# Patient Record
Sex: FEMALE | Race: WHITE | Employment: OTHER | URBAN - NONMETROPOLITAN AREA
[De-identification: names, ages, dates, MRNs, and addresses within clinical notes are randomized per-mention and may not be internally consistent; named-entity substitution may affect disease eponyms.]

---

## 2017-01-20 ENCOUNTER — TELEPHONE (OUTPATIENT)
Dept: GASTROENTEROLOGY | Age: 57
End: 2017-01-20

## 2017-11-01 ENCOUNTER — HOSPITAL ENCOUNTER (INPATIENT)
Facility: HOSPITAL | Age: 57
LOS: 6 days | Discharge: HOME | End: 2017-11-07
Attending: INTERNAL MEDICINE | Admitting: FAMILY MEDICINE

## 2017-11-01 ENCOUNTER — APPOINTMENT (OUTPATIENT)
Dept: GENERAL RADIOLOGY | Facility: HOSPITAL | Age: 57
End: 2017-11-01

## 2017-11-01 ENCOUNTER — APPOINTMENT (OUTPATIENT)
Dept: CT IMAGING | Facility: HOSPITAL | Age: 57
End: 2017-11-01

## 2017-11-01 DIAGNOSIS — B19.20 HEPATITIS C VIRUS INFECTION WITHOUT HEPATIC COMA, UNSPECIFIED CHRONICITY: ICD-10-CM

## 2017-11-01 DIAGNOSIS — Z74.09 IMPAIRED FUNCTIONAL MOBILITY, BALANCE, GAIT, AND ENDURANCE: ICD-10-CM

## 2017-11-01 DIAGNOSIS — I21.4 NSTEMI (NON-ST ELEVATED MYOCARDIAL INFARCTION) (HCC): Primary | ICD-10-CM

## 2017-11-01 DIAGNOSIS — R74.8 ELEVATED LIVER ENZYMES: ICD-10-CM

## 2017-11-01 LAB
ALBUMIN SERPL-MCNC: 3.3 G/DL (ref 3.5–5)
ALBUMIN/GLOB SERPL: 1.1 G/DL (ref 1.1–2.5)
ALP SERPL-CCNC: 98 U/L (ref 24–120)
ALT SERPL W P-5'-P-CCNC: 1236 U/L (ref 0–54)
AMMONIA BLD-SCNC: 21 UMOL/L (ref 9–33)
ANION GAP SERPL CALCULATED.3IONS-SCNC: 6 MMOL/L (ref 4–13)
APTT PPP: 33 SECONDS (ref 24.1–34.8)
ARTERIAL PATENCY WRIST A: ABNORMAL
AST SERPL-CCNC: 3036 U/L (ref 7–45)
ATMOSPHERIC PRESS: 748 MMHG
BASE EXCESS BLDA CALC-SCNC: 9.6 MMOL/L (ref 0–2)
BASOPHILS # BLD AUTO: 0.05 10*3/MM3 (ref 0–0.2)
BASOPHILS NFR BLD AUTO: 0.4 % (ref 0–2)
BDY SITE: ABNORMAL
BILIRUB SERPL-MCNC: 0.5 MG/DL (ref 0.1–1)
BODY TEMPERATURE: 37 C
BUN BLD-MCNC: 26 MG/DL (ref 5–21)
BUN/CREAT SERPL: 18.4 (ref 7–25)
CALCIUM SPEC-SCNC: 8.6 MG/DL (ref 8.4–10.4)
CHLORIDE SERPL-SCNC: 93 MMOL/L (ref 98–110)
CK MB SERPL-CCNC: 6.32 NG/ML (ref 0–5)
CK MB SERPL-RTO: 3.8 % (ref 0–5.7)
CK SERPL-CCNC: 166 U/L (ref 0–203)
CO2 SERPL-SCNC: 38 MMOL/L (ref 24–31)
COHGB MFR BLD: 3.3 % (ref 0–5)
CREAT BLD-MCNC: 1.41 MG/DL (ref 0.5–1.4)
DEPRECATED RDW RBC AUTO: 51.4 FL (ref 40–54)
EOSINOPHIL # BLD AUTO: 0.07 10*3/MM3 (ref 0–0.7)
EOSINOPHIL NFR BLD AUTO: 0.5 % (ref 0–4)
ERYTHROCYTE [DISTWIDTH] IN BLOOD BY AUTOMATED COUNT: 13.9 % (ref 12–15)
ETHANOL UR QL: <0.01 %
GAS FLOW AIRWAY: 2 LPM
GFR SERPL CREATININE-BSD FRML MDRD: 39 ML/MIN/1.73
GLOBULIN UR ELPH-MCNC: 3 GM/DL
GLUCOSE BLD-MCNC: 88 MG/DL (ref 70–100)
HAV IGM SERPL QL IA: NEGATIVE
HBV CORE IGM SERPL QL IA: NEGATIVE
HBV SURFACE AG SERPL QL IA: NEGATIVE
HCO3 BLDA-SCNC: 37.5 MMOL/L (ref 20–26)
HCT VFR BLD AUTO: 39.4 % (ref 37–47)
HCT VFR BLD CALC: 35.9 % (ref 38–51)
HCV AB SER DONR QL: REACTIVE
HCV S/C RATIO: 35.5 (ref 0–0.99)
HGB BLD-MCNC: 11.6 G/DL (ref 12–16)
HGB BLDA-MCNC: 11.7 G/DL (ref 13.5–17.5)
IMM GRANULOCYTES # BLD: 0.05 10*3/MM3 (ref 0–0.03)
IMM GRANULOCYTES NFR BLD: 0.4 % (ref 0–5)
INR PPP: 1.38 (ref 0.91–1.09)
LYMPHOCYTES # BLD AUTO: 2.11 10*3/MM3 (ref 0.72–4.86)
LYMPHOCYTES NFR BLD AUTO: 14.9 % (ref 15–45)
Lab: ABNORMAL
Lab: ABNORMAL
MAGNESIUM SERPL-MCNC: 1.9 MG/DL (ref 1.4–2.2)
MCH RBC QN AUTO: 29.9 PG (ref 28–32)
MCHC RBC AUTO-ENTMCNC: 29.4 G/DL (ref 33–36)
MCV RBC AUTO: 101.5 FL (ref 82–98)
METHGB BLD QL: 0.7 % (ref 0–3)
MODALITY: ABNORMAL
MONOCYTES # BLD AUTO: 1.66 10*3/MM3 (ref 0.19–1.3)
MONOCYTES NFR BLD AUTO: 11.7 % (ref 4–12)
MYOGLOBIN SERPL-MCNC: 165.2 NG/ML (ref 0–110)
NEUTROPHILS # BLD AUTO: 10.26 10*3/MM3 (ref 1.87–8.4)
NEUTROPHILS NFR BLD AUTO: 72.1 % (ref 39–78)
NOTIFIED BY: ABNORMAL
NOTIFIED WHO: ABNORMAL
NRBC BLD MANUAL-RTO: 0 /100 WBC (ref 0–0)
NT-PROBNP SERPL-MCNC: ABNORMAL PG/ML (ref 0–900)
OXYHGB MFR BLDV: 88.3 % (ref 94–99)
PCO2 BLDA: 68.2 MM HG (ref 35–45)
PH BLDA: 7.35 PH UNITS (ref 7.35–7.45)
PLATELET # BLD AUTO: 292 10*3/MM3 (ref 130–400)
PMV BLD AUTO: 10.7 FL (ref 6–12)
PO2 BLDA: 68.6 MM HG (ref 83–108)
POTASSIUM BLD-SCNC: 4.5 MMOL/L (ref 3.5–5.3)
POTASSIUM BLDA-SCNC: 4.3 MMOL/L (ref 3.5–5.2)
PROT SERPL-MCNC: 6.3 G/DL (ref 6.3–8.7)
PROTHROMBIN TIME: 17.4 SECONDS (ref 11.9–14.6)
RBC # BLD AUTO: 3.88 10*6/MM3 (ref 4.2–5.4)
SAO2 % BLDCOA: 91.9 % (ref 94–99)
SODIUM BLD-SCNC: 137 MMOL/L (ref 135–145)
SODIUM BLDA-SCNC: 137 MMOL/L (ref 136–145)
TROPONIN I SERPL-MCNC: 0.09 NG/ML (ref 0–0.03)
TROPONIN I SERPL-MCNC: 0.1 NG/ML (ref 0–0.03)
TROPONIN I SERPL-MCNC: 0.1 NG/ML (ref 0–0.03)
VENTILATOR MODE: ABNORMAL
WBC NRBC COR # BLD: 14.2 10*3/MM3 (ref 4.8–10.8)

## 2017-11-01 PROCEDURE — 36600 WITHDRAWAL OF ARTERIAL BLOOD: CPT

## 2017-11-01 PROCEDURE — 85060 BLOOD SMEAR INTERPRETATION: CPT | Performed by: INTERNAL MEDICINE

## 2017-11-01 PROCEDURE — 80074 ACUTE HEPATITIS PANEL: CPT | Performed by: PHYSICIAN ASSISTANT

## 2017-11-01 PROCEDURE — 93010 ELECTROCARDIOGRAM REPORT: CPT | Performed by: INTERNAL MEDICINE

## 2017-11-01 PROCEDURE — 25010000002 FUROSEMIDE PER 20 MG: Performed by: INTERNAL MEDICINE

## 2017-11-01 PROCEDURE — 83880 ASSAY OF NATRIURETIC PEPTIDE: CPT | Performed by: PHYSICIAN ASSISTANT

## 2017-11-01 PROCEDURE — 82805 BLOOD GASES W/O2 SATURATION: CPT

## 2017-11-01 PROCEDURE — 85730 THROMBOPLASTIN TIME PARTIAL: CPT | Performed by: PHYSICIAN ASSISTANT

## 2017-11-01 PROCEDURE — 25010000002 METHYLPREDNISOLONE PER 125 MG: Performed by: PHYSICIAN ASSISTANT

## 2017-11-01 PROCEDURE — 80307 DRUG TEST PRSMV CHEM ANLYZR: CPT | Performed by: INTERNAL MEDICINE

## 2017-11-01 PROCEDURE — 82550 ASSAY OF CK (CPK): CPT | Performed by: PHYSICIAN ASSISTANT

## 2017-11-01 PROCEDURE — 82375 ASSAY CARBOXYHB QUANT: CPT

## 2017-11-01 PROCEDURE — 94760 N-INVAS EAR/PLS OXIMETRY 1: CPT

## 2017-11-01 PROCEDURE — 84484 ASSAY OF TROPONIN QUANT: CPT | Performed by: INTERNAL MEDICINE

## 2017-11-01 PROCEDURE — 84484 ASSAY OF TROPONIN QUANT: CPT | Performed by: PHYSICIAN ASSISTANT

## 2017-11-01 PROCEDURE — 70450 CT HEAD/BRAIN W/O DYE: CPT

## 2017-11-01 PROCEDURE — 93005 ELECTROCARDIOGRAM TRACING: CPT

## 2017-11-01 PROCEDURE — 83735 ASSAY OF MAGNESIUM: CPT | Performed by: PHYSICIAN ASSISTANT

## 2017-11-01 PROCEDURE — 99284 EMERGENCY DEPT VISIT MOD MDM: CPT

## 2017-11-01 PROCEDURE — 94799 UNLISTED PULMONARY SVC/PX: CPT

## 2017-11-01 PROCEDURE — 85610 PROTHROMBIN TIME: CPT | Performed by: PHYSICIAN ASSISTANT

## 2017-11-01 PROCEDURE — 83874 ASSAY OF MYOGLOBIN: CPT | Performed by: PHYSICIAN ASSISTANT

## 2017-11-01 PROCEDURE — 82140 ASSAY OF AMMONIA: CPT | Performed by: PHYSICIAN ASSISTANT

## 2017-11-01 PROCEDURE — 94640 AIRWAY INHALATION TREATMENT: CPT

## 2017-11-01 PROCEDURE — 85025 COMPLETE CBC W/AUTO DIFF WBC: CPT | Performed by: PHYSICIAN ASSISTANT

## 2017-11-01 PROCEDURE — 82553 CREATINE MB FRACTION: CPT | Performed by: PHYSICIAN ASSISTANT

## 2017-11-01 PROCEDURE — 80053 COMPREHEN METABOLIC PANEL: CPT | Performed by: PHYSICIAN ASSISTANT

## 2017-11-01 PROCEDURE — 71010 HC CHEST PA OR AP: CPT

## 2017-11-01 PROCEDURE — 93005 ELECTROCARDIOGRAM TRACING: CPT | Performed by: PHYSICIAN ASSISTANT

## 2017-11-01 PROCEDURE — 83050 HGB METHEMOGLOBIN QUAN: CPT

## 2017-11-01 RX ORDER — FAMOTIDINE 20 MG/1
40 TABLET, FILM COATED ORAL DAILY
Status: DISCONTINUED | OUTPATIENT
Start: 2017-11-02 | End: 2017-11-07 | Stop reason: HOSPADM

## 2017-11-01 RX ORDER — CYCLOBENZAPRINE HCL 10 MG
10 TABLET ORAL 2 TIMES DAILY PRN
Status: ON HOLD | COMMUNITY
End: 2018-11-26

## 2017-11-01 RX ORDER — NICOTINE 21 MG/24HR
1 PATCH, TRANSDERMAL 24 HOURS TRANSDERMAL EVERY 24 HOURS
Status: DISCONTINUED | OUTPATIENT
Start: 2017-11-01 | End: 2017-11-07 | Stop reason: HOSPADM

## 2017-11-01 RX ORDER — RAMIPRIL 2.5 MG/1
2.5 CAPSULE ORAL DAILY
Status: DISCONTINUED | OUTPATIENT
Start: 2017-11-02 | End: 2017-11-04

## 2017-11-01 RX ORDER — SODIUM CHLORIDE 0.9 % (FLUSH) 0.9 %
1-10 SYRINGE (ML) INJECTION AS NEEDED
Status: DISCONTINUED | OUTPATIENT
Start: 2017-11-01 | End: 2017-11-07 | Stop reason: HOSPADM

## 2017-11-01 RX ORDER — ASPIRIN 325 MG
325 TABLET, DELAYED RELEASE (ENTERIC COATED) ORAL DAILY
Status: DISCONTINUED | OUTPATIENT
Start: 2017-11-02 | End: 2017-11-07 | Stop reason: HOSPADM

## 2017-11-01 RX ORDER — ASPIRIN 81 MG/1
324 TABLET, CHEWABLE ORAL ONCE
Status: COMPLETED | OUTPATIENT
Start: 2017-11-01 | End: 2017-11-01

## 2017-11-01 RX ORDER — IPRATROPIUM BROMIDE AND ALBUTEROL SULFATE 2.5; .5 MG/3ML; MG/3ML
3 SOLUTION RESPIRATORY (INHALATION) ONCE
Status: COMPLETED | OUTPATIENT
Start: 2017-11-01 | End: 2017-11-01

## 2017-11-01 RX ORDER — PANTOPRAZOLE SODIUM 40 MG/1
40 TABLET, DELAYED RELEASE ORAL
Status: DISCONTINUED | OUTPATIENT
Start: 2017-11-02 | End: 2017-11-07 | Stop reason: HOSPADM

## 2017-11-01 RX ORDER — POTASSIUM CHLORIDE 750 MG/1
20 CAPSULE, EXTENDED RELEASE ORAL DAILY
Status: DISCONTINUED | OUTPATIENT
Start: 2017-11-02 | End: 2017-11-07 | Stop reason: HOSPADM

## 2017-11-01 RX ORDER — OXYCODONE HYDROCHLORIDE 5 MG/1
5 TABLET ORAL EVERY 8 HOURS PRN
Status: DISCONTINUED | OUTPATIENT
Start: 2017-11-01 | End: 2017-11-07 | Stop reason: HOSPADM

## 2017-11-01 RX ORDER — IPRATROPIUM BROMIDE AND ALBUTEROL SULFATE 2.5; .5 MG/3ML; MG/3ML
3 SOLUTION RESPIRATORY (INHALATION)
Status: DISCONTINUED | OUTPATIENT
Start: 2017-11-01 | End: 2017-11-02

## 2017-11-01 RX ORDER — PANTOPRAZOLE SODIUM 40 MG/1
40 TABLET, DELAYED RELEASE ORAL DAILY
COMMUNITY

## 2017-11-01 RX ORDER — ONDANSETRON 2 MG/ML
4 INJECTION INTRAMUSCULAR; INTRAVENOUS EVERY 6 HOURS PRN
Status: DISCONTINUED | OUTPATIENT
Start: 2017-11-01 | End: 2017-11-07 | Stop reason: HOSPADM

## 2017-11-01 RX ORDER — VENLAFAXINE 75 MG/1
75 TABLET ORAL DAILY
Status: ON HOLD | COMMUNITY
End: 2018-09-29

## 2017-11-01 RX ORDER — BISACODYL 5 MG/1
5 TABLET, DELAYED RELEASE ORAL DAILY PRN
Status: DISCONTINUED | OUTPATIENT
Start: 2017-11-01 | End: 2017-11-07 | Stop reason: HOSPADM

## 2017-11-01 RX ORDER — VENLAFAXINE 37.5 MG/1
75 TABLET ORAL EVERY 12 HOURS SCHEDULED
Status: DISCONTINUED | OUTPATIENT
Start: 2017-11-01 | End: 2017-11-07 | Stop reason: HOSPADM

## 2017-11-01 RX ORDER — HYDROCODONE BITARTRATE AND ACETAMINOPHEN 7.5; 325 MG/1; MG/1
1 TABLET ORAL EVERY 6 HOURS PRN
COMMUNITY
End: 2018-09-28

## 2017-11-01 RX ORDER — GABAPENTIN 600 MG/1
600 TABLET ORAL 4 TIMES DAILY
COMMUNITY
End: 2018-09-28

## 2017-11-01 RX ORDER — FUROSEMIDE 10 MG/ML
40 INJECTION INTRAMUSCULAR; INTRAVENOUS 2 TIMES DAILY
Status: DISCONTINUED | OUTPATIENT
Start: 2017-11-01 | End: 2017-11-02

## 2017-11-01 RX ORDER — SPIRONOLACTONE 25 MG/1
25 TABLET ORAL DAILY
Status: ON HOLD | COMMUNITY
End: 2018-09-29

## 2017-11-01 RX ORDER — METHYLPREDNISOLONE SODIUM SUCCINATE 125 MG/2ML
125 INJECTION, POWDER, LYOPHILIZED, FOR SOLUTION INTRAMUSCULAR; INTRAVENOUS ONCE
Status: COMPLETED | OUTPATIENT
Start: 2017-11-01 | End: 2017-11-01

## 2017-11-01 RX ORDER — BUDESONIDE AND FORMOTEROL FUMARATE DIHYDRATE 160; 4.5 UG/1; UG/1
2 AEROSOL RESPIRATORY (INHALATION)
Status: DISCONTINUED | OUTPATIENT
Start: 2017-11-01 | End: 2017-11-07 | Stop reason: HOSPADM

## 2017-11-01 RX ORDER — SPIRONOLACTONE 25 MG/1
25 TABLET ORAL DAILY
Status: DISCONTINUED | OUTPATIENT
Start: 2017-11-02 | End: 2017-11-07 | Stop reason: HOSPADM

## 2017-11-01 RX ORDER — BUDESONIDE AND FORMOTEROL FUMARATE DIHYDRATE 160; 4.5 UG/1; UG/1
2 AEROSOL RESPIRATORY (INHALATION)
COMMUNITY

## 2017-11-01 RX ADMIN — IPRATROPIUM BROMIDE AND ALBUTEROL SULFATE 3 ML: 2.5; .5 SOLUTION RESPIRATORY (INHALATION) at 17:20

## 2017-11-01 RX ADMIN — METHYLPREDNISOLONE SODIUM SUCCINATE 125 MG: 125 INJECTION, POWDER, FOR SOLUTION INTRAMUSCULAR; INTRAVENOUS at 17:30

## 2017-11-01 RX ADMIN — FUROSEMIDE 40 MG: 10 INJECTION, SOLUTION INTRAMUSCULAR; INTRAVENOUS at 22:44

## 2017-11-01 RX ADMIN — METOPROLOL TARTRATE 12.5 MG: 25 TABLET, FILM COATED ORAL at 22:45

## 2017-11-01 RX ADMIN — OXYCODONE HYDROCHLORIDE 5 MG: 5 TABLET ORAL at 22:48

## 2017-11-01 RX ADMIN — IPRATROPIUM BROMIDE AND ALBUTEROL SULFATE 3 ML: 2.5; .5 SOLUTION RESPIRATORY (INHALATION) at 22:29

## 2017-11-01 RX ADMIN — ASPIRIN 81 MG 324 MG: 81 TABLET ORAL at 17:18

## 2017-11-01 RX ADMIN — VENLAFAXINE HYDROCHLORIDE 75 MG: 37.5 TABLET ORAL at 22:48

## 2017-11-01 RX ADMIN — BUDESONIDE AND FORMOTEROL FUMARATE DIHYDRATE 2 PUFF: 160; 4.5 AEROSOL RESPIRATORY (INHALATION) at 22:29

## 2017-11-01 RX ADMIN — NICOTINE 1 PATCH: 21 PATCH, EXTENDED RELEASE TRANSDERMAL at 22:47

## 2017-11-01 NOTE — ED PROVIDER NOTES
Subjective   History of Present Illness  57-year-old female presents chief complaint of chest pain.  Patient notes she has had chest pain for the past 3 days and also reports she has had chest pain on and off for the past month.  She reports her chest pain feels like a weight on her chest and is located in the center in the lower part.  Chest pink and be reproduced with palpation is not worse with exertion.  The patient was not she has been more short of breath recently but denies cough.  When she does cough however which is rare, it is productive of green phlegm.  Patient cannot tolerate exertion.  She notes she believes her ankles are swelling.  She is supposed to wear oxygen at night however she has been feeling so short of breath she has been wearing oxygen all day for the past 3-4 days.  Patient was noted to have an oxygen saturation in the 50s upon arrival because she was not wearing oxygen.  She was connected to 2 L on a nasal cannula is now satting 94%.  The significant other is in the room at this patient also notes the patient has had a difficulty with word finding and difficulty with walking.  They are concerned that she may have had a stroke.  Patient can move all extremities.  Patient has significant weakness of her lower extremities.  Review of Systems   All other systems reviewed and are negative.      Past Medical History:   Diagnosis Date   • CHF (congestive heart failure)    • COPD (chronic obstructive pulmonary disease)    • Pneumonia        Allergies   Allergen Reactions   • Codeine Nausea And Vomiting       Past Surgical History:   Procedure Laterality Date   • CARPAL TUNNEL RELEASE     • HYSTERECTOMY      complete       Family History   Problem Relation Age of Onset   • Cancer Mother    • Heart disease Father    • Cancer Sister    • Heart disease Brother        Social History     Social History   • Marital status:      Spouse name: N/A   • Number of children: N/A   • Years of education:  N/A     Social History Main Topics   • Smoking status: Current Every Day Smoker     Packs/day: 0.50     Types: Cigarettes   • Smokeless tobacco: Never Used   • Alcohol use No   • Drug use: No   • Sexual activity: Defer     Other Topics Concern   • None     Social History Narrative   • None           Objective   Physical Exam   Constitutional: She is oriented to person, place, and time. She appears well-developed.   HENT:   Head: Normocephalic.   Eyes: Pupils are equal, round, and reactive to light.   Neck: Normal range of motion.   Cardiovascular: Normal rate and regular rhythm.    Pulmonary/Chest: Effort normal. She has wheezes.   Abdominal: Soft.   hepatomegaly   Musculoskeletal:   Extremity weakness, bilateral legs   Neurological: She is alert and oriented to person, place, and time.   Skin: Skin is warm.   Psychiatric: She has a normal mood and affect. Her behavior is normal.   Nursing note and vitals reviewed.      Procedures         ED Course  ED Course                  MDM  Number of Diagnoses or Management Options  Diagnosis management comments: Will admit to hospitalist, Dr. Zepeda.  Patient has an elevated troponin, elevated liver enzymes.  Patient has a history of Hepatitis C.         Amount and/or Complexity of Data Reviewed  Decide to obtain previous medical records or to obtain history from someone other than the patient: yes        Final diagnoses:   NSTEMI (non-ST elevated myocardial infarction)   Elevated liver enzymes   Hepatitis C virus infection without hepatic coma, unspecified chronicity            Saurav Hernandez PA-C  11/01/17 5839

## 2017-11-02 ENCOUNTER — APPOINTMENT (OUTPATIENT)
Dept: CARDIOLOGY | Facility: HOSPITAL | Age: 57
End: 2017-11-02
Attending: INTERNAL MEDICINE

## 2017-11-02 ENCOUNTER — APPOINTMENT (OUTPATIENT)
Dept: CT IMAGING | Facility: HOSPITAL | Age: 57
End: 2017-11-02

## 2017-11-02 ENCOUNTER — APPOINTMENT (OUTPATIENT)
Dept: ULTRASOUND IMAGING | Facility: HOSPITAL | Age: 57
End: 2017-11-02

## 2017-11-02 ENCOUNTER — APPOINTMENT (OUTPATIENT)
Dept: MRI IMAGING | Facility: HOSPITAL | Age: 57
End: 2017-11-02

## 2017-11-02 LAB
ALBUMIN SERPL-MCNC: 4 G/DL (ref 3.5–5)
ALBUMIN/GLOB SERPL: 1.3 G/DL (ref 1.1–2.5)
ALP SERPL-CCNC: 110 U/L (ref 24–120)
ALT SERPL W P-5'-P-CCNC: 1454 U/L (ref 0–54)
AMPHET+METHAMPHET UR QL: NEGATIVE
AMYLASE SERPL-CCNC: 50 U/L (ref 30–110)
ANION GAP SERPL CALCULATED.3IONS-SCNC: 14 MMOL/L (ref 4–13)
ARTERIAL PATENCY WRIST A: POSITIVE
ARTICHOKE IGE QN: 114 MG/DL (ref 0–99)
AST SERPL-CCNC: 2453 U/L (ref 7–45)
ATMOSPHERIC PRESS: 748 MMHG
BARBITURATES UR QL SCN: NEGATIVE
BASE EXCESS BLDA CALC-SCNC: 17.3 MMOL/L (ref 0–2)
BDY SITE: ABNORMAL
BENZODIAZ UR QL SCN: POSITIVE
BH CV ECHO MEAS - AO MAX PG (FULL): 3.6 MMHG
BH CV ECHO MEAS - AO MAX PG: 9.5 MMHG
BH CV ECHO MEAS - AO MEAN PG (FULL): 0.33 MMHG
BH CV ECHO MEAS - AO MEAN PG: 3.3 MMHG
BH CV ECHO MEAS - AO ROOT AREA (BSA CORRECTED): 1.7
BH CV ECHO MEAS - AO ROOT AREA: 4.9 CM^2
BH CV ECHO MEAS - AO ROOT DIAM: 2.5 CM
BH CV ECHO MEAS - AO V2 MAX: 154 CM/SEC
BH CV ECHO MEAS - AO V2 MEAN: 82.3 CM/SEC
BH CV ECHO MEAS - AO V2 VTI: 24.9 CM
BH CV ECHO MEAS - AVA(I,A): 2.7 CM^2
BH CV ECHO MEAS - AVA(I,D): 2.7 CM^2
BH CV ECHO MEAS - AVA(V,A): 2.4 CM^2
BH CV ECHO MEAS - AVA(V,D): 2.4 CM^2
BH CV ECHO MEAS - BSA(HAYCOCK): 1.5 M^2
BH CV ECHO MEAS - BSA: 1.5 M^2
BH CV ECHO MEAS - BZI_BMI: 21.7 KILOGRAMS/M^2
BH CV ECHO MEAS - BZI_METRIC_HEIGHT: 152.4 CM
BH CV ECHO MEAS - BZI_METRIC_WEIGHT: 50.3 KG
BH CV ECHO MEAS - CONTRAST EF (2CH): 54 ML/M^2
BH CV ECHO MEAS - CONTRAST EF 4CH: 56.4 ML/M^2
BH CV ECHO MEAS - EDV(CUBED): 56.6 ML
BH CV ECHO MEAS - EDV(MOD-SP2): 35 ML
BH CV ECHO MEAS - EDV(MOD-SP4): 47 ML
BH CV ECHO MEAS - EDV(TEICH): 63.5 ML
BH CV ECHO MEAS - EF(CUBED): 74.3 %
BH CV ECHO MEAS - EF(MOD-SP2): 54 %
BH CV ECHO MEAS - EF(MOD-SP4): 56.4 %
BH CV ECHO MEAS - EF(TEICH): 66.9 %
BH CV ECHO MEAS - ESV(CUBED): 14.5 ML
BH CV ECHO MEAS - ESV(MOD-SP2): 16.1 ML
BH CV ECHO MEAS - ESV(MOD-SP4): 20.5 ML
BH CV ECHO MEAS - ESV(TEICH): 21 ML
BH CV ECHO MEAS - FS: 36.5 %
BH CV ECHO MEAS - IVS/LVPW: 0.8
BH CV ECHO MEAS - IVSD: 0.57 CM
BH CV ECHO MEAS - LA DIMENSION: 2.9 CM
BH CV ECHO MEAS - LA/AO: 1.2
BH CV ECHO MEAS - LAT PEAK E' VEL: 10.3 CM/SEC
BH CV ECHO MEAS - LV DIASTOLIC VOL/BSA (35-75): 32.3 ML/M^2
BH CV ECHO MEAS - LV MASS(C)D: 65.4 GRAMS
BH CV ECHO MEAS - LV MASS(C)DI: 45 GRAMS/M^2
BH CV ECHO MEAS - LV MAX PG: 5.9 MMHG
BH CV ECHO MEAS - LV MEAN PG: 3 MMHG
BH CV ECHO MEAS - LV SYSTOLIC VOL/BSA (12-30): 14.1 ML/M^2
BH CV ECHO MEAS - LV V1 MAX: 121.7 CM/SEC
BH CV ECHO MEAS - LV V1 MEAN: 74.8 CM/SEC
BH CV ECHO MEAS - LV V1 VTI: 22.5 CM
BH CV ECHO MEAS - LVIDD: 3.8 CM
BH CV ECHO MEAS - LVIDS: 2.4 CM
BH CV ECHO MEAS - LVLD AP2: 5.7 CM
BH CV ECHO MEAS - LVLD AP4: 6.2 CM
BH CV ECHO MEAS - LVLS AP2: 5.4 CM
BH CV ECHO MEAS - LVLS AP4: 5.1 CM
BH CV ECHO MEAS - LVOT AREA (M): 3.1 CM^2
BH CV ECHO MEAS - LVOT AREA: 3 CM^2
BH CV ECHO MEAS - LVOT DIAM: 2 CM
BH CV ECHO MEAS - LVPWD: 0.71 CM
BH CV ECHO MEAS - MED PEAK E' VEL: 10.5 CM/SEC
BH CV ECHO MEAS - MV A MAX VEL: 102 CM/SEC
BH CV ECHO MEAS - MV DEC TIME: 0.25 SEC
BH CV ECHO MEAS - MV E MAX VEL: 73.3 CM/SEC
BH CV ECHO MEAS - MV E/A: 0.72
BH CV ECHO MEAS - RAP SYSTOLE: 5 MMHG
BH CV ECHO MEAS - RVSP: 54 MMHG
BH CV ECHO MEAS - SI(AO): 84.2 ML/M^2
BH CV ECHO MEAS - SI(CUBED): 29 ML/M^2
BH CV ECHO MEAS - SI(LVOT): 46.2 ML/M^2
BH CV ECHO MEAS - SI(MOD-SP2): 13 ML/M^2
BH CV ECHO MEAS - SI(MOD-SP4): 18.2 ML/M^2
BH CV ECHO MEAS - SI(TEICH): 29.2 ML/M^2
BH CV ECHO MEAS - SV(AO): 122.4 ML
BH CV ECHO MEAS - SV(CUBED): 42.1 ML
BH CV ECHO MEAS - SV(LVOT): 67.2 ML
BH CV ECHO MEAS - SV(MOD-SP2): 18.9 ML
BH CV ECHO MEAS - SV(MOD-SP4): 26.5 ML
BH CV ECHO MEAS - SV(TEICH): 42.5 ML
BH CV ECHO MEAS - TR MAX VEL: 350 CM/SEC
BILIRUB SERPL-MCNC: 0.7 MG/DL (ref 0.1–1)
BILIRUB UR QL STRIP: NEGATIVE
BODY TEMPERATURE: 37 C
BUN BLD-MCNC: 26 MG/DL (ref 5–21)
BUN/CREAT SERPL: 19.4 (ref 7–25)
CALCIUM SPEC-SCNC: 9.2 MG/DL (ref 8.4–10.4)
CANNABINOIDS SERPL QL: NEGATIVE
CHLORIDE SERPL-SCNC: 87 MMOL/L (ref 98–110)
CHOLEST SERPL-MCNC: 171 MG/DL (ref 130–200)
CLARITY UR: CLEAR
CO2 SERPL-SCNC: 39 MMOL/L (ref 24–31)
COCAINE UR QL: NEGATIVE
COLOR UR: YELLOW
CREAT BLD-MCNC: 1.34 MG/DL (ref 0.5–1.4)
CYTOLOGIST CVX/VAG CYTO: NORMAL
D-LACTATE SERPL-SCNC: 1.5 MMOL/L (ref 0.5–2)
D-LACTATE SERPL-SCNC: 5.6 MMOL/L (ref 0.5–2)
E/E' RATIO: 7.1
FERRITIN SERPL-MCNC: 76.3 NG/ML (ref 11.1–264)
GFR SERPL CREATININE-BSD FRML MDRD: 41 ML/MIN/1.73
GLOBULIN UR ELPH-MCNC: 3.1 GM/DL
GLUCOSE BLD-MCNC: 153 MG/DL (ref 70–100)
GLUCOSE UR STRIP-MCNC: NEGATIVE MG/DL
HBA1C MFR BLD: 5.2 %
HCO3 BLDA-SCNC: 43.2 MMOL/L (ref 20–26)
HDLC SERPL-MCNC: 52 MG/DL
HGB UR QL STRIP.AUTO: NEGATIVE
HOLD SPECIMEN: NORMAL
KETONES UR QL STRIP: NEGATIVE
LDLC/HDLC SERPL: 1.83 {RATIO}
LEFT ATRIUM VOLUME INDEX: 22.8 ML/M2
LEFT ATRIUM VOLUME: 33.1 CM3
LEUKOCYTE ESTERASE UR QL STRIP.AUTO: NEGATIVE
LIPASE SERPL-CCNC: 77 U/L (ref 23–203)
LV EF 2D ECHO EST: 55 %
Lab: ABNORMAL
Lab: ABNORMAL
MAGNESIUM SERPL-MCNC: 1.7 MG/DL (ref 1.4–2.2)
MAXIMAL PREDICTED HEART RATE: 165 BPM
METHADONE UR QL SCN: NEGATIVE
MODALITY: ABNORMAL
NITRITE UR QL STRIP: NEGATIVE
NOTIFIED BY: ABNORMAL
NOTIFIED WHO: ABNORMAL
NT-PROBNP SERPL-MCNC: ABNORMAL PG/ML (ref 0–900)
OPIATES UR QL: NEGATIVE
PATH INTERP BLD-IMP: NORMAL
PCO2 BLDA: 55.9 MM HG (ref 35–45)
PCP UR QL SCN: NEGATIVE
PH BLDA: 7.5 PH UNITS (ref 7.35–7.45)
PH UR STRIP.AUTO: 7 [PH] (ref 5–8)
PHOSPHATE SERPL-MCNC: 3.8 MG/DL (ref 2.5–4.5)
PO2 BLDA: 47.8 MM HG (ref 83–108)
POTASSIUM BLD-SCNC: 3.9 MMOL/L (ref 3.5–5.3)
PROT SERPL-MCNC: 7.1 G/DL (ref 6.3–8.7)
PROT UR QL STRIP: NEGATIVE
SAO2 % BLDCOA: 84.5 % (ref 94–99)
SODIUM BLD-SCNC: 140 MMOL/L (ref 135–145)
SP GR UR STRIP: 1.01 (ref 1–1.03)
STRESS TARGET HR: 140 BPM
T3FREE SERPL-MCNC: 2.72 PG/ML (ref 2.77–5.27)
T4 FREE SERPL-MCNC: 1.59 NG/DL (ref 0.78–2.19)
TRIGL SERPL-MCNC: 119 MG/DL (ref 0–149)
TROPONIN I SERPL-MCNC: 0.06 NG/ML (ref 0–0.03)
TROPONIN I SERPL-MCNC: 0.08 NG/ML (ref 0–0.03)
TSH SERPL DL<=0.05 MIU/L-ACNC: 0.31 MIU/ML (ref 0.47–4.68)
UROBILINOGEN UR QL STRIP: NORMAL
VENTILATOR MODE: ABNORMAL

## 2017-11-02 PROCEDURE — 82728 ASSAY OF FERRITIN: CPT | Performed by: INTERNAL MEDICINE

## 2017-11-02 PROCEDURE — 84481 FREE ASSAY (FT-3): CPT | Performed by: NURSE PRACTITIONER

## 2017-11-02 PROCEDURE — 80307 DRUG TEST PRSMV CHEM ANLYZR: CPT | Performed by: INTERNAL MEDICINE

## 2017-11-02 PROCEDURE — 82803 BLOOD GASES ANY COMBINATION: CPT

## 2017-11-02 PROCEDURE — 0 IOHEXOL 300 MG/ML SOLUTION: Performed by: INTERNAL MEDICINE

## 2017-11-02 PROCEDURE — 84484 ASSAY OF TROPONIN QUANT: CPT | Performed by: INTERNAL MEDICINE

## 2017-11-02 PROCEDURE — G8979 MOBILITY GOAL STATUS: HCPCS

## 2017-11-02 PROCEDURE — 36600 WITHDRAWAL OF ARTERIAL BLOOD: CPT

## 2017-11-02 PROCEDURE — 74177 CT ABD & PELVIS W/CONTRAST: CPT

## 2017-11-02 PROCEDURE — 83036 HEMOGLOBIN GLYCOSYLATED A1C: CPT | Performed by: INTERNAL MEDICINE

## 2017-11-02 PROCEDURE — 84443 ASSAY THYROID STIM HORMONE: CPT | Performed by: INTERNAL MEDICINE

## 2017-11-02 PROCEDURE — 82150 ASSAY OF AMYLASE: CPT | Performed by: INTERNAL MEDICINE

## 2017-11-02 PROCEDURE — 80061 LIPID PANEL: CPT | Performed by: INTERNAL MEDICINE

## 2017-11-02 PROCEDURE — 25010000002 ENOXAPARIN PER 10 MG: Performed by: INTERNAL MEDICINE

## 2017-11-02 PROCEDURE — 84439 ASSAY OF FREE THYROXINE: CPT | Performed by: NURSE PRACTITIONER

## 2017-11-02 PROCEDURE — 83605 ASSAY OF LACTIC ACID: CPT | Performed by: INTERNAL MEDICINE

## 2017-11-02 PROCEDURE — 94799 UNLISTED PULMONARY SVC/PX: CPT

## 2017-11-02 PROCEDURE — 80053 COMPREHEN METABOLIC PANEL: CPT | Performed by: INTERNAL MEDICINE

## 2017-11-02 PROCEDURE — 93306 TTE W/DOPPLER COMPLETE: CPT | Performed by: INTERNAL MEDICINE

## 2017-11-02 PROCEDURE — 25010000002 FUROSEMIDE PER 20 MG: Performed by: INTERNAL MEDICINE

## 2017-11-02 PROCEDURE — 83880 ASSAY OF NATRIURETIC PEPTIDE: CPT | Performed by: INTERNAL MEDICINE

## 2017-11-02 PROCEDURE — 93005 ELECTROCARDIOGRAM TRACING: CPT | Performed by: INTERNAL MEDICINE

## 2017-11-02 PROCEDURE — G8978 MOBILITY CURRENT STATUS: HCPCS

## 2017-11-02 PROCEDURE — 81003 URINALYSIS AUTO W/O SCOPE: CPT | Performed by: PHYSICIAN ASSISTANT

## 2017-11-02 PROCEDURE — 93306 TTE W/DOPPLER COMPLETE: CPT

## 2017-11-02 PROCEDURE — 76700 US EXAM ABDOM COMPLETE: CPT

## 2017-11-02 PROCEDURE — 97161 PT EVAL LOW COMPLEX 20 MIN: CPT

## 2017-11-02 PROCEDURE — 83735 ASSAY OF MAGNESIUM: CPT | Performed by: INTERNAL MEDICINE

## 2017-11-02 PROCEDURE — 93010 ELECTROCARDIOGRAM REPORT: CPT | Performed by: INTERNAL MEDICINE

## 2017-11-02 PROCEDURE — 84100 ASSAY OF PHOSPHORUS: CPT | Performed by: INTERNAL MEDICINE

## 2017-11-02 PROCEDURE — 99222 1ST HOSP IP/OBS MODERATE 55: CPT | Performed by: INTERNAL MEDICINE

## 2017-11-02 PROCEDURE — 0 IOPAMIDOL 61 % SOLUTION: Performed by: INTERNAL MEDICINE

## 2017-11-02 PROCEDURE — 83690 ASSAY OF LIPASE: CPT | Performed by: INTERNAL MEDICINE

## 2017-11-02 PROCEDURE — 82105 ALPHA-FETOPROTEIN SERUM: CPT | Performed by: INTERNAL MEDICINE

## 2017-11-02 PROCEDURE — 94760 N-INVAS EAR/PLS OXIMETRY 1: CPT

## 2017-11-02 RX ORDER — FUROSEMIDE 40 MG/1
40 TABLET ORAL DAILY
Status: DISCONTINUED | OUTPATIENT
Start: 2017-11-02 | End: 2017-11-04

## 2017-11-02 RX ORDER — IPRATROPIUM BROMIDE AND ALBUTEROL SULFATE 2.5; .5 MG/3ML; MG/3ML
3 SOLUTION RESPIRATORY (INHALATION)
Status: DISCONTINUED | OUTPATIENT
Start: 2017-11-02 | End: 2017-11-07 | Stop reason: HOSPADM

## 2017-11-02 RX ADMIN — METOPROLOL TARTRATE 12.5 MG: 25 TABLET, FILM COATED ORAL at 09:25

## 2017-11-02 RX ADMIN — METOPROLOL TARTRATE 12.5 MG: 25 TABLET, FILM COATED ORAL at 21:01

## 2017-11-02 RX ADMIN — OXYCODONE HYDROCHLORIDE 5 MG: 5 TABLET ORAL at 21:00

## 2017-11-02 RX ADMIN — IOHEXOL 50 ML: 300 INJECTION, SOLUTION INTRAVENOUS at 15:53

## 2017-11-02 RX ADMIN — IPRATROPIUM BROMIDE AND ALBUTEROL SULFATE 3 ML: 2.5; .5 SOLUTION RESPIRATORY (INHALATION) at 19:54

## 2017-11-02 RX ADMIN — IPRATROPIUM BROMIDE AND ALBUTEROL SULFATE 3 ML: 2.5; .5 SOLUTION RESPIRATORY (INHALATION) at 00:20

## 2017-11-02 RX ADMIN — FUROSEMIDE 40 MG: 10 INJECTION, SOLUTION INTRAMUSCULAR; INTRAVENOUS at 09:30

## 2017-11-02 RX ADMIN — PANTOPRAZOLE SODIUM 40 MG: 40 TABLET, DELAYED RELEASE ORAL at 09:24

## 2017-11-02 RX ADMIN — IPRATROPIUM BROMIDE AND ALBUTEROL SULFATE 3 ML: 2.5; .5 SOLUTION RESPIRATORY (INHALATION) at 11:06

## 2017-11-02 RX ADMIN — SPIRONOLACTONE 25 MG: 25 TABLET ORAL at 09:24

## 2017-11-02 RX ADMIN — BUDESONIDE AND FORMOTEROL FUMARATE DIHYDRATE 2 PUFF: 160; 4.5 AEROSOL RESPIRATORY (INHALATION) at 07:08

## 2017-11-02 RX ADMIN — POTASSIUM CHLORIDE 20 MEQ: 750 CAPSULE, EXTENDED RELEASE ORAL at 09:24

## 2017-11-02 RX ADMIN — ASPIRIN 325 MG: 325 TABLET, COATED ORAL at 09:24

## 2017-11-02 RX ADMIN — IOPAMIDOL 100 ML: 612 INJECTION, SOLUTION INTRAVENOUS at 18:00

## 2017-11-02 RX ADMIN — IPRATROPIUM BROMIDE AND ALBUTEROL SULFATE 3 ML: 2.5; .5 SOLUTION RESPIRATORY (INHALATION) at 15:00

## 2017-11-02 RX ADMIN — FAMOTIDINE 40 MG: 20 TABLET, FILM COATED ORAL at 09:24

## 2017-11-02 RX ADMIN — VENLAFAXINE HYDROCHLORIDE 75 MG: 37.5 TABLET ORAL at 21:00

## 2017-11-02 RX ADMIN — IPRATROPIUM BROMIDE AND ALBUTEROL SULFATE 3 ML: 2.5; .5 SOLUTION RESPIRATORY (INHALATION) at 07:08

## 2017-11-02 RX ADMIN — BUDESONIDE AND FORMOTEROL FUMARATE DIHYDRATE 2 PUFF: 160; 4.5 AEROSOL RESPIRATORY (INHALATION) at 19:54

## 2017-11-02 RX ADMIN — OXYCODONE HYDROCHLORIDE 5 MG: 5 TABLET ORAL at 11:16

## 2017-11-02 NOTE — PLAN OF CARE
Problem: Patient Care Overview (Adult)  Goal: Plan of Care Review  Outcome: Ongoing (interventions implemented as appropriate)    11/02/17 0503   Coping/Psychosocial Response Interventions   Plan Of Care Reviewed With patient   Outcome Evaluation   Outcome Summary/Follow up Plan Initial nutrition assessment. Pt reports fair appetite. No wt loss noted. Educated pt on low Na+ diet recommendations. Will cont to follow.    Patient Care Overview   Progress no change

## 2017-11-02 NOTE — THERAPY EVALUATION
Acute Care - Physical Therapy Initial Evaluation  [facility]     Patient Name: Teri Tim  : 1960  MRN: [id number]  Today's Date: 2017   Onset of Illness/Injury or Date of Surgery Date: 17  Date of Referral to PT: 17  Referring Physician: Dr. Zepeda      Admit Date: 2017     Visit Dx:    ICD-10-CM ICD-9-CM   1. NSTEMI (non-ST elevated myocardial infarction) I21.4 410.70   2. Elevated liver enzymes R74.8 790.5   3. Hepatitis C virus infection without hepatic coma, unspecified chronicity B19.20 070.70   4. Impaired functional mobility, balance, gait, and endurance Z74.09 V49.89     Patient Active Problem List   Diagnosis   • NSTEMI (non-ST elevated myocardial infarction)     Past Medical History:   Diagnosis Date   • CHF (congestive heart failure)    • COPD (chronic obstructive pulmonary disease)    • Pneumonia      Past Surgical History:   Procedure Laterality Date   • CARPAL TUNNEL RELEASE     • HYSTERECTOMY      complete          PT ASSESSMENT (last 72 hours)      PT Evaluation       17 1031 17 1002    Rehab Evaluation    Document Type evaluation   See MAR  -LYNETTE (r) AB (t) LYNETTE (c)     Subjective Information agree to therapy;complains of;weakness;fatigue;pain;dizziness;numbness   intermittent numbness in hands/feet  -LYNETTE (r) AB (t) LYNETTE (c)     General Information    Patient Profile Review yes  -LYNETTE (r) AB (t) LYNETTE (c)     Onset of Illness/Injury or Date of Surgery Date 17  -LYNETTE (r) AB (t) LYNETTE (c)     Referring Physician Dr. Zepeda  -LYNETTE (r) AB (t) LYNETTE (c)     General Observations supine, alert, supplemental O2  -LYNETTE (r) AB (t) LYNETTE (c)     Pertinent History Of Current Problem c/c cp, SOB, wheezing, and swelling.  US abdomen: 14x8mm mass centered @ inf hepatic margin & gallbladder wall.  CXR: ephysema. Dx: NSTEMI  -LYNETTE (r) AB (t) LYNETTE (c)     Precautions/Limitations fall precautions  -LYNETTE (r) AB (t) LYNETTE (c)     Prior Level of Function independent:;all household mobility;ADL's   -LYNETTE (r) AB (t) LYNETTE (c)     Equipment Currently Used at Home oxygen  -LYNETTE (r) AB (t) LYNETTE (c) bipap/ cpap;oxygen  -FAREED    Plans/Goals Discussed With patient;agreed upon  -LYNETTE (r) AB (t) LYNETTE (c)     Risks Reviewed patient:;LOB;nausea/vomiting;dizziness  -LYNETTE (r) AB (t) LYNETTE (c)     Benefits Reviewed patient:;increase independence;improve function;increase strength;increase balance;decrease pain;increase knowledge  -LYNETTE (r) AB (t) LYNETTE (c)     Barriers to Rehab none identified  -LYNETTE (r) AB (t) LYNETTE (c)     Living Environment    Lives With spouse  -LYNETTE (r) AB (t) LYNETTE (c) spouse  -FAREED    Living Arrangements apartment  -LYNETTE (r) AB (t) LYNETTE (c) apartment  -FAREED    Home Accessibility bed and bath on same level;tub/shower is not walk in   have tub & walk-in  -LYNETTE (r) AB (t) LYNETTE (c)     Type of Financial/Environmental Concern  none  -FAREED    Transportation Available  family or friend will provide  -FAREED    Clinical Impression    Date of Referral to PT 11/01/17  -LYNETTE (r) AB (t) LYNETTE (c)     PT Diagnosis impaired balance  -LYNETTE (r) AB (t) LYNETTE (c)     Patient/Family Goals Statement return home  -LYNETTE (r) AB (t) LYNETTE (c)     Criteria for Skilled Therapeutic Interventions Met yes;treatment indicated  -LYNETTE (r) AB (t) LYNETTE (c)     Impairments Found (describe specific impairments) gait, locomotion, and balance;muscle performance  -LYNETTE (r) AB (t) LYNETTE (c)     Rehab Potential good, to achieve stated therapy goals  -LYNETTE (r) AB (t) LYNETTE (c)     Predicted Duration of Therapy Intervention (days/wks) until d/c  -LYNETTE (r) AB (t) LYNETTE (c)     Pain Assessment    Pain Assessment 0-10  -LYNETTE (r) AB (t) LYNETTE (c)     Pain Score 10  -LYNETTE (r) AB (t) LYNETTE (c)     Pain Location Back  -LYNETTE (r) AB (t) LYNETTE (c)     Pain Intervention(s) Repositioned;Ambulation/increased activity  -LYNETTE (r) AB (t) LYNETTE (c)     Response to Interventions tolerated  -LYNTETE (r) AB (t) LYNETTE (c)     Vision Assessment/Intervention    Visual Impairment WFL  -LYNETTE (r) AB (t) LYNETTE (c)     Cognitive Assessment/Intervention    Current Cognitive/Communication  Assessment functional  -LYNETTE (r) AB (t) LYNETTE (c)     Orientation Status oriented x 4  -LYNETTE (r) AB (t) LYNETTE (c)     Follows Commands/Answers Questions able to follow multi-step instructions;100% of the time  -LYNETTE (r) AB (t) LYNETTE (c)     Personal Safety decreased awareness, need for safety  -LYNETTE (r) AB (t) LYNETTE (c)     Personal Safety Interventions fall prevention program maintained;gait belt;muscle strengthening facilitated;nonskid shoes/slippers when out of bed;supervised activity  -LYNETTE (r) AB (t) LYNETTE (c)     ROM (Range of Motion)    General ROM Detail B UE AROM WFL. B LE AROM impaired ~25-50%  -LYNETTE     MMT (Manual Muscle Testing)    General MMT Assessment Detail B UE/LE 3+/5  -LYNETTE (r) AB (t) LYNETTE (c)     Bed Mobility, Assessment/Treatment    Bed Mob, Supine to Sit, [place] supervision required  -LYNETTE (r) AB (t) LYNETTE (c)     Bed Mob, Sit to Supine, [place] supervision required  -LYNETTE (r) AB (t) LYNETTE (c)     Bed Mobility, Impairments pain  -LYNETTE (r) AB (t) LYNETTE (c)     Transfer Assessment/Treatment    Transfers, Sit-Stand [place] supervision required  -LYNETTE (r) AB (t) LYNETTE (c)     Transfers, Stand-Sit [place] supervision required  -LYNETTE (r) AB (t) LYNETTE (c)     Transfer, Impairments pain  -LYNETTE (r) AB (t) LYNETTE (c)     Gait Assessment/Treatment    Gait, [place] Level contact guard assist;hand held assist  -LYNETTE (r) AB (t) LYNETTE (c)     Gait, Distance (Feet) 12  -LYNETTE (r) AB (t) LYNETTE (c)     Gait, Gait Pattern Analysis swing-to gait  -LYNETTE (r) AB (t) LYNETTE (c)     Gait, Gait Deviations bilateral:;katja decreased;double stance time increased;narrow base;step length decreased;weight-shifting ability decreased  -LYNETTE (r) AB (t) LYNETTE (c)     Gait, Safety Issues step length decreased;balance decreased during turns;weight-shifting ability decreased;supplemental O2  -LYNETTE (r) AB (t) LYNETTE (c)     Gait, Impairments ROM decreased;strength decreased;impaired balance;pain  -LYNETTE (r) AB (t) LYNETTE (c)     Motor Skills/Interventions    Additional Documentation Balance Skills  Training (Group)  -LYNETTE (r) AB (t) LYNETTE (c)     Balance Skills Training    Sitting-Level of Assistance Distant supervision  -LYNETTE (r) AB (t) LYNETTE (c)     Sitting-Balance Support Feet supported  -LYNETTE (r) AB (t) LYNETTE (c)     Standing-Level of Assistance Contact guard  -LYNETTE (r) AB (t) LYNETTE (c)     Static Standing Balance Support Left upper extremity supported  -LYNETTE (r) AB (t) LYNETTE (c)     Gait Balance-Level of Assistance Contact guard  -LYNETTE (r) AB (t) LYNETTE (c)     Gait Balance Support Right upper extremity supported;Left upper extremity supported  -LYNETTE (r) AB (t) LYNETTE (c)     Sensory Assessment/Intervention    Light Touch LUE;RUE;LLE;RLE  -LYNETTE (r) AB (t) LYNETTE (c)     LUE Light Touch WNL  -LYNETTE (r) AB (t) LYNETTE (c)     RUE Light Touch WNL  -LYNETTE (r) AB (t) LYNETTE (c)     LLE Light Touch WNL  -LYNETTE (r) AB (t) LYNETTE (c)     RLE Light Touch WNL  -LYNETTE (r) AB (t) LYNETTE (c)     Positioning and Restraints    Pre-Treatment Position in bed  -LYNETTE (r) AB (t) LYNETTE (c)     Post Treatment Position bed  -LYNETTE (r) AB (t) LYNETTE (c)     In Bed supine;call light within reach;encouraged to call for assist;side rails up x2  -LYNETTE (r) AB (t) LYNETTE (c)       11/02/17 0100 11/01/17 2321    Living Environment    Lives With spouse  -SJ     Living Arrangements house  -SJ     Home Accessibility no concerns  -SJ     Stair Railings at Home none  -SJ     Type of Financial/Environmental Concern none  -SJ     Transportation Available car  -SJ     Muscle Tone Assessment    Muscle Tone Assessment  Bilateral Lower Extremities  -SJ      11/01/17 2214       General Information    Equipment Currently Used at Home bipap/ cpap;oxygen  -SJ       User Key  (r) = Recorded By, (t) = Taken By, (c) = Cosigned By    Initials Name Provider Type    LYNETTE Del Real, PT DPT Physical Therapist    PALOMA Rust, RN Registered Nurse    FAREED Manley, MSW     AB Sonia Iyer, PT Student PT Student          Physical Therapy Education     Title: PT OT SLP Therapies (Active)     Topic: Physical Therapy (Active)      Point: Mobility training (Done)    Learning Progress Summary    Learner Readiness Method Response Comment Documented by Status   Patient Acceptance E VU Educated pt on benefits of activity and planned PT POC. AB 11/02/17 1102 Done                      User Key     Initials Effective Dates Name Provider Type Discipline    AB 05/08/17 -  Sonia Iyer, PT Student PT Student PT                PT Recommendation and Plan  Anticipated Equipment Needs At Discharge: quad cane  Anticipated Discharge Disposition: home with assist, home with home health, home with outpatient services  Planned Therapy Interventions: balance training, gait training, home exercise program, patient/family education, ROM (Range of Motion), strengthening, transfer training, bed mobility training  PT Frequency: daily, 2 times/day  Plan of Care Review  Plan Of Care Reviewed With: patient  Outcome Summary/Follow up Plan: PT eval completed. Pt reported she did not feel well but agreed to particiapte. B LE AROM 25-50% impaired, appears secondary to weakness. Pt performed bed mobility and transfers independently with supervision. Pt  ambulated ~12 ft with CGA/hand held, pt wanted to return to bed due to feeling ill. Her weight shifting abilities are impaired and decreased step length noted. Pt may benefit from quad cane for balance. PT will work on balance, gait, and strength in order to improve pt function. Anticiapte d/c home with assist.           IP PT Goals       11/02/17 1031          Gait Training PT LTG    Gait Training Goal PT LTG, Date Established 11/02/17  -LYNETTE (r) AB (t) LYNETTE (c)      Gait Training Goal PT LTG, Time to Achieve by discharge  -LYNETTE (r) AB (t) LYNETTE (c)      Gait Training Goal PT LTG, [place] Level supervision required  -LYNETTE (r) AB (t) LYNETTE (c)      Gait Training Goal PT LTG, Assist Device cane, quad  -LYNETTE (r) AB (t) LYNETTE (c)      Gait Training Goal PT LTG, Distance to Achieve 100  -LYNETTE (r) AB (t) LYNETTE (c)      Dynamic Standing Balance PT  LTG    Dynamic Standing Balance PT LTG, Date Established 11/02/17  -LYNETTE (r) AB (t) LYNETTE (c)      Dynamic Standing Balance PT LTG, Time to Achieve by discharge  -LYNETTE (r) AB (t) LYNETTE (c)      Dynamic Standing Balance PT LTG, [place] Level supervision required  -LYNETTE (r) AB (t) LYNETTE (c)      Dynamic Standing Balance PT LTG, Assist Device assistive Device  -LYNETTE (r) AB (t) LYNETTE (c)      Dynamic Standing Balance PT LTG, Additional Goal side step, reaching out of [place], lifting objects, picking object up from floor  -LYNETTE (r) AB (t) LYNETTE (c)        User Key  (r) = Recorded By, (t) = Taken By, (c) = Cosigned By    Initials Name Provider Type    LYNETTE Del Real, PT DPT Physical Therapist    AB Sonia Iyer, PT Student PT Student                Outcome Measures       11/02/17 1031          How much help from another person do you currently need...    Turning from your back to your side while in flat bed without using bedrails? 4  -LYNETTE (r) AB (t) LYNETTE (c)      Moving from lying on back to sitting on the side of a flat bed without bedrails? 4  -LYNETTE (r) AB (t) LYNETTE (c)      Moving to and from a bed to a chair (including a wheelchair)? 3  -LYNETTE (r) AB (t) LYNETTE (c)      Standing up from a chair using your arms (e.g., wheelchair, bedside chair)? 4  -LYNETTE (r) AB (t) LYNETTE (c)      Climbing 3-5 steps with a railing? 3  -LYNETTE (r) AB (t) LYNETTE (c)      To walk in hospital room? 3  -LYNETTE (r) AB (t) LYNETTE (c)      AM-PAC 6 Clicks Score 21  -LYNETTE (r) AB (t)      Functional Assessment    Outcome Measure Options AM-PAC 6 Clicks Basic Mobility (PT)  -LYNETTE (r) AB (t) LYNETTE (c)        User Key  (r) = Recorded By, (t) = Taken By, (c) = Cosigned By    Initials Name Provider Type    LYNETTE Del Real, PT DPT Physical Therapist    AB Sonia Iyer, PT Student PT Student           Time Calculation:         PT Charges       11/02/17 1107          Time Calculation    Start Time 1030  -LYNETTE (r) AB (t) LYNETTE (c)      Stop Time 1055  -LYNETTE (r) AB (t) LYNETTE (c)      Time Calculation (min) 25 min  -LYNETTE  (r) AB (t)      PT Received On 11/02/17  -LYNETTE (r) AB (t) LYNETTE (c)      PT Goal Re-Cert Due Date 11/12/17  -LYNETTE (r) AB (t) LYNETTE (c)        User Key  (r) = Recorded By, (t) = Taken By, (c) = Cosigned By    Initials Name Provider Type    LYNETTE Del Real, PT DPT Physical Therapist    AB Sonia Iyer, PT Student PT Student          Therapy Charges for Today     Code Description Service Date Service Provider Modifiers Qty    72329994037 HC PT EVAL LOW COMPLEXITY 2 11/2/2017 Sonia Iyer PT Student GP 1          PT G-Codes  Outcome Measure Options: AM-PAC 6 Clicks Basic Mobility (PT)  Score: 21  Functional Limitation: Mobility: Walking and moving around  Mobility: Walking and Moving Around Current Status (): At least 20 percent but less than 40 percent impaired, limited or restricted  Mobility: Walking and Moving Around Goal Status (): At least 1 percent but less than 20 percent impaired, limited or restricted      Sonia Iyer PT Student  11/2/2017

## 2017-11-02 NOTE — PLAN OF CARE
Problem: Patient Care Overview (Adult)  Goal: Plan of Care Review    11/02/17 0520   Outcome Evaluation   Outcome Summary/Follow up Plan MD notified of elevated Lactic.

## 2017-11-02 NOTE — PLAN OF CARE
Problem: Patient Care Overview (Adult)  Goal: Plan of Care Review  Outcome: Ongoing (interventions implemented as appropriate)    11/02/17 1031   Coping/Psychosocial Response Interventions   Plan Of Care Reviewed With patient   Outcome Evaluation   Outcome Summary/Follow up Plan PT dinorah completed. Pt reported she did not feel well but agreed to particiapte. B LE AROM 25-50% impaired, appears secondary to weakness. Pt performed bed mobility and transfers independently with supervision. Pt ambulated ~12 ft with CGA/hand held, pt wanted to return to bed due to feeling ill. Her weight shifting abilities are impaired and decreased step length noted. Pt may benefit from quad cane for balance. PT will work on balance, gait, and strength in order to improve pt function. Anticiapte d/c home with assist.          Problem: Inpatient Physical Therapy  Goal: Gait Training Goal LTG- PT  Outcome: Ongoing (interventions implemented as appropriate)    11/02/17 1031   Gait Training PT LTG   Gait Training Goal PT LTG, Date Established 11/02/17   Gait Training Goal PT LTG, Time to Achieve by discharge   Gait Training Goal PT LTG, [place] Level supervision required   Gait Training Goal PT LTG, Assist Device cane, quad   Gait Training Goal PT LTG, Distance to Achieve 100       Goal: Dynamic Standing Balance Goal LTG- PT  Outcome: Ongoing (interventions implemented as appropriate)    11/02/17 1031   Dynamic Standing Balance PT LTG   Dynamic Standing Balance PT LTG, Date Established 11/02/17   Dynamic Standing Balance PT LTG, Time to Achieve by discharge   Dynamic Standing Balance PT LTG, [place] Level supervision required   Dynamic Standing Balance PT LTG, Assist Device assistive Device   Dynamic Standing Balance PT LTG, Additional Goal side step, reaching out of [place], lifting objects, picking object up from floor

## 2017-11-02 NOTE — H&P
[facility]  HISTORY AND PHYSICAL    Date of Admission: 11/1/2017  Primary Care Physician: Jose Barajas MD    Subjective     Chief Complaint: Confusion SOB    History of Present Illness  Patient presents to the emergency room with increasing shortness of breath swelling wheezing.  She also has chest pain.  She has a history of cardiomyopathy as well as hepatitis C.  She continues to smoke.  She has been out of her medications including her opiates Neurontin and I believe also her medications for her hypertension and heart liver etc. for several weeks.  She has had a gradual decline.  She is finally come to the emergency room for further evaluation and management.  In the ER she was found to have elevated LFTs in the thousands which they had been in the 100 range previously.  Her BNP is also significantly elevated.  She is also probably in congestive heart failure.  She also seems to be having trouble breathing consistent with COPD exacerbation as well.  We have been asked to admit her for further evaluation and management.  She has also been having increasing periods of confusion per her  she is on chronic home O2 and apparently she gets short of breath she turns it up I suspect she is causing her CO2 to go up as well with this because she is a CO2 retainer on her ABG that was obtained here.        Review of Systems   14 Point ROS negative except for as per HPI  Otherwise complete ROS reviewed and negative except as mentioned in the HPI.    Past Medical History:   Past Medical History:   Diagnosis Date   • CHF (congestive heart failure)    • COPD (chronic obstructive pulmonary disease)    • Pneumonia      Past Surgical History:  Past Surgical History:   Procedure Laterality Date   • HYSTERECTOMY       Social History:  reports that she has been smoking Cigarettes.  She has been smoking about 0.50 packs per day. She does not have any smokeless tobacco  "history on file. She reports that she does not drink alcohol.    Family History: family history is not on file.     Allergies:  Allergies   Allergen Reactions   • Codeine Nausea And Vomiting     Medications:  Prior to Admission medications    Medication Sig Start Date End Date Taking? Authorizing Provider   budesonide-formoterol (SYMBICORT) 160-4.5 MCG/ACT inhaler Inhale 2 puffs 2 (Two) Times a Day.    Historical Provider, MD   cyclobenzaprine (FLEXERIL) 10 MG tablet Take 10 mg by mouth 2 (Two) Times a Day As Needed for Muscle Spasms.    Historical Provider, MD   gabapentin (NEURONTIN) 600 MG tablet Take 600 mg by mouth 3 (Three) Times a Day.    Historical Provider, MD   HYDROcodone-acetaminophen (NORCO) 7.5-325 MG per tablet Take 1 tablet by mouth Every 6 (Six) Hours As Needed for Moderate Pain .    Historical Provider, MD   metoprolol tartrate (LOPRESSOR) 25 MG tablet Take 12.5 mg by mouth 2 (Two) Times a Day.    Historical Provider, MD   pantoprazole (PROTONIX) 40 MG EC tablet Take 40 mg by mouth Daily.    Historical Provider, MD   potassium chloride (K-DUR,KLOR-CON) 20 MEQ CR tablet Take 1 tablet by mouth Daily. 12/23/16   Jose Barajas MD   spironolactone (ALDACTONE) 25 MG tablet Take 25 mg by mouth Daily.    Historical Provider, MD   venlafaxine (EFFEXOR) 75 MG tablet Take 75 mg by mouth 2 (Two) Times a Day.    Historical Provider, MD     Objective     Vital Signs: /73  Pulse 84  Temp (P) 98.3 °F (36.8 °C) (Temporal Artery )   Resp 18  Ht 61\" (154.9 cm)  Wt 117 lb 1.6 oz (53.1 kg)  SpO2 94%  BMI 22.13 kg/m2  Physical Exam  Constitutional: She is oriented to person, place, and time. She appears well-developed.   HENT:   Head: Normocephalic.   Eyes: Pupils are equal, round, and reactive to light.   Neck: Normal range of motion.   Cardiovascular: Normal rate and regular rhythm.    Pulmonary/Chest: Effort normal. She has wheezes.   Abdominal: Soft.   hepatomegaly   Musculoskeletal:   Extremity " weakness, bilateral legs   Neurological: She is alert and oriented to person, place, and time.   Skin: Skin is warm.   Psychiatric: She has a normal mood and affect. Her behavior is normal.   Nursing note and vitals reviewed.      Results Reviewed:  Lab Results (last 24 hours)     Procedure Component Value Units Date/Time    Blood Gas, Arterial With Co-Ox [[id number]]  (Abnormal) Collected:  11/01/17 1710    Specimen:  Arterial Blood Updated:  11/01/17 1718     Site Right Brachial     Dao's Test N/A     pH, Arterial 7.349 (L) pH units      pCO2, Arterial 68.2 (C) mm Hg      pO2, Arterial 68.6 (L) mm Hg      HCO3, Arterial 37.5 (H) mmol/L      Base Excess, Arterial 9.6 (H) mmol/L      O2 Saturation, Arterial 91.9 (L) %      Hemoglobin, Blood Gas 11.7 (L) g/dL      Hematocrit, Blood Gas 35.9 (L) %      Oxyhemoglobin 88.3 (L) %      Methemoglobin 0.70 %      Carboxyhemoglobin 3.3 %      Temperature 37.0 C      Sodium, Arterial 137 mmol/L      Potassium, Arterial 4.3 mmol/L      Barometric Pressure for Blood Gas 748 mmHg      Modality Nasal Cannula     Flow Rate 2.0 lpm      Ventilator Mode NA     Notified Who Saurav HERMAN     Notified By [id number]     Notified Time 11/01/2017 17:17     Collected by [id number]    Magnesium [[id number]]  (Normal) Collected:  11/01/17 1711    Specimen:  Blood Updated:  11/01/17 1741     Magnesium 1.9 mg/dL     CK [[id number]]  (Normal) Collected:  11/01/17 1711    Specimen:  Blood Updated:  11/01/17 1753     Creatine Kinase 166 U/L     CK-MB [[id number]]  (Abnormal) Collected:  11/01/17 1711    Specimen:  Blood Updated:  11/01/17 1753     CKMB 6.32 (H) ng/mL     Myoglobin, Serum [[id number]]  (Abnormal) Collected:  11/01/17 1711    Specimen:  Blood Updated:  11/01/17 1753     Myoglobin 165.2 (H) ng/mL     Troponin [[id number]]  (Abnormal) Collected:  11/01/17 1711    Specimen:  Blood Updated:  11/01/17 1753     Troponin I 0.092 (H) ng/mL     BNP [[id number]]  (Abnormal) Collected:  11/01/17 5221     Specimen:  Blood Updated:  11/01/17 1753     proBNP 56784.0 (H) pg/mL     CK-MB Index [[id number]]  (Normal) Collected:  11/01/17 1711    Specimen:  Blood Updated:  11/01/17 1753     CK-MB Index 3.8 %     Comprehensive Metabolic Panel [[id number]]  (Abnormal) Collected:  11/01/17 1711    Specimen:  Blood Updated:  11/01/17 1800     Glucose 88 mg/dL      BUN 26 (H) mg/dL      Creatinine 1.41 (H) mg/dL      Sodium 137 mmol/L      Potassium 4.5 mmol/L      Chloride 93 (L) mmol/L      CO2 38.0 (H) mmol/L      Calcium 8.6 mg/dL      Total Protein 6.3 g/dL      Albumin 3.30 (L) g/dL      ALT (SGPT) 1236 (H) U/L      AST (SGOT) 3036 (H) U/L      Alkaline Phosphatase 98 U/L      Total Bilirubin 0.5 mg/dL      eGFR Non African Amer 39 (L) mL/min/1.73      Globulin 3.0 gm/dL      A/G Ratio 1.1 g/dL      BUN/Creatinine Ratio 18.4     Anion Gap 6.0 mmol/L     CBC & Differential [[id number]] Collected:  11/01/17 1711    Specimen:  Blood Updated:  11/01/17 1814    Narrative:       The following orders were created for panel order CBC & Differential.  Procedure                               Abnormality         Status                     ---------                               -----------         ------                     CBC Auto Differential[[id number]]        Abnormal            Final result                 Please view results for these tests on the individual orders.    CBC Auto Differential [[id number]]  (Abnormal) Collected:  11/01/17 1711    Specimen:  Blood Updated:  11/01/17 1814     WBC 14.20 (H) 10*3/mm3      RBC 3.88 (L) 10*6/mm3      Hemoglobin 11.6 (L) g/dL      Hematocrit 39.4 %      .5 (H) fL      MCH 29.9 pg      MCHC 29.4 (L) g/dL      RDW 13.9 %      RDW-SD 51.4 fl      MPV 10.7 fL      Platelets 292 10*3/mm3      Neutrophil % 72.1 %      Lymphocyte % 14.9 (L) %      Monocyte % 11.7 %      Eosinophil % 0.5 %      Basophil % 0.4 %      Immature Grans % 0.4 %      Neutrophils, Absolute 10.26 (H) 10*3/mm3       Lymphocytes, Absolute 2.11 10*3/mm3      Monocytes, Absolute 1.66 (H) 10*3/mm3      Eosinophils, Absolute 0.07 10*3/mm3      Basophils, Absolute 0.05 10*3/mm3      Immature Grans, Absolute 0.05 (H) 10*3/mm3      nRBC 0.0 /100 WBC     Ammonia [[id number]]  (Normal) Collected:  11/01/17 1927    Specimen:  Blood Updated:  11/01/17 1945     Ammonia 21 umol/L     Protime-INR [[id number]]  (Abnormal) Collected:  11/01/17 1711    Specimen:  Blood Updated:  11/01/17 1957     Protime 17.4 (H) Seconds      INR 1.38 (H)    aPTT [[id number]]  (Normal) Collected:  11/01/17 1711    Specimen:  Blood Updated:  11/01/17 1957     PTT 33.0 seconds     Hepatitis Panel, Acute [[id number]] Collected:  11/01/17 2001    Specimen:  Blood Updated:  11/01/17 2004    Troponin [[id number]] Collected:  11/01/17 1927    Specimen:  Blood Updated:  11/01/17 2009    Ethanol [[id number]]  (Normal) Collected:  11/01/17 1711    Specimen:  Blood Updated:  11/01/17 2032     Ethanol % <0.010 %     Narrative:       Not for legal purposes. Chain of Custody not followed.         Imaging Results (last 24 hours)     Procedure Component Value Units Date/Time    XR Chest 1 View [[id number]] Collected:  11/01/17 1726     Updated:  11/01/17 1730    Narrative:       XR CHEST 1 VW- 11/1/2017 5:18 PM CDT     HISTORY: Shortness of air       COMPARISON: 05/27/2016.     FINDINGS:   Emphysematous changes are noted. The previously seen interstitial  opacities have resolved. The cardiomediastinal silhouette and pulmonary  vascularity are unchanged.      The osseous structures and surrounding soft tissues demonstrate no acute  abnormality.       Impression:       1. Emphysema without evidence of acute cardiopulmonary process.        This report was finalized on 11/01/2017 17:27 by Dr. David Bob MD.    CT Head Without Contrast [[id number]] Collected:  11/01/17 1909     Updated:  11/01/17 1912    Narrative:       CT HEAD WO CONTRAST- 11/1/2017 6:46 PM CDT     HISTORY: ams,  weakness, difficulty walking       COMPARISON: None      DOSE LENGTH PRODUCT: 975 mGy cm. Automated exposure control was also  utilized to decrease patient radiation dose.     TECHNIQUE: Helical tomographic images of the brain were obtained without  the use of intravenous contrast.      FINDINGS:   Hemorrhage: None.     Mass effect: No midline shift or mass effect.     Ventricles: Normal and symmetric without hydrocephalus.     Basilar cisterns: Normal.     Sulci: Normal in configuration. No sulcal effacement.     Extra-axial fluid: No abnormal extra-axial fluid collections.     Grey and white matter differentiation: Normal.     Posterior fossa and brainstem: Normal.     Bones: No fractures or lesions.     Soft tissues: No acute process.     Sinuses and mastoid air cells: Clear.       Impression:       1. No acute intracranial process.        This report was finalized on 11/01/2017 19:09 by Dr. David Bob MD.        I have personally reviewed and interpreted the radiology studies and ECG obtained at time of admission.     Assessment / Plan     Assessment:   Hospital Problem List     NSTEMI (non-ST elevated myocardial infarction)      1.  Decompensated diastolic heart failure resulting in non-STEMI.  Patient also has significant pulmonary hypertension.  Believe patient is been noncompliant with medications.  We will resume medications.  Aggressively diurese.  Supplemental oxygen as she can tolerate watching for CO2 retention.  Aggressive bronchodilator treatment.  We will start empiric antibiotics as well.  Monitor troponins see if they are trending down or up.  Her liver if situation I am hesitant to fully anticoagulate we will give prophylactic doses only of Lovenox.  Also start her on a low-dose ace. Cardiology consultationprove.  May also need a pulmonary consultation.  Will repeat 2-D echo.  2.  Elevated liver transaminases in setting of chronic hepatitis C I believe this is probably due to passive  congestion from heart failure however will check ultrasound of liver consider GI consultation will diurese as described above and check hepatitis A and B panels to see if she is had another acute viral hepatitis decompensating her.  Her blood alcohol level is 0 this time.  It appears that she has not been taking any of her medications for pain management at least nothing legal.  For the last 3 months I am awaiting a urine drug screen.  3.  Tobacco dependence we will  and encouraged to quit smoking we will also offer patch.  4.  DVT prophylaxis low-dose Lovenox TEDs hose.               Code Status: Full      I discussed the patients findings and my recommendations with the patient and  who is healthcare power surrogate    Estimated length of stay 3-4 days    Buck Zepeda MD   11/01/17   9:03 PM

## 2017-11-02 NOTE — PROGRESS NOTES
Continued Stay Note  MARISOL Savage     Patient Name: Teri Tim  MRN: [id number]  Today's Date: 11/2/2017    Admit Date: 11/1/2017          Discharge Plan       11/02/17 1004    Case Management/Social Work Plan    Plan PT resides at home with spouse and plans to dc to the same. PT denies any dc needs at this time. Will follow.     Patient/Family In Agreement With Plan yes              Discharge Codes     None            EMILIA Mcdowell

## 2017-11-02 NOTE — PROGRESS NOTES
"Pharmacy Dosing Service  Anticoagulant  Enoxaparin    Assessment/Action/Plan:  Lovenox dose adjusted to 40mg sc q24 based on indication and est crcl greater than 30. Pharmacy will continue to follow daily.      Subjective:  Teri Tim is a 57 y.o. female on Enoxaparin 30 mg SQ every 24 hours for indication of VTE prophylaxis.  Objective:  [Ht: 60\" (152.4 cm); Wt: 111 lb 8 oz (50.6 kg); BMI: Body mass index is 21.78 kg/(m^2).]  Estimated Creatinine Clearance: 37.9 mL/min (by C-G formula based on Cr of 1.34).   Lab Results   Component Value Date    INR 1.38 (H) 11/01/2017    PROTIME 17.4 (H) 11/01/2017        Lab Results   Component Value Date    HGB 11.6 (L) 11/01/2017        Lab Results   Component Value Date     11/01/2017       RAÚL Simpson, Pharm.D.    11/02/17 8:32 AM       "

## 2017-11-02 NOTE — PROGRESS NOTES
"    [facility] Medicine Services  INPATIENT PROGRESS NOTE    Length of Stay: 1  Date of Admission: 11/1/2017  Primary Care Physician: Jose Barajas MD    Subjective   Chief Complaint: follow up     HPI   The patient is currently laying in bed.  She states that she has never received treatment for Hep C, she states, \"they told me it wasn't bad enough.\"  She has been out of her medicine and has not been to her last follow up appointments for lack of a vehicle.  She states that she has been a little short of breath lately and she does admit that she continues to smoke.     Review of Systems   Constitutional: Positive for activity change and fatigue. Negative for appetite change, chills and fever.   HENT: Negative for hearing loss, nosebleeds, tinnitus and trouble swallowing.    Eyes: Negative for visual disturbance.   Respiratory: Negative for cough, chest tightness, shortness of breath and wheezing.    Cardiovascular: Negative for chest pain, palpitations and leg swelling.   Gastrointestinal: Negative for abdominal distention, abdominal pain, blood in stool, constipation, diarrhea, nausea and vomiting.   Endocrine: Negative for cold intolerance, heat intolerance, polydipsia, polyphagia and polyuria.   Genitourinary: Negative for decreased urine volume, difficulty urinating, dysuria, flank pain, frequency and hematuria.   Musculoskeletal: Positive for back pain (chronic ) and myalgias. Negative for arthralgias and joint swelling.   Skin: Negative for rash.   Allergic/Immunologic: Negative for immunocompromised state.   Neurological: Positive for weakness. Negative for dizziness, syncope, light-headedness and headaches.   Hematological: Negative for adenopathy. Does not bruise/bleed easily.   Psychiatric/Behavioral: Negative for confusion and sleep disturbance. The patient is not nervous/anxious.       All pertinent negatives and positives are as above. All other systems have been " reviewed and are negative unless otherwise stated.     Objective    Temp:  [97.5 °F (36.4 °C)-98.9 °F (37.2 °C)] 97.5 °F (36.4 °C)  Heart Rate:  [82-99] 82  Resp:  [13-28] 22  BP: (113-125)/(55-78) 113/57     Physical Exam   Constitutional: She is oriented to person, place, and time. She appears well-developed and well-nourished.   HENT:   Head: Normocephalic and atraumatic.   Eyes: Conjunctivae and EOM are normal. Pupils are equal, round, and reactive to light.   Neck: Neck supple. No JVD present. No thyromegaly present.   Cardiovascular: Normal rate, regular rhythm, normal heart sounds and intact distal pulses.  Exam reveals no gallop and no friction rub.    No murmur heard.  Pulmonary/Chest: Effort normal. No respiratory distress. She has no wheezes. She has rales. She exhibits no tenderness.   Abdominal: Soft. Bowel sounds are normal. She exhibits no distension. There is no tenderness. There is no rebound and no guarding.   Musculoskeletal: Normal range of motion. She exhibits no edema, tenderness or deformity.   Lymphadenopathy:     She has no cervical adenopathy.   Neurological: She is alert and oriented to person, place, and time. She displays normal reflexes. No cranial nerve deficit. She exhibits normal muscle tone.   Skin: Skin is warm and dry. No rash noted.   Psychiatric: She has a normal mood and affect. Her behavior is normal. Judgment and thought content normal.   Vitals reviewed.    Results Review:  I have reviewed the labs, radiology results, and diagnostic studies.    Laboratory Data:     Results from last 7 days  Lab Units 11/01/17  1711   WBC 10*3/mm3 14.20*   HEMOGLOBIN g/dL 11.6*   HEMATOCRIT % 39.4   PLATELETS 10*3/mm3 292        Results from last 7 days  Lab Units 11/02/17  0257 11/01/17  1711 11/01/17  1710   SODIUM mmol/L 140 137  --    SODIUM, ARTERIAL mmol/L  --   --  137   POTASSIUM mmol/L 3.9 4.5  --    CHLORIDE mmol/L 87* 93*  --    CO2 mmol/L 39.0* 38.0*  --    BUN mg/dL 26* 26*  --     CREATININE mg/dL 1.34 1.41*  --    CALCIUM mg/dL 9.2 8.6  --    BILIRUBIN mg/dL 0.7 0.5  --    ALK PHOS U/L 110 98  --    ALT (SGPT) U/L 1454* 1236*  --    AST (SGOT) U/L 2453* 3036*  --    GLUCOSE mg/dL 153* 88  --      Radiology Data:   Imaging Results (last 24 hours)     Procedure Component Value Units Date/Time    XR Chest 1 View [[id number]] Collected:  11/01/17 1726     Updated:  11/01/17 1730    Narrative:       XR CHEST 1 VW- 11/1/2017 5:18 PM CDT     HISTORY: Shortness of air       COMPARISON: 05/27/2016.     FINDINGS:   Emphysematous changes are noted. The previously seen interstitial  opacities have resolved. The cardiomediastinal silhouette and pulmonary  vascularity are unchanged.      The osseous structures and surrounding soft tissues demonstrate no acute  abnormality.       Impression:       1. Emphysema without evidence of acute cardiopulmonary process.        This report was finalized on 11/01/2017 17:27 by Dr. David Bob MD.    CT Head Without Contrast [[id number]] Collected:  11/01/17 1909     Updated:  11/01/17 1912    Narrative:       CT HEAD WO CONTRAST- 11/1/2017 6:46 PM CDT     HISTORY: ams, weakness, difficulty walking       COMPARISON: None      DOSE LENGTH PRODUCT: 975 mGy cm. Automated exposure control was also  utilized to decrease patient radiation dose.     TECHNIQUE: Helical tomographic images of the brain were obtained without  the use of intravenous contrast.      FINDINGS:   Hemorrhage: None.     Mass effect: No midline shift or mass effect.     Ventricles: Normal and symmetric without hydrocephalus.     Basilar cisterns: Normal.     Sulci: Normal in configuration. No sulcal effacement.     Extra-axial fluid: No abnormal extra-axial fluid collections.     Grey and white matter differentiation: Normal.     Posterior fossa and brainstem: Normal.     Bones: No fractures or lesions.     Soft tissues: No acute process.     Sinuses and mastoid air cells: Clear.       Impression:        1. No acute intracranial process.        This report was finalized on 11/01/2017 19:09 by Dr. David Bob MD.        I have reviewed the patient current medications.     Assessment/Plan     Assessment:  1. Acute on chronic hypercarbic and hypoxic respiratory failure, pCO2 68 and pO2 47.8  2. Acutely decompensated diastolic congestive heart failure, ECHO pending  3. Non-ST segment elevated myocardial infarction  4. Transaminitis with chronic hepatitis C  5. Ongoing tobacco abuse  6. Chronic obstructive pulmonary disease, no exacerbation   7. Leukocytosis  8. Abnormal TSH, check T3 and T4    Plan:  1. Check T3 and T4  2. Consult cardiology  3. Check 2D echo  4. CBC, CMP in AM  5. Lasix 40 mg BID IV  6. US abdomen   7. Lactic reflex pending  8. Urine drug screen pending  9. Tobacco cessation education  10. Lovenox 40 mg SQ for DVT prophylaxis  11. Continue Aldactone  12. Started on low dose ramipril by nocturnist  13. May need GI evaluation     Patient seen and examined. Patient complains of back and leg pain. States she could not get to her pain management appointments due to lack of transportation. Patient found out about her hepatitis C last year when she was notified by the health department. She followed up with Dr. Shane at [facility] but I am uncertain that she had any follow up after that. Reviewed US of abdomen and there is a mass around the GB/liver. Will order recommended MRI and get AFP. Follow  pending. Patient was not wearing 02 on my evaluation.     Discharge Planning: I expect the patient to be discharged to home in 2-4 days.    Andrews Pollock, ROLAND   11/02/17   7:49 AM

## 2017-11-02 NOTE — PAYOR COMM NOTE
"[facility]    SANDRA ,   [phone]  OR  FAX  [phone]    Teri Tim (57 y.o. Female)     Date of Birth Social Security Number Address Home Phone MRN    1960  [id number]    [place] [id number] [phone] [id number]    Yazdanism Marital Status          Jewish        Admission Date Admission Type Admitting Provider Attending Provider Department, Room/Bed    11/1/17 Emergency Geovanny Howard DO Jessee, Ali Janell, DO [facility] 4B, 401/1    Discharge Date Discharge Disposition Discharge Destination                      Attending Provider: Geovanny Howard DO     Allergies:  Codeine    Isolation:  None   Infection:  None   Code Status:  FULL    Ht:  60\" (152.4 cm)   Wt:  111 lb 8 oz (50.6 kg)    Admission Cmt:  None   Principal Problem:  None                Active Insurance as of 11/1/2017     Primary Coverage     Payor Plan Insurance Group Employer/Plan Group    [employer] [place] [employer] [place]ICA[place]      Payor Plan Address Payor Plan Phone Number Effective From Effective To    [address] [address] [phone] 11/1/2017     [place] [address]       Subscriber Name Subscriber Birth Date Member ID       TERI TIM 1960 [id number]                 Emergency Contacts      (Rel.) Home Phone Work Phone Mobile Phone    MeetaRich weller (Spouse) [phone] -- [phone]        Sonia Iyer, PT Student PT Student Cosign Needed  Plan of Care Date of Service: 11/2/2017 11:07 AM         Problem: Patient Care Overview (Adult)  Goal: Plan of Care Review  Outcome: Ongoing (interventions implemented as appropriate)     11/02/17 1031   Coping/Psychosocial Response Interventions   Plan Of Care Reviewed With patient   Outcome Evaluation   Outcome Summary/Follow up Plan PT eval completed. Pt reported she did not feel well but agreed to particiapte. B LE AROM 25-50% impaired, appears secondary to weakness. Pt performed bed mobility and transfers " independently with supervision. Pt ambulated ~12 ft with CGA/hand held, pt wanted to return to bed due to feeling ill. Her weight shifting abilities are impaired and decreased step length noted. Pt may benefit from quad cane for balance. PT will work on balance, gait, and strength in order to improve pt function. Anticiapte d/c home with assist.            Yazmin Rust RN Registered Nurse Signed  Plan of Care Date of Service: 11/2/2017  1:51 AM         Problem: Patient Care Overview (Adult)  Goal: Plan of Care Review  Outcome: Ongoing (interventions implemented as appropriate)     11/02/17 0148   Coping/Psychosocial Response Interventions   Plan Of Care Reviewed With patient;spouse   Outcome Evaluation   Outcome Summary/Follow up Plan continues NSR / ST on tele, occassional reuns of pvcs. for echo todaya, cardiology consult, labs and ABG's . edema inproved slightly after IV lasix. no falls,  at bedside                  History & Physical      Buck Zepeda MD at 11/1/2017  9:03 PM              [facility]  HISTORY AND PHYSICAL    Date of Admission: 11/1/2017  Primary Care Physician: Jose Barajas MD    Subjective     Chief Complaint: Confusion SOB    History of Present Illness  Patient presents to the emergency room with increasing shortness of breath swelling wheezing.  She also has chest pain.  She has a history of cardiomyopathy as well as hepatitis C.  She continues to smoke.  She has been out of her medications including her opiates Neurontin and I believe also her medications for her hypertension and heart liver etc. for several weeks.  She has had a gradual decline.  She is finally come to the emergency room for further evaluation and management.  In the ER she was found to have elevated LFTs in the thousands which they had been in the 100 range previously.  Her BNP is also significantly elevated.  She is also probably in congestive heart failure.   She also seems to be having trouble breathing consistent with COPD exacerbation as well.  We have been asked to admit her for further evaluation and management.  She has also been having increasing periods of confusion per her  she is on chronic home O2 and apparently she gets short of breath she turns it up I suspect she is causing her CO2 to go up as well with this because she is a CO2 retainer on her ABG that was obtained here.        Review of Systems   14 Point ROS negative except for as per HPI  Otherwise complete ROS reviewed and negative except as mentioned in the HPI.    Past Medical History:   Past Medical History:   Diagnosis Date   • CHF (congestive heart failure)    • COPD (chronic obstructive pulmonary disease)    • Pneumonia      Past Surgical History:  Past Surgical History:   Procedure Laterality Date   • HYSTERECTOMY       Social History:  reports that she has been smoking Cigarettes.  She has been smoking about 0.50 packs per day. She does not have any smokeless tobacco history on file. She reports that she does not drink alcohol.    Family History: family history is not on file.     Allergies:  Allergies   Allergen Reactions   • Codeine Nausea And Vomiting     Medications:  Prior to Admission medications    Medication Sig Start Date End Date Taking? Authorizing Provider   budesonide-formoterol (SYMBICORT) 160-4.5 MCG/ACT inhaler Inhale 2 puffs 2 (Two) Times a Day.    Historical Provider, MD   cyclobenzaprine (FLEXERIL) 10 MG tablet Take 10 mg by mouth 2 (Two) Times a Day As Needed for Muscle Spasms.    Historical Provider, MD   gabapentin (NEURONTIN) 600 MG tablet Take 600 mg by mouth 3 (Three) Times a Day.    Historical Provider, MD   HYDROcodone-acetaminophen (NORCO) 7.5-325 MG per tablet Take 1 tablet by mouth Every 6 (Six) Hours As Needed for Moderate Pain .    Historical Provider, MD   metoprolol tartrate (LOPRESSOR) 25 MG tablet Take 12.5 mg by mouth 2 (Two) Times a Day.     "Historical Provider, MD   pantoprazole (PROTONIX) 40 MG EC tablet Take 40 mg by mouth Daily.    Historical Provider, MD   potassium chloride (K-DUR,KLOR-CON) 20 MEQ CR tablet Take 1 tablet by mouth Daily. 12/23/16   Jose Barajas MD   spironolactone (ALDACTONE) 25 MG tablet Take 25 mg by mouth Daily.    Historical Provider, MD   venlafaxine (EFFEXOR) 75 MG tablet Take 75 mg by mouth 2 (Two) Times a Day.    Historical Provider, MD     Objective     Vital Signs: /73  Pulse 84  Temp (P) 98.3 °F (36.8 °C) (Temporal Artery )   Resp 18  Ht 61\" (154.9 cm)  Wt 117 lb 1.6 oz (53.1 kg)  SpO2 94%  BMI 22.13 kg/m2  Physical Exam  Constitutional: She is oriented to person, place, and time. She appears well-developed.   HENT:   Head: Normocephalic.   Eyes: Pupils are equal, round, and reactive to light.   Neck: Normal range of motion.   Cardiovascular: Normal rate and regular rhythm.    Pulmonary/Chest: Effort normal. She has wheezes.   Abdominal: Soft.   hepatomegaly   Musculoskeletal:   Extremity weakness, bilateral legs   Neurological: She is alert and oriented to person, place, and time.   Skin: Skin is warm.   Psychiatric: She has a normal mood and affect. Her behavior is normal.   Nursing note and vitals reviewed.      Results Reviewed:  Lab Results (last 24 hours)     Procedure Component Value Units Date/Time    Blood Gas, Arterial With Co-Ox [[id number]]  (Abnormal) Collected:  11/01/17 1710    Specimen:  Arterial Blood Updated:  11/01/17 1718     Site Right Brachial     Dao's Test N/A     pH, Arterial 7.349 (L) pH units      pCO2, Arterial 68.2 (C) mm Hg      pO2, Arterial 68.6 (L) mm Hg      HCO3, Arterial 37.5 (H) mmol/L      Base Excess, Arterial 9.6 (H) mmol/L      O2 Saturation, Arterial 91.9 (L) %      Hemoglobin, Blood Gas 11.7 (L) g/dL      Hematocrit, Blood Gas 35.9 (L) %      Oxyhemoglobin 88.3 (L) %      Methemoglobin 0.70 %      Carboxyhemoglobin 3.3 %      Temperature 37.0 C      Sodium, " Arterial 137 mmol/L      Potassium, Arterial 4.3 mmol/L      Barometric Pressure for Blood Gas 748 mmHg      Modality Nasal Cannula     Flow Rate 2.0 lpm      Ventilator Mode NA     Notified Who Saurav HERMAN     Notified By [id number]     Notified Time 11/01/2017 17:17     Collected by [id number]    Magnesium [[id number]]  (Normal) Collected:  11/01/17 1711    Specimen:  Blood Updated:  11/01/17 1741     Magnesium 1.9 mg/dL     CK [[id number]]  (Normal) Collected:  11/01/17 1711    Specimen:  Blood Updated:  11/01/17 1753     Creatine Kinase 166 U/L     CK-MB [[id number]]  (Abnormal) Collected:  11/01/17 1711    Specimen:  Blood Updated:  11/01/17 1753     CKMB 6.32 (H) ng/mL     Myoglobin, Serum [[id number]]  (Abnormal) Collected:  11/01/17 1711    Specimen:  Blood Updated:  11/01/17 1753     Myoglobin 165.2 (H) ng/mL     Troponin [[id number]]  (Abnormal) Collected:  11/01/17 1711    Specimen:  Blood Updated:  11/01/17 1753     Troponin I 0.092 (H) ng/mL     BNP [[id number]]  (Abnormal) Collected:  11/01/17 1711    Specimen:  Blood Updated:  11/01/17 1753     proBNP 13167.0 (H) pg/mL     CK-MB Index [[id number]]  (Normal) Collected:  11/01/17 1711    Specimen:  Blood Updated:  11/01/17 1753     CK-MB Index 3.8 %     Comprehensive Metabolic Panel [[id number]]  (Abnormal) Collected:  11/01/17 1711    Specimen:  Blood Updated:  11/01/17 1800     Glucose 88 mg/dL      BUN 26 (H) mg/dL      Creatinine 1.41 (H) mg/dL      Sodium 137 mmol/L      Potassium 4.5 mmol/L      Chloride 93 (L) mmol/L      CO2 38.0 (H) mmol/L      Calcium 8.6 mg/dL      Total Protein 6.3 g/dL      Albumin 3.30 (L) g/dL      ALT (SGPT) 1236 (H) U/L      AST (SGOT) 3036 (H) U/L      Alkaline Phosphatase 98 U/L      Total Bilirubin 0.5 mg/dL      eGFR Non African Amer 39 (L) mL/min/1.73      Globulin 3.0 gm/dL      A/G Ratio 1.1 g/dL      BUN/Creatinine Ratio 18.4     Anion Gap 6.0 mmol/L     CBC & Differential [[id number]] Collected:  11/01/17 6296    Specimen:  Blood  Updated:  11/01/17 1814    Narrative:       The following orders were created for panel order CBC & Differential.  Procedure                               Abnormality         Status                     ---------                               -----------         ------                     CBC Auto Differential[[id number]]        Abnormal            Final result                 Please view results for these tests on the individual orders.    CBC Auto Differential [[id number]]  (Abnormal) Collected:  11/01/17 1711    Specimen:  Blood Updated:  11/01/17 1814     WBC 14.20 (H) 10*3/mm3      RBC 3.88 (L) 10*6/mm3      Hemoglobin 11.6 (L) g/dL      Hematocrit 39.4 %      .5 (H) fL      MCH 29.9 pg      MCHC 29.4 (L) g/dL      RDW 13.9 %      RDW-SD 51.4 fl      MPV 10.7 fL      Platelets 292 10*3/mm3      Neutrophil % 72.1 %      Lymphocyte % 14.9 (L) %      Monocyte % 11.7 %      Eosinophil % 0.5 %      Basophil % 0.4 %      Immature Grans % 0.4 %      Neutrophils, Absolute 10.26 (H) 10*3/mm3      Lymphocytes, Absolute 2.11 10*3/mm3      Monocytes, Absolute 1.66 (H) 10*3/mm3      Eosinophils, Absolute 0.07 10*3/mm3      Basophils, Absolute 0.05 10*3/mm3      Immature Grans, Absolute 0.05 (H) 10*3/mm3      nRBC 0.0 /100 WBC     Ammonia [[id number]]  (Normal) Collected:  11/01/17 1927    Specimen:  Blood Updated:  11/01/17 1945     Ammonia 21 umol/L     Protime-INR [[id number]]  (Abnormal) Collected:  11/01/17 1711    Specimen:  Blood Updated:  11/01/17 1957     Protime 17.4 (H) Seconds      INR 1.38 (H)    aPTT [[id number]]  (Normal) Collected:  11/01/17 1711    Specimen:  Blood Updated:  11/01/17 1957     PTT 33.0 seconds     Hepatitis Panel, Acute [[id number]] Collected:  11/01/17 2001    Specimen:  Blood Updated:  11/01/17 2004    Troponin [[id number]] Collected:  11/01/17 1927    Specimen:  Blood Updated:  11/01/17 2009    Ethanol [[id number]]  (Normal) Collected:  11/01/17 1711    Specimen:  Blood Updated:  11/01/17  2032     Ethanol % <0.010 %     Narrative:       Not for legal purposes. Chain of Custody not followed.         Imaging Results (last 24 hours)     Procedure Component Value Units Date/Time    XR Chest 1 View [[id number]] Collected:  11/01/17 1726     Updated:  11/01/17 1730    Narrative:       XR CHEST 1 VW- 11/1/2017 5:18 PM CDT     HISTORY: Shortness of air       COMPARISON: 05/27/2016.     FINDINGS:   Emphysematous changes are noted. The previously seen interstitial  opacities have resolved. The cardiomediastinal silhouette and pulmonary  vascularity are unchanged.      The osseous structures and surrounding soft tissues demonstrate no acute  abnormality.       Impression:       1. Emphysema without evidence of acute cardiopulmonary process.        This report was finalized on 11/01/2017 17:27 by Dr. David Bob MD.    CT Head Without Contrast [[id number]] Collected:  11/01/17 1909     Updated:  11/01/17 1912    Narrative:       CT HEAD WO CONTRAST- 11/1/2017 6:46 PM CDT     HISTORY: ams, weakness, difficulty walking       COMPARISON: None      DOSE LENGTH PRODUCT: 975 mGy cm. Automated exposure control was also  utilized to decrease patient radiation dose.     TECHNIQUE: Helical tomographic images of the brain were obtained without  the use of intravenous contrast.      FINDINGS:   Hemorrhage: None.     Mass effect: No midline shift or mass effect.     Ventricles: Normal and symmetric without hydrocephalus.     Basilar cisterns: Normal.     Sulci: Normal in configuration. No sulcal effacement.     Extra-axial fluid: No abnormal extra-axial fluid collections.     Grey and white matter differentiation: Normal.     Posterior fossa and brainstem: Normal.     Bones: No fractures or lesions.     Soft tissues: No acute process.     Sinuses and mastoid air cells: Clear.       Impression:       1. No acute intracranial process.        This report was finalized on 11/01/2017 19:09 by Dr. David Bob MD.        I have  personally reviewed and interpreted the radiology studies and ECG obtained at time of admission.     Assessment / Plan     Assessment:   Hospital Problem List     NSTEMI (non-ST elevated myocardial infarction)      1.  Decompensated diastolic heart failure resulting in non-STEMI.  Patient also has significant pulmonary hypertension.  Believe patient is been noncompliant with medications.  We will resume medications.  Aggressively diurese.  Supplemental oxygen as she can tolerate watching for CO2 retention.  Aggressive bronchodilator treatment.  We will start empiric antibiotics as well.  Monitor troponins see if they are trending down or up.  Her liver if situation I am hesitant to fully anticoagulate we will give prophylactic doses only of Lovenox.  Also start her on a low-dose ace. Cardiology consultationprove.  May also need a pulmonary consultation.  Will repeat 2-D echo.  2.  Elevated liver transaminases in setting of chronic hepatitis C I believe this is probably due to passive congestion from heart failure however will check ultrasound of liver consider GI consultation will diurese as described above and check hepatitis A and B panels to see if she is had another acute viral hepatitis decompensating her.  Her blood alcohol level is 0 this time.  It appears that she has not been taking any of her medications for pain management at least nothing legal.  For the last 3 months I am awaiting a urine drug screen.  3.  Tobacco dependence we will  and encouraged to quit smoking we will also offer patch.  4.  DVT prophylaxis low-dose Lovenox TEDs hose.               Code Status: Full      I discussed the patients findings and my recommendations with the patient and  who is healthcare power surrogate    Estimated length of stay 3-4 days    Buck Zepeda MD   11/01/17   9:03 PM             Electronically signed by Buck Zepeda MD at 11/2/2017  5:42 AM           Emergency Department Notes       Saurav Hernandez PA-C at 11/1/2017  5:18 PM          Subjective   History of Present Illness  57-year-old female presents chief complaint of chest pain.  Patient notes she has had chest pain for the past 3 days and also reports she has had chest pain on and off for the past month.  She reports her chest pain feels like a weight on her chest and is located in the center in the lower part.  Chest pink and be reproduced with palpation is not worse with exertion.  The patient was not she has been more short of breath recently but denies cough.  When she does cough however which is rare, it is productive of green phlegm.  Patient cannot tolerate exertion.  She notes she believes her ankles are swelling.  She is supposed to wear oxygen at night however she has been feeling so short of breath she has been wearing oxygen all day for the past 3-4 days.  Patient was noted to have an oxygen saturation in the 50s upon arrival because she was not wearing oxygen.  She was connected to 2 L on a nasal cannula is now satting 94%.  The significant other is in the room at this patient also notes the patient has had a difficulty with word finding and difficulty with walking.  They are concerned that she may have had a stroke.  Patient can move all extremities.  Patient has significant weakness of her lower extremities.  Review of Systems   All other systems reviewed and are negative.      Past Medical History:   Diagnosis Date   • CHF (congestive heart failure)    • COPD (chronic obstructive pulmonary disease)    • Pneumonia        Allergies   Allergen Reactions   • Codeine Nausea And Vomiting       Past Surgical History:   Procedure Laterality Date   • CARPAL TUNNEL RELEASE     • HYSTERECTOMY      complete       Family History   Problem Relation Age of Onset   • Cancer Mother    • Heart disease Father    • Cancer Sister    • Heart disease Brother        Social History     Social History   • Marital status:      Spouse  name: N/A   • Number of children: N/A   • Years of education: N/A     Social History Main Topics   • Smoking status: Current Every Day Smoker     Packs/day: 0.50     Types: Cigarettes   • Smokeless tobacco: Never Used   • Alcohol use No   • Drug use: No   • Sexual activity: Defer     Other Topics Concern   • None     Social History Narrative   • None           Objective   Physical Exam   Constitutional: She is oriented to person, place, and time. She appears well-developed.   HENT:   Head: Normocephalic.   Eyes: Pupils are equal, round, and reactive to light.   Neck: Normal range of motion.   Cardiovascular: Normal rate and regular rhythm.    Pulmonary/Chest: Effort normal. She has wheezes.   Abdominal: Soft.   hepatomegaly   Musculoskeletal:   Extremity weakness, bilateral legs   Neurological: She is alert and oriented to person, place, and time.   Skin: Skin is warm.   Psychiatric: She has a normal mood and affect. Her behavior is normal.   Nursing note and vitals reviewed.      Procedures        ED Course  ED Course                  MDM  Number of Diagnoses or Management Options  Diagnosis management comments: Will admit to hospitalist, Dr. Zepeda.  Patient has an elevated troponin, elevated liver enzymes.  Patient has a history of Hepatitis C.         Amount and/or Complexity of Data Reviewed  Decide to obtain previous medical records or to obtain history from someone other than the patient: yes        Final diagnoses:   NSTEMI (non-ST elevated myocardial infarction)   Elevated liver enzymes   Hepatitis C virus infection without hepatic coma, unspecified chronicity            Saurav Hernandez PA-C  11/01/17 4687       Electronically signed by Saurav Hernandez PA-C at 11/1/2017 11:38 PM        Hospital Medications (all)       Dose Frequency Start End    aspirin chewable tablet 324 mg 324 mg Once 11/1/2017 11/1/2017    Sig - Route: Chew 4 tablets 1 (One) Time. - Oral    Cosign for Ordering: Accepted by  Buck Zepeda MD on 11/1/2017  9:49 PM    aspirin EC tablet 325 mg 325 mg Daily 11/2/2017     Sig - Route: Take 1 tablet by mouth Daily. - Oral    bisacodyl (DULCOLAX) EC tablet 5 mg 5 mg Daily PRN 11/1/2017     Sig - Route: Take 1 tablet by mouth Daily As Needed for Constipation. - Oral    budesonide-formoterol (SYMBICORT) 160-4.5 MCG/ACT inhaler 2 puff 2 puff 2 Times Daily - RT 11/1/2017     Sig - Route: Inhale 2 puffs 2 (Two) Times a Day. - Inhalation    enoxaparin (LOVENOX) syringe 40 mg 40 mg Daily 11/2/2017     Sig - Route: Inject 0.4 mL under the skin Daily. - Subcutaneous    famotidine (PEPCID) tablet 40 mg 40 mg Daily 11/2/2017     Sig - Route: Take 2 tablets by mouth Daily. - Oral    furosemide (LASIX) tablet 40 mg 40 mg Daily 11/2/2017     Sig - Route: Take 1 tablet by mouth Daily. - Oral    Cosign for Ordering: Required by Jose Barajas MD    ipratropium-albuterol (DUO-NEB) nebulizer solution 3 mL 3 mL Once 11/1/2017 11/1/2017    Sig - Route: Take 3 mL by nebulization 1 (One) Time. - Nebulization    Cosign for Ordering: Accepted by Buck Zepeda MD on 11/1/2017  9:49 PM    ipratropium-albuterol (DUO-NEB) nebulizer solution 3 mL 3 mL 4 Times Daily - RT 11/2/2017     Sig - Route: Take 3 mL by nebulization 4 (Four) Times a Day. - Nebulization    methylPREDNISolone sodium succinate (SOLU-Medrol) injection 125 mg 125 mg Once 11/1/2017 11/1/2017    Sig - Route: Infuse 2 mL into a venous catheter 1 (One) Time. - Intravenous    Cosign for Ordering: Accepted by Buck Zepeda MD on 11/1/2017  9:49 PM    metoprolol tartrate (LOPRESSOR) tablet 12.5 mg 12.5 mg Every 12 Hours Scheduled 11/1/2017     Sig - Route: Take 0.5 tablets by mouth Every 12 (Twelve) Hours. - Oral    nicotine (NICODERM CQ) 21 MG/24HR patch 1 patch 1 patch Every 24 Hours 11/1/2017     Sig - Route: Place 1 patch on the skin Daily. - Transdermal    ondansetron (ZOFRAN) injection 4 mg 4 mg Every 6 Hours PRN 11/1/2017     Sig - Route:  Infuse 2 mL into a venous catheter Every 6 (Six) Hours As Needed for Nausea or Vomiting. - Intravenous    oxyCODONE (ROXICODONE) immediate release tablet 5 mg 5 mg Every 8 Hours PRN 11/1/2017 11/11/2017    Sig - Route: Take 1 tablet by mouth Every 8 (Eight) Hours As Needed for Moderate Pain . - Oral    pantoprazole (PROTONIX) EC tablet 40 mg 40 mg Every Early Morning 11/2/2017     Sig - Route: Take 1 tablet by mouth Every Morning. - Oral    potassium chloride (MICRO-K) CR capsule 20 mEq 20 mEq Daily 11/2/2017     Sig - Route: Take 2 capsules by mouth Daily. - Oral    ramipril (ALTACE) capsule 2.5 mg 2.5 mg Daily 11/2/2017     Sig - Route: Take 1 capsule by mouth Daily. - Oral    sodium chloride 0.9 % flush 1-10 mL 1-10 mL As Needed 11/1/2017     Sig - Route: Infuse 1-10 mL into a venous catheter As Needed for Line Care. - Intravenous    spironolactone (ALDACTONE) tablet 25 mg 25 mg Daily 11/2/2017     Sig - Route: Take 1 tablet by mouth Daily. - Oral    venlafaxine (EFFEXOR) tablet 75 mg 75 mg Every 12 Hours Scheduled 11/1/2017     Sig - Route: Take 2 tablets by mouth Every 12 (Twelve) Hours. - Oral    enoxaparin (LOVENOX) syringe 30 mg (Discontinued) 30 mg Daily 11/2/2017 11/2/2017    Sig - Route: Inject 0.3 mL under the skin Daily. - Subcutaneous    furosemide (LASIX) injection 40 mg (Discontinued) 40 mg 2 Times Daily 11/1/2017 11/2/2017    Sig - Route: Infuse 4 mL into a venous catheter 2 (Two) Times a Day. - Intravenous    ipratropium-albuterol (DUO-NEB) nebulizer solution 3 mL (Discontinued) 3 mL Every 6 Hours - RT 11/1/2017 11/2/2017    Sig - Route: Take 3 mL by nebulization Every 6 (Six) Hours. - Nebulization          Orders (last 72 hrs)     Start     Ordered    11/03/17 0600  CBC & Differential  Morning Draw      11/02/17 0833    11/03/17 0600  Comprehensive Metabolic Panel  Morning Draw      11/02/17 0833    11/02/17 1230  furosemide (LASIX) tablet 40 mg  Daily      11/02/17 1155    11/02/17 1133   ipratropium-albuterol (DUO-NEB) nebulizer solution 3 mL  4 Times Daily - RT      11/02/17 0833    11/02/17 1040  NPO Diet  Diet Effective Now      11/02/17 1039    11/02/17 1004  Straight Cath  Once      11/02/17 1003    11/02/17 1004  Urine Drug Screen - Urine, Clean Catch  Once      11/02/17 1003    11/02/17 1002  AFP Tumor Marker  Once      11/02/17 1001    11/02/17 1001  Inpatient Consult to Gastroenterology  Once     Specialty:  Gastroenterology  Provider:  Korina Han MD    11/02/17 1000    11/02/17 1000  MRI Abdomen With & Without Contrast  1 Time Imaging      11/02/17 1000    11/02/17 0915  enoxaparin (LOVENOX) syringe 40 mg  Daily      11/02/17 0831    11/02/17 0900  potassium chloride (MICRO-K) CR capsule 20 mEq  Daily      11/01/17 2103 11/02/17 0900  spironolactone (ALDACTONE) tablet 25 mg  Daily      11/01/17 2103    11/02/17 0900  enoxaparin (LOVENOX) syringe 30 mg  Daily,   Status:  Discontinued      11/01/17 2103 11/02/17 0900  ramipril (ALTACE) capsule 2.5 mg  Daily      11/01/17 2103    11/02/17 0900  famotidine (PEPCID) tablet 40 mg  Daily      11/01/17 2103    11/02/17 0900  aspirin EC tablet 325 mg  Daily      11/01/17 2113    11/02/17 0834  T3, Free  Once      11/02/17 0833    11/02/17 0834  T4, Free  Once      11/02/17 0833    11/02/17 0747  Lactic Acid, Reflex  STAT      11/02/17 0746    11/02/17 0600  pantoprazole (PROTONIX) EC tablet 40 mg  Every Early Morning      11/01/17 2103 11/02/17 0600  BNP  Morning Draw      11/01/17 2103 11/02/17 0600  TSH  Morning Draw      11/01/17 2103 11/02/17 0600  Lipid Panel  Morning Draw      11/01/17 2103    11/02/17 0600  Hemoglobin A1c  Morning Draw      11/01/17 2103    11/02/17 0600  Magnesium  Morning Draw      11/01/17 2103 11/02/17 0600  Phosphorus  Morning Draw      11/01/17 2103 11/02/17 0600  Blood Gas, Arterial  Morning Draw      11/01/17 2103 11/02/17 0600  Amylase  Morning Draw      11/01/17 2103 11/02/17 0600   Comprehensive Metabolic Panel  Morning Draw      11/01/17 2103 11/02/17 0600  Ferritin  Morning Draw      11/01/17 2103 11/02/17 0600  Lactic Acid, Plasma  Morning Draw      11/01/17 2103 11/02/17 0600  Lipase  Morning Draw      11/01/17 2103 11/02/17 0600  ECG 12 Lead  Tomorrow AM      11/01/17 2103 11/02/17 0444  Blood Gas, Arterial  Once      11/02/17 0443    11/02/17 0336  Lactic Acid, Reflex Timer  Once      11/02/17 0335    11/01/17 2153  Inpatient Consult to Cardiology  Once     Specialty:  Cardiology  Provider:  Jose Barajas MD    11/01/17 2153 11/01/17 2145  budesonide-formoterol (SYMBICORT) 160-4.5 MCG/ACT inhaler 2 puff  2 Times Daily - RT      11/01/17 2103 11/01/17 2145  ipratropium-albuterol (DUO-NEB) nebulizer solution 3 mL  Every 6 Hours - RT,   Status:  Discontinued      11/01/17 2103 11/01/17 2145  nicotine (NICODERM CQ) 21 MG/24HR patch 1 patch  Every 24 Hours      11/01/17 2103 11/01/17 2145  furosemide (LASIX) injection 40 mg  2 Times Daily,   Status:  Discontinued      11/01/17 2103 11/01/17 2141  Peripheral Blood Smear - Blood,  Once      11/01/17 2140 11/01/17 2115  metoprolol tartrate (LOPRESSOR) tablet 12.5 mg  Every 12 Hours Scheduled      11/01/17 2103 11/01/17 2115  venlafaxine (EFFEXOR) tablet 75 mg  Every 12 Hours Scheduled      11/01/17 2103 11/01/17 2104  Remove & Replace Compression Stockings (TEDS) Daily  Daily      11/01/17 2103 11/01/17 2104  Daily Weights  Daily      11/01/17 2103 11/01/17 2103  US Abdomen Complete  1 Time Imaging      11/01/17 2103 11/01/17 2102  Troponin  Now Then Every 6 Hours     Comments:  Perform 6 Hours After Last Troponin (If Done)    11/01/17 2103 11/01/17 2100  Strict Intake and Output  Every Hour      11/01/17 2103 11/01/17 2100  Adult Transthoracic Echo Complete W/ Cont if Necessary Per Protocol  Once      11/01/17 2103 11/01/17 2058  oxyCODONE (ROXICODONE) immediate release tablet 5 mg  Every  8 Hours PRN      11/01/17 2103 11/01/17 2057  ondansetron (ZOFRAN) injection 4 mg  Every 6 Hours PRN      11/01/17 2103 11/01/17 2057  bisacodyl (DULCOLAX) EC tablet 5 mg  Daily PRN      11/01/17 2103 11/01/17 2054  Diet Regular; Cardiac, Low Sodium  Diet Effective Now,   Status:  Canceled      11/01/17 2103 11/01/17 2054  PT Consult: Eval & Treat  Once      11/01/17 2103 11/01/17 2053  Oxygen Therapy-  Continuous      11/01/17 2103 11/01/17 2053  Cardiac Rehab Evaluation and Enrollment  Once     Provider:  (Not yet assigned)    11/01/17 2103 11/01/17 2053  Insert Peripheral IV  Once      11/01/17 2103 11/01/17 2053  Saline Lock & Maintain IV Access  Continuous      11/01/17 2103 11/01/17 2053  Full Code  Continuous      11/01/17 2103 11/01/17 2053  VTE Risk Assessment - Moderate Risk  Once      11/01/17 2103 11/01/17 2053  Place Compression Stockings (TEDs)  Once      11/01/17 2103 11/01/17 2053  Cardiac Monitoring  Continuous      11/01/17 2103 11/01/17 2053  Pulse Oximetry, Continuous  Continuous      11/01/17 2103 11/01/17 2053  Vital Signs  Per Hospital Policy      11/01/17 2103 11/01/17 2052  sodium chloride 0.9 % flush 1-10 mL  As Needed      11/01/17 2103 11/01/17 1956  Hepatitis Panel, Acute  Once      11/01/17 1955 11/01/17 1955  Inpatient Admission  Once      11/01/17 1955 11/01/17 1954  ECG 12 Lead  Once      11/01/17 1953 11/01/17 1954  Troponin  Once      11/01/17 1953 11/01/17 1943  Ethanol  STAT      11/01/17 1943 11/01/17 1929  Protime-INR  STAT      11/01/17 1929 11/01/17 1929  aPTT  STAT      11/01/17 1929 11/01/17 1927  Ethanol  STAT,   Status:  Canceled      11/01/17 1926 11/01/17 1922  Ammonia  STAT      11/01/17 1921 11/01/17 1754  CK-MB Index  Once      11/01/17 1753    11/01/17 1718  Blood Gas, Arterial With Co-Ox  Once      11/01/17 1717 11/01/17 1716  aspirin chewable tablet 324 mg  Once      11/01/17 1714     11/01/17 1709  ipratropium-albuterol (DUO-NEB) nebulizer solution 3 mL  Once      11/01/17 1707    11/01/17 1709  methylPREDNISolone sodium succinate (SOLU-Medrol) injection 125 mg  Once      11/01/17 1707    11/01/17 1709  Blood Gas, Arterial  STAT,   Status:  Canceled      11/01/17 1708    11/01/17 1709  Blood Gas, Arterial With Co-Ox  STAT      11/01/17 1708    11/01/17 1706  CBC & Differential  STAT      11/01/17 1705    11/01/17 1706  Comprehensive Metabolic Panel  STAT      11/01/17 1705    11/01/17 1706  Urinalysis With / Culture If Indicated - Urine, Clean Catch  STAT      11/01/17 1705    11/01/17 1706  Urine Drug Screen - Urine, Clean Catch  STAT,   Status:  Canceled      11/01/17 1705    11/01/17 1706  CT Head Without Contrast  1 Time Imaging      11/01/17 1705    11/01/17 1706  CK  STAT      11/01/17 1705    11/01/17 1706  CK-MB  STAT      11/01/17 1705    11/01/17 1706  Myoglobin, Serum  Once      11/01/17 1705    11/01/17 1706  Troponin  STAT      11/01/17 1705    11/01/17 1706  XR Chest 1 View  1 Time Imaging      11/01/17 1705    11/01/17 1706  BNP  STAT      11/01/17 1705    11/01/17 1706  Magnesium  STAT      11/01/17 1705    11/01/17 1706  CBC Auto Differential  PROCEDURE ONCE      11/01/17 1706    11/01/17 1624  ECG 12 Lead  STAT      11/01/17 1623    Unscheduled  Up With Assistance  As Needed      11/01/17 2103    --  cyclobenzaprine (FLEXERIL) 10 MG tablet  2 Times Daily PRN      11/01/17 1713    --  venlafaxine (EFFEXOR) 75 MG tablet  2 Times Daily      11/01/17 1713    --  budesonide-formoterol (SYMBICORT) 160-4.5 MCG/ACT inhaler  2 Times Daily - RT      11/01/17 1713    --  spironolactone (ALDACTONE) 25 MG tablet  Daily      11/01/17 1713    --  metoprolol tartrate (LOPRESSOR) 25 MG tablet  2 Times Daily      11/01/17 1713    --  HYDROcodone-acetaminophen (NORCO) 7.5-325 MG per tablet  Every 6 Hours PRN      11/01/17 1713    --  pantoprazole (PROTONIX) 40 MG EC tablet  Daily      11/01/17  1713    --  gabapentin (NEURONTIN) 600 MG tablet  3 Times Daily      11/01/17 1746    --  SCANNED - TELEMETRY        11/01/17 0000    --  SCANNED - TELEMETRY        11/01/17 0000    --  SCANNED EKG      11/01/17 0000             Consult Notes (last 24 hours) (Notes from 11/1/2017  1:12 PM through 11/2/2017  1:12 PM)      Lizbeth Sanford PA-C at 11/2/2017 11:14 AM  Version 1 of 1         [facility] CONSULT NOTE    Referring Provider: Buck Zepeda MD    Reason for Consultation: CHF    Chief Complaint   Patient presents with   • Chest Pain   • Shortness of Breath   • Leg Swelling       Subjective .     History of present illness:  Teri Tim is a 57 y.o. female with history of cor pulmonale, chronic obstructive pulmonary disease, hypertension, tobacco abuse who presented to [facility] ED with complaints of shortness of breath and leg swelling. The patient relates that over the course of the last few weeks she has had increasing shortness of breath, particularly with exertion. She states that about 3 days ago she noted leg swelling as well; as this was new for her she decided to present to our emergency dept. Ms. Tim reports to me that she occasionally has right sided, pressure-like, chest pain with both rest and exertion. She states the last occurrence of this was approximately 3 days. She says this has been an issue for some time. She also endorses a cough, occasionally productive of green sputum. She relates associated nausea. She denies any orthopnea, paroxysmal nocturnal dyspnea or syncope. She says she is on nightly oxygen at home. She reports she feels a little better today, less short of breath. Her leg swelling has significantly improved. She denies any chest pain.    History  Past Medical History:   Diagnosis Date   • CHF (congestive heart failure)    • COPD (chronic obstructive pulmonary disease)    • Pneumonia    ,   Past Surgical History:   Procedure Laterality Date   • CARPAL  TUNNEL RELEASE     • HYSTERECTOMY      complete   ,   Family History   Problem Relation Age of Onset   • Cancer Mother    • Heart disease Father    • Cancer Sister    • Heart disease Brother    ,   Social History   Substance Use Topics   • Smoking status: Current Every Day Smoker     Packs/day: 0.50     Types: Cigarettes   • Smokeless tobacco: Never Used   • Alcohol use No   ,     Medications  Current Facility-Administered Medications   Medication Dose Route Frequency Provider Last Rate Last Dose   • aspirin EC tablet 325 mg  325 mg Oral Daily Buck Zepeda MD   325 mg at 11/02/17 0924   • bisacodyl (DULCOLAX) EC tablet 5 mg  5 mg Oral Daily PRN Buck Zepeda MD       • budesonide-formoterol (SYMBICORT) 160-4.5 MCG/ACT inhaler 2 puff  2 puff Inhalation BID - RT Buck Zepeda MD   2 puff at 11/02/17 0708   • enoxaparin (LOVENOX) syringe 40 mg  40 mg Subcutaneous Daily Geovanny Howard DO       • famotidine (PEPCID) tablet 40 mg  40 mg Oral Daily Buck Zepeda MD   40 mg at 11/02/17 0924   • furosemide (LASIX) injection 40 mg  40 mg Intravenous BID Buck Zepeda MD   40 mg at 11/02/17 0930   • ipratropium-albuterol (DUO-NEB) nebulizer solution 3 mL  3 mL Nebulization 4x Daily - RT ROLAND Brink   3 mL at 11/02/17 1106   • metoprolol tartrate (LOPRESSOR) tablet 12.5 mg  12.5 mg Oral Q12H Buck Zepeda MD   12.5 mg at 11/02/17 0925   • nicotine (NICODERM CQ) 21 MG/24HR patch 1 patch  1 patch Transdermal Q24H Buck Zepeda MD   1 patch at 11/01/17 2247   • ondansetron (ZOFRAN) injection 4 mg  4 mg Intravenous Q6H PRN Buck Zepeda MD       • oxyCODONE (ROXICODONE) immediate release tablet 5 mg  5 mg Oral Q8H PRN Buck Zepeda MD   5 mg at 11/01/17 2248   • pantoprazole (PROTONIX) EC tablet 40 mg  40 mg Oral Q AM Buck Zepeda MD   40 mg at 11/02/17 0924   • potassium chloride (MICRO-K) CR capsule 20 mEq  20 mEq Oral Daily Buck Zeepda MD   20 mEq at 11/02/17 0924  "  • ramipril (ALTACE) capsule 2.5 mg  2.5 mg Oral Daily Buck Zepeda MD       • sodium chloride 0.9 % flush 1-10 mL  1-10 mL Intravenous PRN Buck Zepeda MD       • spironolactone (ALDACTONE) tablet 25 mg  25 mg Oral Daily Buck Zepeda MD   25 mg at 11/02/17 0924   • venlafaxine (EFFEXOR) tablet 75 mg  75 mg Oral Q12H Buck Zepeda MD   75 mg at 11/01/17 2248       Allergies:  Codeine    Review of Systems  Review of Systems   Constitution: Positive for malaise/fatigue. Negative for weight gain.   Cardiovascular: Positive for dyspnea on exertion and leg swelling. Negative for chest pain, claudication, irregular heartbeat, near-syncope, orthopnea, palpitations, paroxysmal nocturnal dyspnea and syncope.   Respiratory: Positive for cough and shortness of breath. Negative for hemoptysis.    Hematologic/Lymphatic: Negative for bleeding problem.   Skin: Negative for poor wound healing.   Musculoskeletal: Negative for myalgias.   Gastrointestinal: Positive for nausea. Negative for melena and vomiting.   Genitourinary: Negative for hematuria.   Neurological: Negative for dizziness, focal weakness and light-headedness.   Psychiatric/Behavioral: Negative for memory loss.   All other systems reviewed and are negative.      Objective     Physical Exam:  Patient Vitals for the past 24 hrs:   BP Temp Temp src Pulse Resp SpO2 Height Weight   11/02/17 0718 113/57 97.5 °F (36.4 °C) Temporal Art 82 22 96 % - -   11/02/17 0708 - - - 93 24 95 % - -   11/01/17 2321 120/58 98.9 °F (37.2 °C) Temporal Art 93 20 99 % - -   11/01/17 2001 122/73 - - 84 18 94 % - -   11/01/17 2000 125/55 98.3 °F (36.8 °C) Temporal Art 88 22 - 60\" (152.4 cm) 111 lb 8 oz (50.6 kg)   11/01/17 1826 - - - 89 - - - -   11/01/17 1746 114/61 - - 94 16 94 % - -   11/01/17 1742 - 98.4 °F (36.9 °C) - - - - - -   11/01/17 1720 - - - 98 13 97 % - -   11/01/17 1648 - - - - - - 61\" (154.9 cm) 117 lb 1.6 oz (53.1 kg)   11/01/17 1646 120/78 - - 99 15 91 % - - "   11/01/17 1645 120/78 - - 99 28 92 % - -     Physical Exam   Constitutional: She is oriented to person, place, and time. She appears well-developed and well-nourished. She appears ill (chronically ill appearing).   HENT:   Head: Normocephalic and atraumatic.   Eyes: Conjunctivae and EOM are normal. Pupils are equal, round, and reactive to light.   Neck: Normal range of motion. Neck supple. No JVD present.   Cardiovascular: Normal rate, regular rhythm, S1 normal, S2 normal, normal heart sounds, intact distal pulses and normal pulses.    No murmur heard.  Pulmonary/Chest: Effort normal. No respiratory distress. She has wheezes (diffuse).   Abdominal: Soft. Bowel sounds are normal. She exhibits no distension.   Musculoskeletal: She exhibits no edema.   Neurological: She is alert and oriented to person, place, and time.   Skin: Skin is warm and dry.   Psychiatric: She has a normal mood and affect. Judgment normal.   Vitals reviewed.      Results Review:   I reviewed the patient's new clinical results.    Lab Results (last 72 hours)     Procedure Component Value Units Date/Time    Blood Gas, Arterial With Co-Ox [[id number]]  (Abnormal) Collected:  11/01/17 1710    Specimen:  Arterial Blood Updated:  11/01/17 1718     Site Right Brachial     Dao's Test N/A     pH, Arterial 7.349 (L) pH units      pCO2, Arterial 68.2 (C) mm Hg      pO2, Arterial 68.6 (L) mm Hg      HCO3, Arterial 37.5 (H) mmol/L      Base Excess, Arterial 9.6 (H) mmol/L      O2 Saturation, Arterial 91.9 (L) %      Hemoglobin, Blood Gas 11.7 (L) g/dL      Hematocrit, Blood Gas 35.9 (L) %      Oxyhemoglobin 88.3 (L) %      Methemoglobin 0.70 %      Carboxyhemoglobin 3.3 %      Temperature 37.0 C      Sodium, Arterial 137 mmol/L      Potassium, Arterial 4.3 mmol/L      Barometric Pressure for Blood Gas 748 mmHg      Modality Nasal Cannula     Flow Rate 2.0 lpm      Ventilator Mode NA     Notified Who Saurav HERMAN     Notified By [id number]     Notified Time  11/01/2017 17:17     Collected by [id number]    Magnesium [[id number]]  (Normal) Collected:  11/01/17 1711    Specimen:  Blood Updated:  11/01/17 1741     Magnesium 1.9 mg/dL     CK [[id number]]  (Normal) Collected:  11/01/17 1711    Specimen:  Blood Updated:  11/01/17 1753     Creatine Kinase 166 U/L     CK-MB [[id number]]  (Abnormal) Collected:  11/01/17 1711    Specimen:  Blood Updated:  11/01/17 1753     CKMB 6.32 (H) ng/mL     Myoglobin, Serum [[id number]]  (Abnormal) Collected:  11/01/17 1711    Specimen:  Blood Updated:  11/01/17 1753     Myoglobin 165.2 (H) ng/mL     Troponin [[id number]]  (Abnormal) Collected:  11/01/17 1711    Specimen:  Blood Updated:  11/01/17 1753     Troponin I 0.092 (H) ng/mL     BNP [[id number]]  (Abnormal) Collected:  11/01/17 1711    Specimen:  Blood Updated:  11/01/17 1753     proBNP 43334.0 (H) pg/mL     CK-MB Index [[id number]]  (Normal) Collected:  11/01/17 1711    Specimen:  Blood Updated:  11/01/17 1753     CK-MB Index 3.8 %     Comprehensive Metabolic Panel [[id number]]  (Abnormal) Collected:  11/01/17 1711    Specimen:  Blood Updated:  11/01/17 1800     Glucose 88 mg/dL      BUN 26 (H) mg/dL      Creatinine 1.41 (H) mg/dL      Sodium 137 mmol/L      Potassium 4.5 mmol/L      Chloride 93 (L) mmol/L      CO2 38.0 (H) mmol/L      Calcium 8.6 mg/dL      Total Protein 6.3 g/dL      Albumin 3.30 (L) g/dL      ALT (SGPT) 1236 (H) U/L      AST (SGOT) 3036 (H) U/L      Alkaline Phosphatase 98 U/L      Total Bilirubin 0.5 mg/dL      eGFR Non African Amer 39 (L) mL/min/1.73      Globulin 3.0 gm/dL      A/G Ratio 1.1 g/dL      BUN/Creatinine Ratio 18.4     Anion Gap 6.0 mmol/L     CBC & Differential [[id number]] Collected:  11/01/17 1711    Specimen:  Blood Updated:  11/01/17 1814    Narrative:       The following orders were created for panel order CBC & Differential.  Procedure                               Abnormality         Status                     ---------                                -----------         ------                     CBC Auto Differential[[id number]]        Abnormal            Final result                 Please view results for these tests on the individual orders.    CBC Auto Differential [[id number]]  (Abnormal) Collected:  11/01/17 1711    Specimen:  Blood Updated:  11/01/17 1814     WBC 14.20 (H) 10*3/mm3      RBC 3.88 (L) 10*6/mm3      Hemoglobin 11.6 (L) g/dL      Hematocrit 39.4 %      .5 (H) fL      MCH 29.9 pg      MCHC 29.4 (L) g/dL      RDW 13.9 %      RDW-SD 51.4 fl      MPV 10.7 fL      Platelets 292 10*3/mm3      Neutrophil % 72.1 %      Lymphocyte % 14.9 (L) %      Monocyte % 11.7 %      Eosinophil % 0.5 %      Basophil % 0.4 %      Immature Grans % 0.4 %      Neutrophils, Absolute 10.26 (H) 10*3/mm3      Lymphocytes, Absolute 2.11 10*3/mm3      Monocytes, Absolute 1.66 (H) 10*3/mm3      Eosinophils, Absolute 0.07 10*3/mm3      Basophils, Absolute 0.05 10*3/mm3      Immature Grans, Absolute 0.05 (H) 10*3/mm3      nRBC 0.0 /100 WBC     Ammonia [[id number]]  (Normal) Collected:  11/01/17 1927    Specimen:  Blood Updated:  11/01/17 1945     Ammonia 21 umol/L     Protime-INR [[id number]]  (Abnormal) Collected:  11/01/17 1711    Specimen:  Blood Updated:  11/01/17 1957     Protime 17.4 (H) Seconds      INR 1.38 (H)    aPTT [[id number]]  (Normal) Collected:  11/01/17 1711    Specimen:  Blood Updated:  11/01/17 1957     PTT 33.0 seconds     Ethanol [[id number]]  (Normal) Collected:  11/01/17 1711    Specimen:  Blood Updated:  11/01/17 2032     Ethanol % <0.010 %     Narrative:       Not for legal purposes. Chain of Custody not followed.     Troponin [[id number]]  (Abnormal) Collected:  11/01/17 1927    Specimen:  Blood Updated:  11/01/17 2114     Troponin I 0.100 (H) ng/mL     Hepatitis Panel, Acute [[id number]]  (Abnormal) Collected:  11/01/17 2001    Specimen:  Blood Updated:  11/01/17 2119     HCV S/C Ratio 35.50 (H)     Hepatitis C Ab Reactive (A)     Hep A IgM Negative      Hep B C IgM Negative     Hepatitis B Surface Ag Negative    Narrative:       This patient's sample tests reactive with a high s/c ratio: >=8.00  Samples with high s/c ratios have been shown to repeat as positive using a different methodology 95% of the time or greater ([facility] MMWR No. RR-3, 2003).  Therefore, additional testing for verification of the result is not recommended.    Peripheral Blood Smear - Blood, [[id number]] Collected:  11/01/17 1711    Specimen:  Blood Updated:  11/01/17 2144    Troponin [[id number]]  (Abnormal) Collected:  11/01/17 2146    Specimen:  Blood Updated:  11/01/17 2223     Troponin I 0.098 (H) ng/mL     Magnesium [[id number]]  (Normal) Collected:  11/02/17 0257    Specimen:  Blood Updated:  11/02/17 0331     Magnesium 1.7 mg/dL     Phosphorus [[id number]]  (Normal) Collected:  11/02/17 0257    Specimen:  Blood Updated:  11/02/17 0331     Phosphorus 3.8 mg/dL     Amylase [[id number]]  (Normal) Collected:  11/02/17 0257    Specimen:  Blood Updated:  11/02/17 0331     Amylase 50 U/L     Lipase [[id number]]  (Normal) Collected:  11/02/17 0257    Specimen:  Blood Updated:  11/02/17 0331     Lipase 77 U/L     Lactic Acid, Plasma [[id number]]  (Abnormal) Collected:  11/02/17 0257    Specimen:  Blood Updated:  11/02/17 0335     Lactate 5.6 (C) mmol/L     Hemoglobin A1c [[id number]] Collected:  11/02/17 0257    Specimen:  Blood Updated:  11/02/17 0342     Hemoglobin A1C 5.2 %     Narrative:       Less than 6.0           Non-Diabetic Range  6.0-7.0                 ADA Therapeutic Target  Greater than 7.0        Action Suggested    Troponin [[id number]]  (Abnormal) Collected:  11/02/17 0257    Specimen:  Blood Updated:  11/02/17 0343     Troponin I 0.080 (H) ng/mL     BNP [[id number]]  (Abnormal) Collected:  11/02/17 0257    Specimen:  Blood Updated:  11/02/17 0343     proBNP 32823.0 (H) pg/mL     Lipid Panel [[id number]]  (Abnormal) Collected:  11/02/17 0257    Specimen:  Blood Updated:  11/02/17 0359      Total Cholesterol 171 mg/dL      Triglycerides 119 mg/dL      HDL Cholesterol 52 mg/dL      LDL Cholesterol  114 (H) mg/dL      LDL/HDL Ratio 1.83    TSH [[id number]]  (Abnormal) Collected:  11/02/17 0257    Specimen:  Blood Updated:  11/02/17 0402     TSH 0.313 (L) mIU/mL     Comprehensive Metabolic Panel [[id number]]  (Abnormal) Collected:  11/02/17 0257    Specimen:  Blood Updated:  11/02/17 0405     Glucose 153 (H) mg/dL      BUN 26 (H) mg/dL      Creatinine 1.34 mg/dL      Sodium 140 mmol/L      Potassium 3.9 mmol/L      Chloride 87 (L) mmol/L      CO2 39.0 (H) mmol/L      Calcium 9.2 mg/dL      Total Protein 7.1 g/dL      Albumin 4.00 g/dL      ALT (SGPT) 1454 (H) U/L      AST (SGOT) 2453 (H) U/L      Alkaline Phosphatase 110 U/L      Total Bilirubin 0.7 mg/dL      eGFR Non African Amer 41 (L) mL/min/1.73      Globulin 3.1 gm/dL      A/G Ratio 1.3 g/dL      BUN/Creatinine Ratio 19.4     Anion Gap 14.0 (H) mmol/L     Ferritin [[id number]]  (Normal) Collected:  11/02/17 0257    Specimen:  Blood Updated:  11/02/17 0406     Ferritin 76.30 ng/mL     Blood Gas, Arterial [[id number]]  (Abnormal) Collected:  11/02/17 0428    Specimen:  Arterial Blood Updated:  11/02/17 0443     Site Right Radial     Dao's Test Positive     pH, Arterial 7.497 (H) pH units      pCO2, Arterial 55.9 (H) mm Hg      pO2, Arterial 47.8 (C) mm Hg      HCO3, Arterial 43.2 (H) mmol/L      Base Excess, Arterial 17.3 (H) mmol/L      O2 Saturation, Arterial 84.5 (L) %      Temperature 37.0 C      Barometric Pressure for Blood Gas 748 mmHg      Modality Room Air     Ventilator Mode NA     Notified Who [id number]     Notified By [email or web address]     Notified Time 11/02/2017 04:36     Collected by [id number]    Lactic Acid, Reflex Timer [[id number]] Collected:  11/02/17 0257    Specimen:  Blood Updated:  11/02/17 0646     Extra Tube Hold for add-ons.      Auto resulted.       Lactic Acid, Reflex [[id number]]  (Normal) Collected:  11/02/17 0810    Specimen:  Blood  Updated:  11/02/17 0857     Lactate 1.5 mmol/L     T3, Free [[id number]]  (Abnormal) Collected:  11/02/17 0257    Specimen:  Blood Updated:  11/02/17 0912     T3, Free 2.72 (L) pg/mL     T4, Free [[id number]]  (Normal) Collected:  11/02/17 0257    Specimen:  Blood Updated:  11/02/17 0912     Free T4 1.59 ng/dL     AFP Tumor Marker [[id number]] Collected:  11/02/17 0810    Specimen:  Blood Updated:  11/02/17 1003    Troponin [[id number]]  (Abnormal) Collected:  11/02/17 0810    Specimen:  Blood Updated:  11/02/17 1009     Troponin I 0.061 (H) ng/mL     Urinalysis With / Culture If Indicated - Urine, Clean Catch [[id number]]  (Normal) Collected:  11/02/17 1022    Specimen:  Urine from Urine, Clean Catch Updated:  11/02/17 1045     Color, UA Yellow     Appearance, UA Clear     pH, UA 7.0     Specific Gravity, UA 1.008     Glucose, UA Negative     Ketones, UA Negative     Bilirubin, UA Negative     Blood, UA Negative     Protein, UA Negative     Leuk Esterase, UA Negative     Nitrite, UA Negative     Urobilinogen, UA 0.2 E.U./dL    Narrative:       Urine microscopic not indicated.    Urine Drug Screen - Urine, Clean Catch [[id number]]  (Abnormal) Collected:  11/02/17 1022    Specimen:  Urine from Urine, Clean Catch Updated:  11/02/17 1103     Amphetamine Screen, Urine Negative     Barbiturates Screen, Urine Negative     Benzodiazepine Screen, Urine Positive (A)     Cocaine Screen, Urine Negative     Methadone Screen, Urine Negative     Opiate Screen Negative     Phencyclidine (PCP), Urine Negative     THC, Screen, Urine Negative    Narrative:       Negative Thresholds For Drugs Screened in Urine:    Amphetamines          500 ng/ml  Barbiturates          200 ng/ml  Benzodiazepines       200 ng/ml  Cocaine               150 ng/ml  Methadone             150 ng/ml  Opiates               300 ng/ml  Phencyclidine         25 ng/ml  THC                      50 ng/ml    The normal value for all drugs tested is negative. This report  includes final unconfirmed screening results.  A positive result by this assay can be, at your request, sent to the Reference [facility] for confirmation by gas chromatography. Unconfirmed results must not be used for non-medical purposes, such as employment or legal testing. Clinical consideration should be applied to any drug of abuse test result, particularly when unconfirmed results are used.          No results found for: ECHOEFEST    Imaging Results (last 72 hours)     Procedure Component Value Units Date/Time    XR Chest 1 View [[id number]] Collected:  11/01/17 1726     Updated:  11/01/17 1730    Narrative:       XR CHEST 1 VW- 11/1/2017 5:18 PM CDT     HISTORY: Shortness of air       COMPARISON: 05/27/2016.     FINDINGS:   Emphysematous changes are noted. The previously seen interstitial  opacities have resolved. The cardiomediastinal silhouette and pulmonary  vascularity are unchanged.      The osseous structures and surrounding soft tissues demonstrate no acute  abnormality.       Impression:       1. Emphysema without evidence of acute cardiopulmonary process.        This report was finalized on 11/01/2017 17:27 by Dr. David Bob MD.    CT Head Without Contrast [[id number]] Collected:  11/01/17 1909     Updated:  11/01/17 1912    Narrative:       CT HEAD WO CONTRAST- 11/1/2017 6:46 PM CDT     HISTORY: ams, weakness, difficulty walking       COMPARISON: None      DOSE LENGTH PRODUCT: 975 mGy cm. Automated exposure control was also  utilized to decrease patient radiation dose.     TECHNIQUE: Helical tomographic images of the brain were obtained without  the use of intravenous contrast.      FINDINGS:   Hemorrhage: None.     Mass effect: No midline shift or mass effect.     Ventricles: Normal and symmetric without hydrocephalus.     Basilar cisterns: Normal.     Sulci: Normal in configuration. No sulcal effacement.     Extra-axial fluid: No abnormal extra-axial fluid collections.     Grey and white matter  differentiation: Normal.     Posterior fossa and brainstem: Normal.     Bones: No fractures or lesions.     Soft tissues: No acute process.     Sinuses and mastoid air cells: Clear.       Impression:       1. No acute intracranial process.        This report was finalized on 11/01/2017 19:09 by Dr. David Bob MD.    US Abdomen Complete [[id number]] Collected:  11/02/17 0912     Updated:  11/02/17 0922    Narrative:       History:  57-year-old evaluated for elevated liver function tests.      Reference:  CT chest May 2016.     Findings:  Real time sonography of the abdomen was performed.     14 x 8 mm heterogeneous mass centered at the inferior hepatic margin and  gallbladder wall. It is unclear if this originates from the liver or  gallbladder wall. Gallbladder is otherwise unremarkable with no  gallstones detected.     Liver is otherwise unremarkable with normal echogenicity. Visualized  portions of the pancreas are unremarkable.     Both kidneys are normal in size, contour, and cortical  thickness/echogenicity. No hydronephrosis. There is a tiny 9 mm right  renal cortical cyst. There is a small 16 mm cyst from the superior left  renal pole. No solid renal mass.      Spleen is unremarkable. Atherosclerotic plaquing of the abdominal aorta  which maintains normal caliber throughout. Imaged IVC is unremarkable.          Impression:       Impression:  14 x 8 mm mass centered at the inferior hepatic margin and gallbladder  wall. It is unclear if this originates from the gallbladder wall or is  exophytic from the liver. MRI liver mass protocol may further  characterize.  This report was finalized on 11/02/2017 09:19 by  Andrew Green, .        Assessment   1. Acute cor pulmonale: history of cor pulmonale with significantly elevated BNP and patient with dyspnea; will evaluate echo to rule out LV dysfunction as well. Patient appears to be nearly compensated today.   2. Type II troponin elevation: peaked and declined. Likely  "type II elevation, given other labs consistent with hypoperfusion, as noted below   3. Hepatic mass with significantly elevated liver enzymes (may be related to hypoperfusion injury? given noted elevated lactate on admission) in the setting of hepatitis C  4.  Acute exacerbation of chronic obstructive pulmonary disease   5. Hypertension   6. Tobacco abuse: 1/2 PPD+  7. Paroxysmal atrial fibrillation: ?? Patient reports she was on coumadin in past, denies afib or blood clot. Her  states \"Coumadin almost killed her.\"     Plan   1. Echo pending  2. Transition to oral diuretics; daily BMP. Monitor daily weights, strict I/Os  3. No need to continue trending troponin, as has peaked and declined. Will need stress echo in future- this could even be done as an outpatient, assuming patient does not have chest pain during her admission.  4. COPD treatment, as well as further workup for liver, per primary  5. Obviously patient is not a statin candidate at this time due to liver failure  6. Monitor telemetry     Further orders per Dr. Barajas upon his evaluation of the patient.     Thank you for the consultation, cardiology will gladly continue to follow.     Lizbeth Sanford PA-C        Please note this cardiology consultation note is the result of a face to face consultation with the patient, in addition to reviewing medical records at length by myself, Lizbeth Sanford PA-C.    Time: appx 35 minutes       Electronically signed by Lizbeth Sanford PA-C at 11/2/2017 12:03 PM        "

## 2017-11-02 NOTE — CONSULTS
[facility]  Inpatient Consult Note  Today's date:  11/02/17    Teri Tim  1960       Referring Provider: Buck Zepeda MD  Primary Physician: Jose Barajas MD   Primary Gastroenterologist: Dr. Francisco J Shane    Date of Admission: 11/1/2017  Date of Service:  11/02/17    Reason for Consultation/Chief Complaint: Elevated liver enzymes/abnormal imaging of liver on ultrasound/history of chronic hepatitis C    History of present illness:  This is a very pleasant 57-year-old female who tells me that approximately 2-3 days ago she began to have worsening shortness of air along with chest discomfort.  She states that she had to increase her use of home O2.  She states she also began to notice ankle swelling.  She states that she had quite a bit of nausea and some vomiting.  The patient has been seen in consult by Dr. Barajas.  Patient carries a history of cor pulmonale along with COPD, hypertension.  The patient was found to have acute cor pulmonale along with type II troponin elevation at peak and decline.    The patient denies any epigastric pain, dysphagia or hematemesis.  She denies any fever or chills.  Denies any melena or hematochezia.  She denies any unintentional weight loss but states she has had some loss of appetite.  She is a current every day smoker.  She tells me that she has never had an EGD performed since she was diagnosed with hepatitis C.  She tells me that she was informed that she had hepatitis C by the health department.  She also tells me that her  who is present today was treated for hepatitis C with Harvoni by Dr. Francisco J Shane.     According to records from [facility] the patient last seen by Corina WATKINS on 9/22/16 for follow-up to review results of labs and liver ultrasound.  The patient was noted to have chronic hepatitis C treatment naïve genotype 1A or 1B with viral load of 7,800,000.  Fibrosure score reveals F0 fibrosis.  At that time  liver ultrasound was found to be normal.    The patient does carry a history of intranasal cocaine use in 1980s and unprotected sex with spouse who has hepatitis C.  She states her last illicit drug use was greater than 20 years ago.  She states she has never been a heavy drinker of alcohol.    Labs and imaging: On admission cardiac enzymes elevated.  CMP with creatinine of 1.4 and BUN 26, ALT 1236, AST 3036, INR 1.38, CBC revealed a WBC of 14 hemoglobin 11.6, ethanol less than 0.010.  Ultrasound of the abdomen compared to CT of chest in May 2016.  Reveals a 14 x 8 mm heterogenous mass centered at the inferior hepatic margin and gallbladder wall.  Unclear if this originates from the liver or gallbladder wall.  All bladder is otherwise unremarkable with no gallstones noted.      Results for WANDY HELTON (MRN [id number]) as of 11/2/2017 14:40   Ref. Range 5/30/2016 05:31 6/1/2016 04:00 6/2/2016 05:04 6/3/2016 09:35 6/4/2016 05:08 11/1/2017 17:11 11/2/2017 02:57   ALT (SGPT) Latest Ref Range: 0 - 54 U/L 302 (H) 275 (H) 176 (H)   1236 (H) 1454 (H)   AST (SGOT) Latest Ref Range: 7 - 45 U/L 273 (H) 104 (H) 38   3036 (H) 2453 (H)   Results for WANDY HELTON (MRN [id number]) as of 11/2/2017 14:40   Ref. Range 11/25/2015 11:21 11/27/2015 13:02 12/3/2015 10:03 12/8/2015 14:35 12/9/2015 12:00 12/10/2015 09:12 12/11/2015 01:33 5/27/2016 06:52 11/1/2017 17:11   INR Latest Ref Range: 0.91 - 1.09  1.50 (H) 1.83 (H) 6.20 (C) 9.33 (C) 8.85 (C) 1.61 (H) 1.23 (H) 1.12 (H) 1.38 (H)     Summary per Dr. Han  57-year-old female with severe cardiopulmonary disease.  She has known hepatitis C with F0 score on Fibrosure testing.  She is treatment naïve.  Sonogram about one year ago at [facility] is unremarkable.  She now has mass at the interface between the gallbladder and liver of unclear etiology.  Liver function tests are now markedly elevated.    Past Medical History:   Diagnosis Date   • CHF (congestive heart failure)    •  COPD (chronic obstructive pulmonary disease)    • Pneumonia        Past Surgical History:   Procedure Laterality Date   • CARPAL TUNNEL RELEASE     • HYSTERECTOMY      complete        Allergies   Allergen Reactions   • Codeine Nausea And Vomiting       Prescriptions Prior to Admission   Medication Sig Dispense Refill Last Dose   • budesonide-formoterol (SYMBICORT) 160-4.5 MCG/ACT inhaler Inhale 2 puffs 2 (Two) Times a Day.   More than a month at Unknown time   • cyclobenzaprine (FLEXERIL) 10 MG tablet Take 10 mg by mouth 2 (Two) Times a Day As Needed for Muscle Spasms.   More than a month at Unknown time   • gabapentin (NEURONTIN) 600 MG tablet Take 600 mg by mouth 3 (Three) Times a Day.   More than a month at Unknown time   • HYDROcodone-acetaminophen (NORCO) 7.5-325 MG per tablet Take 1 tablet by mouth Every 6 (Six) Hours As Needed for Moderate Pain .   More than a month at Unknown time   • metoprolol tartrate (LOPRESSOR) 25 MG tablet Take 12.5 mg by mouth 2 (Two) Times a Day.   More than a month at Unknown time   • pantoprazole (PROTONIX) 40 MG EC tablet Take 40 mg by mouth Daily.   More than a month at Unknown time   • potassium chloride (K-DUR,KLOR-CON) 20 MEQ CR tablet Take 1 tablet by mouth Daily. 30 tablet 11 More than a month at Unknown time   • spironolactone (ALDACTONE) 25 MG tablet Take 25 mg by mouth Daily.   More than a month at Unknown time   • venlafaxine (EFFEXOR) 75 MG tablet Take 75 mg by mouth 2 (Two) Times a Day.   More than a month at Unknown time       Hospital Medications (active)       Dose Frequency Start End    aspirin chewable tablet 324 mg 324 mg Once 11/1/2017 11/1/2017    Sig - Route: Chew 4 tablets 1 (One) Time. - Oral    Cosign for Ordering: Accepted by Buck Zepeda MD on 11/1/2017  9:49 PM    aspirin EC tablet 325 mg 325 mg Daily 11/2/2017     Sig - Route: Take 1 tablet by mouth Daily. - Oral    bisacodyl (DULCOLAX) EC tablet 5 mg 5 mg Daily PRN 11/1/2017     Sig - Route: Take  1 tablet by mouth Daily As Needed for Constipation. - Oral    budesonide-formoterol (SYMBICORT) 160-4.5 MCG/ACT inhaler 2 puff 2 puff 2 Times Daily - RT 11/1/2017     Sig - Route: Inhale 2 puffs 2 (Two) Times a Day. - Inhalation    enoxaparin (LOVENOX) syringe 40 mg 40 mg Daily 11/2/2017     Sig - Route: Inject 0.4 mL under the skin Daily. - Subcutaneous    famotidine (PEPCID) tablet 40 mg 40 mg Daily 11/2/2017     Sig - Route: Take 2 tablets by mouth Daily. - Oral    furosemide (LASIX) tablet 40 mg 40 mg Daily 11/2/2017     Sig - Route: Take 1 tablet by mouth Daily. - Oral    Cosign for Ordering: Required by Jose Barajas MD    ipratropium-albuterol (DUO-NEB) nebulizer solution 3 mL 3 mL Once 11/1/2017 11/1/2017    Sig - Route: Take 3 mL by nebulization 1 (One) Time. - Nebulization    Cosign for Ordering: Accepted by Buck Zepeda MD on 11/1/2017  9:49 PM    ipratropium-albuterol (DUO-NEB) nebulizer solution 3 mL 3 mL 4 Times Daily - RT 11/2/2017     Sig - Route: Take 3 mL by nebulization 4 (Four) Times a Day. - Nebulization    methylPREDNISolone sodium succinate (SOLU-Medrol) injection 125 mg 125 mg Once 11/1/2017 11/1/2017    Sig - Route: Infuse 2 mL into a venous catheter 1 (One) Time. - Intravenous    Cosign for Ordering: Accepted by Buck Zepeda MD on 11/1/2017  9:49 PM    metoprolol tartrate (LOPRESSOR) tablet 12.5 mg 12.5 mg Every 12 Hours Scheduled 11/1/2017     Sig - Route: Take 0.5 tablets by mouth Every 12 (Twelve) Hours. - Oral    nicotine (NICODERM CQ) 21 MG/24HR patch 1 patch 1 patch Every 24 Hours 11/1/2017     Sig - Route: Place 1 patch on the skin Daily. - Transdermal    ondansetron (ZOFRAN) injection 4 mg 4 mg Every 6 Hours PRN 11/1/2017     Sig - Route: Infuse 2 mL into a venous catheter Every 6 (Six) Hours As Needed for Nausea or Vomiting. - Intravenous    oxyCODONE (ROXICODONE) immediate release tablet 5 mg 5 mg Every 8 Hours PRN 11/1/2017 11/11/2017    Sig - Route: Take 1 tablet by  mouth Every 8 (Eight) Hours As Needed for Moderate Pain . - Oral    pantoprazole (PROTONIX) EC tablet 40 mg 40 mg Every Early Morning 11/2/2017     Sig - Route: Take 1 tablet by mouth Every Morning. - Oral    potassium chloride (MICRO-K) CR capsule 20 mEq 20 mEq Daily 11/2/2017     Sig - Route: Take 2 capsules by mouth Daily. - Oral    ramipril (ALTACE) capsule 2.5 mg 2.5 mg Daily 11/2/2017     Sig - Route: Take 1 capsule by mouth Daily. - Oral    sodium chloride 0.9 % flush 1-10 mL 1-10 mL As Needed 11/1/2017     Sig - Route: Infuse 1-10 mL into a venous catheter As Needed for Line Care. - Intravenous    spironolactone (ALDACTONE) tablet 25 mg 25 mg Daily 11/2/2017     Sig - Route: Take 1 tablet by mouth Daily. - Oral    venlafaxine (EFFEXOR) tablet 75 mg 75 mg Every 12 Hours Scheduled 11/1/2017     Sig - Route: Take 2 tablets by mouth Every 12 (Twelve) Hours. - Oral    enoxaparin (LOVENOX) syringe 30 mg (Discontinued) 30 mg Daily 11/2/2017 11/2/2017    Sig - Route: Inject 0.3 mL under the skin Daily. - Subcutaneous    furosemide (LASIX) injection 40 mg (Discontinued) 40 mg 2 Times Daily 11/1/2017 11/2/2017    Sig - Route: Infuse 4 mL into a venous catheter 2 (Two) Times a Day. - Intravenous    ipratropium-albuterol (DUO-NEB) nebulizer solution 3 mL (Discontinued) 3 mL Every 6 Hours - RT 11/1/2017 11/2/2017    Sig - Route: Take 3 mL by nebulization Every 6 (Six) Hours. - Nebulization          Social History   Substance Use Topics   • Smoking status: Current Every Day Smoker     Packs/day: 0.50     Types: Cigarettes   • Smokeless tobacco: Never Used   • Alcohol use No        Past Family History:  Family History   Problem Relation Age of Onset   • Cancer Mother    • Heart disease Father    • Cancer Sister    • Heart disease Brother        Review of Systems:  Review of Systems   Constitutional: Negative for fever and unexpected weight change.   HENT: Negative for hearing loss.    Eyes: Negative for visual disturbance.    Respiratory: Positive for chest tightness and shortness of breath. Negative for cough.    Cardiovascular: Positive for chest pain and leg swelling.   Gastrointestinal:        See HPI   Endocrine: Negative for cold intolerance and heat intolerance.   Genitourinary: Negative for dysuria.   Skin: Negative for rash.   Neurological: Negative for seizures.   Psychiatric/Behavioral: Negative for hallucinations.       Physical Exam:  Temp:  [97.2 °F (36.2 °C)-98.9 °F (37.2 °C)] 97.2 °F (36.2 °C)  Heart Rate:  [78-99] 78  Resp:  [13-28] 20  BP: (113-125)/(55-78) 113/56  Body mass index is 21.78 kg/(m^2).    Intake/Output Summary (Last 24 hours) at 11/02/17 1530  Last data filed at 11/02/17 1132   Gross per 24 hour   Intake                0 ml   Output             2050 ml   Net            -2050 ml     I/O this shift:  In: 0   Out: 450 [Urine:450]  Physical Exam   Constitutional: She is oriented to person, place, and time. She appears well-developed and well-nourished.   HENT:   Mouth/Throat: Oropharynx is clear and moist.   Eyes: EOM are normal.   Cardiovascular: Normal rate, regular rhythm and normal heart sounds.  Exam reveals no gallop and no friction rub.    No murmur heard.  Pulmonary/Chest: Effort normal. She has no wheezes. She has no rales.   Patient is on 2 L O2 nasal cannula with coarse bilateral lung sounds decreased in bases.   Abdominal: Soft. Bowel sounds are normal. She exhibits no distension. There is no hepatosplenomegaly. There is no tenderness. There is no rigidity, no rebound and no guarding.   Musculoskeletal: Normal range of motion. She exhibits no edema, tenderness or deformity.   Neurological: She is alert and oriented to person, place, and time.   Skin: Skin is warm and dry. No rash noted.   Psychiatric: She has a normal mood and affect. Her behavior is normal. Judgment and thought content normal.   Vitals reviewed.    I have performed the physical exam and agree with the findings as noted above.    JOSEFINA Han MD    Results Review:  Lab Results (last 24 hours)     Procedure Component Value Units Date/Time    Blood Gas, Arterial With Co-Ox [[id number]]  (Abnormal) Collected:  11/01/17 1710    Specimen:  Arterial Blood Updated:  11/01/17 1718     Site Right Brachial     Dao's Test N/A     pH, Arterial 7.349 (L) pH units      pCO2, Arterial 68.2 (C) mm Hg      pO2, Arterial 68.6 (L) mm Hg      HCO3, Arterial 37.5 (H) mmol/L      Base Excess, Arterial 9.6 (H) mmol/L      O2 Saturation, Arterial 91.9 (L) %      Hemoglobin, Blood Gas 11.7 (L) g/dL      Hematocrit, Blood Gas 35.9 (L) %      Oxyhemoglobin 88.3 (L) %      Methemoglobin 0.70 %      Carboxyhemoglobin 3.3 %      Temperature 37.0 C      Sodium, Arterial 137 mmol/L      Potassium, Arterial 4.3 mmol/L      Barometric Pressure for Blood Gas 748 mmHg      Modality Nasal Cannula     Flow Rate 2.0 lpm      Ventilator Mode NA     Notified Who Saurav HERMAN     Notified By [id number]     Notified Time 11/01/2017 17:17     Collected by [id number]    Magnesium [[id number]]  (Normal) Collected:  11/01/17 1711    Specimen:  Blood Updated:  11/01/17 1741     Magnesium 1.9 mg/dL     CK [[id number]]  (Normal) Collected:  11/01/17 1711    Specimen:  Blood Updated:  11/01/17 1753     Creatine Kinase 166 U/L     CK-MB [[id number]]  (Abnormal) Collected:  11/01/17 1711    Specimen:  Blood Updated:  11/01/17 1753     CKMB 6.32 (H) ng/mL     Myoglobin, Serum [[id number]]  (Abnormal) Collected:  11/01/17 1711    Specimen:  Blood Updated:  11/01/17 1753     Myoglobin 165.2 (H) ng/mL     Troponin [[id number]]  (Abnormal) Collected:  11/01/17 1711    Specimen:  Blood Updated:  11/01/17 1753     Troponin I 0.092 (H) ng/mL     BNP [[id number]]  (Abnormal) Collected:  11/01/17 1711    Specimen:  Blood Updated:  11/01/17 1753     proBNP 51171.0 (H) pg/mL     CK-MB Index [[id number]]  (Normal) Collected:  11/01/17 1711    Specimen:  Blood Updated:  11/01/17 1753     CK-MB Index 3.8 %     Comprehensive  Metabolic Panel [[id number]]  (Abnormal) Collected:  11/01/17 1711    Specimen:  Blood Updated:  11/01/17 1800     Glucose 88 mg/dL      BUN 26 (H) mg/dL      Creatinine 1.41 (H) mg/dL      Sodium 137 mmol/L      Potassium 4.5 mmol/L      Chloride 93 (L) mmol/L      CO2 38.0 (H) mmol/L      Calcium 8.6 mg/dL      Total Protein 6.3 g/dL      Albumin 3.30 (L) g/dL      ALT (SGPT) 1236 (H) U/L      AST (SGOT) 3036 (H) U/L      Alkaline Phosphatase 98 U/L      Total Bilirubin 0.5 mg/dL      eGFR Non African Amer 39 (L) mL/min/1.73      Globulin 3.0 gm/dL      A/G Ratio 1.1 g/dL      BUN/Creatinine Ratio 18.4     Anion Gap 6.0 mmol/L     CBC & Differential [[id number]] Collected:  11/01/17 1711    Specimen:  Blood Updated:  11/01/17 1814    Narrative:       The following orders were created for panel order CBC & Differential.  Procedure                               Abnormality         Status                     ---------                               -----------         ------                     CBC Auto Differential[[id number]]        Abnormal            Final result                 Please view results for these tests on the individual orders.    CBC Auto Differential [[id number]]  (Abnormal) Collected:  11/01/17 1711    Specimen:  Blood Updated:  11/01/17 1814     WBC 14.20 (H) 10*3/mm3      RBC 3.88 (L) 10*6/mm3      Hemoglobin 11.6 (L) g/dL      Hematocrit 39.4 %      .5 (H) fL      MCH 29.9 pg      MCHC 29.4 (L) g/dL      RDW 13.9 %      RDW-SD 51.4 fl      MPV 10.7 fL      Platelets 292 10*3/mm3      Neutrophil % 72.1 %      Lymphocyte % 14.9 (L) %      Monocyte % 11.7 %      Eosinophil % 0.5 %      Basophil % 0.4 %      Immature Grans % 0.4 %      Neutrophils, Absolute 10.26 (H) 10*3/mm3      Lymphocytes, Absolute 2.11 10*3/mm3      Monocytes, Absolute 1.66 (H) 10*3/mm3      Eosinophils, Absolute 0.07 10*3/mm3      Basophils, Absolute 0.05 10*3/mm3      Immature Grans, Absolute 0.05 (H) 10*3/mm3      nRBC 0.0  /100 WBC     Ammonia [[id number]]  (Normal) Collected:  11/01/17 1927    Specimen:  Blood Updated:  11/01/17 1945     Ammonia 21 umol/L     Protime-INR [[id number]]  (Abnormal) Collected:  11/01/17 1711    Specimen:  Blood Updated:  11/01/17 1957     Protime 17.4 (H) Seconds      INR 1.38 (H)    aPTT [[id number]]  (Normal) Collected:  11/01/17 1711    Specimen:  Blood Updated:  11/01/17 1957     PTT 33.0 seconds     Ethanol [[id number]]  (Normal) Collected:  11/01/17 1711    Specimen:  Blood Updated:  11/01/17 2032     Ethanol % <0.010 %     Narrative:       Not for legal purposes. Chain of Custody not followed.     Troponin [[id number]]  (Abnormal) Collected:  11/01/17 1927    Specimen:  Blood Updated:  11/01/17 2114     Troponin I 0.100 (H) ng/mL     Hepatitis Panel, Acute [[id number]]  (Abnormal) Collected:  11/01/17 2001    Specimen:  Blood Updated:  11/01/17 2119     HCV S/C Ratio 35.50 (H)     Hepatitis C Ab Reactive (A)     Hep A IgM Negative     Hep B C IgM Negative     Hepatitis B Surface Ag Negative    Narrative:       This patient's sample tests reactive with a high s/c ratio: >=8.00  Samples with high s/c ratios have been shown to repeat as positive using a different methodology 95% of the time or greater (CDC MMWR No. RR-3, 2003).  Therefore, additional testing for verification of the result is not recommended.    Troponin [[id number]]  (Abnormal) Collected:  11/01/17 2146    Specimen:  Blood Updated:  11/01/17 2223     Troponin I 0.098 (H) ng/mL     Magnesium [[id number]]  (Normal) Collected:  11/02/17 0257    Specimen:  Blood Updated:  11/02/17 0331     Magnesium 1.7 mg/dL     Phosphorus [[id number]]  (Normal) Collected:  11/02/17 0257    Specimen:  Blood Updated:  11/02/17 0331     Phosphorus 3.8 mg/dL     Amylase [[id number]]  (Normal) Collected:  11/02/17 0257    Specimen:  Blood Updated:  11/02/17 0331     Amylase 50 U/L     Lipase [[id number]]  (Normal) Collected:  11/02/17 0257    Specimen:  Blood Updated:   11/02/17 0331     Lipase 77 U/L     Lactic Acid, Plasma [[id number]]  (Abnormal) Collected:  11/02/17 0257    Specimen:  Blood Updated:  11/02/17 0335     Lactate 5.6 (C) mmol/L     Hemoglobin A1c [[id number]] Collected:  11/02/17 0257    Specimen:  Blood Updated:  11/02/17 0342     Hemoglobin A1C 5.2 %     Narrative:       Less than 6.0           Non-Diabetic Range  6.0-7.0                 ADA Therapeutic Target  Greater than 7.0        Action Suggested    Troponin [[id number]]  (Abnormal) Collected:  11/02/17 0257    Specimen:  Blood Updated:  11/02/17 0343     Troponin I 0.080 (H) ng/mL     BNP [[id number]]  (Abnormal) Collected:  11/02/17 0257    Specimen:  Blood Updated:  11/02/17 0343     proBNP 32100.0 (H) pg/mL     Lipid Panel [[id number]]  (Abnormal) Collected:  11/02/17 0257    Specimen:  Blood Updated:  11/02/17 0359     Total Cholesterol 171 mg/dL      Triglycerides 119 mg/dL      HDL Cholesterol 52 mg/dL      LDL Cholesterol  114 (H) mg/dL      LDL/HDL Ratio 1.83    TSH [[id number]]  (Abnormal) Collected:  11/02/17 0257    Specimen:  Blood Updated:  11/02/17 0402     TSH 0.313 (L) mIU/mL     Comprehensive Metabolic Panel [[id number]]  (Abnormal) Collected:  11/02/17 0257    Specimen:  Blood Updated:  11/02/17 0405     Glucose 153 (H) mg/dL      BUN 26 (H) mg/dL      Creatinine 1.34 mg/dL      Sodium 140 mmol/L      Potassium 3.9 mmol/L      Chloride 87 (L) mmol/L      CO2 39.0 (H) mmol/L      Calcium 9.2 mg/dL      Total Protein 7.1 g/dL      Albumin 4.00 g/dL      ALT (SGPT) 1454 (H) U/L      AST (SGOT) 2453 (H) U/L      Alkaline Phosphatase 110 U/L      Total Bilirubin 0.7 mg/dL      eGFR Non African Amer 41 (L) mL/min/1.73      Globulin 3.1 gm/dL      A/G Ratio 1.3 g/dL      BUN/Creatinine Ratio 19.4     Anion Gap 14.0 (H) mmol/L     Ferritin [[id number]]  (Normal) Collected:  11/02/17 0257    Specimen:  Blood Updated:  11/02/17 0406     Ferritin 76.30 ng/mL     Blood Gas, Arterial [[id number]]  (Abnormal)  Collected:  11/02/17 0428    Specimen:  Arterial Blood Updated:  11/02/17 0443     Site Right Radial     Dao's Test Positive     pH, Arterial 7.497 (H) pH units      pCO2, Arterial 55.9 (H) mm Hg      pO2, Arterial 47.8 (C) mm Hg      HCO3, Arterial 43.2 (H) mmol/L      Base Excess, Arterial 17.3 (H) mmol/L      O2 Saturation, Arterial 84.5 (L) %      Temperature 37.0 C      Barometric Pressure for Blood Gas 748 mmHg      Modality Room Air     Ventilator Mode NA     Notified [facility] [id number]     Notified By [email or web address]     Notified Time 11/02/2017 04:36     Collected by [id number]    Lactic Acid, Reflex Timer [[id number]] Collected:  11/02/17 0257    Specimen:  Blood Updated:  11/02/17 0646     Extra Tube Hold for add-ons.      Auto resulted.       Lactic Acid, Reflex [[id number]]  (Normal) Collected:  11/02/17 0810    Specimen:  Blood Updated:  11/02/17 0857     Lactate 1.5 mmol/L     T3, Free [[id number]]  (Abnormal) Collected:  11/02/17 0257    Specimen:  Blood Updated:  11/02/17 0912     T3, Free 2.72 (L) pg/mL     T4, Free [[id number]]  (Normal) Collected:  11/02/17 0257    Specimen:  Blood Updated:  11/02/17 0912     Free T4 1.59 ng/dL     AFP Tumor Marker [[id number]] Collected:  11/02/17 0810    Specimen:  Blood Updated:  11/02/17 1003    Troponin [[id number]]  (Abnormal) Collected:  11/02/17 0810    Specimen:  Blood Updated:  11/02/17 1009     Troponin I 0.061 (H) ng/mL     Urinalysis With / Culture If Indicated - Urine, Clean Catch [[id number]]  (Normal) Collected:  11/02/17 1022    Specimen:  Urine from Urine, Clean Catch Updated:  11/02/17 1045     Color, UA Yellow     Appearance, UA Clear     pH, UA 7.0     Specific Gravity, UA 1.008     Glucose, UA Negative     Ketones, UA Negative     Bilirubin, UA Negative     Blood, UA Negative     Protein, UA Negative     Leuk Esterase, UA Negative     Nitrite, UA Negative     Urobilinogen, UA 0.2 E.U./dL    Narrative:       Urine microscopic not indicated.    Urine Drug Screen -  Urine, Clean Catch [[id number]]  (Abnormal) Collected:  11/02/17 1022    Specimen:  Urine from Urine, Clean Catch Updated:  11/02/17 1103     Amphetamine Screen, Urine Negative     Barbiturates Screen, Urine Negative     Benzodiazepine Screen, Urine Positive (A)     Cocaine Screen, Urine Negative     Methadone Screen, Urine Negative     Opiate Screen Negative     Phencyclidine (PCP), Urine Negative     THC, Screen, Urine Negative    Narrative:       Negative Thresholds For Drugs Screened in Urine:    Amphetamines          500 ng/ml  Barbiturates          200 ng/ml  Benzodiazepines       200 ng/ml  Cocaine               150 ng/ml  Methadone             150 ng/ml  Opiates               300 ng/ml  Phencyclidine         25 ng/ml  THC                      50 ng/ml    The normal value for all drugs tested is negative. This report includes final unconfirmed screening results.  A positive result by this assay can be, at your request, sent to the Reference Lab for confirmation by gas chromatography. Unconfirmed results must not be used for non-medical purposes, such as employment or legal testing. Clinical consideration should be applied to any drug of abuse test result, particularly when unconfirmed results are used.    Peripheral Blood Smear - Blood, [[id number]] Collected:  11/01/17 1711    Specimen:  Blood Updated:  11/02/17 1338     Performed by: Sabrina Ulloa MD     Pathologist Interpretation Leukocytosis with macrocytic normochromic anemia.  Mild anisopoikilocytosis and polychromasia.  Negative for increase in blasts.  Negative for increase in schistocytes.           Radiology Review:  Imaging Results (last 72 hours)     Procedure Component Value Units Date/Time    XR Chest 1 View [[id number]] Collected:  11/01/17 1726     Updated:  11/01/17 1730    Narrative:       XR CHEST 1 VW- 11/1/2017 5:18 PM CDT     HISTORY: Shortness of air       COMPARISON: 05/27/2016.     FINDINGS:   Emphysematous changes are noted. The  previously seen interstitial  opacities have resolved. The cardiomediastinal silhouette and pulmonary  vascularity are unchanged.      The osseous structures and surrounding soft tissues demonstrate no acute  abnormality.       Impression:       1. Emphysema without evidence of acute cardiopulmonary process.        This report was finalized on 11/01/2017 17:27 by Dr. David Bob MD.    CT Head Without Contrast [[id number]] Collected:  11/01/17 1909     Updated:  11/01/17 1912    Narrative:       CT HEAD WO CONTRAST- 11/1/2017 6:46 PM CDT     HISTORY: ams, weakness, difficulty walking       COMPARISON: None      DOSE LENGTH PRODUCT: 975 mGy cm. Automated exposure control was also  utilized to decrease patient radiation dose.     TECHNIQUE: Helical tomographic images of the brain were obtained without  the use of intravenous contrast.      FINDINGS:   Hemorrhage: None.     Mass effect: No midline shift or mass effect.     Ventricles: Normal and symmetric without hydrocephalus.     Basilar cisterns: Normal.     Sulci: Normal in configuration. No sulcal effacement.     Extra-axial fluid: No abnormal extra-axial fluid collections.     Grey and white matter differentiation: Normal.     Posterior fossa and brainstem: Normal.     Bones: No fractures or lesions.     Soft tissues: No acute process.     Sinuses and mastoid air cells: Clear.       Impression:       1. No acute intracranial process.        This report was finalized on 11/01/2017 19:09 by Dr. David Bob MD.    US Abdomen Complete [[id number]] Collected:  11/02/17 0912     Updated:  11/02/17 0922    Narrative:       History:  57-year-old evaluated for elevated liver function tests.      Reference:  CT chest May 2016.     Findings:  Real time sonography of the abdomen was performed.     14 x 8 mm heterogeneous mass centered at the inferior hepatic margin and  gallbladder wall. It is unclear if this originates from the liver or  gallbladder wall. Gallbladder is  otherwise unremarkable with no  gallstones detected.     Liver is otherwise unremarkable with normal echogenicity. Visualized  portions of the pancreas are unremarkable.     Both kidneys are normal in size, contour, and cortical  thickness/echogenicity. No hydronephrosis. There is a tiny 9 mm right  renal cortical cyst. There is a small 16 mm cyst from the superior left  renal pole. No solid renal mass.      Spleen is unremarkable. Atherosclerotic plaquing of the abdominal aorta  which maintains normal caliber throughout. Imaged IVC is unremarkable.          Impression:       Impression:  14 x 8 mm mass centered at the inferior hepatic margin and gallbladder  wall. It is unclear if this originates from the gallbladder wall or is  exophytic from the liver. MRI liver mass protocol may further  characterize.  This report was finalized on 11/02/2017 09:19 by  Andrew Green, .          Impression/Plan:  Patient Active Problem List   Diagnosis Code   • NSTEMI (non-ST elevated myocardial infarction) I21.4     Abnormal imaging of the liver via sonogram   This is a new finding.  It is suggestive of a mass at the interface between the gallbladder and liver.  It is difficult to ascertain from which organ it emanates.  Sonogram was normal at [facility] on 08/22/2016.  We will need to check alpha-fetoprotein level.  MRI was suggested by radiology with liver protocol however she is unable to hold her breath for this to take place.  Looks like she is now being scheduled for CT scan.    Elevated liver enzymes  This is likely multifactorial.  She definitely has a component of abnormal liver function tests from hepatitis C but that is not the cause of this marked elevation at the present time.  There is some component of her MI contributing to the lumen liver enzyme abnormality.  Furthermore is a questionable mass that mainly not be contributory.  We will need to follow serial enzymes.  Await results of further imaging either via  MRI or CT.    Chronic hepatitis C  Followed by Dr. Francisco J Shane.  naïve to treatment as the patient revealed a fibrosure score of F0 fibrosis. genotype 1A or 1B with viral load of 7,800, 000.  At that time liver ultrasound was found to be normal.  outpatient follow-up with Dr. Shane post discharge.    ROLAND Witt  11/02/17   3:30 PM    Korina Han MD  [facility]  11/02/17  3:36 PM    Much of this encounter note is an electronic transcription/translation of spoken language to printed text. The electronic translation of spoken language may permit erroneous, or at times, nonsensical words or phrases to be inadvertently transcribed; although I have reviewed the note for such errors, some may still exist.

## 2017-11-02 NOTE — CONSULTS
[facility] HEART GROUP CONSULT NOTE    Referring Provider: Buck Zepeda MD    Reason for Consultation: CHF    Chief Complaint   Patient presents with   • Chest Pain   • Shortness of Breath   • Leg Swelling       Subjective .     History of present illness:  Teri Tim is a 57 y.o. female with history of cor pulmonale, chronic obstructive pulmonary disease, hypertension, tobacco abuse who presented to [facility] ED with complaints of shortness of breath and leg swelling. The patient relates that over the course of the last few weeks she has had increasing shortness of breath, particularly with exertion. She states that about 3 days ago she noted leg swelling as well; as this was new for her she decided to present to our emergency dept. Ms. Tim reports to me that she occasionally has right sided, pressure-like, chest pain with both rest and exertion. She states the last occurrence of this was approximately 3 days. She says this has been an issue for some time. She also endorses a cough, occasionally productive of green sputum. She relates associated nausea. She denies any orthopnea, paroxysmal nocturnal dyspnea or syncope. She says she is on nightly oxygen at home. She reports she feels a little better today, less short of breath. Her leg swelling has significantly improved. She denies any chest pain.    History  Past Medical History:   Diagnosis Date   • CHF (congestive heart failure)    • COPD (chronic obstructive pulmonary disease)    • Pneumonia    ,   Past Surgical History:   Procedure Laterality Date   • CARPAL TUNNEL RELEASE     • HYSTERECTOMY      complete   ,   Family History   Problem Relation Age of Onset   • Cancer Mother    • Heart disease Father    • Cancer Sister    • Heart disease Brother    ,   Social History   Substance Use Topics   • Smoking status: Current Every Day Smoker     Packs/day: 0.50     Types: Cigarettes   • Smokeless tobacco: Never Used   • Alcohol use No   ,      Medications  Current Facility-Administered Medications   Medication Dose Route Frequency Provider Last Rate Last Dose   • aspirin EC tablet 325 mg  325 mg Oral Daily Buck Zepeda MD   325 mg at 11/02/17 0924   • bisacodyl (DULCOLAX) EC tablet 5 mg  5 mg Oral Daily PRN Buck Zepeda MD       • budesonide-formoterol (SYMBICORT) 160-4.5 MCG/ACT inhaler 2 puff  2 puff Inhalation BID - RT Buck Zepeda MD   2 puff at 11/02/17 0708   • enoxaparin (LOVENOX) syringe 40 mg  40 mg Subcutaneous Daily Geovanny Howard DO       • famotidine (PEPCID) tablet 40 mg  40 mg Oral Daily Buck Zepeda MD   40 mg at 11/02/17 0924   • furosemide (LASIX) injection 40 mg  40 mg Intravenous BID Buck Zepeda MD   40 mg at 11/02/17 0930   • ipratropium-albuterol (DUO-NEB) nebulizer solution 3 mL  3 mL Nebulization 4x Daily - RT ROLAND Brink   3 mL at 11/02/17 1106   • metoprolol tartrate (LOPRESSOR) tablet 12.5 mg  12.5 mg Oral Q12H Buck Zepeda MD   12.5 mg at 11/02/17 0925   • nicotine (NICODERM CQ) 21 MG/24HR patch 1 patch  1 patch Transdermal Q24H Buck Zepeda MD   1 patch at 11/01/17 2247   • ondansetron (ZOFRAN) injection 4 mg  4 mg Intravenous Q6H PRN Buck Zepeda MD       • oxyCODONE (ROXICODONE) immediate release tablet 5 mg  5 mg Oral Q8H PRN Buck Zepeda MD   5 mg at 11/01/17 2248   • pantoprazole (PROTONIX) EC tablet 40 mg  40 mg Oral Q AM Buck Zepeda MD   40 mg at 11/02/17 0924   • potassium chloride (MICRO-K) CR capsule 20 mEq  20 mEq Oral Daily Buck Zepeda MD   20 mEq at 11/02/17 0924   • ramipril (ALTACE) capsule 2.5 mg  2.5 mg Oral Daily Buck Zepeda MD       • sodium chloride 0.9 % flush 1-10 mL  1-10 mL Intravenous PRN Buck Zepeda MD       • spironolactone (ALDACTONE) tablet 25 mg  25 mg Oral Daily Buck Zepeda MD   25 mg at 11/02/17 0924   • venlafaxine (EFFEXOR) tablet 75 mg  75 mg Oral Q12H Buck Zepeda MD   75 mg at 11/01/17 0939  "      Allergies:  Codeine    Review of Systems  Review of Systems   Constitution: Positive for malaise/fatigue. Negative for weight gain.   Cardiovascular: Positive for dyspnea on exertion and leg swelling. Negative for chest pain, claudication, irregular heartbeat, near-syncope, orthopnea, palpitations, paroxysmal nocturnal dyspnea and syncope.   Respiratory: Positive for cough and shortness of breath. Negative for hemoptysis.    Hematologic/Lymphatic: Negative for bleeding problem.   Skin: Negative for poor wound healing.   Musculoskeletal: Negative for myalgias.   Gastrointestinal: Positive for nausea. Negative for melena and vomiting.   Genitourinary: Negative for hematuria.   Neurological: Negative for dizziness, focal weakness and light-headedness.   Psychiatric/Behavioral: Negative for memory loss.   All other systems reviewed and are negative.      Objective     Physical Exam:  Patient Vitals for the past 24 hrs:   BP Temp Temp src Pulse Resp SpO2 Height Weight   11/02/17 0718 113/57 97.5 °F (36.4 °C) Temporal Art 82 22 96 % - -   11/02/17 0708 - - - 93 24 95 % - -   11/01/17 2321 120/58 98.9 °F (37.2 °C) Temporal Art 93 20 99 % - -   11/01/17 2001 122/73 - - 84 18 94 % - -   11/01/17 2000 125/55 98.3 °F (36.8 °C) Temporal Art 88 22 - 60\" (152.4 cm) 111 lb 8 oz (50.6 kg)   11/01/17 1826 - - - 89 - - - -   11/01/17 1746 114/61 - - 94 16 94 % - -   11/01/17 1742 - 98.4 °F (36.9 °C) - - - - - -   11/01/17 1720 - - - 98 13 97 % - -   11/01/17 1648 - - - - - - 61\" (154.9 cm) 117 lb 1.6 oz (53.1 kg)   11/01/17 1646 120/78 - - 99 15 91 % - -   11/01/17 1645 120/78 - - 99 28 92 % - -     Physical Exam   Constitutional: She is oriented to person, place, and time. She appears well-developed and well-nourished. She appears ill (chronically ill appearing).   HENT:   Head: Normocephalic and atraumatic.   Eyes: Conjunctivae and EOM are normal. Pupils are equal, round, and reactive to light.   Neck: Normal range of motion. " Neck supple. No JVD present.   Cardiovascular: Normal rate, regular rhythm, S1 normal, S2 normal, normal heart sounds, intact distal pulses and normal pulses.    No murmur heard.  Pulmonary/Chest: Effort normal. No respiratory distress. She has wheezes (diffuse).   Abdominal: Soft. Bowel sounds are normal. She exhibits no distension.   Musculoskeletal: She exhibits no edema.   Neurological: She is alert and oriented to person, place, and time.   Skin: Skin is warm and dry.   Psychiatric: She has a normal mood and affect. Judgment normal.   Vitals reviewed.      Results Review:   I reviewed the patient's new clinical results.    Lab Results (last 72 hours)     Procedure Component Value Units Date/Time    Blood Gas, Arterial With Co-Ox [[id number]]  (Abnormal) Collected:  11/01/17 1710    Specimen:  Arterial Blood Updated:  11/01/17 1718     Site Right Brachial     Dao's Test N/A     pH, Arterial 7.349 (L) pH units      pCO2, Arterial 68.2 (C) mm Hg      pO2, Arterial 68.6 (L) mm Hg      HCO3, Arterial 37.5 (H) mmol/L      Base Excess, Arterial 9.6 (H) mmol/L      O2 Saturation, Arterial 91.9 (L) %      Hemoglobin, Blood Gas 11.7 (L) g/dL      Hematocrit, Blood Gas 35.9 (L) %      Oxyhemoglobin 88.3 (L) %      Methemoglobin 0.70 %      Carboxyhemoglobin 3.3 %      Temperature 37.0 C      Sodium, Arterial 137 mmol/L      Potassium, Arterial 4.3 mmol/L      Barometric Pressure for Blood Gas 748 mmHg      Modality Nasal Cannula     Flow Rate 2.0 lpm      Ventilator Mode NA     Notified Who Saurav HERMAN     Notified By [id number]     Notified Time 11/01/2017 17:17     Collected by [id number]    Magnesium [[id number]]  (Normal) Collected:  11/01/17 1711    Specimen:  Blood Updated:  11/01/17 1741     Magnesium 1.9 mg/dL     CK [[id number]]  (Normal) Collected:  11/01/17 1711    Specimen:  Blood Updated:  11/01/17 1753     Creatine Kinase 166 U/L     CK-MB [[id number]]  (Abnormal) Collected:  11/01/17 1711    Specimen:  Blood  Updated:  11/01/17 1753     CKMB 6.32 (H) ng/mL     Myoglobin, Serum [[id number]]  (Abnormal) Collected:  11/01/17 1711    Specimen:  Blood Updated:  11/01/17 1753     Myoglobin 165.2 (H) ng/mL     Troponin [[id number]]  (Abnormal) Collected:  11/01/17 1711    Specimen:  Blood Updated:  11/01/17 1753     Troponin I 0.092 (H) ng/mL     BNP [[id number]]  (Abnormal) Collected:  11/01/17 1711    Specimen:  Blood Updated:  11/01/17 1753     proBNP 04046.0 (H) pg/mL     CK-MB Index [[id number]]  (Normal) Collected:  11/01/17 1711    Specimen:  Blood Updated:  11/01/17 1753     CK-MB Index 3.8 %     Comprehensive Metabolic Panel [[id number]]  (Abnormal) Collected:  11/01/17 1711    Specimen:  Blood Updated:  11/01/17 1800     Glucose 88 mg/dL      BUN 26 (H) mg/dL      Creatinine 1.41 (H) mg/dL      Sodium 137 mmol/L      Potassium 4.5 mmol/L      Chloride 93 (L) mmol/L      CO2 38.0 (H) mmol/L      Calcium 8.6 mg/dL      Total Protein 6.3 g/dL      Albumin 3.30 (L) g/dL      ALT (SGPT) 1236 (H) U/L      AST (SGOT) 3036 (H) U/L      Alkaline Phosphatase 98 U/L      Total Bilirubin 0.5 mg/dL      eGFR Non African Amer 39 (L) mL/min/1.73      Globulin 3.0 gm/dL      A/G Ratio 1.1 g/dL      BUN/Creatinine Ratio 18.4     Anion Gap 6.0 mmol/L     CBC & Differential [[id number]] Collected:  11/01/17 1711    Specimen:  Blood Updated:  11/01/17 1814    Narrative:       The following orders were created for panel order CBC & Differential.  Procedure                               Abnormality         Status                     ---------                               -----------         ------                     CBC Auto Differential[[id number]]        Abnormal            Final result                 Please view results for these tests on the individual orders.    CBC Auto Differential [[id number]]  (Abnormal) Collected:  11/01/17 1711    Specimen:  Blood Updated:  11/01/17 1814     WBC 14.20 (H) 10*3/mm3      RBC 3.88 (L) 10*6/mm3       Hemoglobin 11.6 (L) g/dL      Hematocrit 39.4 %      .5 (H) fL      MCH 29.9 pg      MCHC 29.4 (L) g/dL      RDW 13.9 %      RDW-SD 51.4 fl      MPV 10.7 fL      Platelets 292 10*3/mm3      Neutrophil % 72.1 %      Lymphocyte % 14.9 (L) %      Monocyte % 11.7 %      Eosinophil % 0.5 %      Basophil % 0.4 %      Immature Grans % 0.4 %      Neutrophils, Absolute 10.26 (H) 10*3/mm3      Lymphocytes, Absolute 2.11 10*3/mm3      Monocytes, Absolute 1.66 (H) 10*3/mm3      Eosinophils, Absolute 0.07 10*3/mm3      Basophils, Absolute 0.05 10*3/mm3      Immature Grans, Absolute 0.05 (H) 10*3/mm3      nRBC 0.0 /100 WBC     Ammonia [[id number]]  (Normal) Collected:  11/01/17 1927    Specimen:  Blood Updated:  11/01/17 1945     Ammonia 21 umol/L     Protime-INR [[id number]]  (Abnormal) Collected:  11/01/17 1711    Specimen:  Blood Updated:  11/01/17 1957     Protime 17.4 (H) Seconds      INR 1.38 (H)    aPTT [[id number]]  (Normal) Collected:  11/01/17 1711    Specimen:  Blood Updated:  11/01/17 1957     PTT 33.0 seconds     Ethanol [[id number]]  (Normal) Collected:  11/01/17 1711    Specimen:  Blood Updated:  11/01/17 2032     Ethanol % <0.010 %     Narrative:       Not for legal purposes. Chain of Custody not followed.     Troponin [[id number]]  (Abnormal) Collected:  11/01/17 1927    Specimen:  Blood Updated:  11/01/17 2114     Troponin I 0.100 (H) ng/mL     Hepatitis Panel, Acute [[id number]]  (Abnormal) Collected:  11/01/17 2001    Specimen:  Blood Updated:  11/01/17 2119     HCV S/C Ratio 35.50 (H)     Hepatitis C Ab Reactive (A)     Hep A IgM Negative     Hep B C IgM Negative     Hepatitis B Surface Ag Negative    Narrative:       This patient's sample tests reactive with a high s/c ratio: >=8.00  Samples with high s/c ratios have been shown to repeat as positive using a different methodology 95% of the time or greater ([facility] MMWR No. RR-3, 2003).  Therefore, additional testing for verification of the result is not  recommended.    Peripheral Blood Smear - Blood, [[id number]] Collected:  11/01/17 1711    Specimen:  Blood Updated:  11/01/17 2144    Troponin [[id number]]  (Abnormal) Collected:  11/01/17 2146    Specimen:  Blood Updated:  11/01/17 2223     Troponin I 0.098 (H) ng/mL     Magnesium [[id number]]  (Normal) Collected:  11/02/17 0257    Specimen:  Blood Updated:  11/02/17 0331     Magnesium 1.7 mg/dL     Phosphorus [[id number]]  (Normal) Collected:  11/02/17 0257    Specimen:  Blood Updated:  11/02/17 0331     Phosphorus 3.8 mg/dL     Amylase [[id number]]  (Normal) Collected:  11/02/17 0257    Specimen:  Blood Updated:  11/02/17 0331     Amylase 50 U/L     Lipase [[id number]]  (Normal) Collected:  11/02/17 0257    Specimen:  Blood Updated:  11/02/17 0331     Lipase 77 U/L     Lactic Acid, Plasma [[id number]]  (Abnormal) Collected:  11/02/17 0257    Specimen:  Blood Updated:  11/02/17 0335     Lactate 5.6 (C) mmol/L     Hemoglobin A1c [[id number]] Collected:  11/02/17 0257    Specimen:  Blood Updated:  11/02/17 0342     Hemoglobin A1C 5.2 %     Narrative:       Less than 6.0           Non-Diabetic Range  6.0-7.0                 ADA Therapeutic Target  Greater than 7.0        Action Suggested    Troponin [[id number]]  (Abnormal) Collected:  11/02/17 0257    Specimen:  Blood Updated:  11/02/17 0343     Troponin I 0.080 (H) ng/mL     BNP [[id number]]  (Abnormal) Collected:  11/02/17 0257    Specimen:  Blood Updated:  11/02/17 0343     proBNP 49107.0 (H) pg/mL     Lipid Panel [[id number]]  (Abnormal) Collected:  11/02/17 0257    Specimen:  Blood Updated:  11/02/17 0359     Total Cholesterol 171 mg/dL      Triglycerides 119 mg/dL      HDL Cholesterol 52 mg/dL      LDL Cholesterol  114 (H) mg/dL      LDL/HDL Ratio 1.83    TSH [[id number]]  (Abnormal) Collected:  11/02/17 0257    Specimen:  Blood Updated:  11/02/17 0402     TSH 0.313 (L) mIU/mL     Comprehensive Metabolic Panel [[id number]]  (Abnormal) Collected:  11/02/17 0257    Specimen:   Blood Updated:  11/02/17 0405     Glucose 153 (H) mg/dL      BUN 26 (H) mg/dL      Creatinine 1.34 mg/dL      Sodium 140 mmol/L      Potassium 3.9 mmol/L      Chloride 87 (L) mmol/L      CO2 39.0 (H) mmol/L      Calcium 9.2 mg/dL      Total Protein 7.1 g/dL      Albumin 4.00 g/dL      ALT (SGPT) 1454 (H) U/L      AST (SGOT) 2453 (H) U/L      Alkaline Phosphatase 110 U/L      Total Bilirubin 0.7 mg/dL      eGFR Non African Amer 41 (L) mL/min/1.73      Globulin 3.1 gm/dL      A/G Ratio 1.3 g/dL      BUN/Creatinine Ratio 19.4     Anion Gap 14.0 (H) mmol/L     Ferritin [[id number]]  (Normal) Collected:  11/02/17 0257    Specimen:  Blood Updated:  11/02/17 0406     Ferritin 76.30 ng/mL     Blood Gas, Arterial [[id number]]  (Abnormal) Collected:  11/02/17 0428    Specimen:  Arterial Blood Updated:  11/02/17 0443     Site Right Radial     Dao's Test Positive     pH, Arterial 7.497 (H) pH units      pCO2, Arterial 55.9 (H) mm Hg      pO2, Arterial 47.8 (C) mm Hg      HCO3, Arterial 43.2 (H) mmol/L      Base Excess, Arterial 17.3 (H) mmol/L      O2 Saturation, Arterial 84.5 (L) %      Temperature 37.0 C      Barometric Pressure for Blood Gas 748 mmHg      Modality Room Air     Ventilator Mode NA     Notified Who [id number]     Notified By [email or web address]     Notified Time 11/02/2017 04:36     Collected by [id number]    Lactic Acid, Reflex Timer [[id number]] Collected:  11/02/17 0257    Specimen:  Blood Updated:  11/02/17 0646     Extra Tube Hold for add-ons.      Auto resulted.       Lactic Acid, Reflex [[id number]]  (Normal) Collected:  11/02/17 0810    Specimen:  Blood Updated:  11/02/17 0857     Lactate 1.5 mmol/L     T3, Free [[id number]]  (Abnormal) Collected:  11/02/17 0257    Specimen:  Blood Updated:  11/02/17 0912     T3, Free 2.72 (L) pg/mL     T4, Free [[id number]]  (Normal) Collected:  11/02/17 0257    Specimen:  Blood Updated:  11/02/17 0912     Free T4 1.59 ng/dL     AFP Tumor Marker [[id number]] Collected:  11/02/17 0810     Specimen:  Blood Updated:  11/02/17 1003    Troponin [[id number]]  (Abnormal) Collected:  11/02/17 0810    Specimen:  Blood Updated:  11/02/17 1009     Troponin I 0.061 (H) ng/mL     Urinalysis With / Culture If Indicated - Urine, Clean Catch [[id number]]  (Normal) Collected:  11/02/17 1022    Specimen:  Urine from Urine, Clean Catch Updated:  11/02/17 1045     Color, UA Yellow     Appearance, UA Clear     pH, UA 7.0     Specific Gravity, UA 1.008     Glucose, UA Negative     Ketones, UA Negative     Bilirubin, UA Negative     Blood, UA Negative     Protein, UA Negative     Leuk Esterase, UA Negative     Nitrite, UA Negative     Urobilinogen, UA 0.2 E.U./dL    Narrative:       Urine microscopic not indicated.    Urine Drug Screen - Urine, Clean Catch [[id number]]  (Abnormal) Collected:  11/02/17 1022    Specimen:  Urine from Urine, Clean Catch Updated:  11/02/17 1103     Amphetamine Screen, Urine Negative     Barbiturates Screen, Urine Negative     Benzodiazepine Screen, Urine Positive (A)     Cocaine Screen, Urine Negative     Methadone Screen, Urine Negative     Opiate Screen Negative     Phencyclidine (PCP), Urine Negative     THC, Screen, Urine Negative    Narrative:       Negative Thresholds For Drugs Screened in Urine:    Amphetamines          500 ng/ml  Barbiturates          200 ng/ml  Benzodiazepines       200 ng/ml  Cocaine               150 ng/ml  Methadone             150 ng/ml  Opiates               300 ng/ml  Phencyclidine         25 ng/ml  THC                      50 ng/ml    The normal value for all drugs tested is negative. This report includes final unconfirmed screening results.  A positive result by this assay can be, at your request, sent to the Reference Lab for confirmation by gas chromatography. Unconfirmed results must not be used for non-medical purposes, such as employment or legal testing. Clinical consideration should be applied to any drug of abuse test result, particularly when unconfirmed  results are used.          No results found for: ECHOEFEST    Imaging Results (last 72 hours)     Procedure Component Value Units Date/Time    XR Chest 1 View [[id number]] Collected:  11/01/17 1726     Updated:  11/01/17 1730    Narrative:       XR CHEST 1 VW- 11/1/2017 5:18 PM CDT     HISTORY: Shortness of air       COMPARISON: 05/27/2016.     FINDINGS:   Emphysematous changes are noted. The previously seen interstitial  opacities have resolved. The cardiomediastinal silhouette and pulmonary  vascularity are unchanged.      The osseous structures and surrounding soft tissues demonstrate no acute  abnormality.       Impression:       1. Emphysema without evidence of acute cardiopulmonary process.        This report was finalized on 11/01/2017 17:27 by Dr. David Bob MD.    CT Head Without Contrast [[id number]] Collected:  11/01/17 1909     Updated:  11/01/17 1912    Narrative:       CT HEAD WO CONTRAST- 11/1/2017 6:46 PM CDT     HISTORY: ams, weakness, difficulty walking       COMPARISON: None      DOSE LENGTH PRODUCT: 975 mGy cm. Automated exposure control was also  utilized to decrease patient radiation dose.     TECHNIQUE: Helical tomographic images of the brain were obtained without  the use of intravenous contrast.      FINDINGS:   Hemorrhage: None.     Mass effect: No midline shift or mass effect.     Ventricles: Normal and symmetric without hydrocephalus.     Basilar cisterns: Normal.     Sulci: Normal in configuration. No sulcal effacement.     Extra-axial fluid: No abnormal extra-axial fluid collections.     Grey and white matter differentiation: Normal.     Posterior fossa and brainstem: Normal.     Bones: No fractures or lesions.     Soft tissues: No acute process.     Sinuses and mastoid air cells: Clear.       Impression:       1. No acute intracranial process.        This report was finalized on 11/01/2017 19:09 by Dr. David Bob MD.    US Abdomen Complete [[id number]] Collected:  11/02/17 0912      Updated:  11/02/17 0922    Narrative:       History:  57-year-old evaluated for elevated liver function tests.      Reference:  CT chest May 2016.     Findings:  Real time sonography of the abdomen was performed.     14 x 8 mm heterogeneous mass centered at the inferior hepatic margin and  gallbladder wall. It is unclear if this originates from the liver or  gallbladder wall. Gallbladder is otherwise unremarkable with no  gallstones detected.     Liver is otherwise unremarkable with normal echogenicity. Visualized  portions of the pancreas are unremarkable.     Both kidneys are normal in size, contour, and cortical  thickness/echogenicity. No hydronephrosis. There is a tiny 9 mm right  renal cortical cyst. There is a small 16 mm cyst from the superior left  renal pole. No solid renal mass.      Spleen is unremarkable. Atherosclerotic plaquing of the abdominal aorta  which maintains normal caliber throughout. Imaged IVC is unremarkable.          Impression:       Impression:  14 x 8 mm mass centered at the inferior hepatic margin and gallbladder  wall. It is unclear if this originates from the gallbladder wall or is  exophytic from the liver. MRI liver mass protocol may further  characterize.  This report was finalized on 11/02/2017 09:19 by  Andrew Green, .        Assessment   1. Acute cor pulmonale: history of cor pulmonale with significantly elevated BNP and patient with dyspnea; will evaluate echo to rule out LV dysfunction as well. Patient appears to be nearly compensated today.   2. Type II troponin elevation: peaked and declined. Likely type II elevation, given other labs consistent with hypoperfusion, as noted below   3. Hepatic mass with significantly elevated liver enzymes (may be related to hypoperfusion injury? given noted elevated lactate on admission) in the setting of hepatitis C  4.  Acute exacerbation of chronic obstructive pulmonary disease   5. Hypertension   6. Tobacco abuse: 1/2 PPD+  7. Paroxysmal  "atrial fibrillation: ?? Patient reports she was on coumadin in past, denies afib or blood clot. Her  states \"Coumadin almost killed her.\"     Plan   1. Echo pending  2. Transition to oral diuretics; daily BMP. Monitor daily weights, strict I/Os  3. No need to continue trending troponin, as has peaked and declined. Will need stress echo in future- this could even be done as an outpatient, assuming patient does not have chest pain during her admission.  4. COPD treatment, as well as further workup for liver, per primary  5. Obviously patient is not a statin candidate at this time due to liver failure  6. Monitor telemetry     Further orders per Dr. Barajas upon his evaluation of the patient.     Thank you for the consultation, cardiology will gladly continue to follow.     Lizbeth Sanford PA-C        Please note this cardiology consultation note is the result of a face to face consultation with the patient, in addition to reviewing medical records at length by myself, Lizbeth Sanford PA-C.    Time: appx 35 minutes    "

## 2017-11-03 ENCOUNTER — APPOINTMENT (OUTPATIENT)
Dept: CT IMAGING | Facility: HOSPITAL | Age: 57
End: 2017-11-03

## 2017-11-03 ENCOUNTER — APPOINTMENT (OUTPATIENT)
Dept: CARDIOLOGY | Facility: HOSPITAL | Age: 57
End: 2017-11-03

## 2017-11-03 LAB
AFP-TM SERPL-MCNC: 4 NG/ML (ref 0–8.3)
ALBUMIN SERPL-MCNC: 3.3 G/DL (ref 3.5–5)
ALBUMIN/GLOB SERPL: 1.1 G/DL (ref 1.1–2.5)
ALP SERPL-CCNC: 87 U/L (ref 24–120)
ALT SERPL W P-5'-P-CCNC: 915 U/L (ref 0–54)
ANION GAP SERPL CALCULATED.3IONS-SCNC: ABNORMAL MMOL/L (ref 4–13)
ARTERIAL PATENCY WRIST A: POSITIVE
AST SERPL-CCNC: 856 U/L (ref 7–45)
ATMOSPHERIC PRESS: 756 MMHG
BASE EXCESS BLDA CALC-SCNC: 24.2 MMOL/L (ref 0–2)
BASOPHILS # BLD AUTO: 0 10*3/MM3 (ref 0–0.2)
BASOPHILS NFR BLD AUTO: 0 % (ref 0–2)
BDY SITE: ABNORMAL
BH CV STRESS BP STAGE 1: NORMAL
BH CV STRESS BP STAGE 2: NORMAL
BH CV STRESS BP STAGE 3: NORMAL
BH CV STRESS BP STAGE 4: NORMAL
BH CV STRESS BP STAGE 5: NORMAL
BH CV STRESS DOB - ATROPINE STAGE 4: 2
BH CV STRESS DOSE DOBUTAMINE STAGE 1: 10
BH CV STRESS DOSE DOBUTAMINE STAGE 2: 20
BH CV STRESS DOSE DOBUTAMINE STAGE 3: 30
BH CV STRESS DOSE DOBUTAMINE STAGE 4: 40
BH CV STRESS DURATION MIN STAGE 1: 3
BH CV STRESS DURATION MIN STAGE 2: 3
BH CV STRESS DURATION MIN STAGE 3: 3
BH CV STRESS DURATION MIN STAGE 4: 3
BH CV STRESS DURATION MIN STAGE 5: 1
BH CV STRESS DURATION SEC STAGE 1: 0
BH CV STRESS DURATION SEC STAGE 2: 0
BH CV STRESS DURATION SEC STAGE 3: 0
BH CV STRESS DURATION SEC STAGE 4: 0
BH CV STRESS DURATION SEC STAGE 5: 47
BH CV STRESS ECHO POST STRESS EJECTION FRACTION EF: 65 %
BH CV STRESS HR STAGE 1: 68
BH CV STRESS HR STAGE 2: 72
BH CV STRESS HR STAGE 3: 111
BH CV STRESS HR STAGE 4: 120
BH CV STRESS HR STAGE 5: 120
BH CV STRESS PROTOCOL 1: NORMAL
BH CV STRESS RECOVERY BP: NORMAL MMHG
BH CV STRESS RECOVERY HR: 104 BPM
BH CV STRESS STAGE 1: 1
BH CV STRESS STAGE 2: 2
BH CV STRESS STAGE 3: 3
BH CV STRESS STAGE 4: 4
BH CV STRESS STAGE 5: 5
BILIRUB SERPL-MCNC: 0.7 MG/DL (ref 0.1–1)
BODY TEMPERATURE: 37 C
BUN BLD-MCNC: 33 MG/DL (ref 5–21)
BUN/CREAT SERPL: 28.4 (ref 7–25)
CALCIUM SPEC-SCNC: 8.5 MG/DL (ref 8.4–10.4)
CHLORIDE SERPL-SCNC: 80 MMOL/L (ref 98–110)
CO2 SERPL-SCNC: >40 MMOL/L (ref 24–31)
CREAT BLD-MCNC: 1.16 MG/DL (ref 0.5–1.4)
DEPRECATED RDW RBC AUTO: 50 FL (ref 40–54)
EOSINOPHIL # BLD AUTO: 0.01 10*3/MM3 (ref 0–0.7)
EOSINOPHIL NFR BLD AUTO: 0.1 % (ref 0–4)
ERYTHROCYTE [DISTWIDTH] IN BLOOD BY AUTOMATED COUNT: 14 % (ref 12–15)
GAS FLOW AIRWAY: 3 LPM
GFR SERPL CREATININE-BSD FRML MDRD: 49 ML/MIN/1.73
GLOBULIN UR ELPH-MCNC: 3 GM/DL
GLUCOSE BLD-MCNC: 98 MG/DL (ref 70–100)
HCO3 BLDA-SCNC: 52.1 MMOL/L (ref 20–26)
HCT VFR BLD AUTO: 40.8 % (ref 37–47)
HGB BLD-MCNC: 12.4 G/DL (ref 12–16)
IMM GRANULOCYTES # BLD: 0.02 10*3/MM3 (ref 0–0.03)
IMM GRANULOCYTES NFR BLD: 0.2 % (ref 0–5)
LV EF 2D ECHO EST: 55 %
LYMPHOCYTES # BLD AUTO: 1.75 10*3/MM3 (ref 0.72–4.86)
LYMPHOCYTES NFR BLD AUTO: 13.9 % (ref 15–45)
Lab: ABNORMAL
Lab: ABNORMAL
MAXIMAL PREDICTED HEART RATE: 165 BPM
MCH RBC QN AUTO: 30 PG (ref 28–32)
MCHC RBC AUTO-ENTMCNC: 30.4 G/DL (ref 33–36)
MCV RBC AUTO: 98.6 FL (ref 82–98)
MODALITY: ABNORMAL
MONOCYTES # BLD AUTO: 1.79 10*3/MM3 (ref 0.19–1.3)
MONOCYTES NFR BLD AUTO: 14.2 % (ref 4–12)
NEUTROPHILS # BLD AUTO: 9.01 10*3/MM3 (ref 1.87–8.4)
NEUTROPHILS NFR BLD AUTO: 71.6 % (ref 39–78)
NOTIFIED BY: ABNORMAL
NOTIFIED WHO: ABNORMAL
PCO2 BLDA: 68.7 MM HG (ref 35–45)
PERCENT MAX PREDICTED HR: 72.73 %
PH BLDA: 7.49 PH UNITS (ref 7.35–7.45)
PLATELET # BLD AUTO: 275 10*3/MM3 (ref 130–400)
PMV BLD AUTO: 10.6 FL (ref 6–12)
PO2 BLDA: 91.5 MM HG (ref 83–108)
POTASSIUM BLD-SCNC: 5 MMOL/L (ref 3.5–5.3)
PROT SERPL-MCNC: 6.3 G/DL (ref 6.3–8.7)
RBC # BLD AUTO: 4.14 10*6/MM3 (ref 4.2–5.4)
SAO2 % BLDCOA: 97.7 % (ref 94–99)
SODIUM BLD-SCNC: 135 MMOL/L (ref 135–145)
STRESS BASELINE BP: NORMAL MMHG
STRESS BASELINE HR: 71 BPM
STRESS PERCENT HR: 86 %
STRESS POST EXERCISE DUR MIN: 13 MIN
STRESS POST EXERCISE DUR SEC: 47 SEC
STRESS POST PEAK BP: NORMAL MMHG
STRESS POST PEAK HR: 120 BPM
STRESS TARGET HR: 140 BPM
VENTILATOR MODE: ABNORMAL
WBC NRBC COR # BLD: 12.58 10*3/MM3 (ref 4.8–10.8)

## 2017-11-03 PROCEDURE — 25010000002 DOBUTAMINE 250 MG/20ML SOLUTION: Performed by: INTERNAL MEDICINE

## 2017-11-03 PROCEDURE — 25010000002 PERFLUTREN 6.52 MG/ML SUSPENSION: Performed by: INTERNAL MEDICINE

## 2017-11-03 PROCEDURE — 93350 STRESS TTE ONLY: CPT

## 2017-11-03 PROCEDURE — 93352 ADMIN ECG CONTRAST AGENT: CPT | Performed by: INTERNAL MEDICINE

## 2017-11-03 PROCEDURE — 93018 CV STRESS TEST I&R ONLY: CPT | Performed by: INTERNAL MEDICINE

## 2017-11-03 PROCEDURE — 80053 COMPREHEN METABOLIC PANEL: CPT | Performed by: NURSE PRACTITIONER

## 2017-11-03 PROCEDURE — 94660 CPAP INITIATION&MGMT: CPT

## 2017-11-03 PROCEDURE — 94799 UNLISTED PULMONARY SVC/PX: CPT

## 2017-11-03 PROCEDURE — 97116 GAIT TRAINING THERAPY: CPT

## 2017-11-03 PROCEDURE — 97110 THERAPEUTIC EXERCISES: CPT

## 2017-11-03 PROCEDURE — 70450 CT HEAD/BRAIN W/O DYE: CPT

## 2017-11-03 PROCEDURE — 36600 WITHDRAWAL OF ARTERIAL BLOOD: CPT

## 2017-11-03 PROCEDURE — 99233 SBSQ HOSP IP/OBS HIGH 50: CPT | Performed by: INTERNAL MEDICINE

## 2017-11-03 PROCEDURE — 85025 COMPLETE CBC W/AUTO DIFF WBC: CPT | Performed by: NURSE PRACTITIONER

## 2017-11-03 PROCEDURE — 82803 BLOOD GASES ANY COMBINATION: CPT

## 2017-11-03 PROCEDURE — 99232 SBSQ HOSP IP/OBS MODERATE 35: CPT | Performed by: INTERNAL MEDICINE

## 2017-11-03 PROCEDURE — 25010000002 ENOXAPARIN PER 10 MG: Performed by: INTERNAL MEDICINE

## 2017-11-03 PROCEDURE — 93350 STRESS TTE ONLY: CPT | Performed by: INTERNAL MEDICINE

## 2017-11-03 PROCEDURE — 93017 CV STRESS TEST TRACING ONLY: CPT

## 2017-11-03 RX ORDER — ATROPINE SULFATE 1 MG/ML
2 INJECTION, SOLUTION INTRAMUSCULAR; INTRAVENOUS; SUBCUTANEOUS ONCE
Status: DISCONTINUED | OUTPATIENT
Start: 2017-11-03 | End: 2017-11-03

## 2017-11-03 RX ORDER — DOBUTAMINE HYDROCHLORIDE 100 MG/100ML
10-50 INJECTION INTRAVENOUS CONTINUOUS
Status: DISCONTINUED | OUTPATIENT
Start: 2017-11-03 | End: 2017-11-03 | Stop reason: HOSPADM

## 2017-11-03 RX ORDER — ATROPINE SULFATE 1 MG/ML
2 INJECTION, SOLUTION INTRAMUSCULAR; INTRAVENOUS; SUBCUTANEOUS ONCE
Status: COMPLETED | OUTPATIENT
Start: 2017-11-03 | End: 2017-11-03

## 2017-11-03 RX ADMIN — FAMOTIDINE 40 MG: 20 TABLET, FILM COATED ORAL at 09:22

## 2017-11-03 RX ADMIN — VENLAFAXINE HYDROCHLORIDE 75 MG: 37.5 TABLET ORAL at 09:22

## 2017-11-03 RX ADMIN — OXYCODONE HYDROCHLORIDE 5 MG: 5 TABLET ORAL at 14:46

## 2017-11-03 RX ADMIN — VENLAFAXINE HYDROCHLORIDE 75 MG: 37.5 TABLET ORAL at 21:21

## 2017-11-03 RX ADMIN — RAMIPRIL 2.5 MG: 2.5 CAPSULE ORAL at 09:22

## 2017-11-03 RX ADMIN — IPRATROPIUM BROMIDE AND ALBUTEROL SULFATE 3 ML: 2.5; .5 SOLUTION RESPIRATORY (INHALATION) at 14:22

## 2017-11-03 RX ADMIN — ATROPINE SULFATE 2 MG: 1 INJECTION, SOLUTION INTRAMUSCULAR; INTRAVENOUS; SUBCUTANEOUS at 10:45

## 2017-11-03 RX ADMIN — POTASSIUM CHLORIDE 20 MEQ: 750 CAPSULE, EXTENDED RELEASE ORAL at 09:21

## 2017-11-03 RX ADMIN — OXYCODONE HYDROCHLORIDE 5 MG: 5 TABLET ORAL at 06:36

## 2017-11-03 RX ADMIN — FUROSEMIDE 40 MG: 40 TABLET ORAL at 09:21

## 2017-11-03 RX ADMIN — BUDESONIDE AND FORMOTEROL FUMARATE DIHYDRATE 2 PUFF: 160; 4.5 AEROSOL RESPIRATORY (INHALATION) at 07:02

## 2017-11-03 RX ADMIN — METOPROLOL TARTRATE 12.5 MG: 25 TABLET, FILM COATED ORAL at 09:22

## 2017-11-03 RX ADMIN — SPIRONOLACTONE 25 MG: 25 TABLET ORAL at 09:22

## 2017-11-03 RX ADMIN — OXYCODONE HYDROCHLORIDE 5 MG: 5 TABLET ORAL at 23:56

## 2017-11-03 RX ADMIN — PANTOPRAZOLE SODIUM 40 MG: 40 TABLET, DELAYED RELEASE ORAL at 06:34

## 2017-11-03 RX ADMIN — ASPIRIN 325 MG: 325 TABLET, COATED ORAL at 09:22

## 2017-11-03 RX ADMIN — DOBUTAMINE 50 MCG/KG/MIN: 12.5 INJECTION, SOLUTION, CONCENTRATE INTRAVENOUS at 10:46

## 2017-11-03 RX ADMIN — PERFLUTREN 8.48 MG: 6.52 INJECTION, SUSPENSION INTRAVENOUS at 10:45

## 2017-11-03 RX ADMIN — METOPROLOL TARTRATE 12.5 MG: 25 TABLET, FILM COATED ORAL at 21:21

## 2017-11-03 RX ADMIN — BUDESONIDE AND FORMOTEROL FUMARATE DIHYDRATE 2 PUFF: 160; 4.5 AEROSOL RESPIRATORY (INHALATION) at 19:49

## 2017-11-03 RX ADMIN — ENOXAPARIN SODIUM 40 MG: 40 INJECTION SUBCUTANEOUS at 09:22

## 2017-11-03 RX ADMIN — IPRATROPIUM BROMIDE AND ALBUTEROL SULFATE 3 ML: 2.5; .5 SOLUTION RESPIRATORY (INHALATION) at 07:02

## 2017-11-03 RX ADMIN — IPRATROPIUM BROMIDE AND ALBUTEROL SULFATE 3 ML: 2.5; .5 SOLUTION RESPIRATORY (INHALATION) at 19:49

## 2017-11-03 NOTE — PLAN OF CARE
Problem: Patient Care Overview (Adult)  Goal: Plan of Care Review  Outcome: Ongoing (interventions implemented as appropriate)    11/03/17 0512   Coping/Psychosocial Response Interventions   Plan Of Care Reviewed With patient;spouse   Outcome Evaluation   Outcome Summary/Follow up Plan NPO for Stress test today. Medicated for chronic back pain, slept at long intervals. S/SA 71-89   Patient Care Overview   Progress no change

## 2017-11-03 NOTE — PLAN OF CARE
Problem: Patient Care Overview (Adult)  Goal: Plan of Care Review  Outcome: Ongoing (interventions implemented as appropriate)    11/03/17 1430   Coping/Psychosocial Response Interventions   Plan Of Care Reviewed With patient   Outcome Evaluation   Outcome Summary/Follow up Plan Pt sitting EOB and having trouble following directions. Pt was min x 1 to stand and min x 1 to ambulate with Rw on 02 2L.       11/03/17 1430   Coping/Psychosocial Response Interventions   Plan Of Care Reviewed With patient   Outcome Evaluation   Outcome Summary/Follow up Plan Pt sitting EOB and having trouble following directions. Pt was min x 1 to stand and min x 1 to ambulate with Rw on o2   Patient Care Overview   Progress no change       Patient Care Overview   Progress no change

## 2017-11-03 NOTE — PLAN OF CARE
Problem: Pain, Acute (Adult)  Goal: Identify Related Risk Factors and Signs and Symptoms  Outcome: Ongoing (interventions implemented as appropriate)    11/03/17 0508   Pain, Acute   Related Risk Factors (Acute Pain) disease process;persistent pain   Signs and Symptoms (Acute Pain) fatigue/weakness;impaired thought process/concentration       Goal: Acceptable Pain Control/Comfort Level  Outcome: Ongoing (interventions implemented as appropriate)    11/03/17 0508   Pain, Acute (Adult)   Acceptable Pain Control/Comfort Level making progress toward outcome

## 2017-11-03 NOTE — PROGRESS NOTES
[facility]  Inpatient Progress Note  Teri Tim  1960    11/3/2017  Reason for Follow Up:  Elevated liver functions    Subjective     Subjective:   Pt currently in shower    Current Facility-Administered Medications:   •  aspirin EC tablet 325 mg, 325 mg, Oral, Daily, Buck Zepeda MD, 325 mg at 11/02/17 0924  •  bisacodyl (DULCOLAX) EC tablet 5 mg, 5 mg, Oral, Daily PRN, Buck Zepeda MD  •  budesonide-formoterol (SYMBICORT) 160-4.5 MCG/ACT inhaler 2 puff, 2 puff, Inhalation, BID - RT, Buck Zepeda MD, 2 puff at 11/03/17 0702  •  enoxaparin (LOVENOX) syringe 40 mg, 40 mg, Subcutaneous, Daily, Geovanny Howard DO  •  famotidine (PEPCID) tablet 40 mg, 40 mg, Oral, Daily, Buck Zepeda MD, 40 mg at 11/02/17 0924  •  furosemide (LASIX) tablet 40 mg, 40 mg, Oral, Daily, Lizbeth Sanford PA-C  •  ipratropium-albuterol (DUO-NEB) nebulizer solution 3 mL, 3 mL, Nebulization, 4x Daily - RT, ROLAND Brink, 3 mL at 11/03/17 0702  •  metoprolol tartrate (LOPRESSOR) tablet 12.5 mg, 12.5 mg, Oral, Q12H, Buck Zepeda MD, 12.5 mg at 11/02/17 2101  •  nicotine (NICODERM CQ) 21 MG/24HR patch 1 patch, 1 patch, Transdermal, Q24H, Buck Zepeda MD, 1 patch at 11/01/17 2247  •  ondansetron (ZOFRAN) injection 4 mg, 4 mg, Intravenous, Q6H PRN, Buck Zepeda MD  •  oxyCODONE (ROXICODONE) immediate release tablet 5 mg, 5 mg, Oral, Q8H PRN, Buck Zepeda MD, 5 mg at 11/03/17 0636  •  pantoprazole (PROTONIX) EC tablet 40 mg, 40 mg, Oral, Q AM, Buck Zepeda MD, 40 mg at 11/03/17 0634  •  potassium chloride (MICRO-K) CR capsule 20 mEq, 20 mEq, Oral, Daily, Buck Zepeda MD, 20 mEq at 11/02/17 0924  •  ramipril (ALTACE) capsule 2.5 mg, 2.5 mg, Oral, Daily, Buck Zepeda MD  •  sodium chloride 0.9 % flush 1-10 mL, 1-10 mL, Intravenous, PRN, Buck Zepeda MD  •  spironolactone (ALDACTONE) tablet 25 mg, 25 mg, Oral, Daily, Buck Zepeda MD, 25 mg at  11/02/17 0924  •  venlafaxine (EFFEXOR) tablet 75 mg, 75 mg, Oral, Q12H, Buck Zepeda MD, 75 mg at 11/02/17 2100    Review of Systems:    Review of Systems   Constitutional: Negative for activity change, appetite change, chills, diaphoresis, fatigue, fever and unexpected weight change.   HENT: Negative for ear pain, hearing loss, mouth sores, sore throat, trouble swallowing and voice change.    Eyes: Negative.    Respiratory: Negative for cough, choking, shortness of breath and wheezing.    Cardiovascular: Negative for chest pain and palpitations.   Gastrointestinal: Negative for abdominal pain, blood in stool, constipation, diarrhea, nausea and vomiting.   Endocrine: Negative for cold intolerance and heat intolerance.   Genitourinary: Negative for decreased urine volume, dysuria, frequency, hematuria and urgency.   Musculoskeletal: Negative for back pain, gait problem and myalgias.   Skin: Negative for color change, pallor and rash.   Allergic/Immunologic: Negative for food allergies and immunocompromised state.   Neurological: Negative for dizziness, tremors, seizures, syncope, weakness, light-headedness, numbness and headaches.   Hematological: Negative for adenopathy. Does not bruise/bleed easily.   Psychiatric/Behavioral: Negative for agitation and confusion. The patient is not nervous/anxious.    All other systems reviewed and are negative.       Objective     Vital Signs  Temp:  [96.9 °F (36.1 °C)-97.7 °F (36.5 °C)] 97.7 °F (36.5 °C)  Heart Rate:  [70-92] 75  Resp:  [18-20] 18  BP: (102-150)/(53-70) 102/53  Body mass index is 21.87 kg/(m^2).    Intake/Output Summary (Last 24 hours) at 11/03/17 0905  Last data filed at 11/02/17 2008   Gross per 24 hour   Intake              860 ml   Output             1150 ml   Net             -290 ml             Physical Exam:   General: patient awake, alert and cooperative, no acute distress   Eyes: Normal lids and lashes, no scleral icterus   Neck: supple, no  JVD   Skin: warm and dry   Cardiovascular: regular rhythm and rate, no murmurs auscultated   Pulm: clear to auscultation bilaterally, regular and unlabored   Abdomen: soft, nontender, nondistended; normal bowel sounds   Psychiatric: Normal mood and behavior; converses appropriately     Results Review:    I have reviewed all of the patients current test results      Results from last 7 days  Lab Units 11/03/17  0619 11/01/17  1711   WBC 10*3/mm3 12.58* 14.20*   HEMOGLOBIN g/dL 12.4 11.6*   HEMATOCRIT % 40.8 39.4   PLATELETS 10*3/mm3 275 292         Results from last 7 days  Lab Units 11/03/17  0619 11/02/17  0257 11/01/17  1711   SODIUM mmol/L 135 140 137   POTASSIUM mmol/L 5.0 3.9 4.5   CHLORIDE mmol/L 80* 87* 93*   CO2 mmol/L >40.0* 39.0* 38.0*   BUN mg/dL 33* 26* 26*   CREATININE mg/dL 1.16 1.34 1.41*   CALCIUM mg/dL 8.5 9.2 8.6   BILIRUBIN mg/dL 0.7 0.7 0.5   ALK PHOS U/L 87 110 98   ALT (SGPT) U/L 915* 1454* 1236*   AST (SGOT) U/L 856* 2453* 3036*   GLUCOSE mg/dL 98 153* 88         Results from last 7 days  Lab Units 11/01/17  1711   INR  1.38*         Lab Results  Lab Value Date/Time   LIPASE 77 11/02/2017 0257   LIPASE 31 11/19/2015 1304   LIPASE 169 09/09/2015 1450       Radiology:    Imaging Results (last 24 hours)     Procedure Component Value Units Date/Time    US Abdomen Complete [[id number]] Collected:  11/02/17 0912     Updated:  11/02/17 0922    Narrative:       History:  57-year-old evaluated for elevated liver function tests.      Reference:  CT chest May 2016.     Findings:  Real time sonography of the abdomen was performed.     14 x 8 mm heterogeneous mass centered at the inferior hepatic margin and  gallbladder wall. It is unclear if this originates from the liver or  gallbladder wall. Gallbladder is otherwise unremarkable with no  gallstones detected.     Liver is otherwise unremarkable with normal echogenicity. Visualized  portions of the pancreas are unremarkable.     Both kidneys are normal in  size, contour, and cortical  thickness/echogenicity. No hydronephrosis. There is a tiny 9 mm right  renal cortical cyst. There is a small 16 mm cyst from the superior left  renal pole. No solid renal mass.      Spleen is unremarkable. Atherosclerotic plaquing of the abdominal aorta  which maintains normal caliber throughout. Imaged IVC is unremarkable.          Impression:       Impression:  14 x 8 mm mass centered at the inferior hepatic margin and gallbladder  wall. It is unclear if this originates from the gallbladder wall or is  exophytic from the liver. MRI liver mass protocol may further  characterize.  This report was finalized on 11/02/2017 09:19 by  Andrew Green, .    CT Abdomen Pelvis With Contrast [[id number]] Collected:  11/02/17 1858     Updated:  11/02/17 1904    Narrative:       CT ABDOMEN PELVIS W CONTRAST- 11/2/2017 6:17 PM CDT     HISTORY: elevated liver enzymes; I21.4-Non-ST elevation (NSTEMI)  myocardial infarction; R74.8-Abnormal levels of other serum enzymes;  B19.20-Unspecified viral hepatitis C without hepatic coma; Z74.09-Other  reduced mobility       COMPARISON: None.      DOSE LENGTH PRODUCT: 344 mGy cm. Automated exposure control was also  utilized to decrease patient radiation dose.     TECHNIQUE: Following the oral ingestion and intravenous administration  of contrast, helical CT tomographic images of the abdomen and pelvis  were acquired. Multiplanar reformatted images were provided for review.      FINDINGS:   The lung bases and base of the heart are unremarkable.      LIVER: Fatty liver infiltration is seen adjacent to the falciform  ligament.      BILIARY SYSTEM: The gallbladder is unremarkable. No intrahepatic or  extrahepatic ductal dilatation.      PANCREAS: No focal pancreatic lesion.      SPLEEN: Unremarkable.      KIDNEYS AND ADRENALS: There is a tiny exophytic cyst arising from the  superior pole of the LEFT kidney. The kidneys are otherwise normal in  appearance. The adrenal  glands are unremarkable. The ureters are  decompressed and normal in appearance.     RETROPERITONEUM: No mass, lymphadenopathy or hemorrhage.      GI TRACT: No evidence of obstruction or bowel wall thickening. The  appendix is not definitively visualized, but there are no secondary  signs of acute appendicitis.     OTHER: There is no mesenteric mass, lymphadenopathy or fluid collection.  The abdominopelvic vasculature is patent. The osseous structures and  soft tissues demonstrate no worrisome lesions.          PELVIS: No mass lesion, fluid collection or significant lymphadenopathy  is seen in the pelvis. The urinary bladder is normal in appearance.       Impression:       1. No evidence of acute abdominopelvic process.         This report was finalized on 11/02/2017 19:01 by Dr. David Bob MD.            Assessment/Plan     Patient Active Problem List   Diagnosis Code   • NSTEMI (non-ST elevated myocardial infarction) I21.4       1. Elevated Liver functions  2. Hepatitis C followed by Dr Shane  3. Abnormal Ultrasound of the liver follow up CT shows no abdominopelvic process        EMR Dragon/transcription disclaimer: Much of this encounter note is electronic transcription/translation of spoken language to printed text. The electronic translation of spoken language may be erroneous, or at times, nonsensical words or phrases may be inadvertently transcribed. Although I have reviewed the note for such errors, some may still exist.    ROLAND Zamora  11/03/17  9:05 AM        I performed a history, physical examination, reviewed the progress note/consult note, and discussed the management of this patient with ROLAND Perez as her supervising physician.  I agree with the documented findings and plan of care as outlined with any exceptions or new recommendations noted below.    CT was negative for mass   Can follow up with her primary GI after d/c related to management of her hep C  LFTs are  declining     EMR Dragon/transcription disclaimer: Much of this encounter note is an electronic transcription/translation of spoken language to printed text.  The electronic translation of spoken language may permit erroneous, or at times, nonsensical words or phrases to be inadvertently transcribed.  Although I have reviewed the note for such errors, some may still exist.      Roberto Bryan MD  2:45 PM  11/3/2017

## 2017-11-03 NOTE — PROGRESS NOTES
[facility]  INPATIENT PROGRESS NOTE    Length of Stay: 2  Date of Admission: 11/1/2017  Primary Care Physician: Jose Barajas MD    Subjective   Chief Complaint: follow up     HPI   The patient is currently sitting up in bed.   at bedside.  Going for stress today.  Feeling better.  Liver enzymes better today. Discussed CT results. No complaints at this time.  Not having any difficulty breathing, no chest pain.     Review of Systems   Constitutional: Positive for activity change. Negative for appetite change.   HENT: Negative for hearing loss, nosebleeds, tinnitus and trouble swallowing.    Eyes: Negative for visual disturbance.   Respiratory: Negative for chest tightness.    Gastrointestinal: Negative for abdominal distention, blood in stool, constipation and diarrhea.   Endocrine: Negative for cold intolerance, heat intolerance, polydipsia, polyphagia and polyuria.   Genitourinary: Negative for decreased urine volume, difficulty urinating, dysuria, flank pain, frequency and hematuria.   Allergic/Immunologic: Negative for immunocompromised state.   Neurological: Negative for syncope and light-headedness.   Hematological: Negative for adenopathy. Does not bruise/bleed easily.   Psychiatric/Behavioral: Negative for confusion and sleep disturbance. The patient is not nervous/anxious.       All pertinent negatives and positives are as above. All other systems have been reviewed and are negative unless otherwise stated.     Objective    Temp:  [96.9 °F (36.1 °C)-97.7 °F (36.5 °C)] 97.7 °F (36.5 °C)  Heart Rate:  [70-92] 75  Resp:  [18-20] 18  BP: (102-150)/(53-70) 102/53     Physical Exam   Constitutional: She is oriented to person, place, and time. She appears well-developed and well-nourished.   HENT:   Head: Normocephalic and atraumatic.   Eyes: Conjunctivae and EOM are normal. Pupils are equal, round, and reactive to light.   Neck: Neck supple. No JVD  present. No thyromegaly present.   Cardiovascular: Normal rate, regular rhythm, normal heart sounds and intact distal pulses.  Exam reveals no gallop and no friction rub.    No murmur heard.  Pulmonary/Chest: Effort normal. No respiratory distress. She has no wheezes. She has rales. She exhibits no tenderness.   Abdominal: Soft. Bowel sounds are normal. She exhibits no distension. There is no tenderness. There is no rebound and no guarding.   Musculoskeletal: Normal range of motion. She exhibits no edema, tenderness or deformity.   Lymphadenopathy:     She has no cervical adenopathy.   Neurological: She is alert and oriented to person, place, and time. She displays normal reflexes. No cranial nerve deficit. She exhibits normal muscle tone.   Skin: Skin is warm and dry. No rash noted.   Psychiatric: She has a normal mood and affect. Her behavior is normal. Judgment and thought content normal.   Vitals reviewed.    Results Review:  I have reviewed the labs, radiology results, and diagnostic studies.    Laboratory Data:     Results from last 7 days  Lab Units 11/03/17  0619 11/01/17  1711   WBC 10*3/mm3 12.58* 14.20*   HEMOGLOBIN g/dL 12.4 11.6*   HEMATOCRIT % 40.8 39.4   PLATELETS 10*3/mm3 275 292        Results from last 7 days  Lab Units 11/03/17  0619 11/02/17  0257 11/01/17  1711   SODIUM mmol/L 135 140 137   POTASSIUM mmol/L 5.0 3.9 4.5   CHLORIDE mmol/L 80* 87* 93*   CO2 mmol/L >40.0* 39.0* 38.0*   BUN mg/dL 33* 26* 26*   CREATININE mg/dL 1.16 1.34 1.41*   CALCIUM mg/dL 8.5 9.2 8.6   BILIRUBIN mg/dL 0.7 0.7 0.5   ALK PHOS U/L 87 110 98   ALT (SGPT) U/L 915* 1454* 1236*   AST (SGOT) U/L 856* 2453* 3036*   GLUCOSE mg/dL 98 153* 88     Radiology Data:   Imaging Results (last 24 hours)     Procedure Component Value Units Date/Time    CT Abdomen Pelvis With Contrast [[id number]] Collected:  11/02/17 1858     Updated:  11/02/17 1904    Narrative:       CT ABDOMEN PELVIS W CONTRAST- 11/2/2017 6:17 PM CDT     HISTORY:  elevated liver enzymes; I21.4-Non-ST elevation (NSTEMI)  myocardial infarction; R74.8-Abnormal levels of other serum enzymes;  B19.20-Unspecified viral hepatitis C without hepatic coma; Z74.09-Other  reduced mobility       COMPARISON: None.      DOSE LENGTH PRODUCT: 344 mGy cm. Automated exposure control was also  utilized to decrease patient radiation dose.     TECHNIQUE: Following the oral ingestion and intravenous administration  of contrast, helical CT tomographic images of the abdomen and pelvis  were acquired. Multiplanar reformatted images were provided for review.      FINDINGS:   The lung bases and base of the heart are unremarkable.      LIVER: Fatty liver infiltration is seen adjacent to the falciform  ligament.      BILIARY SYSTEM: The gallbladder is unremarkable. No intrahepatic or  extrahepatic ductal dilatation.      PANCREAS: No focal pancreatic lesion.      SPLEEN: Unremarkable.      KIDNEYS AND ADRENALS: There is a tiny exophytic cyst arising from the  superior pole of the LEFT kidney. The kidneys are otherwise normal in  appearance. The adrenal glands are unremarkable. The ureters are  decompressed and normal in appearance.     RETROPERITONEUM: No mass, lymphadenopathy or hemorrhage.      GI TRACT: No evidence of obstruction or bowel wall thickening. The  appendix is not definitively visualized, but there are no secondary  signs of acute appendicitis.     OTHER: There is no mesenteric mass, lymphadenopathy or fluid collection.  The abdominopelvic vasculature is patent. The osseous structures and  soft tissues demonstrate no worrisome lesions.          PELVIS: No mass lesion, fluid collection or significant lymphadenopathy  is seen in the pelvis. The urinary bladder is normal in appearance.       Impression:       1. No evidence of acute abdominopelvic process.         This report was finalized on 11/02/2017 19:01 by Dr. David Bob MD.          Teri Tim   Echocardiogram   Order# [id number]     Reading physician: Jose Barajas MD   Ordering physician: Buck Zepeda MD   Study date: 17         Patient Information      Patient Name MRN Sex  (Age)     Teri Tim [id number] Female 1960 (57 y.o.)       Admission Information      Admission Date/Time Discharge Date/Time Room/Bed     17  1627  401/1       Sedation Narrator Report      Sedation Narrator Report       Interpretation Summary      · Left ventricular systolic function is normal. Estimated EF = 55%.  · Left ventricular diastolic dysfunction (grade I) consistent with impaired relaxation.  · Right ventricular cavity is mildly dilated.  · Normal right ventricular wall thickness, systolic function and septal motion noted.  · Moderate pulmonary hypertension is present.     I have reviewed the patient current medications.     Assessment/Plan     Assessment:  1. Acute on chronic hypercarbic and hypoxic respiratory failure, pCO2 68 and pO2 47.8  2. Acutely decompensated diastolic congestive heart failure, ECHO pending  3. Non-ST segment elevated myocardial infarction  4. Transaminitis with chronic hepatitis C  5. Ongoing tobacco abuse  6. Chronic obstructive pulmonary disease, no exacerbation   7. Leukocytosis  8. Abnormal TSH, check T3 and T4    Plan:  1. PCP to follow TSH  2. Stress to be done today  3. 2D echo -EF 55%  4. CBC, CMP in AM  5. Lasix 40 mg PO Daily   6. US abdomen - lesion noted.  7. Lactic reflex - normal  8. Urine drug screen pending  9. Tobacco cessation education  10. Lovenox 40 mg SQ for DVT prophylaxis  11. Continue Aldactone  12. Started on low dose ramipril by nocturnist  13. Continue to trend liver enzymes    Dr. Howard:  Patient seen and examined. Patient appears slightly confused. Not eating. PT was concerned about patient not following commands. Patient up in chair. Speech at times inaudible. Will get head CT and add BiPap to see if elevated CO2 is the cause.     Discharge Planning: I expect the patient to  be discharged to home in 1-2 days.    Andrews Pollock, ROLAND   11/03/17   10:05 AM

## 2017-11-03 NOTE — PAYOR COMM NOTE
"11/3 CLINICAL UPDATE WITH ECHO  UR PHONE    [phone]    Teri Tim (57 y.o. Female)     Date of Birth Social Security Number Address Home Phone MRN    1960  [id number]    [place] [address] [phone] [id number]    [employer] Marital Status          Mosque        Admission Date Admission Type Admitting Provider Attending Provider Department, Room/Bed    11/1/17 Emergency Geovanny Howard DO [place], Ali Janell, DO [facility] 4B, 401/1    Discharge Date Discharge Disposition Discharge Destination                      Attending Provider: Geovanny Howard DO     Allergies:  Codeine    Isolation:  None   Infection:  None   Code Status:  FULL    Ht:  60\" (152.4 cm)   Wt:  112 lb (50.8 kg)    Admission Cmt:  None   Principal Problem:  None                Active Insurance as of 11/1/2017     Primary Coverage     Payor Plan Insurance Group Employer/Plan Group    [employer] [place] [employer] [place]      Payor Plan Address Payor Plan Phone Number Effective From Effective To    [address] [address] [phone] 11/1/2017     [place] [address]       Subscriber Name Subscriber Birth Date Member ID       TERI TIM 1960 [id number]                 Emergency Contacts      (Rel.) Home Phone Work Phone Mobile Phone    Rich Tim (Spouse) [phone] -- [phone]            Vital Signs (last 72 hrs)       10/31 0700  -  11/01 0659 11/01 0700  -  11/02 0659 11/02 0700  -  11/03 0659 11/03 0700  -  11/03 0952   Most Recent    Temp (°F)   98.3 -  98.9    96.9 -  97.7      97.7     97.7 (36.5)    Heart Rate   84 -  99    70 -  93      75     75    Resp   13 -  28    18 -  24      18     18    BP   114/61 -  125/55    109/59 -  150/70      102/53     102/53    SpO2 (%)   91 -  99    93 -  100      92     92          Intake & Output (last 3 days)       10/31 0701 - 11/01 0700 11/01 0701 - 11/02 0700 11/02 0701 - 11/03 0700 11/03 0701 - 11/04 0700    " P.O.   860     Total Intake(mL/kg)   860 (16.9)     Urine (mL/kg/hr)  1600 1150 (0.9)     Total Output   1600 1150      Net   -1600 -290                  Lines, Drains & Airways    Active LDAs     Name:   Placement date:   Placement time:   Site:   Days:    Peripheral IV Line - Double Lumen 11/02/17 1807  22 gauge  11/02/17 1807      less than 1                Hospital Medications (active)       Dose Frequency Start End    aspirin EC tablet 325 mg 325 mg Daily 11/2/2017     Sig - Route: Take 1 tablet by mouth Daily. - Oral    bisacodyl (DULCOLAX) EC tablet 5 mg 5 mg Daily PRN 11/1/2017     Sig - Route: Take 1 tablet by mouth Daily As Needed for Constipation. - Oral    budesonide-formoterol (SYMBICORT) 160-4.5 MCG/ACT inhaler 2 puff 2 puff 2 Times Daily - RT 11/1/2017     Sig - Route: Inhale 2 puffs 2 (Two) Times a Day. - Inhalation    enoxaparin (LOVENOX) syringe 40 mg 40 mg Daily 11/2/2017     Sig - Route: Inject 0.4 mL under the skin Daily. - Subcutaneous    famotidine (PEPCID) tablet 40 mg 40 mg Daily 11/2/2017     Sig - Route: Take 2 tablets by mouth Daily. - Oral    furosemide (LASIX) tablet 40 mg 40 mg Daily 11/2/2017     Sig - Route: Take 1 tablet by mouth Daily. - Oral    Cosign for Ordering: Accepted by Jose Barajas MD on 11/2/2017 10:22 PM    iohexol (OMNIPAQUE) 300 MG/ML injection 50 mL 50 mL Once in Imaging 11/2/2017 11/2/2017    Sig - Route: Take 50 mL by mouth Once. - Oral    iopamidol (ISOVUE-300) 61 % injection 100 mL 100 mL Once in Imaging 11/2/2017 11/2/2017    Sig - Route: Infuse 100 mL into a venous catheter Once. - Intravenous    ipratropium-albuterol (DUO-NEB) nebulizer solution 3 mL 3 mL 4 Times Daily - RT 11/2/2017     Sig - Route: Take 3 mL by nebulization 4 (Four) Times a Day. - Nebulization    metoprolol tartrate (LOPRESSOR) tablet 12.5 mg 12.5 mg Every 12 Hours Scheduled 11/1/2017     Sig - Route: Take 0.5 tablets by mouth Every 12 (Twelve) Hours. - Oral    nicotine (NICODERM CQ) 21  MG/24HR patch 1 patch 1 patch Every 24 Hours 11/1/2017     Sig - Route: Place 1 patch on the skin Daily. - Transdermal    ondansetron (ZOFRAN) injection 4 mg 4 mg Every 6 Hours PRN 11/1/2017     Sig - Route: Infuse 2 mL into a venous catheter Every 6 (Six) Hours As Needed for Nausea or Vomiting. - Intravenous    oxyCODONE (ROXICODONE) immediate release tablet 5 mg 5 mg Every 8 Hours PRN 11/1/2017 11/11/2017    Sig - Route: Take 1 tablet by mouth Every 8 (Eight) Hours As Needed for Moderate Pain . - Oral    pantoprazole (PROTONIX) EC tablet 40 mg 40 mg Every Early Morning 11/2/2017     Sig - Route: Take 1 tablet by mouth Every Morning. - Oral    potassium chloride (MICRO-K) CR capsule 20 mEq 20 mEq Daily 11/2/2017     Sig - Route: Take 2 capsules by mouth Daily. - Oral    ramipril (ALTACE) capsule 2.5 mg 2.5 mg Daily 11/2/2017     Sig - Route: Take 1 capsule by mouth Daily. - Oral    sodium chloride 0.9 % flush 1-10 mL 1-10 mL As Needed 11/1/2017     Sig - Route: Infuse 1-10 mL into a venous catheter As Needed for Line Care. - Intravenous    spironolactone (ALDACTONE) tablet 25 mg 25 mg Daily 11/2/2017     Sig - Route: Take 1 tablet by mouth Daily. - Oral    venlafaxine (EFFEXOR) tablet 75 mg 75 mg Every 12 Hours Scheduled 11/1/2017     Sig - Route: Take 2 tablets by mouth Every 12 (Twelve) Hours. - Oral    diatrizoate meglumine-sodium (GASTROGRAFIN) 66-10 % solution 30 mL (Discontinued) 30 mL Once 11/2/2017 11/2/2017    Sig - Route: Take 30 mL by mouth 1 (One) Time. - Oral    furosemide (LASIX) injection 40 mg (Discontinued) 40 mg 2 Times Daily 11/1/2017 11/2/2017    Sig - Route: Infuse 4 mL into a venous catheter 2 (Two) Times a Day. - Intravenous    iohexol (OMNIPAQUE) 300 MG/ML injection 50 mL (Discontinued) 50 mL Once in Imaging 11/2/2017 11/2/2017    Sig - Route: Infuse 50 mL into a venous catheter Once. - Intravenous            Lab Results (last 24 hours)     Procedure Component Value Units Date/Time    AFP  Tumor Marker [[id number]] Collected:  11/02/17 0810    Specimen:  Blood Updated:  11/02/17 1003    Troponin [[id number]]  (Abnormal) Collected:  11/02/17 0810    Specimen:  Blood Updated:  11/02/17 1009     Troponin I 0.061 (H) ng/mL     Urinalysis With / Culture If Indicated - Urine, Clean Catch [[id number]]  (Normal) Collected:  11/02/17 1022    Specimen:  Urine from Urine, Clean Catch Updated:  11/02/17 1045     Color, UA Yellow     Appearance, UA Clear     pH, UA 7.0     Specific Gravity, UA 1.008     Glucose, UA Negative     Ketones, UA Negative     Bilirubin, UA Negative     Blood, UA Negative     Protein, UA Negative     Leuk Esterase, UA Negative     Nitrite, UA Negative     Urobilinogen, UA 0.2 E.U./dL    Narrative:       Urine microscopic not indicated.    Urine Drug Screen - Urine, Clean Catch [[id number]]  (Abnormal) Collected:  11/02/17 1022    Specimen:  Urine from Urine, Clean Catch Updated:  11/02/17 1103     Amphetamine Screen, Urine Negative     Barbiturates Screen, Urine Negative     Benzodiazepine Screen, Urine Positive (A)     Cocaine Screen, Urine Negative     Methadone Screen, Urine Negative     Opiate Screen Negative     Phencyclidine (PCP), Urine Negative     THC, Screen, Urine Negative    Narrative:       Negative Thresholds For Drugs Screened in Urine:    Amphetamines          500 ng/ml  Barbiturates          200 ng/ml  Benzodiazepines       200 ng/ml  Cocaine               150 ng/ml  Methadone             150 ng/ml  Opiates               300 ng/ml  Phencyclidine         25 ng/ml  THC                      50 ng/ml    The normal value for all drugs tested is negative. This report includes final unconfirmed screening results.  A positive result by this assay can be, at your request, sent to the Reference Lab for confirmation by gas chromatography. Unconfirmed results must not be used for non-medical purposes, such as employment or legal testing. Clinical consideration should be applied to any  drug of abuse test result, particularly when unconfirmed results are used.    Peripheral Blood Smear - Blood, [[id number]] Collected:  11/01/17 1711    Specimen:  Blood Updated:  11/02/17 1338     Performed by: Sabrina Ulloa MD     Pathologist Interpretation Leukocytosis with macrocytic normochromic anemia.  Mild anisopoikilocytosis and polychromasia.  Negative for increase in blasts.  Negative for increase in schistocytes.     CBC & Differential [[id number]] Collected:  11/03/17 0619    Specimen:  Blood Updated:  11/03/17 0655    Narrative:       The following orders were created for panel order CBC & Differential.  Procedure                               Abnormality         Status                     ---------                               -----------         ------                     CBC Auto Differential[[id number]]        Abnormal            Final result                 Please view results for these tests on the individual orders.    CBC Auto Differential [[id number]]  (Abnormal) Collected:  11/03/17 0619    Specimen:  Blood Updated:  11/03/17 0655     WBC 12.58 (H) 10*3/mm3      RBC 4.14 (L) 10*6/mm3      Hemoglobin 12.4 g/dL      Hematocrit 40.8 %      MCV 98.6 (H) fL      MCH 30.0 pg      MCHC 30.4 (L) g/dL      RDW 14.0 %      RDW-SD 50.0 fl      MPV 10.6 fL      Platelets 275 10*3/mm3      Neutrophil % 71.6 %      Lymphocyte % 13.9 (L) %      Monocyte % 14.2 (H) %      Eosinophil % 0.1 %      Basophil % 0.0 %      Immature Grans % 0.2 %      Neutrophils, Absolute 9.01 (H) 10*3/mm3      Lymphocytes, Absolute 1.75 10*3/mm3      Monocytes, Absolute 1.79 (H) 10*3/mm3      Eosinophils, Absolute 0.01 10*3/mm3      Basophils, Absolute 0.00 10*3/mm3      Immature Grans, Absolute 0.02 10*3/mm3     Comprehensive Metabolic Panel [[id number]]  (Abnormal) Collected:  11/03/17 0619    Specimen:  Blood Updated:  11/03/17 0727     Glucose 98 mg/dL      BUN 33 (H) mg/dL      Creatinine 1.16 mg/dL      Sodium 135 mmol/L       Potassium 5.0 mmol/L      Chloride 80 (L) mmol/L      CO2 >40.0 (C) mmol/L      Calcium 8.5 mg/dL      Total Protein 6.3 g/dL      Albumin 3.30 (L) g/dL      ALT (SGPT) 915 (H) U/L      AST (SGOT) 856 (H) U/L      Alkaline Phosphatase 87 U/L      Total Bilirubin 0.7 mg/dL      eGFR Non African Amer 49 (L) mL/min/1.73      Globulin 3.0 gm/dL      A/G Ratio 1.1 g/dL      BUN/Creatinine Ratio 28.4 (H)     Anion Gap -- mmol/L       Unable to calculate Anion Gap.           Imaging Results (last 24 hours)     Procedure Component Value Units Date/Time    CT Abdomen Pelvis With Contrast [[id number]] Collected:  11/02/17 1858     Updated:  11/02/17 1904    Narrative:       CT ABDOMEN PELVIS W CONTRAST- 11/2/2017 6:17 PM CDT     HISTORY: elevated liver enzymes; I21.4-Non-ST elevation (NSTEMI)  myocardial infarction; R74.8-Abnormal levels of other serum enzymes;  B19.20-Unspecified viral hepatitis C without hepatic coma; Z74.09-Other  reduced mobility       COMPARISON: None.      DOSE LENGTH PRODUCT: 344 mGy cm. Automated exposure control was also  utilized to decrease patient radiation dose.     TECHNIQUE: Following the oral ingestion and intravenous administration  of contrast, helical CT tomographic images of the abdomen and pelvis  were acquired. Multiplanar reformatted images were provided for review.      FINDINGS:   The lung bases and base of the heart are unremarkable.      LIVER: Fatty liver infiltration is seen adjacent to the falciform  ligament.      BILIARY SYSTEM: The gallbladder is unremarkable. No intrahepatic or  extrahepatic ductal dilatation.      PANCREAS: No focal pancreatic lesion.      SPLEEN: Unremarkable.      KIDNEYS AND ADRENALS: There is a tiny exophytic cyst arising from the  superior pole of the LEFT kidney. The kidneys are otherwise normal in  appearance. The adrenal glands are unremarkable. The ureters are  decompressed and normal in appearance.     RETROPERITONEUM: No mass, lymphadenopathy or  hemorrhage.      GI TRACT: No evidence of obstruction or bowel wall thickening. The  appendix is not definitively visualized, but there are no secondary  signs of acute appendicitis.     OTHER: There is no mesenteric mass, lymphadenopathy or fluid collection.  The abdominopelvic vasculature is patent. The osseous structures and  soft tissues demonstrate no worrisome lesions.          PELVIS: No mass lesion, fluid collection or significant lymphadenopathy  is seen in the pelvis. The urinary bladder is normal in appearance.       Impression:       1. No evidence of acute abdominopelvic process.         This report was finalized on 11/02/2017 19:01 by Dr. David Bob MD.        ECG/EMG Results (last 24 hours)     Procedure Component Value Units Date/Time    SCANNED EKG [[id number]] Resulted:  11/01/17      Updated:  11/02/17 1247    Adult Transthoracic Echo Complete W/ Cont if Necessary Per Protocol [[id number]] Collected:  11/02/17 1344     Updated:  11/02/17 1542     BSA 1.5 m^2      IVSd 0.57 cm      LVIDd 3.8 cm      LVIDs 2.4 cm      LVPWd 0.71 cm      IVS/LVPW 0.8     FS 36.5 %      EDV(Teich) 63.5 ml      ESV(Teich) 21.0 ml      EF(Teich) 66.9 %      EDV(cubed) 56.6 ml      ESV(cubed) 14.5 ml      EF(cubed) 74.3 %      LV mass(C)d 65.4 grams      LV mass(C)dI 45.0 grams/m^2      SV(Teich) 42.5 ml      SI(Teich) 29.2 ml/m^2      SV(cubed) 42.1 ml      SI(cubed) 29.0 ml/m^2      Ao root diam 2.5 cm      Ao root area 4.9 cm^2      LA dimension 2.9 cm      LA/Ao 1.2     LVOT diam 2.0 cm      LVOT area 3.0 cm^2      LVOT area(traced) 3.1 cm^2      LVLd ap4 6.2 cm      EDV(MOD-sp4) 47.0 ml      LVLs ap4 5.1 cm      ESV(MOD-sp4) 20.5 ml      EF(MOD-sp4) 56.4 %      LVLd ap2 5.7 cm      EDV(MOD-sp2) 35.0 ml      LVLs ap2 5.4 cm      ESV(MOD-sp2) 16.1 ml      EF(MOD-sp2) 54.0 %      SV(MOD-sp4) 26.5 ml      SI(MOD-sp4) 18.2 ml/m^2      SV(MOD-sp2) 18.9 ml      SI(MOD-sp2) 13.0 ml/m^2      Ao root area (BSA corrected)  1.7     Ao root area (BSA corrected) 54.0 ml/m^2      CONTRAST EF 4CH 56.4 ml/m^2      LV Diastolic corrected for BSA 32.3 ml/m^2      LV Systolic corrected for BSA 14.1 ml/m^2      MV E max tejas 73.3 cm/sec      MV A max tejas 102.0 cm/sec      MV E/A 0.72     MV dec time 0.25 sec      Ao pk tejas 154.0 cm/sec      Ao max PG 9.5 mmHg      Ao max PG (full) 3.6 mmHg      Ao V2 mean 82.3 cm/sec      Ao mean PG 3.3 mmHg      Ao mean PG (full) 0.33 mmHg      Ao V2 VTI 24.9 cm      MIKA(I,A) 2.7 cm^2      MIKA(I,D) 2.7 cm^2      MIKA(V,A) 2.4 cm^2      MIKA(V,D) 2.4 cm^2      LV V1 max PG 5.9 mmHg      LV V1 mean PG 3.0 mmHg      LV V1 max 121.7 cm/sec      LV V1 mean 74.8 cm/sec      LV V1 VTI 22.5 cm      SV(Ao) 122.4 ml      SI(Ao) 84.2 ml/m^2      SV(LVOT) 67.2 ml      SI(LVOT) 46.2 ml/m^2      TR max tejas 350.0 cm/sec      RVSP(TR) 54.0 mmHg      RAP systole 5.0 mmHg       CV ECHO ENOC - BZI_BMI 21.7 kilograms/m^2       [facility] 1.5 m^2       CV ECHO ENOC - BZI_METRIC_WEIGHT 50.3 kg       CV ECHO ENOC - BZI_METRIC_HEIGHT 152.4 cm      Target HR (85%) 140 bpm      Max. Pred. HR (100%) 165 bpm      LA volume 33.1 cm3      E/E' ratio 7.1     LA Volume Index 22.8 mL/m2      Lat Peak E' Tejas 10.3 cm/sec      Med Peak E' Tejas 10.50 cm/sec      Echo EF Estimated 55 %     Narrative:       · Left ventricular systolic function is normal. Estimated EF = 55%.  · Left ventricular diastolic dysfunction (grade I) consistent with   impaired relaxation.  · Right ventricular cavity is mildly dilated.  · Normal right ventricular wall thickness, systolic function and septal   motion noted.  · Moderate pulmonary hypertension is present.       ECG 12 Lead [[id number]] Collected:  11/02/17 0730     Updated:  11/03/17 0735    Narrative:       Test Reason : chf  Blood Pressure : **/** mmHG  Vent. Rate : 082 BPM     Atrial Rate : 082 BPM     P-R Int : 096 ms          QRS Dur : 082 ms      QT Int : 392 ms       P-R-T Axes : 077  094 051 degrees     QTc Int : 457 ms    Sinus rhythm with short CO  Right atrial enlargement  Rightward axis  Borderline ECG  When compared with ECG of 01-NOV-2017 19:57,  No significant change was found    Referred By:  ALTHEA           Confirmed By:RAÚL Bradley MD    ECG 12 Lead [[id number]] Collected:  11/01/17 1957     Updated:  11/03/17 0736    Narrative:       Test Reason : cp  Blood Pressure : **/** mmHG  Vent. Rate : 086 BPM     Atrial Rate : 086 BPM     P-R Int : 108 ms          QRS Dur : 080 ms      QT Int : 368 ms       P-R-T Axes : 079 096 059 degrees     QTc Int : 440 ms    Sinus rhythm with short CO  Rightward axis  T wave abnormality, consider anterior ischemia  Borderline ECG  When compared with ECG of 01-NOV-2017 16:35,  No significant change was found      Referred By:  ANISA           Confirmed By:RAÚL Bradley MD    ECG 12 Lead [[id number]] Collected:  11/01/17 1635     Updated:  11/03/17 0737    Narrative:       Test Reason : CHEST PAIN  Blood Pressure : **/** mmHG  Vent. Rate : 108 BPM     Atrial Rate : 108 BPM     P-R Int : 122 ms          QRS Dur : 072 ms      QT Int : 342 ms       P-R-T Axes : 079 094 063 degrees     QTc Int : 458 ms    Sinus tachycardia  Right atrial enlargement  Possible Right ventricular hypertrophy  Nonspecific T wave abnormality  Abnormal ECG  When compared with ECG of 26-MAY-2016 22:34,  Nonspecific T wave abnormality now evident in Lateral leads  QT has lengthened    Referred By:  ANISA           Confirmed By:RAÚL Bradley MD          Orders (last 24 hrs)     Start     Ordered    11/03/17 0839  Blood Gas, Arterial  STAT      11/03/17 0838    11/03/17 0600  CBC & Differential  Morning Draw      11/02/17 0833    11/03/17 0600  Comprehensive Metabolic Panel  Morning Draw      11/02/17 0833    11/03/17 0600  CBC Auto Differential  PROCEDURE ONCE      11/03/17 0001    11/03/17 0001  NPO Diet  Diet Effective Midnight      11/02/17 1606    11/03/17 0000  Adult  Stress Echo W/ Cont or Stress Agent if Necessary Per Protocol  Once      17 1606    17 1800  iopamidol (ISOVUE-300) 61 % injection 100 mL  Once in Imaging      17 1723    17 1654  CT Abdomen Pelvis With Contrast  1 Time Imaging      17 1528    17 1615  iohexol (OMNIPAQUE) 300 MG/ML injection 50 mL  Once in Imaging,   Status:  Discontinued      17 1539    17 1615  iohexol (OMNIPAQUE) 300 MG/ML injection 50 mL  Once in Imaging      17 1542    17 1600  diatrizoate meglumine-sodium (GASTROGRAFIN) 66-10 % solution 30 mL  Once,   Status:  Discontinued      17 1528    17 1528  CT Abdomen Pelvis With & Without Contrast  1 Time Imaging,   Status:  Canceled      17 1528    17 1341  Oxygen Therapy- Nasal Cannula; 1 LPM; Titrate for SPO2: 94%  Continuous      17 1340    17 1323  PT Plan of Care Cert / Re-Cert  Once     Comments:  Physical Therapy Plan of Care  Initial Certification  Certification Period: 2017 - 2018    Patient Name: Teri Tim  : 1960    (I21.4) NSTEMI (non-ST elevated myocardial infarction)  (primary encounter diagnosis)  (R74.8) Elevated liver enzymes  (B19.20) Hepatitis C virus infection without hepatic coma, unspecified chronicity  (Z74.09) Impaired functional mobility, balance, gait, and endurance                  Assessment  PT Assessment  Impairments Found (describe specific impairments): gait, locomotion, and balance, muscle performance  PT Diagnosis: impaired balance  Rehab Potential: good, to achieve stated therapy goals  Plans/Goals Discussed With: patient, agreed upon  Criteria for Skilled Therapeutic Interventions Met: yes, treatment indicated              IP PT Goals       17 1031          Gait Training PT LTG    Gait Training Goal PT LTG, Date Established 17  -LYNETTE (r) AB (t) LYNETTE   (c)      Gait Training Goal PT LTG, Time to Achieve by discharge  -LYNETTE (r) AB (t)   LYNETTE (c)       Gait Training Goal PT LTG, [place] Level supervision required  -LYNETTE   (r) AB (t) LYNETTE (c)      Gait Training Goal PT LTG, Assist Device cane, quad  -LYNETTE (r) AB (t) LYNETTE   (c)      Gait Training Goal PT LTG, Distance to Achieve 100  -LYNETTE (r) AB (t) LYNETTE (c)        Dynamic Standing Balance PT LTG    Dynamic Standing Balance PT LTG, Date Established 11/02/17  -LYNETTE (r) AB   (t) LYNETTE (c)      Dynamic Standing Balance PT LTG, Time to Achieve by discharge  -LYNETTE (r) AB   (t) LYNETTE (c)      Dynamic Standing Balance PT LTG, [place] Level supervision required    -LYNETTE (r) AB (t) LYNETTE (c)      Dynamic Standing Balance PT LTG, Assist Device assistive Device  -LYNETTE (r)   AB (t) LYNETTE (c)      Dynamic Standing Balance PT LTG, Additional Goal side step, reaching out   of [place], lifting objects, picking object up from floor  -LYNETTE (r) AB (t) LYNETTE   (c)        User Key  (r) = Recorded By, (t) = Taken By, (c) = Cosigned By    Initials Name Provider Type    LYNETTE Del Real, PT DPT Physical Therapist    AB Sonia Iyer, PT Student PT Student            PT OP Goals       11/02/17 1107       Time Calculation    PT Goal Re-Cert Due Date 11/12/17  -LYNETTE (r) AB (t) LYNETTE (c)       User Key  (r) = Recorded By, (t) = Taken By, (c) = Cosigned By    Initials Name Provider Type    LYNETTE Del Real, PT DPT Physical Therapist    AB Sonia Iyer, PT Student PT Student            Plan  PT Plan  PT Frequency: daily, 2 times/day  Predicted Duration of Therapy Intervention (days/wks): until d/c  Planned Therapy Interventions: balance training, gait training, home exercise program, patient/family education, ROM (Range of Motion), strengthening, transfer training, bed mobility training  Outcome Summary/Follow up Plan: PT dinorah completed. Pt reported she did not feel well but agreed to particiapte. B LE AROM 25-50% impaired, appears secondary to weakness. Pt performed bed mobility and transfers independently with supervision. Pt  ambulated ~12 ft with CGA/hand held, pt  wanted to return to bed due to feeling ill. Her weight shifting abilities are impaired and decreased step length noted. Pt may benefit from quad cane for balance. PT will work on balance, gait, and strength in order to improve pt function. Anticiapte d/c home with assist.         Jl Del Real, PT DPT  11/2/2017            By cosigning this order, either electronically or physically, I certify that the therapy services are furnished while this patient is under my care, the services outline above are required by this patient, and will be reviewed every 90 days.        MSaigeD.:__________________________________________ Date: ______________    11/02/17 1323    11/02/17 1230  furosemide (LASIX) tablet 40 mg  Daily      11/02/17 1155    11/02/17 1130  ipratropium-albuterol (DUO-NEB) nebulizer solution 3 mL  4 Times Daily - RT      11/02/17 0833    11/02/17 1040  NPO Diet  Diet Effective Now,   Status:  Canceled      11/02/17 1039    11/02/17 1004  Straight Cath  Once      11/02/17 1003    11/02/17 1004  Urine Drug Screen - Urine, Clean Catch  Once      11/02/17 1003    11/02/17 1002  AFP Tumor Marker  Once      11/02/17 1001    11/02/17 1001  Inpatient Consult to Gastroenterology  Once     Specialty:  Gastroenterology  Provider:  Korina Han MD    11/02/17 1000    11/02/17 1000  MRI Abdomen With & Without Contrast  1 Time Imaging,   Status:  Canceled      11/02/17 1000    11/02/17 0915  enoxaparin (LOVENOX) syringe 40 mg  Daily      11/02/17 0831    11/02/17 0900  potassium chloride (MICRO-K) CR capsule 20 mEq  Daily      11/01/17 2103    11/02/17 0900  spironolactone (ALDACTONE) tablet 25 mg  Daily      11/01/17 2103    11/02/17 0900  ramipril (ALTACE) capsule 2.5 mg  Daily      11/01/17 2103 11/02/17 0900  famotidine (PEPCID) tablet 40 mg  Daily      11/01/17 2103 11/02/17 0900  aspirin EC tablet 325 mg  Daily      11/01/17 2113 11/02/17 0600  pantoprazole (PROTONIX) EC tablet 40 mg  Every Early Morning       11/01/17 2103 11/01/17 2145  budesonide-formoterol (SYMBICORT) 160-4.5 MCG/ACT inhaler 2 puff  2 Times Daily - RT      11/01/17 2103 11/01/17 2145  nicotine (NICODERM CQ) 21 MG/24HR patch 1 patch  Every 24 Hours      11/01/17 2103 11/01/17 2145  furosemide (LASIX) injection 40 mg  2 Times Daily,   Status:  Discontinued      11/01/17 2103 11/01/17 2115  metoprolol tartrate (LOPRESSOR) tablet 12.5 mg  Every 12 Hours Scheduled      11/01/17 2103 11/01/17 2115  venlafaxine (EFFEXOR) tablet 75 mg  Every 12 Hours Scheduled      11/01/17 2103 11/01/17 2058  oxyCODONE (ROXICODONE) immediate release tablet 5 mg  Every 8 Hours PRN      11/01/17 2103 11/01/17 2057  ondansetron (ZOFRAN) injection 4 mg  Every 6 Hours PRN      11/01/17 2103 11/01/17 2057  bisacodyl (DULCOLAX) EC tablet 5 mg  Daily PRN      11/01/17 2103 11/01/17 2052  sodium chloride 0.9 % flush 1-10 mL  As Needed      11/01/17 2103    Unscheduled  Up With Assistance  As Needed      11/01/17 2103    --  cyclobenzaprine (FLEXERIL) 10 MG tablet  2 Times Daily PRN      11/01/17 1713    --  venlafaxine (EFFEXOR) 75 MG tablet  2 Times Daily      11/01/17 1713    --  budesonide-formoterol (SYMBICORT) 160-4.5 MCG/ACT inhaler  2 Times Daily - RT      11/01/17 1713    --  spironolactone (ALDACTONE) 25 MG tablet  Daily      11/01/17 1713    --  metoprolol tartrate (LOPRESSOR) 25 MG tablet  2 Times Daily      11/01/17 1713    --  HYDROcodone-acetaminophen (NORCO) 7.5-325 MG per tablet  Every 6 Hours PRN      11/01/17 1713    --  pantoprazole (PROTONIX) 40 MG EC tablet  Daily      11/01/17 1713    --  gabapentin (NEURONTIN) 600 MG tablet  3 Times Daily      11/01/17 1746    --  SCANNED - TELEMETRY        11/01/17 0000    --  SCANNED - TELEMETRY        11/01/17 0000    --  SCANNED EKG      11/01/17 0000          [facility] Medicine Services  INPATIENT PROGRESS NOTE    Length of Stay: 1  Date of Admission:  "11/1/2017  Primary Care Physician: Jose Barajas MD    Subjective   Chief Complaint: follow up     HPI   The patient is currently laying in bed.  She states that she has never received treatment for Hep C, she states, \"they told me it wasn't bad enough.\"  She has been out of her medicine and has not been to her last follow up appointments for lack of a vehicle.  She states that she has been a little short of breath lately and she does admit that she continues to smoke.     Review of Systems   Constitutional: Positive for activity change and fatigue. Negative for appetite change, chills and fever.   HENT: Negative for hearing loss, nosebleeds, tinnitus and trouble swallowing.    Eyes: Negative for visual disturbance.   Respiratory: Negative for cough, chest tightness, shortness of breath and wheezing.    Cardiovascular: Negative for chest pain, palpitations and leg swelling.   Gastrointestinal: Negative for abdominal distention, abdominal pain, blood in stool, constipation, diarrhea, nausea and vomiting.   Endocrine: Negative for cold intolerance, heat intolerance, polydipsia, polyphagia and polyuria.   Genitourinary: Negative for decreased urine volume, difficulty urinating, dysuria, flank pain, frequency and hematuria.   Musculoskeletal: Positive for back pain (chronic ) and myalgias. Negative for arthralgias and joint swelling.   Skin: Negative for rash.   Allergic/Immunologic: Negative for immunocompromised state.   Neurological: Positive for weakness. Negative for dizziness, syncope, light-headedness and headaches.   Hematological: Negative for adenopathy. Does not bruise/bleed easily.   Psychiatric/Behavioral: Negative for confusion and sleep disturbance. The patient is not nervous/anxious.       All pertinent negatives and positives are as above. All other systems have been reviewed and are negative unless otherwise stated.     Objective    Temp:  [97.5 °F (36.4 °C)-98.9 °F (37.2 °C)] 97.5 °F (36.4 °C)  Heart " Rate:  [82-99] 82  Resp:  [13-28] 22  BP: (113-125)/(55-78) 113/57     Physical Exam   Constitutional: She is oriented to person, place, and time. She appears well-developed and well-nourished.   HENT:   Head: Normocephalic and atraumatic.   Eyes: Conjunctivae and EOM are normal. Pupils are equal, round, and reactive to light.   Neck: Neck supple. No JVD present. No thyromegaly present.   Cardiovascular: Normal rate, regular rhythm, normal heart sounds and intact distal pulses.  Exam reveals no gallop and no friction rub.    No murmur heard.  Pulmonary/Chest: Effort normal. No respiratory distress. She has no wheezes. She has rales. She exhibits no tenderness.   Abdominal: Soft. Bowel sounds are normal. She exhibits no distension. There is no tenderness. There is no rebound and no guarding.   Musculoskeletal: Normal range of motion. She exhibits no edema, tenderness or deformity.   Lymphadenopathy:     She has no cervical adenopathy.   Neurological: She is alert and oriented to person, place, and time. She displays normal reflexes. No cranial nerve deficit. She exhibits normal muscle tone.   Skin: Skin is warm and dry. No rash noted.   Psychiatric: She has a normal mood and affect. Her behavior is normal. Judgment and thought content normal.   Vitals reviewed.  Assessment:  1. Acute on chronic hypercarbic and hypoxic respiratory failure, pCO2 68 and pO2 47.8  2. Acutely decompensated diastolic congestive heart failure, ECHO pending  3. Non-ST segment elevated myocardial infarction  4. Transaminitis with chronic hepatitis C  5. Ongoing tobacco abuse  6. Chronic obstructive pulmonary disease, no exacerbation   7. Leukocytosis  8. Abnormal TSH, check T3 and T4    Plan:  1. Check T3 and T4  2. Consult cardiology  3. Check 2D echo  4. CBC, CMP in AM  5. Lasix 40 mg BID IV  6. US abdomen   7. Lactic reflex pending  8. Urine drug screen pending  9. Tobacco cessation education  10. Lovenox 40 mg SQ for DVT prophylaxis  11.  "Continue Aldactone  12. Started on low dose ramipril by nocturnist  13. May need GI evaluation     Patient seen and examined. Patient complains of back and leg pain. States she could not get to her pain management appointments due to lack of transportation. Patient found out about her hepatitis C last year when she was notified by the health department. She followed up with Dr. Shane at [facility] but I am uncertain that she had any follow up after that. Reviewed US of abdomen and there is a mass around the GB/liver. Will order recommended MRI and get AFP. Follow  pending. Patient was not wearing 02 on my evaluation.     Discharge Planning: I expect the patient to be discharged to home in 2-4 days.    Andrews Mccann Phill, APRN   11/02/17   7:49 AM      [facility] Medicine Services  INPATIENT PROGRESS NOTE    Length of Stay: 1  Date of Admission: 11/1/2017  Primary Care Physician: Jose Barajas MD    Subjective   Chief Complaint: follow up     HPI   The patient is currently laying in bed.  She states that she has never received treatment for Hep C, she states, \"they told me it wasn't bad enough.\"  She has been out of her medicine and has not been to her last follow up appointments for lack of a vehicle.  She states that she has been a little short of breath lately and she does admit that she continues to smoke.     Review of Systems   Constitutional: Positive for activity change and fatigue. Negative for appetite change, chills and fever.   HENT: Negative for hearing loss, nosebleeds, tinnitus and trouble swallowing.    Eyes: Negative for visual disturbance.   Respiratory: Negative for cough, chest tightness, shortness of breath and wheezing.    Cardiovascular: Negative for chest pain, palpitations and leg swelling.   Gastrointestinal: Negative for abdominal distention, abdominal pain, blood in stool, constipation, diarrhea, nausea and vomiting.   Endocrine: Negative for cold intolerance, " heat intolerance, polydipsia, polyphagia and polyuria.   Genitourinary: Negative for decreased urine volume, difficulty urinating, dysuria, flank pain, frequency and hematuria.   Musculoskeletal: Positive for back pain (chronic ) and myalgias. Negative for arthralgias and joint swelling.   Skin: Negative for rash.   Allergic/Immunologic: Negative for immunocompromised state.   Neurological: Positive for weakness. Negative for dizziness, syncope, light-headedness and headaches.   Hematological: Negative for adenopathy. Does not bruise/bleed easily.   Psychiatric/Behavioral: Negative for confusion and sleep disturbance. The patient is not nervous/anxious.       All pertinent negatives and positives are as above. All other systems have been reviewed and are negative unless otherwise stated.     Objective    Temp:  [97.5 °F (36.4 °C)-98.9 °F (37.2 °C)] 97.5 °F (36.4 °C)  Heart Rate:  [82-99] 82  Resp:  [13-28] 22  BP: (113-125)/(55-78) 113/57     Physical Exam   Constitutional: She is oriented to person, place, and time. She appears well-developed and well-nourished.   HENT:   Head: Normocephalic and atraumatic.   Eyes: Conjunctivae and EOM are normal. Pupils are equal, round, and reactive to light.   Neck: Neck supple. No JVD present. No thyromegaly present.   Cardiovascular: Normal rate, regular rhythm, normal heart sounds and intact distal pulses.  Exam reveals no gallop and no friction rub.    No murmur heard.  Pulmonary/Chest: Effort normal. No respiratory distress. She has no wheezes. She has rales. She exhibits no tenderness.   Abdominal: Soft. Bowel sounds are normal. She exhibits no distension. There is no tenderness. There is no rebound and no guarding.   Musculoskeletal: Normal range of motion. She exhibits no edema, tenderness or deformity.   Lymphadenopathy:     She has no cervical adenopathy.   Neurological: She is alert and oriented to person, place, and time. She displays normal reflexes. No cranial  nerve deficit. She exhibits normal muscle tone.   Skin: Skin is warm and dry. No rash noted.   Psychiatric: She has a normal mood and affect. Her behavior is normal. Judgment and thought content normal.   Vitals reviewed.      Assessment/Plan     Assessment:  1. Acute on chronic hypercarbic and hypoxic respiratory failure, pCO2 68 and pO2 47.8  2. Acutely decompensated diastolic congestive heart failure, ECHO pending  3. Non-ST segment elevated myocardial infarction  4. Transaminitis with chronic hepatitis C  5. Ongoing tobacco abuse  6. Chronic obstructive pulmonary disease, no exacerbation   7. Leukocytosis  8. Abnormal TSH, check T3 and T4    Plan:  1. Check T3 and T4  2. Consult cardiology  3. Check 2D echo  4. CBC, CMP in AM  5. Lasix 40 mg BID IV  6. US abdomen   7. Lactic reflex pending  8. Urine drug screen pending  9. Tobacco cessation education  10. Lovenox 40 mg SQ for DVT prophylaxis  11. Continue Aldactone  12. Started on low dose ramipril by nocturnist  13. May need GI evaluation     Discharge Planning: I expect the patient to be discharged to home in 2-4 days.    ROLAND Brink   11/02/17   7:49 AM       Electronically signed by ROLAND Brink at 11/2/2017  8:40 AM           Consult Notes (all)      Lizbeth Sanford PA-C at 11/2/2017 11:14 AM  Version 1 of 1   [facility] HEART GROUP CONSULT NOTE    Referring Provider: Buck Zepeda MD    Reason for Consultation: CHF    Chief Complaint   Patient presents with   • Chest Pain   • Shortness of Breath   • Leg Swelling       Subjective .     History of present illness:  Teri Tim is a 57 y.o. female with history of cor pulmonale, chronic obstructive pulmonary disease, hypertension, tobacco abuse who presented to [facility] ED with complaints of shortness of breath and leg swelling. The patient relates that over the course of the last few weeks she has had increasing shortness of breath, particularly with exertion. She states that  about 3 days ago she noted leg swelling as well; as this was new for her she decided to present to our emergency dept. Ms. Tim reports to me that she occasionally has right sided, pressure-like, chest pain with both rest and exertion. She states the last occurrence of this was approximately 3 days. She says this has been an issue for some time. She also endorses a cough, occasionally productive of green sputum. She relates associated nausea. She denies any orthopnea, paroxysmal nocturnal dyspnea or syncope. She says she is on nightly oxygen at home. She reports she feels a little better today, less short of breath. Her leg swelling has significantly improved. She denies any chest pain.    Allergies:  Codeine    Review of Systems  Review of Systems   Constitution: Positive for malaise/fatigue. Negative for weight gain.   Cardiovascular: Positive for dyspnea on exertion and leg swelling. Negative for chest pain, claudication, irregular heartbeat, near-syncope, orthopnea, palpitations, paroxysmal nocturnal dyspnea and syncope.   Respiratory: Positive for cough and shortness of breath. Negative for hemoptysis.    Hematologic/Lymphatic: Negative for bleeding problem.   Skin: Negative for poor wound healing.   Musculoskeletal: Negative for myalgias.   Gastrointestinal: Positive for nausea. Negative for melena and vomiting.   Genitourinary: Negative for hematuria.   Neurological: Negative for dizziness, focal weakness and light-headedness.   Psychiatric/Behavioral: Negative for memory loss.   All other systems reviewed and are negative.      Objective       Physical Exam   Constitutional: She is oriented to person, place, and time. She appears well-developed and well-nourished. She appears ill (chronically ill appearing).   HENT:   Head: Normocephalic and atraumatic.   Eyes: Conjunctivae and EOM are normal. Pupils are equal, round, and reactive to light.   Neck: Normal range of motion. Neck supple. No JVD present.  "  Cardiovascular: Normal rate, regular rhythm, S1 normal, S2 normal, normal heart sounds, intact distal pulses and normal pulses.    No murmur heard.  Pulmonary/Chest: Effort normal. No respiratory distress. She has wheezes (diffuse).   Abdominal: Soft. Bowel sounds are normal. She exhibits no distension.   Musculoskeletal: She exhibits no edema.   Neurological: She is alert and oriented to person, place, and time.   Skin: Skin is warm and dry.   Psychiatric: She has a normal mood and affect. Judgment normal.   Vitals reviewed.    Assessment   1. Acute cor pulmonale: history of cor pulmonale with significantly elevated BNP and patient with dyspnea; will evaluate echo to rule out LV dysfunction as well. Patient appears to be nearly compensated today.   2. Type II troponin elevation: peaked and declined. Likely type II elevation, given other labs consistent with hypoperfusion, as noted below   3. Hepatic mass with significantly elevated liver enzymes (may be related to hypoperfusion injury? given noted elevated lactate on admission) in the setting of hepatitis C  4.  Acute exacerbation of chronic obstructive pulmonary disease   5. Hypertension   6. Tobacco abuse: 1/2 PPD+  7. Paroxysmal atrial fibrillation: ?? Patient reports she was on coumadin in past, denies afib or blood clot. Her  states \"Coumadin almost killed her.\"     Plan   1. Echo pending  2. Transition to oral diuretics; daily BMP. Monitor daily weights, strict I/Os  3. No need to continue trending troponin, as has peaked and declined. Will need stress echo in future- this could even be done as an outpatient, assuming patient does not have chest pain during her admission.  4. COPD treatment, as well as further workup for liver, per primary  5. Obviously patient is not a statin candidate at this time due to liver failure  6. Monitor telemetry     Further orders per Dr. Barajas upon his evaluation of the patient.     Thank you for the consultation, " cardiology will gladly continue to follow.     Lizbeth Sanford PA-C        Please note this cardiology consultation note is the result of a face to face consultation with the patient, in addition to reviewing medical records at length by myself, Lizbeth Sanford PA-C.    Time: appx 35 minutes       Electronically signed by Jose Barajas MD at 11/2/2017  9:02 PM      Korina Han MD at 11/2/2017  2:13 PM  Version 2 of 2     Consult Orders:    1. Inpatient Consult to Gastroenterology [[id number]] ordered by Geovanny Howard DO at 11/02/17 1000                        [facility] Gastroenterology  Inpatient Consult Note  Today's date:  11/02/17    Teri FUNG Meeta  1960       Referring Provider: Buck Zepeda MD  Primary Physician: Jose Barajas MD   Primary Gastroenterologist: Dr. Francisco J Shane    Date of Admission: 11/1/2017  Date of Service:  11/02/17    Reason for Consultation/Chief Complaint: Elevated liver enzymes/abnormal imaging of liver on ultrasound/history of chronic hepatitis C    History of present illness:  This is a very pleasant 57-year-old female who tells me that approximately 2-3 days ago she began to have worsening shortness of air along with chest discomfort.  She states that she had to increase her use of home O2.  She states she also began to notice ankle swelling.  She states that she had quite a bit of nausea and some vomiting.  The patient has been seen in consult by Dr. Barajas.  Patient carries a history of cor pulmonale along with COPD, hypertension.  The patient was found to have acute cor pulmonale along with type II troponin elevation at peak and decline.    The patient denies any epigastric pain, dysphagia or hematemesis.  She denies any fever or chills.  Denies any melena or hematochezia.  She denies any unintentional weight loss but states she has had some loss of appetite.  She is a current every day smoker.  She tells me that she has never had an EGD performed since she was  diagnosed with hepatitis C.  She tells me that she was informed that she had hepatitis C by the health department.  She also tells me that her  who is present today was treated for hepatitis C with Adela by Dr. Francisco J Shane.     [facility] the patient last seen by Corina WATKINS on 9/22/16 for follow-up to review results of labs and liver ultrasound.  The patient was noted to have chronic hepatitis C treatment naïve genotype 1A or 1B with viral load of 7,800,000.  Fibrosure score reveals F0 fibrosis.  At that time liver ultrasound was found to be normal.    The patient does carry a history of intranasal cocaine use in 1980s and unprotected sex with spouse who has hepatitis C.  She states her last illicit drug use was greater than 20 years ago.  She states she has never been a heavy drinker of alcohol.    Labs and imaging: On admission cardiac enzymes elevated.  CMP with creatinine of 1.4 and BUN 26, ALT 1236, AST 3036, INR 1.38, CBC revealed a WBC of 14 hemoglobin 11.6, ethanol less than 0.010.  Ultrasound of the abdomen compared to CT of chest in May 2016.  Reveals a 14 x 8 mm heterogenous mass centered at the inferior hepatic margin and gallbladder wall.  Unclear if this originates from the liver or gallbladder wall.  All bladder is otherwise unremarkable with no gallstones noted.      Results for WANDY HELTON (MRN [id number]) as of 11/2/2017 14:40   Ref. Range 5/30/2016 05:31 6/1/2016 04:00 6/2/2016 05:04 6/3/2016 09:35 6/4/2016 05:08 11/1/2017 17:11 11/2/2017 02:57   ALT (SGPT) Latest Ref Range: 0 - 54 U/L 302 (H) 275 (H) 176 (H)   1236 (H) 1454 (H)   AST (SGOT) Latest Ref Range: 7 - 45 U/L 273 (H) 104 (H) 38   3036 (H) 2453 (H)   Results for WANDY HELTON (MRN [id number]) as of 11/2/2017 14:40   Ref. Range 11/25/2015 11:21 11/27/2015 13:02 12/3/2015 10:03 12/8/2015 14:35 12/9/2015 12:00 12/10/2015 09:12 12/11/2015 01:33 5/27/2016 06:52 11/1/2017 17:11   INR  Latest Ref Range: 0.91 - 1.09  1.50 (H) 1.83 (H) 6.20 (C) 9.33 (C) 8.85 (C) 1.61 (H) 1.23 (H) 1.12 (H) 1.38 (H)     Summary per Dr. Han  57-year-old female with severe cardiopulmonary disease.  She has known hepatitis C with F0 score on Fibrosure testing.  She is treatment naïve.  Sonogram about one year ago at [facility] is unremarkable.  She now has mass at the interface between the gallbladder and liver of unclear etiology.  Liver function tests are now markedly elevated.    Review of Systems:  Review of Systems   Constitutional: Negative for fever and unexpected weight change.   HENT: Negative for hearing loss.    Eyes: Negative for visual disturbance.   Respiratory: Positive for chest tightness and shortness of breath. Negative for cough.    Cardiovascular: Positive for chest pain and leg swelling.   Gastrointestinal:        See HPI   Endocrine: Negative for cold intolerance and heat intolerance.   Genitourinary: Negative for dysuria.   Skin: Negative for rash.   Neurological: Negative for seizures.   Psychiatric/Behavioral: Negative for hallucinations.       Physical Exam:  Temp:  [97.2 °F (36.2 °C)-98.9 °F (37.2 °C)] 97.2 °F (36.2 °C)  Heart Rate:  [78-99] 78  Resp:  [13-28] 20  BP: (113-125)/(55-78) 113/56  Body mass index is 21.78 kg/(m^2).    Intake/Output Summary (Last 24 hours) at 11/02/17 1530  Last data filed at 11/02/17 1132   Gross per 24 hour   Intake                0 ml   Output             2050 ml   Net            -2050 ml     I/O this shift:  In: 0   Out: 450 [Urine:450]  Physical Exam   Constitutional: She is oriented to person, place, and time. She appears well-developed and well-nourished.   HENT:   Mouth/Throat: Oropharynx is clear and moist.   Eyes: EOM are normal.   Cardiovascular: Normal rate, regular rhythm and normal heart sounds.  Exam reveals no gallop and no friction rub.    No murmur heard.  Pulmonary/Chest: Effort normal. She has no wheezes. She has no rales.   Patient is on  2 L O2 nasal cannula with coarse bilateral lung sounds decreased in bases.   Abdominal: Soft. Bowel sounds are normal. She exhibits no distension. There is no hepatosplenomegaly. There is no tenderness. There is no rigidity, no rebound and no guarding.   Musculoskeletal: Normal range of motion. She exhibits no edema, tenderness or deformity.   Neurological: She is alert and oriented to person, place, and time.   Skin: Skin is warm and dry. No rash noted.   Psychiatric: She has a normal mood and affect. Her behavior is normal. Judgment and thought content normal.   Vitals reviewed.    I have performed the physical exam and agree with the findings as noted above.   JOSEFINA Han MD  Impression/Plan:  Patient Active Problem List   Diagnosis Code   • NSTEMI (non-ST elevated myocardial infarction) I21.4     Abnormal imaging of the liver via sonogram   This is a new finding.  It is suggestive of a mass at the interface between the gallbladder and liver.  It is difficult to ascertain from which organ it emanates.  [facility]ogram was [facility] at [facility] on 08/22/2016.  We will need to check alpha-fetoprotein level.  MRI was suggested by radiology with liver protocol however she is unable to hold her breath for this to take place.  Looks like she is now being scheduled for CT scan.    Elevated liver enzymes  This is likely multifactorial.  She definitely has a component of abnormal liver function tests from hepatitis C but that is not the cause of this marked elevation at the present time.  There is some component of her MI contributing to the lumen liver enzyme abnormality.  Furthermore is a questionable mass that mainly not be contributory.  We will need to follow serial enzymes.  Await results of further imaging either via MRI or CT.    Chronic hepatitis C  Followed by Dr. Francisco J Shane.  naïve  to treatment as the patient revealed a fibrosure score of F0 fibrosis. genotype 1A or 1B with viral load of 7,800, 000.  At that time  liver ultrasound was found to be normal.  outpatient follow-up with Dr. Shane post discharge.    ROALND Witt  11/02/17   3:30 PM    Korina Han MD  [facility] Gastroenterology  11/02/17  3:36 PM    Much of this encounter note is an electronic transcription/translation of spoken language to printed text. The electronic translation of spoken language may permit erroneous, or at times, nonsensical words or phrases to be inadvertently transcribed; although I have reviewed the note for such errors, some may still exist.       Electronically signed by Korina Han MD at 11/2/2017  3:38 PM      ROLAND Tavares at 11/2/2017  2:13 PM  Version 1 of 2                 [facility] Gastroenterology  Inpatient Consult Note  Today's date:  11/02/17    Teri FUNG Meeta  1960       Referring Provider: Buck Zepeda MD  Primary Physician: Jose Barajas MD   Primary Gastroenterologist: Dr. Francisco J Shane    Date of Admission: 11/1/2017  Date of Service:  11/02/17    Reason for Consultation/Chief Complaint: Elevated liver enzymes/abnormal imaging of liver on ultrasound/history of chronic hepatitis C    History of present illness:  This is a very pleasant 57-year-old female who tells me that approximately 2-3 days ago she began to have worsening shortness of air along with chest discomfort.  She states that she had to increase her use of home O2.  She states she also began to notice ankle swelling.  She states that she had quite a bit of nausea and some vomiting.  The patient has been seen in consult by Dr. Barajas.  Patient carries a history of cor pulmonale along with COPD, hypertension.  The patient was found to have acute cor pulmonale along with type II troponin elevation at peak and declined.    The patient denies any epigastric pain, dysphagia or hematemesis.  She denies any fever or chills.  Denies any melena or hematochezia.  She denies any unintentional weight loss but states she has had some loss of  appetite.  She is a current every day smoker.  She tells me that she has never had an EGD performed since she was diagnosed with hepatitis C.  She tells me that she was informed that she had hepatitis C by the health department.  She also tells me that her  who is present today was treated for hepatitis C with Adela by Dr. Francisco J Shane.     According to records from [facility] the patient last seen by Corina WATKINS on 9/22/16 for follow-up to review results of labs and liver ultrasound.  The patient was noted to have chronic hepatitis C treatment naïve genotype 1A or 1B with viral load of 7,800, 000.  Fibrosure score reveals F0 fibrosis.  At that time liver ultrasound was found to be normal.    The patient does carry a history of intranasal cocaine use in 1980s and unprotected sex with spouse who has hepatitis C.  She states her last illicit drug use was greater than 20 years ago.  She states she has never been a heavy drinker of alcohol.    Labs and imaging: On admission cardiac enzymes elevated.  CMP with creatinine of 1.4 and BUN 26, ALT 1236, AST 3036, INR 1.38, CBC revealed a WBC of 14 hemoglobin 11.6, ethanol less than 0.010.  Ultrasound of the abdomen compared to CT of chest in May 2016.  Reveals a 14 x 8 mm heterogenous mass centered at the inferior hepatic margin and gallbladder wall.  Clear if this originates from the liver or gallbladder wall.  All bladder is otherwise unremarkable with no gallstones noted.      Results for WANDY HELTON (MRN [id number]) as of 11/2/2017 14:40   Ref. Range 5/30/2016 05:31 6/1/2016 04:00 6/2/2016 05:04 6/3/2016 09:35 6/4/2016 05:08 11/1/2017 17:11 11/2/2017 02:57   ALT (SGPT) Latest Ref Range: 0 - 54 U/L 302 (H) 275 (H) 176 (H)   1236 (H) 1454 (H)   AST (SGOT) Latest Ref Range: 7 - 45 U/L 273 (H) 104 (H) 38   3036 (H) 2453 (H)   Results for WANDY HELTON (MRN [id number]) as of 11/2/2017 14:40   Ref. Range 11/25/2015 11:21 11/27/2015 13:02 12/3/2015  10:03 12/8/2015 14:35 12/9/2015 12:00 12/10/2015 09:12 12/11/2015 01:33 5/27/2016 06:52 11/1/2017 17:11   INR Latest Ref Range: 0.91 - 1.09  1.50 (H) 1.83 (H) 6.20 (C) 9.33 (C) 8.85 (C) 1.61 (H) 1.23 (H) 1.12 (H) 1.38 (H)     Review of Systems:  Review of Systems   Constitutional: Negative for fever and unexpected weight change.   HENT: Negative for hearing loss.    Eyes: Negative for visual disturbance.   Respiratory: Positive for chest tightness and shortness of breath. Negative for cough.    Cardiovascular: Positive for chest pain and leg swelling.   Gastrointestinal:        See HPI   Endocrine: Negative for cold intolerance and heat intolerance.   Genitourinary: Negative for dysuria.   Neurological: Negative for seizures.   Psychiatric/Behavioral: Negative for hallucinations.       Physical Exam:  Temp:  [97.2 °F (36.2 °C)-98.9 °F (37.2 °C)] 97.2 °F (36.2 °C)  Heart Rate:  [78-99] 78  Resp:  [13-28] 20  BP: (113-125)/(55-78) 113/56  Body mass index is 21.78 kg/(m^2).    Intake/Output Summary (Last 24 hours) at 11/02/17 1530  Last data filed at 11/02/17 1132   Gross per 24 hour   Intake                0 ml   Output             2050 ml   Net            -2050 ml     I/O this shift:  In: 0   Out: 450 [Urine:450]  Physical Exam   Constitutional: She is oriented to person, place, and time. She appears well-developed and well-nourished.   HENT:   Mouth/Throat: Oropharynx is clear and moist.   Eyes: EOM are normal.   Cardiovascular: Normal rate, regular rhythm and normal heart sounds.  Exam reveals no gallop and no friction rub.    No murmur heard.  Pulmonary/Chest: Effort normal. She has no wheezes. She has no rales.   Patient is on 2 L O2 nasal cannula with coarse bilateral lung sounds decreased in bases.   Abdominal: Soft. Bowel sounds are normal. She exhibits no distension. There is no hepatosplenomegaly. There is no tenderness. There is no rigidity, no rebound and no guarding.   Musculoskeletal: Normal range of  "motion. She exhibits no edema, tenderness or deformity.   Neurological: She is alert and oriented to person, place, and time.   Skin: Skin is warm and dry. No rash noted.   Psychiatric: She has a normal mood and affect. Her behavior is normal. Judgment and thought content normal.   Vitals reviewed.  Abnormal liver ultrasound  New finding    Chronic hepatitis C  Followed by Dr. Francisco J Shane.  naïve  to treatment as the patient revealed a fibrosure score of F0 fibrosis. genotype 1A or 1B with viral load of 7,800, 000.  At that time liver ultrasound was found to be normal.  Elevated liver enzymes    AFP tumor marker in process    ROLAND Witt  17   3:30 PM       Electronically signed by ROLAND Tavares at 2017  3:30 PM        [facility] CARDIOLOGY  [address] [place] [id number] [phone]             Teri Tim   Echocardiogram   Order# [id number]   Reading physician: Jose Barajas MD Ordering physician: Buck Zepeda MD Study date: 17   Patient Information   Patient Name MRN Sex  (Age)   Teri Tim [id number] Female 1960 (57 y.o.)   Admission Information   Admission Date/Time Discharge Date/Time Room/Bed   17  1627  401/1   Sedation Narrator Report   Sedation Narrator Report   Interpretation Summary   · Left ventricular systolic function is normal. Estimated EF = 55%.  · Left ventricular diastolic dysfunction (grade I) consistent with impaired relaxation.  · Right ventricular cavity is mildly dilated.  · Normal right ventricular wall thickness, systolic function and septal motion noted.  · Moderate pulmonary hypertension is present.       Patient Height & Weight   Height Weight BSA (Calculated - sq m) BMI (kg/m2) Pulse   60\" (152.4 cm) 112 lb (50.8 kg) 1.46 sq meters 21.82 75   Patient Vitals   BP Pulse   102/53 75   Blood Pressures    Right Arm Left Arm   Blood Pressure           Reason For Exam   Heart Failure, Cardiomyopathy or Systemic or " Pulmonary Hypertension   Cardiac History   Diagnosis Date Comment   CHF (congestive heart failure)     Study Description   2D Colorflow, Doppler, and Bubble study performed. The study is technically good for diagnosis.Verbal consent was obtained from the patient to use saline contrast in order to optimize the study.   Echocardiogram Findings   Left Ventricle  Left ventricular systolic function is normal. Calculated EF = 56.4%. Estimated EF = 55%. Normal left ventricular cavity size and wall thickness noted. All left ventricular wall segments contract normally. Left ventricular diastolic dysfunction is noted (grade I) consistent with impaired relaxation.   Right Ventricle  Normal right ventricular wall thickness, systolic function and septal motion noted. Right ventricular cavity is mildly dilated.   Left Atrium  Normal left atrial size and volume noted.   Right Atrium  Normal right atrial size noted.   Aortic Valve  The aortic valve is structurally normal. No aortic valve regurgitation is present. No aortic valve stenosis is present.   Mitral Valve  The mitral valve is normal in structure. No mitral valve regurgitation is present. No significant mitral valve stenosis is present.   Tricuspid Valve  The tricuspid valve is normal. No tricuspid valve stenosis is present. Mild tricuspid valve regurgitation is present. Estimated right ventricular systolic pressure from tricuspid regurgitation is moderately elevated (45-55 mmHg). Moderate pulmonary hypertension is present.   Pulmonic Valve  The pulmonic valve is structurally normal. There is no significant pulmonic valve stenosis present. There is no pulmonic valve regurgitation present.   Pericardium  The pericardium is normal. There is no evidence of pericardial effusion.      LV Measurements   Dimensions   LVIDd 3.8 cm      LVIDs 2.4 cm      IVSd 0.57 cm      LVPWd 0.71 cm      FS 36.5 %      IVS/LVPW 0.8       ESV(cubed) 14.5 ml      LV Systolic corrected for BSA 14.1  ml/m^2      EDV(cubed) 56.6 ml      LV Diastolic corrected for BSA 32.3 ml/m^2      LVOT area 3.0 cm^2      Diastolic Filling   MV E/A 0.72       MV E max tejas 73.3 cm/sec      E/E' ratio 7.1       MV A max tejas 102.0 cm/sec      LA Volume Index 22.8 mL/m2      Med Peak E' Tejas 10.50 cm/sec      Lat Peak E' Tejas 10.3 cm/sec      MV dec time 0.25 sec       Dimensions   LV mass(C)d 65.4 grams      LVOT diam 2.0 cm      SV(MOD-sp2) 18.9 ml      EDV(MOD-sp2) 35.0 ml      EDV(MOD-sp4) 47.0 ml      ESV(MOD-sp2) 16.1 ml      ESV(MOD-sp4) 20.5 ml      EF(MOD-sp2) 54.0 %      EF(MOD-sp4) 56.4 %      SV(MOD-sp4) 26.5 ml      Shunt Ratio   SV(LVOT) 67.2 ml         LA Measurements   Measurements   LA dimension 2.9 cm      LA volume 33.1 cm3      LA/Ao 1.2       LA Volume Index 22.8 mL/m2         Aortic Valve Measurements   Stenosis   LVOT diam 2.0 cm      LV V1 max 121.7 cm/sec      LV V1 max PG 5.9 mmHg      LV V1 mean PG 3.0 mmHg      LV V1 VTI 22.5 cm      Ao pk tejas 154.0 cm/sec       Stenosis   Ao max PG 9.5 mmHg      Ao mean PG 3.3 mmHg      Ao V2 VTI 24.9 cm      MIKA(I,D) 2.7 cm^2         Mitral Valve Measurements   Stenosis   MV dec time 0.25 sec          Tricuspid Valve Measurements   TR max tejas 350.0 cm/sec      RVSP(TR) 54.0 mmHg      RAP systole 5.0 mmHg         Greater Vessels Measurements   Ao root diam 2.5 cm      Ao root area (BSA corrected) 1.7          Study Information   Physician Technologist Supporting Staff    Joseline Eduardo    PACS Images   Show images for Adult Transthoracic Echo Complete W/ Cont if Necessary Per Protocol   Moderate Sedation Charge Report   Open Hard Copy Result Report (Order #[id number] - Adult Transthoracic Echo Complete W/ Cont if Necessary Per Protocol)   Signed   Electronically signed by Jose Barajas MD on 11/2/17 at 1542 CDT   Printable Result Report   Result Report    Encounter   View Encounter      Results Routing Tracking   View Results Routing Information   Adult Transthoracic Echo  Complete W/ Cont if Necessary Per Protocol [ECH10] (Order [id number])   Echocardiography   Date: 11/1/2017 Department: [address] Released By/Authorizing: Buck Zepeda MD (auto-released)   Procedure Abnormality Status   Adult Transthoracic Echo Complete W/ Cont if Necessary Per Protocol     Order History   Inpatient   Date/Time Action Taken User Additional Information    0000 Result Joseline Eduardo In process   11/01/17 2103 Release Buck Zepeda MD (auto-released) From Order: [id number]   11/02/17 1529 Result Interface, Rad Results [place] In In process   11/02/17 1530 Result Joseline Eduardo In process   11/02/17 1530 Result Joseline Eduardo In process   11/02/17 1537 Result Jose Barajas MD Final   Order Details   Frequency Duration Priority Order Class   Once 1  occurrence Routine Hospital Performed   Start Date/Time   Start Date Start Time   11/01/17 2100   Order Questions   Question Answer Comment   Performing Location Department    Reason for Exam Heart Failure, Cardiomyopathy or Systemic or Pulmonary Hypertension    Procedures Performed   Code Procedure Name   ECH26 ECHO COMPLETE W/ DOPPLER AND COLOR FLOW   Acknowledgement Info   For At Acknowledged By Acknowledged On   Placing Order 11/01/17 2103 Yazmin Rust, RN 11/02/17 0013   Reprint Order Requisition   Adult Transthoracic Echo Complete W/ Cont if Necessary Per Protocol (Order #[id number]) on 11/1/17   Encounter-Level Cardiology Documents:   There are no encounter-level cardiology documents.   Encounter   View Encounter   Appointments for this Order   11/2/2017 1:50 PM - 45 min [facility] ECHO ROOM 1 ([facility]) [facility] Cardiology   Order Provider Info       Office phone Pager E-mail   Ordering User Buck Zepeda [phone] -- --   Authorizing Provider Buck Zepeda [phone] -- --   Attending Provider Geovanny Howard [phone] -- --   Billing Provider Jose Barajas [phone] -- --   Linked Charges    Charge Code Quantity Modifiers Diagnosis   [facility] ECHO 2D COMP W SPEC COLR DOPL ADULT WO CONTRAST  87878 1      [place] ECHO HEART XTHORACIC,COMPLETE W DOPPLER  77099 1      [place] ECHO HEART XTHORACIC,COMPLETE W DOPPLER  18480 1      Order Transmittal Tracking   Adult Transthoracic Echo Complete W/ Cont if Necessary Per Protocol (Order #[id number]) on 11/1/17   Authorized by:  Buck Zepeda MD  (NPI: [id number])       Reviewed By List   Buck Zepeda MD on 11/2/2017  8:45 PM   Jose Barajas MD on 11/2/2017  8:13 PM

## 2017-11-03 NOTE — PROGRESS NOTES
[facility] -  Progress Note     LOS: 2 days   Patient Care Team:  Jose Barajas MD as PCP - General    Chief Complaint:Shortness of Breath    Subjective     Interval History:   No acute events overnight, sinus on telemetry. Tolerated stress this morning, results pending.     Review of Systems:   Review of Systems   Constitution: Positive for weakness. Negative for chills, decreased appetite, fever, malaise/fatigue, weight gain and weight loss.   HENT: Negative for nosebleeds.    Eyes: Negative for visual disturbance.   Cardiovascular: Positive for dyspnea on exertion and leg swelling. Negative for chest pain, near-syncope, orthopnea, palpitations, paroxysmal nocturnal dyspnea and syncope.   Respiratory: Positive for cough and shortness of breath. Negative for hemoptysis and snoring.    Endocrine: Negative for cold intolerance and heat intolerance.   Hematologic/Lymphatic: Negative for bleeding problem. Does not bruise/bleed easily.   Skin: Negative for rash.   Musculoskeletal: Negative for back pain and falls.   Gastrointestinal: Negative for abdominal pain, constipation, diarrhea, heartburn, melena, nausea and vomiting.   Genitourinary: Negative for hematuria.   Neurological: Negative for dizziness, headaches and light-headedness.   Psychiatric/Behavioral: Negative for altered mental status.   Allergic/Immunologic: Negative for persistent infections.        Objective     Vital Sign Min/Max for last 24 hours  Temp  Min: 96.9 °F (36.1 °C)  Max: 97.7 °F (36.5 °C)   BP  Min: 102/53  Max: 150/70   Pulse  Min: 70  Max: 92   Resp  Min: 18  Max: 18   SpO2  Min: 92 %  Max: 100 %   Flow (L/min)  Min: 2.5  Max: 2.5   Weight  Min: 112 lb (50.8 kg)  Max: 112 lb (50.8 kg)     Last Weight    11/02/17 2011   Weight: 112 lb (50.8 kg)         Intake/Output Summary (Last 24 hours) at 11/03/17 1233  Last data filed at 11/02/17 2008   Gross per 24 hour   Intake              860 ml   Output              700 ml    Net              160 ml         Physical Exam:  Physical Exam   Constitutional: She is oriented to person, place, and time. She appears well-developed and well-nourished.   HENT:   Head: Normocephalic and atraumatic.   Eyes: Pupils are equal, round, and reactive to light.   Neck: Normal range of motion. Neck supple. No JVD present. Carotid bruit is not present.   Cardiovascular: Normal rate, regular rhythm, normal heart sounds and intact distal pulses.    Pulmonary/Chest: Effort normal and breath sounds normal.   Coarse breath sounds   Abdominal: Soft. Bowel sounds are normal.   Musculoskeletal: Normal range of motion.   Neurological: She is alert and oriented to person, place, and time. She has normal reflexes.   Skin: Skin is warm and dry.   Psychiatric: She has a normal mood and affect. Her behavior is normal. Judgment and thought content normal.        Results Review:   Lab Results (last 72 hours)     Procedure Component Value Units Date/Time    Blood Gas, Arterial With Co-Ox [[id number]]  (Abnormal) Collected:  11/01/17 1710    Specimen:  Arterial Blood Updated:  11/01/17 1718     Site Right Brachial     Dao's Test N/A     pH, Arterial 7.349 (L) pH units      pCO2, Arterial 68.2 (C) mm Hg      pO2, Arterial 68.6 (L) mm Hg      HCO3, Arterial 37.5 (H) mmol/L      Base Excess, Arterial 9.6 (H) mmol/L      O2 Saturation, Arterial 91.9 (L) %      Hemoglobin, Blood Gas 11.7 (L) g/dL      Hematocrit, Blood Gas 35.9 (L) %      Oxyhemoglobin 88.3 (L) %      Methemoglobin 0.70 %      Carboxyhemoglobin 3.3 %      Temperature 37.0 C      Sodium, Arterial 137 mmol/L      Potassium, Arterial 4.3 mmol/L      Barometric Pressure for Blood Gas 748 mmHg      Modality Nasal Cannula     Flow Rate 2.0 lpm      Ventilator Mode NA     Notified Who Saurav HERMAN     Notified By [id number]     Notified Time 11/01/2017 17:17     Collected by [id number]    Magnesium [[id number]]  (Normal) Collected:  11/01/17 1711    Specimen:  Blood  Updated:  11/01/17 1741     Magnesium 1.9 mg/dL     CK [[id number]]  (Normal) Collected:  11/01/17 1711    Specimen:  Blood Updated:  11/01/17 1753     Creatine Kinase 166 U/L     CK-MB [[id number]]  (Abnormal) Collected:  11/01/17 1711    Specimen:  Blood Updated:  11/01/17 1753     CKMB 6.32 (H) ng/mL     Myoglobin, Serum [[id number]]  (Abnormal) Collected:  11/01/17 1711    Specimen:  Blood Updated:  11/01/17 1753     Myoglobin 165.2 (H) ng/mL     Troponin [[id number]]  (Abnormal) Collected:  11/01/17 1711    Specimen:  Blood Updated:  11/01/17 1753     Troponin I 0.092 (H) ng/mL     BNP [[id number]]  (Abnormal) Collected:  11/01/17 1711    Specimen:  Blood Updated:  11/01/17 1753     proBNP 99467.0 (H) pg/mL     CK-MB Index [[id number]]  (Normal) Collected:  11/01/17 1711    Specimen:  Blood Updated:  11/01/17 1753     CK-MB Index 3.8 %     Comprehensive Metabolic Panel [[id number]]  (Abnormal) Collected:  11/01/17 1711    Specimen:  Blood Updated:  11/01/17 1800     Glucose 88 mg/dL      BUN 26 (H) mg/dL      Creatinine 1.41 (H) mg/dL      Sodium 137 mmol/L      Potassium 4.5 mmol/L      Chloride 93 (L) mmol/L      CO2 38.0 (H) mmol/L      Calcium 8.6 mg/dL      Total Protein 6.3 g/dL      Albumin 3.30 (L) g/dL      ALT (SGPT) 1236 (H) U/L      AST (SGOT) 3036 (H) U/L      Alkaline Phosphatase 98 U/L      Total Bilirubin 0.5 mg/dL      eGFR Non African Amer 39 (L) mL/min/1.73      Globulin 3.0 gm/dL      A/G Ratio 1.1 g/dL      BUN/Creatinine Ratio 18.4     Anion Gap 6.0 mmol/L     CBC & Differential [[id number]] Collected:  11/01/17 1711    Specimen:  Blood Updated:  11/01/17 1814    Narrative:       The following orders were created for panel order CBC & Differential.  Procedure                               Abnormality         Status                     ---------                               -----------         ------                     CBC Auto Differential[[id number]]        Abnormal            Final result                  Please view results for these tests on the individual orders.    CBC Auto Differential [[id number]]  (Abnormal) Collected:  11/01/17 1711    Specimen:  Blood Updated:  11/01/17 1814     WBC 14.20 (H) 10*3/mm3      RBC 3.88 (L) 10*6/mm3      Hemoglobin 11.6 (L) g/dL      Hematocrit 39.4 %      .5 (H) fL      MCH 29.9 pg      MCHC 29.4 (L) g/dL      RDW 13.9 %      RDW-SD 51.4 fl      MPV 10.7 fL      Platelets 292 10*3/mm3      Neutrophil % 72.1 %      Lymphocyte % 14.9 (L) %      Monocyte % 11.7 %      Eosinophil % 0.5 %      Basophil % 0.4 %      Immature Grans % 0.4 %      Neutrophils, Absolute 10.26 (H) 10*3/mm3      Lymphocytes, Absolute 2.11 10*3/mm3      Monocytes, Absolute 1.66 (H) 10*3/mm3      Eosinophils, Absolute 0.07 10*3/mm3      Basophils, Absolute 0.05 10*3/mm3      Immature Grans, Absolute 0.05 (H) 10*3/mm3      nRBC 0.0 /100 WBC     Ammonia [[id number]]  (Normal) Collected:  11/01/17 1927    Specimen:  Blood Updated:  11/01/17 1945     Ammonia 21 umol/L     Protime-INR [[id number]]  (Abnormal) Collected:  11/01/17 1711    Specimen:  Blood Updated:  11/01/17 1957     Protime 17.4 (H) Seconds      INR 1.38 (H)    aPTT [[id number]]  (Normal) Collected:  11/01/17 1711    Specimen:  Blood Updated:  11/01/17 1957     PTT 33.0 seconds     Ethanol [[id number]]  (Normal) Collected:  11/01/17 1711    Specimen:  Blood Updated:  11/01/17 2032     Ethanol % <0.010 %     Narrative:       Not for legal purposes. Chain of Custody not followed.     Troponin [[id number]]  (Abnormal) Collected:  11/01/17 1927    Specimen:  Blood Updated:  11/01/17 2114     Troponin I 0.100 (H) ng/mL     Hepatitis Panel, Acute [[id number]]  (Abnormal) Collected:  11/01/17 2001    Specimen:  Blood Updated:  11/01/17 2119     HCV S/C Ratio 35.50 (H)     Hepatitis C Ab Reactive (A)     Hep A IgM Negative     Hep B C IgM Negative     Hepatitis B Surface Ag Negative    Narrative:       This patient's sample tests reactive with a high  s/c ratio: >=8.00  Samples with high s/c ratios have been shown to repeat as positive using a different methodology 95% of the time or greater (CDC MMWR No. RR-3, 2003).  Therefore, additional testing for verification of the result is not recommended.    Troponin [[id number]]  (Abnormal) Collected:  11/01/17 2146    Specimen:  Blood Updated:  11/01/17 2223     Troponin I 0.098 (H) ng/mL     Magnesium [[id number]]  (Normal) Collected:  11/02/17 0257    Specimen:  Blood Updated:  11/02/17 0331     Magnesium 1.7 mg/dL     Phosphorus [[id number]]  (Normal) Collected:  11/02/17 0257    Specimen:  Blood Updated:  11/02/17 0331     Phosphorus 3.8 mg/dL     Amylase [[id number]]  (Normal) Collected:  11/02/17 0257    Specimen:  Blood Updated:  11/02/17 0331     Amylase 50 U/L     Lipase [[id number]]  (Normal) Collected:  11/02/17 0257    Specimen:  Blood Updated:  11/02/17 0331     Lipase 77 U/L     Lactic Acid, Plasma [[id number]]  (Abnormal) Collected:  11/02/17 0257    Specimen:  Blood Updated:  11/02/17 0335     Lactate 5.6 (C) mmol/L     Hemoglobin A1c [[id number]] Collected:  11/02/17 0257    Specimen:  Blood Updated:  11/02/17 0342     Hemoglobin A1C 5.2 %     Narrative:       Less than 6.0           Non-Diabetic Range  6.0-7.0                 ADA Therapeutic Target  Greater than 7.0        Action Suggested    Troponin [[id number]]  (Abnormal) Collected:  11/02/17 0257    Specimen:  Blood Updated:  11/02/17 0343     Troponin I 0.080 (H) ng/mL     BNP [[id number]]  (Abnormal) Collected:  11/02/17 0257    Specimen:  Blood Updated:  11/02/17 0343     proBNP 22629.0 (H) pg/mL     Lipid Panel [[id number]]  (Abnormal) Collected:  11/02/17 0257    Specimen:  Blood Updated:  11/02/17 0359     Total Cholesterol 171 mg/dL      Triglycerides 119 mg/dL      HDL Cholesterol 52 mg/dL      LDL Cholesterol  114 (H) mg/dL      LDL/HDL Ratio 1.83    TSH [[id number]]  (Abnormal) Collected:  11/02/17 0257    Specimen:  Blood Updated:  11/02/17 0406      TSH 0.313 (L) mIU/mL     Comprehensive Metabolic Panel [[id number]]  (Abnormal) Collected:  11/02/17 0257    Specimen:  Blood Updated:  11/02/17 0405     Glucose 153 (H) mg/dL      BUN 26 (H) mg/dL      Creatinine 1.34 mg/dL      Sodium 140 mmol/L      Potassium 3.9 mmol/L      Chloride 87 (L) mmol/L      CO2 39.0 (H) mmol/L      Calcium 9.2 mg/dL      Total Protein 7.1 g/dL      Albumin 4.00 g/dL      ALT (SGPT) 1454 (H) U/L      AST (SGOT) 2453 (H) U/L      Alkaline Phosphatase 110 U/L      Total Bilirubin 0.7 mg/dL      eGFR Non African Amer 41 (L) mL/min/1.73      Globulin 3.1 gm/dL      A/G Ratio 1.3 g/dL      BUN/Creatinine Ratio 19.4     Anion Gap 14.0 (H) mmol/L     Ferritin [[id number]]  (Normal) Collected:  11/02/17 0257    Specimen:  Blood Updated:  11/02/17 0406     Ferritin 76.30 ng/mL     Blood Gas, Arterial [[id number]]  (Abnormal) Collected:  11/02/17 0428    Specimen:  Arterial Blood Updated:  11/02/17 0443     Site Right Radial     Dao's Test Positive     pH, Arterial 7.497 (H) pH units      pCO2, Arterial 55.9 (H) mm Hg      pO2, Arterial 47.8 (C) mm Hg      HCO3, Arterial 43.2 (H) mmol/L      Base Excess, Arterial 17.3 (H) mmol/L      O2 Saturation, Arterial 84.5 (L) %      Temperature 37.0 C      Barometric Pressure for Blood Gas 748 mmHg      Modality Room Air     Ventilator Mode NA     Notified Who [id number]     Notified By [email or web address]     Notified Time 11/02/2017 04:36     Collected by [id number]    Lactic Acid, Reflex Timer [[id number]] Collected:  11/02/17 0257    Specimen:  Blood Updated:  11/02/17 0646     Extra Tube Hold for add-ons.      Auto resulted.       Lactic Acid, Reflex [[id number]]  (Normal) Collected:  11/02/17 0810    Specimen:  Blood Updated:  11/02/17 0857     Lactate 1.5 mmol/L     T3, Free [[id number]]  (Abnormal) Collected:  11/02/17 0257    Specimen:  Blood Updated:  11/02/17 0912     T3, Free 2.72 (L) pg/mL     T4, Free [[id number]]  (Normal) Collected:  11/02/17 0257     Specimen:  Blood Updated:  11/02/17 0912     Free T4 1.59 ng/dL     AFP Tumor Marker [[id number]] Collected:  11/02/17 0810    Specimen:  Blood Updated:  11/02/17 1003    Troponin [[id number]]  (Abnormal) Collected:  11/02/17 0810    Specimen:  Blood Updated:  11/02/17 1009     Troponin I 0.061 (H) ng/mL     Urinalysis With / Culture If Indicated - Urine, Clean Catch [[id number]]  (Normal) Collected:  11/02/17 1022    Specimen:  Urine from Urine, Clean Catch Updated:  11/02/17 1045     Color, UA Yellow     Appearance, UA Clear     pH, UA 7.0     Specific Gravity, UA 1.008     Glucose, UA Negative     Ketones, UA Negative     Bilirubin, UA Negative     Blood, UA Negative     Protein, UA Negative     Leuk Esterase, UA Negative     Nitrite, UA Negative     Urobilinogen, UA 0.2 E.U./dL    Narrative:       Urine microscopic not indicated.    Urine Drug Screen - Urine, Clean Catch [[id number]]  (Abnormal) Collected:  11/02/17 1022    Specimen:  Urine from Urine, Clean Catch Updated:  11/02/17 1103     Amphetamine Screen, Urine Negative     Barbiturates Screen, Urine Negative     Benzodiazepine Screen, Urine Positive (A)     Cocaine Screen, Urine Negative     Methadone Screen, Urine Negative     Opiate Screen Negative     Phencyclidine (PCP), Urine Negative     THC, Screen, Urine Negative    Narrative:       Negative Thresholds For Drugs Screened in Urine:    Amphetamines          500 ng/ml  Barbiturates          200 ng/ml  Benzodiazepines       200 ng/ml  Cocaine               150 ng/ml  Methadone             150 ng/ml  Opiates               300 ng/ml  Phencyclidine         25 ng/ml  THC                      50 ng/ml    The normal value for all drugs tested is negative. This report includes final unconfirmed screening results.  A positive result by this assay can be, at your request, sent to the Reference Lab for confirmation by gas chromatography. Unconfirmed results must not be used for non-medical purposes, such as  employment or legal testing. Clinical consideration should be applied to any drug of abuse test result, particularly when unconfirmed results are used.    Peripheral Blood Smear - Blood, [[id number]] Collected:  11/01/17 1711    Specimen:  Blood Updated:  11/02/17 1338     Performed by: Sabrina Ulloa MD     Pathologist Interpretation Leukocytosis with macrocytic normochromic anemia.  Mild anisopoikilocytosis and polychromasia.  Negative for increase in blasts.  Negative for increase in schistocytes.     CBC & Differential [[id number]] Collected:  11/03/17 0619    Specimen:  Blood Updated:  11/03/17 0655    Narrative:       The following orders were created for panel order CBC & Differential.  Procedure                               Abnormality         Status                     ---------                               -----------         ------                     CBC Auto Differential[[id number]]        Abnormal            Final result                 Please view results for these tests on the individual orders.    CBC Auto Differential [[id number]]  (Abnormal) Collected:  11/03/17 0619    Specimen:  Blood Updated:  11/03/17 0655     WBC 12.58 (H) 10*3/mm3      RBC 4.14 (L) 10*6/mm3      Hemoglobin 12.4 g/dL      Hematocrit 40.8 %      MCV 98.6 (H) fL      MCH 30.0 pg      MCHC 30.4 (L) g/dL      RDW 14.0 %      RDW-SD 50.0 fl      MPV 10.6 fL      Platelets 275 10*3/mm3      Neutrophil % 71.6 %      Lymphocyte % 13.9 (L) %      Monocyte % 14.2 (H) %      Eosinophil % 0.1 %      Basophil % 0.0 %      Immature Grans % 0.2 %      Neutrophils, Absolute 9.01 (H) 10*3/mm3      Lymphocytes, Absolute 1.75 10*3/mm3      Monocytes, Absolute 1.79 (H) 10*3/mm3      Eosinophils, Absolute 0.01 10*3/mm3      Basophils, Absolute 0.00 10*3/mm3      Immature Grans, Absolute 0.02 10*3/mm3     Comprehensive Metabolic Panel [[id number]]  (Abnormal) Collected:  11/03/17 0619    Specimen:  Blood Updated:  11/03/17 0727     Glucose 98 mg/dL       BUN 33 (H) mg/dL      Creatinine 1.16 mg/dL      Sodium 135 mmol/L      Potassium 5.0 mmol/L      Chloride 80 (L) mmol/L      CO2 >40.0 (C) mmol/L      Calcium 8.5 mg/dL      Total Protein 6.3 g/dL      Albumin 3.30 (L) g/dL      ALT (SGPT) 915 (H) U/L      AST (SGOT) 856 (H) U/L      Alkaline Phosphatase 87 U/L      Total Bilirubin 0.7 mg/dL      eGFR Non African Amer 49 (L) mL/min/1.73      Globulin 3.0 gm/dL      A/G Ratio 1.1 g/dL      BUN/Creatinine Ratio 28.4 (H)     Anion Gap -- mmol/L       Unable to calculate Anion Gap.           Medication Review: yes  Current Facility-Administered Medications   Medication Dose Route Frequency Provider Last Rate Last Dose   • aspirin EC tablet 325 mg  325 mg Oral Daily Buck Zepeda MD   325 mg at 11/03/17 0922   • atropine injection 2 mg  2 mg Intravenous Once Jose Barajas MD       • bisacodyl (DULCOLAX) EC tablet 5 mg  5 mg Oral Daily PRN Buck Zepeda MD       • budesonide-formoterol (SYMBICORT) 160-4.5 MCG/ACT inhaler 2 puff  2 puff Inhalation BID - RT Buck Zepeda MD   2 puff at 11/03/17 0702   • DOBUTamine (DOBUTREX) 1 mg/mL infusion (ECHO)  10-50 mcg/kg/min Intravenous Continuous Jose Barajas .4 mL/hr at 11/03/17 1046 50 mcg/kg/min at 11/03/17 1046   • enoxaparin (LOVENOX) syringe 40 mg  40 mg Subcutaneous Daily Geovanny Howard DO   40 mg at 11/03/17 0922   • famotidine (PEPCID) tablet 40 mg  40 mg Oral Daily Buck Zepeda MD   40 mg at 11/03/17 0922   • furosemide (LASIX) tablet 40 mg  40 mg Oral Daily Lizbeth Sanford PA-C   40 mg at 11/03/17 0921   • ipratropium-albuterol (DUO-NEB) nebulizer solution 3 mL  3 mL Nebulization 4x Daily - RT ROLAND Brink   3 mL at 11/03/17 0702   • metoprolol tartrate (LOPRESSOR) tablet 12.5 mg  12.5 mg Oral Q12H Buck Zepeda MD   12.5 mg at 11/03/17 0922   • nicotine (NICODERM CQ) 21 MG/24HR patch 1 patch  1 patch Transdermal Q24H Buck Zepeda MD   1 patch at 11/01/17 7283   •  ondansetron (ZOFRAN) injection 4 mg  4 mg Intravenous Q6H PRN Buck Zepeda MD       • oxyCODONE (ROXICODONE) immediate release tablet 5 mg  5 mg Oral Q8H PRN Buck Zepeda MD   5 mg at 11/03/17 0636   • pantoprazole (PROTONIX) EC tablet 40 mg  40 mg Oral Q AM Buck Zepeda MD   40 mg at 11/03/17 0634   • potassium chloride (MICRO-K) CR capsule 20 mEq  20 mEq Oral Daily Buck Zepeda MD   20 mEq at 11/03/17 0921   • ramipril (ALTACE) capsule 2.5 mg  2.5 mg Oral Daily Buck Zepeda MD   2.5 mg at 11/03/17 0922   • sodium chloride 0.9 % flush 1-10 mL  1-10 mL Intravenous PRN Buck Zepeda MD       • spironolactone (ALDACTONE) tablet 25 mg  25 mg Oral Daily Buck Zepeda MD   25 mg at 11/03/17 0922   • venlafaxine (EFFEXOR) tablet 75 mg  75 mg Oral Q12H Buck Zepeda MD   75 mg at 11/03/17 0922         Assessment/Plan     Active Problems:  Likely Type II Troponin Elevation  Acute Cor Pulmonale  Acute exacerbation of COPD  Hepatic mass with elevated Liver Enzymes  Tobacco Abuse  Paroxysmal Atrial Fibrillation    Plan:  Maintaining NSR  Stress Echo Pending  Results of Echo Pending  Tolerating oral diuretics  Low Salt Diet, Daily Weights and Strict I&O  COPD treatment and work up for hepatic mass per primary  Not a candidate for statins at this time  Monitor telemetry     ROLAND Daniel  11/03/17  12:33 PM

## 2017-11-03 NOTE — THERAPY TREATMENT NOTE
Acute Care - Physical Therapy Treatment Note  [facility]     Patient Name: Teri Tim  : 1960  MRN: [id number]  Today's Date: 11/3/2017  Onset of Illness/Injury or Date of Surgery Date: 17  Date of Referral to PT: 17  Referring Physician: Dr. Zepeda    Admit Date: 2017    Visit Dx:    ICD-10-CM ICD-9-CM   1. NSTEMI (non-ST elevated myocardial infarction) I21.4 410.70   2. Elevated liver enzymes R74.8 790.5   3. Hepatitis C virus infection without hepatic coma, unspecified chronicity B19.20 070.70   4. Impaired functional mobility, balance, gait, and endurance Z74.09 V49.89     Patient Active Problem List   Diagnosis   • NSTEMI (non-ST elevated myocardial infarction)               Adult Rehabilitation Note       17 1356          Rehab Assessment/Intervention    Discipline physical therapy assistant  -AE      Document Type therapy note (daily note)  -AE      Subjective Information agree to therapy  -AE      Precautions/Limitations fall precautions   confusion  -AE      Specific Treatment Considerations --   Having difficulty following commands today  -AE      Recorded by [AE] Zoya Anthony PTA      Pain Assessment    Pain Assessment No/denies pain  -AE      Recorded by [AE] Zoya Anthony PTA      Bed Mobility, Assessment/Treatment    Bed Mobility, Comment sitting EOB  -AE      Recorded by [AE] Zoya Anthony PTA      Transfer Assessment/Treatment    Transfers, Sit-Stand [place] minimum assist (75% patient effort);contact guard assist  -AE      Recorded by [AE] Zoya Anthony PTA      Gait Assessment/Treatment    Gait, Car[place] Level contact guard assist;minimum assist (75% patient effort)  -AE      Gait, Assistive Device rolling walker  -AE      Gait, Distance (Feet) 35  -AE      Gait, Safety Issues balance decreased during turns;step length decreased  -AE      Gait, Impairments impaired balance;coordination impaired;strength decreased  -AE      Gait, Comment --   1  LOB  -AE      Recorded by [AE] Zoya Anthony PTA      Therapy Exercises    Bilateral Lower Extremities 15 reps;AROM:;sitting;hip abduction/adduction;LAQ;ankle pumps/circles   Pt needed a lot of vc's to complete task  -AE      Recorded by [AE] Zoya Anthony PTA      Positioning and Restraints    Pre-Treatment Position in bed  -AE      Post Treatment Position chair  -AE      In Chair sitting;call light within reach  -AE      Recorded by [AE] Zoya Anthony PTA        User Key  (r) = Recorded By, (t) = Taken By, (c) = Cosigned By    Initials Name Effective Dates    AE Zoya Anthony PTA 06/22/15 -                 IP PT Goals       11/02/17 1031          Gait Training PT LTG    Gait Training Goal PT LTG, Date Established 11/02/17  -LYNETTE (r) AB (t) LYNETTE (c)      Gait Training Goal PT LTG, Time to Achieve by discharge  -LYNETTE (r) AB (t) LYNETTE (c)      Gait Training Goal PT LTG, [place] Level supervision required  -LYNETTE (r) AB (t) LYNETTE (c)      Gait Training Goal PT LTG, Assist Device cane, quad  -LYNETTE (r) AB (t) LYNETTE (c)      Gait Training Goal PT LTG, Distance to Achieve 100  -LYNETTE (r) AB (t) LYNETTE (c)      Dynamic Standing Balance PT LTG    Dynamic Standing Balance PT LTG, Date Established 11/02/17  -LYNETTE (r) AB (t) LYNETTE (c)      Dynamic Standing Balance PT LTG, Time to Achieve by discharge  -LYNETTE (r) AB (t) LYNETTE (c)      Dynamic Standing Balance PT LTG, [place] Level supervision required  -LYNETTE (r) AB (t) LYNETTE (c)      Dynamic Standing Balance PT LTG, Assist Device assistive Device  -LYNETTE (r) AB (t) LYNETTE (c)      Dynamic Standing Balance PT LTG, Additional Goal side step, reaching out of [place], lifting objects, picking object up from floor  -LYNETTE (r) AB (t) LYNETTE (c)        User Key  (r) = Recorded By, (t) = Taken By, (c) = Cosigned By    Initials Name Provider Type    LYNETTE Del Real, PT DPT Physical Therapist    AB Sonia Iyer, PT Student PT Student          Physical Therapy Education     Title: PT OT SLP Therapies (Active)     Topic:  Physical Therapy (Active)     Point: Mobility training (Done)    Learning Progress Summary    Learner Readiness Method Response Comment Documented by Status   Patient Acceptance E VU Educated pt on benefits of activity and planned PT POC. AB 11/02/17 1102 Done               Point: Home exercise program (Active)    Learning Progress Summary    Learner Readiness Method Response Comment Documented by Status   Patient Acceptance E NR ex's AE 11/03/17 1432 Active                      User Key     Initials Effective Dates Name Provider Type Discipline    AE 06/22/15 -  Zoya Anthony, PTA Physical Therapy Assistant PT    AB 05/08/17 -  Sonia Iyer, PT Student PT Student PT                    PT Recommendation and Plan  Anticipated Equipment Needs At Discharge: quad cane  Anticipated Discharge Disposition: home with assist, home with home health, home with outpatient services  Planned Therapy Interventions: balance training, gait training, home exercise program, patient/family education, ROM (Range of Motion), strengthening, transfer training, bed mobility training  PT Frequency: daily, 2 times/day  Plan of Care Review  Plan Of Care Reviewed With: patient  Progress: no change  Outcome Summary/Follow up Plan: Pt sitting EOB and having trouble following directions. Pt was min x 1 to stand and min x 1 to ambulate with Rw on o2          Outcome Measures       11/03/17 1356 11/02/17 1031       How much help from another person do you currently need...    Turning from your back to your side while in flat bed without using bedrails? 4  -AE 4  -LYNETTE (r) AB (t) LYNETTE (c)     Moving from lying on back to sitting on the side of a flat bed without bedrails? 4  -AE 4  -LYNETTE (r) AB (t) LYNETTE (c)     Moving to and from a bed to a chair (including a wheelchair)? 3  -AE 3  -LYNETTE (r) AB (t) LYNETTE (c)     Standing up from a chair using your arms (e.g., wheelchair, bedside chair)? 3  -AE 4  -LYNETTE (r) AB (t) LYNETTE (c)     Climbing 3-5 steps with a railing? 2   -AE 3  -LYNETTE (r) AB (t) LYNETTE (c)     To walk in hospital room? 3  -AE 3  -LYNETTE (r) AB (t) LYNETTE (c)     AM-PAC 6 Clicks Score 19  -AE 21  -LYNETTE (r) AB (t)     Functional Assessment    Outcome Measure Options AM-PAC 6 Clicks Basic Mobility (PT)  -AE AM-PAC 6 Clicks Basic Mobility (PT)  -LYNETTE (r) AB (t) LYNETTE (c)       User Key  (r) = Recorded By, (t) = Taken By, (c) = Cosigned By    Initials Name Provider Type    AE Zoya Anthony PTA Physical Therapy Assistant    LYNETTE Del Real, PT DPT Physical Therapist    AB Sonia Iyer, PT Student PT Student           Time Calculation:         PT Charges       11/03/17 1434          Time Calculation    Start Time 1356  -AE      Stop Time 1435  -AE      Time Calculation (min) 39 min  -AE      PT Received On 11/03/17  -AE      PT Goal Re-Cert Due Date 11/12/17  -AE      Time Calculation- PT    Total Timed Code Minutes- PT 39 minute(s)  -AE        User Key  (r) = Recorded By, (t) = Taken By, (c) = Cosigned By    Initials Name Provider Type    AE Zoya Anthony PTA Physical Therapy Assistant          Therapy Charges for Today     Code Description Service Date Service Provider Modifiers Qty    46136183246 HC GAIT TRAINING EA 15 MIN 11/3/2017 Zoya Anthony PTA GP 1    48342313381 HC PT THER PROC EA 15 MIN 11/3/2017 Zoya Anthony PTA GP 2          PT G-Codes  Outcome Measure Options: AM-PAC 6 Clicks Basic Mobility (PT)  Score: 21  Functional Limitation: Mobility: Walking and moving around  Mobility: Walking and Moving Around Current Status (): At least 20 percent but less than 40 percent impaired, limited or restricted  Mobility: Walking and Moving Around Goal Status (): At least 1 percent but less than 20 percent impaired, limited or restricted    Zoya Anthony PTA  11/3/2017

## 2017-11-04 PROBLEM — R79.89 ABNORMAL LFTS: Status: ACTIVE | Noted: 2017-11-04

## 2017-11-04 PROBLEM — R93.5 ABNORMAL US (ULTRASOUND) OF ABDOMEN: Status: ACTIVE | Noted: 2017-11-04

## 2017-11-04 LAB
ALBUMIN SERPL-MCNC: 3.7 G/DL (ref 3.5–5)
ALBUMIN/GLOB SERPL: 1.2 G/DL (ref 1.1–2.5)
ALP SERPL-CCNC: 88 U/L (ref 24–120)
ALT SERPL W P-5'-P-CCNC: 769 U/L (ref 0–54)
ANION GAP SERPL CALCULATED.3IONS-SCNC: ABNORMAL MMOL/L (ref 4–13)
ARTERIAL PATENCY WRIST A: ABNORMAL
AST SERPL-CCNC: 545 U/L (ref 7–45)
ATMOSPHERIC PRESS: 752 MMHG
BASE EXCESS BLDA CALC-SCNC: 23.1 MMOL/L (ref 0–2)
BASOPHILS # BLD AUTO: 0.01 10*3/MM3 (ref 0–0.2)
BASOPHILS NFR BLD AUTO: 0.1 % (ref 0–2)
BDY SITE: ABNORMAL
BILIRUB SERPL-MCNC: 1 MG/DL (ref 0.1–1)
BODY TEMPERATURE: 37 C
BUN BLD-MCNC: 41 MG/DL (ref 5–21)
BUN/CREAT SERPL: 22.4 (ref 7–25)
CALCIUM SPEC-SCNC: 8.6 MG/DL (ref 8.4–10.4)
CHLORIDE SERPL-SCNC: 78 MMOL/L (ref 98–110)
CO2 SERPL-SCNC: >40 MMOL/L (ref 24–31)
CREAT BLD-MCNC: 1.83 MG/DL (ref 0.5–1.4)
DEPRECATED RDW RBC AUTO: 50.2 FL (ref 40–54)
EOSINOPHIL # BLD AUTO: 0.06 10*3/MM3 (ref 0–0.7)
EOSINOPHIL NFR BLD AUTO: 0.4 % (ref 0–4)
EPAP: 4
ERYTHROCYTE [DISTWIDTH] IN BLOOD BY AUTOMATED COUNT: 14 % (ref 12–15)
GFR SERPL CREATININE-BSD FRML MDRD: 29 ML/MIN/1.73
GLOBULIN UR ELPH-MCNC: 3 GM/DL
GLUCOSE BLD-MCNC: 71 MG/DL (ref 70–100)
HAV IGM SERPL QL IA: NEGATIVE
HBV SURFACE AG SERPL QL IA: NEGATIVE
HCO3 BLDA-SCNC: 50.7 MMOL/L (ref 20–26)
HCT VFR BLD AUTO: 42.4 % (ref 37–47)
HGB BLD-MCNC: 12.7 G/DL (ref 12–16)
HOROWITZ INDEX BLD+IHG-RTO: 30 %
IMM GRANULOCYTES # BLD: 0.04 10*3/MM3 (ref 0–0.03)
IMM GRANULOCYTES NFR BLD: 0.3 % (ref 0–5)
IPAP: 12
LYMPHOCYTES # BLD AUTO: 2.19 10*3/MM3 (ref 0.72–4.86)
LYMPHOCYTES NFR BLD AUTO: 15.7 % (ref 15–45)
Lab: ABNORMAL
MCH RBC QN AUTO: 29.6 PG (ref 28–32)
MCHC RBC AUTO-ENTMCNC: 30 G/DL (ref 33–36)
MCV RBC AUTO: 98.8 FL (ref 82–98)
MODALITY: ABNORMAL
MONOCYTES # BLD AUTO: 2.08 10*3/MM3 (ref 0.19–1.3)
MONOCYTES NFR BLD AUTO: 14.9 % (ref 4–12)
NEUTROPHILS # BLD AUTO: 9.56 10*3/MM3 (ref 1.87–8.4)
NEUTROPHILS NFR BLD AUTO: 68.6 % (ref 39–78)
PCO2 BLDA: 66.6 MM HG (ref 35–45)
PH BLDA: 7.49 PH UNITS (ref 7.35–7.45)
PLATELET # BLD AUTO: 311 10*3/MM3 (ref 130–400)
PMV BLD AUTO: 10.9 FL (ref 6–12)
PO2 BLDA: 57.9 MM HG (ref 83–108)
POTASSIUM BLD-SCNC: 3.9 MMOL/L (ref 3.5–5.3)
PROT SERPL-MCNC: 6.7 G/DL (ref 6.3–8.7)
RBC # BLD AUTO: 4.29 10*6/MM3 (ref 4.2–5.4)
SAO2 % BLDCOA: 90.6 % (ref 94–99)
SET MECH RESP RATE: 12
SODIUM BLD-SCNC: 137 MMOL/L (ref 135–145)
VENTILATOR MODE: ABNORMAL
WBC NRBC COR # BLD: 13.94 10*3/MM3 (ref 4.8–10.8)

## 2017-11-04 PROCEDURE — 94799 UNLISTED PULMONARY SVC/PX: CPT

## 2017-11-04 PROCEDURE — 99232 SBSQ HOSP IP/OBS MODERATE 35: CPT | Performed by: INTERNAL MEDICINE

## 2017-11-04 PROCEDURE — 82803 BLOOD GASES ANY COMBINATION: CPT

## 2017-11-04 PROCEDURE — 25010000002 ENOXAPARIN PER 10 MG: Performed by: INTERNAL MEDICINE

## 2017-11-04 PROCEDURE — 36600 WITHDRAWAL OF ARTERIAL BLOOD: CPT

## 2017-11-04 PROCEDURE — 94660 CPAP INITIATION&MGMT: CPT

## 2017-11-04 PROCEDURE — 87340 HEPATITIS B SURFACE AG IA: CPT | Performed by: INTERNAL MEDICINE

## 2017-11-04 PROCEDURE — 85025 COMPLETE CBC W/AUTO DIFF WBC: CPT | Performed by: NURSE PRACTITIONER

## 2017-11-04 PROCEDURE — 86709 HEPATITIS A IGM ANTIBODY: CPT | Performed by: INTERNAL MEDICINE

## 2017-11-04 PROCEDURE — 80053 COMPREHEN METABOLIC PANEL: CPT | Performed by: NURSE PRACTITIONER

## 2017-11-04 RX ORDER — SODIUM CHLORIDE 9 MG/ML
100 INJECTION, SOLUTION INTRAVENOUS CONTINUOUS
Status: DISCONTINUED | OUTPATIENT
Start: 2017-11-04 | End: 2017-11-05

## 2017-11-04 RX ORDER — FUROSEMIDE 20 MG/1
20 TABLET ORAL DAILY
Status: DISCONTINUED | OUTPATIENT
Start: 2017-11-05 | End: 2017-11-05

## 2017-11-04 RX ADMIN — SODIUM CHLORIDE 100 ML/HR: 9 INJECTION, SOLUTION INTRAVENOUS at 18:16

## 2017-11-04 RX ADMIN — IPRATROPIUM BROMIDE AND ALBUTEROL SULFATE 3 ML: 2.5; .5 SOLUTION RESPIRATORY (INHALATION) at 15:14

## 2017-11-04 RX ADMIN — RAMIPRIL 2.5 MG: 2.5 CAPSULE ORAL at 08:45

## 2017-11-04 RX ADMIN — OXYCODONE HYDROCHLORIDE 5 MG: 5 TABLET ORAL at 17:06

## 2017-11-04 RX ADMIN — IPRATROPIUM BROMIDE AND ALBUTEROL SULFATE 3 ML: 2.5; .5 SOLUTION RESPIRATORY (INHALATION) at 20:53

## 2017-11-04 RX ADMIN — PANTOPRAZOLE SODIUM 40 MG: 40 TABLET, DELAYED RELEASE ORAL at 08:44

## 2017-11-04 RX ADMIN — SPIRONOLACTONE 25 MG: 25 TABLET ORAL at 08:45

## 2017-11-04 RX ADMIN — VENLAFAXINE HYDROCHLORIDE 75 MG: 37.5 TABLET ORAL at 08:45

## 2017-11-04 RX ADMIN — FAMOTIDINE 40 MG: 20 TABLET, FILM COATED ORAL at 08:45

## 2017-11-04 RX ADMIN — ASPIRIN 325 MG: 325 TABLET, COATED ORAL at 08:45

## 2017-11-04 RX ADMIN — NICOTINE 1 PATCH: 21 PATCH, EXTENDED RELEASE TRANSDERMAL at 21:03

## 2017-11-04 RX ADMIN — VENLAFAXINE HYDROCHLORIDE 75 MG: 37.5 TABLET ORAL at 21:02

## 2017-11-04 RX ADMIN — POTASSIUM CHLORIDE 20 MEQ: 750 CAPSULE, EXTENDED RELEASE ORAL at 08:45

## 2017-11-04 RX ADMIN — FUROSEMIDE 40 MG: 40 TABLET ORAL at 08:45

## 2017-11-04 RX ADMIN — METOPROLOL TARTRATE 12.5 MG: 25 TABLET, FILM COATED ORAL at 08:45

## 2017-11-04 RX ADMIN — IPRATROPIUM BROMIDE AND ALBUTEROL SULFATE 3 ML: 2.5; .5 SOLUTION RESPIRATORY (INHALATION) at 10:47

## 2017-11-04 RX ADMIN — IPRATROPIUM BROMIDE AND ALBUTEROL SULFATE 3 ML: 2.5; .5 SOLUTION RESPIRATORY (INHALATION) at 07:31

## 2017-11-04 RX ADMIN — BUDESONIDE AND FORMOTEROL FUMARATE DIHYDRATE 2 PUFF: 160; 4.5 AEROSOL RESPIRATORY (INHALATION) at 20:53

## 2017-11-04 RX ADMIN — SODIUM CHLORIDE 100 ML/HR: 9 INJECTION, SOLUTION INTRAVENOUS at 08:45

## 2017-11-04 RX ADMIN — ENOXAPARIN SODIUM 40 MG: 40 INJECTION SUBCUTANEOUS at 08:44

## 2017-11-04 RX ADMIN — OXYCODONE HYDROCHLORIDE 5 MG: 5 TABLET ORAL at 08:44

## 2017-11-04 RX ADMIN — BUDESONIDE AND FORMOTEROL FUMARATE DIHYDRATE 2 PUFF: 160; 4.5 AEROSOL RESPIRATORY (INHALATION) at 07:31

## 2017-11-04 RX ADMIN — METOPROLOL TARTRATE 12.5 MG: 25 TABLET, FILM COATED ORAL at 21:02

## 2017-11-04 NOTE — PROGRESS NOTES
[facility]  Inpatient Progress Note    Chief Complaint   Patient presents with   • Chest Pain   • Shortness of Breath   • Leg Swelling       Subjective   Reason for Follow Up:  Abnormal LFTs    She feels okay from a GI standpoint.  She denies any abdominal pain.  I did discuss with her she ever had any episodes of syncope.  She denies prior to hospitalization.    Interval History:   LOS: 3      Current Facility-Administered Medications:   •  aspirin EC tablet 325 mg, 325 mg, Oral, Daily, Buck Zepeda MD, 325 mg at 11/04/17 0845  •  bisacodyl (DULCOLAX) EC tablet 5 mg, 5 mg, Oral, Daily PRN, Buck Zepeda MD  •  budesonide-formoterol (SYMBICORT) 160-4.5 MCG/ACT inhaler 2 puff, 2 puff, Inhalation, BID - RT, Buck Zepeda MD, 2 puff at 11/04/17 0731  •  enoxaparin (LOVENOX) syringe 40 mg, 40 mg, Subcutaneous, Daily, Geovanny Howard DO, 40 mg at 11/04/17 0844  •  famotidine (PEPCID) tablet 40 mg, 40 mg, Oral, Daily, Buck Zepeda MD, 40 mg at 11/04/17 0845  •  furosemide (LASIX) tablet 40 mg, 40 mg, Oral, Daily, Lizbeth Sanford PA-C, 40 mg at 11/04/17 0845  •  ipratropium-albuterol (DUO-NEB) nebulizer solution 3 mL, 3 mL, Nebulization, 4x Daily - RT, ROLAND Brink, 3 mL at 11/04/17 0731  •  metoprolol tartrate (LOPRESSOR) tablet 12.5 mg, 12.5 mg, Oral, Q12H, Buck Zepeda MD, 12.5 mg at 11/04/17 0845  •  nicotine (NICODERM CQ) 21 MG/24HR patch 1 patch, 1 patch, Transdermal, Q24H, Buck Zepeda MD, 1 patch at 11/01/17 2247  •  ondansetron (ZOFRAN) injection 4 mg, 4 mg, Intravenous, Q6H PRN, Buck Zepeda MD  •  oxyCODONE (ROXICODONE) immediate release tablet 5 mg, 5 mg, Oral, Q8H PRN, Buck Zepeda MD, 5 mg at 11/04/17 0844  •  pantoprazole (PROTONIX) EC tablet 40 mg, 40 mg, Oral, Q AM, Buck Zepeda MD, 40 mg at 11/04/17 0844  •  potassium chloride (MICRO-K) CR capsule 20 mEq, 20 mEq, Oral, Daily, Buck Zepeda MD, 20 mEq at  11/04/17 0845  •  ramipril (ALTACE) capsule 2.5 mg, 2.5 mg, Oral, Daily, Buck Zepeda MD, 2.5 mg at 11/04/17 0845  •  sodium chloride 0.9 % flush 1-10 mL, 1-10 mL, Intravenous, PRN, Buck Zepeda MD  •  sodium chloride 0.9 % infusion, 100 mL/hr, Intravenous, Continuous, ROLAND Brink, Last Rate: 100 mL/hr at 11/04/17 0845, 100 mL/hr at 11/04/17 0845  •  spironolactone (ALDACTONE) tablet 25 mg, 25 mg, Oral, Daily, Buck Zepeda MD, 25 mg at 11/04/17 0845  •  venlafaxine (EFFEXOR) tablet 75 mg, 75 mg, Oral, Q12H, Buck Zepeda MD, 75 mg at 11/04/17 0845    Review of Systems   Cardiovascular: Negative for chest pain.   Gastrointestinal: Negative for abdominal distention and abdominal pain.       Objective     Vital Signs  Temp:  [97 °F (36.1 °C)-98.5 °F (36.9 °C)] 98 °F (36.7 °C)  Heart Rate:  [60-98] 89  Resp:  [20] 20  BP: ()/(47-64) 82/52    Intake/Output Summary (Last 24 hours) at 11/04/17 0957  Last data filed at 11/04/17 0913   Gross per 24 hour   Intake             1240 ml   Output              800 ml   Net              440 ml     I/O this shift:  In: 360 [P.O.:360]  Out: -          Physical Exam   Cardiovascular: Normal rate.    Pulmonary/Chest: She has no rales.   On BiPAP   Abdominal: Soft. Bowel sounds are normal. She exhibits no distension. There is no tenderness. There is no guarding.   No hepatosplenomegaly           Results Review:     I reviewed the patient's new clinical results.    Lab Results (last 24 hours)     Procedure Component Value Units Date/Time    Blood Gas, Arterial [[id number]]  (Abnormal) Collected:  11/03/17 1230    Specimen:  Arterial Blood Updated:  11/03/17 1244     Site Left Radial     Dao's Test Positive     pH, Arterial 7.488 (H) pH units      pCO2, Arterial 68.7 (C) mm Hg      pO2, Arterial 91.5 mm Hg      HCO3, Arterial 52.1 (H) mmol/L      Base Excess, Arterial 24.2 (H) mmol/L      O2 Saturation, Arterial 97.7 %      Temperature 37.0 C       Barometric Pressure for Blood Gas 756 mmHg      Modality Nasal Cannula     Flow Rate 3.0 lpm      Ventilator Mode NA     Notified [facility] [id number]     Notified By [id number]     Notified Time 11/03/2017 12:42     Collected by [id number]    AFP Tumor Marker [[id number]] Collected:  11/02/17 0810    Specimen:  Blood Updated:  11/03/17 1416     AFP Tumor Marker 4.0 ng/mL       Roche ECLIA methodology       Narrative:       Performed at:  [address] [place] [address]  [id number]  : Clark Cheung PhD, Phone:  [phone]    CBC & Differential [[id number]] Collected:  11/04/17 0431    Specimen:  Blood Updated:  11/04/17 0515    Narrative:       The following orders were created for panel order CBC & Differential.  Procedure                               Abnormality         Status                     ---------                               -----------         ------                     CBC Auto Differential[[id number]]        Abnormal            Final result                 Please view results for these tests on the individual orders.    CBC Auto Differential [[id number]]  (Abnormal) Collected:  11/04/17 0431    Specimen:  Blood Updated:  11/04/17 0515     WBC 13.94 (H) 10*3/mm3      RBC 4.29 10*6/mm3      Hemoglobin 12.7 g/dL      Hematocrit 42.4 %      MCV 98.8 (H) fL      MCH 29.6 pg      MCHC 30.0 (L) g/dL      RDW 14.0 %      RDW-SD 50.2 fl      MPV 10.9 fL      Platelets 311 10*3/mm3      Neutrophil % 68.6 %      Lymphocyte % 15.7 %      Monocyte % 14.9 (H) %      Eosinophil % 0.4 %      Basophil % 0.1 %      Immature Grans % 0.3 %      Neutrophils, Absolute 9.56 (H) 10*3/mm3      Lymphocytes, Absolute 2.19 10*3/mm3      Monocytes, Absolute 2.08 (H) 10*3/mm3      Eosinophils, Absolute 0.06 10*3/mm3      Basophils, Absolute 0.01 10*3/mm3      Immature Grans, Absolute 0.04 (H) 10*3/mm3     Comprehensive Metabolic Panel [[id number]]  (Abnormal) Collected:  11/04/17 0431    Specimen:  Blood Updated:   11/04/17 0523     Glucose 71 mg/dL      BUN 41 (H) mg/dL      Creatinine 1.83 (H) mg/dL      Sodium 137 mmol/L      Potassium 3.9 mmol/L      Chloride 78 (L) mmol/L      CO2 >40.0 (C) mmol/L      Calcium 8.6 mg/dL      Total Protein 6.7 g/dL      Albumin 3.70 g/dL      ALT (SGPT) 769 (H) U/L      AST (SGOT) 545 (H) U/L      Alkaline Phosphatase 88 U/L      Total Bilirubin 1.0 mg/dL      eGFR Non African Amer 29 (L) mL/min/1.73      Globulin 3.0 gm/dL      A/G Ratio 1.2 g/dL      BUN/Creatinine Ratio 22.4     Anion Gap -- mmol/L       Unable to calculate Anion Gap.       Blood Gas, Arterial [[id number]]  (Abnormal) Collected:  11/04/17 0651    Specimen:  Arterial Blood Updated:  11/04/17 0700     Site Right Brachial     Dao's Test N/A     pH, Arterial 7.490 (H) pH units      pCO2, Arterial 66.6 (H) mm Hg      pO2, Arterial 57.9 (L) mm Hg      HCO3, Arterial 50.7 (H) mmol/L      Base Excess, Arterial 23.1 (H) mmol/L      O2 Saturation, Arterial 90.6 (L) %      Temperature 37.0 C      Barometric Pressure for Blood Gas 752 mmHg      Modality BiPap     FIO2 30 %      Ventilator Mode NA     Set [facility] Resp Rate 12.0     IPAP 12     EPAP 4     Collected by [id number]          Radiology:    Imaging Results (last 24 hours)     Procedure Component Value Units Date/Time    CT Head Without Contrast [[id number]] Collected:  11/03/17 1922     Updated:  11/03/17 1931    Narrative:       CT BRAIN without contrast dated 11/3/2017 6:40 PM CDT     HISTORY: Confusion     COMPARISON: Compared to 11/01/2017      DOSE LENGTH PRODUCT: 473 mGy cm     In order to have a CT radiation dose as low as reasonably achievable,  Automated Exposure Control was utilized for adjustment of the mA and/or  KV according to patient size.     TECHNIQUE: Serial axial tomographic images of the brain were obtained  without the use of intravenous contrast.      FINDINGS:   The midline structures are nondisplaced. The ventricles and basilar  cisterns are normal in size  and configuration. There is no evidence of  intracranial hemorrhage or mass-effect. The gray-white matter  differentiation is maintained. The sulci have a normal configuration,  and there are no abnormal extra-axial fluid collections. The structures  of the posterior fossa are unremarkable.      The included orbits and their contents are unremarkable. The visualized  paranasal sinuses, mastoid air cells and middle ear cavities are clear.  The visualized osseous structures and overlying soft tissues of the  skull and face are unremarkable.        Impression:       1. No acute intracranial process.        This report was finalized on 11/03/2017 19:23 by Dr. Foster Clifton MD.            Assessment/Plan     Patient Active Problem List   Diagnosis Code   • NSTEMI (non-ST elevated myocardial infarction) I21.4   • Abnormal LFTs R79.89   • Abnormal US (ultrasound) of abdomen R93.5       I reviewed her CT and ultrasound with the radiologist, Dr. Otoole.  The abnormality on ultrasound was not visible on CT scan and it is compatible with focal fatty infiltration or radiologist.  There are no signs of infarction.    At this point there is no clear etiology for spike in her abnormal LFTs.  I will check a hepatitis A IgM and hepatitis B surface antigen to see if there is any evidence of acute viral infection.  Continue supportive care.  Her transaminases are trending down.      EMR Dragon/transcription disclaimer:  Much of this encounter note is electronic transcription/translation of spoken language to printed text.  The electronic translation of spoken language may be erroneous, or at times, nonsensical words or phrases may be inadvertently transcribed.  Although I have reviewed the note for such errors, some may still exist.    Dano Watkins MD  11/04/17  9:57 AM

## 2017-11-04 NOTE — PROGRESS NOTES
[facility] Medicine Services  INPATIENT PROGRESS NOTE    Length of Stay: 3  Date of Admission: 11/1/2017  Primary Care Physician: Jose Barajas MD    Subjective   Chief Complaint: follow up     HPI   The patient is currently sitting up in bed. She has CPAP on currently and doing well.  She states that she feels better than she did yesterday.  She is more coherent and alert.  However her renal function has declined since yesterday but her liver functions are improving.     Review of Systems   Constitutional: Positive for activity change. Negative for appetite change.   HENT: Negative for hearing loss, nosebleeds, tinnitus and trouble swallowing.    Eyes: Negative for visual disturbance.   Respiratory: Negative for chest tightness.    Gastrointestinal: Negative for abdominal distention, blood in stool, constipation and diarrhea.   Endocrine: Negative for cold intolerance, heat intolerance, polydipsia, polyphagia and polyuria.   Genitourinary: Negative for decreased urine volume, difficulty urinating, dysuria, flank pain, frequency and hematuria.   Allergic/Immunologic: Negative for immunocompromised state.   Neurological: Negative for syncope and light-headedness.   Hematological: Negative for adenopathy. Does not bruise/bleed easily.   Psychiatric/Behavioral: Negative for confusion and sleep disturbance. The patient is not nervous/anxious.       All pertinent negatives and positives are as above. All other systems have been reviewed and are negative unless otherwise stated.     Objective    Temp:  [97 °F (36.1 °C)-98.5 °F (36.9 °C)] 97 °F (36.1 °C)  Heart Rate:  [60-98] 68  Resp:  [18-20] 20  BP: ()/(47-64) 109/59     Physical Exam   Constitutional: She is oriented to person, place, and time. She appears well-developed and well-nourished.   HENT:   Head: Normocephalic and atraumatic.   Eyes: Conjunctivae and EOM are normal. Pupils are equal, round, and reactive to light.    Neck: Neck supple. No JVD present. No thyromegaly present.   Cardiovascular: Normal rate, regular rhythm, normal heart sounds and intact distal pulses.  Exam reveals no gallop and no friction rub.    No murmur heard.  Pulmonary/Chest: Effort normal. No respiratory distress. She has no wheezes. She has rales. She exhibits no tenderness.   Abdominal: Soft. Bowel sounds are normal. She exhibits no distension. There is no tenderness. There is no rebound and no guarding.   Musculoskeletal: Normal range of motion. She exhibits no edema, tenderness or deformity.   Lymphadenopathy:     She has no cervical adenopathy.   Neurological: She is alert and oriented to person, place, and time. She displays normal reflexes. No cranial nerve deficit. She exhibits normal muscle tone.   Skin: Skin is warm and dry. No rash noted.   Psychiatric: She has a normal mood and affect. Her behavior is normal. Judgment and thought content normal.   Vitals reviewed.    Results Review:  I have reviewed the labs, radiology results, and diagnostic studies.    Laboratory Data:     Results from last 7 days  Lab Units 11/04/17 0431 11/03/17 0619 11/01/17  1711   WBC 10*3/mm3 13.94* 12.58* 14.20*   HEMOGLOBIN g/dL 12.7 12.4 11.6*   HEMATOCRIT % 42.4 40.8 39.4   PLATELETS 10*3/mm3 311 275 292        Results from last 7 days  Lab Units 11/04/17  0431 11/03/17 0619 11/02/17  0257   SODIUM mmol/L 137 135 140   POTASSIUM mmol/L 3.9 5.0 3.9   CHLORIDE mmol/L 78* 80* 87*   CO2 mmol/L >40.0* >40.0* 39.0*   BUN mg/dL 41* 33* 26*   CREATININE mg/dL 1.83* 1.16 1.34   CALCIUM mg/dL 8.6 8.5 9.2   BILIRUBIN mg/dL 1.0 0.7 0.7   ALK PHOS U/L 88 87 110   ALT (SGPT) U/L 769* 915* 1454*   AST (SGOT) U/L 545* 856* 2453*   GLUCOSE mg/dL 71 98 153*     Radiology Data:   Imaging Results (last 24 hours)     Procedure Component Value Units Date/Time    CT Head Without Contrast [[id number]] Collected:  11/03/17 1922     Updated:  11/03/17 1931    Narrative:       CT BRAIN  without contrast dated 11/3/2017 6:40 PM CDT     HISTORY: Confusion     COMPARISON: Compared to 2017      DOSE LENGTH PRODUCT: 473 mGy cm     In order to have a CT radiation dose as low as reasonably achievable,  Automated Exposure Control was utilized for adjustment of the mA and/or  KV according to patient size.     TECHNIQUE: Serial axial tomographic images of the brain were obtained  without the use of intravenous contrast.      FINDINGS:   The midline structures are nondisplaced. The ventricles and basilar  cisterns are normal in size and configuration. There is no evidence of  intracranial hemorrhage or mass-effect. The gray-white matter  differentiation is maintained. The sulci have a normal configuration,  and there are no abnormal extra-axial fluid collections. The structures  of the posterior fossa are unremarkable.      The included orbits and their contents are unremarkable. The visualized  paranasal sinuses, mastoid air cells and middle ear cavities are clear.  The visualized osseous structures and overlying soft tissues of the  skull and face are unremarkable.        Impression:       1. No acute intracranial process.        This report was finalized on 2017 19:23 by Dr. Foster Clifton MD.          Teri Tim   Echocardiogram   Order# [id number]    Reading physician: Jose Barajas MD   Ordering physician: Buck Zepeda MD   Study date: 17         Patient Information      Patient Name MRN Sex  (Age)     Teri Tim [id number] Female 1960 (57 y.o.)       Admission Information      Admission Date/Time Discharge Date/Time Room/Bed     17  1627  401/1       Sedation Narrator Report      Sedation Narrator Report       Interpretation Summary      · Left ventricular systolic function is normal. Estimated EF = 55%.  · Left ventricular diastolic dysfunction (grade I) consistent with impaired relaxation.  · Right ventricular cavity is mildly dilated.  · Normal right  ventricular wall thickness, systolic function and septal motion noted.  · Moderate pulmonary hypertension is present.     I have reviewed the patient current medications.     Assessment/Plan     Assessment:  1. Acute on chronic hypercarbic and hypoxic respiratory failure, pCO2 68 and pO2 47.8  2. Acutely decompensated diastolic congestive heart failure, ECHO pending  3. Non-ST segment elevated myocardial infarction  4. Transaminitis with chronic hepatitis C  5. Ongoing tobacco abuse  6. Chronic obstructive pulmonary disease, no exacerbation   7. Leukocytosis  8. Abnormal TSH, check T3 and T4    Plan:  1. PCP to follow TSH  2. Stress to be done today  3. 2D echo -EF 55%  4. CBC, CMP in AM  5. Decrease Lasix to 20 mg PO Daily   6. US abdomen - lesion noted, but CT scan shows no lesion or mass.   7. Lactic reflex - normal  8. CT head normal   9. Tobacco cessation education  10. Lovenox 40 mg SQ for DVT prophylaxis  11. Continue Aldactone  12. Continue Bipap  13. Continue to trend liver enzymes    Discharge Planning: I expect the patient to be discharged to home in 1-2 days.    Discussed above with NP. Reduced dose of lasix to 20 due to mild hypotension this am. Monitor bp closely. Reviewed results of stress testing.  Reviewed notes and recs from cards.  Discussed with nursing.  MD Andrews Alberto, APRN   11/04/17   6:18 AM

## 2017-11-04 NOTE — PLAN OF CARE
Problem: Patient Care Overview (Adult)  Goal: Plan of Care Review  Outcome: Ongoing (interventions implemented as appropriate)    Problem: Pain, Acute (Adult)  Goal: Identify Related Risk Factors and Signs and Symptoms  Outcome: Outcome(s) achieved Date Met:  11/03/17  Goal: Acceptable Pain Control/Comfort Level  Outcome: Ongoing (interventions implemented as appropriate)

## 2017-11-04 NOTE — PLAN OF CARE
Problem: Patient Care Overview (Adult)  Goal: Plan of Care Review    11/04/17 9070   Coping/Psychosocial Response Interventions   Plan Of Care Reviewed With patient   Outcome Evaluation   Outcome Summary/Follow up Plan BP low this am/other VSS, s 70-81 on tele, c/o constant back pain/prn meds with mild relief, bipap remains in place unless pt eating (3LNC), no new issues noted at this time, cont to monitor   Patient Care Overview   Progress progress toward functional goals as expected       Goal: Adult Individualization and Mutuality  Outcome: Ongoing (interventions implemented as appropriate)  Goal: Discharge Needs Assessment  Outcome: Ongoing (interventions implemented as appropriate)    Problem: Pain, Acute (Adult)  Goal: Acceptable Pain Control/Comfort Level  Outcome: Ongoing (interventions implemented as appropriate)    Problem: Respiratory Insufficiency (Adult)  Goal: Identify Related Risk Factors and Signs and Symptoms  Outcome: Outcome(s) achieved Date Met:  11/04/17  Goal: Acid/Base Balance  Outcome: Ongoing (interventions implemented as appropriate)  Goal: Effective Ventilation  Outcome: Ongoing (interventions implemented as appropriate)

## 2017-11-05 LAB
ALBUMIN SERPL-MCNC: 3.1 G/DL (ref 3.5–5)
ALBUMIN/GLOB SERPL: 1.1 G/DL (ref 1.1–2.5)
ALP SERPL-CCNC: 72 U/L (ref 24–120)
ALT SERPL W P-5'-P-CCNC: 464 U/L (ref 0–54)
AMMONIA BLD-SCNC: <9 UMOL/L (ref 9–33)
ANION GAP SERPL CALCULATED.3IONS-SCNC: ABNORMAL MMOL/L (ref 4–13)
ARTERIAL PATENCY WRIST A: POSITIVE
AST SERPL-CCNC: 188 U/L (ref 7–45)
ATMOSPHERIC PRESS: 748 MMHG
BASE EXCESS BLDA CALC-SCNC: 12.8 MMOL/L (ref 0–2)
BASOPHILS # BLD AUTO: 0.01 10*3/MM3 (ref 0–0.2)
BASOPHILS NFR BLD AUTO: 0.1 % (ref 0–2)
BDY SITE: ABNORMAL
BILIRUB SERPL-MCNC: 1.3 MG/DL (ref 0.1–1)
BODY TEMPERATURE: 37 C
BUN BLD-MCNC: 34 MG/DL (ref 5–21)
BUN/CREAT SERPL: 26.2 (ref 7–25)
CALCIUM SPEC-SCNC: 8 MG/DL (ref 8.4–10.4)
CHLORIDE SERPL-SCNC: 87 MMOL/L (ref 98–110)
CO2 SERPL-SCNC: >40 MMOL/L (ref 24–31)
CREAT BLD-MCNC: 1.3 MG/DL (ref 0.5–1.4)
DEPRECATED RDW RBC AUTO: 50.3 FL (ref 40–54)
EOSINOPHIL # BLD AUTO: 0.15 10*3/MM3 (ref 0–0.7)
EOSINOPHIL NFR BLD AUTO: 0.9 % (ref 0–4)
ERYTHROCYTE [DISTWIDTH] IN BLOOD BY AUTOMATED COUNT: 14.1 % (ref 12–15)
GAS FLOW AIRWAY: 3 LPM
GFR SERPL CREATININE-BSD FRML MDRD: 43 ML/MIN/1.73
GLOBULIN UR ELPH-MCNC: 2.8 GM/DL
GLUCOSE BLD-MCNC: 77 MG/DL (ref 70–100)
HCO3 BLDA-SCNC: 40.7 MMOL/L (ref 20–26)
HCT VFR BLD AUTO: 37.7 % (ref 37–47)
HGB BLD-MCNC: 11.2 G/DL (ref 12–16)
IMM GRANULOCYTES # BLD: 0.04 10*3/MM3 (ref 0–0.03)
IMM GRANULOCYTES NFR BLD: 0.2 % (ref 0–5)
LYMPHOCYTES # BLD AUTO: 1.57 10*3/MM3 (ref 0.72–4.86)
LYMPHOCYTES NFR BLD AUTO: 9.6 % (ref 15–45)
Lab: ABNORMAL
Lab: ABNORMAL
MCH RBC QN AUTO: 29.4 PG (ref 28–32)
MCHC RBC AUTO-ENTMCNC: 29.7 G/DL (ref 33–36)
MCV RBC AUTO: 99 FL (ref 82–98)
MODALITY: ABNORMAL
MONOCYTES # BLD AUTO: 2.01 10*3/MM3 (ref 0.19–1.3)
MONOCYTES NFR BLD AUTO: 12.3 % (ref 4–12)
NEUTROPHILS # BLD AUTO: 12.6 10*3/MM3 (ref 1.87–8.4)
NEUTROPHILS NFR BLD AUTO: 76.9 % (ref 39–78)
NOTIFIED BY: ABNORMAL
NOTIFIED WHO: ABNORMAL
PCO2 BLDA: 69.9 MM HG (ref 35–45)
PH BLDA: 7.37 PH UNITS (ref 7.35–7.45)
PLATELET # BLD AUTO: 254 10*3/MM3 (ref 130–400)
PMV BLD AUTO: 10.7 FL (ref 6–12)
PO2 BLDA: 77.3 MM HG (ref 83–108)
POTASSIUM BLD-SCNC: 3.6 MMOL/L (ref 3.5–5.3)
PROT SERPL-MCNC: 5.9 G/DL (ref 6.3–8.7)
RBC # BLD AUTO: 3.81 10*6/MM3 (ref 4.2–5.4)
SAO2 % BLDCOA: 95.3 % (ref 94–99)
SODIUM BLD-SCNC: 137 MMOL/L (ref 135–145)
VENTILATOR MODE: ABNORMAL
WBC NRBC COR # BLD: 16.38 10*3/MM3 (ref 4.8–10.8)

## 2017-11-05 PROCEDURE — 94799 UNLISTED PULMONARY SVC/PX: CPT

## 2017-11-05 PROCEDURE — 25010000002 ENOXAPARIN PER 10 MG: Performed by: INTERNAL MEDICINE

## 2017-11-05 PROCEDURE — 85025 COMPLETE CBC W/AUTO DIFF WBC: CPT | Performed by: NURSE PRACTITIONER

## 2017-11-05 PROCEDURE — 36600 WITHDRAWAL OF ARTERIAL BLOOD: CPT

## 2017-11-05 PROCEDURE — 94660 CPAP INITIATION&MGMT: CPT

## 2017-11-05 PROCEDURE — 94760 N-INVAS EAR/PLS OXIMETRY 1: CPT

## 2017-11-05 PROCEDURE — 80053 COMPREHEN METABOLIC PANEL: CPT | Performed by: NURSE PRACTITIONER

## 2017-11-05 PROCEDURE — 97116 GAIT TRAINING THERAPY: CPT

## 2017-11-05 PROCEDURE — 97110 THERAPEUTIC EXERCISES: CPT

## 2017-11-05 PROCEDURE — 82140 ASSAY OF AMMONIA: CPT | Performed by: NURSE PRACTITIONER

## 2017-11-05 PROCEDURE — 82803 BLOOD GASES ANY COMBINATION: CPT

## 2017-11-05 PROCEDURE — 99232 SBSQ HOSP IP/OBS MODERATE 35: CPT | Performed by: INTERNAL MEDICINE

## 2017-11-05 RX ORDER — FUROSEMIDE 20 MG/1
10 TABLET ORAL DAILY
Status: DISCONTINUED | OUTPATIENT
Start: 2017-11-05 | End: 2017-11-07 | Stop reason: HOSPADM

## 2017-11-05 RX ADMIN — IPRATROPIUM BROMIDE AND ALBUTEROL SULFATE 3 ML: 2.5; .5 SOLUTION RESPIRATORY (INHALATION) at 15:16

## 2017-11-05 RX ADMIN — FUROSEMIDE 10 MG: 20 TABLET ORAL at 08:56

## 2017-11-05 RX ADMIN — POTASSIUM CHLORIDE 20 MEQ: 750 CAPSULE, EXTENDED RELEASE ORAL at 08:56

## 2017-11-05 RX ADMIN — VENLAFAXINE HYDROCHLORIDE 75 MG: 37.5 TABLET ORAL at 08:56

## 2017-11-05 RX ADMIN — PANTOPRAZOLE SODIUM 40 MG: 40 TABLET, DELAYED RELEASE ORAL at 05:24

## 2017-11-05 RX ADMIN — BUDESONIDE AND FORMOTEROL FUMARATE DIHYDRATE 2 PUFF: 160; 4.5 AEROSOL RESPIRATORY (INHALATION) at 07:23

## 2017-11-05 RX ADMIN — OXYCODONE HYDROCHLORIDE 5 MG: 5 TABLET ORAL at 08:55

## 2017-11-05 RX ADMIN — IPRATROPIUM BROMIDE AND ALBUTEROL SULFATE 3 ML: 2.5; .5 SOLUTION RESPIRATORY (INHALATION) at 07:23

## 2017-11-05 RX ADMIN — BUDESONIDE AND FORMOTEROL FUMARATE DIHYDRATE 2 PUFF: 160; 4.5 AEROSOL RESPIRATORY (INHALATION) at 19:54

## 2017-11-05 RX ADMIN — SPIRONOLACTONE 25 MG: 25 TABLET ORAL at 08:56

## 2017-11-05 RX ADMIN — VENLAFAXINE HYDROCHLORIDE 75 MG: 37.5 TABLET ORAL at 20:32

## 2017-11-05 RX ADMIN — IPRATROPIUM BROMIDE AND ALBUTEROL SULFATE 3 ML: 2.5; .5 SOLUTION RESPIRATORY (INHALATION) at 11:19

## 2017-11-05 RX ADMIN — METOPROLOL TARTRATE 12.5 MG: 25 TABLET, FILM COATED ORAL at 20:33

## 2017-11-05 RX ADMIN — ENOXAPARIN SODIUM 40 MG: 40 INJECTION SUBCUTANEOUS at 08:55

## 2017-11-05 RX ADMIN — OXYCODONE HYDROCHLORIDE 5 MG: 5 TABLET ORAL at 00:51

## 2017-11-05 RX ADMIN — METOPROLOL TARTRATE 12.5 MG: 25 TABLET, FILM COATED ORAL at 08:56

## 2017-11-05 RX ADMIN — OXYCODONE HYDROCHLORIDE 5 MG: 5 TABLET ORAL at 17:12

## 2017-11-05 RX ADMIN — FAMOTIDINE 40 MG: 20 TABLET, FILM COATED ORAL at 08:56

## 2017-11-05 RX ADMIN — IPRATROPIUM BROMIDE AND ALBUTEROL SULFATE 3 ML: 2.5; .5 SOLUTION RESPIRATORY (INHALATION) at 19:54

## 2017-11-05 RX ADMIN — NICOTINE 1 PATCH: 21 PATCH, EXTENDED RELEASE TRANSDERMAL at 20:35

## 2017-11-05 RX ADMIN — ASPIRIN 325 MG: 325 TABLET, COATED ORAL at 09:08

## 2017-11-05 NOTE — PLAN OF CARE
Problem: Patient Care Overview (Adult)  Goal: Plan of Care Review  Outcome: Ongoing (interventions implemented as appropriate)    11/05/17 0017   Coping/Psychosocial Response Interventions   Plan Of Care Reviewed With patient   Outcome Evaluation   Outcome Summary/Follow up Plan Pt has been hypotensive this shift. NSR 75-72. Pt c/o back pain. Pt on Bipap at all times with exception of eating and taking medications (3L NC). Will continue and notify MD of changes.   Patient Care Overview   Progress progress toward functional goals as expected       Goal: Adult Individualization and Mutuality  Outcome: Ongoing (interventions implemented as appropriate)    Problem: Pain, Acute (Adult)  Goal: Acceptable Pain Control/Comfort Level  Outcome: Ongoing (interventions implemented as appropriate)    Problem: Respiratory Insufficiency (Adult)  Goal: Acid/Base Balance  Outcome: Ongoing (interventions implemented as appropriate)  Goal: Effective Ventilation  Outcome: Ongoing (interventions implemented as appropriate)    Problem: Acute Coronary Syndrome (ACS) (Adult)  Goal: Signs and Symptoms of Listed Potential Problems Will be Absent or Manageable (Acute Coronary Syndrome)  Outcome: Ongoing (interventions implemented as appropriate)

## 2017-11-05 NOTE — PLAN OF CARE
Problem: Patient Care Overview (Adult)  Goal: Plan of Care Review  Outcome: Ongoing (interventions implemented as appropriate)    11/05/17 1528   Coping/Psychosocial Response Interventions   Plan Of Care Reviewed With patient;significant other   Outcome Evaluation   Outcome Summary/Follow up Plan VSS, PT HAS BEEN REFUSING BIPAP ALL DAY. CO2 ELEVATED ON ABGS.   Patient Care Overview   Progress no change       Goal: Adult Individualization and Mutuality  Outcome: Ongoing (interventions implemented as appropriate)  Goal: Discharge Needs Assessment  Outcome: Ongoing (interventions implemented as appropriate)    Problem: Pain, Acute (Adult)  Goal: Acceptable Pain Control/Comfort Level  Outcome: Ongoing (interventions implemented as appropriate)    Problem: Respiratory Insufficiency (Adult)  Goal: Acid/Base Balance  Outcome: Ongoing (interventions implemented as appropriate)  Goal: Effective Ventilation  Outcome: Ongoing (interventions implemented as appropriate)    Problem: Acute Coronary Syndrome (ACS) (Adult)  Goal: Signs and Symptoms of Listed Potential Problems Will be Absent or Manageable (Acute Coronary Syndrome)  Outcome: Ongoing (interventions implemented as appropriate)

## 2017-11-05 NOTE — PROGRESS NOTES
[facility] Medicine Services  INPATIENT PROGRESS NOTE    Length of Stay: 4  Date of Admission: 11/1/2017  Primary Care Physician: Jose Barajas MD    Subjective   Chief Complaint: follow up     HPI   The patient is currently sitting up in bed.  Her  is at the bedside.  She is much more alert and with it this morning.  She states that she has been up walking around and trying to move to get better.  She denies any fever or chills.     Review of Systems   Constitutional: Positive for activity change. Negative for appetite change.   HENT: Negative for hearing loss, nosebleeds, tinnitus and trouble swallowing.    Eyes: Negative for visual disturbance.   Respiratory: Negative for chest tightness.    Gastrointestinal: Negative for abdominal distention, blood in stool, constipation and diarrhea.   Endocrine: Negative for cold intolerance, heat intolerance, polydipsia, polyphagia and polyuria.   Genitourinary: Negative for decreased urine volume, difficulty urinating, dysuria, flank pain, frequency and hematuria.   Allergic/Immunologic: Negative for immunocompromised state.   Neurological: Negative for syncope and light-headedness.   Hematological: Negative for adenopathy. Does not bruise/bleed easily.   Psychiatric/Behavioral: Negative for confusion and sleep disturbance. The patient is not nervous/anxious.       All pertinent negatives and positives are as above. All other systems have been reviewed and are negative unless otherwise stated.     Objective    Temp:  [96.3 °F (35.7 °C)-98 °F (36.7 °C)] 97.3 °F (36.3 °C)  Heart Rate:  [60-89] 64  Resp:  [16-22] 16  BP: ()/(45-60) 94/54     Physical Exam   Constitutional: She is oriented to person, place, and time. She appears well-developed and well-nourished.   HENT:   Head: Normocephalic and atraumatic.   Eyes: Conjunctivae and EOM are normal. Pupils are equal, round, and reactive to light.   Neck: Neck supple. No JVD  present. No thyromegaly present.   Cardiovascular: Normal rate, regular rhythm, normal heart sounds and intact distal pulses.  Exam reveals no gallop and no friction rub.    No murmur heard.  Pulmonary/Chest: Effort normal. No respiratory distress. She has no wheezes. She has rales. She exhibits no tenderness.   Abdominal: Soft. Bowel sounds are normal. She exhibits no distension. There is no tenderness. There is no rebound and no guarding.   Musculoskeletal: Normal range of motion. She exhibits no edema, tenderness or deformity.   Lymphadenopathy:     She has no cervical adenopathy.   Neurological: She is alert and oriented to person, place, and time. She displays normal reflexes. No cranial nerve deficit. She exhibits normal muscle tone.   Skin: Skin is warm and dry. No rash noted.   Psychiatric: She has a normal mood and affect. Her behavior is normal. Judgment and thought content normal.   Vitals reviewed.    Results Review:  I have reviewed the labs, radiology results, and diagnostic studies.    Laboratory Data:     Results from last 7 days  Lab Units 11/05/17 0321 11/04/17 0431 11/03/17 0619   WBC 10*3/mm3 16.38* 13.94* 12.58*   HEMOGLOBIN g/dL 11.2* 12.7 12.4   HEMATOCRIT % 37.7 42.4 40.8   PLATELETS 10*3/mm3 254 311 275        Results from last 7 days  Lab Units 11/05/17 0321 11/04/17 0431 11/03/17 0619   SODIUM mmol/L 137 137 135   POTASSIUM mmol/L 3.6 3.9 5.0   CHLORIDE mmol/L 87* 78* 80*   CO2 mmol/L >40.0* >40.0* >40.0*   BUN mg/dL 34* 41* 33*   CREATININE mg/dL 1.30 1.83* 1.16   CALCIUM mg/dL 8.0* 8.6 8.5   BILIRUBIN mg/dL 1.3* 1.0 0.7   ALK PHOS U/L 72 88 87   ALT (SGPT) U/L 464* 769* 915*   AST (SGOT) U/L 188* 545* 856*   GLUCOSE mg/dL 77 71 98     Radiology Data:   Imaging Results (last 24 hours)     ** No results found for the last 24 hours. **          Teri Tim   Echocardiogram   Order# [id number]    Reading physician: Jose Barajas MD   Ordering physician: Buck Zepeda MD   Study  "date: 17         Patient Information      Patient Name DONG Sex  (Age)     Teri Tim [id number] Female 1960 (57 y.o.)       Admission Information      Admission Date/Time Discharge Date/Time Room/Bed     17  1627  401/1       Sedation Narrator Report      Sedation Narrator Report       Interpretation Summary      · Left ventricular systolic function is normal. Estimated EF = 55%.  · Left ventricular diastolic dysfunction (grade I) consistent with impaired relaxation.  · Right ventricular cavity is mildly dilated.  · Normal right ventricular wall thickness, systolic function and septal motion noted.  · Moderate pulmonary hypertension is present.     Teri Tim   Stress Echocardiogram   Order# [id number]    Reading physician: Jose Barajas MD   Ordering physician: Lizbeth Sanford PA-C   Study date: 11/3/17         Patient Information      Patient Name DONG Flannery DOB (Age)     Teri Tim [id number] Female 1960 (57 y.o.)       Admission Information      Admission Date/Time Discharge Date/Time Room/Bed     17  1627  401/1       Patient Height & Weight      Height Weight BSA (Calculated - sq m) BMI (kg/m2) Pulse     60\" (152.4 cm) 111 lb 6 oz (50.5 kg) 1.46 sq meters 21.82 64       Patient Vitals      BP Pulse     94/54 64       Reason For Exam      Chest Pain; Dyspnea       Cardiac History      Diagnosis Date Comment     CHF (congestive heart failure)       Hypertension         Social History      Tobacco Use      Current Every Day Smoker; Smokes 0.5 packs/day; Smoked: Cigarettes.     Smokeless Tobacco: Never used smokeless tobacco.              Family Cardiac History as of 2017      Problem Relation Age of Onset     Heart disease Brother      Heart disease Father        Interpretation Summary      · Left ventricular systolic function is normal. Estimated EF = 55%.  · Normal stress echo with no significant echocardiographic evidence for myocardial ischemia.  · Despite not reaching " target heart rate on maximal dose of dobutamine felt to be low risk     I have reviewed the patient current medications.     Assessment/Plan     Assessment:  1. Acute on chronic hypercarbic and hypoxic respiratory failure, pCO2 68 and pO2 47.8  2. Acutely decompensated diastolic congestive heart failure, ECHO pending  3. Non-ST segment elevated myocardial infarction  4. Transaminitis with chronic hepatitis C  5. Ongoing tobacco abuse  6. Chronic obstructive pulmonary disease, no exacerbation   7. Leukocytosis  8. Abnormal TSH, check T3 and T4    Plan:  1. PCP to follow TSH  2. Stress echo - low risk for ischemia   3. 2D echo -EF 55%  4. CBC, CMP in AM  5. Continue Lasix to 10 mg PO daily   6. US abdomen - lesion noted, but CT scan shows no lesion or mass.   7. Encourage ambulation   8. CT head - normal   9. Tobacco cessation education  10. Lovenox 40 mg SQ for DVT prophylaxis  11. Continue Aldactone  12. Continue Bipap  13. Continue to trend liver enzymes  14. Discontinued ACEi yesterday. Blood pressure 94/54, 90/56, 103/48.    Discharge Planning: I expect the patient to be discharged to home in 1-2 days.  Discussed above plan with NP.  Reviewed labs, meds, vitals.  Monitor bp and cr with above changes.  May need to d/c lasix or use on a few times per week schedule.  MD Andrews Bella, ROLAND   11/05/17   6:43 AM

## 2017-11-05 NOTE — PROGRESS NOTES
CC:  Elevated liver function test    Subjective:   With no complaints.  She tells me she has never been treated for hepatitis C.  Apparently her fibrosis score was low and she is being monitored.      Current Facility-Administered Medications:   •  aspirin EC tablet 325 mg, 325 mg, Oral, Daily, Buck Zepeda MD, 325 mg at 11/04/17 0845  •  bisacodyl (DULCOLAX) EC tablet 5 mg, 5 mg, Oral, Daily PRN, Buck Zepeda MD  •  budesonide-formoterol (SYMBICORT) 160-4.5 MCG/ACT inhaler 2 puff, 2 puff, Inhalation, BID - RT, Buck Zepeda MD, 2 puff at 11/05/17 0723  •  enoxaparin (LOVENOX) syringe 40 mg, 40 mg, Subcutaneous, Daily, Geovanny Howard DO, 40 mg at 11/04/17 0844  •  famotidine (PEPCID) tablet 40 mg, 40 mg, Oral, Daily, Buck Zepeda MD, 40 mg at 11/04/17 0845  •  furosemide (LASIX) tablet 10 mg, 10 mg, Oral, Daily, ROLAND Brink  •  ipratropium-albuterol (DUO-NEB) nebulizer solution 3 mL, 3 mL, Nebulization, 4x Daily - RT, ROLAND Brink, 3 mL at 11/05/17 0723  •  metoprolol tartrate (LOPRESSOR) tablet 12.5 mg, 12.5 mg, Oral, Q12H, Buck Zepeda MD, 12.5 mg at 11/04/17 2102  •  nicotine (NICODERM CQ) 21 MG/24HR patch 1 patch, 1 patch, Transdermal, Q24H, Buck Zepeda MD, 1 patch at 11/04/17 2103  •  ondansetron (ZOFRAN) injection 4 mg, 4 mg, Intravenous, Q6H PRN, Buck Zepeda MD  •  oxyCODONE (ROXICODONE) immediate release tablet 5 mg, 5 mg, Oral, Q8H PRN, Buck Zepeda MD, 5 mg at 11/05/17 0051  •  pantoprazole (PROTONIX) EC tablet 40 mg, 40 mg, Oral, Q AM, Buck Zepeda MD, 40 mg at 11/05/17 0524  •  potassium chloride (MICRO-K) CR capsule 20 mEq, 20 mEq, Oral, Daily, Buck Zepeda MD, 20 mEq at 11/04/17 0845  •  sodium chloride 0.9 % flush 1-10 mL, 1-10 mL, Intravenous, PRN, Buck Zepeda MD  •  spironolactone (ALDACTONE) tablet 25 mg, 25 mg, Oral, Daily, Buck Zepeda MD, 25 mg at 11/04/17 0845  •  venlafaxine (EFFEXOR) tablet 75 mg, 75 mg,  Oral, Q12H, Buck Zepeda MD, 75 mg at 11/04/17 2102    Review of Systems   Cardiovascular: Negative.    Gastrointestinal: Negative.          Vital Signs:  Temp:  [96.3 °F (35.7 °C)-98.9 °F (37.2 °C)] 98.9 °F (37.2 °C)  Heart Rate:  [64-89] 82  Resp:  [16-22] 18  BP: ()/(45-60) 105/50  Body mass index is 21.75 kg/(m^2).     Physical Exam   Constitutional: She is oriented to person, place, and time. She appears well-developed.   Cardiovascular: Normal rate.    Abdominal: Soft. Bowel sounds are normal. She exhibits no distension.   Neurological: She is alert and oriented to person, place, and time.        Results Review:     LABS:     Results from last 7 days  Lab Units 11/05/17  0321 11/04/17  0431 11/03/17  0619   WBC 10*3/mm3 16.38* 13.94* 12.58*   HEMOGLOBIN g/dL 11.2* 12.7 12.4   HEMATOCRIT % 37.7 42.4 40.8   PLATELETS 10*3/mm3 254 311 275         Results from last 7 days  Lab Units 11/05/17  0321 11/04/17  0431 11/03/17  0619   SODIUM mmol/L 137 137 135   POTASSIUM mmol/L 3.6 3.9 5.0   CHLORIDE mmol/L 87* 78* 80*   CO2 mmol/L >40.0* >40.0* >40.0*   BUN mg/dL 34* 41* 33*   CREATININE mg/dL 1.30 1.83* 1.16   CALCIUM mg/dL 8.0* 8.6 8.5   BILIRUBIN mg/dL 1.3* 1.0 0.7   ALK PHOS U/L 72 88 87   ALT (SGPT) U/L 464* 769* 915*   AST (SGOT) U/L 188* 545* 856*   GLUCOSE mg/dL 77 71 98         Results from last 7 days  Lab Units 11/01/17  1711   INR  1.38*         Lab Results  Lab Value Date/Time   LIPASE 77 11/02/2017 0257   LIPASE 31 11/19/2015 1304   LIPASE 169 09/09/2015 1450       Radiology:    Imaging Results (last 24 hours)     ** No results found for the last 24 hours. **          Impression/Plan:    Patient Active Problem List   Diagnosis   • NSTEMI (non-ST elevated myocardial infarction)   • Abnormal LFTs   • Abnormal US (ultrasound) of abdomen       1.  Elevated liver function tests.  Numbers are trending down.  Hepatitis A and B were negative.  She has never been treated for hepatitis C.  I think her  elevated transaminases or acute rather than related to her viral illness.  She has established care with [facility] gastroenterology for follow-up of her hepatitis C.  She will see them back after discharge.  Again, CT of the abdomen failed to identify any abnormality associated with the gallbladder, pancreas, or liver.  Per Dr. Watkins's note this appears to be in fatty change.  Nothing to add at this time.    EMR Dragon/transcription disclaimer: Much of this encounter note is electronic transcription/translation of spoken language to printed text.  The electronic translation of spoken language may permit erroneous, or at times, nonsensical words or phrases to be inadvertently transcribed.  Although I have reviewed the note for such errors, some may still exist.      Roberto Bryan MD  11/05/17  8:08 AM

## 2017-11-05 NOTE — THERAPY TREATMENT NOTE
Acute Care - Physical Therapy Treatment Note  [facility]     Patient Name: Teri Tim  : 1960  MRN: [id number]  Today's Date: 2017  Onset of Illness/Injury or Date of Surgery Date: 17  Date of Referral to PT: 17  Referring Physician: Dr. Zepeda    Admit Date: 2017    Visit Dx:    ICD-10-CM ICD-9-CM   1. NSTEMI (non-ST elevated myocardial infarction) I21.4 410.70   2. Elevated liver enzymes R74.8 790.5   3. Hepatitis C virus infection without hepatic coma, unspecified chronicity B19.20 070.70   4. Impaired functional mobility, balance, gait, and endurance Z74.09 V49.89     Patient Active Problem List   Diagnosis   • NSTEMI (non-ST elevated myocardial infarction)   • Abnormal LFTs   • Abnormal US (ultrasound) of abdomen               Adult Rehabilitation Note       17 1500 17 1400 17 1300    Rehab Assessment/Intervention    Discipline  physical therapy assistant  -EC physical therapy assistant  -EC    Document Type  therapy note (daily note)  -EC     Subjective Information  agree to therapy  -EC     Treatment Not Performed   --   requested wait until after shower, check back  -EC    Recorded by  [EC] Paxton Betts PTA [EC] Paxton Betts PTA    Bed Mobility, Assessment/Treatment    Bed Mob, Supine to Sit, [place] supervision required  -EC      Recorded by [EC] Paxton Betts PTA      Transfer Assessment/Treatment    Transfers, Sit-Stand [place] stand by assist  -EC      Transfers, Stand-Sit [place] stand by assist  -EC      Recorded by [EC] Paxton Betts PTA      Gait Assessment/Treatment    Gait, [place] Level contact guard assist  -EC      Gait, Assistive Device rolling walker  -EC      Gait, Distance (Feet) 35   seated rest then additional 35 ft.  -EC      Recorded by [EC] Paxton Betts PTA      Therapy Exercises    Bilateral Lower Extremities 10 reps;AROM:;sitting;ankle pumps/circles;20 reps;LAQ;hip flexion   20 reps AP all  others 10  -EC      Recorded by [EC] Paxton Betts PTA      Positioning and Restraints    Pre-Treatment Position  in bed  -EC     Post Treatment Position  bed  -EC     In Bed  sitting EOB;call light within reach;with family/caregiver;with other staff  -EC     Recorded by  [EC] Paxton Betts PTA       11/04/17 0900 11/03/17 1356       Rehab Assessment/Intervention    Discipline  physical therapy assistant  -AE     Document Type  therapy note (daily note)  -AE     Subjective Information  agree to therapy  -AE     Treatment Not Performed --   with other staff  -EC      Precautions/Limitations  fall precautions   confusion  -AE     Specific Treatment Considerations  --   Having difficulty following commands today  -AE     Recorded by [EC] Paxton Betts PTA [AE] Zoya Anthony PTA     Pain Assessment    Pain Assessment  No/denies pain  -AE     Recorded by  [AE] Zoya Anthony PTA     Bed Mobility, Assessment/Treatment    Bed Mobility, Comment  sitting EOB  -AE     Recorded by  [AE] Zoya Anthony PTA     Transfer Assessment/Treatment    Transfers, Sit-Stand [place]  minimum assist (75% patient effort);contact guard assist  -AE     Recorded by  [AE] Zoya Anthony PTA     Gait Assessment/Treatment    Gait, [place] Level  contact guard assist;minimum assist (75% patient effort)  -AE     Gait, Assistive Device  rolling walker  -AE     Gait, Distance (Feet)  35  -AE     Gait, Safety Issues  balance decreased during turns;step length decreased  -AE     Gait, Impairments  impaired balance;coordination impaired;strength decreased  -AE     Gait, Comment  --   1 LOB  -AE     Recorded by  [AE] Zoya Anthony PTA     Therapy Exercises    Bilateral Lower Extremities  15 reps;AROM:;sitting;hip abduction/adduction;LAQ;ankle pumps/circles   Pt needed a lot of vc's to complete task  -AE     Recorded by  [AE] Zoya Anthony PTA     Positioning and Restraints    Pre-Treatment Position  in bed  -AE     Post  Treatment Position  chair  -AE     In Chair  sitting;call light within reach  -AE     Recorded by  [AE] Zoya Anthony, PTA       User Key  (r) = Recorded By, (t) = Taken By, (c) = Cosigned By    Initials Name Effective Dates    EC Paxton Betts, PTA 08/02/16 -     AE Zoya Atnhony, PTA 06/22/15 -                 IP PT Goals       11/02/17 1031          Gait Training PT LTG    Gait Training Goal PT LTG, Date Established 11/02/17  -LYNETTE (r) AB (t) LYNETTE (c)      Gait Training Goal PT LTG, Time to Achieve by discharge  -LYNETTE (r) AB (t) LYNETTE (c)      Gait Training Goal PT LTG, [place] Level supervision required  -LYNETTE (r) AB (t) LYNETTE (c)      Gait Training Goal PT LTG, Assist Device cane, quad  -LYNETTE (r) AB (t) LYNETTE (c)      Gait Training Goal PT LTG, Distance to Achieve 100  -LYNETTE (r) AB (t) LYNETTE (c)      Dynamic Standing Balance PT LTG    Dynamic Standing Balance PT LTG, Date Established 11/02/17  -LYNETTE (r) AB (t) LYNETTE (c)      Dynamic Standing Balance PT LTG, Time to Achieve by discharge  -LYNETTE (r) AB (t) LYNETTE (c)      Dynamic Standing Balance PT LTG, [place] Level supervision required  -LYNETTE (r) AB (t) LYNETTE (c)      Dynamic Standing Balance PT LTG, Assist Device assistive Device  -LYNETTE (r) AB (t) LYNETTE (c)      Dynamic Standing Balance PT LTG, Additional Goal side step, reaching out of [place], lifting objects, picking object up from floor  -LYNETTE (r) AB (t) LYNETTE (c)        User Key  (r) = Recorded By, (t) = Taken By, (c) = Cosigned By    Initials Name Provider Type    LYNETTE Del Real, PT DPT Physical Therapist    AB Sonia Iyer, PT Student PT Student          Physical Therapy Education     Title: PT OT SLP Therapies (Active)     Topic: Physical Therapy (Active)     Point: Mobility training (Done)    Learning Progress Summary    Learner Readiness Method Response Comment Documented by Status   Patient Acceptance E VU Educated pt on benefits of activity and planned PT POC. AB 11/02/17 1102 Done               Point: Home exercise program  (Active)    Learning Progress Summary    Learner Readiness Method Response Comment Documented by Status   Patient Acceptance E NR ex's AE 11/03/17 1432 Active                      User Key     Initials Effective Dates Name Provider Type Discipline    AE 06/22/15 -  Zoya Anthony PTA Physical Therapy Assistant PT    AB 05/08/17 -  Sonia Iyer, PT Student PT Student PT                    PT Recommendation and Plan  Anticipated Equipment Needs At Discharge: quad cane  Anticipated Discharge Disposition: home with assist, home with home health, home with outpatient services  Planned Therapy Interventions: balance training, gait training, home exercise program, patient/family education, ROM (Range of Motion), strengthening, transfer training, bed mobility training  PT Frequency: daily, 2 times/day             Outcome Measures       11/05/17 1500 11/03/17 1356       How much help from another person do you currently need...    Turning from your back to your side while in flat bed without using bedrails? 4  -EC 4  -AE     Moving from lying on back to sitting on the side of a flat bed without bedrails? 4  -EC 4  -AE     Moving to and from a bed to a chair (including a wheelchair)? 3  -EC 3  -AE     Standing up from a chair using your arms (e.g., wheelchair, bedside chair)? 3  -EC 3  -AE     Climbing 3-5 steps with a railing? 2  -EC 2  -AE     To walk in hospital room? 3  -EC 3  -AE     AM-PAC 6 Clicks Score 19  -EC 19  -AE     Functional Assessment    Outcome Measure Options AM-PAC 6 Clicks Basic Mobility (PT)  -EC AM-PAC 6 Clicks Basic Mobility (PT)  -AE       User Key  (r) = Recorded By, (t) = Taken By, (c) = Cosigned By    Initials Name Provider Type    EC Paxton Betts PTA Physical Therapy Assistant    AE Zoya Anthony, DARRICK Physical Therapy Assistant           Time Calculation:         PT Charges       11/05/17 1515          Time Calculation    Start Time 1450  -EC      Stop Time 1515  -EC      Time Calculation  (min) 25 min  -EC      Time Calculation- PT    Total Timed Code Minutes- PT 25 minute(s)  -EC        User Key  (r) = Recorded By, (t) = Taken By, (c) = Cosigned By    Initials Name Provider Type    EC Paxton Betts PTA Physical Therapy Assistant          Therapy Charges for Today     Code Description Service Date Service Provider Modifiers Qty    34533478210 HC PT THER PROC EA 15 MIN 11/5/2017 Paxton Betts PTA GP 1    50982064080 HC GAIT TRAINING EA 15 MIN 11/5/2017 Paxton Betts PTA GP 1          PT G-Codes  Outcome Measure Options: AM-PAC 6 Clicks Basic Mobility (PT)  Score: 21  Functional Limitation: Mobility: Walking and moving around  Mobility: Walking and Moving Around Current Status (): At least 20 percent but less than 40 percent impaired, limited or restricted  Mobility: Walking and Moving Around Goal Status (): At least 1 percent but less than 20 percent impaired, limited or restricted    Paxton Betts PTA  11/5/2017

## 2017-11-06 ENCOUNTER — APPOINTMENT (OUTPATIENT)
Dept: GENERAL RADIOLOGY | Facility: HOSPITAL | Age: 57
End: 2017-11-06

## 2017-11-06 LAB
ALBUMIN SERPL-MCNC: 3.3 G/DL (ref 3.5–5)
ALBUMIN/GLOB SERPL: 1.1 G/DL (ref 1.1–2.5)
ALP SERPL-CCNC: 75 U/L (ref 24–120)
ALT SERPL W P-5'-P-CCNC: 332 U/L (ref 0–54)
ANION GAP SERPL CALCULATED.3IONS-SCNC: 5 MMOL/L (ref 4–13)
AST SERPL-CCNC: 82 U/L (ref 7–45)
BASOPHILS # BLD AUTO: 0.02 10*3/MM3 (ref 0–0.2)
BASOPHILS NFR BLD AUTO: 0.1 % (ref 0–2)
BILIRUB SERPL-MCNC: 1.5 MG/DL (ref 0.1–1)
BUN BLD-MCNC: 17 MG/DL (ref 5–21)
BUN/CREAT SERPL: 19.8 (ref 7–25)
CALCIUM SPEC-SCNC: 8.4 MG/DL (ref 8.4–10.4)
CHLORIDE SERPL-SCNC: 89 MMOL/L (ref 98–110)
CO2 SERPL-SCNC: 40 MMOL/L (ref 24–31)
CREAT BLD-MCNC: 0.86 MG/DL (ref 0.5–1.4)
D-LACTATE SERPL-SCNC: 0.8 MMOL/L (ref 0.5–2)
DEPRECATED RDW RBC AUTO: 49.8 FL (ref 40–54)
EOSINOPHIL # BLD AUTO: 0.91 10*3/MM3 (ref 0–0.7)
EOSINOPHIL NFR BLD AUTO: 4 % (ref 0–4)
ERYTHROCYTE [DISTWIDTH] IN BLOOD BY AUTOMATED COUNT: 13.9 % (ref 12–15)
GFR SERPL CREATININE-BSD FRML MDRD: 69 ML/MIN/1.73
GLOBULIN UR ELPH-MCNC: 2.9 GM/DL
GLUCOSE BLD-MCNC: 81 MG/DL (ref 70–100)
HCT VFR BLD AUTO: 35.7 % (ref 37–47)
HGB BLD-MCNC: 10.7 G/DL (ref 12–16)
IMM GRANULOCYTES # BLD: 0.1 10*3/MM3 (ref 0–0.03)
IMM GRANULOCYTES NFR BLD: 0.4 % (ref 0–5)
LYMPHOCYTES # BLD AUTO: 1.85 10*3/MM3 (ref 0.72–4.86)
LYMPHOCYTES NFR BLD AUTO: 8.1 % (ref 15–45)
MCH RBC QN AUTO: 29.3 PG (ref 28–32)
MCHC RBC AUTO-ENTMCNC: 30 G/DL (ref 33–36)
MCV RBC AUTO: 97.8 FL (ref 82–98)
MONOCYTES # BLD AUTO: 2.26 10*3/MM3 (ref 0.19–1.3)
MONOCYTES NFR BLD AUTO: 9.9 % (ref 4–12)
NEUTROPHILS # BLD AUTO: 17.58 10*3/MM3 (ref 1.87–8.4)
NEUTROPHILS NFR BLD AUTO: 77.5 % (ref 39–78)
PLATELET # BLD AUTO: 244 10*3/MM3 (ref 130–400)
PMV BLD AUTO: 10.7 FL (ref 6–12)
POTASSIUM BLD-SCNC: 3.7 MMOL/L (ref 3.5–5.3)
PROT SERPL-MCNC: 6.2 G/DL (ref 6.3–8.7)
RBC # BLD AUTO: 3.65 10*6/MM3 (ref 4.2–5.4)
SODIUM BLD-SCNC: 134 MMOL/L (ref 135–145)
WBC NRBC COR # BLD: 22.72 10*3/MM3 (ref 4.8–10.8)

## 2017-11-06 PROCEDURE — 94660 CPAP INITIATION&MGMT: CPT

## 2017-11-06 PROCEDURE — 94799 UNLISTED PULMONARY SVC/PX: CPT

## 2017-11-06 PROCEDURE — 25010000002 CEFTRIAXONE PER 250 MG: Performed by: INTERNAL MEDICINE

## 2017-11-06 PROCEDURE — 25010000002 METHYLPREDNISOLONE PER 40 MG: Performed by: NURSE PRACTITIONER

## 2017-11-06 PROCEDURE — 97530 THERAPEUTIC ACTIVITIES: CPT

## 2017-11-06 PROCEDURE — 80053 COMPREHEN METABOLIC PANEL: CPT | Performed by: NURSE PRACTITIONER

## 2017-11-06 PROCEDURE — 97116 GAIT TRAINING THERAPY: CPT

## 2017-11-06 PROCEDURE — 71010 HC CHEST PA OR AP: CPT

## 2017-11-06 PROCEDURE — 25010000002 ENOXAPARIN PER 10 MG: Performed by: INTERNAL MEDICINE

## 2017-11-06 PROCEDURE — 99231 SBSQ HOSP IP/OBS SF/LOW 25: CPT | Performed by: INTERNAL MEDICINE

## 2017-11-06 PROCEDURE — 99232 SBSQ HOSP IP/OBS MODERATE 35: CPT | Performed by: INTERNAL MEDICINE

## 2017-11-06 PROCEDURE — 85025 COMPLETE CBC W/AUTO DIFF WBC: CPT | Performed by: NURSE PRACTITIONER

## 2017-11-06 PROCEDURE — 83605 ASSAY OF LACTIC ACID: CPT | Performed by: INTERNAL MEDICINE

## 2017-11-06 RX ORDER — METHYLPREDNISOLONE SODIUM SUCCINATE 40 MG/ML
40 INJECTION, POWDER, LYOPHILIZED, FOR SOLUTION INTRAMUSCULAR; INTRAVENOUS EVERY 12 HOURS
Status: DISCONTINUED | OUTPATIENT
Start: 2017-11-06 | End: 2017-11-07 | Stop reason: HOSPADM

## 2017-11-06 RX ADMIN — OXYCODONE HYDROCHLORIDE 5 MG: 5 TABLET ORAL at 08:31

## 2017-11-06 RX ADMIN — VENLAFAXINE HYDROCHLORIDE 75 MG: 37.5 TABLET ORAL at 08:31

## 2017-11-06 RX ADMIN — METHYLPREDNISOLONE SODIUM SUCCINATE 40 MG: 40 INJECTION, POWDER, FOR SOLUTION INTRAMUSCULAR; INTRAVENOUS at 14:07

## 2017-11-06 RX ADMIN — IPRATROPIUM BROMIDE AND ALBUTEROL SULFATE 3 ML: 2.5; .5 SOLUTION RESPIRATORY (INHALATION) at 20:41

## 2017-11-06 RX ADMIN — IPRATROPIUM BROMIDE AND ALBUTEROL SULFATE 3 ML: 2.5; .5 SOLUTION RESPIRATORY (INHALATION) at 06:44

## 2017-11-06 RX ADMIN — VENLAFAXINE HYDROCHLORIDE 75 MG: 37.5 TABLET ORAL at 20:37

## 2017-11-06 RX ADMIN — IPRATROPIUM BROMIDE AND ALBUTEROL SULFATE 3 ML: 2.5; .5 SOLUTION RESPIRATORY (INHALATION) at 15:03

## 2017-11-06 RX ADMIN — FAMOTIDINE 40 MG: 20 TABLET, FILM COATED ORAL at 08:31

## 2017-11-06 RX ADMIN — ASPIRIN 325 MG: 325 TABLET, COATED ORAL at 08:32

## 2017-11-06 RX ADMIN — POTASSIUM CHLORIDE 20 MEQ: 750 CAPSULE, EXTENDED RELEASE ORAL at 08:31

## 2017-11-06 RX ADMIN — FUROSEMIDE 10 MG: 20 TABLET ORAL at 08:32

## 2017-11-06 RX ADMIN — SPIRONOLACTONE 25 MG: 25 TABLET ORAL at 08:26

## 2017-11-06 RX ADMIN — METOPROLOL TARTRATE 12.5 MG: 25 TABLET, FILM COATED ORAL at 20:38

## 2017-11-06 RX ADMIN — PANTOPRAZOLE SODIUM 40 MG: 40 TABLET, DELAYED RELEASE ORAL at 05:34

## 2017-11-06 RX ADMIN — METHYLPREDNISOLONE SODIUM SUCCINATE 40 MG: 40 INJECTION, POWDER, FOR SOLUTION INTRAMUSCULAR; INTRAVENOUS at 22:37

## 2017-11-06 RX ADMIN — IPRATROPIUM BROMIDE AND ALBUTEROL SULFATE 3 ML: 2.5; .5 SOLUTION RESPIRATORY (INHALATION) at 10:58

## 2017-11-06 RX ADMIN — BUDESONIDE AND FORMOTEROL FUMARATE DIHYDRATE 2 PUFF: 160; 4.5 AEROSOL RESPIRATORY (INHALATION) at 06:44

## 2017-11-06 RX ADMIN — BUDESONIDE AND FORMOTEROL FUMARATE DIHYDRATE 2 PUFF: 160; 4.5 AEROSOL RESPIRATORY (INHALATION) at 20:41

## 2017-11-06 RX ADMIN — OXYCODONE HYDROCHLORIDE 5 MG: 5 TABLET ORAL at 16:39

## 2017-11-06 RX ADMIN — ENOXAPARIN SODIUM 40 MG: 40 INJECTION SUBCUTANEOUS at 08:31

## 2017-11-06 RX ADMIN — NICOTINE 1 PATCH: 21 PATCH, EXTENDED RELEASE TRANSDERMAL at 20:38

## 2017-11-06 RX ADMIN — CEFTRIAXONE SODIUM 1 G: 1 INJECTION, POWDER, FOR SOLUTION INTRAMUSCULAR; INTRAVENOUS at 05:34

## 2017-11-06 RX ADMIN — OXYCODONE HYDROCHLORIDE 5 MG: 5 TABLET ORAL at 00:44

## 2017-11-06 NOTE — THERAPY TREATMENT NOTE
Acute Care - Physical Therapy Treatment Note  [facility]     Patient Name: Teri Tim  : 1960  MRN: [id number]  Today's Date: 2017  Onset of Illness/Injury or Date of Surgery Date: 17  Date of Referral to PT: 17  Referring Physician: Dr. Zepeda    Admit Date: 2017    Visit Dx:    ICD-10-CM ICD-9-CM   1. NSTEMI (non-ST elevated myocardial infarction) I21.4 410.70   2. Elevated liver enzymes R74.8 790.5   3. Hepatitis C virus infection without hepatic coma, unspecified chronicity B19.20 070.70   4. Impaired functional mobility, balance, gait, and endurance Z74.09 V49.89     Patient Active Problem List   Diagnosis   • NSTEMI (non-ST elevated myocardial infarction)   • Abnormal LFTs   • Abnormal US (ultrasound) of abdomen               Adult Rehabilitation Note       17 1513 17 0956 17 1500    Rehab Assessment/Intervention    Discipline physical therapy assistant  -LG physical therapy assistant  -LG     Document Type therapy note (daily note)  -LG      Subjective Information agree to therapy;complains of;weakness  -LG      Patient Effort, Rehab Treatment good  -LG      Treatment Not Performed  patient/family declined treatment  -LG     Treatment Not Performed, Comment  pt walked with nsg 5 minutes ago  -LG     Precautions/Limitations fall precautions   confusion  -LG      Specific Treatment Considerations  Pt just getting back to bed from walking 50' x 2 reps with nsg.   -LG     Recorded by [LG] Praful Fernandez PTA [LG] Praful Fernandez PTA     Bed Mobility, Assessment/Treatment    Bed Mob, Supine to Sit, [place] supervision required  -LG  supervision required  -EC    Bed Mob, Sit to Supine, [place] supervision required  -LG      Recorded by [LG] Praful Fernandez PTA  [EC] Paxton Betts PTA    Transfer Assessment/Treatment    Transfers, Sit-Stand [place] stand by assist  -LG  stand by assist  -EC    Transfers, Stand-Sit [place] stand by assist  -LG   stand by assist  -EC    Toilet Transfer, [place] supervision required  -LG      Recorded by [LG] Praful Fernandez PTA  [EC] Paxton Betts PTA    Gait Assessment/Treatment    Gait, [place] Level contact guard assist  -LG  contact guard assist  -EC    Gait, Assistive Device   rolling walker  -EC    Gait, Distance (Feet) 50   50' standing rest break then 50' back to room  -LG  35   seated rest then additional 35 ft.  -EC    Gait, Gait Pattern Analysis swing-to gait  -LG      Gait, Gait Deviations bilateral:;narrow base;step length decreased;stride length decreased;toe-to-floor clearance decreased  -LG      Gait, Safety Issues balance decreased during turns  -LG      Gait, Impairments strength decreased;impaired balance  -LG      Recorded by [LG] Praful Fernandez PTA  [EC] Paxton Betts PTA    Therapy Exercises    Bilateral Lower Extremities AROM:;20 reps;sitting;ankle pumps/circles;LAQ;knee flexion  -LG  10 reps;AROM:;sitting;ankle pumps/circles;20 reps;LAQ;hip flexion   20 reps AP all others 10  -EC    Recorded by [LG] Praful Fernandez PTA  [EC] Paxton Betts PTA    Positioning and Restraints    Pre-Treatment Position in bed  -LG      Post Treatment Position bed  -LG      In Bed fowlers;call light within reach;encouraged to call for assist;with family/caregiver;notified nsg  -LG      Recorded by [LG] Praful Fernandez PTA        11/05/17 1400 11/05/17 1300 11/04/17 0900    Rehab Assessment/Intervention    Discipline physical therapy assistant  -EC physical therapy assistant  -EC     Document Type therapy note (daily note)  -EC      Subjective Information agree to therapy  -EC      Treatment Not Performed  --   requested wait until after shower, check back  -EC --   with other staff  -EC    Recorded by [EC] Paxton Betts PTA [EC] Paxton Betts PTA [EC] Paxton Betts PTA    Positioning and Restraints    Pre-Treatment Position in bed  -EC      Post Treatment Position bed  -EC      In Bed sitting  EOB;call light within reach;with family/caregiver;with other staff  -EC      Recorded by [EC] Paxton L Alpesh, PTA        User Key  (r) = Recorded By, (t) = Taken By, (c) = Cosigned By    Initials Name Effective Dates    EC Paxton Betts, PTA 08/02/16 -     LG Praful Fernandez, PTA 08/02/16 -                 IP PT Goals       11/02/17 1031          Gait Training PT LTG    Gait Training Goal PT LTG, Date Established 11/02/17  -LYNETTE (r) AB (t) LYNETTE (c)      Gait Training Goal PT LTG, Time to Achieve by discharge  -LYNETTE (r) AB (t) LYNETTE (c)      Gait Training Goal PT LTG, [place] Level supervision required  -LYNETTE (r) AB (t) LYNETTE (c)      Gait Training Goal PT LTG, Assist Device cane, quad  -LYNETTE (r) AB (t) LYNETTE (c)      Gait Training Goal PT LTG, Distance to Achieve 100  -LYNETTE (r) AB (t) LYNETTE (c)      Dynamic Standing Balance PT LTG    Dynamic Standing Balance PT LTG, Date Established 11/02/17  -LYNETTE (r) AB (t) LYNETTE (c)      Dynamic Standing Balance PT LTG, Time to Achieve by discharge  -LYNETTE (r) AB (t) LYNETTE (c)      Dynamic Standing Balance PT LTG, [place] Level supervision required  -LYNETTE (r) AB (t) LYNETTE (c)      Dynamic Standing Balance PT LTG, Assist Device assistive Device  -LYNETTE (r) AB (t) LYNETTE (c)      Dynamic Standing Balance PT LTG, Additional Goal side step, reaching out of [place], lifting objects, picking object up from floor  -LYNETTE (r) AB (t) LYNETTE (c)        User Key  (r) = Recorded By, (t) = Taken By, (c) = Cosigned By    Initials Name Provider Type    LYNETTE Del Real, PT DPT Physical Therapist    AB Sonia Iyer, NAZANIN Student PT Student          Physical Therapy Education     Title: PT OT SLP Therapies (Active)     Topic: Physical Therapy (Active)     Point: Mobility training (Active)    Learning Progress Summary    Learner Readiness Method Response Comment Documented by Status   Patient Acceptance E,D NR Educated on benefits of activity. Instructed in Bed Mobility, Transfers, Gait, Therapeutic Exercises.  11/06/17 7417 Active     Acceptance E VU Educated pt on benefits of activity and planned PT POC. AB 11/02/17 1102 Done               Point: Home exercise program (Active)    Learning Progress Summary    Learner Readiness Method Response Comment Documented by Status   Patient Acceptance E,D NR Educated on benefits of activity. Instructed in Bed Mobility, Transfers, Gait, Therapeutic Exercises.  11/06/17 1513 Active    Acceptance E NR ex's AE 11/03/17 1432 Active                      User Key     Initials Effective Dates Name Provider Type Discipline    AE 06/22/15 -  Zoya Anthony, PTA Physical Therapy Assistant PT     08/02/16 -  Praful Fernandez, PTA Physical Therapy Assistant PT    AB 05/08/17 -  Sonia Iyer, PT Student PT Student PT                    PT Recommendation and Plan  Anticipated Equipment Needs At Discharge: quad cane  Anticipated Discharge Disposition: home with assist, home with home health, home with outpatient services  Planned Therapy Interventions: balance training, gait training, home exercise program, patient/family education, ROM (Range of Motion), strengthening, transfer training, bed mobility training  PT Frequency: daily, 2 times/day  Plan of Care Review  Plan Of Care Reviewed With: patient  Progress: progress toward functional goals is gradual  Outcome Summary/Follow up Plan: Pt Conditional [place] with Bed Mobility, Supervision for Transfers, CGA for Gait x 50' with standing rest break then 50' back to room. B LE strengthening exercises x 20 reps.           Outcome Measures       11/06/17 1537 11/05/17 1500       How much help from another person do you currently need...    Turning from your back to your side while in flat bed without using bedrails? 4  -LG 4  -EC     Moving from lying on back to sitting on the side of a flat bed without bedrails? 4  -LG 4  -EC     Moving to and from a bed to a chair (including a wheelchair)? 3  -LG 3  -EC     Standing up from a chair using your arms (e.g., wheelchair,  bedside chair)? 3  -LG 3  -EC     Climbing 3-5 steps with a railing? 2  -LG 2  -EC     To walk in hospital room? 3  -LG 3  -EC     AM-PAC 6 Clicks Score 19  -LG 19  -EC     Functional Assessment    Outcome Measure Options AM-PAC 6 Clicks Basic Mobility (PT)  -LG AM-PAC 6 Clicks Basic Mobility (PT)  -EC       User Key  (r) = Recorded By, (t) = Taken By, (c) = Cosigned By    Initials Name Provider Type    EC Paxton Betts PTA Physical Therapy Assistant     Praful Fernandez PTA Physical Therapy Assistant           Time Calculation:         PT Charges       11/06/17 1513          Time Calculation    Start Time 1513  -      Stop Time 1537  -      Time Calculation (min) 24 min  -      PT Received On 11/06/17  -      PT Goal Re-Cert Due Date 11/12/17  -      Time Calculation- PT    Total Timed Code Minutes- PT 24 minute(s)  -        User Key  (r) = Recorded By, (t) = Taken By, (c) = Cosigned By    Initials Name Provider Type     Praful Fernandez PTA Physical Therapy Assistant          Therapy Charges for Today     Code Description Service Date Service Provider Modifiers Qty    57488177875 HC GAIT TRAINING EA 15 MIN 11/6/2017 Praful Fernandez PTA GP 1    63155043684 HC PT THERAPEUTIC ACT EA 15 MIN 11/6/2017 Praful Fernandez PTA GP 1          PT G-Codes  Outcome Measure Options: AM-PAC 6 Clicks Basic Mobility (PT)  Score: 21  Functional Limitation: Mobility: Walking and moving around  Mobility: Walking and Moving Around Current Status (): At least 20 percent but less than 40 percent impaired, limited or restricted  Mobility: Walking and Moving Around Goal Status (): At least 1 percent but less than 20 percent impaired, limited or restricted    Praful Fernandez PTA  11/6/2017

## 2017-11-06 NOTE — PLAN OF CARE
Problem: Patient Care Overview (Adult)  Goal: Plan of Care Review  Outcome: Ongoing (interventions implemented as appropriate)  Goal: Adult Individualization and Mutuality  Outcome: Ongoing (interventions implemented as appropriate)    Problem: Respiratory Insufficiency (Adult)  Goal: Identify Related Risk Factors and Signs and Symptoms  Outcome: Ongoing (interventions implemented as appropriate)  Goal: Acid/Base Balance  Outcome: Ongoing (interventions implemented as appropriate)

## 2017-11-06 NOTE — PLAN OF CARE
Problem: Pain, Acute (Adult)  Goal: Acceptable Pain Control/Comfort Level  Outcome: Ongoing (interventions implemented as appropriate)    Problem: Acute Coronary Syndrome (ACS) (Adult)  Goal: Signs and Symptoms of Listed Potential Problems Will be Absent or Manageable (Acute Coronary Syndrome)  Outcome: Ongoing (interventions implemented as appropriate)

## 2017-11-06 NOTE — PROGRESS NOTES
[facility]enterology  Inpatient Progress Note  Teri Tim  1960    11/6/2017  Reason for Follow Up:  Elevated LFTs    Subjective     Subjective:   Pt is AAOx3 in the bed with family at bedside. No new complaints today. States she is feeling better. No GI related issues.     Current Facility-Administered Medications:   •  aspirin EC tablet 325 mg, 325 mg, Oral, Daily, Buck Zepeda MD, 325 mg at 11/06/17 0832  •  bisacodyl (DULCOLAX) EC tablet 5 mg, 5 mg, Oral, Daily PRN, Buck Zepeda MD  •  budesonide-formoterol (SYMBICORT) 160-4.5 MCG/ACT inhaler 2 puff, 2 puff, Inhalation, BID - RT, Buck Zepeda MD, 2 puff at 11/06/17 0644  •  cefTRIAXone (ROCEPHIN) 1 g/100 mL 0.9% NS (MBP), 1 g, Intravenous, Q24H, Buck Zepeda MD, 1 g at 11/06/17 0534  •  enoxaparin (LOVENOX) syringe 40 mg, 40 mg, Subcutaneous, Daily, Geovanny Howard DO, 40 mg at 11/06/17 0831  •  famotidine (PEPCID) tablet 40 mg, 40 mg, Oral, Daily, Buck Zepeda MD, 40 mg at 11/06/17 0831  •  furosemide (LASIX) tablet 10 mg, 10 mg, Oral, Daily, ROLAND Brink, 10 mg at 11/06/17 0832  •  ipratropium-albuterol (DUO-NEB) nebulizer solution 3 mL, 3 mL, Nebulization, 4x Daily - RT, ROLAND Brink, 3 mL at 11/06/17 0644  •  methylPREDNISolone sodium succinate (SOLU-Medrol) injection 40 mg, 40 mg, Intravenous, Q12H, ROLAND Brink  •  metoprolol tartrate (LOPRESSOR) tablet 12.5 mg, 12.5 mg, Oral, Q12H, Buck Zepeda MD, 12.5 mg at 11/05/17 2033  •  nicotine (NICODERM CQ) 21 MG/24HR patch 1 patch, 1 patch, Transdermal, Q24H, Buck Zepeda MD, 1 patch at 11/05/17 2035  •  ondansetron (ZOFRAN) injection 4 mg, 4 mg, Intravenous, Q6H PRN, Buck Zepeda MD  •  oxyCODONE (ROXICODONE) immediate release tablet 5 mg, 5 mg, Oral, Q8H PRN, Buck Zepeda MD, 5 mg at 11/06/17 0831  •  pantoprazole (PROTONIX) EC tablet 40 mg, 40 mg, Oral, Q AM, Buck Zepeda MD, 40 mg at 11/06/17 0534  •   potassium chloride (MICRO-K) CR capsule 20 mEq, 20 mEq, Oral, Daily, Buck Zepeda MD, 20 mEq at 11/06/17 0831  •  sodium chloride 0.9 % flush 1-10 mL, 1-10 mL, Intravenous, PRN, Buck Zepeda MD  •  spironolactone (ALDACTONE) tablet 25 mg, 25 mg, Oral, Daily, Buck Zepeda MD, 25 mg at 11/06/17 0826  •  venlafaxine (EFFEXOR) tablet 75 mg, 75 mg, Oral, Q12H, Buck Zepeda MD, 75 mg at 11/06/17 0831    Review of Systems:    Review of Systems   Constitutional: Negative for chills, diaphoresis and fever.   Respiratory: Negative for cough, choking, chest tightness and shortness of breath.    Cardiovascular: Negative for chest pain and palpitations.   Gastrointestinal: Negative for abdominal pain, nausea and vomiting.   Musculoskeletal: Negative for joint swelling.   Skin: Negative for color change and rash.   Neurological: Negative for headaches.   Psychiatric/Behavioral: Negative for confusion.        Objective     Vital Signs  Temp:  [97.5 °F (36.4 °C)-98.7 °F (37.1 °C)] 98.4 °F (36.9 °C)  Heart Rate:  [74-96] 87  Resp:  [16-22] 18  BP: ()/(42-51) 101/49  Body mass index is 20.76 kg/(m^2).    Intake/Output Summary (Last 24 hours) at 11/06/17 1041  Last data filed at 11/06/17 0918   Gross per 24 hour   Intake             1739 ml   Output             1400 ml   Net              339 ml     I/O this shift:  In: 240 [P.O.:240]  Out: -        Physical Exam:   General: patient awake, alert and cooperative, no acute distress   Eyes: Normal lids and lashes, no scleral icterus   Neck: supple, no JVD   Skin: warm and dry   Cardiovascular: regular rhythm and rate, no murmurs auscultated   Pulm: coarse bilaterally   Abdomen: soft, nontender, nondistended; normal bowel sounds   Psychiatric: Normal mood and behavior; converses appropriately     Results Review:    I have reviewed all of the patients current test results      Results from last 7 days  Lab Units 11/06/17  0356 11/05/17  0321 11/04/17  0431   WBC  10*3/mm3 22.72* 16.38* 13.94*   HEMOGLOBIN g/dL 10.7* 11.2* 12.7   HEMATOCRIT % 35.7* 37.7 42.4   PLATELETS 10*3/mm3 244 254 311         Results from last 7 days  Lab Units 11/06/17  0356 11/05/17  0321 11/04/17  0431   SODIUM mmol/L 134* 137 137   POTASSIUM mmol/L 3.7 3.6 3.9   CHLORIDE mmol/L 89* 87* 78*   CO2 mmol/L 40.0* >40.0* >40.0*   BUN mg/dL 17 34* 41*   CREATININE mg/dL 0.86 1.30 1.83*   CALCIUM mg/dL 8.4 8.0* 8.6   BILIRUBIN mg/dL 1.5* 1.3* 1.0   ALK PHOS U/L 75 72 88   ALT (SGPT) U/L 332* 464* 769*   AST (SGOT) U/L 82* 188* 545*   GLUCOSE mg/dL 81 77 71         Results from last 7 days  Lab Units 11/01/17  1711   INR  1.38*         Lab Results  Lab Value Date/Time   LIPASE 77 11/02/2017 0257   LIPASE 31 11/19/2015 1304   LIPASE 169 09/09/2015 1450       Radiology:    Imaging Results (last 24 hours)     Procedure Component Value Units Date/Time    XR Chest 1 View [[id number]] Collected:  11/06/17 0800     Updated:  11/06/17 0804    Narrative:       EXAMINATION: XR CHEST 1 VW- 11/6/2017 8:00 AM CST     HISTORY: Shortness of breath, clinically suspected pneumonia.     REPORT: Comparison is made with the study from 11/01/2017.     Lungs are hyperinflated, there are coarse interstitial markings. No lung  consolidation is identified. Heart size is normal. No pneumothorax or  pleural effusion is identified. The osseous structures are unremarkable.       Impression:       Advanced COPD with interstitial disease which appears  stable. No consolidating infiltrate is identified.  This report was finalized on 11/06/2017 08:01 by Dr. Marcial Leon MD.            Assessment/Plan     Patient Active Problem List   Diagnosis Code   • NSTEMI (non-ST elevated myocardial infarction) I21.4   • Abnormal LFTs R79.89   • Abnormal US (ultrasound) of abdomen R93.5       1. Elevated LFTs, known hepatitis C  2. NSTEMI    Nothing to add from a GI standpoint her liver functions are trending down she plans to follow up outpatient  with Dr Shane regarding her known liver disease.     EMR Dragon/transcription disclaimer: Much of this encounter note is electronic transcription/translation of spoken language to printed text. The electronic translation of spoken language may be erroneous, or at times, nonsensical words or phrases may be inadvertently transcribed. Although I have reviewed the note for such errors, some may still exist.    ROLAND Zamora  11/06/17  10:41 AM      I performed a history, physical examination, reviewed the progress note/consult note, and discussed the management of this patient with ROLAND Perez as her supervising physician.  I agree with the documented findings and plan of care as outlined with any exceptions or new recommendations noted below.    Numbers continue to decline.  She will need keep her follow-up one with [facility] gastroenterology.  We will sign off.    EMR Dragon/transcription disclaimer: Much of this encounter note is an electronic transcription/translation of spoken language to printed text.  The electronic translation of spoken language may permit erroneous, or at times, nonsensical words or phrases to be inadvertently transcribed.  Although I have reviewed the note for such errors, some may still exist.      Roberto Bryan MD  3:32 PM  11/6/2017

## 2017-11-06 NOTE — PLAN OF CARE
Problem: Patient Care Overview (Adult)  Goal: Plan of Care Review  Outcome: Ongoing (interventions implemented as appropriate)    11/06/17 1513   Coping/Psychosocial Response Interventions   Plan Of Care Reviewed With patient   Outcome Evaluation   Outcome Summary/Follow up Plan Pt Conditional Joanna with Bed Mobility, Supervision for Transfers, CGA for Gait x 50' with standing rest break then 50' back to room. B LE strengthening exercises x 20 reps.    Patient Care Overview   Progress progress toward functional goals is gradual

## 2017-11-06 NOTE — PROGRESS NOTES
[facility] -  Progress Note     LOS: 5 days   Patient Care Team:  Jose Barajas MD as PCP - General    Chief Complaint:Shortness of Breath    Subjective     Interval History:   No acute events overnight, sinus on telemetry. Still shortness of breath. Tolerated ambulation well.     Review of Systems:   Review of Systems   Constitution: Positive for weakness. Negative for chills, decreased appetite, fever, malaise/fatigue, weight gain and weight loss.   HENT: Negative for nosebleeds.    Eyes: Negative for visual disturbance.   Cardiovascular: Positive for dyspnea on exertion and leg swelling. Negative for chest pain, near-syncope, orthopnea, palpitations, paroxysmal nocturnal dyspnea and syncope.   Respiratory: Positive for cough and shortness of breath. Negative for hemoptysis and snoring.    Endocrine: Negative for cold intolerance and heat intolerance.   Hematologic/Lymphatic: Negative for bleeding problem. Does not bruise/bleed easily.   Skin: Negative for rash.   Musculoskeletal: Negative for back pain and falls.   Gastrointestinal: Negative for abdominal pain, constipation, diarrhea, heartburn, melena, nausea and vomiting.   Genitourinary: Negative for hematuria.   Neurological: Negative for dizziness, headaches and light-headedness.   Psychiatric/Behavioral: Negative for altered mental status.   Allergic/Immunologic: Negative for persistent infections.        Objective     Vital Sign Min/Max for last 24 hours  Temp  Min: 97.5 °F (36.4 °C)  Max: 98.7 °F (37.1 °C)   BP  Min: 96/50  Max: 112/51   Pulse  Min: 74  Max: 96   Resp  Min: 16  Max: 22   SpO2  Min: 91 %  Max: 99 %   Flow (L/min)  Min: 2  Max: 2   Weight  Min: 106 lb 5 oz (48.2 kg)  Max: 106 lb 5 oz (48.2 kg)     Last Weight    11/06/17  0013   Weight: 106 lb 5 oz (48.2 kg)         Intake/Output Summary (Last 24 hours) at 11/06/17 1032  Last data filed at 11/06/17 0918   Gross per 24 hour   Intake             1739 ml   Output              1400 ml   Net              339 ml         Physical Exam:  Physical Exam   Constitutional: She is oriented to person, place, and time. She appears well-developed and well-nourished.   HENT:   Head: Normocephalic and atraumatic.   Eyes: Pupils are equal, round, and reactive to light.   Neck: Normal range of motion. Neck supple. No JVD present. Carotid bruit is not present.   Cardiovascular: Normal rate, regular rhythm, normal heart sounds and intact distal pulses.    Pulmonary/Chest: Effort normal and breath sounds normal.   Coarse breath sounds   Abdominal: Soft. Bowel sounds are normal.   Musculoskeletal: Normal range of motion.   Neurological: She is alert and oriented to person, place, and time. She has normal reflexes.   Skin: Skin is warm and dry.   Psychiatric: She has a normal mood and affect. Her behavior is normal. Judgment and thought content normal.        Results Review:   Lab Results (last 72 hours)     Procedure Component Value Units Date/Time    Blood Gas, Arterial With Co-Ox [[id number]]  (Abnormal) Collected:  11/01/17 1710    Specimen:  Arterial Blood Updated:  11/01/17 1718     Site Right Brachial     Dao's Test N/A     pH, Arterial 7.349 (L) pH units      pCO2, Arterial 68.2 (C) mm Hg      pO2, Arterial 68.6 (L) mm Hg      HCO3, Arterial 37.5 (H) mmol/L      Base Excess, Arterial 9.6 (H) mmol/L      O2 Saturation, Arterial 91.9 (L) %      Hemoglobin, Blood Gas 11.7 (L) g/dL      Hematocrit, Blood Gas 35.9 (L) %      Oxyhemoglobin 88.3 (L) %      Methemoglobin 0.70 %      Carboxyhemoglobin 3.3 %      Temperature 37.0 C      Sodium, Arterial 137 mmol/L      Potassium, Arterial 4.3 mmol/L      Barometric Pressure for Blood Gas 748 mmHg      Modality Nasal Cannula     Flow Rate 2.0 lpm      Ventilator Mode NA     Notified Who Saurav HERMAN     Notified By [id number]     Notified Time 11/01/2017 17:17     Collected by [id number]    Magnesium [[id number]]  (Normal) Collected:  11/01/17 1711    Specimen:   Blood Updated:  11/01/17 1741     Magnesium 1.9 mg/dL     CK [[id number]]  (Normal) Collected:  11/01/17 1711    Specimen:  Blood Updated:  11/01/17 1753     Creatine Kinase 166 U/L     CK-MB [[id number]]  (Abnormal) Collected:  11/01/17 1711    Specimen:  Blood Updated:  11/01/17 1753     CKMB 6.32 (H) ng/mL     Myoglobin, Serum [[id number]]  (Abnormal) Collected:  11/01/17 1711    Specimen:  Blood Updated:  11/01/17 1753     Myoglobin 165.2 (H) ng/mL     Troponin [[id number]]  (Abnormal) Collected:  11/01/17 1711    Specimen:  Blood Updated:  11/01/17 1753     Troponin I 0.092 (H) ng/mL     BNP [[id number]]  (Abnormal) Collected:  11/01/17 1711    Specimen:  Blood Updated:  11/01/17 1753     proBNP 89925.0 (H) pg/mL     CK-MB Index [[id number]]  (Normal) Collected:  11/01/17 1711    Specimen:  Blood Updated:  11/01/17 1753     CK-MB Index 3.8 %     Comprehensive Metabolic Panel [[id number]]  (Abnormal) Collected:  11/01/17 1711    Specimen:  Blood Updated:  11/01/17 1800     Glucose 88 mg/dL      BUN 26 (H) mg/dL      Creatinine 1.41 (H) mg/dL      Sodium 137 mmol/L      Potassium 4.5 mmol/L      Chloride 93 (L) mmol/L      CO2 38.0 (H) mmol/L      Calcium 8.6 mg/dL      Total Protein 6.3 g/dL      Albumin 3.30 (L) g/dL      ALT (SGPT) 1236 (H) U/L      AST (SGOT) 3036 (H) U/L      Alkaline Phosphatase 98 U/L      Total Bilirubin 0.5 mg/dL      eGFR Non African Amer 39 (L) mL/min/1.73      Globulin 3.0 gm/dL      A/G Ratio 1.1 g/dL      BUN/Creatinine Ratio 18.4     Anion Gap 6.0 mmol/L     CBC & Differential [[id number]] Collected:  11/01/17 1711    Specimen:  Blood Updated:  11/01/17 1814    Narrative:       The following orders were created for panel order CBC & Differential.  Procedure                               Abnormality         Status                     ---------                               -----------         ------                     CBC Auto Differential[[id number]]        Abnormal            Final result                  Please view results for these tests on the individual orders.    CBC Auto Differential [[id number]]  (Abnormal) Collected:  11/01/17 1711    Specimen:  Blood Updated:  11/01/17 1814     WBC 14.20 (H) 10*3/mm3      RBC 3.88 (L) 10*6/mm3      Hemoglobin 11.6 (L) g/dL      Hematocrit 39.4 %      .5 (H) fL      MCH 29.9 pg      MCHC 29.4 (L) g/dL      RDW 13.9 %      RDW-SD 51.4 fl      MPV 10.7 fL      Platelets 292 10*3/mm3      Neutrophil % 72.1 %      Lymphocyte % 14.9 (L) %      Monocyte % 11.7 %      Eosinophil % 0.5 %      Basophil % 0.4 %      Immature Grans % 0.4 %      Neutrophils, Absolute 10.26 (H) 10*3/mm3      Lymphocytes, Absolute 2.11 10*3/mm3      Monocytes, Absolute 1.66 (H) 10*3/mm3      Eosinophils, Absolute 0.07 10*3/mm3      Basophils, Absolute 0.05 10*3/mm3      Immature Grans, Absolute 0.05 (H) 10*3/mm3      nRBC 0.0 /100 WBC     Ammonia [[id number]]  (Normal) Collected:  11/01/17 1927    Specimen:  Blood Updated:  11/01/17 1945     Ammonia 21 umol/L     Protime-INR [[id number]]  (Abnormal) Collected:  11/01/17 1711    Specimen:  Blood Updated:  11/01/17 1957     Protime 17.4 (H) Seconds      INR 1.38 (H)    aPTT [[id number]]  (Normal) Collected:  11/01/17 1711    Specimen:  Blood Updated:  11/01/17 1957     PTT 33.0 seconds     Ethanol [[id number]]  (Normal) Collected:  11/01/17 1711    Specimen:  Blood Updated:  11/01/17 2032     Ethanol % <0.010 %     Narrative:       Not for legal purposes. Chain of Custody not followed.     Troponin [[id number]]  (Abnormal) Collected:  11/01/17 1927    Specimen:  Blood Updated:  11/01/17 2114     Troponin I 0.100 (H) ng/mL     Hepatitis Panel, Acute [[id number]]  (Abnormal) Collected:  11/01/17 2001    Specimen:  Blood Updated:  11/01/17 2119     HCV S/C Ratio 35.50 (H)     Hepatitis C Ab Reactive (A)     Hep A IgM Negative     Hep B C IgM Negative     Hepatitis B Surface Ag Negative    Narrative:       This patient's sample tests reactive with a  high s/c ratio: >=8.00  Samples with high s/c ratios have been shown to repeat as positive using a different methodology 95% of the time or greater ([facility] MMWR No. RR-3, 2003).  Therefore, additional testing for verification of the result is not recommended.    Troponin [[id number]]  (Abnormal) Collected:  11/01/17 2146    Specimen:  Blood Updated:  11/01/17 2223     Troponin I 0.098 (H) ng/mL     Magnesium [[id number]]  (Normal) Collected:  11/02/17 0257    Specimen:  Blood Updated:  11/02/17 0331     Magnesium 1.7 mg/dL     Phosphorus [[id number]]  (Normal) Collected:  11/02/17 0257    Specimen:  Blood Updated:  11/02/17 0331     Phosphorus 3.8 mg/dL     Amylase [[id number]]  (Normal) Collected:  11/02/17 0257    Specimen:  Blood Updated:  11/02/17 0331     Amylase 50 U/L     Lipase [[id number]]  (Normal) Collected:  11/02/17 0257    Specimen:  Blood Updated:  11/02/17 0331     Lipase 77 U/L     Lactic Acid, Plasma [[id number]]  (Abnormal) Collected:  11/02/17 0257    Specimen:  Blood Updated:  11/02/17 0335     Lactate 5.6 (C) mmol/L     Hemoglobin A1c [[id number]] Collected:  11/02/17 0257    Specimen:  Blood Updated:  11/02/17 0342     Hemoglobin A1C 5.2 %     Narrative:       Less than 6.0           Non-Diabetic Range  6.0-7.0                 ADA Therapeutic Target  Greater than 7.0        Action Suggested    Troponin [[id number]]  (Abnormal) Collected:  11/02/17 0257    Specimen:  Blood Updated:  11/02/17 0343     Troponin I 0.080 (H) ng/mL     BNP [[id number]]  (Abnormal) Collected:  11/02/17 0257    Specimen:  Blood Updated:  11/02/17 0343     proBNP 96322.0 (H) pg/mL     Lipid Panel [[id number]]  (Abnormal) Collected:  11/02/17 0257    Specimen:  Blood Updated:  11/02/17 0359     Total Cholesterol 171 mg/dL      Triglycerides 119 mg/dL      HDL Cholesterol 52 mg/dL      LDL Cholesterol  114 (H) mg/dL      LDL/HDL Ratio 1.83    TSH [[id number]]  (Abnormal) Collected:  11/02/17 0257    Specimen:  Blood Updated:  11/02/17  0402     TSH 0.313 (L) mIU/mL     Comprehensive Metabolic Panel [[id number]]  (Abnormal) Collected:  11/02/17 0257    Specimen:  Blood Updated:  11/02/17 0405     Glucose 153 (H) mg/dL      BUN 26 (H) mg/dL      Creatinine 1.34 mg/dL      Sodium 140 mmol/L      Potassium 3.9 mmol/L      Chloride 87 (L) mmol/L      CO2 39.0 (H) mmol/L      Calcium 9.2 mg/dL      Total Protein 7.1 g/dL      Albumin 4.00 g/dL      ALT (SGPT) 1454 (H) U/L      AST (SGOT) 2453 (H) U/L      Alkaline Phosphatase 110 U/L      Total Bilirubin 0.7 mg/dL      eGFR Non African Amer 41 (L) mL/min/1.73      Globulin 3.1 gm/dL      A/G Ratio 1.3 g/dL      BUN/Creatinine Ratio 19.4     Anion Gap 14.0 (H) mmol/L     Ferritin [[id number]]  (Normal) Collected:  11/02/17 0257    Specimen:  Blood Updated:  11/02/17 0406     Ferritin 76.30 ng/mL     Blood Gas, Arterial [[id number]]  (Abnormal) Collected:  11/02/17 0428    Specimen:  Arterial Blood Updated:  11/02/17 0443     Site Right Radial     Dao's Test Positive     pH, Arterial 7.497 (H) pH units      pCO2, Arterial 55.9 (H) mm Hg      pO2, Arterial 47.8 (C) mm Hg      HCO3, Arterial 43.2 (H) mmol/L      Base Excess, Arterial 17.3 (H) mmol/L      O2 Saturation, Arterial 84.5 (L) %      Temperature 37.0 C      Barometric Pressure for Blood Gas 748 mmHg      Modality Room Air     Ventilator Mode NA     Notified Who [id number]     Notified By [email or web address]     Notified Time 11/02/2017 04:36     Collected by [id number]    Lactic Acid, Reflex Timer [[id number]] Collected:  11/02/17 0257    Specimen:  Blood Updated:  11/02/17 0646     Extra Tube Hold for add-ons.      Auto resulted.       Lactic Acid, Reflex [[id number]]  (Normal) Collected:  11/02/17 0810    Specimen:  Blood Updated:  11/02/17 0857     Lactate 1.5 mmol/L     T3, Free [[id number]]  (Abnormal) Collected:  11/02/17 0257    Specimen:  Blood Updated:  11/02/17 0912     T3, Free 2.72 (L) pg/mL     T4, Free [[id number]]  (Normal) Collected:  11/02/17 0257     Specimen:  Blood Updated:  11/02/17 0912     Free T4 1.59 ng/dL     AFP Tumor Marker [[id number]] Collected:  11/02/17 0810    Specimen:  Blood Updated:  11/02/17 1003    Troponin [[id number]]  (Abnormal) Collected:  11/02/17 0810    Specimen:  Blood Updated:  11/02/17 1009     Troponin I 0.061 (H) ng/mL     Urinalysis With / Culture If Indicated - Urine, Clean Catch [[id number]]  (Normal) Collected:  11/02/17 1022    Specimen:  Urine from Urine, Clean Catch Updated:  11/02/17 1045     Color, UA Yellow     Appearance, UA Clear     pH, UA 7.0     Specific Gravity, UA 1.008     Glucose, UA Negative     Ketones, UA Negative     Bilirubin, UA Negative     Blood, UA Negative     Protein, UA Negative     Leuk Esterase, UA Negative     Nitrite, UA Negative     Urobilinogen, UA 0.2 E.U./dL    Narrative:       Urine microscopic not indicated.    Urine Drug Screen - Urine, Clean Catch [[id number]]  (Abnormal) Collected:  11/02/17 1022    Specimen:  Urine from Urine, Clean Catch Updated:  11/02/17 1103     Amphetamine Screen, Urine Negative     Barbiturates Screen, Urine Negative     Benzodiazepine Screen, Urine Positive (A)     Cocaine Screen, Urine Negative     Methadone Screen, Urine Negative     Opiate Screen Negative     Phencyclidine (PCP), Urine Negative     THC, Screen, Urine Negative    Narrative:       Negative Thresholds For Drugs Screened in Urine:    Amphetamines          500 ng/ml  Barbiturates          200 ng/ml  Benzodiazepines       200 ng/ml  Cocaine               150 ng/ml  Methadone             150 ng/ml  Opiates               300 ng/ml  Phencyclidine         25 ng/ml  THC                      50 ng/ml    The normal value for all drugs tested is negative. This report includes final unconfirmed screening results.  A positive result by this assay can be, at your request, sent to the Reference Lab for confirmation by gas chromatography. Unconfirmed results must not be used for non-medical purposes, such as  employment or legal testing. Clinical consideration should be applied to any drug of abuse test result, particularly when unconfirmed results are used.    Peripheral Blood Smear - Blood, [[id number]] Collected:  11/01/17 1711    Specimen:  Blood Updated:  11/02/17 1338     Performed by: Sabrina Ulloa MD     Pathologist Interpretation Leukocytosis with macrocytic normochromic anemia.  Mild anisopoikilocytosis and polychromasia.  Negative for increase in blasts.  Negative for increase in schistocytes.     CBC & Differential [[id number]] Collected:  11/03/17 0619    Specimen:  Blood Updated:  11/03/17 0655    Narrative:       The following orders were created for panel order CBC & Differential.  Procedure                               Abnormality         Status                     ---------                               -----------         ------                     CBC Auto Differential[[id number]]        Abnormal            Final result                 Please view results for these tests on the individual orders.    CBC Auto Differential [[id number]]  (Abnormal) Collected:  11/03/17 0619    Specimen:  Blood Updated:  11/03/17 0655     WBC 12.58 (H) 10*3/mm3      RBC 4.14 (L) 10*6/mm3      Hemoglobin 12.4 g/dL      Hematocrit 40.8 %      MCV 98.6 (H) fL      MCH 30.0 pg      MCHC 30.4 (L) g/dL      RDW 14.0 %      RDW-SD 50.0 fl      MPV 10.6 fL      Platelets 275 10*3/mm3      Neutrophil % 71.6 %      Lymphocyte % 13.9 (L) %      Monocyte % 14.2 (H) %      Eosinophil % 0.1 %      Basophil % 0.0 %      Immature Grans % 0.2 %      Neutrophils, Absolute 9.01 (H) 10*3/mm3      Lymphocytes, Absolute 1.75 10*3/mm3      Monocytes, Absolute 1.79 (H) 10*3/mm3      Eosinophils, Absolute 0.01 10*3/mm3      Basophils, Absolute 0.00 10*3/mm3      Immature Grans, Absolute 0.02 10*3/mm3     Comprehensive Metabolic Panel [[id number]]  (Abnormal) Collected:  11/03/17 0619    Specimen:  Blood Updated:  11/03/17 0727     Glucose 98 mg/dL       BUN 33 (H) mg/dL      Creatinine 1.16 mg/dL      Sodium 135 mmol/L      Potassium 5.0 mmol/L      Chloride 80 (L) mmol/L      CO2 >40.0 (C) mmol/L      Calcium 8.5 mg/dL      Total Protein 6.3 g/dL      Albumin 3.30 (L) g/dL      ALT (SGPT) 915 (H) U/L      AST (SGOT) 856 (H) U/L      Alkaline Phosphatase 87 U/L      Total Bilirubin 0.7 mg/dL      eGFR Non African Amer 49 (L) mL/min/1.73      Globulin 3.0 gm/dL      A/G Ratio 1.1 g/dL      BUN/Creatinine Ratio 28.4 (H)     Anion Gap -- mmol/L       Unable to calculate Anion Gap.           Medication Review: yes  Current Facility-Administered Medications   Medication Dose Route Frequency Provider Last Rate Last Dose   • aspirin EC tablet 325 mg  325 mg Oral Daily Buck Zepeda MD   325 mg at 11/06/17 0832   • bisacodyl (DULCOLAX) EC tablet 5 mg  5 mg Oral Daily PRN Buck Zepeda MD       • budesonide-formoterol (SYMBICORT) 160-4.5 MCG/ACT inhaler 2 puff  2 puff Inhalation BID - RT Buck Zepeda MD   2 puff at 11/06/17 0644   • cefTRIAXone (ROCEPHIN) 1 g/100 mL 0.9% NS (MBP)  1 g Intravenous Q24H Buck Zepeda MD   1 g at 11/06/17 0534   • enoxaparin (LOVENOX) syringe 40 mg  40 mg Subcutaneous Daily Geovanny Howard DO   40 mg at 11/06/17 0831   • famotidine (PEPCID) tablet 40 mg  40 mg Oral Daily Buck Zepeda MD   40 mg at 11/06/17 0831   • furosemide (LASIX) tablet 10 mg  10 mg Oral Daily ROLAND Brink   10 mg at 11/06/17 0832   • ipratropium-albuterol (DUO-NEB) nebulizer solution 3 mL  3 mL Nebulization 4x Daily - RT ROLAND Brink   3 mL at 11/06/17 0644   • methylPREDNISolone sodium succinate (SOLU-Medrol) injection 40 mg  40 mg Intravenous Q12H ROLAND Brink       • metoprolol tartrate (LOPRESSOR) tablet 12.5 mg  12.5 mg Oral Q12H Buck Zepeda MD   12.5 mg at 11/05/17 2033   • nicotine (NICODERM CQ) 21 MG/24HR patch 1 patch  1 patch Transdermal Q24H Buck Zepeda MD   1 patch at 11/05/17 2035   •  ondansetron (ZOFRAN) injection 4 mg  4 mg Intravenous Q6H PRN Buck Zepeda MD       • oxyCODONE (ROXICODONE) immediate release tablet 5 mg  5 mg Oral Q8H PRN Buck Zepeda MD   5 mg at 11/06/17 0831   • pantoprazole (PROTONIX) EC tablet 40 mg  40 mg Oral Q AM Buck Zepeda MD   40 mg at 11/06/17 0534   • potassium chloride (MICRO-K) CR capsule 20 mEq  20 mEq Oral Daily Buck Zepeda MD   20 mEq at 11/06/17 0831   • sodium chloride 0.9 % flush 1-10 mL  1-10 mL Intravenous PRN Buck Zepeda MD       • spironolactone (ALDACTONE) tablet 25 mg  25 mg Oral Daily Buck Zepeda MD   25 mg at 11/06/17 0826   • venlafaxine (EFFEXOR) tablet 75 mg  75 mg Oral Q12H Buck Zepeda MD   75 mg at 11/06/17 0831         Assessment/Plan     Active Problems:  Likely Type II Troponin Elevation  Acute Cor Pulmonale  Acute exacerbation of COPD  Hepatic mass with elevated Liver Enzymes  Tobacco Abuse  Paroxysmal Atrial Fibrillation    Plan:  Maintaining NSR  Stress Echo normal  Tolerating oral diuretics  Low Salt Diet, Daily Weights and Strict I&O  COPD treatment and work up for hepatic mass per primary  Not a candidate for statins at this time  Monitor telemetry   Will see less often  May benefit with establishing care with pulmonary outpatient  Follow up appointment made    ROLAND Daniel  11/06/17  10:32 AM

## 2017-11-06 NOTE — PAYOR COMM NOTE
"Teri Tim (57 y.o. Female) [id number]   CONT STAY REQUEST PT REMAINS IN HOSPITAL  [facility] phone   Fax        Date of Birth Social Security Number Address Home Phone MRN    1960  [id number]    [place] [phone] [id number]    Yazidi Marital Status          [employer]        Admission Date Admission Type Admitting Provider Attending Provider Department, Room/Bed    11/1/17 Emergency Hema Garcia MD Thompson, Benjamin H, MD [facility] 4B, 401/1    Discharge Date Discharge Disposition Discharge Destination                      Attending Provider: Hema Garcia MD     Allergies:  Codeine    Isolation:  None   Infection:  None   Code Status:  FULL    Ht:  60\" (152.4 cm)   Wt:  106 lb 5 oz (48.2 kg)    Admission Cmt:  None   Principal Problem:  None                Active Insurance as of 11/1/2017     Primary Coverage     Payor Plan Insurance Group Employer/Plan Group    [employer] [place] [employer] [place]ICA[place]      Payor Plan Address Payor Plan Phone Number Effective From Effective To    [address] [address] [phone] 11/1/2017     [place] [address]       Subscriber Name Subscriber Birth Date Member ID       TERI TIM 1960 [id number]                 Emergency Contacts      (Rel.) Home Phone Work Phone Mobile Phone    Rich Tim (Spouse) [phone] -- [phone]            Hospital Medications (active)       Dose Frequency Start End    aspirin EC tablet 325 mg 325 mg Daily 11/2/2017     Sig - Route: Take 1 tablet by mouth Daily. - Oral    bisacodyl (DULCOLAX) EC tablet 5 mg 5 mg Daily PRN 11/1/2017     Sig - Route: Take 1 tablet by mouth Daily As Needed for Constipation. - Oral    budesonide-formoterol (SYMBICORT) 160-4.5 MCG/ACT inhaler 2 puff 2 puff 2 Times Daily - RT 11/1/2017     Sig - Route: Inhale 2 puffs 2 (Two) Times a Day. - Inhalation    cefTRIAXone (ROCEPHIN) 1 g/100 " mL 0.9% NS (MBP) 1 g Every 24 Hours 11/6/2017     Sig - Route: Infuse 100 mL into a venous catheter Daily. - Intravenous    Cosign for Ordering: Accepted by Buck Zepeda MD on 11/6/2017  6:20 AM    enoxaparin (LOVENOX) syringe 40 mg 40 mg Daily 11/2/2017     Sig - Route: Inject 0.4 mL under the skin Daily. - Subcutaneous    famotidine (PEPCID) tablet 40 mg 40 mg Daily 11/2/2017     Sig - Route: Take 2 tablets by mouth Daily. - Oral    furosemide (LASIX) tablet 10 mg 10 mg Daily 11/5/2017     Sig - Route: Take 0.5 tablets by mouth Daily. - Oral    ipratropium-albuterol (DUO-NEB) nebulizer solution 3 mL 3 mL 4 Times Daily - RT 11/2/2017     Sig - Route: Take 3 mL by nebulization 4 (Four) Times a Day. - Nebulization    methylPREDNISolone sodium succinate (SOLU-Medrol) injection 40 mg 40 mg Every 12 Hours 11/6/2017     Sig - Route: Infuse 1 mL into a venous catheter Every 12 (Twelve) Hours. - Intravenous    metoprolol tartrate (LOPRESSOR) tablet 12.5 mg 12.5 mg Every 12 Hours Scheduled 11/1/2017     Sig - Route: Take 0.5 tablets by mouth Every 12 (Twelve) Hours. - Oral    nicotine (NICODERM CQ) 21 MG/24HR patch 1 patch 1 patch Every 24 Hours 11/1/2017     Sig - Route: Place 1 patch on the skin Daily. - Transdermal    ondansetron (ZOFRAN) injection 4 mg 4 mg Every 6 Hours PRN 11/1/2017     Sig - Route: Infuse 2 mL into a venous catheter Every 6 (Six) Hours As Needed for Nausea or Vomiting. - Intravenous    oxyCODONE (ROXICODONE) immediate release tablet 5 mg 5 mg Every 8 Hours PRN 11/1/2017 11/11/2017    Sig - Route: Take 1 tablet by mouth Every 8 (Eight) Hours As Needed for Moderate Pain . - Oral    pantoprazole (PROTONIX) EC tablet 40 mg 40 mg Every Early Morning 11/2/2017     Sig - Route: Take 1 tablet by mouth Every Morning. - Oral    potassium chloride (MICRO-K) CR capsule 20 mEq 20 mEq Daily 11/2/2017     Sig - Route: Take 2 capsules by mouth Daily. - Oral    sodium chloride 0.9 % flush 1-10 mL 1-10 mL As  Needed 11/1/2017     Sig - Route: Infuse 1-10 mL into a venous catheter As Needed for Line Care. - Intravenous    spironolactone (ALDACTONE) tablet 25 mg 25 mg Daily 11/2/2017     Sig - Route: Take 1 tablet by mouth Daily. - Oral    venlafaxine (EFFEXOR) tablet 75 mg 75 mg Every 12 Hours Scheduled 11/1/2017     Sig - Route: Take 2 tablets by mouth Every 12 (Twelve) Hours. - Oral             Physician Progress Notes (last 24 hours) (Notes from 11/5/2017 11:23 AM through 11/6/2017 11:23 AM)      ROLAND Daniel at 11/6/2017 10:32 AM  Version 2 of 2         [facility] -  Progress Note     LOS: 5 days   Patient Care Team:  Jose Barajas MD as PCP - General    Chief Complaint:Shortness of Breath    Subjective     Interval History:   No acute events overnight, sinus on telemetry. Still shortness of breath. Tolerated ambulation well.     Review of Systems:   Review of Systems   Constitution: Positive for weakness. Negative for chills, decreased appetite, fever, malaise/fatigue, weight gain and weight loss.   HENT: Negative for nosebleeds.    Eyes: Negative for visual disturbance.   Cardiovascular: Positive for dyspnea on exertion and leg swelling. Negative for chest pain, near-syncope, orthopnea, palpitations, paroxysmal nocturnal dyspnea and syncope.   Respiratory: Positive for cough and shortness of breath. Negative for hemoptysis and snoring.    Endocrine: Negative for cold intolerance and heat intolerance.   Hematologic/Lymphatic: Negative for bleeding problem. Does not bruise/bleed easily.   Skin: Negative for rash.   Musculoskeletal: Negative for back pain and falls.   Gastrointestinal: Negative for abdominal pain, constipation, diarrhea, heartburn, melena, nausea and vomiting.   Genitourinary: Negative for hematuria.   Neurological: Negative for dizziness, headaches and light-headedness.   Psychiatric/Behavioral: Negative for altered mental status.   Allergic/Immunologic: Negative for  persistent infections.        Objective     Vital Sign Min/Max for last 24 hours  Temp  Min: 97.5 °F (36.4 °C)  Max: 98.7 °F (37.1 °C)   BP  Min: 96/50  Max: 112/51   Pulse  Min: 74  Max: 96   Resp  Min: 16  Max: 22   SpO2  Min: 91 %  Max: 99 %   Flow (L/min)  Min: 2  Max: 2   Weight  Min: 106 lb 5 oz (48.2 kg)  Max: 106 lb 5 oz (48.2 kg)     Last Weight    11/06/17  0013   Weight: 106 lb 5 oz (48.2 kg)         Intake/Output Summary (Last 24 hours) at 11/06/17 1032  Last data filed at 11/06/17 0918   Gross per 24 hour   Intake             1739 ml   Output             1400 ml   Net              339 ml         Physical Exam:  Physical Exam   Constitutional: She is oriented to person, place, and time. She appears well-developed and well-nourished.   HENT:   Head: Normocephalic and atraumatic.   Eyes: Pupils are equal, round, and reactive to light.   Neck: Normal range of motion. Neck supple. No JVD present. Carotid bruit is not present.   Cardiovascular: Normal rate, regular rhythm, normal heart sounds and intact distal pulses.    Pulmonary/Chest: Effort normal and breath sounds normal.   Coarse breath sounds   Abdominal: Soft. Bowel sounds are normal.   Musculoskeletal: Normal range of motion.   Neurological: She is alert and oriented to person, place, and time. She has normal reflexes.   Skin: Skin is warm and dry.   Psychiatric: She has a normal mood and affect. Her behavior is normal. Judgment and thought content normal.        Results Review:   Lab Results (last 72 hours)     Procedure Component Value Units Date/Time    Blood Gas, Arterial With Co-Ox [[id number]]  (Abnormal) Collected:  11/01/17 1710    Specimen:  Arterial Blood Updated:  11/01/17 1718     Site Right Brachial     Dao's Test N/A     pH, Arterial 7.349 (L) pH units      pCO2, Arterial 68.2 (C) mm Hg      pO2, Arterial 68.6 (L) mm Hg      HCO3, Arterial 37.5 (H) mmol/L      Base Excess, Arterial 9.6 (H) mmol/L      O2 Saturation, Arterial 91.9 (L) %       Hemoglobin, Blood Gas 11.7 (L) g/dL      Hematocrit, Blood Gas 35.9 (L) %      Oxyhemoglobin 88.3 (L) %      Methemoglobin 0.70 %      Carboxyhemoglobin 3.3 %      Temperature 37.0 C      Sodium, Arterial 137 mmol/L      Potassium, Arterial 4.3 mmol/L      Barometric Pressure for Blood Gas 748 mmHg      Modality Nasal Cannula     Flow Rate 2.0 lpm      Ventilator Mode NA     Notified Who Saurav HERMAN     Notified By [id number]     Notified Time 11/01/2017 17:17     Collected by [id number]    Magnesium [[id number]]  (Normal) Collected:  11/01/17 1711    Specimen:  Blood Updated:  11/01/17 1741     Magnesium 1.9 mg/dL     CK [[id number]]  (Normal) Collected:  11/01/17 1711    Specimen:  Blood Updated:  11/01/17 1753     Creatine Kinase 166 U/L     CK-MB [[id number]]  (Abnormal) Collected:  11/01/17 1711    Specimen:  Blood Updated:  11/01/17 1753     CKMB 6.32 (H) ng/mL     Myoglobin, Serum [[id number]]  (Abnormal) Collected:  11/01/17 1711    Specimen:  Blood Updated:  11/01/17 1753     Myoglobin 165.2 (H) ng/mL     Troponin [[id number]]  (Abnormal) Collected:  11/01/17 1711    Specimen:  Blood Updated:  11/01/17 1753     Troponin I 0.092 (H) ng/mL     BNP [[id number]]  (Abnormal) Collected:  11/01/17 1711    Specimen:  Blood Updated:  11/01/17 1753     proBNP 33071.0 (H) pg/mL     CK-MB Index [[id number]]  (Normal) Collected:  11/01/17 1711    Specimen:  Blood Updated:  11/01/17 1753     CK-MB Index 3.8 %     Comprehensive Metabolic Panel [[id number]]  (Abnormal) Collected:  11/01/17 1711    Specimen:  Blood Updated:  11/01/17 1800     Glucose 88 mg/dL      BUN 26 (H) mg/dL      Creatinine 1.41 (H) mg/dL      Sodium 137 mmol/L      Potassium 4.5 mmol/L      Chloride 93 (L) mmol/L      CO2 38.0 (H) mmol/L      Calcium 8.6 mg/dL      Total Protein 6.3 g/dL      Albumin 3.30 (L) g/dL      ALT (SGPT) 1236 (H) U/L      AST (SGOT) 3036 (H) U/L      Alkaline Phosphatase 98 U/L      Total Bilirubin 0.5 mg/dL      eGFR Non  Amer 39  (L) mL/min/1.73      Globulin 3.0 gm/dL      A/G Ratio 1.1 g/dL      BUN/Creatinine Ratio 18.4     Anion Gap 6.0 mmol/L     CBC & Differential [[id number]] Collected:  11/01/17 1711    Specimen:  Blood Updated:  11/01/17 1814    Narrative:       The following orders were created for panel order CBC & Differential.  Procedure                               Abnormality         Status                     ---------                               -----------         ------                     CBC Auto Differential[[id number]]        Abnormal            Final result                 Please view results for these tests on the individual orders.    CBC Auto Differential [[id number]]  (Abnormal) Collected:  11/01/17 1711    Specimen:  Blood Updated:  11/01/17 1814     WBC 14.20 (H) 10*3/mm3      RBC 3.88 (L) 10*6/mm3      Hemoglobin 11.6 (L) g/dL      Hematocrit 39.4 %      .5 (H) fL      MCH 29.9 pg      MCHC 29.4 (L) g/dL      RDW 13.9 %      RDW-SD 51.4 fl      MPV 10.7 fL      Platelets 292 10*3/mm3      Neutrophil % 72.1 %      Lymphocyte % 14.9 (L) %      Monocyte % 11.7 %      Eosinophil % 0.5 %      Basophil % 0.4 %      Immature Grans % 0.4 %      Neutrophils, Absolute 10.26 (H) 10*3/mm3      Lymphocytes, Absolute 2.11 10*3/mm3      Monocytes, Absolute 1.66 (H) 10*3/mm3      Eosinophils, Absolute 0.07 10*3/mm3      Basophils, Absolute 0.05 10*3/mm3      Immature Grans, Absolute 0.05 (H) 10*3/mm3      nRBC 0.0 /100 WBC     Ammonia [[id number]]  (Normal) Collected:  11/01/17 1927    Specimen:  Blood Updated:  11/01/17 1945     Ammonia 21 umol/L     Protime-INR [[id number]]  (Abnormal) Collected:  11/01/17 1711    Specimen:  Blood Updated:  11/01/17 1957     Protime 17.4 (H) Seconds      INR 1.38 (H)    aPTT [[id number]]  (Normal) Collected:  11/01/17 1711    Specimen:  Blood Updated:  11/01/17 1957     PTT 33.0 seconds     Ethanol [[id number]]  (Normal) Collected:  11/01/17 1711    Specimen:  Blood Updated:  11/01/17 2032      Ethanol % <0.010 %     Narrative:       Not for legal purposes. Chain of Custody not followed.     Troponin [[id number]]  (Abnormal) Collected:  11/01/17 1927    Specimen:  Blood Updated:  11/01/17 2114     Troponin I 0.100 (H) ng/mL     Hepatitis Panel, Acute [[id number]]  (Abnormal) Collected:  11/01/17 2001    Specimen:  Blood Updated:  11/01/17 2119     HCV S/C Ratio 35.50 (H)     Hepatitis C Ab Reactive (A)     Hep A IgM Negative     Hep B C IgM Negative     Hepatitis B Surface Ag Negative    Narrative:       This patient's sample tests reactive with a high s/c ratio: >=8.00  Samples with high s/c ratios have been shown to repeat as positive using a different methodology 95% of the time or greater ([facility] MMWR No. RR-3, 2003).  Therefore, additional testing for verification of the result is not recommended.    Troponin [[id number]]  (Abnormal) Collected:  11/01/17 2146    Specimen:  Blood Updated:  11/01/17 2223     Troponin I 0.098 (H) ng/mL     Magnesium [[id number]]  (Normal) Collected:  11/02/17 0257    Specimen:  Blood Updated:  11/02/17 0331     Magnesium 1.7 mg/dL     Phosphorus [[id number]]  (Normal) Collected:  11/02/17 0257    Specimen:  Blood Updated:  11/02/17 0331     Phosphorus 3.8 mg/dL     Amylase [[id number]]  (Normal) Collected:  11/02/17 0257    Specimen:  Blood Updated:  11/02/17 0331     Amylase 50 U/L     Lipase [[id number]]  (Normal) Collected:  11/02/17 0257    Specimen:  Blood Updated:  11/02/17 0331     Lipase 77 U/L     Lactic Acid, Plasma [[id number]]  (Abnormal) Collected:  11/02/17 0257    Specimen:  Blood Updated:  11/02/17 0335     Lactate 5.6 (C) mmol/L     Hemoglobin A1c [[id number]] Collected:  11/02/17 0257    Specimen:  Blood Updated:  11/02/17 0342     Hemoglobin A1C 5.2 %     Narrative:       Less than 6.0           Non-Diabetic Range  6.0-7.0                 ADA Therapeutic Target  Greater than 7.0        Action Suggested    Troponin [[id number]]  (Abnormal) Collected:  11/02/17  0257    Specimen:  Blood Updated:  11/02/17 0343     Troponin I 0.080 (H) ng/mL     BNP [[id number]]  (Abnormal) Collected:  11/02/17 0257    Specimen:  Blood Updated:  11/02/17 0343     proBNP 69543.0 (H) pg/mL     Lipid Panel [[id number]]  (Abnormal) Collected:  11/02/17 0257    Specimen:  Blood Updated:  11/02/17 0359     Total Cholesterol 171 mg/dL      Triglycerides 119 mg/dL      HDL Cholesterol 52 mg/dL      LDL Cholesterol  114 (H) mg/dL      LDL/HDL Ratio 1.83    TSH [[id number]]  (Abnormal) Collected:  11/02/17 0257    Specimen:  Blood Updated:  11/02/17 0402     TSH 0.313 (L) mIU/mL     Comprehensive Metabolic Panel [[id number]]  (Abnormal) Collected:  11/02/17 0257    Specimen:  Blood Updated:  11/02/17 0405     Glucose 153 (H) mg/dL      BUN 26 (H) mg/dL      Creatinine 1.34 mg/dL      Sodium 140 mmol/L      Potassium 3.9 mmol/L      Chloride 87 (L) mmol/L      CO2 39.0 (H) mmol/L      Calcium 9.2 mg/dL      Total Protein 7.1 g/dL      Albumin 4.00 g/dL      ALT (SGPT) 1454 (H) U/L      AST (SGOT) 2453 (H) U/L      Alkaline Phosphatase 110 U/L      Total Bilirubin 0.7 mg/dL      eGFR Non African Amer 41 (L) mL/min/1.73      Globulin 3.1 gm/dL      A/G Ratio 1.3 g/dL      BUN/Creatinine Ratio 19.4     Anion Gap 14.0 (H) mmol/L     Ferritin [[id number]]  (Normal) Collected:  11/02/17 0257    Specimen:  Blood Updated:  11/02/17 0406     Ferritin 76.30 ng/mL     Blood Gas, Arterial [[id number]]  (Abnormal) Collected:  11/02/17 0428    Specimen:  Arterial Blood Updated:  11/02/17 0443     Site Right Radial     Dao's Test Positive     pH, Arterial 7.497 (H) pH units      pCO2, Arterial 55.9 (H) mm Hg      pO2, Arterial 47.8 (C) mm Hg      HCO3, Arterial 43.2 (H) mmol/L      Base Excess, Arterial 17.3 (H) mmol/L      O2 Saturation, Arterial 84.5 (L) %      Temperature 37.0 C      Barometric Pressure for Blood Gas 748 mmHg      Modality Room Air     Ventilator Mode NA     Notified Who [id number]     Notified By  [email or web address]     Notified Time 11/02/2017 04:36     Collected by [id number]    Lactic Acid, Reflex Timer [[id number]] Collected:  11/02/17 0257    Specimen:  Blood Updated:  11/02/17 0646     Extra Tube Hold for add-ons.      Auto resulted.       Lactic Acid, Reflex [[id number]]  (Normal) Collected:  11/02/17 0810    Specimen:  Blood Updated:  11/02/17 0857     Lactate 1.5 mmol/L     T3, Free [[id number]]  (Abnormal) Collected:  11/02/17 0257    Specimen:  Blood Updated:  11/02/17 0912     T3, Free 2.72 (L) pg/mL     T4, Free [[id number]]  (Normal) Collected:  11/02/17 0257    Specimen:  Blood Updated:  11/02/17 0912     Free T4 1.59 ng/dL     AFP Tumor Marker [[id number]] Collected:  11/02/17 0810    Specimen:  Blood Updated:  11/02/17 1003    Troponin [[id number]]  (Abnormal) Collected:  11/02/17 0810    Specimen:  Blood Updated:  11/02/17 1009     Troponin I 0.061 (H) ng/mL     Urinalysis With / Culture If Indicated - Urine, Clean Catch [[id number]]  (Normal) Collected:  11/02/17 1022    Specimen:  Urine from Urine, Clean Catch Updated:  11/02/17 1045     Color, UA Yellow     Appearance, UA Clear     pH, UA 7.0     Specific Gravity, UA 1.008     Glucose, UA Negative     Ketones, UA Negative     Bilirubin, UA Negative     Blood, UA Negative     Protein, UA Negative     Leuk Esterase, UA Negative     Nitrite, UA Negative     Urobilinogen, UA 0.2 E.U./dL    Narrative:       Urine microscopic not indicated.    Urine Drug Screen - Urine, Clean Catch [[id number]]  (Abnormal) Collected:  11/02/17 1022    Specimen:  Urine from Urine, Clean Catch Updated:  11/02/17 1103     Amphetamine Screen, Urine Negative     Barbiturates Screen, Urine Negative     Benzodiazepine Screen, Urine Positive (A)     Cocaine Screen, Urine Negative     Methadone Screen, Urine Negative     Opiate Screen Negative     Phencyclidine (PCP), Urine Negative     THC, Screen, Urine Negative    Narrative:       Negative Thresholds For Drugs Screened in  Urine:    Amphetamines          500 ng/ml  Barbiturates          200 ng/ml  Benzodiazepines       200 ng/ml  Cocaine               150 ng/ml  Methadone             150 ng/ml  Opiates               300 ng/ml  Phencyclidine         25 ng/ml  THC                      50 ng/ml    The normal value for all drugs tested is negative. This report includes final unconfirmed screening results.  A positive result by this assay can be, at your request, sent to the Reference Lab for confirmation by gas chromatography. Unconfirmed results must not be used for non-medical purposes, such as employment or legal testing. Clinical consideration should be applied to any drug of abuse test result, particularly when unconfirmed results are used.    Peripheral Blood Smear - Blood, [[id number]] Collected:  11/01/17 1711    Specimen:  Blood Updated:  11/02/17 1338     Performed by: Sabrina Ulloa MD     Pathologist Interpretation Leukocytosis with macrocytic normochromic anemia.  Mild anisopoikilocytosis and polychromasia.  Negative for increase in blasts.  Negative for increase in schistocytes.     CBC & Differential [[id number]] Collected:  11/03/17 0619    Specimen:  Blood Updated:  11/03/17 0655    Narrative:       The following orders were created for panel order CBC & Differential.  Procedure                               Abnormality         Status                     ---------                               -----------         ------                     CBC Auto Differential[[id number]]        Abnormal            Final result                 Please view results for these tests on the individual orders.    CBC Auto Differential [[id number]]  (Abnormal) Collected:  11/03/17 0619    Specimen:  Blood Updated:  11/03/17 0655     WBC 12.58 (H) 10*3/mm3      RBC 4.14 (L) 10*6/mm3      Hemoglobin 12.4 g/dL      Hematocrit 40.8 %      MCV 98.6 (H) fL      MCH 30.0 pg      MCHC 30.4 (L) g/dL      RDW 14.0 %      RDW-SD 50.0 fl      MPV 10.6 fL       Platelets 275 10*3/mm3      Neutrophil % 71.6 %      Lymphocyte % 13.9 (L) %      Monocyte % 14.2 (H) %      Eosinophil % 0.1 %      Basophil % 0.0 %      Immature Grans % 0.2 %      Neutrophils, Absolute 9.01 (H) 10*3/mm3      Lymphocytes, Absolute 1.75 10*3/mm3      Monocytes, Absolute 1.79 (H) 10*3/mm3      Eosinophils, Absolute 0.01 10*3/mm3      Basophils, Absolute 0.00 10*3/mm3      Immature Grans, Absolute 0.02 10*3/mm3     Comprehensive Metabolic Panel [[id number]]  (Abnormal) Collected:  11/03/17 0619    Specimen:  Blood Updated:  11/03/17 0727     Glucose 98 mg/dL      BUN 33 (H) mg/dL      Creatinine 1.16 mg/dL      Sodium 135 mmol/L      Potassium 5.0 mmol/L      Chloride 80 (L) mmol/L      CO2 >40.0 (C) mmol/L      Calcium 8.5 mg/dL      Total Protein 6.3 g/dL      Albumin 3.30 (L) g/dL      ALT (SGPT) 915 (H) U/L      AST (SGOT) 856 (H) U/L      Alkaline Phosphatase 87 U/L      Total Bilirubin 0.7 mg/dL      eGFR Non African Amer 49 (L) mL/min/1.73      Globulin 3.0 gm/dL      A/G Ratio 1.1 g/dL      BUN/Creatinine Ratio 28.4 (H)     Anion Gap -- mmol/L       Unable to calculate Anion Gap.           Medication Review: yes  Current Facility-Administered Medications   Medication Dose Route Frequency Provider Last Rate Last Dose   • aspirin EC tablet 325 mg  325 mg Oral Daily Buck Zepeda MD   325 mg at 11/06/17 0832   • bisacodyl (DULCOLAX) EC tablet 5 mg  5 mg Oral Daily PRN Buck Zepeda MD       • budesonide-formoterol (SYMBICORT) 160-4.5 MCG/ACT inhaler 2 puff  2 puff Inhalation BID - RT Buck Zepeda MD   2 puff at 11/06/17 0644   • cefTRIAXone (ROCEPHIN) 1 g/100 mL 0.9% NS (MBP)  1 g Intravenous Q24H Buck Zepeda MD   1 g at 11/06/17 0534   • enoxaparin (LOVENOX) syringe 40 mg  40 mg Subcutaneous Daily Geovanny Howard DO   40 mg at 11/06/17 0831   • famotidine (PEPCID) tablet 40 mg  40 mg Oral Daily Buck Zepeda MD   40 mg at 11/06/17 0831   • furosemide (LASIX) tablet 10  mg  10 mg Oral Daily ROLAND Brink   10 mg at 11/06/17 0832   • ipratropium-albuterol (DUO-NEB) nebulizer solution 3 mL  3 mL Nebulization 4x Daily - RT ROLAND Brink   3 mL at 11/06/17 0644   • methylPREDNISolone sodium succinate (SOLU-Medrol) injection 40 mg  40 mg Intravenous Q12H ROLAND Brink       • metoprolol tartrate (LOPRESSOR) tablet 12.5 mg  12.5 mg Oral Q12H Buck Zepeda MD   12.5 mg at 11/05/17 2033   • nicotine (NICODERM CQ) 21 MG/24HR patch 1 patch  1 patch Transdermal Q24H Buck Zepeda MD   1 patch at 11/05/17 2035   • ondansetron (ZOFRAN) injection 4 mg  4 mg Intravenous Q6H PRN Buck Zepeda MD       • oxyCODONE (ROXICODONE) immediate release tablet 5 mg  5 mg Oral Q8H PRN Buck Zepeda MD   5 mg at 11/06/17 0831   • pantoprazole (PROTONIX) EC tablet 40 mg  40 mg Oral Q AM Buck Zepeda MD   40 mg at 11/06/17 0534   • potassium chloride (MICRO-K) CR capsule 20 mEq  20 mEq Oral Daily Buck Zepeda MD   20 mEq at 11/06/17 0831   • sodium chloride 0.9 % flush 1-10 mL  1-10 mL Intravenous PRN Buck Zepeda MD       • spironolactone (ALDACTONE) tablet 25 mg  25 mg Oral Daily Buck Zepeda MD   25 mg at 11/06/17 0826   • venlafaxine (EFFEXOR) tablet 75 mg  75 mg Oral Q12H Buck Zepeda MD   75 mg at 11/06/17 0831         Assessment/Plan     Active Problems:  Likely Type II Troponin Elevation  Acute Cor Pulmonale  Acute exacerbation of COPD  Hepatic mass with elevated Liver Enzymes  Tobacco Abuse  Paroxysmal Atrial Fibrillation    Plan:  Maintaining NSR  Stress Echo normal  Tolerating oral diuretics  Low Salt Diet, Daily Weights and Strict I&O  COPD treatment and work up for hepatic mass per primary  Not a candidate for statins at this time  Monitor telemetry   Will see less often  May benefit with establishing care with pulmonary outpatient  Follow up appointment made    Geryr Miller APRN  11/06/17  10:32 AM                    Electronically signed by ROLAND Daniel at 11/6/2017 10:34 AM      ROLAND Daniel at 11/6/2017 10:32 AM  Version 1 of 2         [facility] -  Progress Note     LOS: 5 days   Patient Care Team:  Jose Barajas MD as PCP - General    Chief Complaint:Shortness of Breath    Subjective     Interval History:   No acute events overnight, sinus on telemetry. Still shortness of breath. Tolerated ambulation well.     Review of Systems:   Review of Systems   Constitution: Positive for weakness. Negative for chills, decreased appetite, fever, malaise/fatigue, weight gain and weight loss.   HENT: Negative for nosebleeds.    Eyes: Negative for visual disturbance.   Cardiovascular: Positive for dyspnea on exertion and leg swelling. Negative for chest pain, near-syncope, orthopnea, palpitations, paroxysmal nocturnal dyspnea and syncope.   Respiratory: Positive for cough and shortness of breath. Negative for hemoptysis and snoring.    Endocrine: Negative for cold intolerance and heat intolerance.   Hematologic/Lymphatic: Negative for bleeding problem. Does not bruise/bleed easily.   Skin: Negative for rash.   Musculoskeletal: Negative for back pain and falls.   Gastrointestinal: Negative for abdominal pain, constipation, diarrhea, heartburn, melena, nausea and vomiting.   Genitourinary: Negative for hematuria.   Neurological: Negative for dizziness, headaches and light-headedness.   Psychiatric/Behavioral: Negative for altered mental status.   Allergic/Immunologic: Negative for persistent infections.        Objective     Vital Sign Min/Max for last 24 hours  Temp  Min: 97.5 °F (36.4 °C)  Max: 98.7 °F (37.1 °C)   BP  Min: 96/50  Max: 112/51   Pulse  Min: 74  Max: 96   Resp  Min: 16  Max: 22   SpO2  Min: 91 %  Max: 99 %   Flow (L/min)  Min: 2  Max: 2   Weight  Min: 106 lb 5 oz (48.2 kg)  Max: 106 lb 5 oz (48.2 kg)     Last Weight    11/06/17  0013   Weight: 106 lb 5 oz (48.2 kg)         Intake/Output  Summary (Last 24 hours) at 11/06/17 1032  Last data filed at 11/06/17 0918   Gross per 24 hour   Intake             1739 ml   Output             1400 ml   Net              339 ml         Physical Exam:  Physical Exam   Constitutional: She is oriented to person, place, and time. She appears well-developed and well-nourished.   HENT:   Head: Normocephalic and atraumatic.   Eyes: Pupils are equal, round, and reactive to light.   Neck: Normal range of motion. Neck supple. No JVD present. Carotid bruit is not present.   Cardiovascular: Normal rate, regular rhythm, normal heart sounds and intact distal pulses.    Pulmonary/Chest: Effort normal and breath sounds normal.   Coarse breath sounds   Abdominal: Soft. Bowel sounds are normal.   Musculoskeletal: Normal range of motion.   Neurological: She is alert and oriented to person, place, and time. She has normal reflexes.   Skin: Skin is warm and dry.   Psychiatric: She has a normal mood and affect. Her behavior is normal. Judgment and thought content normal.        Results Review:   Lab Results (last 72 hours)     Procedure Component Value Units Date/Time    Blood Gas, Arterial With Co-Ox [[id number]]  (Abnormal) Collected:  11/01/17 1710    Specimen:  Arterial Blood Updated:  11/01/17 1718     Site Right Brachial     Dao's Test N/A     pH, Arterial 7.349 (L) pH units      pCO2, Arterial 68.2 (C) mm Hg      pO2, Arterial 68.6 (L) mm Hg      HCO3, Arterial 37.5 (H) mmol/L      Base Excess, Arterial 9.6 (H) mmol/L      O2 Saturation, Arterial 91.9 (L) %      Hemoglobin, Blood Gas 11.7 (L) g/dL      Hematocrit, Blood Gas 35.9 (L) %      Oxyhemoglobin 88.3 (L) %      Methemoglobin 0.70 %      Carboxyhemoglobin 3.3 %      Temperature 37.0 C      Sodium, Arterial 137 mmol/L      Potassium, Arterial 4.3 mmol/L      Barometric Pressure for Blood Gas 748 mmHg      Modality Nasal Cannula     Flow Rate 2.0 lpm      Ventilator Mode NA     Notified Who Saurav HERMAN     Notified By  [id number]     Notified Time 11/01/2017 17:17     Collected by [id number]    Magnesium [[id number]]  (Normal) Collected:  11/01/17 1711    Specimen:  Blood Updated:  11/01/17 1741     Magnesium 1.9 mg/dL     CK [[id number]]  (Normal) Collected:  11/01/17 1711    Specimen:  Blood Updated:  11/01/17 1753     Creatine Kinase 166 U/L     CK-MB [[id number]]  (Abnormal) Collected:  11/01/17 1711    Specimen:  Blood Updated:  11/01/17 1753     CKMB 6.32 (H) ng/mL     Myoglobin, Serum [[id number]]  (Abnormal) Collected:  11/01/17 1711    Specimen:  Blood Updated:  11/01/17 1753     Myoglobin 165.2 (H) ng/mL     Troponin [[id number]]  (Abnormal) Collected:  11/01/17 1711    Specimen:  Blood Updated:  11/01/17 1753     Troponin I 0.092 (H) ng/mL     BNP [[id number]]  (Abnormal) Collected:  11/01/17 1711    Specimen:  Blood Updated:  11/01/17 1753     proBNP 76575.0 (H) pg/mL     CK-MB Index [[id number]]  (Normal) Collected:  11/01/17 1711    Specimen:  Blood Updated:  11/01/17 1753     CK-MB Index 3.8 %     Comprehensive Metabolic Panel [[id number]]  (Abnormal) Collected:  11/01/17 1711    Specimen:  Blood Updated:  11/01/17 1800     Glucose 88 mg/dL      BUN 26 (H) mg/dL      Creatinine 1.41 (H) mg/dL      Sodium 137 mmol/L      Potassium 4.5 mmol/L      Chloride 93 (L) mmol/L      CO2 38.0 (H) mmol/L      Calcium 8.6 mg/dL      Total Protein 6.3 g/dL      Albumin 3.30 (L) g/dL      ALT (SGPT) 1236 (H) U/L      AST (SGOT) 3036 (H) U/L      Alkaline Phosphatase 98 U/L      Total Bilirubin 0.5 mg/dL      eGFR Non African Amer 39 (L) mL/min/1.73      Globulin 3.0 gm/dL      A/G Ratio 1.1 g/dL      BUN/Creatinine Ratio 18.4     Anion Gap 6.0 mmol/L     CBC & Differential [[id number]] Collected:  11/01/17 1711    Specimen:  Blood Updated:  11/01/17 1814    Narrative:       The following orders were created for panel order CBC & Differential.  Procedure                               Abnormality         Status                     ---------                                -----------         ------                     CBC Auto Differential[[id number]]        Abnormal            Final result                 Please view results for these tests on the individual orders.    CBC Auto Differential [[id number]]  (Abnormal) Collected:  11/01/17 1711    Specimen:  Blood Updated:  11/01/17 1814     WBC 14.20 (H) 10*3/mm3      RBC 3.88 (L) 10*6/mm3      Hemoglobin 11.6 (L) g/dL      Hematocrit 39.4 %      .5 (H) fL      MCH 29.9 pg      MCHC 29.4 (L) g/dL      RDW 13.9 %      RDW-SD 51.4 fl      MPV 10.7 fL      Platelets 292 10*3/mm3      Neutrophil % 72.1 %      Lymphocyte % 14.9 (L) %      Monocyte % 11.7 %      Eosinophil % 0.5 %      Basophil % 0.4 %      Immature Grans % 0.4 %      Neutrophils, Absolute 10.26 (H) 10*3/mm3      Lymphocytes, Absolute 2.11 10*3/mm3      Monocytes, Absolute 1.66 (H) 10*3/mm3      Eosinophils, Absolute 0.07 10*3/mm3      Basophils, Absolute 0.05 10*3/mm3      Immature Grans, Absolute 0.05 (H) 10*3/mm3      nRBC 0.0 /100 WBC     Ammonia [[id number]]  (Normal) Collected:  11/01/17 1927    Specimen:  Blood Updated:  11/01/17 1945     Ammonia 21 umol/L     Protime-INR [[id number]]  (Abnormal) Collected:  11/01/17 1711    Specimen:  Blood Updated:  11/01/17 1957     Protime 17.4 (H) Seconds      INR 1.38 (H)    aPTT [[id number]]  (Normal) Collected:  11/01/17 1711    Specimen:  Blood Updated:  11/01/17 1957     PTT 33.0 seconds     Ethanol [[id number]]  (Normal) Collected:  11/01/17 1711    Specimen:  Blood Updated:  11/01/17 2032     Ethanol % <0.010 %     Narrative:       Not for legal purposes. Chain of Custody not followed.     Troponin [[id number]]  (Abnormal) Collected:  11/01/17 1927    Specimen:  Blood Updated:  11/01/17 2114     Troponin I 0.100 (H) ng/mL     Hepatitis Panel, Acute [[id number]]  (Abnormal) Collected:  11/01/17 2001    Specimen:  Blood Updated:  11/01/17 2119     HCV S/C Ratio 35.50 (H)     Hepatitis C Ab Reactive (A)     Hep A  IgM Negative     Hep B C IgM Negative     Hepatitis B Surface Ag Negative    Narrative:       This patient's sample tests reactive with a high s/c ratio: >=8.00  Samples with high s/c ratios have been shown to repeat as positive using a different methodology 95% of the time or greater ([facility] MMWR No. RR-3, 2003).  Therefore, additional testing for verification of the result is not recommended.    Troponin [[id number]]  (Abnormal) Collected:  11/01/17 2146    Specimen:  Blood Updated:  11/01/17 2223     Troponin I 0.098 (H) ng/mL     Magnesium [[id number]]  (Normal) Collected:  11/02/17 0257    Specimen:  Blood Updated:  11/02/17 0331     Magnesium 1.7 mg/dL     Phosphorus [[id number]]  (Normal) Collected:  11/02/17 0257    Specimen:  Blood Updated:  11/02/17 0331     Phosphorus 3.8 mg/dL     Amylase [[id number]]  (Normal) Collected:  11/02/17 0257    Specimen:  Blood Updated:  11/02/17 0331     Amylase 50 U/L     Lipase [[id number]]  (Normal) Collected:  11/02/17 0257    Specimen:  Blood Updated:  11/02/17 0331     Lipase 77 U/L     Lactic Acid, Plasma [[id number]]  (Abnormal) Collected:  11/02/17 0257    Specimen:  Blood Updated:  11/02/17 0335     Lactate 5.6 (C) mmol/L     Hemoglobin A1c [[id number]] Collected:  11/02/17 0257    Specimen:  Blood Updated:  11/02/17 0342     Hemoglobin A1C 5.2 %     Narrative:       Less than 6.0           Non-Diabetic Range  6.0-7.0                 ADA Therapeutic Target  Greater than 7.0        Action Suggested    Troponin [[id number]]  (Abnormal) Collected:  11/02/17 0257    Specimen:  Blood Updated:  11/02/17 0343     Troponin I 0.080 (H) ng/mL     BNP [[id number]]  (Abnormal) Collected:  11/02/17 0257    Specimen:  Blood Updated:  11/02/17 0343     proBNP 27032.0 (H) pg/mL     Lipid Panel [[id number]]  (Abnormal) Collected:  11/02/17 0257    Specimen:  Blood Updated:  11/02/17 0359     Total Cholesterol 171 mg/dL      Triglycerides 119 mg/dL      HDL Cholesterol 52 mg/dL      LDL  Cholesterol  114 (H) mg/dL      LDL/HDL Ratio 1.83    TSH [[id number]]  (Abnormal) Collected:  11/02/17 0257    Specimen:  Blood Updated:  11/02/17 0402     TSH 0.313 (L) mIU/mL     Comprehensive Metabolic Panel [[id number]]  (Abnormal) Collected:  11/02/17 0257    Specimen:  Blood Updated:  11/02/17 0405     Glucose 153 (H) mg/dL      BUN 26 (H) mg/dL      Creatinine 1.34 mg/dL      Sodium 140 mmol/L      Potassium 3.9 mmol/L      Chloride 87 (L) mmol/L      CO2 39.0 (H) mmol/L      Calcium 9.2 mg/dL      Total Protein 7.1 g/dL      Albumin 4.00 g/dL      ALT (SGPT) 1454 (H) U/L      AST (SGOT) 2453 (H) U/L      Alkaline Phosphatase 110 U/L      Total Bilirubin 0.7 mg/dL      eGFR Non African Amer 41 (L) mL/min/1.73      Globulin 3.1 gm/dL      A/G Ratio 1.3 g/dL      BUN/Creatinine Ratio 19.4     Anion Gap 14.0 (H) mmol/L     Ferritin [[id number]]  (Normal) Collected:  11/02/17 0257    Specimen:  Blood Updated:  11/02/17 0406     Ferritin 76.30 ng/mL     Blood Gas, Arterial [[id number]]  (Abnormal) Collected:  11/02/17 0428    Specimen:  Arterial Blood Updated:  11/02/17 0443     Site Right Radial     Dao's Test Positive     pH, Arterial 7.497 (H) pH units      pCO2, Arterial 55.9 (H) mm Hg      pO2, Arterial 47.8 (C) mm Hg      HCO3, Arterial 43.2 (H) mmol/L      Base Excess, Arterial 17.3 (H) mmol/L      O2 Saturation, Arterial 84.5 (L) %      Temperature 37.0 C      Barometric Pressure for Blood Gas 748 mmHg      Modality Room Air     Ventilator Mode NA     Notified Who [id number]     Notified By [email or web address]     Notified Time 11/02/2017 04:36     Collected by [id number]    Lactic Acid, Reflex Timer [[id number]] Collected:  11/02/17 0257    Specimen:  Blood Updated:  11/02/17 0646     Extra Tube Hold for add-ons.      Auto resulted.       Lactic Acid, Reflex [[id number]]  (Normal) Collected:  11/02/17 0810    Specimen:  Blood Updated:  11/02/17 0857     Lactate 1.5 mmol/L     T3, Free [[id number]]  (Abnormal) Collected:   11/02/17 0257    Specimen:  Blood Updated:  11/02/17 0912     T3, Free 2.72 (L) pg/mL     T4, Free [[id number]]  (Normal) Collected:  11/02/17 0257    Specimen:  Blood Updated:  11/02/17 0912     Free T4 1.59 ng/dL     AFP Tumor Marker [[id number]] Collected:  11/02/17 0810    Specimen:  Blood Updated:  11/02/17 1003    Troponin [[id number]]  (Abnormal) Collected:  11/02/17 0810    Specimen:  Blood Updated:  11/02/17 1009     Troponin I 0.061 (H) ng/mL     Urinalysis With / Culture If Indicated - Urine, Clean Catch [[id number]]  (Normal) Collected:  11/02/17 1022    Specimen:  Urine from Urine, Clean Catch Updated:  11/02/17 1045     Color, UA Yellow     Appearance, UA Clear     pH, UA 7.0     Specific Gravity, UA 1.008     Glucose, UA Negative     Ketones, UA Negative     Bilirubin, UA Negative     Blood, UA Negative     Protein, UA Negative     Leuk Esterase, UA Negative     Nitrite, UA Negative     Urobilinogen, UA 0.2 E.U./dL    Narrative:       Urine microscopic not indicated.    Urine Drug Screen - Urine, Clean Catch [[id number]]  (Abnormal) Collected:  11/02/17 1022    Specimen:  Urine from Urine, Clean Catch Updated:  11/02/17 1103     Amphetamine Screen, Urine Negative     Barbiturates Screen, Urine Negative     Benzodiazepine Screen, Urine Positive (A)     Cocaine Screen, Urine Negative     Methadone Screen, Urine Negative     Opiate Screen Negative     Phencyclidine (PCP), Urine Negative     THC, Screen, Urine Negative    Narrative:       Negative Thresholds For Drugs Screened in Urine:    Amphetamines          500 ng/ml  Barbiturates          200 ng/ml  Benzodiazepines       200 ng/ml  Cocaine               150 ng/ml  Methadone             150 ng/ml  Opiates               300 ng/ml  Phencyclidine         25 ng/ml  THC                      50 ng/ml    The normal value for all drugs tested is negative. This report includes final unconfirmed screening results.  A positive result by this assay can be, at your  request, sent to the Reference Lab for confirmation by gas chromatography. Unconfirmed results must not be used for non-medical purposes, such as employment or legal testing. Clinical consideration should be applied to any drug of abuse test result, particularly when unconfirmed results are used.    Peripheral Blood Smear - Blood, [[id number]] Collected:  11/01/17 1711    Specimen:  Blood Updated:  11/02/17 1338     Performed by: Sabrina Ulloa MD     Pathologist Interpretation Leukocytosis with macrocytic normochromic anemia.  Mild anisopoikilocytosis and polychromasia.  Negative for increase in blasts.  Negative for increase in schistocytes.     CBC & Differential [[id number]] Collected:  11/03/17 0619    Specimen:  Blood Updated:  11/03/17 0655    Narrative:       The following orders were created for panel order CBC & Differential.  Procedure                               Abnormality         Status                     ---------                               -----------         ------                     CBC Auto Differential[[id number]]        Abnormal            Final result                 Please view results for these tests on the individual orders.    CBC Auto Differential [[id number]]  (Abnormal) Collected:  11/03/17 0619    Specimen:  Blood Updated:  11/03/17 0655     WBC 12.58 (H) 10*3/mm3      RBC 4.14 (L) 10*6/mm3      Hemoglobin 12.4 g/dL      Hematocrit 40.8 %      MCV 98.6 (H) fL      MCH 30.0 pg      MCHC 30.4 (L) g/dL      RDW 14.0 %      RDW-SD 50.0 fl      MPV 10.6 fL      Platelets 275 10*3/mm3      Neutrophil % 71.6 %      Lymphocyte % 13.9 (L) %      Monocyte % 14.2 (H) %      Eosinophil % 0.1 %      Basophil % 0.0 %      Immature Grans % 0.2 %      Neutrophils, Absolute 9.01 (H) 10*3/mm3      Lymphocytes, Absolute 1.75 10*3/mm3      Monocytes, Absolute 1.79 (H) 10*3/mm3      Eosinophils, Absolute 0.01 10*3/mm3      Basophils, Absolute 0.00 10*3/mm3      Immature Grans, Absolute 0.02 10*3/mm3      Comprehensive Metabolic Panel [[id number]]  (Abnormal) Collected:  11/03/17 0619    Specimen:  Blood Updated:  11/03/17 0727     Glucose 98 mg/dL      BUN 33 (H) mg/dL      Creatinine 1.16 mg/dL      Sodium 135 mmol/L      Potassium 5.0 mmol/L      Chloride 80 (L) mmol/L      CO2 >40.0 (C) mmol/L      Calcium 8.5 mg/dL      Total Protein 6.3 g/dL      Albumin 3.30 (L) g/dL      ALT (SGPT) 915 (H) U/L      AST (SGOT) 856 (H) U/L      Alkaline Phosphatase 87 U/L      Total Bilirubin 0.7 mg/dL      eGFR Non African Amer 49 (L) mL/min/1.73      Globulin 3.0 gm/dL      A/G Ratio 1.1 g/dL      BUN/Creatinine Ratio 28.4 (H)     Anion Gap -- mmol/L       Unable to calculate Anion Gap.           Medication Review: yes  Current Facility-Administered Medications   Medication Dose Route Frequency Provider Last Rate Last Dose   • aspirin EC tablet 325 mg  325 mg Oral Daily Buck Zepeda MD   325 mg at 11/06/17 0832   • bisacodyl (DULCOLAX) EC tablet 5 mg  5 mg Oral Daily PRN Buck Zepeda MD       • budesonide-formoterol (SYMBICORT) 160-4.5 MCG/ACT inhaler 2 puff  2 puff Inhalation BID - RT Buck Zepeda MD   2 puff at 11/06/17 0644   • cefTRIAXone (ROCEPHIN) 1 g/100 mL 0.9% NS (MBP)  1 g Intravenous Q24H Buck Zepeda MD   1 g at 11/06/17 0534   • enoxaparin (LOVENOX) syringe 40 mg  40 mg Subcutaneous Daily Geovanny Howard DO   40 mg at 11/06/17 0831   • famotidine (PEPCID) tablet 40 mg  40 mg Oral Daily Buck Zepeda MD   40 mg at 11/06/17 0831   • furosemide (LASIX) tablet 10 mg  10 mg Oral Daily ROLAND Brink   10 mg at 11/06/17 0832   • ipratropium-albuterol (DUO-NEB) nebulizer solution 3 mL  3 mL Nebulization 4x Daily - RT ROLAND Brink   3 mL at 11/06/17 0644   • methylPREDNISolone sodium succinate (SOLU-Medrol) injection 40 mg  40 mg Intravenous Q12H ROLAND Brink       • metoprolol tartrate (LOPRESSOR) tablet 12.5 mg  12.5 mg Oral Q12H Buck Zepeda MD   12.5 mg at  11/05/17 2033   • nicotine (NICODERM CQ) 21 MG/24HR patch 1 patch  1 patch Transdermal Q24H Buck Zepeda MD   1 patch at 11/05/17 2035   • ondansetron (ZOFRAN) injection 4 mg  4 mg Intravenous Q6H PRN Buck Zepeda MD       • oxyCODONE (ROXICODONE) immediate release tablet 5 mg  5 mg Oral Q8H PRN Buck Zepeda MD   5 mg at 11/06/17 0831   • pantoprazole (PROTONIX) EC tablet 40 mg  40 mg Oral Q AM Buck Zepeda MD   40 mg at 11/06/17 0534   • potassium chloride (MICRO-K) CR capsule 20 mEq  20 mEq Oral Daily Buck Zepeda MD   20 mEq at 11/06/17 0831   • sodium chloride 0.9 % flush 1-10 mL  1-10 mL Intravenous PRN Buck Zepeda MD       • spironolactone (ALDACTONE) tablet 25 mg  25 mg Oral Daily Buck Zepeda MD   25 mg at 11/06/17 0826   • venlafaxine (EFFEXOR) tablet 75 mg  75 mg Oral Q12H Buck Zepeda MD   75 mg at 11/06/17 0831         Assessment/Plan     Active Problems:  Likely Type II Troponin Elevation  Acute Cor Pulmonale  Acute exacerbation of COPD  Hepatic mass with elevated Liver Enzymes  Tobacco Abuse  Paroxysmal Atrial Fibrillation    Plan:  Maintaining NSR  Stress Echo normal  Tolerating oral diuretics  Low Salt Diet, Daily Weights and Strict I&O  COPD treatment and work up for hepatic mass per primary  Not a candidate for statins at this time  Monitor telemetry   Will see less often  May benefit with establishing care with pulmonary outpatient  Follow up appointment made    ROLAND Daniel  11/06/17  10:32 AM                   Electronically signed by ROLAND Daniel at 11/6/2017 10:34 AM      ROLAND Zamora at 11/6/2017 10:41 AM  Version 1 of 1         [facility] Gastroenter[facility]  Inpatient Progress Note  Teri Tim  1960    11/6/2017  Reason for Follow Up:  Elevated LFTs    Subjective     Subjective:   Pt is AAOx3 in the bed with family at bedside. No new complaints today. States she is feeling better. No GI related issues.      Current Facility-Administered Medications:   •  aspirin EC tablet 325 mg, 325 mg, Oral, Daily, Buck Zepeda MD, 325 mg at 11/06/17 0832  •  bisacodyl (DULCOLAX) EC tablet 5 mg, 5 mg, Oral, Daily PRN, Buck Zepeda MD  •  budesonide-formoterol (SYMBICORT) 160-4.5 MCG/ACT inhaler 2 puff, 2 puff, Inhalation, BID - RT, Buck Zepeda MD, 2 puff at 11/06/17 0644  •  cefTRIAXone (ROCEPHIN) 1 g/100 mL 0.9% NS (MBP), 1 g, Intravenous, Q24H, Buck Zepeda MD, 1 g at 11/06/17 0534  •  enoxaparin (LOVENOX) syringe 40 mg, 40 mg, Subcutaneous, Daily, Geovanny Howard DO, 40 mg at 11/06/17 0831  •  famotidine (PEPCID) tablet 40 mg, 40 mg, Oral, Daily, Buck Zepeda MD, 40 mg at 11/06/17 0831  •  furosemide (LASIX) tablet 10 mg, 10 mg, Oral, Daily, ROLAND Brink, 10 mg at 11/06/17 0832  •  ipratropium-albuterol (DUO-NEB) nebulizer solution 3 mL, 3 mL, Nebulization, 4x Daily - RT, ROLAND Brink, 3 mL at 11/06/17 0644  •  methylPREDNISolone sodium succinate (SOLU-Medrol) injection 40 mg, 40 mg, Intravenous, Q12H, ROLAND Brink  •  metoprolol tartrate (LOPRESSOR) tablet 12.5 mg, 12.5 mg, Oral, Q12H, Buck Zepeda MD, 12.5 mg at 11/05/17 2033  •  nicotine (NICODERM CQ) 21 MG/24HR patch 1 patch, 1 patch, Transdermal, Q24H, Buck Zepeda MD, 1 patch at 11/05/17 2035  •  ondansetron (ZOFRAN) injection 4 mg, 4 mg, Intravenous, Q6H PRN, Buck Zepeda MD  •  oxyCODONE (ROXICODONE) immediate release tablet 5 mg, 5 mg, Oral, Q8H PRN, Buck Zepeda MD, 5 mg at 11/06/17 0831  •  pantoprazole (PROTONIX) EC tablet 40 mg, 40 mg, Oral, Q AM, Buck Zepeda MD, 40 mg at 11/06/17 0534  •  potassium chloride (MICRO-K) CR capsule 20 mEq, 20 mEq, Oral, Daily, Buck Zepeda MD, 20 mEq at 11/06/17 0831  •  sodium chloride 0.9 % flush 1-10 mL, 1-10 mL, Intravenous, PRN, Buck Zepeda MD  •  spironolactone (ALDACTONE) tablet 25 mg, 25 mg, Oral, Daily, Buck Zepeda MD, 25 mg  at 11/06/17 0826  •  venlafaxine (EFFEXOR) tablet 75 mg, 75 mg, Oral, Q12H, Buck Zepeda MD, 75 mg at 11/06/17 0831    Review of Systems:    Review of Systems   Constitutional: Negative for chills, diaphoresis and fever.   Respiratory: Negative for cough, choking, chest tightness and shortness of breath.    Cardiovascular: Negative for chest pain and palpitations.   Gastrointestinal: Negative for abdominal pain, nausea and vomiting.   Musculoskeletal: Negative for joint swelling.   Skin: Negative for color change and rash.   Neurological: Negative for headaches.   Psychiatric/Behavioral: Negative for confusion.        Objective     Vital Signs  Temp:  [97.5 °F (36.4 °C)-98.7 °F (37.1 °C)] 98.4 °F (36.9 °C)  Heart Rate:  [74-96] 87  Resp:  [16-22] 18  BP: ()/(42-51) 101/49  Body mass index is 20.76 kg/(m^2).    Intake/Output Summary (Last 24 hours) at 11/06/17 1041  Last data filed at 11/06/17 0918   Gross per 24 hour   Intake             1739 ml   Output             1400 ml   Net              339 ml     I/O this shift:  In: 240 [P.O.:240]  Out: -        Physical Exam:   General: patient awake, alert and cooperative, no acute distress   Eyes: Normal lids and lashes, no scleral icterus   Neck: supple, no JVD   Skin: warm and dry   Cardiovascular: regular rhythm and rate, no murmurs auscultated   Pulm: coarse bilaterally   Abdomen: soft, nontender, nondistended; normal bowel sounds   Psychiatric: Normal mood and behavior; converses appropriately     Results Review:    I have reviewed all of the patients current test results      Results from last 7 days  Lab Units 11/06/17  0356 11/05/17 0321 11/04/17  0431   WBC 10*3/mm3 22.72* 16.38* 13.94*   HEMOGLOBIN g/dL 10.7* 11.2* 12.7   HEMATOCRIT % 35.7* 37.7 42.4   PLATELETS 10*3/mm3 244 254 311         Results from last 7 days  Lab Units 11/06/17  0356 11/05/17 0321 11/04/17  0431   SODIUM mmol/L 134* 137 137   POTASSIUM mmol/L 3.7 3.6 3.9   CHLORIDE mmol/L 89*  87* 78*   CO2 mmol/L 40.0* >40.0* >40.0*   BUN mg/dL 17 34* 41*   CREATININE mg/dL 0.86 1.30 1.83*   CALCIUM mg/dL 8.4 8.0* 8.6   BILIRUBIN mg/dL 1.5* 1.3* 1.0   ALK PHOS U/L 75 72 88   ALT (SGPT) U/L 332* 464* 769*   AST (SGOT) U/L 82* 188* 545*   GLUCOSE mg/dL 81 77 71         Results from last 7 days  Lab Units 11/01/17  1711   INR  1.38*         Lab Results  Lab Value Date/Time   LIPASE 77 11/02/2017 0257   LIPASE 31 11/19/2015 1304   LIPASE 169 09/09/2015 1450       Radiology:    Imaging Results (last 24 hours)     Procedure Component Value Units Date/Time    XR Chest 1 View [[id number]] Collected:  11/06/17 0800     Updated:  11/06/17 0804    Narrative:       EXAMINATION: XR CHEST 1 VW- 11/6/2017 8:00 AM CST     HISTORY: Shortness of breath, clinically suspected pneumonia.     REPORT: Comparison is made with the study from 11/01/2017.     Lungs are hyperinflated, there are coarse interstitial markings. No lung  consolidation is identified. Heart size is normal. No pneumothorax or  pleural effusion is identified. The osseous structures are unremarkable.       Impression:       Advanced COPD with interstitial disease which appears  stable. No consolidating infiltrate is identified.  This report was finalized on 11/06/2017 08:01 by Dr. Marcial Leon MD.            Assessment/Plan     Patient Active Problem List   Diagnosis Code   • NSTEMI (non-ST elevated myocardial infarction) I21.4   • Abnormal LFTs R79.89   • Abnormal US (ultrasound) of abdomen R93.5       1. Elevated LFTs, known hepatitis C  2. NSTEMI    Nothing to add from a GI standpoint her liver functions are trending down she plans to follow up outpatient with Dr Shane regarding her known liver disease.     EMR Dragon/transcription disclaimer: Much of this encounter note is electronic transcription/translation of spoken language to printed text. The electronic translation of spoken language may be erroneous, or at times, nonsensical words or phrases  may be inadvertently transcribed. Although I have reviewed the note for such errors, some may still exist.    ROLAND Zamora  11/06/17  10:41 AM                 Electronically signed by ROLAND Zamora at 11/6/2017 10:46 AM        Consult Notes (last 24 hours) (Notes from 11/5/2017 11:23 AM through 11/6/2017 11:23 AM)     No notes of this type exist for this encounter.

## 2017-11-06 NOTE — PROGRESS NOTES
[facility] Medicine Services  INPATIENT PROGRESS NOTE    Length of Stay: 5  Date of Admission: 11/1/2017  Primary Care Physician: Jose Barajas MD    Subjective   Chief Complaint: follow up SOA    HPI   The patient is currently sitting up in bed. She wore the BiPAP all night.  Looking better.  Sounds a little more congested in her chest today.  Denies any fever or chills.  Feels weak.  Alert and oriented.  at bedside. Discussed with PRESTON Giron at bedside.     Review of Systems   Constitutional: Positive for activity change. Negative for appetite change.   HENT: Positive for congestion. Negative for hearing loss, nosebleeds, tinnitus and trouble swallowing.    Eyes: Negative for visual disturbance.   Respiratory: Positive for cough. Negative for chest tightness and shortness of breath.    Gastrointestinal: Negative for abdominal distention, blood in stool, constipation and diarrhea.   Endocrine: Negative for cold intolerance, heat intolerance, polydipsia, polyphagia and polyuria.   Genitourinary: Negative for decreased urine volume, difficulty urinating, dysuria, flank pain, frequency and hematuria.   Allergic/Immunologic: Negative for immunocompromised state.   Neurological: Negative for syncope and light-headedness.   Hematological: Negative for adenopathy. Does not bruise/bleed easily.   Psychiatric/Behavioral: Negative for confusion and sleep disturbance. The patient is not nervous/anxious.       All pertinent negatives and positives are as above. All other systems have been reviewed and are negative unless otherwise stated.     Objective    Temp:  [97.5 °F (36.4 °C)-98.7 °F (37.1 °C)] 98.4 °F (36.9 °C)  Heart Rate:  [74-96] 87  Resp:  [16-22] 18  BP: ()/(42-51) 101/49     Physical Exam   Constitutional: She is oriented to person, place, and time. She appears well-developed and well-nourished.   HENT:   Head: Normocephalic and atraumatic.   Eyes: Conjunctivae and  EOM are normal. Pupils are equal, round, and reactive to light.   Neck: Neck supple. No JVD present. No thyromegaly present.   Cardiovascular: Normal rate, regular rhythm, normal heart sounds and intact distal pulses.  Exam reveals no gallop and no friction rub.    No murmur heard.  Pulmonary/Chest: Effort normal. No respiratory distress. She has wheezes. She has rhonchi. She has no rales. She exhibits no tenderness.   Abdominal: Soft. Bowel sounds are normal. She exhibits no distension. There is no tenderness. There is no rebound and no guarding.   Musculoskeletal: Normal range of motion. She exhibits no edema, tenderness or deformity.   Lymphadenopathy:     She has no cervical adenopathy.   Neurological: She is alert and oriented to person, place, and time. She displays normal reflexes. No cranial nerve deficit. She exhibits normal muscle tone.   Skin: Skin is warm and dry. No rash noted.   Psychiatric: She has a normal mood and affect. Her behavior is normal. Judgment and thought content normal.   Vitals reviewed.    Results Review:  I have reviewed the labs, radiology results, and diagnostic studies.    Laboratory Data:     Results from last 7 days  Lab Units 11/06/17  0356 11/05/17 0321 11/04/17  0431   WBC 10*3/mm3 22.72* 16.38* 13.94*   HEMOGLOBIN g/dL 10.7* 11.2* 12.7   HEMATOCRIT % 35.7* 37.7 42.4   PLATELETS 10*3/mm3 244 254 311        Results from last 7 days  Lab Units 11/06/17  0356 11/05/17 0321 11/04/17  0431   SODIUM mmol/L 134* 137 137   POTASSIUM mmol/L 3.7 3.6 3.9   CHLORIDE mmol/L 89* 87* 78*   CO2 mmol/L 40.0* >40.0* >40.0*   BUN mg/dL 17 34* 41*   CREATININE mg/dL 0.86 1.30 1.83*   CALCIUM mg/dL 8.4 8.0* 8.6   BILIRUBIN mg/dL 1.5* 1.3* 1.0   ALK PHOS U/L 75 72 88   ALT (SGPT) U/L 332* 464* 769*   AST (SGOT) U/L 82* 188* 545*   GLUCOSE mg/dL 81 77 71     Radiology Data:   Imaging Results (last 24 hours)     Procedure Component Value Units Date/Time    XR Chest 1 View [[id number]] Collected:   17 0800     Updated:  17 0804    Narrative:       EXAMINATION: XR CHEST 1 VW- 2017 8:00 AM CST     HISTORY: Shortness of breath, clinically suspected pneumonia.     REPORT: Comparison is made with the study from 2017.     Lungs are hyperinflated, there are coarse interstitial markings. No lung  consolidation is identified. Heart size is normal. No pneumothorax or  pleural effusion is identified. The osseous structures are unremarkable.       Impression:       Advanced COPD with interstitial disease which appears  stable. No consolidating infiltrate is identified.  This report was finalized on 2017 08:01 by Dr. Marcial Leon MD.        Teri Tim   Echocardiogram   Order# [id number]    Reading physician: Jose Barajas MD   Ordering physician: Buck Zepeda MD   Study date: 17         Patient Information      Patient Name MRN Sex  (Age)     Teri FUNG Meeta [id number] Female 1960 (57 y.o.)       Admission Information      Admission Date/Time Discharge Date/Time Room/Bed     17  1627  401/1       Sedation Narrator Report      Sedation Narrator Report       Interpretation Summary      · Left ventricular systolic function is normal. Estimated EF = 55%.  · Left ventricular diastolic dysfunction (grade I) consistent with impaired relaxation.  · Right ventricular cavity is mildly dilated.  · Normal right ventricular wall thickness, systolic function and septal motion noted.  · Moderate pulmonary hypertension is present.     Teri Tim   Stress Echocardiogram   Order# [id number]    Reading physician: Jose Barajas MD   Ordering physician: Lizbeth Sanford PA-C   Study date: 11/3/17         Patient Information      Patient Name MRN Sex  (Age)     Teri Tim [id number] Female 1960 (57 y.o.)       Admission Information      Admission Date/Time Discharge Date/Time Room/Bed     17  1627  401/1       Patient Height & Weight      Height Weight BSA (Calculated -  "sq m) BMI (kg/m2) Pulse     60\" (152.4 cm) 111 lb 6 oz (50.5 kg) 1.46 sq meters 21.82 64       Patient Vitals      BP Pulse     94/54 64       Reason For Exam      Chest Pain; Dyspnea       Cardiac History      Diagnosis Date Comment     CHF (congestive heart failure)       Hypertension         Social History      Tobacco Use      Current Every Day Smoker; Smokes 0.5 packs/day; Smoked: Cigarettes.     Smokeless Tobacco: Never used smokeless tobacco.              Family Cardiac History as of 11/5/2017      Problem Relation Age of Onset     Heart disease Brother      Heart disease Father        Interpretation Summary      · Left ventricular systolic function is normal. Estimated EF = 55%.  · Normal stress echo with no significant echocardiographic evidence for myocardial ischemia.  · Despite not reaching target heart rate on maximal dose of dobutamine felt to be low risk     I have reviewed the patient current medications.     Assessment/Plan     Assessment:  1. Acute on chronic hypercarbic and hypoxic respiratory failure, pCO2 68 and pO2 47.8  2. Acutely decompensated diastolic congestive heart failure, EF 55%  3. Non-ST segment elevated myocardial infarction  4. Transaminitis with chronic hepatitis C, improving   5. Ongoing tobacco abuse  6. Chronic obstructive pulmonary disease, no exacerbation   7. Leukocytosis  8. Abnormal TSH, T3 2.72 / T4 1.59, PCP to follow   9. Moderate pulmonary hypertension   10. Mild hyponatremia     Plan:  1. PCP to follow TSH  2. Stress echo - low risk for ischemia   3. 2D echo -EF 55%, grade 1 diastolic dysfunction and moderate pulmonary hypertension   4. CBC, CMP in AM  5. Continue Lasix to 10 mg PO daily   6. US abdomen - lesion noted, but CT scan shows no lesion or mass. Now per Dr. Watkins and radiologist compatible with focal fatty infiltration. Patient was unable to hold breath long enough for MRI liver.   7. Encourage ambulation and incentive spirometry.  8. CT head - normal   9. " Tobacco cessation education  10. Lovenox 40 mg SQ for DVT prophylaxis  11. Continue Aldactone  12. Continue Bipap QHS and PRN   13. Continue to trend liver enzymes, trended down nicely.  14. Blood pressure 97/42, 112/51, 101/49  15. Slightly worsening leukocytosis   16. Hepatitis A and B negative  17. Rocephin added per Dr. Zepeda for pneumonia, no pneumonia noted on CXR.  Likely more a COPD exacerbation.  Does have increased congestion and some rhonci/wheeze noted on exam  18. Add solu-medrol  19. Encourage increased activity and ambulation.     Discharge Planning: I expect the patient to be discharged to home in 1-2 days.    ROLAND Brink   11/06/17   8:40 AM    I personally evaluated and examined the patient in conjunction with ROLAND Terrell and agree with the assessment, treatment plan, and disposition of the patient as recorded by her. My history, exam, and further recommendations are: Patient reports that she feels better today as compared to the past couple of days.  Her primary concern is chronic pain that she experiences in her back, of which she is followed by pain management.  She tolerated BiPAP last night without problem, and reports that she has BiPAP on an outpatient basis.  A trial of Rocephin was started, IV Solu-Medrol, and scheduled nebulizer treatments.  Patient appears to have advanced COPD based upon chest x-ray findings, and unfortunately she continues to smoke.  We discussed the importance of tobacco cessation given her history of tobacco dependence.  Continue work with physical therapy.        Hema Garcia MD  11/06/17  11:28 AM

## 2017-11-07 VITALS
HEIGHT: 60 IN | BODY MASS INDEX: 20.82 KG/M2 | SYSTOLIC BLOOD PRESSURE: 113 MMHG | RESPIRATION RATE: 16 BRPM | TEMPERATURE: 98.1 F | WEIGHT: 106.06 LBS | OXYGEN SATURATION: 92 % | HEART RATE: 100 BPM | DIASTOLIC BLOOD PRESSURE: 56 MMHG

## 2017-11-07 LAB
ALBUMIN SERPL-MCNC: 3.6 G/DL (ref 3.5–5)
ALBUMIN/GLOB SERPL: 1.1 G/DL (ref 1.1–2.5)
ALP SERPL-CCNC: 81 U/L (ref 24–120)
ALT SERPL W P-5'-P-CCNC: 239 U/L (ref 0–54)
ANION GAP SERPL CALCULATED.3IONS-SCNC: 13 MMOL/L (ref 4–13)
AST SERPL-CCNC: 43 U/L (ref 7–45)
BASOPHILS # BLD AUTO: 0.01 10*3/MM3 (ref 0–0.2)
BASOPHILS NFR BLD AUTO: 0.1 % (ref 0–2)
BILIRUB SERPL-MCNC: 0.9 MG/DL (ref 0.1–1)
BUN BLD-MCNC: 14 MG/DL (ref 5–21)
BUN/CREAT SERPL: 19.7 (ref 7–25)
CALCIUM SPEC-SCNC: 9.2 MG/DL (ref 8.4–10.4)
CHLORIDE SERPL-SCNC: 89 MMOL/L (ref 98–110)
CO2 SERPL-SCNC: 31 MMOL/L (ref 24–31)
CREAT BLD-MCNC: 0.71 MG/DL (ref 0.5–1.4)
DEPRECATED RDW RBC AUTO: 48.2 FL (ref 40–54)
EOSINOPHIL # BLD AUTO: 0 10*3/MM3 (ref 0–0.7)
EOSINOPHIL NFR BLD AUTO: 0 % (ref 0–4)
ERYTHROCYTE [DISTWIDTH] IN BLOOD BY AUTOMATED COUNT: 13.8 % (ref 12–15)
GFR SERPL CREATININE-BSD FRML MDRD: 85 ML/MIN/1.73
GLOBULIN UR ELPH-MCNC: 3.3 GM/DL
GLUCOSE BLD-MCNC: 117 MG/DL (ref 70–100)
HCT VFR BLD AUTO: 37.9 % (ref 37–47)
HGB BLD-MCNC: 11.6 G/DL (ref 12–16)
IMM GRANULOCYTES # BLD: 0.03 10*3/MM3 (ref 0–0.03)
IMM GRANULOCYTES NFR BLD: 0.2 % (ref 0–5)
LYMPHOCYTES # BLD AUTO: 0.48 10*3/MM3 (ref 0.72–4.86)
LYMPHOCYTES NFR BLD AUTO: 3.6 % (ref 15–45)
MCH RBC QN AUTO: 29.3 PG (ref 28–32)
MCHC RBC AUTO-ENTMCNC: 30.6 G/DL (ref 33–36)
MCV RBC AUTO: 95.7 FL (ref 82–98)
MONOCYTES # BLD AUTO: 0.56 10*3/MM3 (ref 0.19–1.3)
MONOCYTES NFR BLD AUTO: 4.2 % (ref 4–12)
NEUTROPHILS # BLD AUTO: 12.35 10*3/MM3 (ref 1.87–8.4)
NEUTROPHILS NFR BLD AUTO: 91.9 % (ref 39–78)
PLATELET # BLD AUTO: 289 10*3/MM3 (ref 130–400)
PMV BLD AUTO: 10.9 FL (ref 6–12)
POTASSIUM BLD-SCNC: 4.1 MMOL/L (ref 3.5–5.3)
PROT SERPL-MCNC: 6.9 G/DL (ref 6.3–8.7)
RBC # BLD AUTO: 3.96 10*6/MM3 (ref 4.2–5.4)
SODIUM BLD-SCNC: 133 MMOL/L (ref 135–145)
WBC NRBC COR # BLD: 13.43 10*3/MM3 (ref 4.8–10.8)

## 2017-11-07 PROCEDURE — 97110 THERAPEUTIC EXERCISES: CPT

## 2017-11-07 PROCEDURE — 80053 COMPREHEN METABOLIC PANEL: CPT | Performed by: NURSE PRACTITIONER

## 2017-11-07 PROCEDURE — 85025 COMPLETE CBC W/AUTO DIFF WBC: CPT | Performed by: NURSE PRACTITIONER

## 2017-11-07 PROCEDURE — 25010000002 CEFTRIAXONE PER 250 MG: Performed by: INTERNAL MEDICINE

## 2017-11-07 PROCEDURE — 25010000002 ENOXAPARIN PER 10 MG: Performed by: INTERNAL MEDICINE

## 2017-11-07 PROCEDURE — 25010000002 METHYLPREDNISOLONE PER 40 MG: Performed by: NURSE PRACTITIONER

## 2017-11-07 PROCEDURE — 99232 SBSQ HOSP IP/OBS MODERATE 35: CPT | Performed by: INTERNAL MEDICINE

## 2017-11-07 PROCEDURE — 97116 GAIT TRAINING THERAPY: CPT

## 2017-11-07 PROCEDURE — 94660 CPAP INITIATION&MGMT: CPT

## 2017-11-07 PROCEDURE — 94799 UNLISTED PULMONARY SVC/PX: CPT

## 2017-11-07 PROCEDURE — 97530 THERAPEUTIC ACTIVITIES: CPT

## 2017-11-07 RX ORDER — CEFDINIR 300 MG/1
300 CAPSULE ORAL 2 TIMES DAILY
Qty: 12 CAPSULE | Refills: 0 | Status: SHIPPED | OUTPATIENT
Start: 2017-11-07 | End: 2017-11-13

## 2017-11-07 RX ORDER — PREDNISONE 20 MG/1
20 TABLET ORAL DAILY
Qty: 11 TABLET | Refills: 0 | Status: ON HOLD | OUTPATIENT
Start: 2017-11-07 | End: 2018-03-31

## 2017-11-07 RX ORDER — FUROSEMIDE 20 MG/1
10 TABLET ORAL DAILY
Qty: 30 TABLET | Refills: 1 | Status: ON HOLD | OUTPATIENT
Start: 2017-11-08 | End: 2018-04-05

## 2017-11-07 RX ADMIN — FAMOTIDINE 40 MG: 20 TABLET, FILM COATED ORAL at 10:07

## 2017-11-07 RX ADMIN — METOPROLOL TARTRATE 12.5 MG: 25 TABLET, FILM COATED ORAL at 10:06

## 2017-11-07 RX ADMIN — ENOXAPARIN SODIUM 40 MG: 40 INJECTION SUBCUTANEOUS at 10:05

## 2017-11-07 RX ADMIN — BUDESONIDE AND FORMOTEROL FUMARATE DIHYDRATE 2 PUFF: 160; 4.5 AEROSOL RESPIRATORY (INHALATION) at 07:05

## 2017-11-07 RX ADMIN — POTASSIUM CHLORIDE 20 MEQ: 750 CAPSULE, EXTENDED RELEASE ORAL at 10:06

## 2017-11-07 RX ADMIN — METHYLPREDNISOLONE SODIUM SUCCINATE 40 MG: 40 INJECTION, POWDER, FOR SOLUTION INTRAMUSCULAR; INTRAVENOUS at 10:06

## 2017-11-07 RX ADMIN — CEFTRIAXONE SODIUM 1 G: 1 INJECTION, POWDER, FOR SOLUTION INTRAMUSCULAR; INTRAVENOUS at 10:07

## 2017-11-07 RX ADMIN — OXYCODONE HYDROCHLORIDE 5 MG: 5 TABLET ORAL at 02:22

## 2017-11-07 RX ADMIN — OXYCODONE HYDROCHLORIDE 5 MG: 5 TABLET ORAL at 10:08

## 2017-11-07 RX ADMIN — ASPIRIN 325 MG: 325 TABLET, COATED ORAL at 10:07

## 2017-11-07 RX ADMIN — SPIRONOLACTONE 25 MG: 25 TABLET ORAL at 10:06

## 2017-11-07 RX ADMIN — IPRATROPIUM BROMIDE AND ALBUTEROL SULFATE 3 ML: 2.5; .5 SOLUTION RESPIRATORY (INHALATION) at 07:05

## 2017-11-07 RX ADMIN — VENLAFAXINE HYDROCHLORIDE 75 MG: 37.5 TABLET ORAL at 10:06

## 2017-11-07 RX ADMIN — IPRATROPIUM BROMIDE AND ALBUTEROL SULFATE 3 ML: 2.5; .5 SOLUTION RESPIRATORY (INHALATION) at 10:18

## 2017-11-07 RX ADMIN — FUROSEMIDE 10 MG: 20 TABLET ORAL at 10:06

## 2017-11-07 RX ADMIN — PANTOPRAZOLE SODIUM 40 MG: 40 TABLET, DELAYED RELEASE ORAL at 05:27

## 2017-11-07 NOTE — THERAPY TREATMENT NOTE
Acute Care - Physical Therapy Treatment Note  [facility]     Patient Name: Teri Tim  : 1960  MRN: [id number]  Today's Date: 2017  Onset of Illness/Injury or Date of Surgery Date: 17  Date of Referral to PT: 17  Referring Physician: Dr. Zepeda    Admit Date: 2017    Visit Dx:    ICD-10-CM ICD-9-CM   1. NSTEMI (non-ST elevated myocardial infarction) I21.4 410.70   2. Elevated liver enzymes R74.8 790.5   3. Hepatitis C virus infection without hepatic coma, unspecified chronicity B19.20 070.70   4. Impaired functional mobility, balance, gait, and endurance Z74.09 V49.89     Patient Active Problem List   Diagnosis   • NSTEMI (non-ST elevated myocardial infarction)   • Abnormal LFTs   • Abnormal US (ultrasound) of abdomen               Adult Rehabilitation Note       17 0903 17 1513 17 0956    Rehab Assessment/Intervention    Discipline physical therapy assistant  -LG physical therapy assistant  -LG physical therapy assistant  -LG    Document Type therapy note (daily note)  -LG therapy note (daily note)  -LG     Subjective Information agree to therapy;complains of;weakness;fatigue;pain  -LG agree to therapy;complains of;weakness  -LG     Patient Effort, Rehab Treatment good  -LG good  -LG     Treatment Not Performed   patient/family declined treatment  -LG    Treatment Not Performed, Comment   pt walked with nsg 5 minutes ago  -LG    Precautions/Limitations fall precautions  -LG fall precautions   confusion  -LG     Specific Treatment Considerations   Pt just getting back to bed from walking 50' x 2 reps with nsg.   -LG    Recorded by [LG] Praful Fernandez PTA [LG] Praful Fernandez PTA [LG] Praful Fernandez PTA    Pain Assessment    Pain Assessment 0-10  -LG      Pain Score 6  -LG      Post Pain Score 6  -LG      Pain Type Chronic pain  -LG      Pain Location Back  -LG      Pain Intervention(s) Repositioned;Ambulation/increased activity  -LG      Response to Interventions  tolerated  -LG      Recorded by [LG] Praful Fernandez PTA      Bed Mobility, Assessment/Treatment    Bed Mob, Supine to Sit, [place] conditional independence  -LG supervision required  -LG     Bed Mob, Sit to Supine, [place] conditional independence  -LG supervision required  -LG     Recorded by [LG] Praful Fernandez PTA [LG] Praful Fernandez PTA     Transfer Assessment/Treatment    Transfers, Sit-Stand [place] conditional independence  -LG stand by assist  -LG     Transfers, Stand-Sit [place] conditional independence  -LG stand by assist  -LG     Toilet Transfer, [place] supervision required  -LG supervision required  -LG     Recorded by [LG] Praful Fernandez PTA [LG] Praful Fernandez PTA     Gait Assessment/Treatment    Gait, [place] Level stand by assist  -LG contact guard assist  -LG     Gait, Assistive Device rolling walker  -LG      Gait, Distance (Feet) 125  -LG 50   50' standing rest break then 50' back to room  -LG     Gait, Gait Pattern Analysis swing-through gait  -LG swing-to gait  -LG     Gait, Gait Deviations bilateral:;katja decreased;step length decreased;stride length decreased;toe-to-floor clearance decreased  -LG bilateral:;narrow base;step length decreased;stride length decreased;toe-to-floor clearance decreased  -LG     Gait, Safety Issues balance decreased during turns  -LG balance decreased during turns  -LG     Gait, Impairments strength decreased;impaired balance  -LG strength decreased;impaired balance  -LG     Recorded by [LG] Praful Fernandez PTA [LG] Praful Fernandez PTA     Therapy Exercises    Bilateral Lower Extremities AROM:;20 reps;sitting;ankle pumps/circles;LAQ;knee flexion;hip abduction/adduction  -LG AROM:;20 reps;sitting;ankle pumps/circles;LAQ;knee flexion  -LG     Recorded by [LG] Praful Fernandez PTA [LG] Praful Fernandez PTA     Positioning and Restraints    Pre-Treatment Position in bed  -LG in bed  -LG     Post Treatment Position bed  -LG bed  -LG     In Bed  fowlers;call light within reach;encouraged to call for assist  -LG fowlers;call light within reach;encouraged to call for assist;with family/caregiver;notified nsg  -LG     Recorded by [LG] Praful Fernandez PTA [LG] Praful Fernandez PTA       11/05/17 1500 11/05/17 1400 11/05/17 1300    Rehab Assessment/Intervention    Discipline  physical therapy assistant  -EC physical therapy assistant  -EC    Document Type  therapy note (daily note)  -EC     Subjective Information  agree to therapy  -EC     Treatment Not Performed   --   requested wait until after shower, check back  -EC    Recorded by  [EC] Paxton Betts PTA [EC] Paxton Betts PTA    Bed Mobility, Assessment/Treatment    Bed Mob, Supine to Sit, [place] supervision required  -EC      Recorded by [EC] Paxton Betts PTA      Transfer Assessment/Treatment    Transfers, Sit-Stand [place] stand by assist  -EC      Transfers, Stand-Sit [place] stand by assist  -EC      Recorded by [EC] Paxton Betts PTA      Gait Assessment/Treatment    Gait, [place] Level contact guard assist  -EC      Gait, Assistive Device rolling walker  -EC      Gait, Distance (Feet) 35   seated rest then additional 35 ft.  -EC      Recorded by [EC] Paxton Betts PTA      Therapy Exercises    Bilateral Lower Extremities 10 reps;AROM:;sitting;ankle pumps/circles;20 reps;LAQ;hip flexion   20 reps AP all others 10  -EC      Recorded by [EC] Paxton Betts PTA      Positioning and Restraints    Pre-Treatment Position  in bed  -EC     Post Treatment Position  bed  -EC     In Bed  sitting EOB;call light within reach;with family/caregiver;with other staff  -EC     Recorded by  [EC] Paxton Betts PTA       User Key  (r) = Recorded By, (t) = Taken By, (c) = Cosigned By    Initials Name Effective Dates    EC Paxton Betts PTA 08/02/16 -     LG Praful Fernandez PTA 08/02/16 -                 IP PT Goals       11/02/17 1031          Gait Training PT LTG    Gait  Training Goal PT LTG, Date Established 11/02/17  -LYNETTE (r) AB (t) LYNETTE (c)      Gait Training Goal PT LTG, Time to Achieve by discharge  -LYNETTE (r) AB (t) LYNETTE (c)      Gait Training Goal PT LTG, [place] Level supervision required  -LYNETTE (r) AB (t) LYNETTE (c)      Gait Training Goal PT LTG, Assist Device cane, quad  -LYNETTE (r) AB (t) LYNETTE (c)      Gait Training Goal PT LTG, Distance to Achieve 100  -LYNETTE (r) AB (t) LYNETTE (c)      Dynamic Standing Balance PT LTG    Dynamic Standing Balance PT LTG, Date Established 11/02/17  -LYNETTE (r) AB (t) LYNETTE (c)      Dynamic Standing Balance PT LTG, Time to Achieve by discharge  -LYNETTE (r) AB (t) LYNETTE (c)      Dynamic Standing Balance PT LTG, [place] Level supervision required  -LYNETTE (r) AB (t) LYNETTE (c)      Dynamic Standing Balance PT LTG, Assist Device assistive Device  -LYNETTE (r) AB (t) LYNETTE (c)      Dynamic Standing Balance PT LTG, Additional Goal side step, reaching out of [place]ST[place], lifting objects, picking object up from floor  -LYNETTE (r) AB (t) LYNETTE (c)        User Key  (r) = Recorded By, (t) = Taken By, (c) = Cosigned By    Initials Name Provider Type    LYNETTE Del Real, PT DPT Physical Therapist    AB Sonia Iyer, PT Student PT Student          Physical Therapy Education     Title: PT OT SLP Therapies (Done)     Topic: Physical Therapy (Done)     Point: Mobility training (Done)    Learning Progress Summary    Learner Readiness Method Response Comment Documented by Status   Patient Acceptance E DU Educated on benefits of activity. Instructed in Bed Mobility, Transfers, Gait, Therapeutic Exercises.  11/07/17 0903 Done    Acceptance E,D NR Educated on benefits of activity. Instructed in Bed Mobility, Transfers, Gait, Therapeutic Exercises.  11/06/17 1513 Active    Acceptance E VU Educated pt on benefits of activity and planned PT POC. AB 11/02/17 1102 Done               Point: Home exercise program (Done)    Learning Progress Summary    Learner Readiness Method Response Comment Documented by Status    Patient Acceptance E DU Educated on benefits of activity. Instructed in Bed Mobility, Transfers, Gait, Therapeutic Exercises.  11/07/17 0903 Done    Acceptance E,D NR Educated on benefits of activity. Instructed in Bed Mobility, Transfers, Gait, Therapeutic Exercises.  11/06/17 1513 Active    Acceptance E NR ex's AE 11/03/17 1432 Active                      User Key     Initials Effective Dates Name Provider Type Discipline    AE 06/22/15 -  Zoya Anthony, PTA Physical Therapy Assistant PT     08/02/16 -  Praful Fernandez, PTA Physical Therapy Assistant PT    AB 05/08/17 -  Sonia Iyer, PT Student PT Student PT                    PT Recommendation and Plan  Anticipated Equipment Needs At Discharge: quad cane  Anticipated Discharge Disposition: home with assist, home with home health, home with outpatient services  Planned Therapy Interventions: balance training, gait training, home exercise program, patient/family education, ROM (Range of Motion), strengthening, transfer training, bed mobility training  PT Frequency: daily, 2 times/day  Plan of Care Review  Plan Of Care Reviewed With: patient  Progress: progress towards functional goals is fair  Outcome Summary/Follow up Plan: Pt Conditionally Independent with Bed Mobility, Transfers and SBA for Gait with RW x 125'. O2 sats 96 on 2L at rest, 91-94 on 2L during activity.  Pt possibly going home today, would benefit from RW to improve safety with gait and help reduce back pain.           Outcome Measures       11/07/17 0942 11/06/17 1537 11/05/17 1500    How much help from another person do you currently need...    Turning from your back to your side while in flat bed without using bedrails? 4  -LG 4  -LG 4  -EC    Moving from lying on back to sitting on the side of a flat bed without bedrails? 4  -LG 4  -LG 4  -EC    Moving to and from a bed to a chair (including a wheelchair)? 3  -LG 3  -LG 3  -EC    Standing up from a chair using your arms (e.g., wheelchair,  bedside chair)? 3  -LG 3  -LG 3  -EC    Climbing 3-5 steps with a railing? 3  -LG 2  -LG 2  -EC    To walk in hospital room? 3  -LG 3  -LG 3  -EC    AM-PAC 6 Clicks Score 20  -LG 19  -LG 19  -EC    Functional Assessment    Outcome Measure Options AM-PAC 6 Clicks Basic Mobility (PT)  -LG AM-PAC 6 Clicks Basic Mobility (PT)  -LG AM-PAC 6 Clicks Basic Mobility (PT)  -EC      User Key  (r) = Recorded By, (t) = Taken By, (c) = Cosigned By    Initials Name Provider Type    EC Paxton Betts PTA Physical Therapy Assistant    SHE Fernandez PTA Physical Therapy Assistant           Time Calculation:         PT Charges       11/07/17 0903          Time Calculation    Start Time 0903  -      Stop Time 0943  -      Time Calculation (min) 40 min  -      PT Received On 11/07/17  -      PT Goal Re-Cert Due Date 11/12/17  -      Time Calculation- PT    Total Timed Code Minutes- PT 40 minute(s)  -        User Key  (r) = Recorded By, (t) = Taken By, (c) = Cosigned By    Initials Name Provider Type     Praful Fernandez PTA Physical Therapy Assistant          Therapy Charges for Today     Code Description Service Date Service Provider Modifiers Qty    17461060605 HC GAIT TRAINING EA 15 MIN 11/6/2017 Praful Fernandez PTA GP 1    58771792881 HC PT THERAPEUTIC ACT EA 15 MIN 11/6/2017 Praful Fernandez PTA GP 1    21067857460 HC GAIT TRAINING EA 15 MIN 11/7/2017 Praful Fernandez PTA GP 1    68033119921 HC PT THERAPEUTIC ACT EA 15 MIN 11/7/2017 Praful Fernandez PTA GP 1    13608986043 HC PT THER PROC EA 15 MIN 11/7/2017 Praful Fernandez, DARRICK GP 1          PT G-Codes  Outcome Measure Options: AM-PAC 6 Clicks Basic Mobility (PT)  Score: 21  Functional Limitation: Mobility: Walking and moving around  Mobility: Walking and Moving Around Current Status (): At least 20 percent but less than 40 percent impaired, limited or restricted  Mobility: Walking and Moving Around Goal Status (): At least 1 percent but less than 20 percent impaired,  limited or restricted    Praful Fernandez, PTA  11/7/2017

## 2017-11-07 NOTE — PROGRESS NOTES
Continued Stay Note   Janette     Patient Name: Teri Tim  MRN: [id number]  Today's Date: 11/7/2017    Admit Date: 11/1/2017          Discharge Plan       11/07/17 1126    Case Management/Social Work Plan    Plan PT continues to plan to dc home with spouse. PT has requested a walker for home use. PT prefers to use [facility]. SW has faxed orders PT will  after dc.     Patient/Family In Agreement With Plan yes              Discharge Codes     None        Expected Discharge Date and Time     Expected Discharge Date Expected Discharge Time    Nov 7, 2017             EMILIA Mcdowell

## 2017-11-07 NOTE — PLAN OF CARE
Problem: Patient Care Overview (Adult)  Goal: Plan of Care Review  Outcome: Ongoing (interventions implemented as appropriate)    11/07/17 0903   Coping/Psychosocial Response Interventions   Plan Of Care Reviewed With patient   Outcome Evaluation   Outcome Summary/Follow up Plan Pt Conditionally Independent with Bed Mobility, Transfers and SBA for Gait with RW x 125'. O2 sats 96 on 2L at rest, 91-94 on 2L during activity. Pt possibly going home today, would benefit from RW to improve safety with gait and help reduce back pain.    Patient Care Overview   Progress progress towards functional goals is fair

## 2017-11-07 NOTE — DISCHARGE PLACEMENT REQUEST
"Teri Tim (57 y.o. Female)     Date of Birth Social Security Number Address Home Phone MRN    1960  [address]    [place] [address] [phone] [id number]    [place] Marital Status          Sabianism        Admission Date Admission Type Admitting Provider Attending Provider Department, Room/Bed    11/1/17 Emergency Marlen Connors MD Ruxer, Jena Thomas, MD [address], 401/1    Discharge Date Discharge Disposition Discharge Destination         Home or Self Care             Attending Provider: Marlen Connors MD     Allergies:  Codeine    Isolation:  None   Infection:  None   Code Status:  FULL    Ht:  60\" (152.4 cm)   Wt:  106 lb 1 oz (48.1 kg)    Admission Cmt:  None   Principal Problem:  None                Active Insurance as of 11/1/2017     Primary Coverage     Payor Plan Insurance Group Employer/Plan Group    [employer] [place] [employer] [place]ICA[place]      Payor Plan Address Payor Plan Phone Number Effective From Effective To    [address] [address] [phone] 11/1/2017     [place] [address]       Subscriber Name Subscriber Birth Date Member ID       TERI TIM 1960 [id number]                 Emergency Contacts      (Rel.) Home Phone Work Phone Mobile Phone    Rich Tim (Spouse) [phone] -- [phone]               History & Physical      Buck Zepeda MD at 11/1/2017  9:03 PM              [facility] Medicine Services  HISTORY AND PHYSICAL    Date of Admission: 11/1/2017  Primary Care Physician: Jose Barajas MD    Subjective     Chief Complaint: Confusion SOB    History of Present Illness  Patient presents to the emergency room with increasing shortness of breath swelling wheezing.  She also has chest pain.  She has a history of cardiomyopathy as well as hepatitis C.  She continues to smoke.  She has been out of her medications including her opiates Neurontin and I believe also her medications for " her hypertension and heart liver etc. for several weeks.  She has had a gradual decline.  She is finally come to the emergency room for further evaluation and management.  In the ER she was found to have elevated LFTs in the thousands which they had been in the 100 range previously.  Her BNP is also significantly elevated.  She is also probably in congestive heart failure.  She also seems to be having trouble breathing consistent with COPD exacerbation as well.  We have been asked to admit her for further evaluation and management.  She has also been having increasing periods of confusion per her  she is on chronic home O2 and apparently she gets short of breath she turns it up I suspect she is causing her CO2 to go up as well with this because she is a CO2 retainer on her ABG that was obtained here.        Review of Systems   14 Point ROS negative except for as per HPI  Otherwise complete ROS reviewed and negative except as mentioned in the HPI.    Past Medical History:   Past Medical History:   Diagnosis Date   • CHF (congestive heart failure)    • COPD (chronic obstructive pulmonary disease)    • Pneumonia      Past Surgical History:  Past Surgical History:   Procedure Laterality Date   • HYSTERECTOMY       Social History:  reports that she has been smoking Cigarettes.  She has been smoking about 0.50 packs per day. She does not have any smokeless tobacco history on file. She reports that she does not drink alcohol.    Family History: family history is not on file.     Allergies:  Allergies   Allergen Reactions   • Codeine Nausea And Vomiting     Medications:  Prior to Admission medications    Medication Sig Start Date End Date Taking? Authorizing Provider   budesonide-formoterol (SYMBICORT) 160-4.5 MCG/ACT inhaler Inhale 2 puffs 2 (Two) Times a Day.    Historical Provider, MD   cyclobenzaprine (FLEXERIL) 10 MG tablet Take 10 mg by mouth 2 (Two) Times a Day As Needed for Muscle Spasms.    Historical  "Provider, MD   gabapentin (NEURONTIN) 600 MG tablet Take 600 mg by mouth 3 (Three) Times a Day.    Historical Provider, MD   HYDROcodone-acetaminophen (NORCO) 7.5-325 MG per tablet Take 1 tablet by mouth Every 6 (Six) Hours As Needed for Moderate Pain .    Historical Provider, MD   metoprolol tartrate (LOPRESSOR) 25 MG tablet Take 12.5 mg by mouth 2 (Two) Times a Day.    Historical Provider, MD   pantoprazole (PROTONIX) 40 MG EC tablet Take 40 mg by mouth Daily.    Historical Provider, MD   potassium chloride (K-DUR,KLOR-CON) 20 MEQ CR tablet Take 1 tablet by mouth Daily. 12/23/16   Jose Barajas MD   spironolactone (ALDACTONE) 25 MG tablet Take 25 mg by mouth Daily.    Historical Provider, MD   venlafaxine (EFFEXOR) 75 MG tablet Take 75 mg by mouth 2 (Two) Times a Day.    Historical Provider, MD     Objective     Vital Signs: /73  Pulse 84  Temp (P) 98.3 °F (36.8 °C) (Temporal Artery )   Resp 18  Ht 61\" (154.9 cm)  Wt 117 lb 1.6 oz (53.1 kg)  SpO2 94%  BMI 22.13 kg/m2  Physical Exam  Constitutional: She is oriented to person, place, and time. She appears well-developed.   HENT:   Head: Normocephalic.   Eyes: Pupils are equal, round, and reactive to light.   Neck: Normal range of motion.   Cardiovascular: Normal rate and regular rhythm.    Pulmonary/Chest: Effort normal. She has wheezes.   Abdominal: Soft.   hepatomegaly   Musculoskeletal:   Extremity weakness, bilateral legs   Neurological: She is alert and oriented to person, place, and time.   Skin: Skin is warm.   Psychiatric: She has a normal mood and affect. Her behavior is normal.   Nursing note and vitals reviewed.      Results Reviewed:  Lab Results (last 24 hours)     Procedure Component Value Units Date/Time    Blood Gas, Arterial With Co-Ox [[id number]]  (Abnormal) Collected:  11/01/17 1710    Specimen:  Arterial Blood Updated:  11/01/17 1718     Site Right Brachial     Dao's Test N/A     pH, Arterial 7.349 (L) pH units      pCO2, Arterial " 68.2 (C) mm Hg      pO2, Arterial 68.6 (L) mm Hg      HCO3, Arterial 37.5 (H) mmol/L      Base Excess, Arterial 9.6 (H) mmol/L      O2 Saturation, Arterial 91.9 (L) %      Hemoglobin, Blood Gas 11.7 (L) g/dL      Hematocrit, Blood Gas 35.9 (L) %      Oxyhemoglobin 88.3 (L) %      Methemoglobin 0.70 %      Carboxyhemoglobin 3.3 %      Temperature 37.0 C      Sodium, Arterial 137 mmol/L      Potassium, Arterial 4.3 mmol/L      Barometric Pressure for Blood Gas 748 mmHg      Modality Nasal Cannula     Flow Rate 2.0 lpm      Ventilator Mode NA     Notified Who Saurav HERMAN     Notified By [id number]     Notified Time 11/01/2017 17:17     Collected by [id number]    Magnesium [[id number]]  (Normal) Collected:  11/01/17 1711    Specimen:  Blood Updated:  11/01/17 1741     Magnesium 1.9 mg/dL     CK [[id number]]  (Normal) Collected:  11/01/17 1711    Specimen:  Blood Updated:  11/01/17 1753     Creatine Kinase 166 U/L     CK-MB [[id number]]  (Abnormal) Collected:  11/01/17 1711    Specimen:  Blood Updated:  11/01/17 1753     CKMB 6.32 (H) ng/mL     Myoglobin, Serum [[id number]]  (Abnormal) Collected:  11/01/17 1711    Specimen:  Blood Updated:  11/01/17 1753     Myoglobin 165.2 (H) ng/mL     Troponin [[id number]]  (Abnormal) Collected:  11/01/17 1711    Specimen:  Blood Updated:  11/01/17 1753     Troponin I 0.092 (H) ng/mL     BNP [[id number]]  (Abnormal) Collected:  11/01/17 1711    Specimen:  Blood Updated:  11/01/17 1753     proBNP 00652.0 (H) pg/mL     CK-MB Index [[id number]]  (Normal) Collected:  11/01/17 1711    Specimen:  Blood Updated:  11/01/17 1753     CK-MB Index 3.8 %     Comprehensive Metabolic Panel [[id number]]  (Abnormal) Collected:  11/01/17 1711    Specimen:  Blood Updated:  11/01/17 1800     Glucose 88 mg/dL      BUN 26 (H) mg/dL      Creatinine 1.41 (H) mg/dL      Sodium 137 mmol/L      Potassium 4.5 mmol/L      Chloride 93 (L) mmol/L      CO2 38.0 (H) mmol/L      Calcium 8.6 mg/dL      Total Protein 6.3 g/dL       Albumin 3.30 (L) g/dL      ALT (SGPT) 1236 (H) U/L      AST (SGOT) 3036 (H) U/L      Alkaline Phosphatase 98 U/L      Total Bilirubin 0.5 mg/dL      eGFR Non African Amer 39 (L) mL/min/1.73      Globulin 3.0 gm/dL      A/G Ratio 1.1 g/dL      BUN/Creatinine Ratio 18.4     Anion Gap 6.0 mmol/L     CBC & Differential [[id number]] Collected:  11/01/17 1711    Specimen:  Blood Updated:  11/01/17 1814    Narrative:       The following orders were created for panel order CBC & Differential.  Procedure                               Abnormality         Status                     ---------                               -----------         ------                     CBC Auto Differential[[id number]]        Abnormal            Final result                 Please view results for these tests on the individual orders.    CBC Auto Differential [[id number]]  (Abnormal) Collected:  11/01/17 1711    Specimen:  Blood Updated:  11/01/17 1814     WBC 14.20 (H) 10*3/mm3      RBC 3.88 (L) 10*6/mm3      Hemoglobin 11.6 (L) g/dL      Hematocrit 39.4 %      .5 (H) fL      MCH 29.9 pg      MCHC 29.4 (L) g/dL      RDW 13.9 %      RDW-SD 51.4 fl      MPV 10.7 fL      Platelets 292 10*3/mm3      Neutrophil % 72.1 %      Lymphocyte % 14.9 (L) %      Monocyte % 11.7 %      Eosinophil % 0.5 %      Basophil % 0.4 %      Immature Grans % 0.4 %      Neutrophils, Absolute 10.26 (H) 10*3/mm3      Lymphocytes, Absolute 2.11 10*3/mm3      Monocytes, Absolute 1.66 (H) 10*3/mm3      Eosinophils, Absolute 0.07 10*3/mm3      Basophils, Absolute 0.05 10*3/mm3      Immature Grans, Absolute 0.05 (H) 10*3/mm3      nRBC 0.0 /100 WBC     Ammonia [[id number]]  (Normal) Collected:  11/01/17 1927    Specimen:  Blood Updated:  11/01/17 1945     Ammonia 21 umol/L     Protime-INR [[id number]]  (Abnormal) Collected:  11/01/17 1711    Specimen:  Blood Updated:  11/01/17 1957     Protime 17.4 (H) Seconds      INR 1.38 (H)    aPTT [[id number]]  (Normal) Collected:  11/01/17  1711    Specimen:  Blood Updated:  11/01/17 1957     PTT 33.0 seconds     Hepatitis Panel, Acute [[id number]] Collected:  11/01/17 2001    Specimen:  Blood Updated:  11/01/17 2004    Troponin [[id number]] Collected:  11/01/17 1927    Specimen:  Blood Updated:  11/01/17 2009    Ethanol [[id number]]  (Normal) Collected:  11/01/17 1711    Specimen:  Blood Updated:  11/01/17 2032     Ethanol % <0.010 %     Narrative:       Not for legal purposes. Chain of Custody not followed.         Imaging Results (last 24 hours)     Procedure Component Value Units Date/Time    XR Chest 1 View [[id number]] Collected:  11/01/17 1726     Updated:  11/01/17 1730    Narrative:       XR CHEST 1 VW- 11/1/2017 5:18 PM CDT     HISTORY: Shortness of air       COMPARISON: 05/27/2016.     FINDINGS:   Emphysematous changes are noted. The previously seen interstitial  opacities have resolved. The cardiomediastinal silhouette and pulmonary  vascularity are unchanged.      The osseous structures and surrounding soft tissues demonstrate no acute  abnormality.       Impression:       1. Emphysema without evidence of acute cardiopulmonary process.        This report was finalized on 11/01/2017 17:27 by Dr. David Bob MD.    CT Head Without Contrast [[id number]] Collected:  11/01/17 1909     Updated:  11/01/17 1912    Narrative:       CT HEAD WO CONTRAST- 11/1/2017 6:46 PM CDT     HISTORY: ams, weakness, difficulty walking       COMPARISON: None      DOSE LENGTH PRODUCT: 975 mGy cm. Automated exposure control was also  utilized to decrease patient radiation dose.     TECHNIQUE: Helical tomographic images of the brain were obtained without  the use of intravenous contrast.      FINDINGS:   Hemorrhage: None.     Mass effect: No midline shift or mass effect.     Ventricles: Normal and symmetric without hydrocephalus.     Basilar cisterns: Normal.     Sulci: Normal in configuration. No sulcal effacement.     Extra-axial fluid: No abnormal extra-axial fluid  collections.     Grey and white matter differentiation: Normal.     Posterior fossa and brainstem: Normal.     Bones: No fractures or lesions.     Soft tissues: No acute process.     Sinuses and mastoid air cells: Clear.       Impression:       1. No acute intracranial process.        This report was finalized on 11/01/2017 19:09 by Dr. David Bob MD.        I have personally reviewed and interpreted the radiology studies and ECG obtained at time of admission.     Assessment / Plan     Assessment:   Hospital Problem List     NSTEMI (non-ST elevated myocardial infarction)      1.  Decompensated diastolic heart failure resulting in non-STEMI.  Patient also has significant pulmonary hypertension.  Believe patient is been noncompliant with medications.  We will resume medications.  Aggressively diurese.  Supplemental oxygen as she can tolerate watching for CO2 retention.  Aggressive bronchodilator treatment.  We will start empiric antibiotics as well.  Monitor troponins see if they are trending down or up.  Her liver if situation I am hesitant to fully anticoagulate we will give prophylactic doses only of Lovenox.  Also start her on a low-dose ace. Cardiology consultationprove.  May also need a pulmonary consultation.  Will repeat 2-D echo.  2.  Elevated liver transaminases in setting of chronic hepatitis C I believe this is probably due to passive congestion from heart failure however will check ultrasound of liver consider GI consultation will diurese as described above and check hepatitis A and B panels to see if she is had another acute viral hepatitis decompensating her.  Her blood alcohol level is 0 this time.  It appears that she has not been taking any of her medications for pain management at least nothing legal.  For the last 3 months I am awaiting a urine drug screen.  3.  Tobacco dependence we will  and encouraged to quit smoking we will also offer patch.  4.  DVT prophylaxis low-dose Lovenox Zach  myah.               Code Status: Full      I discussed the patients findings and my recommendations with the patient and  who is healthcare power surrogate    Estimated length of stay 3-4 days    Buck Zepeda MD   11/01/17   9:03 PM             Electronically signed by Buck Zepeda MD at 11/2/2017  5:42 AM           Discharge Summary      Andrews Mccann ROLAND Pollock at 11/7/2017 10:27 AM              [facility] Medicine Services  DISCHARGE SUMMARY       Date of Admission: 11/1/2017  Date of Discharge:  11/7/2017  Primary Care Physician: Jose Barajas MD    Presenting Problem/History of Present Illness:  NSTEMI (non-ST elevated myocardial infarction) [I21.4]     Final Discharge Diagnoses:  1. Acute on chronic hypercarbic and hypoxic respiratory failure, pCO2 68 and pO2 47.8  2. Acutely decompensated diastolic congestive heart failure, EF 55%  3. Non-ST segment elevated myocardial infarction  4. Transaminitis with chronic hepatitis C, improving   5. Ongoing tobacco abuse  6. Chronic obstructive pulmonary disease, no exacerbation   7. Leukocytosis  8. Abnormal TSH, T3 2.72 / T4 1.59, PCP to follow   9. Moderate pulmonary hypertension   10. Mild hyponatremia     Consults:   1. Dr. Bryan, Gastroenterology  2. Dr. Barajas, Cardiology    Procedures Performed: None    Pertinent Test Results:   Lab Results (last 24 hours)     Procedure Component Value Units Date/Time    CBC & Differential [[id number]] Collected:  11/07/17 0941    Specimen:  Blood Updated:  11/07/17 1004    Narrative:       The following orders were created for panel order CBC & Differential.  Procedure                               Abnormality         Status                     ---------                               -----------         ------                     CBC Auto Differential[[id number]]        Abnormal            Final result                 Please view results for these tests on the individual orders.     CBC Auto Differential [[id number]]  (Abnormal) Collected:  11/07/17 0941    Specimen:  Blood Updated:  11/07/17 1004     WBC 13.43 (H) 10*3/mm3      RBC 3.96 (L) 10*6/mm3      Hemoglobin 11.6 (L) g/dL      Hematocrit 37.9 %      MCV 95.7 fL      MCH 29.3 pg      MCHC 30.6 (L) g/dL      RDW 13.8 %      RDW-SD 48.2 fl      MPV 10.9 fL      Platelets 289 10*3/mm3      Neutrophil % 91.9 (H) %      Lymphocyte % 3.6 (L) %      Monocyte % 4.2 %      Eosinophil % 0.0 %      Basophil % 0.1 %      Immature Grans % 0.2 %      Neutrophils, Absolute 12.35 (H) 10*3/mm3      Lymphocytes, Absolute 0.48 (L) 10*3/mm3      Monocytes, Absolute 0.56 10*3/mm3      Eosinophils, Absolute 0.00 10*3/mm3      Basophils, Absolute 0.01 10*3/mm3      Immature Grans, Absolute 0.03 10*3/mm3     Comprehensive Metabolic Panel [[id number]]  (Abnormal) Collected:  11/07/17 0941    Specimen:  Blood Updated:  11/07/17 1018     Glucose 117 (H) mg/dL      BUN 14 mg/dL      Creatinine 0.71 mg/dL      Sodium 133 (L) mmol/L      Potassium 4.1 mmol/L      Chloride 89 (L) mmol/L      CO2 31.0 mmol/L      Calcium 9.2 mg/dL      Total Protein 6.9 g/dL      Albumin 3.60 g/dL      ALT (SGPT) 239 (H) U/L      AST (SGOT) 43 U/L      Alkaline Phosphatase 81 U/L      Total Bilirubin 0.9 mg/dL      eGFR Non African Amer 85 mL/min/1.73      Globulin 3.3 gm/dL      A/G Ratio 1.1 g/dL      BUN/Creatinine Ratio 19.7     Anion Gap 13.0 mmol/L         Imaging Results (last 72 hours)     Procedure Component Value Units Date/Time    XR Chest 1 View [[id number]] Collected:  11/06/17 0800     Updated:  11/06/17 0804    Narrative:       EXAMINATION: XR CHEST 1 VW- 11/6/2017 8:00 AM CST     HISTORY: Shortness of breath, clinically suspected pneumonia.     REPORT: Comparison is made with the study from 11/01/2017.     Lungs are hyperinflated, there are coarse interstitial markings. No lung  consolidation is identified. Heart size is normal. No pneumothorax or  pleural effusion is  "identified. The osseous structures are unremarkable.       Impression:       Advanced COPD with interstitial disease which appears  stable. No consolidating infiltrate is identified.  This report was finalized on 2017 08:01 by Dr. Marcial Leon MD.        Teri L Meeta   Stress Echocardiogram   Order# [id number]    Reading physician: Jose Barajas MD   Ordering physician: Lizbeth Sanford PA-C   Study date: 11/3/17         Patient Information      Patient Name MRN Sex  (Age)     Teri Tim [id number] Female 1960 (57 y.o.)       Admission Information      Admission Date/Time Discharge Date/Time Room/Bed     17  1627  401/1       Patient Height & Weight      Height Weight BSA (Calculated - sq m) BMI (kg/m2) Pulse     60\" (152.4 cm) 106 lb 1 oz (48.1 kg) 1.46 sq meters 21.82 89       Patient Vitals      BP Pulse     110/54 89       Reason For Exam      Chest Pain; Dyspnea       Cardiac History      Diagnosis Date Comment     CHF (congestive heart failure)       Hypertension         Social History      Tobacco Use      Current Every Day Smoker; Smokes 0.5 packs/day; Smoked: Cigarettes.     Smokeless Tobacco: Never used smokeless tobacco.              Family Cardiac History as of 2017      Problem Relation Age of Onset     Heart disease Brother      Heart disease Father        Interpretation Summary      · Left ventricular systolic function is normal. Estimated EF = 55%.  · Normal stress echo with no significant echocardiographic evidence for myocardial ischemia.  · Despite not reaching target heart rate on maximal dose of dobutamine felt to be low risk     Teri Tim   Echocardiogram   Order# [id number]    Reading physician: Jose Barajas MD   Ordering physician: Buck Zepeda MD   Study date: 17         Patient Information      Patient Name MRN Sex  (Age)     Teri Tim [id number] Female 1960 (57 y.o.)       Admission Information      Admission Date/Time Discharge " "Date/Time Room/Bed     11/01/17  1628  401/1       Sedation Narrator Report      Sedation Narrator Report       Interpretation Summary      · Left ventricular systolic function is normal. Estimated EF = 55%.  · Left ventricular diastolic dysfunction (grade I) consistent with impaired relaxation.  · Right ventricular cavity is mildly dilated.  · Normal right ventricular wall thickness, systolic function and septal motion noted.  · Moderate pulmonary hypertension is present.         Hospital Course:  The patient is a 57 y.o. female who presented to [facility] with shortness of breath and wheezing.  She was also found to have very elevated liver enzymes with an ALT 1236 and AST 3036.  She has known hepatitis C and was not treated due to her \"numbers\" being low per the patient.  She was admitted for further evaluation and treatment.  A CT abdomen and pelvis with contrast and ultrasound of the abdomen were completed.  On the ultrasound there was concern for a 14 x 8 mm mass centered at the inferior hepatic margin and gallbladder wall.  CT scan was further imaged this.  This was not seen on the CAT scan.  Dr. Watkins felt that it was a focal fatty infiltration.  Her liver enzymes were continued to be monitored and had declined nicely.  She now has an ALT of 239 and an AST of 43.  Gastroenterology was consulted and initially when her liver enzymes were in the thousands and 3000s.  Cardiology was also consulted for a slight increase in her troponin.  Troponin elevation peaked at 0.10.  She was taken for a dobutamine stress echo which was low risk for ischemia.  2-D echocardiogram showed a preserved ejection fraction of 55%.  She also has moderate pulmonary hypertension with grade 1 diastolic dysfunction of the left ventricle as well.  She is a known patient of Dr. Barajas and is on Aldactone daily.  On admission she was started on Lasix in addition to the Aldactone.  It has been decreased to 10 mg daily and this is " "managing her nicely.  2 days prior to discharge she developed shortness of breath and wheezing.  Likely COPD exacerbation.  She was started on Rocephin and IV steroids with duo nebs.  She is now feeling better.  She has some diminished breath sounds in bilateral bases but no further wheezing or rhonchi noted.  She will be discharged on Omnicef and steroid taper to complete her therapy as an outpatient.  She will need a follow-up with Tabatha Oliveira in one week.  She is also that an appointment with pain management next week since she has missed the last 2 visits.  She is stable and good condition for discharge.  She follows regularly with Dr. Svitlana Newby and will follow-up with him in 2 weeks in regards to her hepatitis C and elevated liver enzymes.    Condition on Discharge:  Stable     Physical Exam on Discharge:  /54 (BP Location: Right arm, Patient Position: Lying)  Pulse 89  Temp 99.8 °F (37.7 °C) (Tympanic)   Resp 18  Ht 60\" (152.4 cm)  Wt 106 lb 1 oz (48.1 kg)  SpO2 94%  BMI 20.71 kg/m2     Physical Exam  Constitutional: She is oriented to person, place, and time. She appears well-developed and well-nourished.   HENT:   Head: Normocephalic and atraumatic.   Eyes: Conjunctivae and EOM are normal. Pupils are equal, round, and reactive to light.   Neck: Neck supple. No JVD present. No thyromegaly present.   Cardiovascular: Normal rate, regular rhythm, normal heart sounds and intact distal pulses.  Exam reveals no gallop and no friction rub.    No murmur heard.  Pulmonary/Chest: Effort normal. No respiratory distress. She has no wheezes. She has no rhonchi. She has no rales. She exhibits no tenderness.   Abdominal: Soft. Bowel sounds are normal. She exhibits no distension. There is no tenderness. There is no rebound and no guarding.   Musculoskeletal: Normal range of motion. She exhibits no edema, tenderness or deformity.   Lymphadenopathy:     She has no cervical adenopathy.   Neurological: She is " alert and oriented to person, place, and time. She displays normal reflexes. No cranial nerve deficit. She exhibits normal muscle tone.   Skin: Skin is warm and dry. No rash noted.   Psychiatric: She has a normal mood and affect. Her behavior is normal. Judgment and thought content normal.   Vitals reviewed.    Discharge Disposition:  Home or Self Care    Discharge Medications:   Teri Tim   Home Medication Instructions ARLETH:[id number]    Printed on:11/07/17 2505   Medication Information                      budesonide-formoterol (SYMBICORT) 160-4.5 MCG/ACT inhaler  Inhale 2 puffs 2 (Two) Times a Day.             cefdinir (OMNICEF) 300 MG capsule  Take 1 capsule by mouth 2 (Two) Times a Day for 6 days.             cyclobenzaprine (FLEXERIL) 10 MG tablet  Take 10 mg by mouth 2 (Two) Times a Day As Needed for Muscle Spasms.             furosemide (LASIX) 20 MG tablet  Take 0.5 tablets by mouth Daily.             gabapentin (NEURONTIN) 600 MG tablet  Take 600 mg by mouth 3 (Three) Times a Day.             HYDROcodone-acetaminophen (NORCO) 7.5-325 MG per tablet  Take 1 tablet by mouth Every 6 (Six) Hours As Needed for Moderate Pain .             metoprolol tartrate (LOPRESSOR) 25 MG tablet  Take 12.5 mg by mouth 2 (Two) Times a Day.             pantoprazole (PROTONIX) 40 MG EC tablet  Take 40 mg by mouth Daily.             potassium chloride (K-DUR,KLOR-CON) 20 MEQ CR tablet  Take 1 tablet by mouth Daily.             predniSONE (DELTASONE) 20 MG tablet  Take 1 tablet by mouth Daily. Take 40 mg daily x 3 days, 20 mg daily x 3 days then 10 mg daily for 3 days             spironolactone (ALDACTONE) 25 MG tablet  Take 25 mg by mouth Daily.             venlafaxine (EFFEXOR) 75 MG tablet  Take 75 mg by mouth 2 (Two) Times a Day.               Discharge Diet:   Diet Instructions     Diet: Regular, Cardiac; Thin       Discharge Diet:   Regular  Cardiac      Fluid Consistency:  Thin               Activity at Discharge:    Activity Instructions     Activity as Tolerated                   Follow-up Appointments:   1. Follow up with Tabatha Oliveira in one week  2. Follow up with Dr. Shane in two weeks   No future appointments.    Test Results Pending at Discharge: None    ROLAND Brink  11/07/17  10:27 AM    Time: 35 minutes            Electronically signed by ROLAND Brink at 11/7/2017 10:36 AM        [facility] [] (Order [id number])   General Supply    Date: 11/7/2017   Department: [address]   Ordering/Authorizing: ROLAND Brink           Order History  Outpatient       Date/Time Action Taken User Additional Information       11/07/17 1100 Sign Tiny Sidhu RN Ordering Mode: Telephone with readback           Order Details        Frequency Duration Priority Order Class       None None Routine External           Start Date/Time        Start Date       11/07/17           Order Questions        Question Answer Comment       Equipment  Walker Folding with Wheels        Length of Need (99 Months = Lifetime) 99 Months = Lifetime        Note:  Custom values to enter length of need for DME           Verbal Order Info        Action Created on Order Mode Entered by Responsible Provider Signed by Signed on       Ordering 11/07/17 1100 Telephone with readback CHRISTINA Watkins APRN             Source Order Set / Preference List        Preference List       AMB SUPPLIES [[id number]]           Clinical Indications           ICD-10-CM ICD-9-CM       NSTEMI (non-ST elevated myocardial infarction)  - Primary     I21.4 410.70           Reprint Order Requisition        [facility] (Order #[id number]) on 11/7/17         Encounter-Level Cardiology Documents:        There are no encounter-level cardiology documents.         Encounter        View Encounter         Order Provider Info            Office phone Pager E-mail       Ordering User Tiny Sidhu RN -- -- --       Authorizing Provider  ROLAND Brink [phone] -- --       Attending Provider Marlen Connors [phone] -- --       Entered By Tiny Sidhu RN -- -- --       Ordering Provider ROLAND Brink [phone] -- --           Linked Charges        No charges linked to this procedure         Order Transmittal Tracking        [facility] (Order #[id number]) on 11/7/17         Authorized by:  ROLAND Brink  (NPI: [id number])

## 2017-11-07 NOTE — DISCHARGE SUMMARY
[facility] Medicine Services  DISCHARGE SUMMARY       Date of Admission: 11/1/2017  Date of Discharge:  11/7/2017  Primary Care Physician: Jose Barajas MD    Presenting Problem/History of Present Illness:  NSTEMI (non-ST elevated myocardial infarction) [I21.4]     Final Discharge Diagnoses:  1. Acute on chronic hypercarbic and hypoxic respiratory failure, pCO2 68 and pO2 47.8  2. Acutely decompensated diastolic congestive heart failure, EF 55%  3. Non-ST segment elevated myocardial infarction  4. Transaminitis with chronic hepatitis C, improving   5. Ongoing tobacco abuse  6. Chronic obstructive pulmonary disease, no exacerbation   7. Leukocytosis  8. Abnormal TSH, T3 2.72 / T4 1.59, PCP to follow   9. Moderate pulmonary hypertension   10. Mild hyponatremia     Consults:   1. Dr. Bryan, Gastroenterology  2. Dr. Barajas, Cardiology    Procedures Performed: None    Pertinent Test Results:   Lab Results (last 24 hours)     Procedure Component Value Units Date/Time    CBC & Differential [[id number]] Collected:  11/07/17 0941    Specimen:  Blood Updated:  11/07/17 1004    Narrative:       The following orders were created for panel order CBC & Differential.  Procedure                               Abnormality         Status                     ---------                               -----------         ------                     CBC Auto Differential[[id number]]        Abnormal            Final result                 Please view results for these tests on the individual orders.    CBC Auto Differential [[id number]]  (Abnormal) Collected:  11/07/17 0941    Specimen:  Blood Updated:  11/07/17 1004     WBC 13.43 (H) 10*3/mm3      RBC 3.96 (L) 10*6/mm3      Hemoglobin 11.6 (L) g/dL      Hematocrit 37.9 %      MCV 95.7 fL      MCH 29.3 pg      MCHC 30.6 (L) g/dL      RDW 13.8 %      RDW-SD 48.2 fl      MPV 10.9 fL      Platelets 289 10*3/mm3      Neutrophil % 91.9 (H) %      Lymphocyte % 3.6  (L) %      Monocyte % 4.2 %      Eosinophil % 0.0 %      Basophil % 0.1 %      Immature Grans % 0.2 %      Neutrophils, Absolute 12.35 (H) 10*3/mm3      Lymphocytes, Absolute 0.48 (L) 10*3/mm3      Monocytes, Absolute 0.56 10*3/mm3      Eosinophils, Absolute 0.00 10*3/mm3      Basophils, Absolute 0.01 10*3/mm3      Immature Grans, Absolute 0.03 10*3/mm3     Comprehensive Metabolic Panel [[id number]]  (Abnormal) Collected:  11/07/17 0941    Specimen:  Blood Updated:  11/07/17 1018     Glucose 117 (H) mg/dL      BUN 14 mg/dL      Creatinine 0.71 mg/dL      Sodium 133 (L) mmol/L      Potassium 4.1 mmol/L      Chloride 89 (L) mmol/L      CO2 31.0 mmol/L      Calcium 9.2 mg/dL      Total Protein 6.9 g/dL      Albumin 3.60 g/dL      ALT (SGPT) 239 (H) U/L      AST (SGOT) 43 U/L      Alkaline Phosphatase 81 U/L      Total Bilirubin 0.9 mg/dL      eGFR Non African Amer 85 mL/min/1.73      Globulin 3.3 gm/dL      A/G Ratio 1.1 g/dL      BUN/Creatinine Ratio 19.7     Anion Gap 13.0 mmol/L         Imaging Results (last 72 hours)     Procedure Component Value Units Date/Time    XR Chest 1 View [[id number]] Collected:  11/06/17 0800     Updated:  11/06/17 0804    Narrative:       EXAMINATION: XR CHEST 1 VW- 11/6/2017 8:00 AM CST     HISTORY: Shortness of breath, clinically suspected pneumonia.     REPORT: Comparison is made with the study from 11/01/2017.     Lungs are hyperinflated, there are coarse interstitial markings. No lung  consolidation is identified. Heart size is normal. No pneumothorax or  pleural effusion is identified. The osseous structures are unremarkable.       Impression:       Advanced COPD with interstitial disease which appears  stable. No consolidating infiltrate is identified.  This report was finalized on 11/06/2017 08:01 by Dr. Marcial Leon MD.        Teri Tim   Stress Echocardiogram   Order# [id number]    Reading physician: Jose Barajas MD   Ordering physician: Lizbeth Sanford PA-C   Study date:  "11/3/17         Patient Information      Patient Name MRRODRIGO Sex  (Age)     Teri Tim [id number] Female 1960 (57 y.o.)       Admission Information      Admission Date/Time Discharge Date/Time Room/Bed     17  1627  401/1       Patient Height & Weight      Height Weight BSA (Calculated - sq m) BMI (kg/m2) Pulse     60\" (152.4 cm) 106 lb 1 oz (48.1 kg) 1.46 sq meters 21.82 89       Patient Vitals      BP Pulse     110/54 89       Reason For Exam      Chest Pain; Dyspnea       Cardiac History      Diagnosis Date Comment     CHF (congestive heart failure)       Hypertension         Social History      Tobacco Use      Current Every Day Smoker; Smokes 0.5 packs/day; Smoked: Cigarettes.     Smokeless Tobacco: Never used smokeless tobacco.              Family Cardiac History as of 2017      Problem Relation Age of Onset     Heart disease Brother      Heart disease Father        Interpretation Summary      · Left ventricular systolic function is normal. Estimated EF = 55%.  · Normal stress echo with no significant echocardiographic evidence for myocardial ischemia.  · Despite not reaching target heart rate on maximal dose of dobutamine felt to be low risk     Teri Tim   Echocardiogram   Order# [id number]    Reading physician: Jose Barajas MD   Ordering physician: Buck Zepeda MD   Study date: 17         Patient Information      Patient Name MRRODRIGO Sex  (Age)     Teri Tim [id number] Female 1960 (57 y.o.)       Admission Information      Admission Date/Time Discharge Date/Time Room/Bed     17  1627  401/1       Sedation Narrator Report      Sedation Narrator Report       Interpretation Summary      · Left ventricular systolic function is normal. Estimated EF = 55%.  · Left ventricular diastolic dysfunction (grade I) consistent with impaired relaxation.  · Right ventricular cavity is mildly dilated.  · Normal right ventricular wall thickness, systolic function and septal " "motion noted.  · Moderate pulmonary hypertension is present.         Hospital Course:  The patient is a 57 y.o. female who presented to [facility] with shortness of breath and wheezing.  She was also found to have very elevated liver enzymes with an ALT 1236 and AST 3036.  She has known hepatitis C and was not treated due to her \"numbers\" being low per the patient.  She was admitted for further evaluation and treatment.  A CT abdomen and pelvis with contrast and ultrasound of the abdomen were completed.  On the ultrasound there was concern for a 14 x 8 mm mass centered at the inferior hepatic margin and gallbladder wall.  CT scan was further imaged this.  This was not seen on the CAT scan.  Dr. Watkins felt that it was a focal fatty infiltration.  Her liver enzymes were continued to be monitored and had declined nicely.  She now has an ALT of 239 and an AST of 43.  Gastroenterology was consulted and initially when her liver enzymes were in the thousands and 3000s.  Cardiology was also consulted for a slight increase in her troponin.  Troponin elevation peaked at 0.10.  She was taken for a dobutamine stress echo which was low risk for ischemia.  2-D echocardiogram showed a preserved ejection fraction of 55%.  She also has moderate pulmonary hypertension with grade 1 diastolic dysfunction of the left ventricle as well.  She is a known patient of Dr. Barajas and is on Aldactone daily.  On admission she was started on Lasix in addition to the Aldactone.  It has been decreased to 10 mg daily and this is managing her nicely.  2 days prior to discharge she developed shortness of breath and wheezing.  Likely COPD exacerbation.  She was started on Rocephin and IV steroids with duo nebs.  She is now feeling better.  She has some diminished breath sounds in bilateral bases but no further wheezing or rhonchi noted.  She will be discharged on Omnicef and steroid taper to complete her therapy as an outpatient.  She will " "need a follow-up with Tabatha Oliveira in one week.  She is also that an appointment with pain management next week since she has missed the last 2 visits.  She is stable and good condition for discharge.  She follows regularly with Dr. Svitlana Newby and will follow-up with him in 2 weeks in regards to her hepatitis C and elevated liver enzymes.    Condition on Discharge:  Stable     Physical Exam on Discharge:  /54 (BP Location: Right arm, Patient Position: Lying)  Pulse 89  Temp 99.8 °F (37.7 °C) (Tympanic)   Resp 18  Ht 60\" (152.4 cm)  Wt 106 lb 1 oz (48.1 kg)  SpO2 94%  BMI 20.71 kg/m2     Physical Exam  Constitutional: She is oriented to person, place, and time. She appears well-developed and well-nourished.   HENT:   Head: Normocephalic and atraumatic.   Eyes: Conjunctivae and EOM are normal. Pupils are equal, round, and reactive to light.   Neck: Neck supple. No JVD present. No thyromegaly present.   Cardiovascular: Normal rate, regular rhythm, normal heart sounds and intact distal pulses.  Exam reveals no gallop and no friction rub.    No murmur heard.  Pulmonary/Chest: Effort normal. No respiratory distress. She has no wheezes. She has no rhonchi. She has no rales. She exhibits no tenderness.   Abdominal: Soft. Bowel sounds are normal. She exhibits no distension. There is no tenderness. There is no rebound and no guarding.   Musculoskeletal: Normal range of motion. She exhibits no edema, tenderness or deformity.   Lymphadenopathy:     She has no cervical adenopathy.   Neurological: She is alert and oriented to person, place, and time. She displays normal reflexes. No cranial nerve deficit. She exhibits normal muscle tone.   Skin: Skin is warm and dry. No rash noted.   Psychiatric: She has a normal mood and affect. Her behavior is normal. Judgment and thought content normal.   Vitals reviewed.    Discharge Disposition:  Home or Self Care    Discharge Medications:   Teri Tim   Home Medication " Instructions [facility]:[id number]    Printed on:11/07/17 8550   Medication Information                      budesonide-formoterol (SYMBICORT) 160-4.5 MCG/ACT inhaler  Inhale 2 puffs 2 (Two) Times a Day.             cefdinir (OMNICEF) 300 MG capsule  Take 1 capsule by mouth 2 (Two) Times a Day for 6 days.             cyclobenzaprine (FLEXERIL) 10 MG tablet  Take 10 mg by mouth 2 (Two) Times a Day As Needed for Muscle Spasms.             furosemide (LASIX) 20 MG tablet  Take 0.5 tablets by mouth Daily.             gabapentin (NEURONTIN) 600 MG tablet  Take 600 mg by mouth 3 (Three) Times a Day.             HYDROcodone-acetaminophen (NORCO) 7.5-325 MG per tablet  Take 1 tablet by mouth Every 6 (Six) Hours As Needed for Moderate Pain .             metoprolol tartrate (LOPRESSOR) 25 MG tablet  Take 12.5 mg by mouth 2 (Two) Times a Day.             pantoprazole (PROTONIX) 40 MG EC tablet  Take 40 mg by mouth Daily.             potassium chloride (K-DUR,KLOR-CON) 20 MEQ CR tablet  Take 1 tablet by mouth Daily.             predniSONE (DELTASONE) 20 MG tablet  Take 1 tablet by mouth Daily. Take 40 mg daily x 3 days, 20 mg daily x 3 days then 10 mg daily for 3 days             spironolactone (ALDACTONE) 25 MG tablet  Take 25 mg by mouth Daily.             venlafaxine (EFFEXOR) 75 MG tablet  Take 75 mg by mouth 2 (Two) Times a Day.               Discharge Diet:   Diet Instructions     Diet: Regular, Cardiac; Thin       Discharge Diet:   Regular  Cardiac      Fluid Consistency:  Thin               Activity at Discharge:   Activity Instructions     Activity as Tolerated                   Follow-up Appointments:   1. Follow up with Tabatha Oliveira in one week  2. Follow up with Dr. Shane in two weeks   No future appointments.    Test Results Pending at Discharge: None  REviewed above note, meds, labs and vitals.  Agree with plan for d/c and f/u plans.  MD Andrews Bella, ROLAND  11/07/17  10:27 AM    Time: 35 minutes

## 2017-11-07 NOTE — PLAN OF CARE
Problem: Patient Care Overview (Adult)  Goal: Plan of Care Review  Outcome: Ongoing (interventions implemented as appropriate)    11/06/17 1513 11/06/17 2133 11/07/17 0226   Coping/Psychosocial Response Interventions   Plan Of Care Reviewed With --  patient --    Outcome Evaluation   Outcome Summary/Follow up Plan --  --  VSS. Pt c/o back pain and given pain medication x1. Pt has worn the bipap most of the night. No c/o soa or chest pain. Will continue to monitor and notify MD of changes.   Patient Care Overview   Progress progress toward functional goals is gradual --  --        Goal: Adult Individualization and Mutuality  Outcome: Ongoing (interventions implemented as appropriate)    Problem: Pain, Acute (Adult)  Goal: Acceptable Pain Control/Comfort Level  Outcome: Ongoing (interventions implemented as appropriate)    Problem: Respiratory Insufficiency (Adult)  Goal: Identify Related Risk Factors and Signs and Symptoms  Outcome: Ongoing (interventions implemented as appropriate)  Goal: Acid/Base Balance  Outcome: Ongoing (interventions implemented as appropriate)  Goal: Effective Ventilation  Outcome: Ongoing (interventions implemented as appropriate)    Problem: Acute Coronary Syndrome (ACS) (Adult)  Goal: Signs and Symptoms of Listed Potential Problems Will be Absent or Manageable (Acute Coronary Syndrome)  Outcome: Ongoing (interventions implemented as appropriate)

## 2017-11-08 NOTE — PROGRESS NOTES
LOS: 6 days   Patient Care Team:  Jose Barajas MD as PCP - General    Chief Complaint: Shortness of breath  Transient chest pain  Subjective  Feeling  better  No chest pain   Moderate persistent  shortness of breath  No new complaints     Interval History: Improved overall    Patient Complaints:   Denies chest pain currently. Denies excessive shortness of breath. Denies abdominal pain, nausea vomiting or diarrhoea.    Telemetry: no malignant arrhythmia. No significant pauses.    Review of Systems:    The following systems were reviewed and negative; ENT,   gastrointestinal, integument, hematologic / lymphatic, neurological, behavioral/psych and allergies / immunologic    Labs:    WBC WBC   Date Value Ref Range Status   11/07/2017 13.43 (H) 4.80 - 10.80 10*3/mm3 Final   11/06/2017 22.72 (H) 4.80 - 10.80 10*3/mm3 Final   11/05/2017 16.38 (H) 4.80 - 10.80 10*3/mm3 Final      HGB Hemoglobin   Date Value Ref Range Status   11/07/2017 11.6 (L) 12.0 - 16.0 g/dL Final   11/06/2017 10.7 (L) 12.0 - 16.0 g/dL Final   11/05/2017 11.2 (L) 12.0 - 16.0 g/dL Final      HCT Hematocrit   Date Value Ref Range Status   11/07/2017 37.9 37.0 - 47.0 % Final   11/06/2017 35.7 (L) 37.0 - 47.0 % Final   11/05/2017 37.7 37.0 - 47.0 % Final      Platlets Platelets   Date Value Ref Range Status   11/07/2017 289 130 - 400 10*3/mm3 Final   11/06/2017 244 130 - 400 10*3/mm3 Final   11/05/2017 254 130 - 400 10*3/mm3 Final      MCV MCV   Date Value Ref Range Status   11/07/2017 95.7 82.0 - 98.0 fL Final   11/06/2017 97.8 82.0 - 98.0 fL Final   11/05/2017 99.0 (H) 82.0 - 98.0 fL Final        Results from last 7 days  Lab Units 11/07/17  0941 11/06/17  0356 11/05/17  0321   SODIUM mmol/L 133* 134* 137   POTASSIUM mmol/L 4.1 3.7 3.6   CHLORIDE mmol/L 89* 89* 87*   CO2 mmol/L 31.0 40.0* >40.0*   BUN mg/dL 14 17 34*   CREATININE mg/dL 0.71 0.86 1.30   CALCIUM mg/dL 9.2 8.4 8.0*   BILIRUBIN mg/dL 0.9 1.5* 1.3*   ALK PHOS U/L 81 75 72   ALT (SGPT)  U/L 239* 332* 464*   AST (SGOT) U/L 43 82* 188*   GLUCOSE mg/dL 117* 81 77     Lab Results   Component Value Date    CKTOTAL 166 11/01/2017    CKMB 6.32 (H) 11/01/2017    CKMBINDEX 3.8 11/01/2017    TROPONINI 0.061 (H) 11/02/2017     PT/INR:  No results found for: PROTIME/No results found for: INR    Imaging Results (last 72 hours)     Procedure Component Value Units Date/Time    XR Chest 1 View [[id number]] Collected:  11/06/17 0800     Updated:  11/06/17 0804    Narrative:       EXAMINATION: XR CHEST 1 VW- 11/6/2017 8:00 AM CST     HISTORY: Shortness of breath, clinically suspected pneumonia.     REPORT: Comparison is made with the study from 11/01/2017.     Lungs are hyperinflated, there are coarse interstitial markings. No lung  consolidation is identified. Heart size is normal. No pneumothorax or  pleural effusion is identified. The osseous structures are unremarkable.       Impression:       Advanced COPD with interstitial disease which appears  stable. No consolidating infiltrate is identified.  This report was finalized on 11/06/2017 08:01 by Dr. Marcial Leon MD.          Objective     Medication Review:   No current facility-administered medications for this encounter.      Current Outpatient Prescriptions   Medication Sig Dispense Refill   • budesonide-formoterol (SYMBICORT) 160-4.5 MCG/ACT inhaler Inhale 2 puffs 2 (Two) Times a Day.     • cefdinir (OMNICEF) 300 MG capsule Take 1 capsule by mouth 2 (Two) Times a Day for 6 days. 12 capsule 0   • cyclobenzaprine (FLEXERIL) 10 MG tablet Take 10 mg by mouth 2 (Two) Times a Day As Needed for Muscle Spasms.     • [START ON 11/8/2017] furosemide (LASIX) 20 MG tablet Take 0.5 tablets by mouth Daily. 30 tablet 1   • gabapentin (NEURONTIN) 600 MG tablet Take 600 mg by mouth 3 (Three) Times a Day.     • HYDROcodone-acetaminophen (NORCO) 7.5-325 MG per tablet Take 1 tablet by mouth Every 6 (Six) Hours As Needed for Moderate Pain .     • metoprolol tartrate  "(LOPRESSOR) 25 MG tablet Take 12.5 mg by mouth 2 (Two) Times a Day.     • pantoprazole (PROTONIX) 40 MG EC tablet Take 40 mg by mouth Daily.     • potassium chloride (K-DUR,KLOR-CON) 20 MEQ CR tablet Take 1 tablet by mouth Daily. 30 tablet 11   • predniSONE (DELTASONE) 20 MG tablet Take 1 tablet by mouth Daily. Take 40 mg daily x 3 days, 20 mg daily x 3 days then 10 mg daily for 3 days 11 tablet 0   • spironolactone (ALDACTONE) 25 MG tablet Take 25 mg by mouth Daily.     • venlafaxine (EFFEXOR) 75 MG tablet Take 75 mg by mouth 2 (Two) Times a Day.         Vital Sign Min/Max for last 24 hours  Temp  Min: 96.8 °F (36 °C)  Max: 99.8 °F (37.7 °C)   BP  Min: 99/51  Max: 116/63   Pulse  Min: 70  Max: 100   Resp  Min: 14  Max: 22   SpO2  Min: 90 %  Max: 98 %   Flow (L/min)  Min: 2  Max: 2   Weight  Min: 106 lb 1 oz (48.1 kg)  Max: 106 lb 1 oz (48.1 kg)     Flowsheet Rows         First Filed Value    Admission Height  61\" (154.9 cm) Documented at 11/01/2017 1648    Admission Weight  117 lb 1.6 oz (53.1 kg) Documented at 11/01/2017 1648          Physical Exam:  Ears ears appear intact with no abnormalities noted  Nose nares normal, septum midline, mucosa normal and no drainage  Neck supple, trachea midline, no thyromegaly, no carotid bruit and no JVD  Back no kyphosis present, no scoliosis present, no skin lesions, erythema, or scars, no tenderness to percussion or palpation and range of motion normal  Lungs respirations regular, respirations even and respirations unlabored  Heart normal S1, S2, no murmur, no sravani, no rub and no click  Abdomen soft  Skin no bleeding, bruising or rash     Results Review:   I reviewed the patient's new clinical results.  I reviewed the patient's new imaging results and agree with the interpretation.  I reviewed the patient's other test results and agree with the interpretation  I personally viewed and interpreted the patient's EKG/Telemetry data  Discussed with patient    Medication Review: " Performed    Assessment/Plan   Active Problems:   NSTEMI (non-ST elevated myocardial infarction)   Abnormal LFTs   Abnormal US (ultrasound) of abdomen  Likely Type II Troponin Elevation  Acute Cor Pulmonale  Acute exacerbation of COPD  Hepatic mass with elevated Liver Enzymes  Tobacco Abuse  Paroxysmal Atrial Fibrillation  Low risk stress test with normal LVEF by echo cardiogram.          Plan:     OK to discharge  Low salt cardiac diet.   Continue same medications   Discharge to home and or self care with improvement or resolution of presenting symptoms or complaints  Ambulating some  Optimal medical therapy  Deep vein thrombosis precautions with hydration and ambulation  Counseled regarding disease appropriate diet, fluid, sodium, caffeine, stimulants intake   Stressed compliance to diet and medications   Avoid NSAIDS  Follow up with primary provider and GI  as scheduled   Overall improved and deemed fit for discharge  Will be provided with written discharge instructions including diet and medications including prescriptions as required and labs as applicable  Go to nearest emergency room if recurrence of primary complaints or any suspected disabling or life threatening symptoms or complaints such as chest pain, increasing shortness of breath, profound dizziness, weakness, significant palpitations, passing out episodes, cold sweats, excessive nausea etc   See me in office 3 months after discharge    Jose Barajas MD  11/07/17  7:16 PM

## 2017-11-08 NOTE — THERAPY DISCHARGE NOTE
Acute Care - Physical Therapy Discharge Summary  [facility]       Patient Name: Teri Tim  : 1960  MRN: [id number]    Today's Date: 2017  Onset of Illness/Injury or Date of Surgery Date: 17    Date of Referral to PT: 17  Referring Physician: Dr. Zepeda      Admit Date: 2017      PT Recommendation and Plan    Visit Dx:    ICD-10-CM ICD-9-CM   1. NSTEMI (non-ST elevated myocardial infarction) I21.4 410.70   2. Elevated liver enzymes R74.8 790.5   3. Hepatitis C virus infection without hepatic coma, unspecified chronicity B19.20 070.70   4. Impaired functional mobility, balance, gait, and endurance Z74.09 V49.89             Outcome Measures       17 0942 17 1537 17 1500    How much help from another person do you currently need...    Turning from your back to your side while in flat bed without using bedrails? 4  -LG 4  -LG 4  -EC    Moving from lying on back to sitting on the side of a flat bed without bedrails? 4  -LG 4  -LG 4  -EC    Moving to and from a bed to a chair (including a wheelchair)? 3  -LG 3  -LG 3  -EC    Standing up from a chair using your arms (e.g., wheelchair, bedside chair)? 3  -LG 3  -LG 3  -EC    Climbing 3-5 steps with a railing? 3  -LG 2  -LG 2  -EC    To walk in hospital room? 3  -LG 3  -LG 3  -EC    AM-PAC 6 Clicks Score 20  -LG 19  -LG 19  -EC    Functional Assessment    Outcome Measure Options AM-PAC 6 Clicks Basic Mobility (PT)  -LG AM-PAC 6 Clicks Basic Mobility (PT)  -LG AM-PAC 6 Clicks Basic Mobility (PT)  -EC      User Key  (r) = Recorded By, (t) = Taken By, (c) = Cosigned By    Initials Name Provider Type    EC Paxton Betts PTA Physical Therapy Assistant    SHE Fernandez PTA Physical Therapy Assistant                      IP PT Goals       17 1033 17 1031       Gait Training PT LTG    Gait Training Goal PT LTG, Date Established  17  -LYNETTE (r) AB (t) LYNETTE (c)     Gait Training Goal PT LTG, Time to Achieve  by  discharge  -LYNETTE (r) AB (t) LYNETTE (c)     Gait Training Goal PT LTG, [place] Level conditional independence  - supervision required  -LYNETTE (r) AB (t) LYNETTE (c)     Gait Training Goal PT LTG, Assist Device  cane, quad  -LYNETTE (r) AB (t) LYNTETE (c)     Gait Training Goal PT LTG, Distance to Achieve  100  -LYNETTE (r) AB (t) LYNETTE (c)     Gait Training Goal PT LTG, Outcome goal not met  -      Gait Training Goal PT LTG, Reason Goal Not Met discharged from facility  -      Dynamic Standing Balance PT LTG    Dynamic Standing Balance PT LTG, Date Established  11/02/17  -LYNETTE (r) AB (t) LYNETTE (c)     Dynamic Standing Balance PT LTG, Time to Achieve  by discharge  -LYNETTE (r) AB (t) LYNETTE (c)     Dynamic Standing Balance PT LTG, [place] Level  supervision required  -LYNETTE (r) AB (t) LYNETTE (c)     Dynamic Standing Balance PT LTG, Assist Device  assistive Device  -LYNETTE (r) AB (t) LYNETTE (c)     Dynamic Standing Balance PT LTG, Additional Goal  side step, reaching out of [place], lifting objects, picking object up from floor  -LYNETTE (r) AB (t) LYNETTE (c)     Dynamic Standing Balance PT LTG, Date Goal Reviewed 11/08/17  -      Dynamic Standing Balance PT LTG, Outcome goal not met  -      Dynamic Standing Balance PT LTG, Reason Goal Not Met discharged from facility  -        User Key  (r) = Recorded By, (t) = Taken By, (c) = Cosigned By    Initials Name Provider Type    LYNETTE eDl Real, PT DPT Physical Therapist    BALJEET Shaikh, PTA Physical Therapy Assistant    AB Sonia Iyer, PT Student PT Student              PT Discharge Summary  Anticipated Discharge Disposition: home  Reason for Discharge: Discharge from facility  Outcomes Achieved: Patient able to partially acheive established goals  Discharge Destination: Home      Alejandra Shaikh PTA   11/8/2017

## 2017-11-08 NOTE — PLAN OF CARE
Problem: Inpatient Physical Therapy  Goal: Gait Training Goal LTG- PT  Outcome: Unable to achieve outcome(s) by discharge Date Met:  11/08/17 11/08/17 1033   Gait Training PT LTG   Gait Training Goal PT LTG, [place] Level conditional independence   Gait Training Goal PT LTG, Outcome goal not met   Gait Training Goal PT LTG, Reason Goal Not Met discharged from facility       Goal: Dynamic Standing Balance Goal LTG- PT  Outcome: Unable to achieve outcome(s) by discharge Date Met:  11/08/17

## 2018-01-05 NOTE — PLAN OF CARE
Problem: Patient Care Overview (Adult)  Goal: Plan of Care Review  Outcome: Ongoing (interventions implemented as appropriate)    11/02/17 0148   Coping/Psychosocial Response Interventions   Plan Of Care Reviewed With patient;spouse   Outcome Evaluation   Outcome Summary/Follow up Plan continues NSR / ST on tele, occassional reuns of pvcs. for echo todaya, cardiology consult, labs and ABG's . edema inproved slightly after IV lasix. no falls,  at bedside.             (0) independent

## 2018-03-30 ENCOUNTER — HOSPITAL ENCOUNTER (INPATIENT)
Facility: HOSPITAL | Age: 58
LOS: 6 days | Discharge: HOME HEALTH | End: 2018-04-05
Attending: EMERGENCY MEDICINE | Admitting: INTERNAL MEDICINE

## 2018-03-30 ENCOUNTER — APPOINTMENT (OUTPATIENT)
Dept: GENERAL RADIOLOGY | Facility: HOSPITAL | Age: 58
End: 2018-03-30

## 2018-03-30 DIAGNOSIS — Z78.9 IMPAIRED MOBILITY AND ADLS: ICD-10-CM

## 2018-03-30 DIAGNOSIS — J44.1 COPD EXACERBATION (HCC): Primary | ICD-10-CM

## 2018-03-30 DIAGNOSIS — Z74.09 IMPAIRED FUNCTIONAL MOBILITY AND ACTIVITY TOLERANCE: ICD-10-CM

## 2018-03-30 DIAGNOSIS — Z74.09 IMPAIRED MOBILITY AND ADLS: ICD-10-CM

## 2018-03-30 DIAGNOSIS — J96.22 ACUTE ON CHRONIC RESPIRATORY FAILURE WITH HYPOXIA AND HYPERCAPNIA (HCC): ICD-10-CM

## 2018-03-30 DIAGNOSIS — J96.21 ACUTE ON CHRONIC RESPIRATORY FAILURE WITH HYPOXIA AND HYPERCAPNIA (HCC): ICD-10-CM

## 2018-03-30 LAB
ALBUMIN SERPL-MCNC: 3.6 G/DL (ref 3.5–5)
ALBUMIN/GLOB SERPL: 1.2 G/DL (ref 1.1–2.5)
ALP SERPL-CCNC: 107 U/L (ref 24–120)
ALT SERPL W P-5'-P-CCNC: 127 U/L (ref 0–54)
ANION GAP SERPL CALCULATED.3IONS-SCNC: 6 MMOL/L (ref 4–13)
ARTERIAL PATENCY WRIST A: ABNORMAL
ARTERIAL PATENCY WRIST A: POSITIVE
AST SERPL-CCNC: 67 U/L (ref 7–45)
ATMOSPHERIC PRESS: 757 MMHG
ATMOSPHERIC PRESS: 758 MMHG
BASE EXCESS BLDA CALC-SCNC: 11 MMOL/L (ref 0–2)
BASE EXCESS BLDA CALC-SCNC: 5.9 MMOL/L (ref 0–2)
BASOPHILS # BLD AUTO: 0.04 10*3/MM3 (ref 0–0.2)
BASOPHILS NFR BLD AUTO: 0.5 % (ref 0–2)
BDY SITE: ABNORMAL
BDY SITE: ABNORMAL
BILIRUB SERPL-MCNC: 1.1 MG/DL (ref 0.1–1)
BODY TEMPERATURE: 37 C
BODY TEMPERATURE: 37 C
BUN BLD-MCNC: 22 MG/DL (ref 5–21)
BUN/CREAT SERPL: 19 (ref 7–25)
CALCIUM SPEC-SCNC: 8.7 MG/DL (ref 8.4–10.4)
CHLORIDE SERPL-SCNC: 85 MMOL/L (ref 98–110)
CO2 SERPL-SCNC: 38 MMOL/L (ref 24–31)
CREAT BLD-MCNC: 1.16 MG/DL (ref 0.5–1.4)
D-LACTATE SERPL-SCNC: 1.3 MMOL/L (ref 0.5–2)
DEPRECATED RDW RBC AUTO: 57.8 FL (ref 40–54)
EOSINOPHIL # BLD AUTO: 0.01 10*3/MM3 (ref 0–0.7)
EOSINOPHIL NFR BLD AUTO: 0.1 % (ref 0–4)
EPAP: 6
ERYTHROCYTE [DISTWIDTH] IN BLOOD BY AUTOMATED COUNT: 17.9 % (ref 12–15)
GAS FLOW AIRWAY: 2 LPM
GFR SERPL CREATININE-BSD FRML MDRD: 49 ML/MIN/1.73
GLOBULIN UR ELPH-MCNC: 3 GM/DL
GLUCOSE BLD-MCNC: 108 MG/DL (ref 70–100)
HCO3 BLDA-SCNC: 35.2 MMOL/L (ref 20–26)
HCO3 BLDA-SCNC: 39.4 MMOL/L (ref 20–26)
HCT VFR BLD AUTO: 39.2 % (ref 37–47)
HGB BLD-MCNC: 11.5 G/DL (ref 12–16)
HOLD SPECIMEN: NORMAL
HOLD SPECIMEN: NORMAL
HOROWITZ INDEX BLD+IHG-RTO: 35 %
IMM GRANULOCYTES # BLD: 0.04 10*3/MM3 (ref 0–0.03)
IMM GRANULOCYTES NFR BLD: 0.5 % (ref 0–5)
IPAP: 14
LYMPHOCYTES # BLD AUTO: 1.56 10*3/MM3 (ref 0.72–4.86)
LYMPHOCYTES NFR BLD AUTO: 18.4 % (ref 15–45)
Lab: ABNORMAL
MCH RBC QN AUTO: 25.8 PG (ref 28–32)
MCHC RBC AUTO-ENTMCNC: 29.3 G/DL (ref 33–36)
MCV RBC AUTO: 88.1 FL (ref 82–98)
MODALITY: ABNORMAL
MODALITY: ABNORMAL
MONOCYTES # BLD AUTO: 1.06 10*3/MM3 (ref 0.19–1.3)
MONOCYTES NFR BLD AUTO: 12.5 % (ref 4–12)
NEUTROPHILS # BLD AUTO: 5.78 10*3/MM3 (ref 1.87–8.4)
NEUTROPHILS NFR BLD AUTO: 68 % (ref 39–78)
NOTIFIED BY: ABNORMAL
NOTIFIED BY: ABNORMAL
NOTIFIED WHO: ABNORMAL
NOTIFIED WHO: ABNORMAL
NRBC BLD MANUAL-RTO: 2.6 /100 WBC (ref 0–0)
NT-PROBNP SERPL-MCNC: ABNORMAL PG/ML (ref 0–900)
PCO2 BLDA: 72.9 MM HG (ref 35–45)
PCO2 BLDA: 77.6 MM HG (ref 35–45)
PH BLDA: 7.26 PH UNITS (ref 7.35–7.45)
PH BLDA: 7.34 PH UNITS (ref 7.35–7.45)
PLATELET # BLD AUTO: 265 10*3/MM3 (ref 130–400)
PMV BLD AUTO: 9.6 FL (ref 6–12)
PO2 BLDA: 61.1 MM HG (ref 83–108)
PO2 BLDA: 94.2 MM HG (ref 83–108)
POTASSIUM BLD-SCNC: 4.4 MMOL/L (ref 3.5–5.3)
PROT SERPL-MCNC: 6.6 G/DL (ref 6.3–8.7)
RBC # BLD AUTO: 4.45 10*6/MM3 (ref 4.2–5.4)
SAO2 % BLDCOA: 88.9 % (ref 94–99)
SAO2 % BLDCOA: 97.6 % (ref 94–99)
SODIUM BLD-SCNC: 129 MMOL/L (ref 135–145)
TROPONIN I SERPL-MCNC: 0.03 NG/ML (ref 0–0.03)
VENTILATOR MODE: ABNORMAL
VENTILATOR MODE: ABNORMAL
WBC NRBC COR # BLD: 8.49 10*3/MM3 (ref 4.8–10.8)
WHOLE BLOOD HOLD SPECIMEN: NORMAL
WHOLE BLOOD HOLD SPECIMEN: NORMAL

## 2018-03-30 PROCEDURE — 94640 AIRWAY INHALATION TREATMENT: CPT

## 2018-03-30 PROCEDURE — 80053 COMPREHEN METABOLIC PANEL: CPT | Performed by: EMERGENCY MEDICINE

## 2018-03-30 PROCEDURE — 25010000002 METHYLPREDNISOLONE PER 125 MG: Performed by: EMERGENCY MEDICINE

## 2018-03-30 PROCEDURE — 85025 COMPLETE CBC W/AUTO DIFF WBC: CPT | Performed by: EMERGENCY MEDICINE

## 2018-03-30 PROCEDURE — 94760 N-INVAS EAR/PLS OXIMETRY 1: CPT

## 2018-03-30 PROCEDURE — 87040 BLOOD CULTURE FOR BACTERIA: CPT | Performed by: EMERGENCY MEDICINE

## 2018-03-30 PROCEDURE — 36600 WITHDRAWAL OF ARTERIAL BLOOD: CPT

## 2018-03-30 PROCEDURE — 25010000002 METHYLPREDNISOLONE PER 125 MG: Performed by: NURSE PRACTITIONER

## 2018-03-30 PROCEDURE — 83605 ASSAY OF LACTIC ACID: CPT | Performed by: EMERGENCY MEDICINE

## 2018-03-30 PROCEDURE — 99284 EMERGENCY DEPT VISIT MOD MDM: CPT

## 2018-03-30 PROCEDURE — 5A09457 ASSISTANCE WITH RESPIRATORY VENTILATION, 24-96 CONSECUTIVE HOURS, CONTINUOUS POSITIVE AIRWAY PRESSURE: ICD-10-PCS | Performed by: EMERGENCY MEDICINE

## 2018-03-30 PROCEDURE — 82803 BLOOD GASES ANY COMBINATION: CPT

## 2018-03-30 PROCEDURE — 84484 ASSAY OF TROPONIN QUANT: CPT | Performed by: NURSE PRACTITIONER

## 2018-03-30 PROCEDURE — 83880 ASSAY OF NATRIURETIC PEPTIDE: CPT | Performed by: EMERGENCY MEDICINE

## 2018-03-30 PROCEDURE — 94799 UNLISTED PULMONARY SVC/PX: CPT

## 2018-03-30 PROCEDURE — 25010000002 FUROSEMIDE PER 20 MG: Performed by: INTERNAL MEDICINE

## 2018-03-30 PROCEDURE — 36415 COLL VENOUS BLD VENIPUNCTURE: CPT | Performed by: EMERGENCY MEDICINE

## 2018-03-30 PROCEDURE — 94660 CPAP INITIATION&MGMT: CPT

## 2018-03-30 PROCEDURE — 25010000002 FUROSEMIDE PER 20 MG: Performed by: NURSE PRACTITIONER

## 2018-03-30 PROCEDURE — 93010 ELECTROCARDIOGRAM REPORT: CPT | Performed by: INTERNAL MEDICINE

## 2018-03-30 PROCEDURE — 93005 ELECTROCARDIOGRAM TRACING: CPT | Performed by: EMERGENCY MEDICINE

## 2018-03-30 PROCEDURE — 25010000002 ENOXAPARIN PER 10 MG: Performed by: NURSE PRACTITIONER

## 2018-03-30 PROCEDURE — 71045 X-RAY EXAM CHEST 1 VIEW: CPT

## 2018-03-30 RX ORDER — SODIUM CHLORIDE 0.9 % (FLUSH) 0.9 %
10 SYRINGE (ML) INJECTION AS NEEDED
Status: DISCONTINUED | OUTPATIENT
Start: 2018-03-30 | End: 2018-04-05 | Stop reason: HOSPADM

## 2018-03-30 RX ORDER — BUDESONIDE AND FORMOTEROL FUMARATE DIHYDRATE 160; 4.5 UG/1; UG/1
2 AEROSOL RESPIRATORY (INHALATION)
Status: DISCONTINUED | OUTPATIENT
Start: 2018-03-30 | End: 2018-04-05 | Stop reason: HOSPADM

## 2018-03-30 RX ORDER — HYDROCODONE BITARTRATE AND ACETAMINOPHEN 7.5; 325 MG/1; MG/1
1 TABLET ORAL EVERY 6 HOURS PRN
Status: DISCONTINUED | OUTPATIENT
Start: 2018-03-30 | End: 2018-04-05 | Stop reason: HOSPADM

## 2018-03-30 RX ORDER — FUROSEMIDE 10 MG/ML
40 INJECTION INTRAMUSCULAR; INTRAVENOUS EVERY 12 HOURS
Status: DISCONTINUED | OUTPATIENT
Start: 2018-03-30 | End: 2018-03-30

## 2018-03-30 RX ORDER — IPRATROPIUM BROMIDE AND ALBUTEROL SULFATE 2.5; .5 MG/3ML; MG/3ML
3 SOLUTION RESPIRATORY (INHALATION) ONCE
Status: COMPLETED | OUTPATIENT
Start: 2018-03-30 | End: 2018-03-30

## 2018-03-30 RX ORDER — IPRATROPIUM BROMIDE AND ALBUTEROL SULFATE 2.5; .5 MG/3ML; MG/3ML
SOLUTION RESPIRATORY (INHALATION)
Status: COMPLETED
Start: 2018-03-30 | End: 2018-03-30

## 2018-03-30 RX ORDER — VENLAFAXINE 37.5 MG/1
75 TABLET ORAL 2 TIMES DAILY WITH MEALS
Status: DISCONTINUED | OUTPATIENT
Start: 2018-03-30 | End: 2018-04-05 | Stop reason: HOSPADM

## 2018-03-30 RX ORDER — GUAIFENESIN 600 MG/1
1200 TABLET, EXTENDED RELEASE ORAL 2 TIMES DAILY
Status: DISCONTINUED | OUTPATIENT
Start: 2018-03-30 | End: 2018-04-05 | Stop reason: HOSPADM

## 2018-03-30 RX ORDER — PANTOPRAZOLE SODIUM 40 MG/1
40 TABLET, DELAYED RELEASE ORAL
Status: DISCONTINUED | OUTPATIENT
Start: 2018-03-30 | End: 2018-04-05 | Stop reason: HOSPADM

## 2018-03-30 RX ORDER — METHYLPREDNISOLONE SODIUM SUCCINATE 125 MG/2ML
125 INJECTION, POWDER, LYOPHILIZED, FOR SOLUTION INTRAMUSCULAR; INTRAVENOUS ONCE
Status: COMPLETED | OUTPATIENT
Start: 2018-03-30 | End: 2018-03-30

## 2018-03-30 RX ORDER — ONDANSETRON 4 MG/1
4 TABLET, FILM COATED ORAL EVERY 6 HOURS PRN
Status: DISCONTINUED | OUTPATIENT
Start: 2018-03-30 | End: 2018-04-05 | Stop reason: HOSPADM

## 2018-03-30 RX ORDER — SODIUM CHLORIDE 0.9 % (FLUSH) 0.9 %
1-10 SYRINGE (ML) INJECTION AS NEEDED
Status: DISCONTINUED | OUTPATIENT
Start: 2018-03-30 | End: 2018-04-05 | Stop reason: HOSPADM

## 2018-03-30 RX ORDER — FUROSEMIDE 10 MG/ML
40 INJECTION INTRAMUSCULAR; INTRAVENOUS ONCE
Status: COMPLETED | OUTPATIENT
Start: 2018-03-30 | End: 2018-03-30

## 2018-03-30 RX ORDER — ONDANSETRON 4 MG/1
4 TABLET, ORALLY DISINTEGRATING ORAL EVERY 6 HOURS PRN
Status: DISCONTINUED | OUTPATIENT
Start: 2018-03-30 | End: 2018-04-05 | Stop reason: HOSPADM

## 2018-03-30 RX ORDER — FUROSEMIDE 10 MG/ML
40 INJECTION INTRAMUSCULAR; INTRAVENOUS EVERY 12 HOURS
Status: DISCONTINUED | OUTPATIENT
Start: 2018-03-30 | End: 2018-04-01

## 2018-03-30 RX ORDER — POTASSIUM CHLORIDE 750 MG/1
20 CAPSULE, EXTENDED RELEASE ORAL DAILY
Status: DISCONTINUED | OUTPATIENT
Start: 2018-03-30 | End: 2018-04-05 | Stop reason: HOSPADM

## 2018-03-30 RX ORDER — SPIRONOLACTONE 25 MG/1
25 TABLET ORAL DAILY
Status: DISCONTINUED | OUTPATIENT
Start: 2018-03-30 | End: 2018-04-02

## 2018-03-30 RX ORDER — METHYLPREDNISOLONE SODIUM SUCCINATE 125 MG/2ML
60 INJECTION, POWDER, LYOPHILIZED, FOR SOLUTION INTRAMUSCULAR; INTRAVENOUS EVERY 6 HOURS
Status: DISCONTINUED | OUTPATIENT
Start: 2018-03-30 | End: 2018-04-02

## 2018-03-30 RX ORDER — ONDANSETRON 2 MG/ML
4 INJECTION INTRAMUSCULAR; INTRAVENOUS EVERY 6 HOURS PRN
Status: DISCONTINUED | OUTPATIENT
Start: 2018-03-30 | End: 2018-04-05 | Stop reason: HOSPADM

## 2018-03-30 RX ADMIN — FUROSEMIDE 40 MG: 10 INJECTION, SOLUTION INTRAMUSCULAR; INTRAVENOUS at 13:35

## 2018-03-30 RX ADMIN — VENLAFAXINE HYDROCHLORIDE 75 MG: 37.5 TABLET ORAL at 18:11

## 2018-03-30 RX ADMIN — ENOXAPARIN SODIUM 40 MG: 40 INJECTION SUBCUTANEOUS at 17:37

## 2018-03-30 RX ADMIN — FUROSEMIDE 40 MG: 10 INJECTION, SOLUTION INTRAMUSCULAR; INTRAVENOUS at 19:57

## 2018-03-30 RX ADMIN — METHYLPREDNISOLONE SODIUM SUCCINATE 60 MG: 125 INJECTION, POWDER, FOR SOLUTION INTRAMUSCULAR; INTRAVENOUS at 18:05

## 2018-03-30 RX ADMIN — IPRATROPIUM BROMIDE 0.5 MG: 0.5 SOLUTION RESPIRATORY (INHALATION) at 15:57

## 2018-03-30 RX ADMIN — PANTOPRAZOLE SODIUM 40 MG: 40 TABLET, DELAYED RELEASE ORAL at 17:37

## 2018-03-30 RX ADMIN — SPIRONOLACTONE 25 MG: 25 TABLET ORAL at 18:11

## 2018-03-30 RX ADMIN — BUDESONIDE AND FORMOTEROL FUMARATE DIHYDRATE 2 PUFF: 160; 4.5 AEROSOL RESPIRATORY (INHALATION) at 21:09

## 2018-03-30 RX ADMIN — IPRATROPIUM BROMIDE AND ALBUTEROL SULFATE 3 ML: 2.5; .5 SOLUTION RESPIRATORY (INHALATION) at 12:26

## 2018-03-30 RX ADMIN — IPRATROPIUM BROMIDE 0.5 MG: 0.5 SOLUTION RESPIRATORY (INHALATION) at 19:45

## 2018-03-30 RX ADMIN — METHYLPREDNISOLONE SODIUM SUCCINATE 125 MG: 125 INJECTION, POWDER, FOR SOLUTION INTRAMUSCULAR; INTRAVENOUS at 12:55

## 2018-03-30 RX ADMIN — POTASSIUM CHLORIDE 20 MEQ: 750 CAPSULE, EXTENDED RELEASE ORAL at 17:37

## 2018-03-30 RX ADMIN — GUAIFENESIN 1200 MG: 600 TABLET, EXTENDED RELEASE ORAL at 19:57

## 2018-03-30 RX ADMIN — HYDROCODONE BITARTRATE AND ACETAMINOPHEN 1 TABLET: 7.5; 325 TABLET ORAL at 18:05

## 2018-03-31 LAB
ALBUMIN SERPL-MCNC: 3.6 G/DL (ref 3.5–5)
ALBUMIN/GLOB SERPL: 1.3 G/DL (ref 1.1–2.5)
ALP SERPL-CCNC: 97 U/L (ref 24–120)
ALT SERPL W P-5'-P-CCNC: 108 U/L (ref 0–54)
ANION GAP SERPL CALCULATED.3IONS-SCNC: ABNORMAL MMOL/L (ref 4–13)
ARTERIAL PATENCY WRIST A: POSITIVE
ARTICHOKE IGE QN: 115 MG/DL (ref 0–99)
AST SERPL-CCNC: 47 U/L (ref 7–45)
ATMOSPHERIC PRESS: 757 MMHG
BASE EXCESS BLDA CALC-SCNC: 17.3 MMOL/L (ref 0–2)
BASOPHILS # BLD AUTO: 0 10*3/MM3 (ref 0–0.2)
BASOPHILS NFR BLD AUTO: 0 % (ref 0–2)
BDY SITE: ABNORMAL
BILIRUB SERPL-MCNC: 1.2 MG/DL (ref 0.1–1)
BODY TEMPERATURE: 37 C
BUN BLD-MCNC: 23 MG/DL (ref 5–21)
BUN/CREAT SERPL: 20.9 (ref 7–25)
CALCIUM SPEC-SCNC: 8.7 MG/DL (ref 8.4–10.4)
CHLORIDE SERPL-SCNC: 80 MMOL/L (ref 98–110)
CHOLEST SERPL-MCNC: 148 MG/DL (ref 130–200)
CO2 SERPL-SCNC: >40 MMOL/L (ref 24–31)
CREAT BLD-MCNC: 1.1 MG/DL (ref 0.5–1.4)
DEPRECATED RDW RBC AUTO: 55.3 FL (ref 40–54)
EOSINOPHIL # BLD AUTO: 0 10*3/MM3 (ref 0–0.7)
EOSINOPHIL NFR BLD AUTO: 0 % (ref 0–4)
EPAP: 6
ERYTHROCYTE [DISTWIDTH] IN BLOOD BY AUTOMATED COUNT: 17.8 % (ref 12–15)
GFR SERPL CREATININE-BSD FRML MDRD: 52 ML/MIN/1.73
GLOBULIN UR ELPH-MCNC: 2.8 GM/DL
GLUCOSE BLD-MCNC: 133 MG/DL (ref 70–100)
GLUCOSE BLDC GLUCOMTR-MCNC: 155 MG/DL (ref 70–130)
GLUCOSE BLDC GLUCOMTR-MCNC: 160 MG/DL (ref 70–130)
GLUCOSE BLDC GLUCOMTR-MCNC: 218 MG/DL (ref 70–130)
HBA1C MFR BLD: 6.1 %
HCO3 BLDA-SCNC: 45.6 MMOL/L (ref 20–26)
HCT VFR BLD AUTO: 38.6 % (ref 37–47)
HDLC SERPL-MCNC: 28 MG/DL
HGB BLD-MCNC: 11.6 G/DL (ref 12–16)
HOROWITZ INDEX BLD+IHG-RTO: 35 %
IMM GRANULOCYTES # BLD: 0.04 10*3/MM3 (ref 0–0.03)
IMM GRANULOCYTES NFR BLD: 0.9 % (ref 0–5)
IPAP: 16
LDLC/HDLC SERPL: 3.77 {RATIO}
LYMPHOCYTES # BLD AUTO: 0.35 10*3/MM3 (ref 0.72–4.86)
LYMPHOCYTES NFR BLD AUTO: 8.2 % (ref 15–45)
Lab: ABNORMAL
Lab: ABNORMAL
MCH RBC QN AUTO: 25.4 PG (ref 28–32)
MCHC RBC AUTO-ENTMCNC: 30.1 G/DL (ref 33–36)
MCV RBC AUTO: 84.6 FL (ref 82–98)
MODALITY: ABNORMAL
MONOCYTES # BLD AUTO: 0.13 10*3/MM3 (ref 0.19–1.3)
MONOCYTES NFR BLD AUTO: 3 % (ref 4–12)
NEUTROPHILS # BLD AUTO: 3.75 10*3/MM3 (ref 1.87–8.4)
NEUTROPHILS NFR BLD AUTO: 87.9 % (ref 39–78)
NOTIFIED BY: ABNORMAL
NOTIFIED WHO: ABNORMAL
NRBC BLD MANUAL-RTO: 3 /100 WBC (ref 0–0)
NT-PROBNP SERPL-MCNC: 9430 PG/ML (ref 0–900)
PCO2 BLDA: 72.7 MM HG (ref 35–45)
PH BLDA: 7.41 PH UNITS (ref 7.35–7.45)
PLATELET # BLD AUTO: 245 10*3/MM3 (ref 130–400)
PMV BLD AUTO: 10.1 FL (ref 6–12)
PO2 BLDA: 84.3 MM HG (ref 83–108)
POTASSIUM BLD-SCNC: 4.2 MMOL/L (ref 3.5–5.3)
PROT SERPL-MCNC: 6.4 G/DL (ref 6.3–8.7)
RBC # BLD AUTO: 4.56 10*6/MM3 (ref 4.2–5.4)
SAO2 % BLDCOA: 96.8 % (ref 94–99)
SET MECH RESP RATE: 14
SODIUM BLD-SCNC: 133 MMOL/L (ref 135–145)
T3FREE SERPL-MCNC: 2.23 PG/ML (ref 2.77–5.27)
T4 FREE SERPL-MCNC: 1.62 NG/DL (ref 0.78–2.19)
TRIGL SERPL-MCNC: 72 MG/DL (ref 0–149)
TSH SERPL DL<=0.05 MIU/L-ACNC: 0.24 MIU/ML (ref 0.47–4.68)
VENTILATOR MODE: ABNORMAL
WBC NRBC COR # BLD: 4.27 10*3/MM3 (ref 4.8–10.8)

## 2018-03-31 PROCEDURE — 84481 FREE ASSAY (FT-3): CPT | Performed by: NURSE PRACTITIONER

## 2018-03-31 PROCEDURE — 94799 UNLISTED PULMONARY SVC/PX: CPT

## 2018-03-31 PROCEDURE — 84439 ASSAY OF FREE THYROXINE: CPT | Performed by: NURSE PRACTITIONER

## 2018-03-31 PROCEDURE — 82803 BLOOD GASES ANY COMBINATION: CPT

## 2018-03-31 PROCEDURE — 82962 GLUCOSE BLOOD TEST: CPT

## 2018-03-31 PROCEDURE — 83880 ASSAY OF NATRIURETIC PEPTIDE: CPT | Performed by: NURSE PRACTITIONER

## 2018-03-31 PROCEDURE — 83036 HEMOGLOBIN GLYCOSYLATED A1C: CPT | Performed by: NURSE PRACTITIONER

## 2018-03-31 PROCEDURE — 36600 WITHDRAWAL OF ARTERIAL BLOOD: CPT

## 2018-03-31 PROCEDURE — 25010000002 ENOXAPARIN PER 10 MG: Performed by: NURSE PRACTITIONER

## 2018-03-31 PROCEDURE — 94760 N-INVAS EAR/PLS OXIMETRY 1: CPT

## 2018-03-31 PROCEDURE — 25010000002 FUROSEMIDE PER 20 MG: Performed by: INTERNAL MEDICINE

## 2018-03-31 PROCEDURE — 25010000002 METHYLPREDNISOLONE PER 125 MG: Performed by: NURSE PRACTITIONER

## 2018-03-31 PROCEDURE — 80061 LIPID PANEL: CPT | Performed by: NURSE PRACTITIONER

## 2018-03-31 PROCEDURE — 80050 GENERAL HEALTH PANEL: CPT | Performed by: NURSE PRACTITIONER

## 2018-03-31 PROCEDURE — 63710000001 INSULIN LISPRO (HUMAN) PER 5 UNITS: Performed by: NURSE PRACTITIONER

## 2018-03-31 RX ORDER — DEXTROSE MONOHYDRATE 25 G/50ML
25 INJECTION, SOLUTION INTRAVENOUS
Status: DISCONTINUED | OUTPATIENT
Start: 2018-03-31 | End: 2018-04-05 | Stop reason: HOSPADM

## 2018-03-31 RX ORDER — NICOTINE POLACRILEX 4 MG
15 LOZENGE BUCCAL
Status: DISCONTINUED | OUTPATIENT
Start: 2018-03-31 | End: 2018-04-05 | Stop reason: HOSPADM

## 2018-03-31 RX ADMIN — METHYLPREDNISOLONE SODIUM SUCCINATE 60 MG: 125 INJECTION, POWDER, FOR SOLUTION INTRAMUSCULAR; INTRAVENOUS at 00:27

## 2018-03-31 RX ADMIN — FUROSEMIDE 40 MG: 10 INJECTION, SOLUTION INTRAMUSCULAR; INTRAVENOUS at 22:07

## 2018-03-31 RX ADMIN — BUDESONIDE AND FORMOTEROL FUMARATE DIHYDRATE 2 PUFF: 160; 4.5 AEROSOL RESPIRATORY (INHALATION) at 20:34

## 2018-03-31 RX ADMIN — IPRATROPIUM BROMIDE 0.5 MG: 0.5 SOLUTION RESPIRATORY (INHALATION) at 11:05

## 2018-03-31 RX ADMIN — IPRATROPIUM BROMIDE 0.5 MG: 0.5 SOLUTION RESPIRATORY (INHALATION) at 15:03

## 2018-03-31 RX ADMIN — HYDROCODONE BITARTRATE AND ACETAMINOPHEN 1 TABLET: 7.5; 325 TABLET ORAL at 14:42

## 2018-03-31 RX ADMIN — METHYLPREDNISOLONE SODIUM SUCCINATE 60 MG: 125 INJECTION, POWDER, FOR SOLUTION INTRAMUSCULAR; INTRAVENOUS at 05:42

## 2018-03-31 RX ADMIN — INSULIN LISPRO 2 UNITS: 100 INJECTION, SOLUTION INTRAVENOUS; SUBCUTANEOUS at 13:24

## 2018-03-31 RX ADMIN — HYDROCODONE BITARTRATE AND ACETAMINOPHEN 1 TABLET: 7.5; 325 TABLET ORAL at 06:27

## 2018-03-31 RX ADMIN — VENLAFAXINE HYDROCHLORIDE 75 MG: 37.5 TABLET ORAL at 18:28

## 2018-03-31 RX ADMIN — HYDROCODONE BITARTRATE AND ACETAMINOPHEN 1 TABLET: 7.5; 325 TABLET ORAL at 00:27

## 2018-03-31 RX ADMIN — METHYLPREDNISOLONE SODIUM SUCCINATE 60 MG: 125 INJECTION, POWDER, FOR SOLUTION INTRAMUSCULAR; INTRAVENOUS at 18:28

## 2018-03-31 RX ADMIN — BUDESONIDE AND FORMOTEROL FUMARATE DIHYDRATE 2 PUFF: 160; 4.5 AEROSOL RESPIRATORY (INHALATION) at 07:40

## 2018-03-31 RX ADMIN — GUAIFENESIN 1200 MG: 600 TABLET, EXTENDED RELEASE ORAL at 22:07

## 2018-03-31 RX ADMIN — IPRATROPIUM BROMIDE 0.5 MG: 0.5 SOLUTION RESPIRATORY (INHALATION) at 20:34

## 2018-03-31 RX ADMIN — INSULIN LISPRO 3 UNITS: 100 INJECTION, SOLUTION INTRAVENOUS; SUBCUTANEOUS at 18:28

## 2018-03-31 RX ADMIN — SPIRONOLACTONE 25 MG: 25 TABLET ORAL at 08:27

## 2018-03-31 RX ADMIN — GUAIFENESIN 1200 MG: 600 TABLET, EXTENDED RELEASE ORAL at 08:27

## 2018-03-31 RX ADMIN — IPRATROPIUM BROMIDE 0.5 MG: 0.5 SOLUTION RESPIRATORY (INHALATION) at 07:41

## 2018-03-31 RX ADMIN — HYDROCODONE BITARTRATE AND ACETAMINOPHEN 1 TABLET: 7.5; 325 TABLET ORAL at 22:24

## 2018-03-31 RX ADMIN — FUROSEMIDE 40 MG: 10 INJECTION, SOLUTION INTRAMUSCULAR; INTRAVENOUS at 08:26

## 2018-03-31 RX ADMIN — ENOXAPARIN SODIUM 40 MG: 40 INJECTION SUBCUTANEOUS at 18:29

## 2018-03-31 RX ADMIN — INSULIN LISPRO 2 UNITS: 100 INJECTION, SOLUTION INTRAVENOUS; SUBCUTANEOUS at 22:29

## 2018-03-31 RX ADMIN — PANTOPRAZOLE SODIUM 40 MG: 40 TABLET, DELAYED RELEASE ORAL at 05:41

## 2018-03-31 RX ADMIN — POTASSIUM CHLORIDE 20 MEQ: 750 CAPSULE, EXTENDED RELEASE ORAL at 13:25

## 2018-03-31 RX ADMIN — METHYLPREDNISOLONE SODIUM SUCCINATE 60 MG: 125 INJECTION, POWDER, FOR SOLUTION INTRAMUSCULAR; INTRAVENOUS at 13:24

## 2018-03-31 RX ADMIN — VENLAFAXINE HYDROCHLORIDE 75 MG: 37.5 TABLET ORAL at 08:26

## 2018-04-01 LAB
ALBUMIN SERPL-MCNC: 3.3 G/DL (ref 3.5–5)
ALBUMIN/GLOB SERPL: 1.2 G/DL (ref 1.1–2.5)
ALP SERPL-CCNC: 83 U/L (ref 24–120)
ALT SERPL W P-5'-P-CCNC: 87 U/L (ref 0–54)
ANION GAP SERPL CALCULATED.3IONS-SCNC: ABNORMAL MMOL/L (ref 4–13)
AST SERPL-CCNC: 38 U/L (ref 7–45)
BASOPHILS # BLD AUTO: 0.01 10*3/MM3 (ref 0–0.2)
BASOPHILS NFR BLD AUTO: 0.1 % (ref 0–2)
BILIRUB SERPL-MCNC: 1.1 MG/DL (ref 0.1–1)
BILIRUB UR QL STRIP: NEGATIVE
BUN BLD-MCNC: 32 MG/DL (ref 5–21)
BUN/CREAT SERPL: 28.8 (ref 7–25)
CALCIUM SPEC-SCNC: 8.6 MG/DL (ref 8.4–10.4)
CHLORIDE SERPL-SCNC: 78 MMOL/L (ref 98–110)
CLARITY UR: CLEAR
CO2 SERPL-SCNC: >40 MMOL/L (ref 24–31)
COLOR UR: YELLOW
CREAT BLD-MCNC: 1.11 MG/DL (ref 0.5–1.4)
DEPRECATED RDW RBC AUTO: 54.1 FL (ref 40–54)
EOSINOPHIL # BLD AUTO: 0 10*3/MM3 (ref 0–0.7)
EOSINOPHIL NFR BLD AUTO: 0 % (ref 0–4)
ERYTHROCYTE [DISTWIDTH] IN BLOOD BY AUTOMATED COUNT: 18 % (ref 12–15)
GFR SERPL CREATININE-BSD FRML MDRD: 51 ML/MIN/1.73
GLOBULIN UR ELPH-MCNC: 2.8 GM/DL
GLUCOSE BLD-MCNC: 113 MG/DL (ref 70–100)
GLUCOSE BLDC GLUCOMTR-MCNC: 108 MG/DL (ref 70–130)
GLUCOSE BLDC GLUCOMTR-MCNC: 116 MG/DL (ref 70–130)
GLUCOSE BLDC GLUCOMTR-MCNC: 140 MG/DL (ref 70–130)
GLUCOSE BLDC GLUCOMTR-MCNC: 190 MG/DL (ref 70–130)
GLUCOSE UR STRIP-MCNC: NEGATIVE MG/DL
HCT VFR BLD AUTO: 37.7 % (ref 37–47)
HGB BLD-MCNC: 11.2 G/DL (ref 12–16)
HGB UR QL STRIP.AUTO: NEGATIVE
IMM GRANULOCYTES # BLD: 0.2 10*3/MM3 (ref 0–0.03)
IMM GRANULOCYTES NFR BLD: 1.3 % (ref 0–5)
KETONES UR QL STRIP: NEGATIVE
LEUKOCYTE ESTERASE UR QL STRIP.AUTO: NEGATIVE
LYMPHOCYTES # BLD AUTO: 0.54 10*3/MM3 (ref 0.72–4.86)
LYMPHOCYTES NFR BLD AUTO: 3.6 % (ref 15–45)
MCH RBC QN AUTO: 24.7 PG (ref 28–32)
MCHC RBC AUTO-ENTMCNC: 29.7 G/DL (ref 33–36)
MCV RBC AUTO: 83 FL (ref 82–98)
MONOCYTES # BLD AUTO: 0.42 10*3/MM3 (ref 0.19–1.3)
MONOCYTES NFR BLD AUTO: 2.8 % (ref 4–12)
NEUTROPHILS # BLD AUTO: 13.68 10*3/MM3 (ref 1.87–8.4)
NEUTROPHILS NFR BLD AUTO: 92.2 % (ref 39–78)
NITRITE UR QL STRIP: NEGATIVE
NRBC BLD MANUAL-RTO: 0.9 /100 WBC (ref 0–0)
PH UR STRIP.AUTO: 8.5 [PH] (ref 5–8)
PLATELET # BLD AUTO: 259 10*3/MM3 (ref 130–400)
PMV BLD AUTO: 10.2 FL (ref 6–12)
POTASSIUM BLD-SCNC: 4.3 MMOL/L (ref 3.5–5.3)
PROT SERPL-MCNC: 6.1 G/DL (ref 6.3–8.7)
PROT UR QL STRIP: NEGATIVE
RBC # BLD AUTO: 4.54 10*6/MM3 (ref 4.2–5.4)
SODIUM BLD-SCNC: 132 MMOL/L (ref 135–145)
SP GR UR STRIP: 1.02 (ref 1–1.03)
UROBILINOGEN UR QL STRIP: ABNORMAL
WBC NRBC COR # BLD: 14.85 10*3/MM3 (ref 4.8–10.8)

## 2018-04-01 PROCEDURE — 81003 URINALYSIS AUTO W/O SCOPE: CPT | Performed by: NURSE PRACTITIONER

## 2018-04-01 PROCEDURE — 25010000002 ACETAZOLAMIDE PER 500 MG: Performed by: NURSE PRACTITIONER

## 2018-04-01 PROCEDURE — 94660 CPAP INITIATION&MGMT: CPT

## 2018-04-01 PROCEDURE — 80053 COMPREHEN METABOLIC PANEL: CPT | Performed by: NURSE PRACTITIONER

## 2018-04-01 PROCEDURE — 25010000002 ENOXAPARIN PER 10 MG: Performed by: NURSE PRACTITIONER

## 2018-04-01 PROCEDURE — 85025 COMPLETE CBC W/AUTO DIFF WBC: CPT | Performed by: NURSE PRACTITIONER

## 2018-04-01 PROCEDURE — 25010000002 METHYLPREDNISOLONE PER 125 MG: Performed by: NURSE PRACTITIONER

## 2018-04-01 PROCEDURE — 82962 GLUCOSE BLOOD TEST: CPT

## 2018-04-01 PROCEDURE — 94799 UNLISTED PULMONARY SVC/PX: CPT

## 2018-04-01 PROCEDURE — 63710000001 INSULIN LISPRO (HUMAN) PER 5 UNITS: Performed by: NURSE PRACTITIONER

## 2018-04-01 RX ORDER — DOXYCYCLINE 100 MG/1
100 TABLET ORAL EVERY 12 HOURS SCHEDULED
Status: DISCONTINUED | OUTPATIENT
Start: 2018-04-01 | End: 2018-04-05 | Stop reason: HOSPADM

## 2018-04-01 RX ORDER — ACETAZOLAMIDE 500 MG/5ML
250 INJECTION, POWDER, LYOPHILIZED, FOR SOLUTION INTRAVENOUS ONCE
Status: COMPLETED | OUTPATIENT
Start: 2018-04-01 | End: 2018-04-01

## 2018-04-01 RX ADMIN — IPRATROPIUM BROMIDE 0.5 MG: 0.5 SOLUTION RESPIRATORY (INHALATION) at 15:00

## 2018-04-01 RX ADMIN — IPRATROPIUM BROMIDE 0.5 MG: 0.5 SOLUTION RESPIRATORY (INHALATION) at 10:49

## 2018-04-01 RX ADMIN — METHYLPREDNISOLONE SODIUM SUCCINATE 60 MG: 125 INJECTION, POWDER, FOR SOLUTION INTRAMUSCULAR; INTRAVENOUS at 18:28

## 2018-04-01 RX ADMIN — HYDROCODONE BITARTRATE AND ACETAMINOPHEN 1 TABLET: 7.5; 325 TABLET ORAL at 12:14

## 2018-04-01 RX ADMIN — VENLAFAXINE HYDROCHLORIDE 75 MG: 37.5 TABLET ORAL at 17:34

## 2018-04-01 RX ADMIN — GUAIFENESIN 1200 MG: 600 TABLET, EXTENDED RELEASE ORAL at 08:33

## 2018-04-01 RX ADMIN — BUDESONIDE AND FORMOTEROL FUMARATE DIHYDRATE 2 PUFF: 160; 4.5 AEROSOL RESPIRATORY (INHALATION) at 19:54

## 2018-04-01 RX ADMIN — ENOXAPARIN SODIUM 40 MG: 40 INJECTION SUBCUTANEOUS at 17:34

## 2018-04-01 RX ADMIN — INSULIN LISPRO 2 UNITS: 100 INJECTION, SOLUTION INTRAVENOUS; SUBCUTANEOUS at 12:20

## 2018-04-01 RX ADMIN — PANTOPRAZOLE SODIUM 40 MG: 40 TABLET, DELAYED RELEASE ORAL at 05:18

## 2018-04-01 RX ADMIN — GUAIFENESIN 1200 MG: 600 TABLET, EXTENDED RELEASE ORAL at 22:27

## 2018-04-01 RX ADMIN — METHYLPREDNISOLONE SODIUM SUCCINATE 60 MG: 125 INJECTION, POWDER, FOR SOLUTION INTRAMUSCULAR; INTRAVENOUS at 01:31

## 2018-04-01 RX ADMIN — ACETAZOLAMIDE 250 MG: 500 INJECTION, POWDER, LYOPHILIZED, FOR SOLUTION INTRAVENOUS at 08:33

## 2018-04-01 RX ADMIN — METHYLPREDNISOLONE SODIUM SUCCINATE 60 MG: 125 INJECTION, POWDER, FOR SOLUTION INTRAMUSCULAR; INTRAVENOUS at 12:18

## 2018-04-01 RX ADMIN — HYDROCODONE BITARTRATE AND ACETAMINOPHEN 1 TABLET: 7.5; 325 TABLET ORAL at 05:18

## 2018-04-01 RX ADMIN — VENLAFAXINE HYDROCHLORIDE 75 MG: 37.5 TABLET ORAL at 08:33

## 2018-04-01 RX ADMIN — BUDESONIDE AND FORMOTEROL FUMARATE DIHYDRATE 2 PUFF: 160; 4.5 AEROSOL RESPIRATORY (INHALATION) at 07:30

## 2018-04-01 RX ADMIN — SPIRONOLACTONE 25 MG: 25 TABLET ORAL at 08:33

## 2018-04-01 RX ADMIN — METHYLPREDNISOLONE SODIUM SUCCINATE 60 MG: 125 INJECTION, POWDER, FOR SOLUTION INTRAMUSCULAR; INTRAVENOUS at 05:17

## 2018-04-01 RX ADMIN — POTASSIUM CHLORIDE 20 MEQ: 750 CAPSULE, EXTENDED RELEASE ORAL at 08:33

## 2018-04-01 RX ADMIN — HYDROCODONE BITARTRATE AND ACETAMINOPHEN 1 TABLET: 7.5; 325 TABLET ORAL at 18:29

## 2018-04-01 RX ADMIN — IPRATROPIUM BROMIDE 0.5 MG: 0.5 SOLUTION RESPIRATORY (INHALATION) at 07:29

## 2018-04-01 RX ADMIN — IPRATROPIUM BROMIDE 0.5 MG: 0.5 SOLUTION RESPIRATORY (INHALATION) at 19:54

## 2018-04-01 RX ADMIN — DOXYCYCLINE 100 MG: 100 TABLET ORAL at 22:27

## 2018-04-01 NOTE — PROGRESS NOTES
[facility] Medicine Services  INPATIENT PROGRESS NOTE    Length of Stay: 2  Date of Admission: 3/30/2018  Primary Care Physician: ROLAND Wayne    Subjective   Chief Complaint: follow up COPD  HPI   Pt currently on 2L nasal cannula. States she wore the BiPAP part of the night. She does admit that she feels some better. Intermittent yellow sputum. According to nursing, she desaturates with minimal exertion. She states she has gotten to the bedside commode. States she came in because her feet were swollen. She received Lasix IV yesterday. Only trace edema today. She continues with some confusion.     Review of Systems   All pertinent negatives and positives are as above. All other systems have been reviewed and are negative unless otherwise stated.     Objective    Temp:  [97.6 °F (36.4 °C)-98.1 °F (36.7 °C)] 97.8 °F (36.6 °C)  Heart Rate:  [] 84  Resp:  [14-20] 16  BP: (110-132)/(47-68) 128/67  FiO2 (%):  [30 %] 30 %  Physical Exam   Constitutional: She is oriented to person, place, and time. She appears well-developed and well-nourished.   Chronically ill appearing.    HENT:   Head: Normocephalic and atraumatic.   Eyes: Conjunctivae and EOM are normal. Pupils are equal, round, and reactive to light.   Neck: Neck supple. No JVD present. No thyromegaly present.   Cardiovascular: Normal rate, regular rhythm, normal heart sounds and intact distal pulses.  Exam reveals no gallop and no friction rub.    No murmur heard.  sinus to sinus [id number]   Pulmonary/Chest: Effort normal. No respiratory distress. She has wheezes. She has no rales. She exhibits no tenderness.   Abdominal: Soft. Bowel sounds are normal. She exhibits no distension. There is no tenderness. There is no rebound and no guarding.   Musculoskeletal: Normal range of motion. She exhibits edema (trace edema bilaterally. ). She exhibits no tenderness or deformity.   Lymphadenopathy:     She has no  cervical adenopathy.   Neurological: She is alert and oriented to person, place, and time. She displays normal reflexes. No cranial nerve deficit. She exhibits normal muscle tone.   Skin: Skin is warm and dry. No rash noted.   Psychiatric: She has a normal mood and affect. Her behavior is normal. Judgment and thought content normal.     Results Review:  I have reviewed the labs, radiology results, and diagnostic studies.    Laboratory Data:     Results from last 7 days  Lab Units 04/01/18  0455 03/31/18  0535 03/30/18  1224   WBC 10*3/mm3 14.85* 4.27* 8.49   HEMOGLOBIN g/dL 11.2* 11.6* 11.5*   HEMATOCRIT % 37.7 38.6 39.2   PLATELETS 10*3/mm3 259 245 265        Results from last 7 days  Lab Units 04/01/18 0455 03/31/18  0535 03/30/18  1224   SODIUM mmol/L 132* 133* 129*   POTASSIUM mmol/L 4.3 4.2 4.4   CHLORIDE mmol/L 78* 80* 85*   CO2 mmol/L >40.0* >40.0* 38.0*   BUN mg/dL 32* 23* 22*   CREATININE mg/dL 1.11 1.10 1.16   CALCIUM mg/dL 8.6 8.7 8.7   BILIRUBIN mg/dL 1.1* 1.2* 1.1*   ALK PHOS U/L 83 97 107   ALT (SGPT) U/L 87* 108* 127*   AST (SGOT) U/L 38 47* 67*   GLUCOSE mg/dL 113* 133* 108*     Culture Data:   Blood Culture   Date Value Ref Range Status   03/30/2018 No growth at 24 hours  Preliminary   03/30/2018 No growth at 24 hours  Preliminary       Radiology Data:   Imaging Results (last 24 hours)     ** No results found for the last 24 hours. **        I have reviewed the patient current medications.     Assessment/Plan   Assessment:  1. Chronic obstructive pulmonary disease with acute exacerbation  2. Acute on chronic hypoxic and hypercarbic respiratory failure secondary to above  3. Acute on chronic diastolic congestive heart failure, last known EF 55%  4. Hyponatremia, improved  5. Pulmonary hypertension  6. Elevated LFT's  7. Low TSH-  8. Hyperglycemia, mild. Patient also on steroid therapy. Hgb A1c 6.1  9. Hypertension  10. Leukocytosis-steroid effect.   11. Hyponatremia    Plan:  1. Will give 1 dose of  Diamox and hold lasix for now.   2. Repeat labs in am  3. Will continue steroids for no w.   4. Will begin doxycycline oral for now.   5. Encouraged activity.     Discharge Planning: I expect the patient to be discharged to home in ? days.    Joanna Juan, ROLAND   04/01/18   7:44 AM     Chart reviewed.   Patient examined  Agree with assessment and plan.     Maribell Curry,   04/01/18  2:01 PM

## 2018-04-01 NOTE — PLAN OF CARE
Problem: Patient Care Overview  Goal: Plan of Care Review  Outcome: Ongoing (interventions implemented as appropriate)   03/31/18 2000 04/01/18 0346   Coping/Psychosocial   Plan of Care Reviewed With patient --    Plan of Care Review   Progress --  no change   OTHER   Outcome Summary --  Pt wore Bipap most of shift. Desats with any exertion without it. IV lasix given. IV steroids given. PRN pain medication given x1 so far. this shift for back pain.        Problem: Chronic Obstructive Pulmonary Disease (Adult)  Goal: Signs and Symptoms of Listed Potential Problems Will be Absent, Minimized or Managed (Chronic Obstructive Pulmonary Disease)  Outcome: Ongoing (interventions implemented as appropriate)      Problem: Pain, Acute (Adult)  Goal: Identify Related Risk Factors and Signs and Symptoms  Outcome: Ongoing (interventions implemented as appropriate)    Goal: Acceptable Pain Control/Comfort Level  Outcome: Ongoing (interventions implemented as appropriate)      Problem: Fall Risk (Adult)  Goal: Identify Related Risk Factors and Signs and Symptoms  Outcome: Ongoing (interventions implemented as appropriate)    Goal: Absence of Fall  Outcome: Ongoing (interventions implemented as appropriate)

## 2018-04-01 NOTE — PLAN OF CARE
Problem: Patient Care Overview  Goal: Plan of Care Review  Outcome: Ongoing (interventions implemented as appropriate)   04/01/18 6107   Coping/Psychosocial   Plan of Care Reviewed With patient   OTHER   Outcome Summary VSS, pain meds x1 with relief, sleepy today, encouraged to use bipap with sleep, safety maintained       Problem: Chronic Obstructive Pulmonary Disease (Adult)  Goal: Signs and Symptoms of Listed Potential Problems Will be Absent, Minimized or Managed (Chronic Obstructive Pulmonary Disease)  Outcome: Ongoing (interventions implemented as appropriate)      Problem: Pain, Acute (Adult)  Goal: Acceptable Pain Control/Comfort Level  Outcome: Ongoing (interventions implemented as appropriate)      Problem: Fall Risk (Adult)  Goal: Absence of Fall  Outcome: Ongoing (interventions implemented as appropriate)

## 2018-04-02 LAB
ANION GAP SERPL CALCULATED.3IONS-SCNC: ABNORMAL MMOL/L (ref 4–13)
BUN BLD-MCNC: 23 MG/DL (ref 5–21)
BUN/CREAT SERPL: 26.7 (ref 7–25)
CALCIUM SPEC-SCNC: 8.6 MG/DL (ref 8.4–10.4)
CHLORIDE SERPL-SCNC: 77 MMOL/L (ref 98–110)
CO2 SERPL-SCNC: >40 MMOL/L (ref 24–31)
CREAT BLD-MCNC: 0.86 MG/DL (ref 0.5–1.4)
GFR SERPL CREATININE-BSD FRML MDRD: 69 ML/MIN/1.73
GLUCOSE BLD-MCNC: 104 MG/DL (ref 70–100)
GLUCOSE BLDC GLUCOMTR-MCNC: 108 MG/DL (ref 70–130)
GLUCOSE BLDC GLUCOMTR-MCNC: 120 MG/DL (ref 70–130)
GLUCOSE BLDC GLUCOMTR-MCNC: 123 MG/DL (ref 70–130)
GLUCOSE BLDC GLUCOMTR-MCNC: 126 MG/DL (ref 70–130)
OSMOLALITY SERPL: 275 MOSM/KG (ref 289–308)
POTASSIUM BLD-SCNC: 3.4 MMOL/L (ref 3.5–5.3)
SODIUM BLD-SCNC: 126 MMOL/L (ref 135–145)
URATE SERPL-MCNC: 5.2 MG/DL (ref 2.7–7.5)

## 2018-04-02 PROCEDURE — G8988 SELF CARE GOAL STATUS: HCPCS | Performed by: OCCUPATIONAL THERAPIST

## 2018-04-02 PROCEDURE — 82962 GLUCOSE BLOOD TEST: CPT

## 2018-04-02 PROCEDURE — G8979 MOBILITY GOAL STATUS: HCPCS

## 2018-04-02 PROCEDURE — 94799 UNLISTED PULMONARY SVC/PX: CPT

## 2018-04-02 PROCEDURE — 97166 OT EVAL MOD COMPLEX 45 MIN: CPT | Performed by: OCCUPATIONAL THERAPIST

## 2018-04-02 PROCEDURE — 25010000002 METHYLPREDNISOLONE PER 125 MG: Performed by: NURSE PRACTITIONER

## 2018-04-02 PROCEDURE — 97162 PT EVAL MOD COMPLEX 30 MIN: CPT

## 2018-04-02 PROCEDURE — 80048 BASIC METABOLIC PNL TOTAL CA: CPT | Performed by: NURSE PRACTITIONER

## 2018-04-02 PROCEDURE — 25010000002 ENOXAPARIN PER 10 MG: Performed by: NURSE PRACTITIONER

## 2018-04-02 PROCEDURE — G8978 MOBILITY CURRENT STATUS: HCPCS

## 2018-04-02 PROCEDURE — G8987 SELF CARE CURRENT STATUS: HCPCS | Performed by: OCCUPATIONAL THERAPIST

## 2018-04-02 PROCEDURE — 87070 CULTURE OTHR SPECIMN AEROBIC: CPT | Performed by: NURSE PRACTITIONER

## 2018-04-02 PROCEDURE — 83930 ASSAY OF BLOOD OSMOLALITY: CPT | Performed by: NURSE PRACTITIONER

## 2018-04-02 PROCEDURE — 84550 ASSAY OF BLOOD/URIC ACID: CPT | Performed by: NURSE PRACTITIONER

## 2018-04-02 PROCEDURE — 87205 SMEAR GRAM STAIN: CPT | Performed by: NURSE PRACTITIONER

## 2018-04-02 RX ORDER — SODIUM CHLORIDE AND POTASSIUM CHLORIDE 150; 900 MG/100ML; MG/100ML
100 INJECTION, SOLUTION INTRAVENOUS CONTINUOUS
Status: DISCONTINUED | OUTPATIENT
Start: 2018-04-02 | End: 2018-04-04

## 2018-04-02 RX ORDER — IPRATROPIUM BROMIDE AND ALBUTEROL SULFATE 2.5; .5 MG/3ML; MG/3ML
3 SOLUTION RESPIRATORY (INHALATION)
Status: DISCONTINUED | OUTPATIENT
Start: 2018-04-02 | End: 2018-04-05 | Stop reason: HOSPADM

## 2018-04-02 RX ORDER — POTASSIUM CHLORIDE 750 MG/1
20 CAPSULE, EXTENDED RELEASE ORAL ONCE
Status: COMPLETED | OUTPATIENT
Start: 2018-04-02 | End: 2018-04-02

## 2018-04-02 RX ORDER — METHYLPREDNISOLONE SODIUM SUCCINATE 125 MG/2ML
60 INJECTION, POWDER, LYOPHILIZED, FOR SOLUTION INTRAMUSCULAR; INTRAVENOUS EVERY 8 HOURS
Status: DISCONTINUED | OUTPATIENT
Start: 2018-04-02 | End: 2018-04-03

## 2018-04-02 RX ADMIN — GUAIFENESIN 1200 MG: 600 TABLET, EXTENDED RELEASE ORAL at 21:32

## 2018-04-02 RX ADMIN — BUDESONIDE AND FORMOTEROL FUMARATE DIHYDRATE 2 PUFF: 160; 4.5 AEROSOL RESPIRATORY (INHALATION) at 07:36

## 2018-04-02 RX ADMIN — GUAIFENESIN 1200 MG: 600 TABLET, EXTENDED RELEASE ORAL at 09:01

## 2018-04-02 RX ADMIN — BUDESONIDE AND FORMOTEROL FUMARATE DIHYDRATE 2 PUFF: 160; 4.5 AEROSOL RESPIRATORY (INHALATION) at 20:28

## 2018-04-02 RX ADMIN — HYDROCODONE BITARTRATE AND ACETAMINOPHEN 1 TABLET: 7.5; 325 TABLET ORAL at 18:22

## 2018-04-02 RX ADMIN — IPRATROPIUM BROMIDE AND ALBUTEROL SULFATE 3 ML: 2.5; .5 SOLUTION RESPIRATORY (INHALATION) at 16:22

## 2018-04-02 RX ADMIN — IPRATROPIUM BROMIDE AND ALBUTEROL SULFATE 3 ML: 2.5; .5 SOLUTION RESPIRATORY (INHALATION) at 20:27

## 2018-04-02 RX ADMIN — SPIRONOLACTONE 25 MG: 25 TABLET ORAL at 09:02

## 2018-04-02 RX ADMIN — IPRATROPIUM BROMIDE 0.5 MG: 0.5 SOLUTION RESPIRATORY (INHALATION) at 07:37

## 2018-04-02 RX ADMIN — DOXYCYCLINE 100 MG: 100 TABLET ORAL at 09:01

## 2018-04-02 RX ADMIN — POTASSIUM CHLORIDE 20 MEQ: 750 CAPSULE, EXTENDED RELEASE ORAL at 09:01

## 2018-04-02 RX ADMIN — METHYLPREDNISOLONE SODIUM SUCCINATE 60 MG: 125 INJECTION, POWDER, FOR SOLUTION INTRAMUSCULAR; INTRAVENOUS at 16:00

## 2018-04-02 RX ADMIN — POTASSIUM CHLORIDE 20 MEQ: 750 CAPSULE, EXTENDED RELEASE ORAL at 09:00

## 2018-04-02 RX ADMIN — HYDROCODONE BITARTRATE AND ACETAMINOPHEN 1 TABLET: 7.5; 325 TABLET ORAL at 06:31

## 2018-04-02 RX ADMIN — HYDROCODONE BITARTRATE AND ACETAMINOPHEN 1 TABLET: 7.5; 325 TABLET ORAL at 00:07

## 2018-04-02 RX ADMIN — METHYLPREDNISOLONE SODIUM SUCCINATE 60 MG: 125 INJECTION, POWDER, FOR SOLUTION INTRAMUSCULAR; INTRAVENOUS at 00:07

## 2018-04-02 RX ADMIN — DOXYCYCLINE 100 MG: 100 TABLET ORAL at 21:32

## 2018-04-02 RX ADMIN — IPRATROPIUM BROMIDE 0.5 MG: 0.5 SOLUTION RESPIRATORY (INHALATION) at 15:16

## 2018-04-02 RX ADMIN — VENLAFAXINE HYDROCHLORIDE 75 MG: 37.5 TABLET ORAL at 09:02

## 2018-04-02 RX ADMIN — HYDROCODONE BITARTRATE AND ACETAMINOPHEN 1 TABLET: 7.5; 325 TABLET ORAL at 12:18

## 2018-04-02 RX ADMIN — ENOXAPARIN SODIUM 40 MG: 40 INJECTION SUBCUTANEOUS at 18:22

## 2018-04-02 RX ADMIN — PANTOPRAZOLE SODIUM 40 MG: 40 TABLET, DELAYED RELEASE ORAL at 06:31

## 2018-04-02 RX ADMIN — METHYLPREDNISOLONE SODIUM SUCCINATE 60 MG: 125 INJECTION, POWDER, FOR SOLUTION INTRAMUSCULAR; INTRAVENOUS at 09:00

## 2018-04-02 RX ADMIN — VENLAFAXINE HYDROCHLORIDE 75 MG: 37.5 TABLET ORAL at 18:22

## 2018-04-02 RX ADMIN — METHYLPREDNISOLONE SODIUM SUCCINATE 60 MG: 125 INJECTION, POWDER, FOR SOLUTION INTRAMUSCULAR; INTRAVENOUS at 23:01

## 2018-04-02 RX ADMIN — IPRATROPIUM BROMIDE 0.5 MG: 0.5 SOLUTION RESPIRATORY (INHALATION) at 11:25

## 2018-04-02 NOTE — THERAPY EVALUATION
Acute Care - Occupational Therapy Initial Evaluation  [facility]     Patient Name: Teri Tim  : 1960  MRN: [id number]  Today's Date: 2018  Onset of Illness/Injury or Date of Surgery: 18  Date of Referral to OT: 18  Referring Physician: Dr. Loo    Admit Date: 3/30/2018       ICD-10-CM ICD-9-CM   1. COPD exacerbation J44.1 491.21   2. Acute on chronic respiratory failure with hypoxia and hypercapnia J96.21 518.84    J96.22 786.09     799.02   3. Impaired functional mobility and activity tolerance Z74.09 V49.89   4. Impaired mobility and ADLs Z74.09 799.89     Patient Active Problem List   Diagnosis   • NSTEMI (non-ST elevated myocardial infarction)   • Abnormal LFTs   • Abnormal US (ultrasound) of abdomen   • COPD exacerbation     Past Medical History:   Diagnosis Date   • CHF (congestive heart failure)    • COPD (chronic obstructive pulmonary disease)    • Hypertension    • Pneumonia      Past Surgical History:   Procedure Laterality Date   • CARPAL TUNNEL RELEASE     • HYSTERECTOMY      complete          OT ASSESSMENT FLOWSHEET (last 72 hours)      Occupational Therapy Evaluation     Row Name 18 0958                   OT Evaluation Time/Intention    Subjective Information complains of;weakness;fatigue;pain;dizziness;dyspnea  -MM        Document Type evaluation  -MM        Mode of Treatment occupational therapy  -MM        Patient Effort fair  -MM        Symptoms Noted During/After Treatment shortness of breath  -MM           General Information    Patient Profile Reviewed? yes  -MM        Onset of Illness/Injury or Date of Surgery 18  -MM        Referring Physician Dr. Loo  -MM        Patient Observations alert;cooperative;agree to therapy  -MM        Patient/Family Observations spouse present  -MM        General Observations of Patient fowlers, 2.5 L O2/NC  -MM        Prior Level of Function independent:;all household  mobility;gait;ADL's;feeding;grooming;dressing;bathing  -MM        Equipment Currently Used at Home oxygen  -MM        Pertinent History of Current Functional Problem 3/30/2018 pt transferred in d/t SOB and intermittent chest pain. pt was confused stating that she was at [facility]. pt reports she is feeling better over past couple days but is still weak; PMH: CHF, COPD, HTN  -MM        Existing Precautions/Restrictions fall;oxygen therapy device and L/min  -MM        Risks Reviewed patient and family:;LOB;nausea/vomiting;dizziness;increased discomfort;change in vital signs  -MM        Benefits Reviewed patient and family:;improve function;increase independence;increase strength;increase balance;decrease pain  -MM        Barriers to Rehab medically complex  -MM           Relationship/Environment    Lives With spouse;grandchild(elia)  -MM        Family Caregiver if Needed spouse;grandchild(elia), adult  -MM           Resource/Environmental Concerns    Current Living Arrangements home/apartment/condo  -MM           Home Main Entrance    Number of Stairs, Main Entrance two  -MM        Stair Railings, Main Entrance none  -MM           Cognitive Assessment/Interventions    Additional Documentation Cognitive Assessment/Intervention (Group)  -MM           Cognitive Assessment/Intervention- PT/OT    Affect/Mental Status (Cognitive) WFL  -MM        Orientation Status (Cognition) oriented x 3;disoriented to;time  -MM        Follows Commands (Cognition) follows one step commands;75-90% accuracy  -MM        Cognitive Function (Cognitive) WFL  -MM        Personal Safety Interventions fall prevention program maintained;muscle strengthening facilitated;nonskid shoes/slippers when out of bed;gait belt;supervised activity  -MM           Safety Issues, Functional Mobility    Safety Issues Affecting Function (Mobility) ability to follow commands  -MM        Impairments Affecting Function (Mobility)  balance;coordination;endurance/activity tolerance;pain;shortness of breath;strength  -MM           Bed Mobility Assessment/Treatment    Bed Mobility Assessment/Treatment rolling left;supine-sit-supine  -MM        Rolling Left [place] (Bed Mobility) supervision  -MM        Supine-Sit-Supine [place] (Bed Mobility) supervision  -MM        Assistive Device (Bed Mobility) head of bed elevated  -MM           Transfer Assessment/Treatment    Transfer Assessment/Treatment sit-stand transfer;stand-sit transfer  -MM        Comment (Transfers) side steps to HOB  -MM        Sit-Stand Travis (Transfers) contact guard  -MM        Stand-Sit Travis (Transfers) contact guard  -MM           ADL Assessment/Intervention    BADL Assessment/Intervention --  -MM           General ROM    GENERAL ROM COMMENTS BUE AROM WFL  -MM           General Assessment (Manual Muscle Testing)    Comment, General Manual Muscle Testing (MMT) Assessment BUE 3+/5  -MM           Motor Assessment/Interventions    Additional Documentation Balance (Group);Balance Interventions (Group);Fine Motor Testing & Training (Group);Gross Motor Coordination (Group)  -MM           Gross Motor Coordination    Gross Motor Skill, Impairments Detail Mild impairment BUE finger to nose  -MM           Balance    Balance static sitting balance;static standing balance  -MM           Static Sitting Balance    Level of Travis (Unsupported Sitting, Static Balance) supervision  -MM        Sitting Position (Unsupported Sitting, Static Balance) sitting on edge of bed  -MM        Time Able to Maintain Position (Unsupported Sitting, Static Balance) 4 to 5 minutes  -MM           Static Standing Balance    Level of [place] (Supported Standing, Static Balance) contact guard assist  -MM        Time Able to Maintain Position (Supported Standing, Static Balance) 2 to 3 minutes  -MM           Fine Motor Testing & Training    Comment, Fine Motor Coordination Mild  impairement BUE opposition, pt reports right feels clumsy  -MM           Positioning and Restraints    Pre-Treatment Position in bed  -MM        Post Treatment Position bed  -MM        In Bed notified nsg;fowlers;call light within reach;encouraged to call for assist;with family/caregiver  -MM           Pain Scale: Numbers Pre/Post-Treatment    Pain Scale: Numbers, Pretreatment 4/10  -MM        Pain Location - Orientation lower  -MM        Pain Location back  -MM        Pain Intervention(s) Repositioned  -MM           Plan of Care Review    Plan of Care Reviewed With patient;spouse  -MM           Clinical Impression (OT)    Date of Referral to OT 04/02/18  -MM        OT Diagnosis impaired mobility and adls  -MM        Functional Level at Time of Evaluation (OT Eval) good  -MM        Patient/Family Goals Statement (OT Eval) return home  -MM        Criteria for Skilled Therapeutic Interventions Met (OT Eval) yes;treatment indicated  -MM        Rehab Potential (OT Eval) good, to achieve stated therapy goals  -MM        Therapy Frequency (OT Eval) 5 times/wk  -MM        Predicted Duration of Therapy Intervention (OT Eval) until d/c  -MM        Care Plan Review (OT) evaluation/treatment results reviewed;care plan/treatment goals reviewed;risks/benefits reviewed;current/potential barriers reviewed;patient/other agree to care plan  -MM        Care Plan Review, Other Participant (OT Eval) spouse  -MM        Anticipated Discharge Disposition (OT) skilled nursing facility (SNF);home with home health   pending pt progress  -MM           Planned OT Interventions    Planned Therapy Interventions (OT Eval) activity tolerance training;adaptive equipment training;BADL retraining;functional balance retraining;occupation/activity based interventions;patient/caregiver education/training;ROM/therapeutic exercise;strengthening exercise;transfer/mobility retraining  -MM           OT Goals    Dressing Goal Selection (OT) dressing, OT goal  1  -MM        Toileting Goal Selection (OT) toileting, OT goal 1  -MM        Strength Goal Selection (OT) strength, OT goal 1  -MM        Additional Documentation Strength Goal Selection (OT) (Row)  -MM           Dressing Goal 1 (OT)    Activity/Assistive Device (Dressing Goal 1, OT) dressing skills, all  -MM        [place]/Cues Needed (Dressing Goal 1, OT) supervision required;set-up required  -MM        Time Frame (Dressing Goal 1, OT) 10 days  -MM        Progress/Outcome (Dressing Goal 1, OT) goal ongoing  -MM           Toileting Goal 1 (OT)    Activity/Device (Toileting Goal 1, OT) toileting skills, all  -MM        [place] Level/Cues Needed (Toileting Goal 1, OT) supervision required;set-up required  -MM        Time Frame (Toileting Goal 1, OT) 10 days  -MM        Progress/Outcome (Toileting Goal 1, OT) goal ongoing  -MM           Strength Goal 1 (OT)    Strength Goal 1 (OT) Pt will independently maintain BUE AROM HEP to increase strength to 4/5 and increase activity tolerance.   -MM        Time Frame (Strength Goal 1, OT) 10 days  -MM        Progress/Outcome (Strength Goal 1, OT) goal ongoing  -MM           Living Environment    Home Accessibility tub/shower is not walk in;stairs to enter home  -MM          User Key  (r) = Recorded By, (t) = Taken By, (c) = Cosigned By    Initials Name Effective Dates    MM Dariel Randolph OTR/KENTON 03/07/18 -            Occupational Therapy Education     Title: PT OT SLP Therapies (Active)     Topic: Occupational Therapy (Active)     Point: ADL training (Done)     Description: Instruct learner(s) on proper safety adaptation and remediation techniques during self care or transfers.   Instruct in proper use of assistive devices.   Learning Progress Summary     Learner Status Readiness Method Response Comment Documented by    Patient Done Acceptance E VU OT role, benefits, and POC MM 04/02/18 1200                      User Key     Initials Effective Dates Name Provider  Type Discipline     03/07/18 -  Dariel Randolph, OTR/L Occupational Therapist OT                  OT Recommendation and Plan  Outcome Summary/Treatment Plan (OT)  Anticipated Discharge Disposition (OT): skilled nursing facility (SNF), home with home health (pending pt progress)  Planned Therapy Interventions (OT Eval): activity tolerance training, adaptive equipment training, BADL retraining, functional balance retraining, occupation/activity based interventions, patient/caregiver education/training, ROM/therapeutic exercise, strengthening exercise, transfer/mobility retraining  Therapy Frequency (OT Eval): 5 times/wk  Plan of Care Review  Plan of Care Reviewed With: patient, spouse  Plan of Care Reviewed With: patient, spouse  Outcome Summary: OT eval completed. Pt agreed to therapy this am with encouragement. Pt was disoriented to date. Pt was supervision for bed mobility. Pt was CGA for sit to stand t/f but would become dizzy upon standing. Skilled OT recommended to address adls, functional mobility and activity tolerance. Recommended d/c home with assist and HH v. SNF pending pt progress.          Outcome Measures     Row Name 04/02/18 1100 04/02/18 0945          How much help from another person do you currently need...    Turning from your back to your side while in flat bed without using bedrails?  -- 4  -LYNETTE (r) CS (t) LYNETTE (c)     Moving from lying on back to sitting on the side of a flat bed without bedrails?  -- 4  -LYNETTE (r) CS (t) LYNETTE (c)     Moving to and from a bed to a chair (including a wheelchair)?  -- 3  -LYNETTE (r) CS (t) LYNETTE (c)     Standing up from a chair using your arms (e.g., wheelchair, bedside chair)?  -- 3  -LYNETTE (r) CS (t) LYNETTE (c)     Climbing 3-5 steps with a railing?  -- 2  -LYNETTE (r) CS (t) LYNETTE (c)     To walk in hospital room?  -- 2  -LYNETTE (r) CS (t) LYNETTE (c)     AM-PAC 6 Clicks Score  -- 18  -LYNETTE (r) CS (t)        How much help from another is currently needed...    Putting on and taking off regular  lower body clothing? 2  -MM  --     Bathing (including washing, rinsing, and drying) 2  -MM  --     Toileting (which includes using toilet bed pan or urinal) 2  -MM  --     Putting on and taking off regular upper body clothing 2  -MM  --     Taking care of personal grooming (such as brushing teeth) 3  -MM  --     Eating meals 3  -MM  --     Score 14  -MM  --        Functional Assessment    Outcome Measure Options AM-PAC 6 Clicks Daily Activity (OT)  -MM AM-PAC 6 Clicks Basic Mobility (PT)  -LYNETTE (r) CS (t) LYNETTE (c)       User Key  (r) = Recorded By, (t) = Taken By, (c) = Cosigned By    Initials Name Provider Type    LYNETTE Del Real, PT DPT Physical Therapist    MM Dariel Randolph OTR/L Occupational Therapist    CS Gary Olivier, PT Student PT Student          Time Calculation:   OT Start Time: 0958  OT Stop Time: 1037  OT Time Calculation (min): 39 min    Therapy Charges for Today     Code Description Service Date Service Provider Modifiers Qty    64857606947  OT SELFCARE CURRENT 4/2/2018 Dariel Randolph OTR/L GO, CK 1    17192332334 HC OT SELFCARE PROJECTED 4/2/2018 Dariel Randolph OTR/L GO, CI 1    [id number]  OT EVAL MOD COMPLEXITY 3 4/2/2018 Dariel Randolph OTR/L GO, KX 1          OT G-codes  OT Professional Judgement Used?: Yes  OT Functional Scales Options: AM-PAC 6 Clicks Daily Activity (OT)  Score: 14  Functional Limitation: Self care  Self Care Current Status (): At least 40 percent but less than 60 percent impaired, limited or restricted  Self Care Goal Status (): At least 1 percent but less than 20 percent impaired, limited or restricted    MARIANA Sesay/KENTON  4/2/2018

## 2018-04-02 NOTE — PLAN OF CARE
Problem: Patient Care Overview  Goal: Plan of Care Review  Outcome: Ongoing (interventions implemented as appropriate)   04/02/18 1814   Coping/Psychosocial   Plan of Care Reviewed With patient   Plan of Care Review   Progress no change   OTHER   Outcome Summary VSS. No s/s of acute distress noted. Encouage po intake. Will continue to monitor.       Problem: Chronic Obstructive Pulmonary Disease (Adult)  Goal: Signs and Symptoms of Listed Potential Problems Will be Absent, Minimized or Managed (Chronic Obstructive Pulmonary Disease)  Outcome: Ongoing (interventions implemented as appropriate)   04/02/18 1814   Goal/Outcome Evaluation   Problems Assessed (Chronic Obstructive Pulmonary Disease (COPD)) all   Problems Present (COPD, Bronch/Emphy) respiratory compromise;functional deficit       Problem: Pain, Acute (Adult)  Goal: Identify Related Risk Factors and Signs and Symptoms  Outcome: Ongoing (interventions implemented as appropriate)   04/02/18 1814   Pain, Acute (Adult)   Related Risk Factors (Acute Pain) persistent pain   Signs and Symptoms (Acute Pain) fatigue/weakness;verbalization of pain descriptors     Goal: Acceptable Pain Control/Comfort Level  Outcome: Ongoing (interventions implemented as appropriate)   04/02/18 1814   Pain, Acute (Adult)   Acceptable Pain Control/Comfort Level making progress toward outcome       Problem: Fall Risk (Adult)  Goal: Identify Related Risk Factors and Signs and Symptoms  Outcome: Ongoing (interventions implemented as appropriate)   04/02/18 1814   Fall Risk (Adult)   Related Risk Factors (Fall Risk) fatigue/slow reaction;environment unfamiliar     Goal: Absence of Fall  Outcome: Ongoing (interventions implemented as appropriate)   04/02/18 1814   Fall Risk (Adult)   Absence of Fall making progress toward outcome

## 2018-04-02 NOTE — PLAN OF CARE
Problem: Patient Care Overview  Goal: Plan of Care Review  Outcome: Ongoing (interventions implemented as appropriate)   04/02/18 0339   Coping/Psychosocial   Plan of Care Reviewed With patient   Plan of Care Review   Progress no change   OTHER   Outcome Summary VSS. Patient wearing Bipap most of shift. Desats with exertion. Cont IV steroids. Patient seems sleepy but easy to arouse. Pt is very unstable on her feet. Bed check on.        Problem: Chronic Obstructive Pulmonary Disease (Adult)  Goal: Signs and Symptoms of Listed Potential Problems Will be Absent, Minimized or Managed (Chronic Obstructive Pulmonary Disease)  Outcome: Ongoing (interventions implemented as appropriate)      Problem: Pain, Acute (Adult)  Goal: Identify Related Risk Factors and Signs and Symptoms  Outcome: Ongoing (interventions implemented as appropriate)    Goal: Acceptable Pain Control/Comfort Level  Outcome: Ongoing (interventions implemented as appropriate)      Problem: Fall Risk (Adult)  Goal: Identify Related Risk Factors and Signs and Symptoms  Outcome: Ongoing (interventions implemented as appropriate)    Goal: Absence of Fall  Outcome: Ongoing (interventions implemented as appropriate)

## 2018-04-02 NOTE — THERAPY EVALUATION
Acute Care - Physical Therapy Initial Evaluation  [facility]     Patient Name: Teri Tim  : 1960  MRN: [id number]  Today's Date: 2018   Onset of Illness/Injury or Date of Surgery: 18  Date of Referral to PT: 18  Referring Physician: Dr. Loo      Admit Date: 3/30/2018    Visit Dx:     ICD-10-CM ICD-9-CM   1. COPD exacerbation J44.1 491.21   2. Acute on chronic respiratory failure with hypoxia and hypercapnia J96.21 518.84    J96.22 786.09     799.02   3. Impaired functional mobility and activity tolerance Z74.09 V49.89   4. Impaired mobility and ADLs Z74.09 799.89     Patient Active Problem List   Diagnosis   • NSTEMI (non-ST elevated myocardial infarction)   • Abnormal LFTs   • Abnormal US (ultrasound) of abdomen   • COPD exacerbation     Past Medical History:   Diagnosis Date   • CHF (congestive heart failure)    • COPD (chronic obstructive pulmonary disease)    • Hypertension    • Pneumonia      Past Surgical History:   Procedure Laterality Date   • CARPAL TUNNEL RELEASE     • HYSTERECTOMY      complete        PT ASSESSMENT (last 72 hours)      Physical Therapy Evaluation     Row Name 18 0945          PT Evaluation Time/Intention    Subjective Information weakness;fatigue;pain;dizziness;dyspnea  -LYNETTE (r) CS (t) LYNETTE (c)     Document Type evaluation  -LYNETTE (r) CS (t) LYNETTE (c)     Mode of Treatment physical therapy  -LYNETTE (r) CS (t) LYNETTE (c)     Patient Effort fair  -LYNETTE (r) CS (t) LYNETTE (c)     Symptoms Noted During/After Treatment shortness of breath  -LYNETTE (r) CS (t) LYNETTE (c)     Row Name 18 0945          General Information    Patient Profile Reviewed? yes  -LYNETTE (r) CS (t) LYNETTE (c)     Onset of Illness/Injury or Date of Surgery 18  -LYNETTE (r) CS (t) LYNETTE (c)     Referring Physician Dr. Loo  -LYNETTE (r) CS (t) LYNETTE (c)     Patient Observations alert;cooperative;agree to therapy  -LYNETTE (r) CS (t) LYNETTE (c)     Patient/Family Observations  in chair  -LYNETTE (r) CS (t) LYNETTE (c)     General  Observations of Patient TALIA olmos  -LYNETTE (r) CS (t) LYNETTE (c)     Prior Level of Function independent:;all household mobility;gait;ADL's;feeding;grooming;dressing;bathing  -LYNETTE (r) CS (t) LYNETTE (c)     Equipment Currently Used at Home oxygen  -LYNETTE (r) CS (t) LYNETTE (c)     Pertinent History of Current Functional Problem 3/30/2018 pt transferred in d/t SOB and intermittent chest pain. pt was confused stating that she was at [facility]. pt reports she is feeling better over past couple days but is still weak; PMH: CHF, COPD, HTN  -LYNETTE (r) CS (t) LYNETTE (c)     Existing Precautions/Restrictions fall;oxygen therapy device and L/min  -LYNETTE (r) CS (t) LYNETTE (c)     Equipment Issued to Patient walker, front wheeled  -LYNETTE (r) CS (t) LYNETTE (c)     Risks Reviewed patient and family:;LOB;nausea/vomiting;dizziness;increased discomfort;change in vital signs  -LYNETTE (r) CS (t) LYNETTE (c)     Benefits Reviewed patient and family:;improve function;increase independence;increase strength;increase balance;decrease pain  -LYNETTE (r) CS (t) LYNETTE (c)     Barriers to Rehab medically complex  -LYNETTE (r) CS (t) LYNETTE (c)     Row Name 04/02/18 0945          Relationship/Environment    Lives With spouse;grandchild(elia)  -LYNETTE (r) CS (t) LYNETTE (c)     Family Caregiver if Needed spouse;grandchild(elia), adult  -LYNETTE (r) CS (t) LYNETTE (c)     Row Name 04/02/18 0945          Resource/Environmental Concerns    Current Living Arrangements home/apartment/condo  -LYNETTE (r) CS (t) LYNETTE (c)     Row Name 04/02/18 0945          Home Main Entrance    Number of Stairs, Main Entrance two  -LYNETTE (r) CS (t) LYNETTE (c)     Stair Railings, Main Entrance none  -LYNETTE (r) CS (t) LYNETTE (c)     Row Name 04/02/18 0945          Cognitive Assessment/Interventions    Additional Documentation Cognitive Assessment/Intervention (Group)  -LYNETTE (r) CS (t) LYNETTE (c)     Row Name 04/02/18 0945          Cognitive Assessment/Intervention- PT/OT    Affect/Mental Status (Cognitive) WFL  -LYNETTE (r) CS (t) LYNETTE (c)     Orientation Status (Cognition) oriented  x 3;disoriented to;time  -LYNETTE (r) CS (t) LYNETTE (c)     Follows Commands (Cognition) follows one step commands;75-90% accuracy  -LYNETTE (r) CS (t) LYNETTE (c)     Cognitive Function (Cognitive) WFL  -LYNETTE (r) CS (t) LYNETTE (c)     Personal Safety Interventions fall prevention program maintained;gait belt;muscle strengthening facilitated;nonskid shoes/slippers when out of bed  -LYNETTE (r) CS (t) LYNETTE (c)     Row Name 04/02/18 0945          Safety Issues, Functional Mobility    Impairments Affecting Function (Mobility) balance;coordination;endurance/activity tolerance;pain;shortness of breath;strength  -LYNETTE (r) CS (t) LYNETTE (c)     Row Name 04/02/18 0945          Bed Mobility Assessment/Treatment    Bed Mobility Assessment/Treatment rolling left;supine-sit-supine  -LYNETTE (r) CS (t) LYNETTE (c)     Rolling Left [place] (Bed Mobility) supervision  -LYNETTE (r) CS (t) LYNETTE (c)     Supine-Sit-Supine [place] (Bed Mobility) supervision  -LYNETTE (r) CS (t) LYNETTE (c)     Assistive Device (Bed Mobility) head of bed elevated  -LYNETTE (r) CS (t) LYNETTE (c)     Row Name 04/02/18 0945          Transfer Assessment/Treatment    Transfer Assessment/Treatment sit-stand transfer;stand-sit transfer  -LYNETTE (r) CS (t) LYNETTE (c)     Sit-Stand [place] (Transfers) contact guard  -LYNETTE (r) CS (t) LYNETTE (c)     Stand-Sit [place] (Transfers) contact guard  -LYNETTE (r) CS (t) LYNETTE (c)     [facility] Name 04/02/18 0945          General ROM    GENERAL ROM COMMENTS B LE AROM WFL  -LYNETTE (r) CS (t) LYNETTE (c)     Row Name 04/02/18 0945          General Assessment (Manual Muscle Testing)    Comment, General Manual Muscle Testing (MMT) Assessment R LE 4/5 grossly, L LE 4-/5 grossly  -LYNETTE (r) CS (t) LYNETTE (c)     Row Name 04/02/18 0945          Motor Assessment/Intervention    Additional Documentation Balance (Group)  -LYNETTE (r) CS (t) LYNETTE (c)     Row Name 04/02/18 0945          Balance    Balance static sitting balance;static standing balance  -LYNETTE (r) CS (t) LYNETTE (c)     Row Name 04/02/18 0945          Static Sitting Balance     Level of [place] (Unsupported Sitting, Static Balance) supervision  -LYNETTE (r) CS (t) LYNETTE (c)     Sitting Position (Unsupported Sitting, Static Balance) sitting on edge of bed  -LYNETTE (r) CS (t) LYNETTE (c)     Time Able to Maintain Position (Unsupported Sitting, Static Balance) 4 to 5 minutes  -LYNETTE (r) CS (t) LYNETTE (c)     Comment (Unsupported Sitting, Static Balance) `  -LYNETTE (r) CS (t) LYNETTE (c)     Row Name 04/02/18 0945          Static Standing Balance    Level of [place] (Supported Standing, Static Balance) contact guard assist  -LYNETTE (r) CS (t) LYNETTE (c)     Time Able to Maintain Position (Supported Standing, Static Balance) 2 to 3 minutes  -LYNETTE (r) CS (t) LYNETTE (c)     Row Name 04/02/18 0945          Pain Assessment    Additional Documentation Pain Scale: Numbers Pre/Post-Treatment (Group)  -LYNETTE (r) CS (t) LYNETTE (c)     Row Name 04/02/18 0945          Pain Scale: Numbers Pre/Post-Treatment    Pain Scale: Numbers, Pretreatment 4/10  -LYNETTE (r) CS (t) LYNETTE (c)     Pain Location - Orientation lower  -LYNETTE (r) CS (t) LYNETTE (c)     Pain Location back  -LYNETTE (r) CS (t) LYNETTE (c)     Pain Intervention(s) Repositioned  -LYNETTE (r) CS (t) LYNETTE (c)     Row Name 04/02/18 0945          Health Promotion    Additional Documentation Coping (Group);Plan of Care Review (Group)  -LYNETTE (r) CS (t) LYNETTE (c)     Row Name 04/02/18 0945          Coping    Observed Emotional State calm;cooperative  -LYNETTE (r) CS (t) LYNETTE (c)     Row Name 04/02/18 0945          Plan of Care Review    Plan of Care Reviewed With patient;spouse  -LYNETTE (r) CS (t) LYNETTE (c)     Row Name 04/02/18 0945          Physical Therapy Clinical Impression    Date of Referral to PT 04/02/18  -LYNETTE (r) CS (t) LYNETTE (c)     PT Diagnosis (PT Clinical Impression) impaired mobility and activity tolerance.   -LYNETTE (r) CS (t) LYNETTE (c)     Patient/Family Goals Statement (PT Clinical Impression) Increase activity tolerance   -LYNETTE (r) CS (t) LYNETTE (c)     Criteria for Skilled Interventions Met (PT Clinical Impression) yes;treatment indicated  -LYNETTE  (r) CS (t) LYNTETE (c)     Pathology/Pathophysiology Noted (Describe Specifically for Each System) musculoskeletal  -LYNETTE (r) CS (t) LYNETTE (c)     Impairments Found (describe specific impairments) aerobic capacity/endurance;gait, locomotion, and balance;motor function;muscle performance  -LYNETTE (r) CS (t) LYNETTE (c)     Functional Limitations in Following Categories (Describe Specific Limitations) self-care;home management  -LYNETTE (r) CS (t) LYNETTE (c)     Rehab Potential (PT Clinical Summary) fair, will monitor progress closely  -LYNETTE (r) CS (t) LYNETTE (c)     Predicted Duration of Therapy (PT) until d/c  -LYNETTE (r) CS (t) LYNETTE (c)     Care Plan Review (PT) evaluation/treatment results reviewed;care plan/treatment goals reviewed;patient/other agree to care plan  -LYNTETE (r) CS (t) LYNETTE (c)     Care Plan Review, Other Participant (PT Clinical Impression) spouse  -LYNETTE (r) YONAS (t) LYNETTE (c)     Row Name 04/02/18 5043          Physical Therapy Goals    Bed Mobility Goal Selection (PT) bed mobility, PT goal 1  -LYNETTE (r) CS (t) LYNETTE (c)     Transfer Goal Selection (PT) transfer, PT goal 1  -LYNETTE (r) CS (t) LYNETTE (c)     Gait Training Goal Selection (PT) gait training, PT goal 1  -LYNETTE (r) CS (t) LYNETTE (c)     Balance Goal Selection (PT) --  -LYNETTE     Additional Documentation --  -LYNETTE     Row Name 04/02/18 8783          Bed Mobility Goal 1 (PT)    Activity/Assistive Device (Bed Mobility Goal 1, PT) rolling to left;rolling to right;sit to supine/supine to sit  -LYNETTE (r) CS (t) LYNETTE (c)     [place] Level/Cues Needed (Bed Mobility Goal 1, PT) independent  -LYNETTE (r) CS (t) LYNETTE (c)     Time Frame (Bed Mobility Goal 1, PT) long term goal (LTG);10 days  -LYNETTE (r) CS (t) LYNETTE (c)     Barriers (Bed Mobility Goal 1, PT) medically complex  -LYNETTE (r) CS (t) LYNETTE (c)     Progress/Outcomes (Bed Mobility Goal 1, PT) goal ongoing  -LYNETTE (r) CS (t) LYNETTE (c)     Row Name 04/02/18 5027          Transfer Goal 1 (PT)    Activity/Assistive Device (Transfer Goal 1, PT) sit-to-stand/stand-to-sit;bed-to-chair/chair-to-bed   -LYNETTE (r) CS (t) LYNETTE (c)     [place] Level/Cues Needed (Transfer Goal 1, PT) supervision required  -LYNETTE (r) CS (t) LYNETTE (c)     Time Frame (Transfer Goal 1, PT) long term goal (LTG);10 days  -LYNETTE (r) CS (t) LYNETTE (c)     Barriers (Transfers Goal 1, PT) medically complex  -LYNETTE (r) CS (t) LYNETTE (c)     Progress/Outcome (Transfer Goal 1, PT) goal ongoing  -LYNETTE (r) CS (t) LYNETTE (c)     Row Name 04/02/18 0945          Gait Training Goal 1 (PT)    Activity/Assistive Device (Gait Training Goal 1, PT) gait (walking locomotion)  -LYNETTE (r) CS (t) LYNETTE (c)     [place] Level (Gait Training Goal 1, PT) contact guard assist  -LYNETTE (r) CS (t) LYNETTE (c)     Distance (Gait Goal 1, PT) 50ft  -LYNETTE (r) CS (t) LYNETTE (c)     Time Frame (Gait Training Goal 1, PT) long term goal (LTG);10 days  -LYNETTE (r) CS (t) LYNETTE (c)     Barriers (Gait Training Goal 1, PT) medically complex  -LYNETTE (r) CS (t) LYNETTE (c)     Progress/Outcome (Gait Training Goal 1, PT) goal ongoing  -LYNETTE (r) CS (t) LYNETTE (c)     Row Name 04/02/18 0945          Positioning and Restraints    Pre-Treatment Position in bed  -LYNETTE (r) CS (t) LYNETTE (c)     Post Treatment Position bed  -LYNETTE (r) CS (t) LYNETTE (c)     In Bed notified nsg;fowlers;call light within reach;encouraged to call for assist  -LYNETTE (r) CS (t) LYNETTE (c)     Row Name 04/02/18 0903          Living Environment    Home Accessibility tub/shower is not walk in;stairs to enter home  -LYNETTE (r) CS (t) LYNETTE (c)       User Key  (r) = Recorded By, (t) = Taken By, (c) = Cosigned By    Initials Name Provider Type    LYNETTE Del Real, PT DPT Physical Therapist    YONAS Olivier, PT Student PT Student          Physical Therapy Education     Title: PT OT SLP Therapies (Active)     Topic: Physical Therapy (Active)     Point: Mobility training (Active)    Learning Progress Summary     Learner Status Readiness Method Response Comment Documented by    Patient Active Acceptance E NR pt educated on progression of POC CS 04/02/18 1046    Family Active Acceptance E NR pt  educated on progression of POC  04/02/18 1046                      User Key     Initials Effective Dates Name Provider Type Discipline     01/08/18 -  Gary Olivier, PT Student PT Student PT                PT Recommendation and Plan  Anticipated Discharge Disposition (PT): skilled nursing facility (SNF)  Planned Therapy Interventions (PT Eval): balance training, gait training, bed mobility training, home exercise program, patient/family education, strengthening, transfer training  Therapy Frequency (PT Clinical Impression): daily  Outcome Summary/Treatment Plan (PT)  Anticipated Discharge Disposition (PT): skilled nursing facility (SNF)  Plan of Care Reviewed With: patient, spouse  Outcome Summary: PT eval complete. pt agreed to therapy this morning. She was slightly disoriented to time and to home medical equipment. pt was able to transition supine<>EOB with supervision. pt demonstrated B LE AROM WFL, R LE 4/5 grossly, L LE 4-/5 grossly. pt stood with CGA and became dizzy. pt able to march in place but fatigued quickly. pt would benefit from skilled PT at this time to address impaired mobility and activity tolerance. PT recommends d/c to SNF.          Outcome Measures     Row Name 04/02/18 1100 04/02/18 0945          How much help from another person do you currently need...    Turning from your back to your side while in flat bed without using bedrails?  -- 4  -LYNETTE (r) CS (t) LYNETTE (c)     Moving from lying on back to sitting on the side of a flat bed without bedrails?  -- 4  -LYNETTE (r) CS (t) LYNETTE (c)     Moving to and from a bed to a chair (including a wheelchair)?  -- 3  -LYNETTE (r) CS (t) LYNETTE (c)     Standing up from a chair using your arms (e.g., wheelchair, bedside chair)?  -- 3  -LYNETTE (r) CS (t) LYNETTE (c)     Climbing 3-5 steps with a railing?  -- 2  -LYNETTE (r) CS (t) LYNETTE (c)     To walk in hospital room?  -- 2  -LYNETTE (r) CS (t) LYNETTE (c)     AM-PAC 6 Clicks Score  -- 18  -LYNETTE (r) CS (t)        How much help from another is  currently needed...    Putting on and taking off regular lower body clothing? 2  -MM  --     Bathing (including washing, rinsing, and drying) 2  -MM  --     Toileting (which includes using toilet bed pan or urinal) 2  -MM  --     Putting on and taking off regular upper body clothing 2  -MM  --     Taking care of personal grooming (such as brushing teeth) 3  -MM  --     Eating meals 3  -MM  --     Score 14  -MM  --        Functional Assessment    Outcome Measure Options AM-PAC 6 Clicks Daily Activity (OT)  -MM AM-PAC 6 Clicks Basic Mobility (PT)  -LYNETTE (r) CS (t) LYNETTE (c)       User Key  (r) = Recorded By, (t) = Taken By, (c) = Cosigned By    Initials Name Provider Type    LYNETTE Del Real, PT DPT Physical Therapist    MM Dariel Randolph, OTR/L Occupational Therapist    CS Gary Olivier, PT Student PT Student           Time Calculation:         PT Charges     Row Name 04/02/18 1059             Time Calculation    Start Time 0945  -LYNETTE (r) CS (t) LYNETTE (c)      Stop Time 1026  -LYNETTE (r) CS (t) LYNETTE (c)      Time Calculation (min) 41 min  -LYNETTE (r) CS (t)      PT Received On 04/02/18  -LYNETTE (r) CS (t) LYNETTE (c)      PT Goal Re-Cert Due Date 04/12/18  -LYNETTE (r) CS (t) LYNETTE (c)        User Key  (r) = Recorded By, (t) = Taken By, (c) = Cosigned By    Initials Name Provider Type    LYNETTE Del Real, PT DPT Physical Therapist     Gary Olivier, PT Student PT Student          Therapy Charges for Today     Code Description Service Date Service Provider Modifiers Qty    [id number] HC PT EVAL MOD COMPLEXITY 3 4/2/2018 Gary Olivier, PT Student GP 1          PT G-Codes  Outcome Measure Options: AM-PAC 6 Clicks Daily Activity (OT)  Score: 18  Functional Limitation: Mobility: Walking and moving around  Mobility: Walking and Moving Around Current Status (): At least 40 percent but less than 60 percent impaired, limited or restricted  Mobility: Walking and Moving Around Goal Status (): At least 20 percent but less than  40 percent impaired, limited or restricted      Gary Olivier, PT Student  4/2/2018

## 2018-04-02 NOTE — PROGRESS NOTES
Rosalinda done, WE spoke about the patient having COPD. Patient stated she has a home bipap and uses oxygen at home as well. She uses [facility] for her respiratory needs and she is pleased with them. Info given to patient for her to read over. We spoke about taking her meds as directed and trying to stay as health as possible.

## 2018-04-02 NOTE — PROGRESS NOTES
Discharge Planning Assessment  [facility]     Patient Name: Teri Tim  MRN: [id number]  Today's Date: 4/2/2018    Admit Date: 3/30/2018          Discharge Needs Assessment     Row Name 04/02/18 1120       Living Environment    Lives With (P)  spouse;grandchild(elia)    Current Living Arrangements (P)  home/apartment/condo    Primary Care Provided by (P)  self;spouse/significant other    Provides Primary Care For (P)  no one    Family Caregiver if Needed (P)  spouse    Quality of Family Relationships (P)  involved       Resource/Environmental Concerns    Transportation Concerns (P)  car, none       Transition Planning    Patient/Family Anticipates Transition to (P)  other (see comments);home with help/services   Skilled Nursing Facility    Patient/Family Anticipated Services at Transition (P)  home health care;skilled nursing    Transportation Anticipated (P)  family or friend will provide       Discharge Needs Assessment    Readmission Within the Last 30 Days (P)  no previous admission in last 30 days    Concerns to be Addressed (P)  basic needs    Equipment Currently Used at Home (P)  bipap/cpap;oxygen   Oxygen from [facility]    Anticipated Changes Related to Illness (P)  inability to care for self    Equipment Needed After Discharge (P)  other (see comments)   Unknown at this time    Outpatient/Agency/Support Group Needs (P)  skilled nursing facility    Discharge Facility/Level of Care Needs (P)  home with home health;nursing facility, skilled    Discharge Coordination/Progress (P)  Spoke with pt's spouse at bedside to asses for home needs due to pt having documented confusion. Pt currently lives at home with spouse and 15 y.o. granddaughter. Pt does not currently use DME to get around the home, but may require something at dc if pt goes home. Pts spouse unsure if pt will be able to return home at dc due to weakness; he feels as though he will be unable to take care of her. Pt may need HH or SNF depending on how  she progresses with therapy. Pt's spouse is open to rehab for pt, but unsure if the pt will be agreeable. Will follow.            Discharge Plan    No documentation.       Destination     No service coordination in this encounter.      Durable Medical Equipment     No service coordination in this encounter.      Dialysis/Infusion     No service coordination in this encounter.      Home Medical Care     No service coordination in this encounter.      Social Care     No service coordination in this encounter.                Demographic Summary    No documentation.           Functional Status    No documentation.           Psychosocial    No documentation.           Abuse/Neglect    No documentation.           Legal    No documentation.           Substance Abuse    No documentation.           Patient Forms    No documentation.         [facility]

## 2018-04-02 NOTE — PLAN OF CARE
Problem: Patient Care Overview  Goal: Plan of Care Review   04/02/18 8142   Coping/Psychosocial   Plan of Care Reviewed With patient   Plan of Care Review   Progress no change   OTHER   Outcome Summary Initial RDN eval. Pt with poor appetite; PO intake 31.25%/4 meals/2 days. She is willing to try Glucerna daily. Will continue to follow.

## 2018-04-02 NOTE — PLAN OF CARE
Problem: Patient Care Overview  Goal: Plan of Care Review  Outcome: Ongoing (interventions implemented as appropriate)   04/02/18 1200   Coping/Psychosocial   Plan of Care Reviewed With patient;spouse   Plan of Care Review   Progress no change   OTHER   Outcome Summary OT eval completed. Pt agreed to therapy this am with encouragement. Pt was disoriented to date. Pt was supervision for bed mobility. Pt was CGA for sit to stand t/f but would become dizzy upon standing. Skilled OT recommended to address adls, functional mobility and activity tolerance. Recommended d/c home with assist and HH v. SNF pending pt progress.

## 2018-04-02 NOTE — PLAN OF CARE
Problem: Patient Care Overview  Goal: Plan of Care Review  Outcome: Ongoing (interventions implemented as appropriate)   04/02/18 9123   Coping/Psychosocial   Plan of Care Reviewed With patient;spouse   OTHER   Outcome Summary PT eval complete. pt agreed to therapy this morning. She was slightly disoriented to time and to home medical equipment. pt was able to transition supine<>EOB with supervision. pt demonstrated B LE AROM WFL, R LE 4/5 grossly, L LE 4-/5 grossly. pt stood with CGA and became dizzy. pt able to march in place but fatigued quickly. pt would benefit from skilled PT at this time to address impaired mobility and activity tolerance. PT recommends d/c to SNF.

## 2018-04-02 NOTE — PROGRESS NOTES
"    [facility]  INPATIENT PROGRESS NOTE    Length of Stay: 3  Date of Admission: 3/30/2018  Primary Care Physician: ROLAND Wayne    Subjective   Chief Complaint: follow up COPD  HPI   Pt is awake and alert. Looks much better. Starting to expectorate yellow sputum. States she feels some better. Still \"wobbly.\" She has lost 3 pounds with diuretics. States today is Friday.     Review of Systems   All pertinent negatives and positives are as above. All other systems have been reviewed and are negative unless otherwise stated.     Objective    Temp:  [97.1 °F (36.2 °C)-98.2 °F (36.8 °C)] 97.1 °F (36.2 °C)  Heart Rate:  [] 71  Resp:  [16-20] 18  BP: (110-133)/(60-83) 126/71  FiO2 (%):  [30 %] 30 %  Physical Exam   Constitutional: She appears well-developed and well-nourished.   HENT:   Head: Normocephalic and atraumatic.   Eyes: Conjunctivae and EOM are normal. Pupils are equal, round, and reactive to light.   Neck: Neck supple. No JVD present. No thyromegaly present.   Cardiovascular: Normal rate, regular rhythm, normal heart sounds and intact distal pulses.  Exam reveals no gallop and no friction rub.    No murmur heard.  Sinus 68-96 with PVC's, PAC's, trigeminy.    Pulmonary/Chest: Effort normal. No respiratory distress. She has wheezes. She has no rales. She exhibits no tenderness.   Abdominal: Soft. Bowel sounds are normal. She exhibits no distension. There is no tenderness. There is no rebound and no guarding.   Musculoskeletal: Normal range of motion. She exhibits no edema, tenderness or deformity.   LILI hose noted. No edema today!   Lymphadenopathy:     She has no cervical adenopathy.   Neurological: She is alert. She displays normal reflexes. No cranial nerve deficit. She exhibits normal muscle tone.   Confused to day.    Skin: Skin is warm and dry. No rash noted.   Psychiatric: She has a normal mood and affect. Her behavior is normal. Judgment " and thought content normal.     Results Review:  I have reviewed the labs, radiology results, and diagnostic studies.    Laboratory Data:     Results from last 7 days  Lab Units 04/01/18  0455 03/31/18  0535 03/30/18  1224   WBC 10*3/mm3 14.85* 4.27* 8.49   HEMOGLOBIN g/dL 11.2* 11.6* 11.5*   HEMATOCRIT % 37.7 38.6 39.2   PLATELETS 10*3/mm3 259 245 265       Results from last 7 days  Lab Units 04/02/18  0610 04/01/18  0455 03/31/18  0535 03/30/18  1224   SODIUM mmol/L 126* 132* 133* 129*   POTASSIUM mmol/L 3.4* 4.3 4.2 4.4   CHLORIDE mmol/L 77* 78* 80* 85*   CO2 mmol/L >40.0* >40.0* >40.0* 38.0*   BUN mg/dL 23* 32* 23* 22*   CREATININE mg/dL 0.86 1.11 1.10 1.16   CALCIUM mg/dL 8.6 8.6 8.7 8.7   BILIRUBIN mg/dL  --  1.1* 1.2* 1.1*   ALK PHOS U/L  --  83 97 107   ALT (SGPT) U/L  --  87* 108* 127*   AST (SGOT) U/L  --  38 47* 67*   GLUCOSE mg/dL 104* 113* 133* 108*       Culture Data:   Blood Culture   Date Value Ref Range Status   03/30/2018 No growth at 2 days  Preliminary   03/30/2018 No growth at 2 days  Preliminary       Radiology Data:   Imaging Results (last 24 hours)     ** No results found for the last 24 hours. **          I have reviewed the patient current medications.     Assessment/Plan   Assessment:  1. Chronic obstructive pulmonary disease with acute exacerbation  2. Acute on chronic hypoxic and hypercarbic respiratory failure secondary to above  3. Acute on chronic diastolic congestive heart failure, last known EF 55%  4. Hyponatremia-likely diuretic related  5. Pulmonary hypertension  6. Elevated LFT's  7. Low TSH-  8. Hyperglycemia, mild. Patient also on steroid therapy. Hgb A1c 6.1  9. Hypertension  10. Leukocytosis-steroid effect.   11. Hyponatremia     Plan:  1. Hold diuretics for now.   2. Will check urine osm, urine sodium, serum osm, uric acid.   3. Doxycycline day 2  4. Consult physical and occupational therapy.   5. Repeat labs in am.   6. Beta blocker   7. Continue BiPAP while sleeping.      Discharge Planning: I expect the patient to be discharged to home in ? days.    ROLAND Burdick   04/02/18   7:06 AM     I personally evaluated and examined the patient in conjunction with ROLAND Bryant and agree with the assessment, treatment plan, and disposition of the patient as recorded by her. My history, exam, and further recommendations are:     57-year-old woman admitted for evaluation of shortness of breath and confusion.  Based on admitting H&P it says that she wants hypoxic even on 2 L nasal cannula.  She was unable to give any meaningful review of system on admission.  He was admitted for acute hypoxic and hypercarbic respiratory failure with chronic obstructive pulmonary disease exacerbation.  He was treated with Lasix 40 twice a day with concern for fluid overload, Solu-Medrol every 6 hours    Agree holding off diuretic.  (Hyponatremia/hypochloremia)  Patient at early part of hospitalizations showed acute  respiratory acidosis/hypercarbia that readily went to compensated acidosis.   He has worsening hyponatremia and hypochloremia.    The pharmacist sent me a message that his home meds was filled way back in August of 2017.   The only recently filled medications includes Flexeril, Lasix, Norco and Effexor.    Hospital medications include:    budesonide-formoterol 2 puff Inhalation BID - RT   doxycycline 100 mg Oral Q12H   enoxaparin 40 mg Subcutaneous Q24H   guaiFENesin 1,200 mg Oral BID   insulin lispro 2-7 Units Subcutaneous 4x Daily With Meals & Nightly   ipratropium 0.5 mg Nebulization 4x Daily - RT   methylPREDNISolone sodium succinate 60 mg Intravenous Q8H   pantoprazole 40 mg Oral Q AM   potassium chloride 20 mEq Oral Daily   spironolactone 25 mg Oral Daily   venlafaxine 75 mg Oral BID With Meals        Serum osmolality is 275, urine sodium, urine osmolality is still pending  Vital signs are stable  Give IV fluid + potassium and follow bmp; d/c aldactone.  Monitor volume  status    Cont solumedrol; add albuterol; cont o2 (she is o2 dependent) - wheezing  Increase activities  Gonsalo Loo MD  04/02/18  3:17 PM

## 2018-04-03 LAB
ANION GAP SERPL CALCULATED.3IONS-SCNC: ABNORMAL MMOL/L (ref 4–13)
BUN BLD-MCNC: 20 MG/DL (ref 5–21)
BUN/CREAT SERPL: 25.3 (ref 7–25)
CALCIUM SPEC-SCNC: 8.4 MG/DL (ref 8.4–10.4)
CHLORIDE SERPL-SCNC: 82 MMOL/L (ref 98–110)
CO2 SERPL-SCNC: >40 MMOL/L (ref 24–31)
CREAT BLD-MCNC: 0.79 MG/DL (ref 0.5–1.4)
DEPRECATED RDW RBC AUTO: 53.1 FL (ref 40–54)
ERYTHROCYTE [DISTWIDTH] IN BLOOD BY AUTOMATED COUNT: 17.7 % (ref 12–15)
GFR SERPL CREATININE-BSD FRML MDRD: 76 ML/MIN/1.73
GLUCOSE BLD-MCNC: 102 MG/DL (ref 70–100)
GLUCOSE BLDC GLUCOMTR-MCNC: 119 MG/DL (ref 70–130)
GLUCOSE BLDC GLUCOMTR-MCNC: 120 MG/DL (ref 70–130)
GLUCOSE BLDC GLUCOMTR-MCNC: 133 MG/DL (ref 70–130)
GLUCOSE BLDC GLUCOMTR-MCNC: 133 MG/DL (ref 70–130)
HCT VFR BLD AUTO: 37.2 % (ref 37–47)
HGB BLD-MCNC: 11.3 G/DL (ref 12–16)
MCH RBC QN AUTO: 25.1 PG (ref 28–32)
MCHC RBC AUTO-ENTMCNC: 30.4 G/DL (ref 33–36)
MCV RBC AUTO: 82.5 FL (ref 82–98)
OSMOLALITY UR: 558 MOSM/KG (ref 601–850)
PLATELET # BLD AUTO: 189 10*3/MM3 (ref 130–400)
PMV BLD AUTO: 9.4 FL (ref 6–12)
POTASSIUM BLD-SCNC: 3.9 MMOL/L (ref 3.5–5.3)
RBC # BLD AUTO: 4.51 10*6/MM3 (ref 4.2–5.4)
SODIUM BLD-SCNC: 127 MMOL/L (ref 135–145)
SODIUM UR-SCNC: 9 MMOL/L (ref 30–90)
WBC NRBC COR # BLD: 9.84 10*3/MM3 (ref 4.8–10.8)

## 2018-04-03 PROCEDURE — 82962 GLUCOSE BLOOD TEST: CPT

## 2018-04-03 PROCEDURE — 83935 ASSAY OF URINE OSMOLALITY: CPT | Performed by: NURSE PRACTITIONER

## 2018-04-03 PROCEDURE — 85027 COMPLETE CBC AUTOMATED: CPT | Performed by: NURSE PRACTITIONER

## 2018-04-03 PROCEDURE — 25010000002 METHYLPREDNISOLONE PER 125 MG: Performed by: NURSE PRACTITIONER

## 2018-04-03 PROCEDURE — 25010000002 ENOXAPARIN PER 10 MG: Performed by: NURSE PRACTITIONER

## 2018-04-03 PROCEDURE — 97535 SELF CARE MNGMENT TRAINING: CPT

## 2018-04-03 PROCEDURE — 94799 UNLISTED PULMONARY SVC/PX: CPT

## 2018-04-03 PROCEDURE — 25810000003 SODIUM CHLORIDE 0.9 % WITH KCL 20 MEQ 20-0.9 MEQ/L-% SOLUTION: Performed by: INTERNAL MEDICINE

## 2018-04-03 PROCEDURE — 97116 GAIT TRAINING THERAPY: CPT

## 2018-04-03 PROCEDURE — 97110 THERAPEUTIC EXERCISES: CPT

## 2018-04-03 PROCEDURE — 84300 ASSAY OF URINE SODIUM: CPT | Performed by: NURSE PRACTITIONER

## 2018-04-03 PROCEDURE — 94660 CPAP INITIATION&MGMT: CPT

## 2018-04-03 PROCEDURE — 80048 BASIC METABOLIC PNL TOTAL CA: CPT | Performed by: NURSE PRACTITIONER

## 2018-04-03 RX ORDER — METHYLPREDNISOLONE SODIUM SUCCINATE 40 MG/ML
40 INJECTION, POWDER, LYOPHILIZED, FOR SOLUTION INTRAMUSCULAR; INTRAVENOUS EVERY 12 HOURS
Status: DISCONTINUED | OUTPATIENT
Start: 2018-04-03 | End: 2018-04-05 | Stop reason: HOSPADM

## 2018-04-03 RX ADMIN — ENOXAPARIN SODIUM 40 MG: 40 INJECTION SUBCUTANEOUS at 18:30

## 2018-04-03 RX ADMIN — GUAIFENESIN 1200 MG: 600 TABLET, EXTENDED RELEASE ORAL at 20:13

## 2018-04-03 RX ADMIN — POTASSIUM CHLORIDE AND SODIUM CHLORIDE 100 ML/HR: 900; 150 INJECTION, SOLUTION INTRAVENOUS at 23:32

## 2018-04-03 RX ADMIN — DOXYCYCLINE 100 MG: 100 TABLET ORAL at 08:15

## 2018-04-03 RX ADMIN — IPRATROPIUM BROMIDE AND ALBUTEROL SULFATE 3 ML: 2.5; .5 SOLUTION RESPIRATORY (INHALATION) at 12:11

## 2018-04-03 RX ADMIN — HYDROCODONE BITARTRATE AND ACETAMINOPHEN 1 TABLET: 7.5; 325 TABLET ORAL at 00:57

## 2018-04-03 RX ADMIN — METHYLPREDNISOLONE SODIUM SUCCINATE 40 MG: 125 INJECTION, POWDER, FOR SOLUTION INTRAMUSCULAR; INTRAVENOUS at 11:45

## 2018-04-03 RX ADMIN — HYDROCODONE BITARTRATE AND ACETAMINOPHEN 1 TABLET: 7.5; 325 TABLET ORAL at 06:25

## 2018-04-03 RX ADMIN — HYDROCODONE BITARTRATE AND ACETAMINOPHEN 1 TABLET: 7.5; 325 TABLET ORAL at 20:12

## 2018-04-03 RX ADMIN — VENLAFAXINE HYDROCHLORIDE 75 MG: 37.5 TABLET ORAL at 18:30

## 2018-04-03 RX ADMIN — POTASSIUM CHLORIDE 20 MEQ: 750 CAPSULE, EXTENDED RELEASE ORAL at 08:27

## 2018-04-03 RX ADMIN — PANTOPRAZOLE SODIUM 40 MG: 40 TABLET, DELAYED RELEASE ORAL at 06:25

## 2018-04-03 RX ADMIN — METHYLPREDNISOLONE SODIUM SUCCINATE 40 MG: 125 INJECTION, POWDER, FOR SOLUTION INTRAMUSCULAR; INTRAVENOUS at 22:02

## 2018-04-03 RX ADMIN — IPRATROPIUM BROMIDE AND ALBUTEROL SULFATE 3 ML: 2.5; .5 SOLUTION RESPIRATORY (INHALATION) at 16:37

## 2018-04-03 RX ADMIN — GUAIFENESIN 1200 MG: 600 TABLET, EXTENDED RELEASE ORAL at 08:15

## 2018-04-03 RX ADMIN — POTASSIUM CHLORIDE AND SODIUM CHLORIDE 100 ML/HR: 900; 150 INJECTION, SOLUTION INTRAVENOUS at 09:49

## 2018-04-03 RX ADMIN — HYDROCODONE BITARTRATE AND ACETAMINOPHEN 1 TABLET: 7.5; 325 TABLET ORAL at 14:02

## 2018-04-03 RX ADMIN — BUDESONIDE AND FORMOTEROL FUMARATE DIHYDRATE 2 PUFF: 160; 4.5 AEROSOL RESPIRATORY (INHALATION) at 20:58

## 2018-04-03 RX ADMIN — IPRATROPIUM BROMIDE AND ALBUTEROL SULFATE 3 ML: 2.5; .5 SOLUTION RESPIRATORY (INHALATION) at 20:58

## 2018-04-03 RX ADMIN — BUDESONIDE AND FORMOTEROL FUMARATE DIHYDRATE 2 PUFF: 160; 4.5 AEROSOL RESPIRATORY (INHALATION) at 07:57

## 2018-04-03 RX ADMIN — DOXYCYCLINE 100 MG: 100 TABLET ORAL at 20:12

## 2018-04-03 RX ADMIN — IPRATROPIUM BROMIDE AND ALBUTEROL SULFATE 3 ML: 2.5; .5 SOLUTION RESPIRATORY (INHALATION) at 07:50

## 2018-04-03 RX ADMIN — VENLAFAXINE HYDROCHLORIDE 75 MG: 37.5 TABLET ORAL at 08:15

## 2018-04-03 NOTE — PLAN OF CARE
Problem: Patient Care Overview  Goal: Plan of Care Review  Outcome: Ongoing (interventions implemented as appropriate)   04/03/18 1048   Coping/Psychosocial   Plan of Care Reviewed With patient   Plan of Care Review   Progress improving   OTHER   Outcome Summary Pt educated on work simplification and energy conservation. Pt S for bed mobility, transfers and ambulation in room. Pt encouraged to be OOB as tolerated. Continue OT POC

## 2018-04-03 NOTE — PLAN OF CARE
Problem: Patient Care Overview  Goal: Plan of Care Review  Outcome: Ongoing (interventions implemented as appropriate)   04/03/18 1106 04/03/18 1549   Coping/Psychosocial   Plan of Care Reviewed With patient --    Plan of Care Review   Progress improving --    OTHER   Outcome Summary --  Pt up to chair today for lunch. Up x 1 to the BSC, tolerated well. VSS, will continue to monitor.        Problem: Chronic Obstructive Pulmonary Disease (Adult)  Goal: Signs and Symptoms of Listed Potential Problems Will be Absent, Minimized or Managed (Chronic Obstructive Pulmonary Disease)  Outcome: Ongoing (interventions implemented as appropriate)      Problem: Pain, Acute (Adult)  Goal: Identify Related Risk Factors and Signs and Symptoms  Outcome: Outcome(s) achieved Date Met: 04/03/18    Goal: Acceptable Pain Control/Comfort Level  Outcome: Ongoing (interventions implemented as appropriate)      Problem: Fall Risk (Adult)  Goal: Identify Related Risk Factors and Signs and Symptoms  Outcome: Outcome(s) achieved Date Met: 04/03/18    Goal: Absence of Fall  Outcome: Ongoing (interventions implemented as appropriate)

## 2018-04-03 NOTE — THERAPY TREATMENT NOTE
Acute Care - Physical Therapy Treatment Note  [facility]     Patient Name: Teri Tim  : 1960  MRN: [id number]  Today's Date: 4/3/2018  Onset of Illness/Injury or Date of Surgery: 18  Date of Referral to PT: 18  Referring Physician: Dr. Loo    Admit Date: 3/30/2018    Visit Dx:    ICD-10-CM ICD-9-CM   1. COPD exacerbation J44.1 491.21   2. Acute on chronic respiratory failure with hypoxia and hypercapnia J96.21 518.84    J96.22 786.09     799.02   3. Impaired functional mobility and activity tolerance Z74.09 V49.89   4. Impaired mobility and ADLs Z74.09 799.89     Patient Active Problem List   Diagnosis   • NSTEMI (non-ST elevated myocardial infarction)   • Abnormal LFTs   • Abnormal US (ultrasound) of abdomen   • COPD exacerbation       Therapy Treatment    Therapy Treatment / Health Promotion    Treatment Time/Intention  Discipline: physical therapy assistant (18 1106 : Renee Thomason PTA)  Document Type: therapy note (daily note) (18 1106 : Renee Thomason PTA)  Subjective Information: complains of, pain, weakness (18 1106 : Renee Thomason PTA)  Mode of Treatment: physical therapy (18 1106 : Renee Thomason PTA)  Existing Precautions/Restrictions: fall, oxygen therapy device and L/min (18 1106 : Renee Thomason PTA)  Plan of Care Review  Plan of Care Reviewed With: patient (18 1106 : Renee Thomason PTA)    Vitals/Pain/Safety  Vital Signs  Pre SpO2 (%): 97 (18 1106 : Renee Thomason PTA)  O2 Delivery Pre Treatment: supplemental O2 (2L) (18 1106 : Renee Thomason PTA)  O2 Delivery Intra Treatment: supplemental O2 (2L) (18 1106 : Renee Thomason PTA)  Post SpO2 (%): 90 (increased to 93% within 2 minutes) (18 1106 : Renee Thomason PTA)  O2 Delivery Post Treatment: supplemental O2 (2L) (18 1106 : Renee Thomason PTA)  Pre Patient Position: Sitting (18 1106 : Renee NÚÑEZ  DARRICK Thomason)  Intra Patient Position: Standing (04/03/18 1106 : Renee Thomason PTA)  Post Patient Position: Sitting (04/03/18 1106 : Renee Thomason PTA)  Pain Scale: Numbers Pre/Post-Treatment  Pain Scale: Numbers, Pretreatment: 5/10 (04/03/18 1106 : Renee Thomason PTA)  Pain Location - Orientation: lower (04/03/18 1106 : Renee Thomason PTA)  Pain Location: back (04/03/18 1106 : Renee Thomason PTA)  Pain Intervention(s): Repositioned (04/03/18 1106 : Renee Thomason PTA)  Pain Scale: Word Pre/Post-Treatment  Pain Location - Orientation: lower (04/03/18 1106 : Renee Thomason PTA)  Pain Location: back (04/03/18 1106 : Renee Thomason PTA)  Pain Intervention(s): Repositioned (04/03/18 1106 : Renee Thomason PTA)  Pain Scale: FACES Pre/Post-Treatment  Pain Location - Orientation: lower (04/03/18 1106 : Renee Thomason PTA)  Pain Location: back (04/03/18 1106 : Renee Thomason PTA)  Pain Intervention(s): Repositioned (04/03/18 1106 : Renee Thomason PTA)  Positioning and Restraints  Pre-Treatment Position: sitting in chair/recliner (04/03/18 1106 : Renee Thomason PTA)  Post Treatment Position: chair (04/03/18 1106 : Renee Thomason PTA)  In Chair: sitting, call light within reach, with family/caregiver (04/03/18 1106 : Renee Thomason PTA)    Mobility,ADL,Motor, Modality  Bed Mobility Assessment/Treatment  Comment (Bed Mobility): up in chair (04/03/18 1106 : Renee Thomason PTA)  Sit-Stand Transfer  Sit-Stand [place] (Transfers): supervision (04/03/18 1106 : Renee Thomason, DARRICK)  Stand-Sit Transfer  Stand-Sit [place] (Transfers): supervision (04/03/18 1106 : Renee Thomason PTA)  Gait/Stairs Assessment/Training  [place] Level (Gait): contact guard, verbal cues (04/03/18 1106 : Renee Thomason PTA)  Distance in Feet (Gait): 80 (04/03/18 1106 : Renee Thomason PTA)  Deviations/Abnormal Patterns (Gait): stride length  decreased, base of support, narrow, katja decreased (04/03/18 1106 : Renee Thomason, DARRICK)  Bilateral Gait Deviations: heel strike decreased (04/03/18 1106 : eRnee Thomason PTA)     Lower Extremity Seated Therapeutic Exercise  Performed, Seated Lower Extremity (Therapeutic Exercise): hip abduction/adduction, hip flexion/extension, LAQ (long arc quad), knee extension, ankle dorsiflexion/plantarflexion (04/03/18 1106 : Renee Thomason PTA)  Exercise Type, Seated Lower Extremity (Therapeutic Exercise): AROM (active range of motion) (04/03/18 1106 : Renee Thomason PTA)  Expected Outcomes, Seated Lower Extremity (Therapeutic Exercise): strengthen normal movement patterns (04/03/18 1106 : Renee Thomason PTA)  Sets/Reps Detail, Seated Lower Extremity (Therapeutic Exercise): 15 (04/03/18 1106 : Renee Thomason PTA)  Comment, Seated Lower Extremity (Therapeutic Exercise): very slow (04/03/18 1106 : Renee Thomason PTA)           ROM/MMT             Sensory, Edema, Orthotics          Cognition, Communication, Swallow  Speaking Valve  Pre SpO2 (%): 97 (04/03/18 1106 : Renee Thomason PTA)  Post SpO2 (%): 90 (increased to 93% within 2 minutes) (04/03/18 1106 : Renee Thomason PTA)  General Eating/Swallowing Observations  Pre SpO2 (%): 97 (04/03/18 1106 : eRnee Thomason PTA)  Post SpO2 (%): 90 (increased to 93% within 2 minutes) (04/03/18 1106 : Renee Thomason PTA)    Outcome Summary               PT Rehab Goals     Row Name 04/02/18 0973             Bed Mobility Goal 1 (PT)    Activity/Assistive Device (Bed Mobility Goal 1, PT) rolling to left;rolling to right;sit to supine/supine to sit  -LYNETTE (r) CS (t) LYNETTE (c)      [place] Level/Cues Needed (Bed Mobility Goal 1, PT) independent  -LYNETTE (r) CS (t) LYNETTE (c)      Time Frame (Bed Mobility Goal 1, PT) long term goal (LTG);10 days  -LYNETTE (r) CS (t) LYNETTE (c)      Barriers (Bed Mobility Goal 1, PT) medically complex  -LYNETTE (r) CS (t) LYNETTE (c)       Progress/Outcomes (Bed Mobility Goal 1, PT) goal ongoing  -LYNETTE (r) CS (t) LYNETTE (c)         Transfer Goal 1 (PT)    Activity/Assistive Device (Transfer Goal 1, PT) sit-to-stand/stand-to-sit;bed-to-chair/chair-to-bed  -LYNETTE (r) CS (t) LYNETTE (c)      Hand Level/Cues Needed (Transfer Goal 1, PT) supervision required  -LYNETTE (r) CS (t) LYNETTE (c)      Time Frame (Transfer Goal 1, PT) long term goal (LTG);10 days  -LYNETTE (r) CS (t) LYNETTE (c)      Barriers (Transfers Goal 1, PT) medically complex  -LYNETTE (r) CS (t) LYNETTE (c)      Progress/Outcome (Transfer Goal 1, PT) goal ongoing  -LYNETTE (r) CS (t) LYNETTE (c)         Gait Training Goal 1 (PT)    Activity/Assistive Device (Gait Training Goal 1, PT) gait (walking locomotion)  -LYNETTE (r) CS (t) LYNETTE (c)      Hand Level (Gait Training Goal 1, PT) contact guard assist  -LYNETTE (r) CS (t) LYNETTE (c)      Distance (Gait Goal 1, PT) 50ft  -LYNETTE (r) CS (t) LYNETTE (c)      Time Frame (Gait Training Goal 1, PT) long term goal (LTG);10 days  -LYNETTE (r) CS (t) LYNETTE (c)      Barriers (Gait Training Goal 1, PT) medically complex  -LYNETTE (r) CS (t) LYNETTE (c)      Progress/Outcome (Gait Training Goal 1, PT) goal ongoing  -LYNETTE (r) CS (t) LYNETTE (c)        User Key  (r) = Recorded By, (t) = Taken By, (c) = Cosigned By    Initials Name Provider Type    LYNETTE Del Real, PT DPT Physical Therapist    YONAS Olviier, PT Student PT Student          Physical Therapy Education     Title: PT OT SLP Therapies (Active)     Topic: Physical Therapy (Active)     Point: Mobility training (Active)    Learning Progress Summary     Learner Status Readiness Method Response Comment Documented by    Patient Active Acceptance E,D NR exercises, gait, and benefits of activity MF 04/03/18 1136     Active Acceptance E NR pt educated on progression of POC CS 04/02/18 1046    Family Active Acceptance E NR pt educated on progression of POC CS 04/02/18 1046          Point: Home exercise program (Active)    Learning Progress Summary     Learner Status  Readiness Method Response Comment Documented by    Patient Active Acceptance E,D NR exercises, gait, and benefits of activity  04/03/18 1136                      User Key     Initials Effective Dates Name Provider Type Discipline     08/02/16 -  Renee Thomason, PTA Physical Therapy Assistant PT    CS 01/08/18 -  Gary Olivier, PT Student PT Student PT                    PT Recommendation and Plan     Plan of Care Reviewed With: patient  Progress: improving  Outcome Summary: Pt. agreeable to therapy. Pt.. was supervision to stand and sit from chair. Pt. walked 80' with CGA x 1. Pt required cues for narrow base and short steps to maintain balance. Pt. performed active leg exercises prior to walk. Will continue to work with pt. on strengthening and endurance.           Outcome Measures     Row Name 04/03/18 1106 04/03/18 1000 04/02/18 1100       How much help from another person do you currently need...    Turning from your back to your side while in flat bed without using bedrails? 4  -MF  --  --    Moving from lying on back to sitting on the side of a flat bed without bedrails? 4  -MF  --  --    Moving to and from a bed to a chair (including a wheelchair)? 3  -MF  --  --    Standing up from a chair using your arms (e.g., wheelchair, bedside chair)? 4  -MF  --  --    Climbing 3-5 steps with a railing? 3  -MF  --  --    To walk in hospital room? 3  -MF  --  --    AM-PAC 6 Clicks Score 21  -MF  --  --       How much help from another is currently needed...    Putting on and taking off regular lower body clothing?  -- 3  -TS 2  -MM    Bathing (including washing, rinsing, and drying)  -- 3  -TS 2  -MM    Toileting (which includes using toilet bed pan or urinal)  -- 4  -TS 2  -MM    Putting on and taking off regular upper body clothing  -- 4  -TS 2  -MM    Taking care of personal grooming (such as brushing teeth)  -- 3  -TS 3  -MM    Eating meals  -- 4  -TS 3  -MM    Score  -- 21  -TS 14  -MM       Functional  Assessment    Outcome Measure Options AM-PAC 6 Clicks Basic Mobility (PT)  -MF AM-PAC 6 Clicks Daily Activity (OT)  -TS AM-PAC 6 Clicks Daily Activity (OT)  -MM    Row Name 04/02/18 0945             How much help from another person do you currently need...    Turning from your back to your side while in flat bed without using bedrails? 4  -LYNETTE (r) CS (t) LYNETTE (c)      Moving from lying on back to sitting on the side of a flat bed without bedrails? 4  -LYNETTE (r) CS (t) LYNETTE (c)      Moving to and from a bed to a chair (including a wheelchair)? 3  -LYNETTE (r) CS (t) LYNETTE (c)      Standing up from a chair using your arms (e.g., wheelchair, bedside chair)? 3  -LYNETTE (r) CS (t) LYNETTE (c)      Climbing 3-5 steps with a railing? 2  -LYNETTE (r) CS (t) LYNETTE (c)      To walk in hospital room? 2  -LYNETTE (r) CS (t) LYNETTE (c)      AM-PAC 6 Clicks Score 18  -LYNETTE (r) CS (t)         Functional Assessment    Outcome Measure Options AM-PAC 6 Clicks Basic Mobility (PT)  -LYNETTE (r) CS (t) LYNETTE (c)        User Key  (r) = Recorded By, (t) = Taken By, (c) = Cosigned By    Initials Name Provider Type    TS Harper Gamboa, HAWKINS/L Occupational Therapy Assistant    LYNETTE Del Real, PT DPT Physical Therapist    LAUREN Thomason PTA Physical Therapy Assistant    KIRSTIN Randolph, OTR/L Occupational Therapist    CS Gary Olivier, PT Student PT Student           Time Calculation:         PT Charges     Row Name 04/03/18 1137             Time Calculation    Start Time 1106  -      Stop Time 1130  -      Time Calculation (min) 24 min  -      PT Received On 04/03/18  -      PT Goal Re-Cert Due Date 04/12/18  -         Time Calculation- PT    Total Timed Code Minutes- PT 24 minute(s)  -        User Key  (r) = Recorded By, (t) = Taken By, (c) = Cosigned By    Initials Name Provider Type    LAUREN Thomason PTA Physical Therapy Assistant          Therapy Charges for Today     Code Description Service Date Service Provider Modifiers Qty     01685460600 HC GAIT TRAINING EA 15 MIN 4/3/2018 Renee Thomason PTA GP, KX 1    16291838920 HC PT THER PROC EA 15 MIN 4/3/2018 Renee Thomason PTA GP, KX 1          PT G-Codes  Outcome Measure Options: AM-PAC 6 Clicks Basic Mobility (PT)  Score: 18  Functional Limitation: Mobility: Walking and moving around  Mobility: Walking and Moving Around Current Status (): At least 40 percent but less than 60 percent impaired, limited or restricted  Mobility: Walking and Moving Around Goal Status (): At least 20 percent but less than 40 percent impaired, limited or restricted    Renee Thomason PTA  4/3/2018

## 2018-04-03 NOTE — PLAN OF CARE
Problem: Patient Care Overview  Goal: Plan of Care Review  Outcome: Ongoing (interventions implemented as appropriate)   04/03/18 2098   Coping/Psychosocial   Plan of Care Reviewed With patient   Plan of Care Review   Progress no change   OTHER   Outcome Summary VSS. Currently wearing Bipap. C/O chronic back pain. PRN pain medication given. IV solumedrol given. Cont to monitor.        Problem: Chronic Obstructive Pulmonary Disease (Adult)  Goal: Signs and Symptoms of Listed Potential Problems Will be Absent, Minimized or Managed (Chronic Obstructive Pulmonary Disease)  Outcome: Ongoing (interventions implemented as appropriate)      Problem: Pain, Acute (Adult)  Goal: Identify Related Risk Factors and Signs and Symptoms  Outcome: Ongoing (interventions implemented as appropriate)    Goal: Acceptable Pain Control/Comfort Level  Outcome: Ongoing (interventions implemented as appropriate)      Problem: Fall Risk (Adult)  Goal: Identify Related Risk Factors and Signs and Symptoms  Outcome: Ongoing (interventions implemented as appropriate)    Goal: Absence of Fall  Outcome: Ongoing (interventions implemented as appropriate)

## 2018-04-03 NOTE — PLAN OF CARE
Problem: Patient Care Overview  Goal: Plan of Care Review  Outcome: Ongoing (interventions implemented as appropriate)   04/03/18 1106   Coping/Psychosocial   Plan of Care Reviewed With patient   Plan of Care Review   Progress improving   OTHER   Outcome Summary Pt. agreeable to therapy. Pt.. was supervision to stand and sit from chair. Pt. walked 80' with CGA x 1. Pt required cues for narrow base and short steps to maintain balance. Pt. performed active leg exercises prior to walk. Will continue to work with pt. on strengthening and endurance.

## 2018-04-03 NOTE — PAYOR COMM NOTE
"4/3/18 CLINICAL UPDATE  [id number]   PHONE    [phone]    Teri Tim (57 y.o. Female)     Date of Birth Social Security Number Address Home Phone MRN    1960  [id number]    [place] [address] [phone] [id number]    [employer] Marital Status          Voodoo        Admission Date Admission Type Admitting Provider Attending Provider Department, Room/Bed    3/30/18 Emergency Gonsalo Loo MD [place], Glenn Riego, MD [address], 486/[address]    Discharge Date Discharge Disposition Discharge Destination                       Attending Provider:  Gonsalo Loo MD    Allergies:  Codeine    Isolation:  None   Infection:  None   Code Status:  FULL    Ht:  154.8 cm (60.95\")   Wt:  52.2 kg (115 lb)    Admission Cmt:  None   Principal Problem:  None                Active Insurance as of 3/30/2018     Primary Coverage     Payor Plan Insurance Group Employer/Plan Group    [employer] [place] [employer] [place]ICA[place]      Payor Plan Address Payor Plan Phone Number Effective From Effective To    [address] [address] [phone] 11/1/2017     [place] [address]       Subscriber Name Subscriber Birth Date Member ID       TERI TIM 1960 [id number]                 Emergency Contacts      (Rel.) Home Phone Work Phone Mobile Phone    Rich Tim (Spouse) [phone] -- [phone]                  ICU Vital Signs     Row Name 04/03/18 0756 04/03/18 0750 04/03/18 0738 04/03/18 0420 04/02/18 2318       Vitals    Temp  --  -- 97.8 °F (36.6 °C) 97 °F (36.1 °C) 97.9 °F (36.6 °C)    Temp src  --  -- Temporal Artery  Temporal Artery  Temporal Artery     Pulse 72 77 75 71 95    Heart Rate Source Monitor Monitor Monitor Monitor Monitor    Resp 16 18 16 18 18    Resp Rate Source Visual Visual Visual Visual Visual    BP  --  -- 117/58 124/61 130/56    BP Location  --  -- Left arm Left arm Left arm    BP Method  --  -- Automatic Automatic Automatic "    Patient Position  --  -- Lying Lying Lying       Oxygen Therapy    SpO2 100 % 98 % 98 % 98 % 95 %    Pulse Oximetry Type Continuous Continuous Continuous Continuous Continuous    Device (Oxygen Therapy) nasal cannula nasal cannula nasal cannula CPAP nasal cannula    Flow (L/min) 2 2 2  --  --    Row Name 04/02/18 2043 04/02/18 2028 04/02/18 2000 04/02/18 1708 04/02/18 1622       Vitals    Temp 97.2 °F (36.2 °C)  --  -- 96.9 °F (36.1 °C)  --    Temp src Temporal Artery   --  --  --  --    Pulse 89  --  -- 96 80    Heart Rate Source Monitor  --  --  -- Monitor    Resp 18  --  -- 18 18    Resp Rate Source Visual  --  --  -- Visual    /60  --  -- 118/54  --    BP Location Right arm  --  --  --  --    BP Method Automatic  --  --  --  --    Patient Position Lying  --  --  --  --       Oxygen Therapy    SpO2 93 % 91 %  -- 93 % 96 %    Pulse Oximetry Type Continuous Continuous  --  -- Continuous    Device (Oxygen Therapy) nasal cannula  -- nasal cannula  -- nasal cannula with reservoir (i.e. oximizer)    Flow (L/min) 2 2 2  -- 2    Row Name 04/02/18 1516 04/02/18 1202 04/02/18 1125 04/02/18 0949          Vitals    Temp  -- 97.2 °F (36.2 °C)  --  --     Pulse 91 95 94  --     Heart Rate Source Monitor  -- Monitor  --     Resp 18 16 16  --     Resp Rate Source Visual  -- Visual  --     BP  -- 128/56  --  --        Oxygen Therapy    SpO2 98 % 97 % 95 % 94 %     Pulse Oximetry Type Continuous  -- Continuous Continuous     Device (Oxygen Therapy) nasal cannula  -- nasal cannula nasal cannula     Flow (L/min) 2  -- 2 2         Intake & Output (last day)       04/02 0701 - 04/03 0700 04/03 0701 - 04/04 0700    P.O.      Total Intake(mL/kg)      Urine (mL/kg/hr) 300 (0.2)     Total Output 300      Net -300                Lines, Drains & Airways    Active LDAs     Name:   Placement date:   Placement time:   Site:   Days:    Peripheral IV 03/30/18 1220 Right Wrist  03/30/18    1220    Wrist    3                Hospital  Medications (active)       Dose Frequency Start End    budesonide-formoterol (SYMBICORT) 160-4.5 MCG/ACT inhaler 2 puff 2 puff 2 Times Daily - RT 3/30/2018     Sig - Route: Inhale 2 puffs 2 (Two) Times a Day. - Inhalation    dextrose (D50W) solution 25 g 25 g Every 15 Minutes PRN 3/31/2018     Sig - Route: Infuse 50 mL into a venous catheter Every 15 (Fifteen) Minutes As Needed for Low Blood Sugar (Blood Sugar Less Than 70). - Intravenous    dextrose (GLUTOSE) oral gel 15 g 15 g Every 15 Minutes PRN 3/31/2018     Sig - Route: Take 15 g by mouth Every 15 (Fifteen) Minutes As Needed for Low Blood Sugar (Blood sugar less than 70). - Oral    doxycycline (ADOXA) tablet 100 mg 100 mg Every 12 Hours Scheduled 4/1/2018 4/6/2018    Sig - Route: Take 1 tablet by mouth Every 12 (Twelve) Hours. - Oral    enoxaparin (LOVENOX) syringe 40 mg 40 mg Every 24 Hours 3/30/2018     Sig - Route: Inject 0.4 mL under the skin Daily. - Subcutaneous    glucagon (human recombinant) (GLUCAGEN DIAGNOSTIC) injection 1 mg 1 mg As Needed 3/31/2018     Sig - Route: Inject 1 mg under the skin As Needed (Blood Glucose Less Than 70). - Subcutaneous    guaiFENesin (MUCINEX) 12 hr tablet 1,200 mg 1,200 mg 2 Times Daily 3/30/2018     Sig - Route: Take 2 tablets by mouth 2 (Two) Times a Day. - Oral    HYDROcodone-acetaminophen (NORCO) 7.5-325 MG per tablet 1 tablet 1 tablet Every 6 Hours PRN 3/30/2018     Sig - Route: Take 1 tablet by mouth Every 6 (Six) Hours As Needed for Moderate Pain . - Oral    insulin lispro (humaLOG) injection 2-7 Units 2-7 Units 4 Times Daily With Meals & Nightly 3/31/2018     Sig - Route: Inject 2-7 Units under the skin 4 (Four) Times a Day With Meals & at Bedtime. - Subcutaneous    ipratropium-albuterol (DUO-NEB) nebulizer solution 3 mL 3 mL 4 Times Daily - RT 4/2/2018     Sig - Route: Take 3 mL by nebulization 4 (Four) Times a Day. - Nebulization    methylPREDNISolone sodium succinate (SOLU-Medrol) injection 40 mg 40 mg Every  "12 Hours 4/3/2018     Sig - Route: Infuse 1 mL into a venous catheter Every 12 (Twelve) Hours. - Intravenous    ondansetron (ZOFRAN) injection 4 mg 4 mg Every 6 Hours PRN 3/30/2018     Sig - Route: Infuse 2 mL into a venous catheter Every 6 (Six) Hours As Needed for Nausea or Vomiting. - Intravenous    Linked Group 1:  \"Or\" Linked Group Details        ondansetron (ZOFRAN) tablet 4 mg 4 mg Every 6 Hours PRN 3/30/2018     Sig - Route: Take 1 tablet by mouth Every 6 (Six) Hours As Needed for Nausea or Vomiting. - Oral    Linked Group 1:  \"Or\" Linked Group Details        ondansetron ODT (ZOFRAN-ODT) disintegrating tablet 4 mg 4 mg Every 6 Hours PRN 3/30/2018     Sig - Route: Take 1 tablet by mouth Every 6 (Six) Hours As Needed for Nausea or Vomiting. - Oral    Linked Group 1:  \"Or\" Linked Group Details        pantoprazole (PROTONIX) EC tablet 40 mg 40 mg Every Early Morning 3/30/2018     Sig - Route: Take 1 tablet by mouth Every Morning. - Oral    potassium chloride (MICRO-K) CR capsule 20 mEq 20 mEq Daily 3/30/2018     Sig - Route: Take 2 capsules by mouth Daily. - Oral    sodium chloride 0.9 % flush 1-10 mL 1-10 mL As Needed 3/30/2018     Sig - Route: Infuse 1-10 mL into a venous catheter As Needed for Line Care. - Intravenous    sodium chloride 0.9 % flush 10 mL 10 mL As Needed 3/30/2018     Sig - Route: Infuse 10 mL into a venous catheter As Needed for Line Care. - Intravenous    Linked Group 2:  \"And\" Linked Group Details        sodium chloride 0.9 % with KCl 20 mEq/L infusion 100 mL/hr Continuous 4/2/2018     Sig - Route: Infuse 100 mL/hr into a venous catheter Continuous. - Intravenous    venlafaxine (EFFEXOR) tablet 75 mg 75 mg 2 Times Daily With Meals 3/30/2018     Sig - Route: Take 2 tablets by mouth 2 (Two) Times a Day With Meals. - Oral    ipratropium (ATROVENT) nebulizer solution 0.5 mg (Discontinued) 0.5 mg 4 Times Daily - RT 3/30/2018 4/2/2018    Sig - Route: Take 2.5 mL by nebulization 4 (Four) Times a " Day. - Nebulization    methylPREDNISolone sodium succinate (SOLU-Medrol) injection 60 mg (Discontinued) 60 mg Every 8 Hours 4/2/2018 4/3/2018    Sig - Route: Infuse 0.96 mL into a venous catheter Every 8 (Eight) Hours. - Intravenous    spironolactone (ALDACTONE) tablet 25 mg (Discontinued) 25 mg Daily 3/30/2018 4/2/2018    Sig - Route: Take 1 tablet by mouth Daily. - Oral          Blood Administration Record     None          Lab Results (last 24 hours)     Procedure Component Value Units Date/Time    POC Glucose Once [[id number]]  (Abnormal) Collected:  04/03/18 0741    Specimen:  Blood Updated:  04/03/18 0816     Glucose 133 (H) mg/dL      Comment: : [id number] Olu HuiMeter ID: [id number]       Respiratory Culture - Sputum, Cough [[id number]] Collected:  04/02/18 0705    Specimen:  Sputum from Cough Updated:  04/03/18 0700     Respiratory Culture --      Light growth (2+) Normal Respiratory [place]    Basic Metabolic Panel [[id number]]  (Abnormal) Collected:  04/03/18 0453    Specimen:  Blood Updated:  04/03/18 0532     Glucose 102 (H) mg/dL      BUN 20 mg/dL      Creatinine 0.79 mg/dL      Sodium 127 (L) mmol/L      Potassium 3.9 mmol/L      Chloride 82 (L) mmol/L      CO2 >40.0 (C) mmol/L      Calcium 8.4 mg/dL      eGFR Non African Amer 76 mL/min/1.73      BUN/Creatinine Ratio 25.3 (H)     Anion Gap -- mmol/L      Comment: Unable to calculate Anion Gap.       Narrative:       GFR Normal >60  Chronic Kidney Disease <60  Kidney Failure <15    CBC (No Diff) [[id number]]  (Abnormal) Collected:  04/03/18 0453    Specimen:  Blood Updated:  04/03/18 0515     WBC 9.84 10*3/mm3      RBC 4.51 10*6/mm3      Hemoglobin 11.3 (L) g/dL      Hematocrit 37.2 %      MCV 82.5 fL      MCH 25.1 (L) pg      MCHC 30.4 (L) g/dL      RDW 17.7 (H) %      RDW-SD 53.1 fl      MPV 9.4 fL      Platelets 189 10*3/mm3     POC Glucose Once [[id number]]  (Normal) Collected:  04/02/18 2135    Specimen:  Blood Updated:  04/02/18 2154      Glucose 108 mg/dL      Comment: : [id number] Tio SloanMeter ID: [id number]       POC Glucose Once [[id number]]  (Normal) Collected:  04/02/18 1655    Specimen:  Blood Updated:  04/02/18 1710     Glucose 123 mg/dL      Comment: : [id number] Edmundo EncarnacionMeter ID: [id number]       Blood Culture - Blood, Blood, Venous Line [[id number]]  (Normal) Collected:  03/30/18 1302    Specimen:  Blood from Arm, Left Updated:  04/02/18 1416     Blood Culture No growth at 3 days    Blood Culture - Blood, Blood, Venous Line [[id number]]  (Normal) Collected:  03/30/18 1225    Specimen:  Blood from Arm, Left Updated:  04/02/18 1246     Blood Culture No growth at 3 days    POC Glucose Once [[id number]]  (Normal) Collected:  04/02/18 1143    Specimen:  Blood Updated:  04/02/18 1216     Glucose 120 mg/dL      Comment: : [id number] Edmundo EncarnacionMeter ID: [id number]       Osmolality, Serum [[id number]]  (Abnormal) Collected:  04/02/18 0747    Specimen:  Blood Updated:  04/02/18 0924     Osmolality 275 (L) mOsm/kg         Imaging Results (last 24 hours)     ** No results found for the last 24 hours. **        Orders (last 24 hrs)     Start     Ordered    04/04/18 0600  Basic Metabolic Panel  Morning Draw      04/03/18 0737    04/04/18 0600  CBC & Differential  Morning Draw      04/03/18 0737    04/03/18 1100  methylPREDNISolone sodium succinate (SOLU-Medrol) injection 40 mg  Every 12 Hours      04/03/18 0730    04/03/18 0817  POC Glucose Once  Once      04/03/18 0816    04/03/18 0800  Dietary Nutrition Supplements Glucerna Shake  Daily With Breakfast     Comments:  Chocolate flavor please    04/02/18 1732    04/03/18 0742  Respiratory communication  Once     Comments:  Patient needs different mask for Trilogy    04/03/18 0742    04/03/18 0600  Basic Metabolic Panel  Morning Draw      04/02/18 0704    04/03/18 0600  CBC (No Diff)  Morning Draw      04/02/18 0704    04/02/18 2155  POC Glucose Once  Once      04/02/18 2154    04/02/18  1711  POC Glucose Once  Once      18 1710    18 1630  ipratropium-albuterol (DUO-NEB) nebulizer solution 3 mL  4 Times Daily - RT      18 1531    18 1600  sodium chloride 0.9 % with KCl 20 mEq/L infusion  Continuous      18 1529    18 1217  POC Glucose Once  Once      18 1216    18 1208  PT Plan of Care Cert / Re-Cert  Once     Comments:  Physical Therapy Plan of Care  Initial Certification  Certification Period: 2018 - 2018    Patient Name: Teri Tim  : 1960    (J44.1) COPD exacerbation  (primary encounter diagnosis)  (J96.21,  J96.22) Acute on chronic respiratory failure with hypoxia and hypercapnia  (Z74.09) Impaired functional mobility and activity tolerance  (Z74.09) Impaired mobility and ADLs                  Assessment  PT Assessment  Impairments Found (describe specific impairments): aerobic capacity/endurance, gait, locomotion, and balance, motor function, muscle performance  PT Diagnosis (PT Clinical Impression): impaired mobility and activity tolerance.   Criteria for Skilled Interventions Met (PT Clinical Impression): yes, treatment indicated              PT Rehab Goals     Row Name 18 0945             Bed Mobility Goal 1 (PT)    Activity/Assistive Device (Bed Mobility Goal 1, PT) rolling to   left;rolling to right;sit to supine/supine to sit  -LYNETTE (r) CS (t) LYNETTE (c)        [place] Level/Cues Needed (Bed Mobility Goal 1, PT) independent  -LYNETTE   (r) CS (t) LYNETTE (c)      Time Frame (Bed Mobility Goal 1, PT) long term goal (LTG);10 days  -LYNETTE   (r) CS (t) LYNETTE (c)      Barriers (Bed Mobility Goal 1, PT) medically complex  -LYNETTE (r) CS (t) LYNETTE   (c)      Progress/Outcomes (Bed Mobility Goal 1, PT) goal ongoing  -LYNETTE (r) CS (t)   LYNETTE (c)         Transfer Goal 1 (PT)    Activity/Assistive Device (Transfer Goal 1, PT)   sit-to-stand/stand-to-sit;bed-to-chair/chair-to-bed  -LYNETTE (r) CS (t) LYNETTE (c)        [place] Level/Cues Needed (Transfer Goal  1, PT) supervision required    -LYNETTE (r) CS (t) LYNETTE (c)      Time Frame (Transfer Goal 1, PT) long term goal (LTG);10 days  -LYNETTE (r) CS   (t) LYNETTE (c)      Barriers (Transfers Goal 1, PT) medically complex  -LYNETTE (r) CS (t) LYNETTE (c)        Progress/Outcome (Transfer Goal 1, PT) goal ongoing  -LYNETTE (r) CS (t) LYNETTE   (c)         Gait Training Goal 1 (PT)    Activity/Assistive Device (Gait Training Goal 1, PT) gait (walking   locomotion)  -LYNETTE (r) CS (t) LYNETTE (c)      [place] Level (Gait Training Goal 1, PT) contact guard assist  -LYNETTE   (r) CS (t) LYNETTE (c)      Distance (Gait Goal 1, PT) 50ft  -LYNETTE (r) CS (t) LYNETTE (c)      Time Frame (Gait Training Goal 1, PT) long term goal (LTG);10 days  -LYNETTE   (r) CS (t) LYNETTE (c)      Barriers (Gait Training Goal 1, PT) medically complex  -LYNETTE (r) CS (t) LYNETTE   (c)      Progress/Outcome (Gait Training Goal 1, PT) goal ongoing  -LYNETTE (r) CS (t)   LYNETTE (c)        User Key  (r) = Recorded By, (t) = Taken By, (c) = Cosigned By    Initials Name Provider Type    LYNETTE Del Real, PT DPT Physical Therapist     Gary Olivier, PT Student PT Student            PT OP Goals     Row Name 04/02/18 1059          Time Calculation    PT Goal Re-Cert Due Date 04/12/18  -LYNETTE (r) CS (t) LYNETTE (c)       User Key  (r) = Recorded By, (t) = Taken By, (c) = Cosigned By    Initials Name Provider Type    LYNETTE Del Real, PT DPT Physical Therapist    YONAS Olivier, PT Student PT Student            Plan  PT Plan  Therapy Frequency (PT Clinical Impression): daily  Planned Therapy Interventions (PT Eval): balance training, gait training, bed mobility training, home exercise program, patient/family education, strengthening, transfer training  Outcome Summary: PT eval complete. pt agreed to therapy this morning. She was slightly disoriented to time and to home medical equipment. pt was able to transition supine<>EOB with supervision. pt demonstrated B LE AROM WFL, R LE 4/5 grossly, L LE 4-/5 grossly. pt stood with CGA and  became dizzy. pt able to march in place but fatigued quickly. pt would benefit from skilled PT at this time to address impaired mobility and activity tolerance. PT recommends d/c to SNF.        Jl Del Real, PT DPT  2018            By cosigning this order, either electronically or physically, I certify that the therapy services are furnished while this patient is under my care, the services outline above are required by this patient, and will be reviewed every 90 days.        M.D.:__________________________________________ Date: ______________    18 1207    18 1206  OT Plan of Care Cert / Re-Cert  Once     Comments:  Occupational Therapy Plan of Care  Initial Certification  Certification Period: 2018 - 2018    Patient Name: Teri Tim  : 1960    (J44.1) COPD exacerbation  (primary encounter diagnosis)  (J96.21,  J96.22) Acute on chronic respiratory failure with hypoxia and hypercapnia  (Z74.09) Impaired functional mobility and activity tolerance  (Z74.09) Impaired mobility and ADLs                Assessment  OT Assessment  Functional Level at Time of Evaluation (OT Eval): good  OT Diagnosis: impaired mobility and adls  Rehab Potential (OT Eval): good, to achieve stated therapy goals  Criteria for Skilled Therapeutic Interventions Met (OT Eval): yes, treatment indicated              OT Rehab Goals     Row Name 18 0958             Dressing Goal 1 (OT)    Activity/Assistive Device (Dressing Goal 1, OT) dressing skills, all  -MM        [place]/Cues Needed (Dressing Goal 1, OT) supervision   required;set-up required  -MM      Time Frame (Dressing Goal 1, OT) 10 days  -MM      Progress/Outcome (Dressing Goal 1, OT) goal ongoing  -MM         Toileting Goal 1 (OT)    Activity/Device (Toileting Goal 1, OT) toileting skills, all  -MM      [place] Level/Cues Needed (Toileting Goal 1, OT) supervision   required;set-up required  -MM      Time Frame (Toileting Goal 1, OT)  10 days  -MM      Progress/Outcome (Toileting Goal 1, OT) goal ongoing  -MM         Strength Goal 1 (OT)    Strength Goal 1 (OT) Pt will independently maintain BUE AROM HEP to   increase strength to 4/5 and increase activity tolerance.   -MM      Time Frame (Strength Goal 1, OT) 10 days  -MM      Progress/Outcome (Strength Goal 1, OT) goal ongoing  -MM        User Key  (r) = Recorded By, (t) = Taken By, (c) = Cosigned By    Initials Name Provider Type    KIRSTIN Randolph OTR/L Occupational Therapist              OT Goals     Row Name 04/02/18 1203          Time Calculation    OT Goal Re-Cert Due Date 04/12/18  -MM       User Key  (r) = Recorded By, (t) = Taken By, (c) = Cosigned By    Initials Name Provider Type    KIRSTIN Randolph OTR/L Occupational Therapist          Plan    OT Plan  Therapy Frequency (OT Eval): 5 times/wk  Predicted Duration of Therapy Intervention (OT Eval): until d/c  Outcome Summary: OT eval completed. Pt agreed to therapy this am with encouragement. Pt was disoriented to date. Pt was supervision for bed mobility. Pt was CGA for sit to stand t/f but would become dizzy upon standing. Skilled OT recommended to address adls, functional mobility and activity tolerance. Recommended d/c home with assist and HH v. SNF pending pt progress.      Dariel Randolph OTR/L  4/2/2018        By cosigning this order, either electronically or physically, I certify that the therapy services are furnished while this patient is under my care, the services outline above are required by this patient, and will be reviewed every 90 days.        M.D.:__________________________________________ Date: ______________    04/02/18 1205    04/02/18 0800  methylPREDNISolone sodium succinate (SOLU-Medrol) injection 60 mg  Every 8 Hours,   Status:  Discontinued      04/02/18 0704    04/01/18 2100  doxycycline (ADOXA) tablet 100 mg  Every 12 Hours Scheduled      04/01/18 1531    03/31/18 1230  Oscillating Positive  Expiratory Pressure (OPEP)  4 Times Daily - RT      03/31/18 1024    03/31/18 1200  insulin lispro (humaLOG) injection 2-7 Units  4 Times Daily With Meals & Nightly      03/31/18 1024    03/31/18 1100  POC Glucose 4x Daily AC & at Bedtime  4 Times Daily Before Meals & at Bedtime      03/31/18 1024    03/31/18 1024  dextrose (GLUTOSE) oral gel 15 g  Every 15 Minutes PRN      03/31/18 1024    03/31/18 1024  dextrose (D50W) solution 25 g  Every 15 Minutes PRN      03/31/18 1024    03/31/18 1024  glucagon (human recombinant) (GLUCAGEN DIAGNOSTIC) injection 1 mg  As Needed      03/31/18 1024    03/30/18 2100  guaiFENesin (MUCINEX) 12 hr tablet 1,200 mg  2 Times Daily      03/30/18 1433    03/30/18 1800  venlafaxine (EFFEXOR) tablet 75 mg  2 Times Daily With Meals      03/30/18 1439 03/30/18 1800  enoxaparin (LOVENOX) syringe 40 mg  Every 24 Hours      03/30/18 1439    03/30/18 1600  ipratropium (ATROVENT) nebulizer solution 0.5 mg  4 Times Daily - RT,   Status:  Discontinued      03/30/18 1439    03/30/18 1515  budesonide-formoterol (SYMBICORT) 160-4.5 MCG/ACT inhaler 2 puff  2 Times Daily - RT      03/30/18 1439    03/30/18 1515  potassium chloride (MICRO-K) CR capsule 20 mEq  Daily      03/30/18 1439    03/30/18 1515  spironolactone (ALDACTONE) tablet 25 mg  Daily,   Status:  Discontinued      03/30/18 1439    03/30/18 1445  pantoprazole (PROTONIX) EC tablet 40 mg  Every Early Morning      03/30/18 1439    03/30/18 1439  HYDROcodone-acetaminophen (NORCO) 7.5-325 MG per tablet 1 tablet  Every 6 Hours PRN      03/30/18 1439    03/30/18 1439  ondansetron (ZOFRAN) tablet 4 mg  Every 6 Hours PRN      03/30/18 1439    03/30/18 1439  ondansetron ODT (ZOFRAN-ODT) disintegrating tablet 4 mg  Every 6 Hours PRN      03/30/18 1439    03/30/18 1439  ondansetron (ZOFRAN) injection 4 mg  Every 6 Hours PRN      03/30/18 1439    03/30/18 1439  sodium chloride 0.9 % flush 1-10 mL  As Needed      03/30/18 1439    03/30/18 1225  sodium  chloride 0.9 % flush 10 mL  As Needed      03/30/18 1227    Unscheduled  Up in Chair  As Needed      03/30/18 1439    Unscheduled  Up With Assistance  As Needed      03/30/18 1439    --  SCANNED - TELEMETRY        03/30/18 0000    --  SCANNED - TELEMETRY        03/30/18 0000    --  SCANNED - TELEMETRY        03/30/18 0000    --  SCANNED - TELEMETRY        03/30/18 0000          Ventilator/Non-Invasive Ventilation Settings     Start     Ordered    03/30/18 1440  . BIPAP; Initial LPM: 2; Titrate for SPO2: 88% - 92%  Until Discontinued     Comments:  Respiratory to manage   Question Answer Comment   . BIPAP    Initial LPM 2    Titrate for SPO2 88% - 92%        03/30/18 1439             Physician Progress Notes (last 24 hours) (Notes from 4/2/2018  9:02 AM through 4/3/2018  9:02 AM)      ROLAND Burdick at 4/3/2018  7:18 AM              [facility] Medicine Services  INPATIENT PROGRESS NOTE    Length of Stay: 4  Date of Admission: 3/30/2018  Primary Care Physician: ROLAND Wayne    Subjective   Chief Complaint: follow up copd  HPI   Pt lying in bed. Oxygen at 2L. States she thinks she is feeling better. Family at bedside states she did not walk yesterday. Physical and occupational therapy recommend SNF. Pt is more oriented this am. Respiratory culture is pending. Her trilogy is in her room.     Review of Systems   All pertinent negatives and positives are as above. All other systems have been reviewed and are negative unless otherwise stated.     Objective    Temp:  [96.9 °F (36.1 °C)-97.9 °F (36.6 °C)] 97 °F (36.1 °C)  Heart Rate:  [71-96] 71  Resp:  [16-18] 18  BP: (118-130)/(54-61) 124/61  FiO2 (%):  [30 %] 30 %  Physical Exam   Constitutional: She is oriented to person, place, and time. She appears well-developed and well-nourished.   Chronically ill appearing.    HENT:   Head: Normocephalic and atraumatic.   Eyes: Conjunctivae and EOM are normal. Pupils are  equal, round, and reactive to light.   Neck: Neck supple. No JVD present. No thyromegaly present.   Cardiovascular: Normal rate, regular rhythm, normal heart sounds and intact distal pulses.  Exam reveals no gallop and no friction rub.    No murmur heard.  Sinus to sinus tachy  with PAC's. PVC's   Pulmonary/Chest: Effort normal. No respiratory distress. She has wheezes. She has no rales. She exhibits no tenderness.   Abdominal: Soft. Bowel sounds are normal. She exhibits no distension. There is no tenderness. There is no rebound and no guarding.   Musculoskeletal: Normal range of motion. She exhibits no edema, tenderness or deformity.   Lymphadenopathy:     She has no cervical adenopathy.   Neurological: She is alert and oriented to person, place, and time. She displays normal reflexes. No cranial nerve deficit. She exhibits normal muscle tone.   Skin: Skin is warm and dry. No rash noted.   Psychiatric: She has a normal mood and affect. Her behavior is normal. Judgment and thought content normal.     Results Review:  I have reviewed the labs, radiology results, and diagnostic studies.    Laboratory Data:     Results from last 7 days  Lab Units 04/03/18  0453 04/01/18  0455 03/31/18  0535   WBC 10*3/mm3 9.84 14.85* 4.27*   HEMOGLOBIN g/dL 11.3* 11.2* 11.6*   HEMATOCRIT % 37.2 37.7 38.6   PLATELETS 10*3/mm3 189 259 245       Results from last 7 days  Lab Units 04/03/18  0453 04/02/18  0610 04/01/18  0455 03/31/18  0535 03/30/18  1224   SODIUM mmol/L 127* 126* 132* 133* 129*   POTASSIUM mmol/L 3.9 3.4* 4.3 4.2 4.4   CHLORIDE mmol/L 82* 77* 78* 80* 85*   CO2 mmol/L >40.0* >40.0* >40.0* >40.0* 38.0*   BUN mg/dL 20 23* 32* 23* 22*   CREATININE mg/dL 0.79 0.86 1.11 1.10 1.16   CALCIUM mg/dL 8.4 8.6 8.6 8.7 8.7   BILIRUBIN mg/dL  --   --  1.1* 1.2* 1.1*   ALK PHOS U/L  --   --  83 97 107   ALT (SGPT) U/L  --   --  87* 108* 127*   AST (SGOT) U/L  --   --  38 47* 67*   GLUCOSE mg/dL 102* 104* 113* 133* 108*        Culture Data:   Blood Culture   Date Value Ref Range Status   03/30/2018 No growth at 3 days  Preliminary   03/30/2018 No growth at 3 days  Preliminary     Respiratory Culture   Date Value Ref Range Status   04/02/2018 Light growth (2+) Normal Respiratory [place]  Preliminary       Radiology Data:   Imaging Results (last 24 hours)     ** No results found for the last 24 hours. **        I have reviewed the patient current medications.     Assessment/Plan   Assessment:  1. Chronic obstructive pulmonary disease with acute exacerbation  2. Acute on chronic hypoxic and hypercarbic respiratory failure secondary to above  3. Acute on chronic diastolic congestive heart failure, last known EF 55%  4. Hyponatremia-likely diuretic related  5. Pulmonary hypertension  6. Elevated LFT's  7. Low TSH-low T4. Normal T3.    8. Hyperglycemia, mild. Patient also on steroid therapy. Hgb A1c 6.1  9. Hypertension-off beta blocker for now.   10. Leukocytosis-steroid effect.   11. Hyponatremia-concern for hypovolemic hyponatremia-now on IV fluids.      Plan:  1. Will wean steroids.   2. Explained to patient and her family that she will NOT be discharged unless she is getting out of bed.   3. Doxycyline day 3  4. Will transition to her trilogy and remove our BiPAP today.     Discharge Planning: I expect the patient to be discharged to ? in ? days.    ROLAND Burdick   04/03/18   7:18 AM    Electronically signed by ROLAND Burdick at 4/3/2018  7:37 AM       Consult Notes (last 24 hours) (Notes from 4/2/2018  9:02 AM through 4/3/2018  9:02 AM)     No notes of this type exist for this encounter.

## 2018-04-03 NOTE — PROGRESS NOTES
[facility]  INPATIENT PROGRESS NOTE    Length of Stay: 4  Date of Admission: 3/30/2018  Primary Care Physician: ROLAND Wayne    Subjective   Chief Complaint: follow up copd  HPI   Pt lying in bed. Oxygen at 2L. States she thinks she is feeling better. Family at bedside states she did not walk yesterday. Physical and occupational therapy recommend SNF. Pt is more oriented this am. Respiratory culture is pending. Her trilogy is in her room.     Review of Systems   All pertinent negatives and positives are as above. All other systems have been reviewed and are negative unless otherwise stated.     Objective    Temp:  [96.9 °F (36.1 °C)-97.9 °F (36.6 °C)] 97 °F (36.1 °C)  Heart Rate:  [71-96] 71  Resp:  [16-18] 18  BP: (118-130)/(54-61) 124/61  FiO2 (%):  [30 %] 30 %  Physical Exam   Constitutional: She is oriented to person, place, and time. She appears well-developed and well-nourished.   Chronically ill appearing.    HENT:   Head: Normocephalic and atraumatic.   Eyes: Conjunctivae and EOM are normal. Pupils are equal, round, and reactive to light.   Neck: Neck supple. No JVD present. No thyromegaly present.   Cardiovascular: Normal rate, regular rhythm, normal heart sounds and intact distal pulses.  Exam reveals no gallop and no friction rub.    No murmur heard.  Sinus to sinus tachy  with PAC's. PVC's   Pulmonary/Chest: Effort normal. No respiratory distress. She has wheezes. She has no rales. She exhibits no tenderness.   Abdominal: Soft. Bowel sounds are normal. She exhibits no distension. There is no tenderness. There is no rebound and no guarding.   Musculoskeletal: Normal range of motion. She exhibits no edema, tenderness or deformity.   Lymphadenopathy:     She has no cervical adenopathy.   Neurological: She is alert and oriented to person, place, and time. She displays normal reflexes. No cranial nerve deficit. She exhibits normal muscle  tone.   Skin: Skin is warm and dry. No rash noted.   Psychiatric: She has a normal mood and affect. Her behavior is normal. Judgment and thought content normal.     Results Review:  I have reviewed the labs, radiology results, and diagnostic studies.    Laboratory Data:     Results from last 7 days  Lab Units 04/03/18  0453 04/01/18  0455 03/31/18  0535   WBC 10*3/mm3 9.84 14.85* 4.27*   HEMOGLOBIN g/dL 11.3* 11.2* 11.6*   HEMATOCRIT % 37.2 37.7 38.6   PLATELETS 10*3/mm3 189 259 245       Results from last 7 days  Lab Units 04/03/18  0453 04/02/18  0610 04/01/18  0455 03/31/18  0535 03/30/18  1224   SODIUM mmol/L 127* 126* 132* 133* 129*   POTASSIUM mmol/L 3.9 3.4* 4.3 4.2 4.4   CHLORIDE mmol/L 82* 77* 78* 80* 85*   CO2 mmol/L >40.0* >40.0* >40.0* >40.0* 38.0*   BUN mg/dL 20 23* 32* 23* 22*   CREATININE mg/dL 0.79 0.86 1.11 1.10 1.16   CALCIUM mg/dL 8.4 8.6 8.6 8.7 8.7   BILIRUBIN mg/dL  --   --  1.1* 1.2* 1.1*   ALK PHOS U/L  --   --  83 97 107   ALT (SGPT) U/L  --   --  87* 108* 127*   AST (SGOT) U/L  --   --  38 47* 67*   GLUCOSE mg/dL 102* 104* 113* 133* 108*       Culture Data:   Blood Culture   Date Value Ref Range Status   03/30/2018 No growth at 3 days  Preliminary   03/30/2018 No growth at 3 days  Preliminary     Respiratory Culture   Date Value Ref Range Status   04/02/2018 Light growth (2+) Normal Respiratory [place]  Preliminary       Radiology Data:   Imaging Results (last 24 hours)     ** No results found for the last 24 hours. **        I have reviewed the patient current medications.     Assessment/Plan   Assessment:  1. Chronic obstructive pulmonary disease with acute exacerbation  2. Acute on chronic hypoxic and hypercarbic respiratory failure secondary to above  3. Acute on chronic diastolic congestive heart failure, last known EF 55%  4. Hyponatremia-likely diuretic related  5. Pulmonary hypertension  6. Elevated LFT's  7. Low TSH-low T4. Normal T3.    8. Hyperglycemia, mild. Patient also on  steroid therapy. Hgb A1c 6.1  9. Hypertension-off beta blocker for now.   10. Leukocytosis-steroid effect.   11. Hyponatremia-concern for hypovolemic hyponatremia-now on IV fluids.      Plan:  1. Will wean steroids.   2. Explained to patient and her family that she will NOT be discharged unless she is getting out of bed.   3. Doxycyline day 3  4. Will transition to her trilogy and remove our BiPAP today.     Discharge Planning: I expect the patient to be discharged to ? in ? days.    ROLAND Burdick   04/03/18   7:18 AM   I personally evaluated and examined the patient in conjunction with  Annette WATKINS and agree with the assessment, treatment plan, and disposition of the patient as recorded by her.     Patient said she feels better this afternoon and walked.  Not quite wheezing today compared yesterday.  Hemodynamically stable  No edema. She looks euvolemic and adequately hydrated.   A little improvement in her serum sodium at 127. Urine osm is 558 while urine sodium is 9. She is on IVF 0.9 NS with potassium.  WIll monitor bmp.      Gonsalo Loo MD  04/03/18  5:46 PM

## 2018-04-04 LAB
ANION GAP SERPL CALCULATED.3IONS-SCNC: 4 MMOL/L (ref 4–13)
BACTERIA SPEC AEROBE CULT: NORMAL
BACTERIA SPEC AEROBE CULT: NORMAL
BACTERIA SPEC RESP CULT: NORMAL
BACTERIA SPEC RESP CULT: NORMAL
BASOPHILS # BLD AUTO: 0 10*3/MM3 (ref 0–0.2)
BASOPHILS NFR BLD AUTO: 0 % (ref 0–2)
BUN BLD-MCNC: 15 MG/DL (ref 5–21)
BUN/CREAT SERPL: 22.7 (ref 7–25)
CALCIUM SPEC-SCNC: 8.6 MG/DL (ref 8.4–10.4)
CHLORIDE SERPL-SCNC: 87 MMOL/L (ref 98–110)
CO2 SERPL-SCNC: 35 MMOL/L (ref 24–31)
CREAT BLD-MCNC: 0.66 MG/DL (ref 0.5–1.4)
DEPRECATED RDW RBC AUTO: 51.5 FL (ref 40–54)
EOSINOPHIL # BLD AUTO: 0 10*3/MM3 (ref 0–0.7)
EOSINOPHIL NFR BLD AUTO: 0 % (ref 0–4)
ERYTHROCYTE [DISTWIDTH] IN BLOOD BY AUTOMATED COUNT: 17.3 % (ref 12–15)
GFR SERPL CREATININE-BSD FRML MDRD: 93 ML/MIN/1.73
GLUCOSE BLD-MCNC: 92 MG/DL (ref 70–100)
GLUCOSE BLDC GLUCOMTR-MCNC: 107 MG/DL (ref 70–130)
GLUCOSE BLDC GLUCOMTR-MCNC: 113 MG/DL (ref 70–130)
GLUCOSE BLDC GLUCOMTR-MCNC: 96 MG/DL (ref 70–130)
GLUCOSE BLDC GLUCOMTR-MCNC: 99 MG/DL (ref 70–130)
GRAM STN SPEC: NORMAL
HCT VFR BLD AUTO: 35.4 % (ref 37–47)
HGB BLD-MCNC: 10.8 G/DL (ref 12–16)
IMM GRANULOCYTES # BLD: 0.07 10*3/MM3 (ref 0–0.03)
IMM GRANULOCYTES NFR BLD: 0.9 % (ref 0–5)
LYMPHOCYTES # BLD AUTO: 0.67 10*3/MM3 (ref 0.72–4.86)
LYMPHOCYTES NFR BLD AUTO: 8.6 % (ref 15–45)
MCH RBC QN AUTO: 24.9 PG (ref 28–32)
MCHC RBC AUTO-ENTMCNC: 30.5 G/DL (ref 33–36)
MCV RBC AUTO: 81.8 FL (ref 82–98)
MONOCYTES # BLD AUTO: 0.5 10*3/MM3 (ref 0.19–1.3)
MONOCYTES NFR BLD AUTO: 6.4 % (ref 4–12)
NEUTROPHILS # BLD AUTO: 6.52 10*3/MM3 (ref 1.87–8.4)
NEUTROPHILS NFR BLD AUTO: 84.1 % (ref 39–78)
NRBC BLD MANUAL-RTO: 0.9 /100 WBC (ref 0–0)
PLATELET # BLD AUTO: 178 10*3/MM3 (ref 130–400)
PMV BLD AUTO: 9.6 FL (ref 6–12)
POTASSIUM BLD-SCNC: 4.5 MMOL/L (ref 3.5–5.3)
RBC # BLD AUTO: 4.33 10*6/MM3 (ref 4.2–5.4)
SODIUM BLD-SCNC: 126 MMOL/L (ref 135–145)
WBC NRBC COR # BLD: 7.76 10*3/MM3 (ref 4.8–10.8)

## 2018-04-04 PROCEDURE — 82962 GLUCOSE BLOOD TEST: CPT

## 2018-04-04 PROCEDURE — 94799 UNLISTED PULMONARY SVC/PX: CPT

## 2018-04-04 PROCEDURE — 80048 BASIC METABOLIC PNL TOTAL CA: CPT | Performed by: NURSE PRACTITIONER

## 2018-04-04 PROCEDURE — 85025 COMPLETE CBC W/AUTO DIFF WBC: CPT | Performed by: NURSE PRACTITIONER

## 2018-04-04 PROCEDURE — 97116 GAIT TRAINING THERAPY: CPT

## 2018-04-04 PROCEDURE — 25010000002 METHYLPREDNISOLONE PER 40 MG: Performed by: NURSE PRACTITIONER

## 2018-04-04 PROCEDURE — 97535 SELF CARE MNGMENT TRAINING: CPT

## 2018-04-04 PROCEDURE — 25010000002 ENOXAPARIN PER 10 MG: Performed by: NURSE PRACTITIONER

## 2018-04-04 PROCEDURE — 97110 THERAPEUTIC EXERCISES: CPT

## 2018-04-04 RX ORDER — TOLVAPTAN 15 MG/1
15 TABLET ORAL ONCE
Status: COMPLETED | OUTPATIENT
Start: 2018-04-04 | End: 2018-04-04

## 2018-04-04 RX ORDER — LACTULOSE 20 G/30ML
20 SOLUTION ORAL DAILY
Status: DISCONTINUED | OUTPATIENT
Start: 2018-04-04 | End: 2018-04-05 | Stop reason: HOSPADM

## 2018-04-04 RX ADMIN — BUDESONIDE AND FORMOTEROL FUMARATE DIHYDRATE 2 PUFF: 160; 4.5 AEROSOL RESPIRATORY (INHALATION) at 20:44

## 2018-04-04 RX ADMIN — DOXYCYCLINE 100 MG: 100 TABLET ORAL at 08:14

## 2018-04-04 RX ADMIN — HYDROCODONE BITARTRATE AND ACETAMINOPHEN 1 TABLET: 7.5; 325 TABLET ORAL at 20:27

## 2018-04-04 RX ADMIN — IPRATROPIUM BROMIDE AND ALBUTEROL SULFATE 3 ML: 2.5; .5 SOLUTION RESPIRATORY (INHALATION) at 11:11

## 2018-04-04 RX ADMIN — GUAIFENESIN 1200 MG: 600 TABLET, EXTENDED RELEASE ORAL at 08:14

## 2018-04-04 RX ADMIN — HYDROCODONE BITARTRATE AND ACETAMINOPHEN 1 TABLET: 7.5; 325 TABLET ORAL at 14:32

## 2018-04-04 RX ADMIN — VENLAFAXINE HYDROCHLORIDE 75 MG: 37.5 TABLET ORAL at 18:11

## 2018-04-04 RX ADMIN — IPRATROPIUM BROMIDE AND ALBUTEROL SULFATE 3 ML: 2.5; .5 SOLUTION RESPIRATORY (INHALATION) at 06:58

## 2018-04-04 RX ADMIN — TOLVAPTAN 15 MG: 15 TABLET ORAL at 08:19

## 2018-04-04 RX ADMIN — PANTOPRAZOLE SODIUM 40 MG: 40 TABLET, DELAYED RELEASE ORAL at 05:50

## 2018-04-04 RX ADMIN — IPRATROPIUM BROMIDE AND ALBUTEROL SULFATE 3 ML: 2.5; .5 SOLUTION RESPIRATORY (INHALATION) at 20:44

## 2018-04-04 RX ADMIN — HYDROCODONE BITARTRATE AND ACETAMINOPHEN 1 TABLET: 7.5; 325 TABLET ORAL at 08:14

## 2018-04-04 RX ADMIN — IPRATROPIUM BROMIDE AND ALBUTEROL SULFATE 3 ML: 2.5; .5 SOLUTION RESPIRATORY (INHALATION) at 15:42

## 2018-04-04 RX ADMIN — HYDROCODONE BITARTRATE AND ACETAMINOPHEN 1 TABLET: 7.5; 325 TABLET ORAL at 02:03

## 2018-04-04 RX ADMIN — METHYLPREDNISOLONE SODIUM SUCCINATE 40 MG: 125 INJECTION, POWDER, FOR SOLUTION INTRAMUSCULAR; INTRAVENOUS at 20:28

## 2018-04-04 RX ADMIN — DOXYCYCLINE 100 MG: 100 TABLET ORAL at 20:28

## 2018-04-04 RX ADMIN — POTASSIUM CHLORIDE 20 MEQ: 750 CAPSULE, EXTENDED RELEASE ORAL at 08:14

## 2018-04-04 RX ADMIN — VENLAFAXINE HYDROCHLORIDE 75 MG: 37.5 TABLET ORAL at 08:14

## 2018-04-04 RX ADMIN — LACTULOSE 20 G: 20 SOLUTION ORAL at 08:19

## 2018-04-04 RX ADMIN — METHYLPREDNISOLONE SODIUM SUCCINATE 40 MG: 125 INJECTION, POWDER, FOR SOLUTION INTRAMUSCULAR; INTRAVENOUS at 11:24

## 2018-04-04 RX ADMIN — GUAIFENESIN 1200 MG: 600 TABLET, EXTENDED RELEASE ORAL at 20:28

## 2018-04-04 RX ADMIN — BUDESONIDE AND FORMOTEROL FUMARATE DIHYDRATE 2 PUFF: 160; 4.5 AEROSOL RESPIRATORY (INHALATION) at 06:59

## 2018-04-04 RX ADMIN — ENOXAPARIN SODIUM 40 MG: 40 INJECTION SUBCUTANEOUS at 18:11

## 2018-04-04 NOTE — THERAPY TREATMENT NOTE
Acute Care - Physical Therapy Treatment Note  [facility]     Patient Name: Teri Tim  : 1960  MRN: [id number]  Today's Date: 2018  Onset of Illness/Injury or Date of Surgery: 18  Date of Referral to PT: 18  Referring Physician: Dr. Loo    Admit Date: 3/30/2018    Visit Dx:    ICD-10-CM ICD-9-CM   1. COPD exacerbation J44.1 491.21   2. Acute on chronic respiratory failure with hypoxia and hypercapnia J96.21 518.84    J96.22 786.09     799.02   3. Impaired functional mobility and activity tolerance Z74.09 V49.89   4. Impaired mobility and ADLs Z74.09 799.89     Patient Active Problem List   Diagnosis   • NSTEMI (non-ST elevated myocardial infarction)   • Abnormal LFTs   • Abnormal US (ultrasound) of abdomen   • COPD exacerbation       Therapy Treatment    Therapy Treatment / Health Promotion    Treatment Time/Intention  Discipline: physical therapy assistant (18 1523 : Renee Thomason PTA)  Document Type: therapy note (daily note) (18 1523 : Renee Thomason PTA)  Subjective Information: complains of, pain (18 1523 : Renee Thomason PTA)  Mode of Treatment: physical therapy (18 1523 : Renee Thomason PTA)  Existing Precautions/Restrictions: fall, oxygen therapy device and L/min (18 1523 : Renee Thomason PTA)  Plan of Care Review  Plan of Care Reviewed With: patient (18 1004 : Renee Thomason PTA)    Vitals/Pain/Safety  Pain Scale: Numbers Pre/Post-Treatment  Pain Scale: Numbers, Pretreatment: 6/10 (18 1523 : Renee Thomason PTA)  Pain Location - Orientation: lower (18 1523 : Renee Thomason PTA)  Pain Location: back (18 1523 : Renee Thomason PTA)  Pain Intervention(s): Repositioned, Medication (See MAR) (18 1523 : Renee Thomason PTA)  Pain Scale: Word Pre/Post-Treatment  Pain Location - Orientation: lower (18 1523 : Renee Thomason PTA)  Pain Location: back (18  : Renee Thomason PTA)  Pain Intervention(s): Repositioned, Medication (See MAR) (04/04/18 1523 : Renee Thomason PTA)  Pain Scale: FACES Pre/Post-Treatment  Pain Location - Orientation: lower (04/04/18 1523 : Renee Thomason PTA)  Pain Location: back (04/04/18 1523 : Renee Thomason PTA)  Pain Intervention(s): Repositioned, Medication (See MAR) (04/04/18 1523 : Renee Thomason PTA)  Positioning and Restraints  Pre-Treatment Position: in bed (04/04/18 1523 : Renee Thomason PTA)  Post Treatment Position: bed (04/04/18 1523 : Renee Thomason PTA)  In Bed: fowlers, call light within reach, with family/caregiver, side rails up x2 (04/04/18 1523 : Renee Thomason PTA)  In Chair: sitting, call light within reach, with family/caregiver (04/04/18 1004 : Renee Thomason PTA)    Mobility,ADL,Motor, Modality  Bed Mobility Assessment/Treatment  Supine-Sit [place] (Bed Mobility): conditional independence (04/04/18 1523 : Renee Thomason PTA)  Supine-Sit-Supine [place] (Bed Mobility): conditional independence (04/04/18 1523 : Renee Thomason PTA)  Comment (Bed Mobility): up in chair (04/04/18 1004 : Renee Thomason PTA)  Sit-Stand Transfer  Sit-Stand [place] (Transfers): independent (04/04/18 1523 : Renee Thomason PTA)  Stand-Sit Transfer  Stand-Sit [place] (Transfers): independent (04/04/18 1523 : Renee Thomason PTA)  Toilet Transfer  [place] Level (Toilet Transfer): supervision, conditional independence (04/04/18 1004 : Renee Thomason PTA)  Assistive Device (Toilet Transfer): commode (04/04/18 1004 : Renee Thomason PTA)  Gait/Stairs Assessment/Training  [place] Level (Gait): contact guard (04/04/18 1523 : Renee Thomason, DARRICK)  Distance in Feet (Gait): 300 (04/04/18 1523 : Renee Thomason PTA)  Deviations/Abnormal Patterns (Gait): katja decreased, stride length decreased (04/04/18 1523 : Renee Thomason PTA)  Comment  (Gait/Stairs): constant cues to take bigger steps (04/04/18 1004 : Renee Thomason, DARRICK)     Lower Extremity Seated Therapeutic Exercise  Performed, Seated Lower Extremity (Therapeutic Exercise): hip abduction/adduction, hip flexion/extension, ankle dorsiflexion/plantarflexion, LAQ (long arc quad), knee extension (04/04/18 1004 : Renee Thomason PTA)  Exercise Type, Seated Lower Extremity (Therapeutic Exercise): AROM (active range of motion) (04/04/18 1004 : Renee Thomason PTA)  Expected Outcomes, Seated Lower Extremity (Therapeutic Exercise): strengthen normal movement patterns (04/04/18 1004 : Renee Thomason PTA)  Sets/Reps Detail, Seated Lower Extremity (Therapeutic Exercise): 20 (04/04/18 1004 : Renee Thomason PTA)           ROM/MMT             Sensory, Edema, Orthotics          Cognition, Communication, Swallow       Outcome Summary               PT Rehab Goals     Row Name 04/02/18 0961             Bed Mobility Goal 1 (PT)    Activity/Assistive Device (Bed Mobility Goal 1, PT) rolling to left;rolling to right;sit to supine/supine to sit  -LYNETTE (r) CS (t) LYNETTE (c)      [place] Level/Cues Needed (Bed Mobility Goal 1, PT) independent  -LYNETTE (r) CS (t) LYNETTE (c)      Time Frame (Bed Mobility Goal 1, PT) long term goal (LTG);10 days  -LYNETTE (r) CS (t) LYNETTE (c)      Barriers (Bed Mobility Goal 1, PT) medically complex  -LYNETTE (r) CS (t) LYNETTE (c)      Progress/Outcomes (Bed Mobility Goal 1, PT) goal ongoing  -LYNETTE (r) CS (t) LYNETTE (c)         Transfer Goal 1 (PT)    Activity/Assistive Device (Transfer Goal 1, PT) sit-to-stand/stand-to-sit;bed-to-chair/chair-to-bed  -LYNETTE (r) CS (t) LYNETTE (c)      [place] Level/Cues Needed (Transfer Goal 1, PT) supervision required  -LYNETTE (r) CS (t) LYNETTE (c)      Time Frame (Transfer Goal 1, PT) long term goal (LTG);10 days  -LYNETTE (r) CS (t) LYNETTE (c)      Barriers (Transfers Goal 1, PT) medically complex  -LYNETTE (r) CS (t) LYNETTE (c)      Progress/Outcome (Transfer Goal 1, PT) goal ongoing  -LYNETTE  (r) CS (t) LYNETTE (c)         Gait Training Goal 1 (PT)    Activity/Assistive Device (Gait Training Goal 1, PT) gait (walking locomotion)  -LYNETTE (r) CS (t) LYNETTE (c)      [place] Level (Gait Training Goal 1, PT) contact guard assist  -LYNETTE (r) CS (t) LYNETTE (c)      Distance (Gait Goal 1, PT) 50ft  -LYNETTE (r) CS (t) LYNETTE (c)      Time Frame (Gait Training Goal 1, PT) long term goal (LTG);10 days  -LYNETTE (r) CS (t) LYNETTE (c)      Barriers (Gait Training Goal 1, PT) medically complex  -LYNETTE (r) CS (t) LYNETTE (c)      Progress/Outcome (Gait Training Goal 1, PT) goal ongoing  -LYNETTE (r) CS (t) LYNETTE (c)        User Key  (r) = Recorded By, (t) = Taken By, (c) = Cosigned By    Initials Name Provider Type    LYNETTE Del Real, PT DPT Physical Therapist     Gary Olivier, PT Student PT Student          Physical Therapy Education     Title: PT OT SLP Therapies (Active)     Topic: Physical Therapy (Active)     Point: Mobility training (Active)    Learning Progress Summary     Learner Status Readiness Method Response Comment Documented by    Patient Active Acceptance E,D NR gait  04/04/18 1031     Active Acceptance E,D NR exercises, gait, and benefits of activity  04/03/18 1136     Active Acceptance E NR pt educated on progression of POC  04/02/18 1046    Family Active Acceptance E NR pt educated on progression of POC  04/02/18 1046          Point: Home exercise program (Active)    Learning Progress Summary     Learner Status Readiness Method Response Comment Documented by    Patient Active Acceptance E,D NR exercises, gait, and benefits of activity  04/03/18 1136                      User Key     Initials Effective Dates Name Provider Type Discipline     08/02/16 -  Renee Thomason, PTA Physical Therapy Assistant PT     01/08/18 -  Gary Olivier, PT Student PT Student PT                    PT Recommendation and Plan     Plan of Care Reviewed With: patient  Progress: improving  Outcome Summary: Pt. continues to improve with  mobility. Pt. is supervision for transfers and toileting. Pt. walked 100' x 2 with CGA x 1. Pt. required cues to take bigger steps throughout walk. Pt. performed active leg exercises.  Pt. is progressing towards goals.           Outcome Measures     Row Name 04/04/18 1004 04/04/18 0900 04/03/18 1106       How much help from another person do you currently need...    Turning from your back to your side while in flat bed without using bedrails? 4  -MF  -- 4  -MF    Moving from lying on back to sitting on the side of a flat bed without bedrails? 4  -MF  -- 4  -MF    Moving to and from a bed to a chair (including a wheelchair)? 3  -MF  -- 3  -MF    Standing up from a chair using your arms (e.g., wheelchair, bedside chair)? 4  -MF  -- 4  -MF    Climbing 3-5 steps with a railing? 3  -MF  -- 3  -MF    To walk in hospital room? 3  -MF  -- 3  -MF    AM-PAC 6 Clicks Score 21  -MF  -- 21  -MF       How much help from another is currently needed...    Putting on and taking off regular lower body clothing?  -- 3  -TS  --    Bathing (including washing, rinsing, and drying)  -- 3  -TS  --    Toileting (which includes using toilet bed pan or urinal)  -- 4  -TS  --    Putting on and taking off regular upper body clothing  -- 4  -TS  --    Taking care of personal grooming (such as brushing teeth)  -- 3  -TS  --    Eating meals  -- 4  -TS  --    Score  -- 21  -TS  --       Functional Assessment    Outcome Measure Options AM-PAC 6 Clicks Basic Mobility (PT)  -MF AM-PAC 6 Clicks Daily Activity (OT)  -TS AM-PAC 6 Clicks Basic Mobility (PT)  -MF    Row Name 04/03/18 1000 04/02/18 1100 04/02/18 0945       How much help from another person do you currently need...    Turning from your back to your side while in flat bed without using bedrails?  --  -- 4  -LYNETTE (r) CS (t) LYNETTE (c)    Moving from lying on back to sitting on the side of a flat bed without bedrails?  --  -- 4  -LYNETTE (r) CS (t) LYNETTE (c)    Moving to and from a bed to a chair (including  a wheelchair)?  --  -- 3  -LYNETTE (r) CS (t) LYNETTE (c)    Standing up from a chair using your arms (e.g., wheelchair, bedside chair)?  --  -- 3  -LYNETTE (r) CS (t) LYNETTE (c)    Climbing 3-5 steps with a railing?  --  -- 2  -LYNETTE (r) CS (t) LYNETTE (c)    To walk in hospital room?  --  -- 2  -LYNETTE (r) CS (t) LYNETTE (c)    AM-PAC 6 Clicks Score  --  -- 18  -LYNETTE (r) CS (t)       How much help from another is currently needed...    Putting on and taking off regular lower body clothing? 3  -TS 2  -MM  --    Bathing (including washing, rinsing, and drying) 3  -TS 2  -MM  --    Toileting (which includes using toilet bed pan or urinal) 4  -TS 2  -MM  --    Putting on and taking off regular upper body clothing 4  -TS 2  -MM  --    Taking care of personal grooming (such as brushing teeth) 3  -TS 3  -MM  --    Eating meals 4  -TS 3  -MM  --    Score 21  -TS 14  -MM  --       Functional Assessment    Outcome Measure Options AM-PAC 6 Clicks Daily Activity (OT)  -TS AM-PAC 6 Clicks Daily Activity (OT)  -MM AM-PAC 6 Clicks Basic Mobility (PT)  -LYNETTE (r) CS (t) LYNETTE (c)      User Key  (r) = Recorded By, (t) = Taken By, (c) = Cosigned By    Initials Name Provider Type     Harper Gamboa, HAWKINS/L Occupational Therapy Assistant    LYNETTE Del Real, PT DPT Physical Therapist     Renee Thomaosn, PTA Physical Therapy Assistant    KIRSTIN Randolph, OTR/L Occupational Therapist     Gary Olivier, PT Student PT Student           Time Calculation:         PT Charges     Row Name 04/04/18 1542 04/04/18 1032          Time Calculation    Start Time 1523  -MF 1004  -MF     Stop Time 1540  -MF 1030  -MF     Time Calculation (min) 17 min  -MF 26 min  -MF     PT Received On 04/04/18  - 04/04/18  -     PT Goal Re-Cert Due Date 04/12/18  - 04/12/18  -        Time Calculation- PT    Total Timed Code Minutes- PT 17 minute(s)  -MF 26 minute(s)  -       User Key  (r) = Recorded By, (t) = Taken By, (c) = Cosigned By    Initials Name Provider Type    MF  Renee Thomason PTA Physical Therapy Assistant          Therapy Charges for Today     Code Description Service Date Service Provider Modifiers Qty    59640288214 HC GAIT TRAINING EA 15 MIN 4/3/2018 Renee Thomason PTA GP, KX 1    59932677600 HC PT THER PROC EA 15 MIN 4/3/2018 Renee Thomason PTA GP, KX 1    49213037208 HC GAIT TRAINING EA 15 MIN 4/4/2018 Renee Thomason PTA GP, KX 1    85121568429 HC PT THER PROC EA 15 MIN 4/4/2018 Renee Thomason PTA GP, KX 1    74725386163 HC GAIT TRAINING EA 15 MIN 4/4/2018 Renee Thomason PTA GP, KX 1          PT G-Codes  Outcome Measure Options: AM-PAC 6 Clicks Basic Mobility (PT)  Score: 18  Functional Limitation: Mobility: Walking and moving around  Mobility: Walking and Moving Around Current Status (): At least 40 percent but less than 60 percent impaired, limited or restricted  Mobility: Walking and Moving Around Goal Status (): At least 20 percent but less than 40 percent impaired, limited or restricted    Renee Thomason PTA  4/4/2018

## 2018-04-04 NOTE — THERAPY TREATMENT NOTE
Acute Care - Physical Therapy Treatment Note  [facility]     Patient Name: Teri Tim  : 1960  MRN: [id number]  Today's Date: 2018  Onset of Illness/Injury or Date of Surgery: 18  Date of Referral to PT: 18  Referring Physician: Dr. Loo    Admit Date: 3/30/2018    Visit Dx:    ICD-10-CM ICD-9-CM   1. COPD exacerbation J44.1 491.21   2. Acute on chronic respiratory failure with hypoxia and hypercapnia J96.21 518.84    J96.22 786.09     799.02   3. Impaired functional mobility and activity tolerance Z74.09 V49.89   4. Impaired mobility and ADLs Z74.09 799.89     Patient Active Problem List   Diagnosis   • NSTEMI (non-ST elevated myocardial infarction)   • Abnormal LFTs   • Abnormal US (ultrasound) of abdomen   • COPD exacerbation       Therapy Treatment    Therapy Treatment / Health Promotion    Treatment Time/Intention  Discipline: physical therapy assistant (18 1004 : Renee Thomason PTA)  Document Type: therapy note (daily note) (18 1004 : Renee Thomason PTA)  Subjective Information: complains of, pain (18 1004 : Renee Thomason PTA)  Mode of Treatment: physical therapy (18 1004 : Renee Thomason PTA)  Existing Precautions/Restrictions: fall, oxygen therapy device and L/min (18 1004 : Renee Thomason PTA)  Plan of Care Review  Plan of Care Reviewed With: patient (18 1004 : Renee Thomason PTA)    Vitals/Pain/Safety  Pain Scale: Numbers Pre/Post-Treatment  Pain Scale: Numbers, Pretreatment: 8/10 (18 1004 : Renee Thomason PTA)  Pain Location - Orientation: lower (18 1004 : Renee Thomason PTA)  Pain Location: back (18 1004 : Renee Thomason PTA)  Pain Intervention(s): Repositioned (18 1004 : Renee Thomason PTA)  Pain Scale: Word Pre/Post-Treatment  Pain Location - Orientation: lower (18 1004 : Renee Thomason PTA)  Pain Location: back (18 1004 : Renee Thomason  DARRICK)  Pain Intervention(s): Repositioned (04/04/18 1004 : Renee Thomason PTA)  Pain Scale: FACES Pre/Post-Treatment  Pain Location - Orientation: lower (04/04/18 1004 : Renee Thomason PTA)  Pain Location: back (04/04/18 1004 : Renee Thomason PTA)  Pain Intervention(s): Repositioned (04/04/18 1004 : Renee Thomason PTA)  Positioning and Restraints  Pre-Treatment Position: sitting in chair/recliner (04/04/18 1004 : Renee Thomason PTA)  Post Treatment Position: chair (04/04/18 1004 : Renee Thomason PTA)  In Chair: sitting, call light within reach, with family/caregiver (04/04/18 1004 : Renee Thomason PTA)    Mobility,ADL,Motor, Modality  Bed Mobility Assessment/Treatment  Comment (Bed Mobility): up in chair (04/04/18 1004 : Renee Thomason PTA)  Sit-Stand Transfer  Sit-Stand [place] (Transfers): supervision (04/04/18 1004 : Renee Thomason PTA)  Stand-Sit Transfer  Stand-Sit [place] (Transfers): supervision (04/04/18 1004 : Renee Thomason PTA)  Toilet Transfer  [place] Level (Toilet Transfer): supervision, conditional independence (04/04/18 1004 : Renee Thomason PTA)  Assistive Device (Toilet Transfer): commode (04/04/18 1004 : Renee Thomason PTA)  Gait/Stairs Assessment/Training  [place] Level (Gait): contact guard (04/04/18 1004 : Renee Thomason PTA)  Distance in Feet (Gait): 100 (x 2) (04/04/18 1004 : Renee Thomason PTA)  Deviations/Abnormal Patterns (Gait): stride length decreased, katja decreased (04/04/18 1004 : Renee Thomason PTA)  Comment (Gait/Stairs): constant cues to take bigger steps (04/04/18 1004 : Renee Thomason, PTA)     Lower Extremity Seated Therapeutic Exercise  Performed, Seated Lower Extremity (Therapeutic Exercise): hip abduction/adduction, hip flexion/extension, ankle dorsiflexion/plantarflexion, LAQ (long arc quad), knee extension (04/04/18 1004 : Renee Thomason, PTA)  Exercise Type, Seated  Lower Extremity (Therapeutic Exercise): AROM (active range of motion) (04/04/18 1004 : Renee Thomason, DARRICK)  Expected Outcomes, Seated Lower Extremity (Therapeutic Exercise): strengthen normal movement patterns (04/04/18 1004 : Renee Thomason, DARRICK)  Sets/Reps Detail, Seated Lower Extremity (Therapeutic Exercise): 20 (04/04/18 1004 : Renee Thomason PTA)           ROM/MMT             Sensory, Edema, Orthotics          Cognition, Communication, Swallow       Outcome Summary               PT Rehab Goals     Row Name 04/02/18 0945             Bed Mobility Goal 1 (PT)    Activity/Assistive Device (Bed Mobility Goal 1, PT) rolling to left;rolling to right;sit to supine/supine to sit  -LYNETTE (r) CS (t) LYNETTE (c)      [place] Level/Cues Needed (Bed Mobility Goal 1, PT) independent  -LYNETTE (r) CS (t) LYNETTE (c)      Time Frame (Bed Mobility Goal 1, PT) long term goal (LTG);10 days  -LYNETTE (r) CS (t) LYNETTE (c)      Barriers (Bed Mobility Goal 1, PT) medically complex  -LYNETTE (r) CS (t) LYNETTE (c)      Progress/Outcomes (Bed Mobility Goal 1, PT) goal ongoing  -LYNETTE (r) CS (t) LYNETTE (c)         Transfer Goal 1 (PT)    Activity/Assistive Device (Transfer Goal 1, PT) sit-to-stand/stand-to-sit;bed-to-chair/chair-to-bed  -LYNETTE (r) CS (t) LYNETTE (c)      [place] Level/Cues Needed (Transfer Goal 1, PT) supervision required  -LYNETTE (r) CS (t) LYNETTE (c)      Time Frame (Transfer Goal 1, PT) long term goal (LTG);10 days  -LYNETTE (r) CS (t) LYNETTE (c)      Barriers (Transfers Goal 1, PT) medically complex  -LYNETTE (r) CS (t) LYNETTE (c)      Progress/Outcome (Transfer Goal 1, PT) goal ongoing  -LYNETTE (r) CS (t) LYNETTE (c)         Gait Training Goal 1 (PT)    Activity/Assistive Device (Gait Training Goal 1, PT) gait (walking locomotion)  -LYNETTE (r) CS (t) LYNETTE (c)      [place] Level (Gait Training Goal 1, PT) contact guard assist  -LYNETTE (r) CS (t) LYNETTE (c)      Distance (Gait Goal 1, PT) 50ft  -LYNETTE (r) CS (t) LYNETTE (c)      Time Frame (Gait Training Goal 1, PT) long term goal (LTG);10 days   -LYNETTE (r) CS (t) LYNETTE (c)      Barriers (Gait Training Goal 1, PT) medically complex  -LYNETTE (r) CS (t) LYNETTE (c)      Progress/Outcome (Gait Training Goal 1, PT) goal ongoing  -LYNETTE (r) CS (t) LYNETTE (c)        User Key  (r) = Recorded By, (t) = Taken By, (c) = Cosigned By    Initials Name Provider Type    LYNETTE Del Real, PT DPT Physical Therapist     Gary Olivier, PT Student PT Student          Physical Therapy Education     Title: PT OT SLP Therapies (Active)     Topic: Physical Therapy (Active)     Point: Mobility training (Active)    Learning Progress Summary     Learner Status Readiness Method Response Comment Documented by    Patient Active Acceptance E,D NR gait  04/04/18 1031     Active Acceptance E,D NR exercises, gait, and benefits of activity  04/03/18 1136     Active Acceptance E NR pt educated on progression of POC  04/02/18 1046    Family Active Acceptance E NR pt educated on progression of POC  04/02/18 1046          Point: Home exercise program (Active)    Learning Progress Summary     Learner Status Readiness Method Response Comment Documented by    Patient Active Acceptance E,D NR exercises, gait, and benefits of activity  04/03/18 1136                      User Key     Initials Effective Dates Name Provider Type Discipline     08/02/16 -  Renee Thomason, PTA Physical Therapy Assistant PT     01/08/18 -  Gary Olivier, PT Student PT Student PT                    PT Recommendation and Plan     Plan of Care Reviewed With: patient  Progress: improving  Outcome Summary: Pt. continues to improve with mobility. Pt. is supervision for transfers and toileting. Pt. walked 100' x 2 with CGA x 1. Pt. required cues to take bigger steps throughout walk. Pt. performed active leg exercises.  Pt. is progressing towards goals.           Outcome Measures     Row Name 04/04/18 1004 04/04/18 0900 04/03/18 1106       How much help from another person do you currently need...    Turning from your  back to your side while in flat bed without using bedrails? 4  -MF  -- 4  -MF    Moving from lying on back to sitting on the side of a flat bed without bedrails? 4  -MF  -- 4  -MF    Moving to and from a bed to a chair (including a wheelchair)? 3  -MF  -- 3  -MF    Standing up from a chair using your arms (e.g., wheelchair, bedside chair)? 4  -MF  -- 4  -MF    Climbing 3-5 steps with a railing? 3  -MF  -- 3  -MF    To walk in hospital room? 3  -MF  -- 3  -MF    AM-PAC 6 Clicks Score 21  -MF  -- 21  -MF       How much help from another is currently needed...    Putting on and taking off regular lower body clothing?  -- 3  -TS  --    Bathing (including washing, rinsing, and drying)  -- 3  -TS  --    Toileting (which includes using toilet bed pan or urinal)  -- 4  -TS  --    Putting on and taking off regular upper body clothing  -- 4  -TS  --    Taking care of personal grooming (such as brushing teeth)  -- 3  -TS  --    Eating meals  -- 4  -TS  --    Score  -- 21  -TS  --       Functional Assessment    Outcome Measure Options AM-PAC 6 Clicks Basic Mobility (PT)  - AM-[facility] 6 Clicks Daily Activity (OT)  -TS -[facility] 6 Clicks Basic Mobility (PT)  -    Row Name 04/03/18 1000 04/02/18 1100 04/02/18 0945       How much help from another person do you currently need...    Turning from your back to your side while in flat bed without using bedrails?  --  -- 4  -LYNETTE (r) CS (t) LYNETTE (c)    Moving from lying on back to sitting on the side of a flat bed without bedrails?  --  -- 4  -LYNETTE (r) CS (t) LYNETTE (c)    Moving to and from a bed to a chair (including a wheelchair)?  --  -- 3  -LYNETTE (r) CS (t) LYNETTE (c)    Standing up from a chair using your arms (e.g., wheelchair, bedside chair)?  --  -- 3  -LYNETTE (r) CS (t) LYNETTE (c)    Climbing 3-5 steps with a railing?  --  -- 2  -LYNETTE (r) CS (t) LYNETTE (c)    To walk in hospital room?  --  -- 2  -LYNETTE (r) CS (t) LYNETTE (c)    AM-PAC 6 Clicks Score  --  -- 18  -LYNETTE (r) CS (t)       How much help from another is  currently needed...    Putting on and taking off regular lower body clothing? 3  -TS 2  -MM  --    Bathing (including washing, rinsing, and drying) 3  -TS 2  -MM  --    Toileting (which includes using toilet bed pan or urinal) 4  -TS 2  -MM  --    Putting on and taking off regular upper body clothing 4  -TS 2  -MM  --    Taking care of personal grooming (such as brushing teeth) 3  -TS 3  -MM  --    Eating meals 4  -TS 3  -MM  --    Score 21  -TS 14  -MM  --       Functional Assessment    Outcome Measure Options AM-PAC 6 Clicks Daily Activity (OT)  -TS AM-PAC 6 Clicks Daily Activity (OT)  -MM AM-PAC 6 Clicks Basic Mobility (PT)  -LYNETTE (r) CS (t) LYNETTE (c)      User Key  (r) = Recorded By, (t) = Taken By, (c) = Cosigned By    Initials Name Provider Type     Harper Gamboa, HAWKINS/L Occupational Therapy Assistant    LYNETTE Del Real, PT DPT Physical Therapist    LAUREN Thomason PTA Physical Therapy Assistant    MM Dariel Randolph, OTR/L Occupational Therapist    CS Gary Olivier, PT Student PT Student           Time Calculation:         PT Charges     Row Name 04/04/18 1032             Time Calculation    Start Time 1004  -      Stop Time 1030  -      Time Calculation (min) 26 min  -      PT Received On 04/04/18  -      PT Goal Re-Cert Due Date 04/12/18  -         Time Calculation- PT    Total Timed Code Minutes- PT 26 minute(s)  -        User Key  (r) = Recorded By, (t) = Taken By, (c) = Cosigned By    Initials Name Provider Type    LAUREN Thomason PTA Physical Therapy Assistant          Therapy Charges for Today     Code Description Service Date Service Provider Modifiers Qty    35705593401 HC GAIT TRAINING EA 15 MIN 4/3/2018 Renee Thomason PTA GP, KX 1    35480617795 HC PT THER PROC EA 15 MIN 4/3/2018 Renee Thomason PTA GP, KX 1    53531466165 HC GAIT TRAINING EA 15 MIN 4/4/2018 Renee Thomason PTA GP, KX 1    88949797508 HC PT THER PROC EA 15 MIN 4/4/2018 Renee NÚÑEZ  Paddy, DARRICK GP, KX 1          PT G-Codes  Outcome Measure Options: AM-PAC 6 Clicks Basic Mobility (PT)  Score: 18  Functional Limitation: Mobility: Walking and moving around  Mobility: Walking and Moving Around Current Status (): At least 40 percent but less than 60 percent impaired, limited or restricted  Mobility: Walking and Moving Around Goal Status (): At least 20 percent but less than 40 percent impaired, limited or restricted    Renee Thomason, DARRICK  4/4/2018

## 2018-04-04 NOTE — PLAN OF CARE
Problem: Patient Care Overview  Goal: Plan of Care Review  Outcome: Ongoing (interventions implemented as appropriate)   04/04/18 0333   Coping/Psychosocial   Plan of Care Reviewed With patient   Plan of Care Review   Progress improving   OTHER   Outcome Summary Patient wearing home trilogy. Patient got up with therapy during the day. Complained of back pain. Norco given PRN. VSS. Cont IV fluids and IV steroids.        Problem: Chronic Obstructive Pulmonary Disease (Adult)  Goal: Signs and Symptoms of Listed Potential Problems Will be Absent, Minimized or Managed (Chronic Obstructive Pulmonary Disease)  Outcome: Ongoing (interventions implemented as appropriate)      Problem: Pain, Acute (Adult)  Goal: Acceptable Pain Control/Comfort Level  Outcome: Ongoing (interventions implemented as appropriate)      Problem: Fall Risk (Adult)  Goal: Absence of Fall  Outcome: Ongoing (interventions implemented as appropriate)

## 2018-04-04 NOTE — THERAPY TREATMENT NOTE
Acute Care - Occupational Therapy Treatment Note  [facility]     Patient Name: Teri Tim  : 1960  MRN: [id number]  Today's Date: 2018  Onset of Illness/Injury or Date of Surgery: 18  Date of Referral to OT: 18  Referring Physician: Dr. Loo    Admit Date: 3/30/2018       ICD-10-CM ICD-9-CM   1. COPD exacerbation J44.1 491.21   2. Acute on chronic respiratory failure with hypoxia and hypercapnia J96.21 518.84    J96.22 786.09     799.02   3. Impaired functional mobility and activity tolerance Z74.09 V49.89   4. Impaired mobility and ADLs Z74.09 799.89     Patient Active Problem List   Diagnosis   • NSTEMI (non-ST elevated myocardial infarction)   • Abnormal LFTs   • Abnormal US (ultrasound) of abdomen   • COPD exacerbation     Past Medical History:   Diagnosis Date   • CHF (congestive heart failure)    • COPD (chronic obstructive pulmonary disease)    • Hypertension    • Pneumonia      Past Surgical History:   Procedure Laterality Date   • CARPAL TUNNEL RELEASE     • HYSTERECTOMY      complete       Therapy Treatment    Therapy Treatment / Health Promotion    Treatment Time/Intention  Discipline: occupational therapy assistant (18 : MARY Artis)  Document Type: therapy note (daily note) (18 : MARY Artis)  Subjective Information: complains of, dyspnea (18 : MARY Artis)  Patient Effort: good (18 : MARY Artis)  Existing Precautions/Restrictions: fall, oxygen therapy device and L/min (2L) (18 : MARY Artis)  Plan of Care Review  Plan of Care Reviewed With: patient (1858 : Harper N Cooper, HAWKINS/L)    Vitals/Pain/Safety  Positioning and Restraints  Pre-Treatment Position: bathroom (18 : MARY Artis)  Post Treatment Position: chair (18 : MARY Artis)  In Chair: sitting, call light within  reach, encouraged to call for assist (04/04/18 0831 : Harper Gamboa, HAWKINS/L)    Mobility,ADL,Motor, Modality  Transfer Assessment/Treatment  Transfer Assessment/Treatment: sit-stand transfer, stand-sit transfer, toilet transfer (04/04/18 0831 : Harper Gamboa, HAWKINS/L)  Sit-Stand Transfer  Sit-Stand [place] (Transfers): supervision (04/04/18 0831 : Harper Gamboa, HAWKINS/L)  Stand-Sit Transfer  Stand-Sit [place] (Transfers): supervision (04/04/18 0831 : Harper Gamboa, HAWKINS/L)  Toilet Transfer  Type (Toilet Transfer): sit-stand, stand-sit (04/04/18 0831 : Harper Gamboa, HAWKINS/L)  Dubois Level (Toilet Transfer): supervision (04/04/18 0831 : Harper Gamboa HAWKINS/L)  Assistive Device (Toilet Transfer): commode, grab bars/safety frame (04/04/18 0831 : Harper Gamboa HAWKINS/L)  ADL Assessment/Intervention  BADL Assessment/Intervention: lower body dressing, grooming, toileting, bathing (04/04/18 0831 : Harper Gamboa HAWKINS/L)  Bathing Assessment/Intervention  Bathing Dubois Level: perineal area, set up (04/04/18 0831 : Harper Gamboa HAWKINS/L)  Bathing Position: unsupported standing (04/04/18 0831 : Harper Gamboa HAWKINS/L)  Lower Body Dressing Assessment/Training  Lower Body Dressing Dubois Level: don, pants/bottoms, undergarment, set up (SBA) (04/04/18 0831 : Harper Gamboa HAWKINS/L)  Lower Body Dressing Position: unsupported sitting, unsupported standing (04/04/18 0831 : Harper Gamboa HAWKINS/L)  Grooming Assessment/Training  Dubois Level (Grooming): hair care, combing/brushing (04/04/18 0831 : Harper Gamboa HAWKINS/L)  Grooming Position: unsupported sitting (04/04/18 0831 : MARY Artis)  Toileting Assessment/Training  Dubois Level (Toileting): toileting skills, supervision (04/04/18 0831 : MARY Artis)  Assistive Devices (Toileting): commode (04/04/18 0831 : Harper Gamboa  HAWKINS/L)  Toileting Position: unsupported standing, unsupported sitting (04/04/18 0831 : SHRUTHI Artis/KENTON)              ROM/MMT             Sensory, Edema, Orthotics          Cognition, Communication, Swallow  Cognitive Assessment/Intervention- PT/OT  Personal Safety Interventions: fall prevention program maintained, nonskid shoes/slippers when out of bed (04/04/18 0831 : SHRUTHI Artis/L)    Outcome Summary           OT Rehab Goals     Row Name 04/02/18 0958             Dressing Goal 1 (OT)    Activity/Assistive Device (Dressing Goal 1, OT) dressing skills, all  -MM      [employer]/Cues Needed (Dressing Goal 1, OT) supervision required;set-up required  -MM      Time Frame (Dressing Goal 1, OT) 10 days  -MM      Progress/Outcome (Dressing Goal 1, OT) goal ongoing  -MM         Toileting Goal 1 (OT)    Activity/Device (Toileting Goal 1, OT) toileting skills, all  -MM      [employer] Level/Cues Needed (Toileting Goal 1, OT) supervision required;set-up required  -MM      Time Frame (Toileting Goal 1, OT) 10 days  -MM      Progress/Outcome (Toileting Goal 1, OT) goal ongoing  -MM         Strength Goal 1 (OT)    Strength Goal 1 (OT) Pt will independently maintain BUE AROM HEP to increase strength to 4/5 and increase activity tolerance.   -MM      Time Frame (Strength Goal 1, OT) 10 days  -MM      Progress/Outcome (Strength Goal 1, OT) goal ongoing  -MM        User Key  (r) = Recorded By, (t) = Taken By, (c) = Cosigned By    Initials Name Provider Type    KIRSTIN Randolph, OTR/L Occupational Therapist        Occupational Therapy Education     Title: PT OT SLP Therapies (Active)     Topic: Occupational Therapy (Active)     Point: ADL training (Done)     Description: Instruct learner(s) on proper safety adaptation and remediation techniques during self care or transfers.   Instruct in proper use of assistive devices.   Learning Progress Summary     Learner Status Readiness Method Response Comment  Documented by    Patient Done Acceptance E VU energy conservation, benefits of activity, ADL modification TS 04/04/18 0958     Done Acceptance E VU benefits of activity, work simplification, energy conservation  04/03/18 1047     Done Acceptance E VU OT role, benefits, and POC  04/02/18 1200          Point: Precautions (Done)     Description: Instruct learner(s) on prescribed precautions during self-care and functional transfers.   Learning Progress Summary     Learner Status Readiness Method Response Comment Documented by    Patient Done Acceptance E VU energy conservation, benefits of activity, ADL modification  04/04/18 0958                      User Key     Initials Effective Dates Name Provider Type Discipline     08/02/16 -  SHRUTHI Artis/L Occupational Therapy Assistant OT    MM 03/07/18 - 04/02/18 MARIANA Martinez/L Occupational Therapist OT                  OT Recommendation and Plan     Plan of Care Review  Plan of Care Reviewed With: patient  Plan of Care Reviewed With: patient  Outcome Summary: Pt ambulating with S with O2 in room and bathroom. Pt transfers with S. Pt educated on work simplification and energy conservation techniques. Pt completed LB dressing with S along with toileting and grooming. Pt encouraged to remain OOB as tolerated. Pt would benefit from HH at discharge for continued strength and endurance. Continue OT POC         Outcome Measures     Row Name 04/04/18 0900 04/03/18 1106 04/03/18 1000       How much help from another person do you currently need...    Turning from your back to your side while in flat bed without using bedrails?  -- 4  -MF  --    Moving from lying on back to sitting on the side of a flat bed without bedrails?  -- 4  -MF  --    Moving to and from a bed to a chair (including a wheelchair)?  -- 3  -MF  --    Standing up from a chair using your arms (e.g., wheelchair, bedside chair)?  -- 4  -MF  --    Climbing 3-5 steps with a railing?  -- 3   -MF  --    To walk in hospital room?  -- 3  -MF  --    AM-PAC 6 Clicks Score  -- 21  -MF  --       How much help from another is currently needed...    Putting on and taking off regular lower body clothing? 3  -TS  -- 3  -TS    Bathing (including washing, rinsing, and drying) 3  -TS  -- 3  -TS    Toileting (which includes using toilet bed pan or urinal) 4  -TS  -- 4  -TS    Putting on and taking off regular upper body clothing 4  -TS  -- 4  -TS    Taking care of personal grooming (such as brushing teeth) 3  -TS  -- 3  -TS    Eating meals 4  -TS  -- 4  -TS    Score 21  -TS  -- 21  -TS       Functional Assessment    Outcome Measure Options AM-PAC 6 Clicks Daily Activity (OT)  -TS AM-PAC 6 Clicks Basic Mobility (PT)  -MF AM-[facility] 6 Clicks Daily Activity (OT)  -TS    Row Name 04/02/18 1100 04/02/18 0945          How much help from another person do you currently need...    Turning from your back to your side while in flat bed without using bedrails?  -- 4  -LYNETTE (r) CS (t) LYNETTE (c)     Moving from lying on back to sitting on the side of a flat bed without bedrails?  -- 4  -LYNETTE (r) CS (t) LYNETTE (c)     Moving to and from a bed to a chair (including a wheelchair)?  -- 3  -LYNETTE (r) CS (t) LYNETTE (c)     Standing up from a chair using your arms (e.g., wheelchair, bedside chair)?  -- 3  -LYNETTE (r) CS (t) LYNETTE (c)     Climbing 3-5 steps with a railing?  -- 2  -LYNETTE (r) CS (t) LYNETTE (c)     To walk in hospital room?  -- 2  -LYNETTE (r) CS (t) LYNETTE (c)     AM-PAC 6 Clicks Score  -- 18  -LYNETTE (r) CS (t)        How much help from another is currently needed...    Putting on and taking off regular lower body clothing? 2  -MM  --     Bathing (including washing, rinsing, and drying) 2  -MM  --     Toileting (which includes using toilet bed pan or urinal) 2  -MM  --     Putting on and taking off regular upper body clothing 2  -MM  --     Taking care of personal grooming (such as brushing teeth) 3  -MM  --     Eating meals 3  -MM  --     Score 14  -MM  --         Functional Assessment    Outcome Measure Options AM-PAC 6 Clicks Daily Activity (OT)  -MM AM-PAC 6 Clicks Basic Mobility (PT)  -LYNETTE (r) CS (t) LYNETTE (c)       User Key  (r) = Recorded By, (t) = Taken By, (c) = Cosigned By    Initials Name Provider Type    TS Harper Gamboa, HAWKINS/L Occupational Therapy Assistant    LYNETTE Del Real, PT DPT Physical Therapist    MF Renee Thomason, PTA Physical Therapy Assistant    MM Dariel Randolph, OTR/L Occupational Therapist    CS Gary Olivier, PT Student PT Student           Time Calculation:         Time Calculation- OT     Row Name 04/04/18 1001             Time Calculation- OT    OT Start Time 0830  -TS      OT Stop Time 0900  -TS      OT Time Calculation (min) 30 min  -TS      Total Timed Code Minutes- OT 30 minute(s)  -TS      OT Received On 04/04/18  -TS        User Key  (r) = Recorded By, (t) = Taken By, (c) = Cosigned By    Initials Name Provider Type     Harper Gamboa HAWKINS/L Occupational Therapy Assistant           Therapy Charges for Today     Code Description Service Date Service Provider Modifiers Qty    74672596119 HC OT SELF CARE/MGMT/TRAIN EA 15 MIN 4/3/2018 Harper Gamboa HAWKINS/L GO, KX 4    91997549897 HC OT SELF CARE/MGMT/TRAIN EA 15 MIN 4/4/2018 Harper Gamboa HAWKINS/L GO, KX 2          OT G-codes  OT Professional Judgement Used?: Yes  OT Functional Scales Options: AM-PAC 6 Clicks Daily Activity (OT)  Score: 14  Functional Limitation: Self care  Self Care Current Status (): At least 40 percent but less than 60 percent impaired, limited or restricted  Self Care Goal Status (): At least 1 percent but less than 20 percent impaired, limited or restricted    SHRUTHI Figueroa/KENTON  4/4/2018

## 2018-04-04 NOTE — DISCHARGE PLACEMENT REQUEST
"Valerie Martin [phone] PT HAS OWN TRILOGY MACHINE SHE WILL NEED AT FACILITY  Teri Tim (57 y.o. Female)     Date of Birth Social Security Number Address Home Phone MRN    1960  [id number]    [place] [id number] [phone] [id number]    [employer] Marital Status          [place]        Admission Date Admission Type Admitting Provider Attending Provider Department, Room/Bed    3/30/18 Emergency Gonsalo Loo MD [place], Glenn Riego, MD [facility] 4C, 486/1    Discharge Date Discharge Disposition Discharge Destination                       Attending Provider:  Gonsalo Loo MD    Allergies:  Codeine    Isolation:  None   Infection:  None   Code Status:  FULL    Ht:  154.8 cm (60.95\")   Wt:  53.3 kg (117 lb 9.6 oz)    Admission Cmt:  None   Principal Problem:  None                Active Insurance as of 3/30/2018     Primary Coverage     Payor Plan Insurance Group Employer/Plan Group    [employer] [place] [employer] [place]ICA[place]      Payor Plan Address Payor Plan Phone Number Effective From Effective To    [address] [address] [phone] 11/1/2017     [place] [address]       Subscriber Name Subscriber Birth Date Member ID       TERI TIM 1960 [id number]                 Emergency Contacts      (Rel.) Home Phone Work Phone Mobile Phone    Rich Tim (Spouse) [phone] -- [phone]               History & Physical      Marcin Oneill DO at 3/30/2018  1:23 PM              [facility] Medicine Services  HISTORY AND PHYSICAL    Date of Admission: 3/30/2018  Primary Care Physician: ROLAND Wayne    Subjective     Chief Complaint: shortness of breathing and confusion    History of Present Illness  Teri Tim is a 57 year old  female transferred to [facility] for evaluation of exacerbation chronic obstructive pulmonary disease.  Patient extremely confused thinks she " "is at [facility].  She wants to talk to her  regarding her history and why she came in, unfortunately he is not in attendance.  She complains of shortness of breathing and after reorienting regarding place and time, she states \"I get confused\".  She denies chest pain or abdominal pain.  She reports having lower extremity edema \"for quite awhile\".  History was obtained from medical record.  Oxygen saturation decreases to mid 80's, on 2 liter/NC, when talking.  Patient is admitted for further evaluation and treatment.     Review of Systems   Unable to obtain meaningful ROS due to confusion, she complains of shortness of breathing and edema.     Past Medical History:   Past Medical History:   Diagnosis Date   • CHF (congestive heart failure)    • COPD (chronic obstructive pulmonary disease)    • Hypertension    • Pneumonia        Past Surgical History:   Past Surgical History:   Procedure Laterality Date   • CARPAL TUNNEL RELEASE     • HYSTERECTOMY      complete       Family History: family history includes Cancer in her mother and sister; Heart disease in her brother and father.    Social History:  reports that she has been smoking Cigarettes.  She has been smoking about 0.50 packs per day. She has never used smokeless tobacco. She reports that she does not drink alcohol or use drugs. .     Code Status: Full due to patient's inability to make decision,  not available at this time.      Allergies:  Allergies   Allergen Reactions   • Codeine Nausea And Vomiting       Medications:  Prior to Admission medications    Medication Sig Start Date End Date Taking? Authorizing Provider   budesonide-formoterol (SYMBICORT) 160-4.5 MCG/ACT inhaler Inhale 2 puffs 2 (Two) Times a Day.    Historical Provider, MD   cyclobenzaprine (FLEXERIL) 10 MG tablet Take 10 mg by mouth 2 (Two) Times a Day As Needed for Muscle Spasms.    Historical Provider, MD   furosemide (LASIX) 20 MG tablet Take 0.5 tablets by " "mouth Daily. 11/8/17   ROLAND Brink   gabapentin (NEURONTIN) 600 MG tablet Take 600 mg by mouth 3 (Three) Times a Day.    Historical Provider, MD   HYDROcodone-acetaminophen (NORCO) 7.5-325 MG per tablet Take 1 tablet by mouth Every 6 (Six) Hours As Needed for Moderate Pain .    Historical Provider, MD   metoprolol tartrate (LOPRESSOR) 25 MG tablet Take 12.5 mg by mouth 2 (Two) Times a Day.    Historical Provider, MD   pantoprazole (PROTONIX) 40 MG EC tablet Take 40 mg by mouth Daily.    Historical Provider, MD   potassium chloride (K-DUR,KLOR-CON) 20 MEQ CR tablet Take 1 tablet by mouth Daily. 12/23/16   Jose Barajas MD   predniSONE (DELTASONE) 20 MG tablet Take 1 tablet by mouth Daily. Take 40 mg daily x 3 days, 20 mg daily x 3 days then 10 mg daily for 3 days 11/7/17   ROLAND Brink   spironolactone (ALDACTONE) 25 MG tablet Take 25 mg by mouth Daily.    Historical Provider, MD   venlafaxine (EFFEXOR) 75 MG tablet Take 75 mg by mouth 2 (Two) Times a Day.    Historical Provider, MD       Objective     /58   Pulse 92   Temp 99 °F (37.2 °C) (Oral)   Resp 15   Ht 154.9 cm (61\")   Wt 53.1 kg (117 lb)   SpO2 100%   BMI 22.11 kg/m²       Physical Exam   Constitutional: She appears well-developed and well-nourished. No distress.   HENT:   Head: Normocephalic and atraumatic.   Eyes: Conjunctivae and EOM are normal. Pupils are equal, round, and reactive to light. No scleral icterus.   Neck: Normal range of motion. Neck supple. No JVD present. No tracheal deviation present.   Cardiovascular: Normal rate, regular rhythm, normal heart sounds and intact distal pulses.  Exam reveals no gallop.    No murmur heard.  Pulmonary/Chest: Effort normal. No respiratory distress. She has wheezes (diffuse, inspiratory/expiratory). She has no rales.   Abdominal: Soft. Bowel sounds are normal. She exhibits no distension. There is no tenderness. There is no guarding.   Musculoskeletal: Normal range of motion. " She exhibits no edema.   Facial edema, bilateral lower extremity edema 2-3 +   Neurological: She is alert.   confused   Skin: Skin is warm and dry. No rash noted. She is not diaphoretic. No erythema. No pallor.   Psychiatric: She has a normal mood and affect. Her behavior is normal.   Vitals reviewed.      Pertinent Data:   Lab Results (last 72 hours)     Procedure Component Value Units Date/Time    Blood Culture - Blood, Blood, Venous Line [[id number]] Collected:  03/30/18 1302    Specimen:  Blood from Arm, Left Updated:  03/30/18 1404    Lactic Acid, Plasma [[id number]]  (Normal) Collected:  03/30/18 1247    Specimen:  Blood Updated:  03/30/18 1312     Lactate 1.3 mmol/L     BNP [[id number]]  (Abnormal) Collected:  03/30/18 1224    Specimen:  Blood Updated:  03/30/18 1304     proBNP 12,900.0 (H) pg/mL     Comprehensive Metabolic Panel [[id number]]  (Abnormal) Collected:  03/30/18 1224    Specimen:  Blood Updated:  03/30/18 1256     Glucose 108 (H) mg/dL      BUN 22 (H) mg/dL      Creatinine 1.16 mg/dL      Sodium 129 (L) mmol/L      Potassium 4.4 mmol/L      Chloride 85 (L) mmol/L      CO2 38.0 (H) mmol/L      Calcium 8.7 mg/dL      Total Protein 6.6 g/dL      Albumin 3.60 g/dL      ALT (SGPT) 127 (H) U/L      AST (SGOT) 67 (H) U/L      Alkaline Phosphatase 107 U/L      Total Bilirubin 1.1 (H) mg/dL      eGFR Non African Amer 49 (L) mL/min/1.73      Globulin 3.0 gm/dL      A/G Ratio 1.2 g/dL      BUN/Creatinine Ratio 19.0     Anion Gap 6.0 mmol/L     Blood Culture - Blood, Blood, Venous Line [[id number]] Collected:  03/30/18 1225    Specimen:  Blood from Arm, Left Updated:  03/30/18 1240    CBC Auto Differential [[id number]]  (Abnormal) Collected:  03/30/18 1224    Specimen:  Blood Updated:  03/30/18 1239     WBC 8.49 10*3/mm3      RBC 4.45 10*6/mm3      Hemoglobin 11.5 (L) g/dL      Hematocrit 39.2 %      MCV 88.1 fL      MCH 25.8 (L) pg      MCHC 29.3 (L) g/dL      RDW 17.9 (H) %      RDW-SD 57.8 (H) fl      MPV 9.6  fL      Platelets 265 10*3/mm3      Neutrophil % 68.0 %      Lymphocyte % 18.4 %      Monocyte % 12.5 (H) %      Eosinophil % 0.1 %      Basophil % 0.5 %      Immature Grans % 0.5 %      Neutrophils, Absolute 5.78 10*3/mm3      Lymphocytes, Absolute 1.56 10*3/mm3      Monocytes, Absolute 1.06 10*3/mm3      Eosinophils, Absolute 0.01 10*3/mm3      Basophils, Absolute 0.04 10*3/mm3      Immature Grans, Absolute 0.04 (H) 10*3/mm3      nRBC 2.6 (H) /100 WBC     Blood Gas, Arterial [[id number]]  (Abnormal) Collected:  03/30/18 1213    Specimen:  Arterial Blood Updated:  03/30/18 1218     Site Right Radial     Dao's Test Positive     pH, Arterial 7.265 (L) pH units      pCO2, Arterial 77.6 (C) mm Hg      pO2, Arterial 61.1 (L) mm Hg      HCO3, Arterial 35.2 (H) mmol/L      Base Excess, Arterial 5.9 (H) mmol/L      O2 Saturation, Arterial 88.9 (L) %      Temperature 37.0 C      Barometric Pressure for Blood Gas 758 mmHg      Modality Nasal Cannula     Flow Rate 2.0 lpm      Ventilator Mode NA     Notified Who DR YE CERNA     Notified By [id number]     Notified Time 03/30/2018 12:19     Collected by [id number]          I have personally reviewed and interpreted the radiology studies and ECG obtained at time of admission.     Assessment / Plan     Assessment:   Acute hypoxic and hypercarbic respiratory failure  COPD with exacerbation  Fluid volume overload  Hypertension - now hypoactive  Congestive heart failure, diastolic with exacerbation   Hyponatremia  Tranaminitis  Decreased kidney function    Plan:   1. Admit as inpatient  2. Lasix 40mg IV twice daily  3. Place on BiPAP, respiratory to manage- repeat abg's 1 hour after initation  4. Labs in am: ABG's, BNP, CBC w/diff, CMP, TSH, A1c, Lipid panel  5. Solumedrol 60mg IV every 6 hours, IS q 2 hours while awake, O2 at 2 liters NC when BiPAP in use  6. Add Troponin and UA with culture if indicated to today labs  7. IID  8. Home meds reviewed and restarted  9. Monitor kidney  function  10. Follow culture results    I discussed the patients findings and my recommendations with: Marcin Oneill DO  Time spent: 35 minutes      ROLAND Pillai  03/30/18   1:24 PM     I personally evaluated and examined the patient in conjunction with ROLAND Sears and agree with the assessment, treatment plan, and disposition of the patient as recorded by her. My history, exam, and further recommendations are:     Admitted through the emergency department by Dr. Church for an acute exacerbation of chronic obstructive pulmonary disease with acute hypoxic and hypercarbic respiratory failure.    Her  is not currently present with her.  She remains confused on BiPAP therapy.  The respiratory therapist is in the room to draw another ABG now.  We will follow-up on the results of this.  She is readily alert, not somnolent.  She is protecting her airway.  Continue with BiPAP therapy for now.    Aggressive pulmonary toilet as above.    Seen and discussed with her nurse, Cristin.    Marcin Oneill DO  03/30/18  3:32 PM      Electronically signed by Marcin Oneill DO at 3/30/2018  3:33 PM       Hospital Medications (active)       Dose Frequency Start End    budesonide-formoterol (SYMBICORT) 160-4.5 MCG/ACT inhaler 2 puff 2 puff 2 Times Daily - RT 3/30/2018     Sig - Route: Inhale 2 puffs 2 (Two) Times a Day. - Inhalation    dextrose (D50W) solution 25 g 25 g Every 15 Minutes PRN 3/31/2018     Sig - Route: Infuse 50 mL into a venous catheter Every 15 (Fifteen) Minutes As Needed for Low Blood Sugar (Blood Sugar Less Than 70). - Intravenous    dextrose (GLUTOSE) oral gel 15 g 15 g Every 15 Minutes PRN 3/31/2018     Sig - Route: Take 15 g by mouth Every 15 (Fifteen) Minutes As Needed for Low Blood Sugar (Blood sugar less than 70). - Oral    doxycycline (ADOXA) tablet 100 mg 100 mg Every 12 Hours Scheduled 4/1/2018 4/6/2018    Sig - Route: Take 1 tablet by mouth Every 12 (Twelve) Hours. -  "Oral    enoxaparin (LOVENOX) syringe 40 mg 40 mg Every 24 Hours 3/30/2018     Sig - Route: Inject 0.4 mL under the skin Daily. - Subcutaneous    glucagon (human recombinant) (GLUCAGEN DIAGNOSTIC) injection 1 mg 1 mg As Needed 3/31/2018     Sig - Route: Inject 1 mg under the skin As Needed (Blood Glucose Less Than 70). - Subcutaneous    guaiFENesin (MUCINEX) 12 hr tablet 1,200 mg 1,200 mg 2 Times Daily 3/30/2018     Sig - Route: Take 2 tablets by mouth 2 (Two) Times a Day. - Oral    HYDROcodone-acetaminophen (NORCO) 7.5-325 MG per tablet 1 tablet 1 tablet Every 6 Hours PRN 3/30/2018     Sig - Route: Take 1 tablet by mouth Every 6 (Six) Hours As Needed for Moderate Pain . - Oral    insulin lispro (humaLOG) injection 2-7 Units 2-7 Units 4 Times Daily With Meals & Nightly 3/31/2018     Sig - Route: Inject 2-7 Units under the skin 4 (Four) Times a Day With Meals & at Bedtime. - Subcutaneous    ipratropium-albuterol (DUO-NEB) nebulizer solution 3 mL 3 mL 4 Times Daily - RT 4/2/2018     Sig - Route: Take 3 mL by nebulization 4 (Four) Times a Day. - Nebulization    lactulose solution 20 g 20 g Daily 4/4/2018     Sig - Route: Take 30 mL by mouth Daily. - Oral    methylPREDNISolone sodium succinate (SOLU-Medrol) injection 40 mg 40 mg Every 12 Hours 4/3/2018     Sig - Route: Infuse 1 mL into a venous catheter Every 12 (Twelve) Hours. - Intravenous    ondansetron (ZOFRAN) injection 4 mg 4 mg Every 6 Hours PRN 3/30/2018     Sig - Route: Infuse 2 mL into a venous catheter Every 6 (Six) Hours As Needed for Nausea or Vomiting. - Intravenous    Linked Group 1:  \"Or\" Linked Group Details        ondansetron (ZOFRAN) tablet 4 mg 4 mg Every 6 Hours PRN 3/30/2018     Sig - Route: Take 1 tablet by mouth Every 6 (Six) Hours As Needed for Nausea or Vomiting. - Oral    Linked Group 1:  \"Or\" Linked Group Details        ondansetron ODT (ZOFRAN-ODT) disintegrating tablet 4 mg 4 mg Every 6 Hours PRN 3/30/2018     Sig - Route: Take 1 tablet by " "mouth Every 6 (Six) Hours As Needed for Nausea or Vomiting. - Oral    Linked Group 1:  \"Or\" Linked Group Details        pantoprazole (PROTONIX) EC tablet 40 mg 40 mg Every Early Morning 3/30/2018     Sig - Route: Take 1 tablet by mouth Every Morning. - Oral    potassium chloride (MICRO-K) CR capsule 20 mEq 20 mEq Daily 3/30/2018     Sig - Route: Take 2 capsules by mouth Daily. - Oral    sodium chloride 0.9 % flush 1-10 mL 1-10 mL As Needed 3/30/2018     Sig - Route: Infuse 1-10 mL into a venous catheter As Needed for Line Care. - Intravenous    sodium chloride 0.9 % flush 10 mL 10 mL As Needed 3/30/2018     Sig - Route: Infuse 10 mL into a venous catheter As Needed for Line Care. - Intravenous    Linked Group 2:  \"And\" Linked Group Details        tolvaptan (SAMSCA) tablet 15 mg 15 mg Once 4/4/2018 4/4/2018    Sig - Route: Take 1 tablet by mouth 1 (One) Time. - Oral    venlafaxine (EFFEXOR) tablet 75 mg 75 mg 2 Times Daily With Meals 3/30/2018     Sig - Route: Take 2 tablets by mouth 2 (Two) Times a Day With Meals. - Oral    sodium chloride 0.9 % with KCl 20 mEq/L infusion (Discontinued) 100 mL/hr Continuous 4/2/2018 4/4/2018    Sig - Route: Infuse 100 mL/hr into a venous catheter Continuous. - Intravenous          Operative/Procedure Notes (last 24 hours) (Notes from 4/3/2018  9:38 AM through 4/4/2018  9:38 AM)     No notes of this type exist for this encounter.           Physician Progress Notes (last 24 hours) (Notes from 4/3/2018  9:38 AM through 4/4/2018  9:38 AM)      ROLAND Burdick at 4/4/2018  7:01 AM              [facility] Emergency Room Medicine Services  INPATIENT PROGRESS NOTE    Length of Stay: 5  Date of Admission: 3/30/2018  Primary Care Physician: ROLAND Wayne    Subjective   Chief Complaint: follow up COPD  HPI   Pt is awake and alert. STates she is breathing some better. Walked 80 feet with physical therapy and sat in chair yesterday. She requires great " encouragement to work with therapy. States her back hurts. Family at bedside. She wore her trilogy last night with a new mask. No bowel movement since admission.     Review of Systems   All pertinent negatives and positives are as above. All other systems have been reviewed and are negative unless otherwise stated.     Objective    Temp:  [97.8 °F (36.6 °C)-98.7 °F (37.1 °C)] 98.4 °F (36.9 °C)  Heart Rate:  [65-97] 66  Resp:  [12-18] 16  BP: (114-137)/(52-78) 121/68  Physical Exam   Constitutional: She is oriented to person, place, and time. She appears well-developed.   Chronically ill appearing.    HENT:   Head: Normocephalic and atraumatic.   Eyes: Conjunctivae and EOM are normal. Pupils are equal, round, and reactive to light.   Neck: Neck supple. No JVD present. No thyromegaly present.   Cardiovascular: Normal rate, regular rhythm, normal heart sounds and intact distal pulses.  Exam reveals no gallop and no friction rub.    No murmur heard.  Pulmonary/Chest: Effort normal. No respiratory distress. Wheezes: left greater than right.  She has no rales. She exhibits no tenderness.   Abdominal: Soft. Bowel sounds are normal. She exhibits no distension. There is no tenderness. There is no rebound and no guarding.   Musculoskeletal: Normal range of motion. She exhibits no edema, tenderness or deformity.   Lymphadenopathy:     She has no cervical adenopathy.   Neurological: She is alert and oriented to person, place, and time. She displays normal reflexes. No cranial nerve deficit. She exhibits normal muscle tone.   Skin: Skin is warm and dry. No rash noted.   Psychiatric: She has a normal mood and affect. Her behavior is normal. Judgment and thought content normal.   Vitals reviewed.    Results Review:  I have reviewed the labs, radiology results, and diagnostic studies.    Laboratory Data:     Results from last 7 days  Lab Units 04/04/18  0539 04/03/18  0453 04/01/18  0455   WBC 10*3/mm3 7.76 9.84 14.85*   HEMOGLOBIN  g/dL 10.8* 11.3* 11.2*   HEMATOCRIT % 35.4* 37.2 37.7   PLATELETS 10*3/mm3 178 189 259          Results from last 7 days  Lab Units 04/04/18  0539 04/03/18  0453 04/02/18  0610 04/01/18  0455 03/31/18  0535 03/30/18  1224   SODIUM mmol/L 126* 127* 126* 132* 133* 129*   POTASSIUM mmol/L 4.5 3.9 3.4* 4.3 4.2 4.4   CHLORIDE mmol/L 87* 82* 77* 78* 80* 85*   CO2 mmol/L 35.0* >40.0* >40.0* >40.0* >40.0* 38.0*   BUN mg/dL 15 20 23* 32* 23* 22*   CREATININE mg/dL 0.66 0.79 0.86 1.11 1.10 1.16   CALCIUM mg/dL 8.6 8.4 8.6 8.6 8.7 8.7   BILIRUBIN mg/dL  --   --   --  1.1* 1.2* 1.1*   ALK PHOS U/L  --   --   --  83 97 107   ALT (SGPT) U/L  --   --   --  87* 108* 127*   AST (SGOT) U/L  --   --   --  38 47* 67*   GLUCOSE mg/dL 92 102* 104* 113* 133* 108*       Culture Data:   Blood Culture   Date Value Ref Range Status   03/30/2018 No growth at 4 days  Preliminary   03/30/2018 No growth at 4 days  Preliminary     Respiratory Culture   Date Value Ref Range Status   04/02/2018 Light growth (2+) Normal Respiratory [place]  Final       Radiology Data:   Imaging Results (last 24 hours)     ** No results found for the last 24 hours. **          I have reviewed the patient current medications.     Assessment/Plan   Assessment:  1. Chronic obstructive pulmonary disease with acute exacerbation  2. Acute on chronic hypoxic and hypercarbic respiratory failure secondary to above  3. Acute on chronic diastolic congestive heart failure, last known EF 55%  4. Hyponatremia-likely diuretic related  5. Pulmonary hypertension  6. Elevated LFT's  7. Low TSH-low T4. Normal T3.    8. Hyperglycemia, mild. Patient also on steroid therapy. Hgb A1c 6.1  9. Hypertension-off beta blocker for now.   10. Leukocytosis-steroid effect.   11. Hyponatremia-concern for hypovolemic hyponatremia-now on IV fluids.      Plan:  1. Doxycycline day 4  2. Her sodium remains about the same. She appears euvolemic. Will discontinue IV fluids and give dose of Samsca.   3. Will  continue IV steroids for now. Will transition to oral tomorrow.   4. Will ask  to assist with disposition. Pt states she would consider SNF depending on her progress with therapy.   5. Repeat labs in am.   6. Begin Lactulose.     Discharge Planning: I expect the patient to be discharged to ? in ? days.    ROLAND Burdick   18   7:01 AM    Electronically signed by ROLAND Burdick at 2018  7:48 AM       Consult Notes (last 24 hours) (Notes from 4/3/2018  9:38 AM through 2018  9:38 AM)     No notes of this type exist for this encounter.        Nutrition Notes (last 24 hours) (Notes from 4/3/2018  9:38 AM through 2018  9:38 AM)     No notes of this type exist for this encounter.           Physical Therapy Notes (last 24 hours) (Notes from 4/3/2018  9:38 AM through 2018  9:38 AM)      Renee Thomason PTA at 4/3/2018 11:37 AM  Version 1 of 1         Problem: Patient Care Overview  Goal: Plan of Care Review  Outcome: Ongoing (interventions implemented as appropriate)   18 1106   Coping/Psychosocial   Plan of Care Reviewed With patient   Plan of Care Review   Progress improving   OTHER   Outcome Summary Pt. agreeable to therapy. Pt.. was supervision to stand and sit from chair. Pt. walked 80' with CGA x 1. Pt required cues for narrow base and short steps to maintain balance. Pt. performed active leg exercises prior to walk. Will continue to work with pt. on strengthening and endurance.            Electronically signed by Renee Thomason PTA at 4/3/2018 11:37 AM     Renee Thomason PTA at 4/3/2018 11:38 AM  Version 1 of 1         Acute Care - Physical Therapy Treatment Note  [facility]     Patient Name: Teri Tim  : 1960  MRN: [id number]  Today's Date: 4/3/2018  Onset of Illness/Injury or Date of Surgery: 18  Date of Referral to PT: 18  Referring Physician: Dr. Loo    Admit Date: 3/30/2018    Visit Dx:     ICD-10-CM ICD-9-CM   1. COPD exacerbation J44.1 491.21   2. Acute on chronic respiratory failure with hypoxia and hypercapnia J96.21 518.84    J96.22 786.09     799.02   3. Impaired functional mobility and activity tolerance Z74.09 V49.89   4. Impaired mobility and ADLs Z74.09 799.89     Patient Active Problem List   Diagnosis   • NSTEMI (non-ST elevated myocardial infarction)   • Abnormal LFTs   • Abnormal US (ultrasound) of abdomen   • COPD exacerbation       Therapy Treatment    Therapy Treatment / Health Promotion    Treatment Time/Intention  Discipline: physical therapy assistant (04/03/18 1106 : Renee Thomason PTA)  Document Type: therapy note (daily note) (04/03/18 1106 : Renee Thomason PTA)  Subjective Information: complains of, pain, weakness (04/03/18 1106 : Renee Thomason PTA)  Mode of Treatment: physical therapy (04/03/18 1106 : Renee Thomason PTA)  Existing Precautions/Restrictions: fall, oxygen therapy device and L/min (04/03/18 1106 : Renee Thomason PTA)  Plan of Care Review  Plan of Care Reviewed With: patient (04/03/18 1106 : Renee Thomason PTA)    Vitals/Pain/Safety  Vital Signs  Pre SpO2 (%): 97 (04/03/18 1106 : Renee Thomason PTA)  O2 Delivery Pre Treatment: supplemental O2 (2L) (04/03/18 1106 : Renee Thomason PTA)  O2 Delivery Intra Treatment: supplemental O2 (2L) (04/03/18 1106 : Renee Thomason PTA)  Post SpO2 (%): 90 (increased to 93% within 2 minutes) (04/03/18 1106 : Renee Thomason PTA)  O2 Delivery Post Treatment: supplemental O2 (2L) (04/03/18 1106 : Renee Thomason PTA)  Pre Patient Position: Sitting (04/03/18 1106 : Renee Thomason PTA)  Intra Patient Position: Standing (04/03/18 1106 : Renee Thomason PTA)  Post Patient Position: Sitting (04/03/18 1106 : Renee Thomason PTA)  Pain Scale: Numbers Pre/Post-Treatment  Pain Scale: Numbers, Pretreatment: 5/10 (04/03/18 1106 : Renee Thomason PTA)  Pain Location -  Orientation: lower (04/03/18 1106 : Renee Thomason PTA)  Pain Location: back (04/03/18 1106 : Renee Thomason PTA)  Pain Intervention(s): Repositioned (04/03/18 1106 : Renee Thomason PTA)  Pain Scale: Word Pre/Post-Treatment  Pain Location - Orientation: lower (04/03/18 1106 : Renee Thomason PTA)  Pain Location: back (04/03/18 1106 : Renee Thomason PTA)  Pain Intervention(s): Repositioned (04/03/18 1106 : Renee Thomason PTA)  Pain Scale: FACES Pre/Post-Treatment  Pain Location - Orientation: lower (04/03/18 1106 : Renee Thomason PTA)  Pain Location: back (04/03/18 1106 : Renee Thomason PTA)  Pain Intervention(s): Repositioned (04/03/18 1106 : Renee Thomason PTA)  Positioning and Restraints  Pre-Treatment Position: sitting in chair/recliner (04/03/18 1106 : Renee Thomason PTA)  Post Treatment Position: chair (04/03/18 1106 : Renee Thomason PTA)  In Chair: sitting, call light within reach, with family/caregiver (04/03/18 1106 : Renee Thomason PTA)    Mobility,ADL,Motor, Modality  Bed Mobility Assessment/Treatment  Comment (Bed Mobility): up in chair (04/03/18 1106 : Renee Thomason PTA)  Sit-Stand Transfer  Sit-Stand [place] (Transfers): supervision (04/03/18 1106 : Renee Thomason PTA)  Stand-Sit Transfer  Stand-Sit [place] (Transfers): supervision (04/03/18 1106 : Renee Thomason PTA)  Gait/Stairs Assessment/Training  [place] Level (Gait): contact guard, verbal cues (04/03/18 1106 : Renee Thomason PTA)  Distance in Feet (Gait): 80 (04/03/18 1106 : Renee Thomason, PTA)  Deviations/Abnormal Patterns (Gait): stride length decreased, base of support, narrow, katja decreased (04/03/18 1106 : Renee Thomason, PTA)  Bilateral Gait Deviations: heel strike decreased (04/03/18 1106 : Renee Thomason, PTA)     Lower Extremity Seated Therapeutic Exercise  Performed, Seated Lower Extremity (Therapeutic Exercise): hip  abduction/adduction, hip flexion/extension, LAQ (long arc quad), knee extension, ankle dorsiflexion/plantarflexion (04/03/18 1106 : Renee Thomason PTA)  Exercise Type, Seated Lower Extremity (Therapeutic Exercise): AROM (active range of motion) (04/03/18 1106 : Renee Thomason PTA)  Expected Outcomes, Seated Lower Extremity (Therapeutic Exercise): strengthen normal movement patterns (04/03/18 1106 : Renee Thomason PTA)  Sets/Reps Detail, Seated Lower Extremity (Therapeutic Exercise): 15 (04/03/18 1106 : Renee Thomason PTA)  Comment, Seated Lower Extremity (Therapeutic Exercise): very slow (04/03/18 1106 : Renee Thomason PTA)           ROM/MMT             Sensory, Edema, Orthotics          Cognition, Communication, Swallow  Speaking Valve  Pre SpO2 (%): 97 (04/03/18 1106 : Renee Thomason PTA)  Post SpO2 (%): 90 (increased to 93% within 2 minutes) (04/03/18 1106 : Renee Thomason PTA)  General Eating/Swallowing Observations  Pre SpO2 (%): 97 (04/03/18 1106 : Renee Thomason PTA)  Post SpO2 (%): 90 (increased to 93% within 2 minutes) (04/03/18 1106 : Renee Thomason PTA)    Outcome Summary               PT Rehab Goals     Row Name 04/02/18 2637             Bed Mobility Goal 1 (PT)    Activity/Assistive Device (Bed Mobility Goal 1, PT) rolling to left;rolling to right;sit to supine/supine to sit  -LYNETTE (r) CS (t) LYNETTE (c)      [place] Level/Cues Needed (Bed Mobility Goal 1, PT) independent  -LYNETTE (r) CS (t) LYNETTE (c)      Time Frame (Bed Mobility Goal 1, PT) long term goal (LTG);10 days  -LYNETTE (r) CS (t) LYNETTE (c)      Barriers (Bed Mobility Goal 1, PT) medically complex  -LYNETTE (r) CS (t) LYNETTE (c)      Progress/Outcomes (Bed Mobility Goal 1, PT) goal ongoing  -LYNETTE (r) CS (t) LYNETTE (c)         Transfer Goal 1 (PT)    Activity/Assistive Device (Transfer Goal 1, PT) sit-to-stand/stand-to-sit;bed-to-chair/chair-to-bed  -LYNETTE (r) CS (t) LYENTTE (c)      [place] Level/Cues Needed (Transfer Goal 1, PT)  supervision required  -LYNETTE (r) CS (t) LYNETTE (c)      Time Frame (Transfer Goal 1, PT) long term goal (LTG);10 days  -LYNETTE (r) CS (t) LYNETTE (c)      Barriers (Transfers Goal 1, PT) medically complex  -LYNETTE (r) CS (t) LYNETTE (c)      Progress/Outcome (Transfer Goal 1, PT) goal ongoing  -LYNETTE (r) CS (t) LYNETTE (c)         Gait Training Goal 1 (PT)    Activity/Assistive Device (Gait Training Goal 1, PT) gait (walking locomotion)  -LYNETTE (r) CS (t) LYNETTE (c)      [place] Level (Gait Training Goal 1, PT) contact guard assist  -LYNETTE (r) CS (t) LYNETTE (c)      Distance (Gait Goal 1, PT) 50ft  -LYNETTE (r) CS (t) LYNETTE (c)      Time Frame (Gait Training Goal 1, PT) long term goal (LTG);10 days  -LYNETTE (r) CS (t) LYNETTE (c)      Barriers (Gait Training Goal 1, PT) medically complex  -LYNETTE (r) CS (t) LYNETTE (c)      Progress/Outcome (Gait Training Goal 1, PT) goal ongoing  -LYNETTE (r) CS (t) LYNETTE (c)        User Key  (r) = Recorded By, (t) = Taken By, (c) = Cosigned By    Initials Name Provider Type    LYNETTE DelR eal, PT DPT Physical Therapist    YONAS Olivier, PT Student PT Student          Physical Therapy Education     Title: PT OT SLP Therapies (Active)     Topic: Physical Therapy (Active)     Point: Mobility training (Active)    Learning Progress Summary     Learner Status Readiness Method Response Comment Documented by    Patient Active Acceptance E,D NR exercises, gait, and benefits of activity  04/03/18 1136     Active Acceptance E NR pt educated on progression of POC  04/02/18 1046    Family Active Acceptance E NR pt educated on progression of POC  04/02/18 1046          Point: Home exercise program (Active)    Learning Progress Summary     Learner Status Readiness Method Response Comment Documented by    Patient Active Acceptance E,D NR exercises, gait, and benefits of activity  04/03/18 1136                      User Key     Initials Effective Dates Name Provider Type [facility] 08/02/16 -  Renee Thomason, PTA Physical Therapy Assistant PT     YONAS 01/08/18 -  Gary Olivier, PT Student PT Student PT                    PT Recommendation and Plan     Plan of Care Reviewed With: patient  Progress: improving  Outcome Summary: Pt. agreeable to therapy. Pt.. was supervision to stand and sit from chair. Pt. walked 80' with CGA x 1. Pt required cues for narrow base and short steps to maintain balance. Pt. performed active leg exercises prior to walk. Will continue to work with pt. on strengthening and endurance.           Outcome Measures     Row Name 04/03/18 1106 04/03/18 1000 04/02/18 1100       How much help from another person do you currently need...    Turning from your back to your side while in flat bed without using bedrails? 4  -MF  --  --    Moving from lying on back to sitting on the side of a flat bed without bedrails? 4  -MF  --  --    Moving to and from a bed to a chair (including a wheelchair)? 3  -MF  --  --    Standing up from a chair using your arms (e.g., wheelchair, bedside chair)? 4  -MF  --  --    Climbing 3-5 steps with a railing? 3  -MF  --  --    To walk in hospital room? 3  -MF  --  --    AM-PAC 6 Clicks Score 21  -MF  --  --       How much help from another is currently needed...    Putting on and taking off regular lower body clothing?  -- 3  -TS 2  -MM    Bathing (including washing, rinsing, and drying)  -- 3  -TS 2  -MM    Toileting (which includes using toilet bed pan or urinal)  -- 4  -TS 2  -MM    Putting on and taking off regular upper body clothing  -- 4  -TS 2  -MM    Taking care of personal grooming (such as brushing teeth)  -- 3  -TS 3  -MM    Eating meals  -- 4  -TS 3  -MM    Score  -- 21  -TS 14  -MM       Functional Assessment    Outcome Measure Options AM-PAC 6 Clicks Basic Mobility (PT)  -MF AM-PAC 6 Clicks Daily Activity (OT)  -TS AM-PAC 6 Clicks Daily Activity (OT)  -MM    Row Name 04/02/18 0945             How much help from another person do you currently need...    Turning from your back to your side while  in flat bed without using bedrails? 4  -LYNETTE (r) CS (t) LYNETTE (c)      Moving from lying on back to sitting on the side of a flat bed without bedrails? 4  -LYNETTE (r) CS (t) LYNETTE (c)      Moving to and from a bed to a chair (including a wheelchair)? 3  -LYNETTE (r) CS (t) LYNETTE (c)      Standing up from a chair using your arms (e.g., wheelchair, bedside chair)? 3  -LYNETTE (r) CS (t) LYNETTE (c)      Climbing 3-5 steps with a railing? 2  -LYNETTE (r) CS (t) LYNETTE (c)      To walk in hospital room? 2  -LYNETTE (r) CS (t) LYNETTE (c)      AM-PAC 6 Clicks Score 18  -LYNETTE (r) CS (t)         Functional Assessment    Outcome Measure Options AM-PAC 6 Clicks Basic Mobility (PT)  -LYNETTE (r) CS (t) LYNETTE (c)        User Key  (r) = Recorded By, (t) = Taken By, (c) = Cosigned By    Initials Name Provider Type     Harper Gamboa, HAWKINS/L Occupational Therapy Assistant    LYNETTE Del Real, PT DPT Physical Therapist    LAUREN Thomason PTA Physical Therapy Assistant    MM Dariel Randolph, OTR/L Occupational Therapist    CS Gary Olivier, PT Student PT Student           Time Calculation:         PT Charges     Row Name 04/03/18 1137             Time Calculation    Start Time 1106  -      Stop Time 1130  -      Time Calculation (min) 24 min  -      PT Received On 04/03/18  -      PT Goal Re-Cert Due Date 04/12/18  -         Time Calculation- PT    Total Timed Code Minutes- PT 24 minute(s)  -        User Key  (r) = Recorded By, (t) = Taken By, (c) = Cosigned By    Initials Name Provider Type     Renee Thomason PTA Physical Therapy Assistant          Therapy Charges for Today     Code Description Service Date Service Provider Modifiers Qty    43674503848 HC GAIT TRAINING EA 15 MIN 4/3/2018 Renee Thomason PTA GP, KX 1    08717589642 HC PT THER PROC EA 15 MIN 4/3/2018 Renee Thomason PTA GP, KX 1          PT G-Codes  Outcome Measure Options: AM-PAC 6 Clicks Basic Mobility (PT)  Score: 18  Functional Limitation: Mobility: Walking and moving  around  Mobility: Walking and Moving Around Current Status (): At least 40 percent but less than 60 percent impaired, limited or restricted  Mobility: Walking and Moving Around Goal Status (): At least 20 percent but less than 40 percent impaired, limited or restricted    Renee Thomason PTA  4/3/2018         Electronically signed by Renee Thomason PTA at 4/3/2018 11:38 AM          Occupational Therapy Notes (last 24 hours) (Notes from 4/3/2018  9:38 AM through 2018  9:38 AM)      MARY Artis at 4/3/2018 10:50 AM          Acute Care - Occupational Therapy Treatment Note  [facility]     Patient Name: Teri Tim  : 1960  MRN: [id number]  Today's Date: 4/3/2018  Onset of Illness/Injury or Date of Surgery: 18  Date of Referral to OT: 18  Referring Physician: Dr. Loo    Admit Date: 3/30/2018       ICD-10-CM ICD-9-CM   1. COPD exacerbation J44.1 491.21   2. Acute on chronic respiratory failure with hypoxia and hypercapnia J96.21 518.84    J96.22 786.09     799.02   3. Impaired functional mobility and activity tolerance Z74.09 V49.89   4. Impaired mobility and ADLs Z74.09 799.89     Patient Active Problem List   Diagnosis   • NSTEMI (non-ST elevated myocardial infarction)   • Abnormal LFTs   • Abnormal US (ultrasound) of abdomen   • COPD exacerbation     Past Medical History:   Diagnosis Date   • CHF (congestive heart failure)    • COPD (chronic obstructive pulmonary disease)    • Hypertension    • Pneumonia      Past Surgical History:   Procedure Laterality Date   • CARPAL TUNNEL RELEASE     • HYSTERECTOMY      complete       Therapy Treatment    Therapy Treatment / Health Promotion    Treatment Time/Intention  Discipline: occupational therapy assistant (18 0952 : MARY Artis)  Document Type: therapy note (daily note) (18 0952 : MARY Artis)  Subjective Information: complains of, weakness, fatigue (18  0952 : Harper Gamboa HAWKINS/L)  Patient Effort: good (04/03/18 0952 : Harper Gamboa HAWKINS/L)  Existing Precautions/Restrictions: fall, oxygen therapy device and L/min (2L) (04/03/18 0952 : Harper Gamboa HAWKINS/L)  Treatment Considerations/Comments: COPD (04/03/18 0952 : Harper Gamboa HAWKINS/L)  Plan of Care Review  Plan of Care Reviewed With: patient (04/03/18 1048 : Harper Gamboa HAWKINS/L)    Vitals/Pain/Safety  Vital Signs  Post SpO2 (%): 92 (04/03/18 0952 : Harper Gamboa HAWKINS/L)  O2 Delivery Post Treatment: supplemental O2 (04/03/18 0952 : Harper Gamboa HAWKINS/L)  Post Patient Position: Sitting (04/03/18 0952 : Harper Gamboa HAWKINS/L)  Positioning and Restraints  Pre-Treatment Position: in bed (04/03/18 0952 : Harper Gamboa HAWKINS/L)  Post Treatment Position: chair (04/03/18 0952 : Harper Gamboa HAWKINS/L)  In Chair: reclined, call light within reach, encouraged to call for assist, with family/caregiver (04/03/18 0952 : Harper Gamboa HAWKINS/L)    Mobility,ADL,Motor, Modality  Bed Mobility Assessment/Treatment  Bed Mobility Assessment/Treatment: supine-sit (04/03/18 0952 : Harper Gamboa HAWKINS/L)  Supine-Sit [place] (Bed Mobility): supervision (04/03/18 0952 : Harper Gamboa HAWKINS/L)  Assistive Device (Bed Mobility): bed rails, head of bed elevated (04/03/18 0952 : Harper Gamboa HAWKINS/L)  Transfer Assessment/Treatment  Transfer Assessment/Treatment: sit-stand transfer, stand-sit transfer, toilet transfer (04/03/18 0952 : Harper Gamboa HAWKINS/L)  Sit-Stand Transfer  Sit-Stand [place] (Transfers): supervision (04/03/18 0952 : MARY Artis)  Stand-Sit Transfer  Stand-Sit [place] (Transfers): supervision (04/03/18 0952 : MARY Artis)  Toilet Transfer  Type (Toilet Transfer): sit-stand, stand-sit (04/03/18 0952 : MARY Artis)  [place] Level (Toilet Transfer): supervision  (04/03/18 0952 : Harper Gamboa, HAWKINS/L)  Assistive Device (Toilet Transfer): commode, bedside with drop arms (04/03/18 0952 : Harper Gamboa, HAWKINS/L)  ADL Assessment/Intervention  BADL Assessment/Intervention: toileting (04/03/18 0952 : Harper Gamboa, HAWKINS/L)  Toileting Assessment/Training  [place] Level (Toileting): toileting skills, set up, supervision (04/03/18 0952 : Harper Gamboa, HAWKINS/L)  Assistive Devices (Toileting): commode, bedside with drop arms (04/03/18 0952 : Harper Gamboa, HAWKINS/L)  Toileting Position: unsupported sitting, unsupported standing (04/03/18 0952 : Harper Gamboa, HAWKINS/L)              ROM/MMT             Sensory, Edema, Orthotics          Cognition, Communication, Swallow  Speaking Valve  Post SpO2 (%): 92 (04/03/18 0952 : Harper Gamboa, HAWKINS/L)  General Eating/Swallowing Observations  Post SpO2 (%): 92 (04/03/18 0952 : Harper Gamboa, HAWKINS/L)    Outcome Summary           OT Rehab Goals     Row Name 04/02/18 0958             Dressing Goal 1 (OT)    Activity/Assistive Device (Dressing Goal 1, OT) dressing skills, all  -MM      [place]/Cues Needed (Dressing Goal 1, OT) supervision required;set-up required  -MM      Time Frame (Dressing Goal 1, OT) 10 days  -MM      Progress/Outcome (Dressing Goal 1, OT) goal ongoing  -MM         Toileting Goal 1 (OT)    Activity/Device (Toileting Goal 1, OT) toileting skills, all  -MM      [place] Level/Cues Needed (Toileting Goal 1, OT) supervision required;set-up required  -MM      Time Frame (Toileting Goal 1, OT) 10 days  -MM      Progress/Outcome (Toileting Goal 1, OT) goal ongoing  -MM         Strength Goal 1 (OT)    Strength Goal 1 (OT) Pt will independently maintain BUE AROM HEP to increase strength to 4/5 and increase activity tolerance.   -MM      Time Frame (Strength Goal 1, OT) 10 days  -MM      Progress/Outcome (Strength Goal 1, OT) goal ongoing  -MM        User Key  (r) = Recorded By,  (t) = Taken By, (c) = Cosigned By    Initials Name Provider Type    MM Dariel Randolph, OTR/L Occupational Therapist        Occupational Therapy Education     Title: PT OT SLP Therapies (Active)     Topic: Occupational Therapy (Active)     Point: ADL training (Done)     Description: Instruct learner(s) on proper safety adaptation and remediation techniques during self care or transfers.   Instruct in proper use of assistive devices.   Learning Progress Summary     Learner Status Readiness Method Response Comment Documented by    Patient Done Acceptance E VU benefits of activity, work simplification, energy conservation  04/03/18 1047     Done Acceptance E VU OT role, benefits, and POC  04/02/18 1200                      User Key     Initials Effective Dates Name Provider Type Discipline     08/02/16 -  SHRUTHI Artis/L Occupational Therapy Assistant OT     03/07/18 -  Dariel Randolph, OTR/L Occupational Therapist OT                  OT Recommendation and Plan     Plan of Care Review  Plan of Care Reviewed With: patient  Plan of Care Reviewed With: patient  Outcome Summary: Pt educated on work simplification and energy conservation. Pt S for bed mobility, transfers and ambulation in room. Pt encouraged to be OOB as tolerated. Continue OT POC         Outcome Measures     Row Name 04/03/18 1000 04/02/18 1100 04/02/18 0945       How much help from another person do you currently need...    Turning from your back to your side while in flat bed without using bedrails?  --  -- 4  -LYNETTE (r) CS (t) LYNETTE (c)    Moving from lying on back to sitting on the side of a flat bed without bedrails?  --  -- 4  -LYNETTE (r) CS (t) LYNETTE (c)    Moving to and from a bed to a chair (including a wheelchair)?  --  -- 3  -LYNETTE (r) CS (t) LYNETTE (c)    Standing up from a chair using your arms (e.g., wheelchair, bedside chair)?  --  -- 3  -LYNETTE (r) CS (t) LYNETTE (c)    Climbing 3-5 steps with a railing?  --  -- 2  -LYNETTE (r) CS (t) LYNETTE (c)    To walk  in hospital room?  --  -- 2  -LYNETTE (r) CS (t) LYNETTE (c)    AM-PAC 6 Clicks Score  --  -- 18  -LYNETTE (r) CS (t)       How much help from another is currently needed...    Putting on and taking off regular lower body clothing? 3  -TS 2  -MM  --    Bathing (including washing, rinsing, and drying) 3  -TS 2  -MM  --    Toileting (which includes using toilet bed pan or urinal) 4  -TS 2  -MM  --    Putting on and taking off regular upper body clothing 4  -TS 2  -MM  --    Taking care of personal grooming (such as brushing teeth) 3  -TS 3  -MM  --    Eating meals 4  -TS 3  -MM  --    Score 21  -TS 14  -MM  --       Functional Assessment    Outcome Measure Options AM-PAC 6 Clicks Daily Activity (OT)  -TS AM-PAC 6 Clicks Daily Activity (OT)  -MM AM-PAC 6 Clicks Basic Mobility (PT)  -LYNETTE (r) CS (t) LYNETTE (c)      User Key  (r) = Recorded By, (t) = Taken By, (c) = Cosigned By    Initials Name Provider Type    TS SHRUTHI Artis/L Occupational Therapy Assistant    LYNETTE Del Real, PT DPT Physical Therapist    MM Dariel Randolph, OTR/L Occupational Therapist    CS Gary Olivier, PT Student PT Student           Time Calculation:         Time Calculation- OT     Row Name 04/03/18 1049             Time Calculation- OT    OT Start Time 0952  -TS      OT Stop Time 1049  -TS      OT Time Calculation (min) 57 min  -TS      Total Timed Code Minutes- OT 57 minute(s)  -TS      OT Received On 04/03/18  -TS        User Key  (r) = Recorded By, (t) = Taken By, (c) = Cosigned By    Initials Name Provider Type    TS SHRUTIH Artis/L Occupational Therapy Assistant           Therapy Charges for Today     Code Description Service Date Service Provider Modifiers Qty    69086713593 HC OT SELF CARE/MGMT/TRAIN EA 15 MIN 4/3/2018 SHRUTHI Artis/L GO, KX 4          OT G-codes  OT Professional Judgement Used?: Yes  OT Functional Scales Options: AM-PAC 6 Clicks Daily Activity (OT)  Score: 14  Functional Limitation: Self  care  Self Care Current Status (): At least 40 percent but less than 60 percent impaired, limited or restricted  Self Care Goal Status (): At least 1 percent but less than 20 percent impaired, limited or restricted    MARY Figueroa  4/3/2018    Electronically signed by MARY Artis at 4/3/2018 10:50 AM     Harper N Cooper, HAWKINS/L at 4/3/2018 10:49 AM          Problem: Patient Care Overview  Goal: Plan of Care Review  Outcome: Ongoing (interventions implemented as appropriate)   04/03/18 1048   Coping/Psychosocial   Plan of Care Reviewed With patient   Plan of Care Review   Progress improving   OTHER   Outcome Summary Pt educated on work simplification and energy conservation. Pt S for bed mobility, transfers and ambulation in room. Pt encouraged to be OOB as tolerated. Continue OT POC            Electronically signed by MARY Artis at 4/3/2018 10:49 AM       Speech Language Pathology Notes (last 24 hours) (Notes from 4/3/2018  9:38 AM through 4/4/2018  9:38 AM)     No notes of this type exist for this encounter.           Respiratory Therapy Notes (last 24 hours) (Notes from 4/3/2018  9:38 AM through 4/4/2018  9:38 AM)      Mary Betancur RRT at 4/3/2018 12:19 PM        MESSAGE LEFT WITH LEGACY THAT PT IS REQUESTING NEW MASK FOR HOME TRILOGY.    Electronically signed by Mary Betancur RRT at 4/3/2018 12:20 PM

## 2018-04-04 NOTE — PLAN OF CARE
Problem: Patient Care Overview  Goal: Plan of Care Review  Outcome: Ongoing (interventions implemented as appropriate)   04/04/18 1004 04/04/18 1548   Coping/Psychosocial   Plan of Care Reviewed With patient --    Plan of Care Review   Progress improving --    OTHER   Outcome Summary --  Pt up to chair with meals and up twice with therapy this shift. ADELAIDA saw this morning and helped with ADL's. Pt c/o lower back pain, medicated for pain x 2 with good results. VSS, willc ontinue to monitor.        Problem: Chronic Obstructive Pulmonary Disease (Adult)  Goal: Signs and Symptoms of Listed Potential Problems Will be Absent, Minimized or Managed (Chronic Obstructive Pulmonary Disease)  Outcome: Ongoing (interventions implemented as appropriate)      Problem: Pain, Acute (Adult)  Goal: Acceptable Pain Control/Comfort Level  Outcome: Ongoing (interventions implemented as appropriate)      Problem: Fall Risk (Adult)  Goal: Absence of Fall  Outcome: Ongoing (interventions implemented as appropriate)

## 2018-04-04 NOTE — PROGRESS NOTES
Pt is on home oxygen, a trilogy,neb txs, and inhalers.  She states that she has no questions at this time about her equipment.  She uses [employer] as her DME.  Handouts were given to pt and instructed to let us know if she has any further questions.

## 2018-04-04 NOTE — PLAN OF CARE
Problem: Patient Care Overview  Goal: Plan of Care Review  Outcome: Ongoing (interventions implemented as appropriate)   04/04/18 0819   Coping/Psychosocial   Plan of Care Reviewed With patient   Plan of Care Review   Progress improving   OTHER   Outcome Summary Pt ambulating with S with O2 in room and bathroom. Pt transfers with S. Pt educated on work simplification and energy conservation techniques. Pt completed LB dressing with S along with toileting and grooming. Pt encouraged to remain OOB as tolerated. Pt would benefit from HH at discharge for continued strength and endurance. Continue OT POC

## 2018-04-04 NOTE — PROGRESS NOTES
Continued Stay Note   Janette     Patient Name: Teri Tim  MRN: [id number]  Today's Date: 4/4/2018    Admit Date: 3/30/2018          Discharge Plan     Row Name 04/04/18 0941       Plan    Plan SNF VS HOME HEALTH    Patient/Family in Agreement with Plan yes    Plan Comments Spoke with pt/spouse in room to encouarge pt to be more active here so she can go home with HH. She insist she needs rehab before returning home and wishes to go to nursing home. With pt's age/having trilogy machine/[facility] it will be difficult to find placement if at all, informed pt/spouse.  They also did not want listed too far which will be another factor. Will list and inform if bed offered. Will follow.               Discharge Codes    No documentation.           KAREN Hardin

## 2018-04-04 NOTE — PLAN OF CARE
Problem: Patient Care Overview  Goal: Plan of Care Review  Outcome: Ongoing (interventions implemented as appropriate)   04/04/18 1004   Coping/Psychosocial   Plan of Care Reviewed With patient   Plan of Care Review   Progress improving   OTHER   Outcome Summary Pt. continues to improve with mobility. Pt. is supervision for transfers and toileting. Pt. walked 100' x 2 with CGA x 1. Pt. required cues to take bigger steps throughout walk. Pt. performed active leg exercises. Pt. is progressing towards goals.

## 2018-04-04 NOTE — PROGRESS NOTES
[facility] Medicine Services  INPATIENT PROGRESS NOTE    Length of Stay: 5  Date of Admission: 3/30/2018  Primary Care Physician: ROLAND Wayne    Subjective   Chief Complaint: follow up COPD  HPI   Pt is awake and alert. STates she is breathing some better. Walked 80 feet with physical therapy and sat in chair yesterday. She requires great encouragement to work with therapy. States her back hurts. Family at bedside. She wore her trilogy last night with a new mask. No bowel movement since admission.     Review of Systems   All pertinent negatives and positives are as above. All other systems have been reviewed and are negative unless otherwise stated.     Objective    Temp:  [97.8 °F (36.6 °C)-98.7 °F (37.1 °C)] 98.4 °F (36.9 °C)  Heart Rate:  [65-97] 66  Resp:  [12-18] 16  BP: (114-137)/(52-78) 121/68  Physical Exam   Constitutional: She is oriented to person, place, and time. She appears well-developed.   Chronically ill appearing.    HENT:   Head: Normocephalic and atraumatic.   Eyes: Conjunctivae and EOM are normal. Pupils are equal, round, and reactive to light.   Neck: Neck supple. No JVD present. No thyromegaly present.   Cardiovascular: Normal rate, regular rhythm, normal heart sounds and intact distal pulses.  Exam reveals no gallop and no friction rub.    No murmur heard.  Pulmonary/Chest: Effort normal. No respiratory distress. Wheezes: left greater than right.  She has no rales. She exhibits no tenderness.   Abdominal: Soft. Bowel sounds are normal. She exhibits no distension. There is no tenderness. There is no rebound and no guarding.   Musculoskeletal: Normal range of motion. She exhibits no edema, tenderness or deformity.   Lymphadenopathy:     She has no cervical adenopathy.   Neurological: She is alert and oriented to person, place, and time. She displays normal reflexes. No cranial nerve deficit. She exhibits normal muscle tone.   Skin: Skin is warm  and dry. No rash noted.   Psychiatric: She has a normal mood and affect. Her behavior is normal. Judgment and thought content normal.   Vitals reviewed.    Results Review:  I have reviewed the labs, radiology results, and diagnostic studies.    Laboratory Data:     Results from last 7 days  Lab Units 04/04/18  0539 04/03/18  0453 04/01/18  0455   WBC 10*3/mm3 7.76 9.84 14.85*   HEMOGLOBIN g/dL 10.8* 11.3* 11.2*   HEMATOCRIT % 35.4* 37.2 37.7   PLATELETS 10*3/mm3 178 189 259          Results from last 7 days  Lab Units 04/04/18  0539 04/03/18  0453 04/02/18  0610 04/01/18  0455 03/31/18  0535 03/30/18  1224   SODIUM mmol/L 126* 127* 126* 132* 133* 129*   POTASSIUM mmol/L 4.5 3.9 3.4* 4.3 4.2 4.4   CHLORIDE mmol/L 87* 82* 77* 78* 80* 85*   CO2 mmol/L 35.0* >40.0* >40.0* >40.0* >40.0* 38.0*   BUN mg/dL 15 20 23* 32* 23* 22*   CREATININE mg/dL 0.66 0.79 0.86 1.11 1.10 1.16   CALCIUM mg/dL 8.6 8.4 8.6 8.6 8.7 8.7   BILIRUBIN mg/dL  --   --   --  1.1* 1.2* 1.1*   ALK PHOS U/L  --   --   --  83 97 107   ALT (SGPT) U/L  --   --   --  87* 108* 127*   AST (SGOT) U/L  --   --   --  38 47* 67*   GLUCOSE mg/dL 92 102* 104* 113* 133* 108*       Culture Data:   Blood Culture   Date Value Ref Range Status   03/30/2018 No growth at 4 days  Preliminary   03/30/2018 No growth at 4 days  Preliminary     Respiratory Culture   Date Value Ref Range Status   04/02/2018 Light growth (2+) Normal Respiratory [place]  Final       Radiology Data:   Imaging Results (last 24 hours)     ** No results found for the last 24 hours. **          I have reviewed the patient current medications.     Assessment/Plan   Assessment:  1. Chronic obstructive pulmonary disease with acute exacerbation  2. Acute on chronic hypoxic and hypercarbic respiratory failure secondary to above  3. Acute on chronic diastolic congestive heart failure, last known EF 55%  4. Hyponatremia-likely diuretic related  5. Pulmonary hypertension  6. Elevated LFT's  7. Low TSH-low T4.  Normal T3.    8. Hyperglycemia, mild. Patient also on steroid therapy. Hgb A1c 6.1  9. Hypertension-off beta blocker for now.   10. Leukocytosis-steroid effect.   11. Hyponatremia-concern for hypovolemic hyponatremia-now on IV fluids.      Plan:  1. Doxycycline day 4  2. Her sodium remains about the same. She appears euvolemic. Will discontinue IV fluids and give dose of Samsca.   3. Will continue IV steroids for now. Will transition to oral tomorrow.   4. Will ask  to assist with disposition. Pt states she would consider SNF depending on her progress with therapy.   5. Repeat labs in am.   6. Begin Lactulose.     Discharge Planning: I expect the patient to be discharged to ? in ? days.    ROLAND Burdick   04/04/18   7:01 AM     I personally evaluated and examined the patient in conjunction with  ROLAND Bryant and agree with the assessment, treatment plan, and disposition of the patient as recorded by her.      Vitals:    04/04/18 1208   BP: 130/75   Pulse: 87   Resp: 16   Temp: 98.5 °F (36.9 °C)   SpO2: 99%     Samsca added. F/u BMP.  Home soon  Gonsalo Loo MD  04/04/18  12:21 PM

## 2018-04-05 VITALS
OXYGEN SATURATION: 99 % | WEIGHT: 111.6 LBS | HEIGHT: 61 IN | HEART RATE: 95 BPM | RESPIRATION RATE: 18 BRPM | SYSTOLIC BLOOD PRESSURE: 132 MMHG | TEMPERATURE: 97.2 F | BODY MASS INDEX: 21.07 KG/M2 | DIASTOLIC BLOOD PRESSURE: 64 MMHG

## 2018-04-05 LAB
ANION GAP SERPL CALCULATED.3IONS-SCNC: 2 MMOL/L (ref 4–13)
BASOPHILS # BLD AUTO: 0.01 10*3/MM3 (ref 0–0.2)
BASOPHILS NFR BLD AUTO: 0.1 % (ref 0–2)
BUN BLD-MCNC: 15 MG/DL (ref 5–21)
BUN/CREAT SERPL: 20.8 (ref 7–25)
CALCIUM SPEC-SCNC: 8.5 MG/DL (ref 8.4–10.4)
CHLORIDE SERPL-SCNC: 93 MMOL/L (ref 98–110)
CO2 SERPL-SCNC: 37 MMOL/L (ref 24–31)
CREAT BLD-MCNC: 0.72 MG/DL (ref 0.5–1.4)
DEPRECATED RDW RBC AUTO: 52.6 FL (ref 40–54)
EOSINOPHIL # BLD AUTO: 0 10*3/MM3 (ref 0–0.7)
EOSINOPHIL NFR BLD AUTO: 0 % (ref 0–4)
ERYTHROCYTE [DISTWIDTH] IN BLOOD BY AUTOMATED COUNT: 17.3 % (ref 12–15)
GFR SERPL CREATININE-BSD FRML MDRD: 84 ML/MIN/1.73
GLUCOSE BLD-MCNC: 100 MG/DL (ref 70–100)
GLUCOSE BLDC GLUCOMTR-MCNC: 83 MG/DL (ref 70–130)
GLUCOSE BLDC GLUCOMTR-MCNC: 88 MG/DL (ref 70–130)
HCT VFR BLD AUTO: 34.8 % (ref 37–47)
HGB BLD-MCNC: 10.5 G/DL (ref 12–16)
IMM GRANULOCYTES # BLD: 0.06 10*3/MM3 (ref 0–0.03)
IMM GRANULOCYTES NFR BLD: 0.7 % (ref 0–5)
LYMPHOCYTES # BLD AUTO: 0.66 10*3/MM3 (ref 0.72–4.86)
LYMPHOCYTES NFR BLD AUTO: 7.7 % (ref 15–45)
MCH RBC QN AUTO: 25.1 PG (ref 28–32)
MCHC RBC AUTO-ENTMCNC: 30.2 G/DL (ref 33–36)
MCV RBC AUTO: 83.1 FL (ref 82–98)
MONOCYTES # BLD AUTO: 0.65 10*3/MM3 (ref 0.19–1.3)
MONOCYTES NFR BLD AUTO: 7.6 % (ref 4–12)
NEUTROPHILS # BLD AUTO: 7.18 10*3/MM3 (ref 1.87–8.4)
NEUTROPHILS NFR BLD AUTO: 83.9 % (ref 39–78)
NRBC BLD MANUAL-RTO: 0 /100 WBC (ref 0–0)
PLATELET # BLD AUTO: 163 10*3/MM3 (ref 130–400)
PMV BLD AUTO: 9.6 FL (ref 6–12)
POTASSIUM BLD-SCNC: 4.1 MMOL/L (ref 3.5–5.3)
RBC # BLD AUTO: 4.19 10*6/MM3 (ref 4.2–5.4)
SODIUM BLD-SCNC: 132 MMOL/L (ref 135–145)
WBC NRBC COR # BLD: 8.56 10*3/MM3 (ref 4.8–10.8)

## 2018-04-05 PROCEDURE — 80048 BASIC METABOLIC PNL TOTAL CA: CPT | Performed by: NURSE PRACTITIONER

## 2018-04-05 PROCEDURE — 97535 SELF CARE MNGMENT TRAINING: CPT

## 2018-04-05 PROCEDURE — 85025 COMPLETE CBC W/AUTO DIFF WBC: CPT | Performed by: NURSE PRACTITIONER

## 2018-04-05 PROCEDURE — 97110 THERAPEUTIC EXERCISES: CPT

## 2018-04-05 PROCEDURE — 94799 UNLISTED PULMONARY SVC/PX: CPT

## 2018-04-05 PROCEDURE — 82962 GLUCOSE BLOOD TEST: CPT

## 2018-04-05 PROCEDURE — 97116 GAIT TRAINING THERAPY: CPT

## 2018-04-05 PROCEDURE — 25010000002 METHYLPREDNISOLONE PER 40 MG: Performed by: NURSE PRACTITIONER

## 2018-04-05 RX ORDER — DOXYCYCLINE 100 MG/1
100 TABLET ORAL EVERY 12 HOURS SCHEDULED
Qty: 7 TABLET | Refills: 0 | Status: SHIPPED | OUTPATIENT
Start: 2018-04-05 | End: 2018-04-09

## 2018-04-05 RX ORDER — GUAIFENESIN 600 MG/1
1200 TABLET, EXTENDED RELEASE ORAL 2 TIMES DAILY
Qty: 60 TABLET | Refills: 0 | Status: ON HOLD | OUTPATIENT
Start: 2018-04-05 | End: 2019-11-21

## 2018-04-05 RX ORDER — LACTULOSE 20 G/30ML
20 SOLUTION ORAL DAILY
Qty: 900 ML | Refills: 0 | Status: ON HOLD | OUTPATIENT
Start: 2018-04-06 | End: 2018-09-29

## 2018-04-05 RX ORDER — PREDNISONE 10 MG/1
TABLET ORAL
Qty: 20 TABLET | Refills: 0 | Status: ON HOLD | OUTPATIENT
Start: 2018-04-05 | End: 2018-09-29

## 2018-04-05 RX ORDER — FUROSEMIDE 20 MG/1
10 TABLET ORAL DAILY PRN
Qty: 30 TABLET | Refills: 1 | Status: SHIPPED | OUTPATIENT
Start: 2018-04-05 | End: 2018-10-02 | Stop reason: HOSPADM

## 2018-04-05 RX ADMIN — IPRATROPIUM BROMIDE AND ALBUTEROL SULFATE 3 ML: 2.5; .5 SOLUTION RESPIRATORY (INHALATION) at 06:48

## 2018-04-05 RX ADMIN — IPRATROPIUM BROMIDE AND ALBUTEROL SULFATE 3 ML: 2.5; .5 SOLUTION RESPIRATORY (INHALATION) at 11:03

## 2018-04-05 RX ADMIN — HYDROCODONE BITARTRATE AND ACETAMINOPHEN 1 TABLET: 7.5; 325 TABLET ORAL at 09:28

## 2018-04-05 RX ADMIN — VENLAFAXINE HYDROCHLORIDE 75 MG: 37.5 TABLET ORAL at 08:07

## 2018-04-05 RX ADMIN — GUAIFENESIN 1200 MG: 600 TABLET, EXTENDED RELEASE ORAL at 08:07

## 2018-04-05 RX ADMIN — POTASSIUM CHLORIDE 20 MEQ: 750 CAPSULE, EXTENDED RELEASE ORAL at 08:10

## 2018-04-05 RX ADMIN — DOXYCYCLINE 100 MG: 100 TABLET ORAL at 08:07

## 2018-04-05 RX ADMIN — HYDROCODONE BITARTRATE AND ACETAMINOPHEN 1 TABLET: 7.5; 325 TABLET ORAL at 03:18

## 2018-04-05 RX ADMIN — PANTOPRAZOLE SODIUM 40 MG: 40 TABLET, DELAYED RELEASE ORAL at 06:25

## 2018-04-05 RX ADMIN — BUDESONIDE AND FORMOTEROL FUMARATE DIHYDRATE 2 PUFF: 160; 4.5 AEROSOL RESPIRATORY (INHALATION) at 06:48

## 2018-04-05 NOTE — PROGRESS NOTES
Continued Stay Note  [facility]     Patient Name: Teri Tim  MRN: [id number]  Today's Date: 4/5/2018    Admit Date: 3/30/2018          Discharge Plan     Row Name 04/05/18 1419       Plan    Plan [facility] Care    Patient/Family in Agreement with Plan yes    Final Discharge Disposition Code 06 - home with home health care    Final Note Pt is being discharged home today with [facility] care. Michelle from [id number] aware of both d/c orders placed.               Discharge Codes    No documentation.       Expected Discharge Date and Time     Expected Discharge Date Expected Discharge Time    Apr 5, 2018             KAREN Hardin

## 2018-04-05 NOTE — PLAN OF CARE
Problem: Patient Care Overview  Goal: Plan of Care Review  Outcome: Ongoing (interventions implemented as appropriate)   04/05/18 1132   Coping/Psychosocial   Plan of Care Reviewed With patient   Plan of Care Review   Progress improving   OTHER   Outcome Summary Pt. is independent with bed mobility. Stands and ambulates 300' with stand by assist. Performs 20 reps of LE exercises actively . Will benefit from strengthening .

## 2018-04-05 NOTE — PROGRESS NOTES
Spoke with patient about plans for [facility] and she is agreeable for admission. Voiced concerns about transportation to MD appointments and home from the hospital. Message left for RAYMOND Dial. She denied need for BSC or walker. Michelle Garcia RN, [facility]

## 2018-04-05 NOTE — PLAN OF CARE
Problem: Patient Care Overview  Goal: Plan of Care Review  Outcome: Ongoing (interventions implemented as appropriate)   04/05/18 6365   Coping/Psychosocial   Plan of Care Reviewed With patient;significant other   OTHER   Outcome Summary Pt. performs as expected with unabil exs to increase her act. zeny, Pt. states that she feels too weak to get out of bed at this tx date!

## 2018-04-05 NOTE — DISCHARGE SUMMARY
[facility] Medicine Services  DISCHARGE SUMMARY       Date of Admission: 3/30/2018  Date of Discharge:  4/5/2018  Primary Care Physician: ROLAND Wayne    Presenting Problem/History of Present Illness:  Shortness of breath/confusion.     Final Discharge Diagnoses:  1. Chronic obstructive pulmonary disease with acute exacerbation  2. Acute on chronic hypoxic and hypercarbic respiratory failure secondary to above  3. Acute on chronic diastolic congestive heart failure, last known EF 55%  4. Hyponatremia-likely diuretic related  5. Pulmonary hypertension  6. Elevated LFT's  7. Low TSH-low T4. Normal T3.    8. Hyperglycemia, mild. Patient also on steroid therapy. Hgb A1c 6.1  9. Hypertension-off beta blocker for now.   10. Leukocytosis-steroid effect.   11. Hyponatremia-improved with Samsca     Consults: none    Procedures Performed: none    Pertinent Test Results:   Imaging Results (last 7 days)     Procedure Component Value Units Date/Time    XR Chest 1 View [[id number]] Collected:  03/30/18 1302     Updated:  03/30/18 1307    Narrative:       EXAMINATION:  XR CHEST 1 VW-  3/30/2018 12:29 PM CDT     HISTORY: Coughing and shortness of air.     COMPARISON: 11/6/2017.     FINDINGS:  The inspiration is not as deep on the current study. There is  patchy infiltrate in the right lung base. There is mild left perihilar  infiltrate. There is stable bronchial wall thickening. There is  borderline cardiomegaly. There is no CHF.       Impression:       1. Hypoventilation with vascular crowding.  2. Minimal infiltrate at the right lung base and left perihilar region  likely representing atelectasis. Pneumonia less likely.  3. Probable COPD/emphysema.        This report was finalized on 03/30/2018 13:03 by Dr. Hugo Licea MD.        Lab Results (last 7 days)     Procedure Component Value Units Date/Time    POC Glucose Once [[id number]]  (Normal) Collected:  04/05/18 2794     Specimen:  Blood Updated:  04/05/18 0845     Glucose 83 mg/dL      Comment: : [id number] Edmundo Robledoer ID: [id number]       CBC & Differential [[id number]] Collected:  04/05/18 0430    Specimen:  Blood Updated:  04/05/18 0540    Narrative:       The following orders were created for panel order CBC & Differential.  Procedure                               Abnormality         Status                     ---------                               -----------         ------                     CBC Auto Differential[[id number]]        Abnormal            Final result                 Please view results for these tests on the individual orders.    CBC Auto Differential [[id number]]  (Abnormal) Collected:  04/05/18 0430    Specimen:  Blood Updated:  04/05/18 0540     WBC 8.56 10*3/mm3      RBC 4.19 (L) 10*6/mm3      Hemoglobin 10.5 (L) g/dL      Hematocrit 34.8 (L) %      MCV 83.1 fL      MCH 25.1 (L) pg      MCHC 30.2 (L) g/dL      RDW 17.3 (H) %      RDW-SD 52.6 fl      MPV 9.6 fL      Platelets 163 10*3/mm3      Neutrophil % 83.9 (H) %      Lymphocyte % 7.7 (L) %      Monocyte % 7.6 %      Eosinophil % 0.0 %      Basophil % 0.1 %      Immature Grans % 0.7 %      Neutrophils, Absolute 7.18 10*3/mm3      Lymphocytes, Absolute 0.66 (L) 10*3/mm3      Monocytes, Absolute 0.65 10*3/mm3      Eosinophils, Absolute 0.00 10*3/mm3      Basophils, Absolute 0.01 10*3/mm3      Immature Grans, Absolute 0.06 (H) 10*3/mm3      nRBC 0.0 /100 WBC     Basic Metabolic Panel [[id number]]  (Abnormal) Collected:  04/05/18 0430    Specimen:  Blood Updated:  04/05/18 0532     Glucose 100 mg/dL      BUN 15 mg/dL      Creatinine 0.72 mg/dL      Sodium 132 (L) mmol/L      Potassium 4.1 mmol/L      Chloride 93 (L) mmol/L      CO2 37.0 (H) mmol/L      Calcium 8.5 mg/dL      eGFR Non African Amer 84 mL/min/1.73      BUN/Creatinine Ratio 20.8     Anion Gap 2.0 (L) mmol/L     Narrative:       GFR Normal >60  Chronic Kidney Disease <60  Kidney Failure  <15    POC Glucose Once [[id number]]  (Normal) Collected:  04/04/18 2031    Specimen:  Blood Updated:  04/04/18 2042     Glucose 113 mg/dL      Comment: : [id number] Justice ChowdhuryMeter ID: [id number]       POC Glucose Once [[id number]]  (Normal) Collected:  04/04/18 1718    Specimen:  Blood Updated:  04/04/18 1731     Glucose 107 mg/dL      Comment: : [id number] Stone TinaMeter ID: [id number]       Blood Culture - Blood, Blood, Venous Line [[id number]]  (Normal) Collected:  03/30/18 1302    Specimen:  Blood from Arm, Left Updated:  04/04/18 1416     Blood Culture No growth at 5 days    Blood Culture - Blood, Blood, Venous Line [[id number]]  (Normal) Collected:  03/30/18 1225    Specimen:  Blood from Arm, Left Updated:  04/04/18 1246     Blood Culture No growth at 5 days    POC Glucose Once [[id number]]  (Normal) Collected:  04/04/18 1211    Specimen:  Blood Updated:  04/04/18 1222     Glucose 99 mg/dL      Comment: : [id number] Mariposa LindsayMeter ID: [id number]       POC Glucose Once [[id number]]  (Normal) Collected:  04/04/18 0738    Specimen:  Blood Updated:  04/04/18 0752     Glucose 96 mg/dL      Comment: : [id number] Mariposa LindsayMeter ID: [id number]       Basic Metabolic Panel [[id number]]  (Abnormal) Collected:  04/04/18 0539    Specimen:  Blood Updated:  04/04/18 0646     Glucose 92 mg/dL      BUN 15 mg/dL      Creatinine 0.66 mg/dL      Sodium 126 (L) mmol/L      Potassium 4.5 mmol/L      Chloride 87 (L) mmol/L      CO2 35.0 (H) mmol/L      Calcium 8.6 mg/dL      eGFR Non African Amer 93 mL/min/1.73      BUN/Creatinine Ratio 22.7     Anion Gap 4.0 mmol/L     Narrative:       GFR Normal >60  Chronic Kidney Disease <60  Kidney Failure <15    Respiratory Culture - Sputum, Cough [[id number]] Collected:  04/02/18 0705    Specimen:  Sputum from Cough Updated:  04/04/18 0638     Respiratory Culture --      Light growth (2+) Normal Respiratory Idalia     Gram Stain Result Greater than 25 WBCs per low  power field      Few (2+) Epithelial cells per low power field      Many (4+) Mixed gram positive joanne    CBC & Differential [[id number]] Collected:  04/04/18 0539    Specimen:  Blood Updated:  04/04/18 0620    Narrative:       The following orders were created for panel order CBC & Differential.  Procedure                               Abnormality         Status                     ---------                               -----------         ------                     CBC Auto Differential[[id number]]        Abnormal            Final result                 Please view results for these tests on the individual orders.    CBC Auto Differential [[id number]]  (Abnormal) Collected:  04/04/18 0539    Specimen:  Blood Updated:  04/04/18 0620     WBC 7.76 10*3/mm3      RBC 4.33 10*6/mm3      Hemoglobin 10.8 (L) g/dL      Hematocrit 35.4 (L) %      MCV 81.8 (L) fL      MCH 24.9 (L) pg      MCHC 30.5 (L) g/dL      RDW 17.3 (H) %      RDW-SD 51.5 fl      MPV 9.6 fL      Platelets 178 10*3/mm3      Neutrophil % 84.1 (H) %      Lymphocyte % 8.6 (L) %      Monocyte % 6.4 %      Eosinophil % 0.0 %      Basophil % 0.0 %      Immature Grans % 0.9 %      Neutrophils, Absolute 6.52 10*3/mm3      Lymphocytes, Absolute 0.67 (L) 10*3/mm3      Monocytes, Absolute 0.50 10*3/mm3      Eosinophils, Absolute 0.00 10*3/mm3      Basophils, Absolute 0.00 10*3/mm3      Immature Grans, Absolute 0.07 (H) 10*3/mm3      nRBC 0.9 (H) /100 WBC     POC Glucose Once [[id number]]  (Normal) Collected:  04/03/18 2015    Specimen:  Blood Updated:  04/03/18 2044     Glucose 120 mg/dL      Comment: : [id number] Tio VictoriaMeter ID: [id number]       POC Glucose Once [[id number]]  (Abnormal) Collected:  04/03/18 1635    Specimen:  Blood Updated:  04/03/18 1654     Glucose 133 (H) mg/dL      Comment: : [id number] Aida HayinMeter ID: [id number]       SCANNED - LABS [[id number]] Resulted:  03/30/18      Updated:  04/03/18 1415    Osmolality, Urine - Urine,  Clean Catch [[id number]]  (Abnormal) Collected:  04/03/18 1031    Specimen:  Urine from Urine, Clean Catch Updated:  04/03/18 1201     Osmolality, Urine 558 (L) mOsm/kg     POC Glucose Once [[id number]]  (Normal) Collected:  04/03/18 1101    Specimen:  Blood Updated:  04/03/18 1138     Glucose 119 mg/dL      Comment: : [id number] Olu PamelaMeter ID: [id number]       Sodium, Urine, Random - Urine, Clean Catch [[id number]]  (Abnormal) Collected:  04/03/18 1031    Specimen:  Urine from Urine, Clean Catch Updated:  04/03/18 1107     Sodium, Urine 9 (L) mmol/L     POC Glucose Once [[id number]]  (Abnormal) Collected:  04/03/18 0741    Specimen:  Blood Updated:  04/03/18 0816     Glucose 133 (H) mg/dL      Comment: : [id number] Olu PamelaMeter ID: [id number]       Basic Metabolic Panel [[id number]]  (Abnormal) Collected:  04/03/18 0453    Specimen:  Blood Updated:  04/03/18 0532     Glucose 102 (H) mg/dL      BUN 20 mg/dL      Creatinine 0.79 mg/dL      Sodium 127 (L) mmol/L      Potassium 3.9 mmol/L      Chloride 82 (L) mmol/L      CO2 >40.0 (C) mmol/L      Calcium 8.4 mg/dL      eGFR Non African Amer 76 mL/min/1.73      BUN/Creatinine Ratio 25.3 (H)     Anion Gap -- mmol/L      Comment: Unable to calculate Anion Gap.       Narrative:       GFR Normal >60  Chronic Kidney Disease <60  Kidney Failure <15    CBC (No Diff) [[id number]]  (Abnormal) Collected:  04/03/18 0453    Specimen:  Blood Updated:  04/03/18 0515     WBC 9.84 10*3/mm3      RBC 4.51 10*6/mm3      Hemoglobin 11.3 (L) g/dL      Hematocrit 37.2 %      MCV 82.5 fL      MCH 25.1 (L) pg      MCHC 30.4 (L) g/dL      RDW 17.7 (H) %      RDW-SD 53.1 fl      MPV 9.4 fL      Platelets 189 10*3/mm3     POC Glucose Once [[id number]]  (Normal) Collected:  04/02/18 2135    Specimen:  Blood Updated:  04/02/18 2154     Glucose 108 mg/dL      Comment: : [id number] [facility]: [id number]       POC Glucose Once [[id number]]  (Normal) Collected:  04/02/18  1655    Specimen:  Blood Updated:  04/02/18 1710     Glucose 123 mg/dL      Comment: : [id number] Edmundo KimMeter ID: [id number]       POC Glucose Once [[id number]]  (Normal) Collected:  04/02/18 1143    Specimen:  Blood Updated:  04/02/18 1216     Glucose 120 mg/dL      Comment: : [id number] Edmundo KimMeter ID: [id number]       Osmolality, Serum [[id number]]  (Abnormal) Collected:  04/02/18 0747    Specimen:  Blood Updated:  04/02/18 0924     Osmolality 275 (L) mOsm/kg     POC Glucose Once [[id number]]  (Normal) Collected:  04/02/18 0748    Specimen:  Blood Updated:  04/02/18 0843     Glucose 126 mg/dL      Comment: : [id number] Edmundo KimMeter ID: [id number]       Uric Acid [[id number]]  (Normal) Collected:  04/02/18 0610    Specimen:  Blood Updated:  04/02/18 0727     Uric Acid 5.2 mg/dL     Basic Metabolic Panel [[id number]]  (Abnormal) Collected:  04/02/18 0610    Specimen:  Blood Updated:  04/02/18 0655     Glucose 104 (H) mg/dL      BUN 23 (H) mg/dL      Creatinine 0.86 mg/dL      Sodium 126 (L) mmol/L      Potassium 3.4 (L) mmol/L      Chloride 77 (L) mmol/L      CO2 >40.0 (C) mmol/L      Calcium 8.6 mg/dL      eGFR Non African Amer 69 mL/min/1.73      BUN/Creatinine Ratio 26.7 (H)     Anion Gap -- mmol/L      Comment: Unable to calculate Anion Gap.       Narrative:       GFR Normal >60  Chronic Kidney Disease <60  Kidney Failure <15    POC Glucose Once [[id number]]  (Normal) Collected:  04/01/18 2230    Specimen:  Blood Updated:  04/01/18 2249     Glucose 116 mg/dL      Comment: : [id number] Tio SloanMeter ID: [id number]       Urinalysis With / Culture If Indicated - Urine, Clean Catch [[id number]]  (Abnormal) Collected:  04/01/18 1844    Specimen:  Urine from Urine, Clean Catch Updated:  04/01/18 1857     Color, UA Yellow     Appearance, UA Clear     pH, UA 8.5 (H)     Specific Gravity, UA 1.021     Glucose, UA Negative     Ketones, UA Negative     Bilirubin, UA Negative     Blood, UA  Negative     Protein, UA Negative     Leuk Esterase, UA Negative     Nitrite, UA Negative     Urobilinogen, UA 1.0 E.U./dL    Narrative:       Urine microscopic not indicated.    POC Glucose Once [[id number]]  (Normal) Collected:  04/01/18 1639    Specimen:  Blood Updated:  04/01/18 1652     Glucose 108 mg/dL      Comment: : [id number] Hatchett MichaelMeter ID: [id number]       POC Glucose Once [[id number]]  (Abnormal) Collected:  04/01/18 1128    Specimen:  Blood Updated:  04/01/18 1139     Glucose 190 (H) mg/dL      Comment: : [id number] Lindsey AnnMeter ID: [id number]       POC Glucose Once [[id number]]  (Abnormal) Collected:  04/01/18 0744    Specimen:  Blood Updated:  04/01/18 0810     Glucose 140 (H) mg/dL      Comment: : [id number] Lindsey AnnMeter ID: [id number]       CBC & Differential [[id number]] Collected:  04/01/18 0455    Specimen:  Blood Updated:  04/01/18 0622    Narrative:       The following orders were created for panel order CBC & Differential.  Procedure                               Abnormality         Status                     ---------                               -----------         ------                     CBC Auto Differential[[id number]]        Abnormal            Final result                 Please view results for these tests on the individual orders.    CBC Auto Differential [[id number]]  (Abnormal) Collected:  04/01/18 0455    Specimen:  Blood Updated:  04/01/18 0622     WBC 14.85 (H) 10*3/mm3      RBC 4.54 10*6/mm3      Hemoglobin 11.2 (L) g/dL      Hematocrit 37.7 %      MCV 83.0 fL      MCH 24.7 (L) pg      MCHC 29.7 (L) g/dL      RDW 18.0 (H) %      RDW-SD 54.1 (H) fl      MPV 10.2 fL      Platelets 259 10*3/mm3      Neutrophil % 92.2 (H) %      Lymphocyte % 3.6 (L) %      Monocyte % 2.8 (L) %      Eosinophil % 0.0 %      Basophil % 0.1 %      Immature Grans % 1.3 %      Neutrophils, Absolute 13.68 (H) 10*3/mm3      Lymphocytes, Absolute 0.54 (L) 10*3/mm3      Monocytes,  Absolute 0.42 10*3/mm3      Eosinophils, Absolute 0.00 10*3/mm3      Basophils, Absolute 0.01 10*3/mm3      Immature Grans, Absolute 0.20 (H) 10*3/mm3      nRBC 0.9 (H) /100 WBC     Comprehensive Metabolic Panel [[id number]]  (Abnormal) Collected:  04/01/18 0455    Specimen:  Blood Updated:  04/01/18 0555     Glucose 113 (H) mg/dL      BUN 32 (H) mg/dL      Creatinine 1.11 mg/dL      Sodium 132 (L) mmol/L      Potassium 4.3 mmol/L      Chloride 78 (L) mmol/L      CO2 >40.0 (C) mmol/L      Calcium 8.6 mg/dL      Total Protein 6.1 (L) g/dL      Albumin 3.30 (L) g/dL      ALT (SGPT) 87 (H) U/L      AST (SGOT) 38 U/L      Alkaline Phosphatase 83 U/L      Total Bilirubin 1.1 (H) mg/dL      eGFR Non African Amer 51 (L) mL/min/1.73      Globulin 2.8 gm/dL      A/G Ratio 1.2 g/dL      BUN/Creatinine Ratio 28.8 (H)     Anion Gap -- mmol/L      Comment: Unable to calculate Anion Gap.       POC Glucose Once [[id number]]  (Abnormal) Collected:  03/31/18 2209    Specimen:  Blood Updated:  03/31/18 2235     Glucose 160 (H) mg/dL      Comment: : [id number] Tio SloanMeter ID: [id number]       POC Glucose Once [[id number]]  (Abnormal) Collected:  03/31/18 1823    Specimen:  Blood Updated:  03/31/18 1844     Glucose 218 (H) mg/dL      Comment: : [id number] Aida ShookMeter ID: [id number]       POC Glucose Once [[id number]]  (Abnormal) Collected:  03/31/18 1319    Specimen:  Blood Updated:  03/31/18 1340     Glucose 155 (H) mg/dL      Comment: : [id number] Aida HayinMeter ID: [id number]       T3, Free [[id number]]  (Abnormal) Collected:  03/31/18 0535    Specimen:  Blood Updated:  03/31/18 1103     T3, Free 2.23 (L) pg/mL     T4, Free [[id number]]  (Normal) Collected:  03/31/18 0535    Specimen:  Blood Updated:  03/31/18 1103     Free T4 1.62 ng/dL     Hemoglobin A1c [[id number]] Collected:  03/31/18 0535    Specimen:  Blood Updated:  03/31/18 0825     Hemoglobin A1C 6.1 %     Narrative:       Less than 6.0            Non-Diabetic Range  6.0-7.0                 ADA Therapeutic Target  Greater than 7.0        Action Suggested    TSH [[id number]]  (Abnormal) Collected:  03/31/18 0535    Specimen:  Blood Updated:  03/31/18 0709     TSH 0.245 (L) mIU/mL     Lipid Panel [[id number]]  (Abnormal) Collected:  03/31/18 0535    Specimen:  Blood Updated:  03/31/18 0647     Total Cholesterol 148 mg/dL      Triglycerides 72 mg/dL      HDL Cholesterol 28 (L) mg/dL      LDL Cholesterol  115 (H) mg/dL      LDL/HDL Ratio 3.77    BNP [[id number]]  (Abnormal) Collected:  03/31/18 0535    Specimen:  Blood Updated:  03/31/18 0645     proBNP 9,430.0 (H) pg/mL     Comprehensive Metabolic Panel [[id number]]  (Abnormal) Collected:  03/31/18 0535    Specimen:  Blood Updated:  03/31/18 0644     Glucose 133 (H) mg/dL      BUN 23 (H) mg/dL      Creatinine 1.10 mg/dL      Sodium 133 (L) mmol/L      Potassium 4.2 mmol/L      Chloride 80 (L) mmol/L      CO2 >40.0 (C) mmol/L      Calcium 8.7 mg/dL      Total Protein 6.4 g/dL      Albumin 3.60 g/dL      ALT (SGPT) 108 (H) U/L      AST (SGOT) 47 (H) U/L      Alkaline Phosphatase 97 U/L      Total Bilirubin 1.2 (H) mg/dL      eGFR Non African Amer 52 (L) mL/min/1.73      Globulin 2.8 gm/dL      A/G Ratio 1.3 g/dL      BUN/Creatinine Ratio 20.9     Anion Gap -- mmol/L      Comment: Unable to calculate Anion Gap.       CBC Auto Differential [[id number]]  (Abnormal) Collected:  03/31/18 0535    Specimen:  Blood Updated:  03/31/18 0637     WBC 4.27 (L) 10*3/mm3      RBC 4.56 10*6/mm3      Hemoglobin 11.6 (L) g/dL      Hematocrit 38.6 %      MCV 84.6 fL      MCH 25.4 (L) pg      MCHC 30.1 (L) g/dL      RDW 17.8 (H) %      RDW-SD 55.3 (H) fl      MPV 10.1 fL      Platelets 245 10*3/mm3      Neutrophil % 87.9 (H) %      Lymphocyte % 8.2 (L) %      Monocyte % 3.0 (L) %      Eosinophil % 0.0 %      Basophil % 0.0 %      Immature Grans % 0.9 %      Neutrophils, Absolute 3.75 10*3/mm3      Lymphocytes, Absolute 0.35 (L) 10*3/mm3       Monocytes, Absolute 0.13 (L) 10*3/mm3      Eosinophils, Absolute 0.00 10*3/mm3      Basophils, Absolute 0.00 10*3/mm3      Immature Grans, Absolute 0.04 (H) 10*3/mm3      nRBC 3.0 (H) /100 WBC     Blood Gas, Arterial [[id number]]  (Abnormal) Collected:  03/31/18 0343    Specimen:  Arterial Blood Updated:  03/31/18 0352     Site Left Radial     Dao's Test Positive     pH, Arterial 7.405 pH units      pCO2, Arterial 72.7 (C) mm Hg      pO2, Arterial 84.3 mm Hg      HCO3, Arterial 45.6 (H) mmol/L      Base Excess, Arterial 17.3 (H) mmol/L      O2 Saturation, Arterial 96.8 %      Temperature 37.0 C      Barometric Pressure for Blood Gas 757 mmHg      Modality BiPap     FIO2 35 %      Ventilator Mode NA     Set Mech Resp Rate 14.0     IPAP 16     EPAP 6     Notified Who [id number]     Notified By [id number]     Notified Time 03/31/2018 03:51     Collected by [id number]    Blood Gas, Arterial [[id number]]  (Abnormal) Collected:  03/30/18 1540    Specimen:  Arterial Blood Updated:  03/30/18 1549     Site Right Brachial     Dao's Test N/A     pH, Arterial 7.342 (L) pH units      pCO2, Arterial 72.9 (C) mm Hg      pO2, Arterial 94.2 mm Hg      HCO3, Arterial 39.4 (H) mmol/L      Base Excess, Arterial 11.0 (H) mmol/L      O2 Saturation, Arterial 97.6 %      Temperature 37.0 C      Barometric Pressure for Blood Gas 757 mmHg      Modality BiPap     FIO2 35 %      Ventilator Mode NA     IPAP 14     EPAP 6     Notified [facility] RN Kathy [id number]     Notified By [id number]     Notified Time 03/30/2018 15:48     Collected by [id number]    Troponin [[id number]]  (Normal) Collected:  03/30/18 1247    Specimen:  Blood Updated:  03/30/18 1520     Troponin I 0.026 ng/mL     [place] Draw [[id number]] Collected:  03/30/18 1224    Specimen:  Blood Updated:  03/30/18 1332    Narrative:       The following orders were created for panel order [place] Draw.  Procedure                               Abnormality         Status                     ---------                                -----------         ------                     Light Blue Top[[id number]]                                   Final result               Green Top (Gel)[[id number]]                                  Final result               Lavender Top[[id number]]                                     Final result               Red Top[[id number]]                                          Final result                 Please view results for these tests on the individual orders.    Light Blue Top [[id number]] Collected:  03/30/18 1224    Specimen:  Blood Updated:  03/30/18 1332     Extra Tube hold for add-on     Comment: Auto resulted       Green Top (Gel) [[id number]] Collected:  03/30/18 1224    Specimen:  Blood Updated:  03/30/18 1332     Extra Tube Hold for add-ons.     Comment: Auto resulted.       Lavender Top [[id number]] Collected:  03/30/18 1224    Specimen:  Blood Updated:  03/30/18 1332     Extra Tube hold for add-on     Comment: Auto resulted       Red Top [[id number]] Collected:  03/30/18 1224    Specimen:  Blood Updated:  03/30/18 1332     Extra Tube Hold for add-ons.     Comment: Auto resulted.       Lactic Acid, Plasma [[id number]]  (Normal) Collected:  03/30/18 1247    Specimen:  Blood Updated:  03/30/18 1312     Lactate 1.3 mmol/L     BNP [[id number]]  (Abnormal) Collected:  03/30/18 1224    Specimen:  Blood Updated:  03/30/18 1304     proBNP 12,900.0 (H) pg/mL     Comprehensive Metabolic Panel [[id number]]  (Abnormal) Collected:  03/30/18 1224    Specimen:  Blood Updated:  03/30/18 1256     Glucose 108 (H) mg/dL      BUN 22 (H) mg/dL      Creatinine 1.16 mg/dL      Sodium 129 (L) mmol/L      Potassium 4.4 mmol/L      Chloride 85 (L) mmol/L      CO2 38.0 (H) mmol/L      Calcium 8.7 mg/dL      Total Protein 6.6 g/dL      Albumin 3.60 g/dL      ALT (SGPT) 127 (H) U/L      AST (SGOT) 67 (H) U/L      Alkaline Phosphatase 107 U/L      Total Bilirubin 1.1 (H) mg/dL      eGFR Non African Amer 49 (L) mL/min/1.73       Globulin 3.0 gm/dL      A/G Ratio 1.2 g/dL      BUN/Creatinine Ratio 19.0     Anion Gap 6.0 mmol/L     CBC & Differential [[id number]] Collected:  03/30/18 1224    Specimen:  Blood Updated:  03/30/18 1239    Narrative:       The following orders were created for panel order CBC & Differential.  Procedure                               Abnormality         Status                     ---------                               -----------         ------                     Manual Differential[[id number]]                                                         CBC Auto Differential[[id number]]        Abnormal            Final result                 Please view results for these tests on the individual orders.    CBC Auto Differential [[id number]]  (Abnormal) Collected:  03/30/18 1224    Specimen:  Blood Updated:  03/30/18 1239     WBC 8.49 10*3/mm3      RBC 4.45 10*6/mm3      Hemoglobin 11.5 (L) g/dL      Hematocrit 39.2 %      MCV 88.1 fL      MCH 25.8 (L) pg      MCHC 29.3 (L) g/dL      RDW 17.9 (H) %      RDW-SD 57.8 (H) fl      MPV 9.6 fL      Platelets 265 10*3/mm3      Neutrophil % 68.0 %      Lymphocyte % 18.4 %      Monocyte % 12.5 (H) %      Eosinophil % 0.1 %      Basophil % 0.5 %      Immature Grans % 0.5 %      Neutrophils, Absolute 5.78 10*3/mm3      Lymphocytes, Absolute 1.56 10*3/mm3      Monocytes, Absolute 1.06 10*3/mm3      Eosinophils, Absolute 0.01 10*3/mm3      Basophils, Absolute 0.04 10*3/mm3      Immature Grans, Absolute 0.04 (H) 10*3/mm3      nRBC 2.6 (H) /100 WBC     Blood Gas, Arterial [[id number]]  (Abnormal) Collected:  03/30/18 1213    Specimen:  Arterial Blood Updated:  03/30/18 1218     Site Right Radial     Dao's Test Positive     pH, Arterial 7.265 (L) pH units      pCO2, Arterial 77.6 (C) mm Hg      pO2, Arterial 61.1 (L) mm Hg      HCO3, Arterial 35.2 (H) mmol/L      Base Excess, Arterial 5.9 (H) mmol/L      O2 Saturation, Arterial 88.9 (L) %      Temperature 37.0 C      Barometric Pressure  "for Blood Gas 758 mmHg      Modality Nasal Cannula     Flow Rate 2.0 lpm      Ventilator Mode NA     Notified Who DR YE CERNA     Notified By [id number]     Notified Time 03/30/2018 12:19     Collected by [id number]        Hospital Course:  The patient is a 57 y.o. female who follows with Nasrin Guan for her primary care. She has a past medical history significant for chronic respiratory failure on oxygen at 2L and trilogy therapy, ongoing tobacco abuse, pulmonary hypertension, and congestive heart failure. She presented as a transfer from [facility] with confusion and shortness of breath. She was noted to have a COPD exacerbation and acute diastolic heart failure. She was found to have acute on chronic hypoxic hypercarbic respiratory failure. She was placed on biPAP and was given IV Lasix. She diuresed well and began having some contraction alkalosis. She was transitioned to her home trilogy after we obtained a new mask for her. She developed significant hyponatremia and required some IV fluids. We then opted to give a dose of Samsca which genesis her sodium to 132. She has had problems with inactivity and was still smoking upon admission. She was placed on oral doxycycline. She does carry a history of hepatitis C.     Today, she has ambulated with physical therapy. She is more awake and alert. Her confusion has improved. I have placed her lasix to as needed and continued her aldactone daily. She will need to continue her beta blocker as she has been off here but has continued to wheeze. She will complete a total of 7 days of doxycyline. She will require home health to assist with her management and compliance education. She will need cbc, bmp on Monday with results to her PCP. She will need repeat thyroid function tests post discharge as well.     Physical Exam on Discharge:  /64   Pulse 95   Temp 97.2 °F (36.2 °C)   Resp 18   Ht 154.8 cm (60.95\")   Wt 50.6 kg (111 lb 9.6 oz)   SpO2 99%   BMI " 21.12 kg/m²   Physical Exam   Constitutional: She is oriented to person, place, and time. She appears well-developed and well-nourished.   HENT:   Head: Normocephalic and atraumatic.   Eyes: Conjunctivae and EOM are normal. Pupils are equal, round, and reactive to light.   Neck: Neck supple. No JVD present. No thyromegaly present.   Cardiovascular: Normal rate, regular rhythm, normal heart sounds and intact distal pulses.  Exam reveals no gallop and no friction rub.    No murmur heard.  Sinus  with PAC's   Pulmonary/Chest: Effort normal. No respiratory distress. She has wheezes (very faint today. Has chronic wheezes. ). She has no rales. She exhibits no tenderness.   Abdominal: Soft. Bowel sounds are normal. She exhibits no distension. There is no tenderness. There is no rebound and no guarding.   Musculoskeletal: Normal range of motion. She exhibits no edema, tenderness or deformity.   Lymphadenopathy:     She has no cervical adenopathy.   Neurological: She is alert and oriented to person, place, and time. She displays normal reflexes. No cranial nerve deficit. She exhibits normal muscle tone.   Skin: Skin is warm and dry. No rash noted.   Psychiatric: She has a normal mood and affect. Her behavior is normal. Judgment and thought content normal.     Condition on Discharge: stable with high readmission risk given her non-compliance.     Discharge Disposition:  Home or Self Care    Discharge Medications:   Teri Tim   Home Medication Instructions ARLETH:[id number]    Printed on:04/05/18 1210   Medication Information                      budesonide-formoterol (SYMBICORT) 160-4.5 MCG/ACT inhaler  Inhale 2 puffs 2 (Two) Times a Day.             cyclobenzaprine (FLEXERIL) 10 MG tablet  Take 10 mg by mouth 2 (Two) Times a Day As Needed for Muscle Spasms.             doxycycline (ADOXA) 100 MG tablet  Take 1 tablet by mouth Every 12 (Twelve) Hours for 7 doses.             furosemide (LASIX) 20 MG tablet  Take  0.5 tablets by mouth Daily As Needed (lower extremity edema and weight gain more than 2 pounds in 1 day and 5 pounds in 2 days.).             gabapentin (NEURONTIN) 600 MG tablet  Take 600 mg by mouth 4 (Four) Times a Day.             guaiFENesin (MUCINEX) 600 MG 12 hr tablet  Take 2 tablets by mouth 2 (Two) Times a Day.             HYDROcodone-acetaminophen (NORCO) 7.5-325 MG per tablet  Take 1 tablet by mouth Every 6 (Six) Hours As Needed for Moderate Pain .             lactulose 20 GM/30ML solution solution  Take 30 mL by mouth Daily.             metoprolol tartrate (LOPRESSOR) 25 MG tablet  Take 12.5 mg by mouth Every 12 (Twelve) Hours.             pantoprazole (PROTONIX) 40 MG EC tablet  Take 40 mg by mouth Daily.             predniSONE (DELTASONE) 10 MG tablet  2 tablets twice daily for 2 days then 2 tablets daily for 3 days then 10 tablet daily for 3 days then 1/2 tablet for 3 days then stop.             spironolactone (ALDACTONE) 25 MG tablet  Take 25 mg by mouth Daily.             venlafaxine (EFFEXOR) 75 MG tablet  Take 75 mg by mouth Daily.               Discharge Diet:   Diet Instructions     Diet: Cardiac, Regular; Thin       Discharge Diet:   Cardiac  Regular       Fluid Consistency:  Thin        Activity at Discharge:   Activity Instructions     Activity as Tolerated           Follow-up Appointments:   1. ROLAND Recinos 1 week.     Test Results Pending at Discharge: none    ROLAND Burdick  04/05/18  12:10 PM    Time: 45 minutes.     This is a late entry from encounter at time of discharge.  > she is ambulating in the hallway with PT.  She has stable gait.  Normal respiratory efoort  > She had no bed offered by SNF.  > He rserum sodium is improving with samsca  > she has minimal wheezing which according to her has never been without wheezing.  This is improve compared to prior exam  Overall she is doing well and felt medically appropriate for discharge  Case discussed with Annette at  the time of discharge.  Gonsalo Loo MD  04/07/18  7:34 AM

## 2018-04-05 NOTE — PLAN OF CARE
Problem: Patient Care Overview  Goal: Plan of Care Review  Outcome: Ongoing (interventions implemented as appropriate)   04/04/18 1004 04/05/18 0434   Coping/Psychosocial   Plan of Care Reviewed With patient --    Plan of Care Review   Progress improving --    OTHER   Outcome Summary --  pt c/o generalized back pain, po pain meds given. pt continues home bipap, o2 nc @ 2L, vss. pt resting comfortably       Problem: Chronic Obstructive Pulmonary Disease (Adult)  Goal: Signs and Symptoms of Listed Potential Problems Will be Absent, Minimized or Managed (Chronic Obstructive Pulmonary Disease)  Outcome: Ongoing (interventions implemented as appropriate)      Problem: Pain, Acute (Adult)  Goal: Acceptable Pain Control/Comfort Level  Outcome: Ongoing (interventions implemented as appropriate)      Problem: Fall Risk (Adult)  Goal: Absence of Fall  Outcome: Ongoing (interventions implemented as appropriate)

## 2018-04-05 NOTE — PROGRESS NOTES
Continued Stay Note   Janette     Patient Name: Teri Tmi  MRN: [id number]  Today's Date: 4/5/2018    Admit Date: 3/30/2018          Discharge Plan     Row Name 04/05/18 1010       Plan    Plan Home Health- prefers [facility]     Patient/Family in Agreement with Plan yes    Plan Comments Spoke with pt/spouse in room to inform no nursing home bed offers.  They are aware they will be going home today. Provided pt options for HH care and they prefer [facility]. Will await d/c orders/HH orders. Will follow.               Discharge Codes    No documentation.           KAREN Hardin

## 2018-04-06 NOTE — THERAPY DISCHARGE NOTE
Acute Care - Occupational Therapy Discharge Summary  [facility]     Patient Name: Teri Tim  : 1960  MRN: [id number]    Today's Date: 2018       Date of Referral to OT: 18         Admit Date: 3/30/2018        OT Recommendation and Plan    Visit Dx:    ICD-10-CM ICD-9-CM   1. COPD exacerbation J44.1 491.21   2. Acute on chronic respiratory failure with hypoxia and hypercapnia J96.21 518.84    J96.22 786.09     799.02   3. Impaired functional mobility and activity tolerance Z74.09 V49.89   4. Impaired mobility and ADLs Z74.09 799.89                     OT Rehab Goals     Row Name 18 0600 18 0958          Dressing Goal 1 (OT)    Activity/Assistive Device (Dressing Goal 1, OT)  -- dressing skills, all  -MM     Mono/Cues Needed (Dressing Goal 1, OT)  -- supervision required;set-up required  -MM     Time Frame (Dressing Goal 1, OT)  -- 10 days  -MM     Progress/Outcome (Dressing Goal 1, OT) goal not met;discharged from facility  -[facility] ongoing  -MM        Toileting Goal 1 (OT)    Activity/Device (Toileting Goal 1, OT)  -- toileting skills, all  -MM     Mono Level/Cues Needed (Toileting Goal 1, OT)  -- supervision required;set-up required  -MM     Time Frame (Toileting Goal 1, OT)  -- 10 days  -MM     Progress/Outcome (Toileting Goal 1, OT) goal not met;discharged from [facility]  -[facility] ongoing  -MM        Strength Goal 1 (OT)    Strength Goal 1 (OT)  -- Pt will independently maintain BUE AROM HEP to increase strength to 4/5 and increase activity tolerance.   -MM     Time Frame (Strength Goal 1, OT)  -- 10 days  -MM     Progress/Outcome (Strength Goal 1, OT) goal met;discharged from facility  - goal ongoing  -MM       User Key  (r) = Recorded By, (t) = Taken By, (c) = Cosigned By    Initials Name Provider Type    BLUE Feliz, HAWKINS/L Occupational Therapy Assistant    KIRSTIN Randolph OTR/L Occupational Therapist                Outcome Measures     Row Name  04/05/18 1100 04/05/18 1040 04/04/18 1004       How much help from another person do you currently need...    Turning from your back to your side while in flat bed without using bedrails? 4  -BALJEET  -- 4  -MF    Moving from lying on back to sitting on the side of a flat bed without bedrails? 4  -BALJEET  -- 4  -MF    Moving to and from a bed to a chair (including a wheelchair)? 4  -BALJEET  -- 3  -MF    Standing up from a chair using your arms (e.g., wheelchair, bedside chair)? 4  -BALJEET  -- 4  -MF    Climbing 3-5 steps with a railing? 3  -BALJEET  -- 3  -MF    To walk in hospital room? 3  -BALJEET  -- 3  -MF    AM-PAC 6 Clicks Score 22  -BALJEET  -- 21  -MF       How much help from another is currently needed...    Putting on and taking off regular lower body clothing?  -- 3  -CJ  --    Bathing (including washing, rinsing, and drying)  -- 3  -CJ  --    Toileting (which includes using toilet bed pan or urinal)  -- 4  -CJ  --    Putting on and taking off regular upper body clothing  -- 4  -CJ  --    Taking care of personal grooming (such as brushing teeth)  -- 3  -CJ  --    Eating meals  -- 4  -CJ  --    Score  -- 21  -CJ  --       Functional Assessment    Outcome Measure Options  -- AM-PAC 6 Clicks Daily Activity (OT)  - AM-[facility] 6 Clicks Basic Mobility (PT)  -    Row Name 04/04/18 0900 04/03/18 1106 04/03/18 1000       How much help from another person do you currently need...    Turning from your back to your side while in flat bed without using bedrails?  -- 4  -MF  --    Moving from lying on back to sitting on the side of a flat bed without bedrails?  -- 4  -MF  --    Moving to and from a bed to a chair (including a wheelchair)?  -- 3  -MF  --    Standing up from a chair using your arms (e.g., wheelchair, bedside chair)?  -- 4  -MF  --    Climbing 3-5 steps with a railing?  -- 3  -MF  --    To walk in hospital room?  -- 3  -MF  --    AM-PAC 6 Clicks Score  -- 21  -MF  --       How much help from another is currently needed...    Putting on  and taking off regular lower body clothing? 3  -TS  -- 3  -TS    Bathing (including washing, rinsing, and drying) 3  -TS  -- 3  -TS    Toileting (which includes using toilet bed pan or urinal) 4  -TS  -- 4  -TS    Putting on and taking off regular upper body clothing 4  -TS  -- 4  -TS    Taking care of personal grooming (such as brushing teeth) 3  -TS  -- 3  -TS    Eating meals 4  -TS  -- 4  -TS    Score 21  -TS  -- 21  -TS       Functional Assessment    Outcome Measure Options AM-PAC 6 Clicks Daily Activity (OT)  -TS AM-PAC 6 Clicks Basic Mobility (PT)  -MF AM-PAC 6 Clicks Daily Activity (OT)  -TS      User Key  (r) = Recorded By, (t) = Taken By, (c) = Cosigned By    Initials Name Provider Type    CJ SHRUTHI Ramírez/KENTON Occupational Therapy Assistant     SHRUTHI Artis/L Occupational Therapy Assistant    BALJEET Shaikh, [facility] Physical Therapy Assistant     Renee Thomason, [facility] Physical Therapy Assistant          Therapy Charges for Today     Code Description Service Date Service Provider Modifiers Qty    23750644343 HC OT SELF CARE/MGMT/TRAIN EA 15 MIN 4/5/2018 MARY Ramírez KX 1    [id number] HC OT THER PROC EA 15 MIN 4/5/2018 MARY Ramírez KX 1          OT Discharge Summary  Reason for Discharge: Discharge from facility  Outcomes Achieved: Refer to plan of care for updates on goals achieved  Discharge Destination: Home with home health      MARY Huston  4/6/2018

## 2018-04-06 NOTE — PAYOR COMM NOTE
"MO HOME 4-5-18    [id number]  Teri Tim (57 y.o. Female)     Date of Birth Social Security Number Address Home Phone MRN    1960  [facility] [address]    [place] [address] [phone] [id number]    Adventism Marital Status          Rastafari        Admission Date Admission Type Admitting Provider Attending Provider Department, Room/Bed    3/30/18 Emergency Gonsalo Loo MD  [facility] 4C, 486/1    Discharge Date Discharge Disposition Discharge Destination        4/5/2018 Home or Self Care              Attending Provider:  (none)   Allergies:  Codeine    Isolation:  None   Infection:  None   Code Status:  Prior    Ht:  154.8 cm (60.95\")   Wt:  50.6 kg (111 lb 9.6 oz)    Admission Cmt:  None   Principal Problem:  None                Active Insurance as of 3/30/2018     Primary Coverage     Payor Plan Insurance Group Employer/Plan Group    [employer] [place] [employer] [place]      Payor Plan Address Payor Plan Phone Number Effective From Effective To    [address] [address] [phone] 11/1/2017     [place] [address]       Subscriber Name Subscriber Birth Date Member ID       TERI TIM 1960 [id number]                 Emergency Contacts      (Rel.) Home Phone Work Phone Mobile Phone    MeetaRich (Spouse) [phone] -- [phone]               Discharge Summary      ROLAND Burdick at 4/5/2018 12:08 PM                [facility] Medicine Services  DISCHARGE SUMMARY       Date of Admission: 3/30/2018  Date of Discharge:  4/5/2018  Primary Care Physician: ROLAND Wayne    Presenting Problem/History of Present Illness:  Shortness of breath/confusion.     Final Discharge Diagnoses:  1. Chronic obstructive pulmonary disease with acute exacerbation  2. Acute on chronic hypoxic and hypercarbic respiratory failure secondary to above  3. Acute on chronic diastolic congestive heart failure, last known EF " 55%  4. Hyponatremia-likely diuretic related  5. Pulmonary hypertension  6. Elevated LFT's  7. Low TSH-low T4. Normal T3.    8. Hyperglycemia, mild. Patient also on steroid therapy. Hgb A1c 6.1  9. Hypertension-off beta blocker for now.   10. Leukocytosis-steroid effect.   11. Hyponatremia-improved with Samsca     Consults: none    Procedures Performed: none    Pertinent Test Results:   Imaging Results (last 7 days)     Procedure Component Value Units Date/Time    XR Chest 1 View [[id number]] Collected:  03/30/18 1302     Updated:  03/30/18 1307    Narrative:       EXAMINATION:  XR CHEST 1 VW-  3/30/2018 12:29 PM CDT     HISTORY: Coughing and shortness of air.     COMPARISON: 11/6/2017.     FINDINGS:  The inspiration is not as deep on the current study. There is  patchy infiltrate in the right lung base. There is mild left perihilar  infiltrate. There is stable bronchial wall thickening. There is  borderline cardiomegaly. There is no CHF.       Impression:       1. Hypoventilation with vascular crowding.  2. Minimal infiltrate at the right lung base and left perihilar region  likely representing atelectasis. Pneumonia less likely.  3. Probable COPD/emphysema.        This report was finalized on 03/30/2018 13:03 by Dr. Hugo Licea MD.        Lab Results (last 7 days)     Procedure Component Value Units Date/Time    POC Glucose Once [[id number]]  (Normal) Collected:  04/05/18 0755    Specimen:  Blood Updated:  04/05/18 0845     Glucose 83 mg/dL      Comment: : [id number] Edmundo Garcia ID: [id number]       CBC & Differential [[id number]] Collected:  04/05/18 0430    Specimen:  Blood Updated:  04/05/18 0540    Narrative:       The following orders were created for panel order CBC & Differential.  Procedure                               Abnormality         Status                     ---------                               -----------         ------                     CBC Auto Differential[[id number]]         Abnormal            Final result                 Please view results for these tests on the individual orders.    CBC Auto Differential [[id number]]  (Abnormal) Collected:  04/05/18 0430    Specimen:  Blood Updated:  04/05/18 0540     WBC 8.56 10*3/mm3      RBC 4.19 (L) 10*6/mm3      Hemoglobin 10.5 (L) g/dL      Hematocrit 34.8 (L) %      MCV 83.1 fL      MCH 25.1 (L) pg      MCHC 30.2 (L) g/dL      RDW 17.3 (H) %      RDW-SD 52.6 fl      MPV 9.6 fL      Platelets 163 10*3/mm3      Neutrophil % 83.9 (H) %      Lymphocyte % 7.7 (L) %      Monocyte % 7.6 %      Eosinophil % 0.0 %      Basophil % 0.1 %      Immature Grans % 0.7 %      Neutrophils, Absolute 7.18 10*3/mm3      Lymphocytes, Absolute 0.66 (L) 10*3/mm3      Monocytes, Absolute 0.65 10*3/mm3      Eosinophils, Absolute 0.00 10*3/mm3      Basophils, Absolute 0.01 10*3/mm3      Immature Grans, Absolute 0.06 (H) 10*3/mm3      nRBC 0.0 /100 WBC     Basic Metabolic Panel [[id number]]  (Abnormal) Collected:  04/05/18 0430    Specimen:  Blood Updated:  04/05/18 0532     Glucose 100 mg/dL      BUN 15 mg/dL      Creatinine 0.72 mg/dL      Sodium 132 (L) mmol/L      Potassium 4.1 mmol/L      Chloride 93 (L) mmol/L      CO2 37.0 (H) mmol/L      Calcium 8.5 mg/dL      eGFR Non African Amer 84 mL/min/1.73      BUN/Creatinine Ratio 20.8     Anion Gap 2.0 (L) mmol/L     Narrative:       GFR Normal >60  Chronic Kidney Disease <60  Kidney Failure <15    POC Glucose Once [[id number]]  (Normal) Collected:  04/04/18 2031    Specimen:  Blood Updated:  04/04/18 2042     Glucose 113 mg/dL      Comment: : [id number] Rendon JosephMeter ID: [id number]       POC Glucose Once [[id number]]  (Normal) Collected:  04/04/18 1718    Specimen:  Blood Updated:  04/04/18 1731     Glucose 107 mg/dL      Comment: : [id number] Stone TinaMeter ID: [id number]       Blood Culture - Blood, Blood, Venous Line [[id number]]  (Normal) Collected:  03/30/18 1302    Specimen:  Blood from Arm, Left  Updated:  04/04/18 1416     Blood Culture No growth at 5 days    Blood Culture - Blood, Blood, Venous Line [[id number]]  (Normal) Collected:  03/30/18 1225    Specimen:  Blood from Arm, Left Updated:  04/04/18 1246     Blood Culture No growth at 5 days    POC Glucose Once [[id number]]  (Normal) Collected:  04/04/18 1211    Specimen:  Blood Updated:  04/04/18 1222     Glucose 99 mg/dL      Comment: : [id number] MariposaCrown BiosciencesayMeter ID: [id number]       POC Glucose Once [[id number]]  (Normal) Collected:  04/04/18 0738    Specimen:  Blood Updated:  04/04/18 0752     Glucose 96 mg/dL      Comment: : [id number] MariposaCrown BiosciencesayMeter ID: [id number]       Basic Metabolic Panel [[id number]]  (Abnormal) Collected:  04/04/18 0539    Specimen:  Blood Updated:  04/04/18 0646     Glucose 92 mg/dL      BUN 15 mg/dL      Creatinine 0.66 mg/dL      Sodium 126 (L) mmol/L      Potassium 4.5 mmol/L      Chloride 87 (L) mmol/L      CO2 35.0 (H) mmol/L      Calcium 8.6 mg/dL      eGFR Non African Amer 93 mL/min/1.73      BUN/Creatinine Ratio 22.7     Anion Gap 4.0 mmol/L     Narrative:       GFR Normal >60  Chronic Kidney Disease <60  Kidney Failure <15    Respiratory Culture - Sputum, Cough [[id number]] Collected:  04/02/18 0705    Specimen:  Sputum from Cough Updated:  04/04/18 0638     Respiratory Culture --      Light growth (2+) Normal Respiratory Joanne     Gram Stain Result Greater than 25 WBCs per low power field      Few (2+) Epithelial cells per low power field      Many (4+) Mixed gram positive joanne    CBC & Differential [[id number]] Collected:  04/04/18 0539    Specimen:  Blood Updated:  04/04/18 0620    Narrative:       The following orders were created for panel order CBC & Differential.  Procedure                               Abnormality         Status                     ---------                               -----------         ------                     CBC Auto Differential[[id number]]        Abnormal             Final result                 Please view results for these tests on the individual orders.    CBC Auto Differential [[id number]]  (Abnormal) Collected:  04/04/18 0539    Specimen:  Blood Updated:  04/04/18 0620     WBC 7.76 10*3/mm3      RBC 4.33 10*6/mm3      Hemoglobin 10.8 (L) g/dL      Hematocrit 35.4 (L) %      MCV 81.8 (L) fL      MCH 24.9 (L) pg      MCHC 30.5 (L) g/dL      RDW 17.3 (H) %      RDW-SD 51.5 fl      MPV 9.6 fL      Platelets 178 10*3/mm3      Neutrophil % 84.1 (H) %      Lymphocyte % 8.6 (L) %      Monocyte % 6.4 %      Eosinophil % 0.0 %      Basophil % 0.0 %      Immature Grans % 0.9 %      Neutrophils, Absolute 6.52 10*3/mm3      Lymphocytes, Absolute 0.67 (L) 10*3/mm3      Monocytes, Absolute 0.50 10*3/mm3      Eosinophils, Absolute 0.00 10*3/mm3      Basophils, Absolute 0.00 10*3/mm3      Immature Grans, Absolute 0.07 (H) 10*3/mm3      nRBC 0.9 (H) /100 WBC     POC Glucose Once [[id number]]  (Normal) Collected:  04/03/18 2015    Specimen:  Blood Updated:  04/03/18 2044     Glucose 120 mg/dL      Comment: : [id number] Tio SloanMeter ID: [id number]       POC Glucose Once [[id number]]  (Abnormal) Collected:  04/03/18 1635    Specimen:  Blood Updated:  04/03/18 1654     Glucose 133 (H) mg/dL      Comment: : [id number] Aida ShookMeter ID: [id number]       SCANNED - LABS [[id number]] Resulted:  03/30/18      Updated:  04/03/18 1415    Osmolality, Urine - Urine, Clean Catch [[id number]]  (Abnormal) Collected:  04/03/18 1031    Specimen:  Urine from Urine, Clean Catch Updated:  04/03/18 1201     Osmolality, Urine 558 (L) mOsm/kg     POC Glucose Once [[id number]]  (Normal) Collected:  04/03/18 1101    Specimen:  Blood Updated:  04/03/18 1138     Glucose 119 mg/dL      Comment: : [id number] Olu BenitezelaMeter ID: [id number]       Sodium, Urine, Random - Urine, Clean Catch [[id number]]  (Abnormal) Collected:  04/03/18 1031    Specimen:  Urine from Urine, Clean Catch Updated:  04/03/18  1107     Sodium, Urine 9 (L) mmol/L     POC Glucose Once [[id number]]  (Abnormal) Collected:  04/03/18 0741    Specimen:  Blood Updated:  04/03/18 0816     Glucose 133 (H) mg/dL      Comment: : [id number] Olu HuiMeter ID: [id number]       Basic Metabolic Panel [[id number]]  (Abnormal) Collected:  04/03/18 0453    Specimen:  Blood Updated:  04/03/18 0532     Glucose 102 (H) mg/dL      BUN 20 mg/dL      Creatinine 0.79 mg/dL      Sodium 127 (L) mmol/L      Potassium 3.9 mmol/L      Chloride 82 (L) mmol/L      CO2 >40.0 (C) mmol/L      Calcium 8.4 mg/dL      eGFR Non African Amer 76 mL/min/1.73      BUN/Creatinine Ratio 25.3 (H)     Anion Gap -- mmol/L      Comment: Unable to calculate Anion Gap.       Narrative:       GFR Normal >60  Chronic Kidney Disease <60  Kidney Failure <15    CBC (No Diff) [[id number]]  (Abnormal) Collected:  04/03/18 0453    Specimen:  Blood Updated:  04/03/18 0515     WBC 9.84 10*3/mm3      RBC 4.51 10*6/mm3      Hemoglobin 11.3 (L) g/dL      Hematocrit 37.2 %      MCV 82.5 fL      MCH 25.1 (L) pg      MCHC 30.4 (L) g/dL      RDW 17.7 (H) %      RDW-SD 53.1 fl      MPV 9.4 fL      Platelets 189 10*3/mm3     POC Glucose Once [[id number]]  (Normal) Collected:  04/02/18 2135    Specimen:  Blood Updated:  04/02/18 2154     Glucose 108 mg/dL      Comment: : [id number] Tio NathaliaMeter ID: [id number]       POC Glucose Once [[id number]]  (Normal) Collected:  04/02/18 1655    Specimen:  Blood Updated:  04/02/18 1710     Glucose 123 mg/dL      Comment: : [id number] Edmundo EncarnacionMeter ID: [id number]       POC Glucose Once [[id number]]  (Normal) Collected:  04/02/18 1143    Specimen:  Blood Updated:  04/02/18 1216     Glucose 120 mg/dL      Comment: : [id number] Edmundo KimMeter ID: [id number]       Osmolality, Serum [[id number]]  (Abnormal) Collected:  04/02/18 0747    Specimen:  Blood Updated:  04/02/18 0924     Osmolality 275 (L) mOsm/kg     POC Glucose Once [[id number]]  (Normal)  Collected:  04/02/18 0748    Specimen:  Blood Updated:  04/02/18 0843     Glucose 126 mg/dL      Comment: : [id number] Edmundo KimMeter ID: [id number]       Uric Acid [[id number]]  (Normal) Collected:  04/02/18 0610    Specimen:  Blood Updated:  04/02/18 0727     Uric Acid 5.2 mg/dL     Basic Metabolic Panel [[id number]]  (Abnormal) Collected:  04/02/18 0610    Specimen:  Blood Updated:  04/02/18 0655     Glucose 104 (H) mg/dL      BUN 23 (H) mg/dL      Creatinine 0.86 mg/dL      Sodium 126 (L) mmol/L      Potassium 3.4 (L) mmol/L      Chloride 77 (L) mmol/L      CO2 >40.0 (C) mmol/L      Calcium 8.6 mg/dL      eGFR Non African Amer 69 mL/min/1.73      BUN/Creatinine Ratio 26.7 (H)     Anion Gap -- mmol/L      Comment: Unable to calculate Anion Gap.       Narrative:       GFR Normal >60  Chronic Kidney Disease <60  Kidney Failure <15    POC Glucose Once [[id number]]  (Normal) Collected:  04/01/18 2230    Specimen:  Blood Updated:  04/01/18 2249     Glucose 116 mg/dL      Comment: : [id number] Tio SloanMeter ID: [id number]       Urinalysis With / Culture If Indicated - Urine, Clean Catch [[id number]]  (Abnormal) Collected:  04/01/18 1844    Specimen:  Urine from Urine, Clean Catch Updated:  04/01/18 1857     Color, UA Yellow     Appearance, UA Clear     pH, UA 8.5 (H)     Specific Gravity, UA 1.021     Glucose, UA Negative     Ketones, UA Negative     Bilirubin, UA Negative     Blood, UA Negative     Protein, UA Negative     Leuk Esterase, UA Negative     Nitrite, UA Negative     Urobilinogen, UA 1.0 E.U./dL    Narrative:       Urine microscopic not indicated.    POC Glucose Once [[id number]]  (Normal) Collected:  04/01/18 1639    Specimen:  Blood Updated:  04/01/18 1652     Glucose 108 mg/dL      Comment: : [id number] Hatchett MichaelMeter ID: [id number]       POC Glucose Once [[id number]]  (Abnormal) Collected:  04/01/18 1128    Specimen:  Blood Updated:  04/01/18 1139     Glucose 190 (H) mg/dL       Comment: : [id number] Lindsey AnnMeter ID: [id number]       POC Glucose Once [[id number]]  (Abnormal) Collected:  04/01/18 0744    Specimen:  Blood Updated:  04/01/18 0810     Glucose 140 (H) mg/dL      Comment: : [id number] Lindsey AnnMeter ID: [id number]       CBC & Differential [[id number]] Collected:  04/01/18 0455    Specimen:  Blood Updated:  04/01/18 0622    Narrative:       The following orders were created for panel order CBC & Differential.  Procedure                               Abnormality         Status                     ---------                               -----------         ------                     CBC Auto Differential[[id number]]        Abnormal            Final result                 Please view results for these tests on the individual orders.    CBC Auto Differential [[id number]]  (Abnormal) Collected:  04/01/18 0455    Specimen:  Blood Updated:  04/01/18 0622     WBC 14.85 (H) 10*3/mm3      RBC 4.54 10*6/mm3      Hemoglobin 11.2 (L) g/dL      Hematocrit 37.7 %      MCV 83.0 fL      MCH 24.7 (L) pg      MCHC 29.7 (L) g/dL      RDW 18.0 (H) %      RDW-SD 54.1 (H) fl      MPV 10.2 fL      Platelets 259 10*3/mm3      Neutrophil % 92.2 (H) %      Lymphocyte % 3.6 (L) %      Monocyte % 2.8 (L) %      Eosinophil % 0.0 %      Basophil % 0.1 %      Immature Grans % 1.3 %      Neutrophils, Absolute 13.68 (H) 10*3/mm3      Lymphocytes, Absolute 0.54 (L) 10*3/mm3      Monocytes, Absolute 0.42 10*3/mm3      Eosinophils, Absolute 0.00 10*3/mm3      Basophils, Absolute 0.01 10*3/mm3      Immature Grans, Absolute 0.20 (H) 10*3/mm3      nRBC 0.9 (H) /100 WBC     Comprehensive Metabolic Panel [[id number]]  (Abnormal) Collected:  04/01/18 0455    Specimen:  Blood Updated:  04/01/18 0555     Glucose 113 (H) mg/dL      BUN 32 (H) mg/dL      Creatinine 1.11 mg/dL      Sodium 132 (L) mmol/L      Potassium 4.3 mmol/L      Chloride 78 (L) mmol/L      CO2 >40.0 (C) mmol/L      Calcium 8.6 mg/dL      Total Protein  6.1 (L) g/dL      Albumin 3.30 (L) g/dL      ALT (SGPT) 87 (H) U/L      AST (SGOT) 38 U/L      Alkaline Phosphatase 83 U/L      Total Bilirubin 1.1 (H) mg/dL      eGFR Non African Amer 51 (L) mL/min/1.73      Globulin 2.8 gm/dL      A/G Ratio 1.2 g/dL      BUN/Creatinine Ratio 28.8 (H)     Anion Gap -- mmol/L      Comment: Unable to calculate Anion Gap.       POC Glucose Once [[id number]]  (Abnormal) Collected:  03/31/18 2209    Specimen:  Blood Updated:  03/31/18 2235     Glucose 160 (H) mg/dL      Comment: : [id number] Tio VictoriaMeter ID: [id number]       POC Glucose Once [[id number]]  (Abnormal) Collected:  03/31/18 1823    Specimen:  Blood Updated:  03/31/18 1844     Glucose 218 (H) mg/dL      Comment: : [id number] Aida RobinMeter ID: [id number]       POC Glucose Once [[id number]]  (Abnormal) Collected:  03/31/18 1319    Specimen:  Blood Updated:  03/31/18 1340     Glucose 155 (H) mg/dL      Comment: : [id number] Aida RobinMeter ID: [id number]       T3, Free [[id number]]  (Abnormal) Collected:  03/31/18 0535    Specimen:  Blood Updated:  03/31/18 1103     T3, Free 2.23 (L) pg/mL     T4, Free [[id number]]  (Normal) Collected:  03/31/18 0535    Specimen:  Blood Updated:  03/31/18 1103     Free T4 1.62 ng/dL     Hemoglobin A1c [[id number]] Collected:  03/31/18 0535    Specimen:  Blood Updated:  03/31/18 0825     Hemoglobin A1C 6.1 %     Narrative:       Less than 6.0           Non-Diabetic Range  6.0-7.0                 ADA Therapeutic Target  Greater than 7.0        Action Suggested    TSH [[id number]]  (Abnormal) Collected:  03/31/18 0535    Specimen:  Blood Updated:  03/31/18 0709     TSH 0.245 (L) mIU/mL     Lipid Panel [[id number]]  (Abnormal) Collected:  03/31/18 0535    Specimen:  Blood Updated:  03/31/18 0647     Total Cholesterol 148 mg/dL      Triglycerides 72 mg/dL      HDL Cholesterol 28 (L) mg/dL      LDL Cholesterol  115 (H) mg/dL      LDL/HDL Ratio 3.77    BNP [[id number]]  (Abnormal)  Collected:  03/31/18 0535    Specimen:  Blood Updated:  03/31/18 0645     proBNP 9,430.0 (H) pg/mL     Comprehensive Metabolic Panel [[id number]]  (Abnormal) Collected:  03/31/18 0535    Specimen:  Blood Updated:  03/31/18 0644     Glucose 133 (H) mg/dL      BUN 23 (H) mg/dL      Creatinine 1.10 mg/dL      Sodium 133 (L) mmol/L      Potassium 4.2 mmol/L      Chloride 80 (L) mmol/L      CO2 >40.0 (C) mmol/L      Calcium 8.7 mg/dL      Total Protein 6.4 g/dL      Albumin 3.60 g/dL      ALT (SGPT) 108 (H) U/L      AST (SGOT) 47 (H) U/L      Alkaline Phosphatase 97 U/L      Total Bilirubin 1.2 (H) mg/dL      eGFR Non African Amer 52 (L) mL/min/1.73      Globulin 2.8 gm/dL      A/G Ratio 1.3 g/dL      BUN/Creatinine Ratio 20.9     Anion Gap -- mmol/L      Comment: Unable to calculate Anion Gap.       CBC Auto Differential [[id number]]  (Abnormal) Collected:  03/31/18 0535    Specimen:  Blood Updated:  03/31/18 0637     WBC 4.27 (L) 10*3/mm3      RBC 4.56 10*6/mm3      Hemoglobin 11.6 (L) g/dL      Hematocrit 38.6 %      MCV 84.6 fL      MCH 25.4 (L) pg      MCHC 30.1 (L) g/dL      RDW 17.8 (H) %      RDW-SD 55.3 (H) fl      MPV 10.1 fL      Platelets 245 10*3/mm3      Neutrophil % 87.9 (H) %      Lymphocyte % 8.2 (L) %      Monocyte % 3.0 (L) %      Eosinophil % 0.0 %      Basophil % 0.0 %      Immature Grans % 0.9 %      Neutrophils, Absolute 3.75 10*3/mm3      Lymphocytes, Absolute 0.35 (L) 10*3/mm3      Monocytes, Absolute 0.13 (L) 10*3/mm3      Eosinophils, Absolute 0.00 10*3/mm3      Basophils, Absolute 0.00 10*3/mm3      Immature Grans, Absolute 0.04 (H) 10*3/mm3      nRBC 3.0 (H) /100 WBC     Blood Gas, Arterial [[id number]]  (Abnormal) Collected:  03/31/18 0343    Specimen:  Arterial Blood Updated:  03/31/18 0352     Site Left Radial     Dao's Test Positive     pH, Arterial 7.405 pH units      pCO2, Arterial 72.7 (C) mm Hg      pO2, Arterial 84.3 mm Hg      HCO3, Arterial 45.6 (H) mmol/L      Base Excess, Arterial  17.3 (H) mmol/L      O2 Saturation, Arterial 96.8 %      Temperature 37.0 C      Barometric Pressure for Blood Gas 757 mmHg      Modality BiPap     FIO2 35 %      Ventilator Mode NA     Set Mech Resp Rate 14.0     IPAP 16     EPAP 6     Notified Who [id number]     Notified By [id number]     Notified Time 03/31/2018 03:51     Collected by [id number]    Blood Gas, Arterial [[id number]]  (Abnormal) Collected:  03/30/18 1540    Specimen:  Arterial Blood Updated:  03/30/18 1549     Site Right Brachial     Dao's Test N/A     pH, Arterial 7.342 (L) pH units      pCO2, Arterial 72.9 (C) mm Hg      pO2, Arterial 94.2 mm Hg      HCO3, Arterial 39.4 (H) mmol/L      Base Excess, Arterial 11.0 (H) mmol/L      O2 Saturation, Arterial 97.6 %      Temperature 37.0 C      Barometric Pressure for Blood Gas 757 mmHg      Modality BiPap     FIO2 35 %      Ventilator Mode NA     IPAP 14     EPAP 6     Notified [facility] RN Kathy [id number]     Notified By [id number]     Notified Time 03/30/2018 15:48     Collected by [id number]    Troponin [[id number]]  (Normal) Collected:  03/30/18 1247    Specimen:  Blood Updated:  03/30/18 1520     Troponin I 0.026 ng/mL     [facility] Draw [[id number]] Collected:  03/30/18 1224    Specimen:  Blood Updated:  03/30/18 1332    Narrative:       The following orders were created for panel order [facility] Draw.  Procedure                               Abnormality         Status                     ---------                               -----------         ------                     Light Blue Top[[id number]]                                   Final result               Green Top (Gel)[[id number]]                                  Final result               Lavender Top[[id number]]                                     Final result               Red Top[[id number]]                                          Final result                 Please view results for these tests on the individual orders.    Light Blue Top [[id number]] Collected:  03/30/18  1224    Specimen:  Blood Updated:  03/30/18 1332     Extra Tube hold for add-on     Comment: Auto resulted       Green Top (Gel) [[id number]] Collected:  03/30/18 1224    Specimen:  Blood Updated:  03/30/18 1332     Extra Tube Hold for add-ons.     Comment: Auto resulted.       Lavender Top [[id number]] Collected:  03/30/18 1224    Specimen:  Blood Updated:  03/30/18 1332     Extra Tube hold for add-on     Comment: Auto resulted       Red Top [[id number]] Collected:  03/30/18 1224    Specimen:  Blood Updated:  03/30/18 1332     Extra Tube Hold for add-ons.     Comment: Auto resulted.       Lactic Acid, Plasma [[id number]]  (Normal) Collected:  03/30/18 1247    Specimen:  Blood Updated:  03/30/18 1312     Lactate 1.3 mmol/L     BNP [[id number]]  (Abnormal) Collected:  03/30/18 1224    Specimen:  Blood Updated:  03/30/18 1304     proBNP 12,900.0 (H) pg/mL     Comprehensive Metabolic Panel [[id number]]  (Abnormal) Collected:  03/30/18 1224    Specimen:  Blood Updated:  03/30/18 1256     Glucose 108 (H) mg/dL      BUN 22 (H) mg/dL      Creatinine 1.16 mg/dL      Sodium 129 (L) mmol/L      Potassium 4.4 mmol/L      Chloride 85 (L) mmol/L      CO2 38.0 (H) mmol/L      Calcium 8.7 mg/dL      Total Protein 6.6 g/dL      Albumin 3.60 g/dL      ALT (SGPT) 127 (H) U/L      AST (SGOT) 67 (H) U/L      Alkaline Phosphatase 107 U/L      Total Bilirubin 1.1 (H) mg/dL      eGFR Non African Amer 49 (L) mL/min/1.73      Globulin 3.0 gm/dL      A/G Ratio 1.2 g/dL      BUN/Creatinine Ratio 19.0     Anion Gap 6.0 mmol/L     CBC & Differential [[id number]] Collected:  03/30/18 1224    Specimen:  Blood Updated:  03/30/18 1239    Narrative:       The following orders were created for panel order CBC & Differential.  Procedure                               Abnormality         Status                     ---------                               -----------         ------                     Manual Differential[[id number]]                                                          CBC Auto Differential[[id number]]        Abnormal            Final result                 Please view results for these tests on the individual orders.    CBC Auto Differential [[id number]]  (Abnormal) Collected:  03/30/18 1224    Specimen:  Blood Updated:  03/30/18 1239     WBC 8.49 10*3/mm3      RBC 4.45 10*6/mm3      Hemoglobin 11.5 (L) g/dL      Hematocrit 39.2 %      MCV 88.1 fL      MCH 25.8 (L) pg      MCHC 29.3 (L) g/dL      RDW 17.9 (H) %      RDW-SD 57.8 (H) fl      MPV 9.6 fL      Platelets 265 10*3/mm3      Neutrophil % 68.0 %      Lymphocyte % 18.4 %      Monocyte % 12.5 (H) %      Eosinophil % 0.1 %      Basophil % 0.5 %      Immature Grans % 0.5 %      Neutrophils, Absolute 5.78 10*3/mm3      Lymphocytes, Absolute 1.56 10*3/mm3      Monocytes, Absolute 1.06 10*3/mm3      Eosinophils, Absolute 0.01 10*3/mm3      Basophils, Absolute 0.04 10*3/mm3      Immature Grans, Absolute 0.04 (H) 10*3/mm3      nRBC 2.6 (H) /100 WBC     Blood Gas, Arterial [[id number]]  (Abnormal) Collected:  03/30/18 1213    Specimen:  Arterial Blood Updated:  03/30/18 1218     Site Right Radial     Dao's Test Positive     pH, Arterial 7.265 (L) pH units      pCO2, Arterial 77.6 (C) mm Hg      pO2, Arterial 61.1 (L) mm Hg      HCO3, Arterial 35.2 (H) mmol/L      Base Excess, Arterial 5.9 (H) mmol/L      O2 Saturation, Arterial 88.9 (L) %      Temperature 37.0 C      Barometric Pressure for Blood Gas 758 mmHg      Modality Nasal Cannula     Flow Rate 2.0 lpm      Ventilator Mode NA     Notified Who DR YE CERNA     Notified By [id number]     Notified Time 03/30/2018 12:19     Collected by [id number]        Hospital Course:  The patient is a 57 y.o. female who follows with Nasrin Guan for her primary care. She has a past medical history significant for chronic respiratory failure on oxygen at 2L and trilogy therapy, ongoing tobacco abuse, pulmonary hypertension, and congestive heart failure. She presented as a  "transfer from [facility] with confusion and shortness of breath. She was noted to have a COPD exacerbation and acute diastolic heart failure. She was found to have acute on chronic hypoxic hypercarbic respiratory failure. She was placed on biPAP and was given IV Lasix. She diuresed well and began having some contraction alkalosis. She was transitioned to her home trilogy after we obtained a new mask for her. She developed significant hyponatremia and required some IV fluids. We then opted to give a dose of Samsca which genesis her sodium to 132. She has had problems with inactivity and was still smoking upon admission. She was placed on oral doxycycline. She does carry a history of hepatitis C.     Today, she has ambulated with physical therapy. She is more awake and alert. Her confusion has improved. I have placed her lasix to as needed and continued her aldactone daily. She will need to continue her beta blocker as she has been off here but has continued to wheeze. She will complete a total of 7 days of doxycyline. She will require home health to assist with her management and compliance education. She will need cbc, bmp on Monday with results to her PCP. She will need repeat thyroid function tests post discharge as well.     Physical Exam on Discharge:  /64   Pulse 95   Temp 97.2 °F (36.2 °C)   Resp 18   Ht 154.8 cm (60.95\")   Wt 50.6 kg (111 lb 9.6 oz)   SpO2 99%   BMI 21.12 kg/m²    Physical Exam   Constitutional: She is oriented to person, place, and time. She appears well-developed and well-nourished.   HENT:   Head: Normocephalic and atraumatic.   Eyes: Conjunctivae and EOM are normal. Pupils are equal, round, and reactive to light.   Neck: Neck supple. No JVD present. No thyromegaly present.   Cardiovascular: Normal rate, regular rhythm, normal heart sounds and intact distal pulses.  Exam reveals no gallop and no friction rub.    No murmur heard.  Sinus  with PAC's "   Pulmonary/Chest: Effort normal. No respiratory distress. She has wheezes (very faint today. Has chronic wheezes. ). She has no rales. She exhibits no tenderness.   Abdominal: Soft. Bowel sounds are normal. She exhibits no distension. There is no tenderness. There is no rebound and no guarding.   Musculoskeletal: Normal range of motion. She exhibits no edema, tenderness or deformity.   Lymphadenopathy:     She has no cervical adenopathy.   Neurological: She is alert and oriented to person, place, and time. She displays normal reflexes. No cranial nerve deficit. She exhibits normal muscle tone.   Skin: Skin is warm and dry. No rash noted.   Psychiatric: She has a normal mood and affect. Her behavior is normal. Judgment and thought content normal.     Condition on Discharge: stable with high readmission risk given her non-compliance.     Discharge Disposition:  Home or Self Care    Discharge Medications:   Teri Tim   Home Medication Instructions ARLETH:[id number]    Printed on:04/05/18 1210   Medication Information                      budesonide-formoterol (SYMBICORT) 160-4.5 MCG/ACT inhaler  Inhale 2 puffs 2 (Two) Times a Day.             cyclobenzaprine (FLEXERIL) 10 MG tablet  Take 10 mg by mouth 2 (Two) Times a Day As Needed for Muscle Spasms.             doxycycline (ADOXA) 100 MG tablet  Take 1 tablet by mouth Every 12 (Twelve) Hours for 7 doses.             furosemide (LASIX) 20 MG tablet  Take 0.5 tablets by mouth Daily As Needed (lower extremity edema and weight gain more than 2 pounds in 1 day and 5 pounds in 2 days.).             gabapentin (NEURONTIN) 600 MG tablet  Take 600 mg by mouth 4 (Four) Times a Day.             guaiFENesin (MUCINEX) 600 MG 12 hr tablet  Take 2 tablets by mouth 2 (Two) Times a Day.             HYDROcodone-acetaminophen (NORCO) 7.5-325 MG per tablet  Take 1 tablet by mouth Every 6 (Six) Hours As Needed for Moderate Pain .             lactulose 20 GM/30ML solution  solution  Take 30 mL by mouth Daily.             metoprolol tartrate (LOPRESSOR) 25 MG tablet  Take 12.5 mg by mouth Every 12 (Twelve) Hours.             pantoprazole (PROTONIX) 40 MG EC tablet  Take 40 mg by mouth Daily.             predniSONE (DELTASONE) 10 MG tablet  2 tablets twice daily for 2 days then 2 tablets daily for 3 days then 10 tablet daily for 3 days then 1/2 tablet for 3 days then stop.             spironolactone (ALDACTONE) 25 MG tablet  Take 25 mg by mouth Daily.             venlafaxine (EFFEXOR) 75 MG tablet  Take 75 mg by mouth Daily.               Discharge Diet:   Diet Instructions     Diet: Cardiac, Regular; Thin       Discharge Diet:   Cardiac  Regular       Fluid Consistency:  Thin        Activity at Discharge:   Activity Instructions     Activity as Tolerated           Follow-up Appointments:   1. ROLAND Recinos 1 week.     Test Results Pending at Discharge: none    ROLAND Burdick  04/05/18  12:10 PM    Time: 45 minutes.         Electronically signed by ROLAND Burdick at 4/5/2018 12:49 PM

## 2018-04-06 NOTE — THERAPY DISCHARGE NOTE
Acute Care - Physical Therapy Discharge Summary  [facility]       Patient Name: Teri Tim  : 1960  MRN: [id number]    Today's Date: 2018  Onset of Illness/Injury or Date of Surgery: 18    Date of Referral to PT: 18  Referring Physician: Dr. Loo      Admit Date: 3/30/2018      PT Recommendation and Plan    Visit Dx:    ICD-10-CM ICD-9-CM   1. COPD exacerbation J44.1 491.21   2. Acute on chronic respiratory failure with hypoxia and hypercapnia J96.21 518.84    J96.22 786.09     799.02   3. Impaired functional mobility and activity tolerance Z74.09 V49.89   4. Impaired mobility and ADLs Z74.09 799.89             Outcome Measures     Row Name 18 1100 18 1040 18 1004       How much help from another person do you currently need...    Turning from your back to your side while in flat bed without using bedrails? 4  -BALJEET  -- 4  -MF    Moving from lying on back to sitting on the side of a flat bed without bedrails? 4  -BALJEET  -- 4  -MF    Moving to and from a bed to a chair (including a wheelchair)? 4  -BALJEET  -- 3  -MF    Standing up from a chair using your arms (e.g., wheelchair, bedside chair)? 4  -BALJEET  -- 4  -MF    Climbing 3-5 steps with a railing? 3  -BALJEET  -- 3  -MF    To walk in hospital room? 3  -BALJEET  -- 3  -MF    AM-PAC 6 Clicks Score 22  -BALJEET  -- 21  -MF       How much help from another is currently needed...    Putting on and taking off regular lower body clothing?  -- 3  -CJ  --    Bathing (including washing, rinsing, and drying)  -- 3  -CJ  --    Toileting (which includes using toilet bed pan or urinal)  -- 4  -CJ  --    Putting on and taking off regular upper body clothing  -- 4  -CJ  --    Taking care of personal grooming (such as brushing teeth)  -- 3  -CJ  --    Eating meals  -- 4  -CJ  --    Score  -- 21  -CJ  --       Functional Assessment    Outcome Measure Options  -- AM-PAC 6 Clicks Daily Activity (OT)  -CJ AM-PAC 6 Clicks Basic Mobility (PT)  -    Row Name  04/04/18 0900 04/03/18 1106          How much help from another person do you currently need...    Turning from your back to your side while in flat bed without using bedrails?  -- 4  -MF     Moving from lying on back to sitting on the side of a flat bed without bedrails?  -- 4  -MF     Moving to and from a bed to a chair (including a wheelchair)?  -- 3  -MF     Standing up from a chair using your arms (e.g., wheelchair, bedside chair)?  -- 4  -MF     Climbing 3-5 steps with a railing?  -- 3  -MF     To walk in hospital room?  -- 3  -MF     AM-PAC 6 Clicks Score  -- 21  -MF        How much help from another is currently needed...    Putting on and taking off regular lower body clothing? 3  -TS  --     Bathing (including washing, rinsing, and drying) 3  -TS  --     Toileting (which includes using toilet bed pan or urinal) 4  -TS  --     Putting on and taking off regular upper body clothing 4  -TS  --     Taking care of personal grooming (such as brushing teeth) 3  -TS  --     Eating meals 4  -TS  --     Score 21  -TS  --        Functional Assessment    Outcome Measure Options AM-PAC 6 Clicks Daily Activity (OT)  -TS AM-PAC 6 Clicks Basic Mobility (PT)  -       User Key  (r) = Recorded By, (t) = Taken By, (c) = Cosigned By    Initials Name Provider Type    SHRUTHI Marie/L Occupational Therapy Assistant    TS SHRUTHI Artis/L Occupational Therapy Assistant    BALJEET Shaikh [facility] Physical Therapy Assistant     Renee Thomason [facility] Physical Therapy Assistant                      PT Rehab Goals     Row Name 04/06/18 1000 04/02/18 0945          Bed Mobility Goal 1 (PT)    Activity/Assistive Device (Bed Mobility Goal 1, PT)  -- rolling to left;rolling to right;sit to supine/supine to sit  -LYNETTE (r) CS (t) LYNETTE (c)     [place] Level/Cues Needed (Bed Mobility Goal 1, PT)  -- independent  -LYNETTE (r) CS (t) LYNETTE (c)     Time Frame (Bed Mobility Goal 1, PT)  -- long term goal (LTG);10 days  -LYNETTE  (r) CS (t) LYNETTE (c)     Barriers (Bed Mobility Goal 1, PT)  -- medically complex  -LYNETTE (r) CS (t) LYNETTE (c)     Progress/Outcomes (Bed Mobility Goal 1, PT) goal met  -BALJEET goal ongoing  -LYNETTE (r) CS (t) LYNETTE (c)        Transfer Goal 1 (PT)    Activity/Assistive Device (Transfer Goal 1, PT)  -- sit-to-stand/stand-to-sit;bed-to-chair/chair-to-bed  -LYNETTE (r) CS (t) LYNETTE (c)     [place] Level/Cues Needed (Transfer Goal 1, PT) independent  -BALJEET supervision required  -LYNETTE (r) CS (t) LYNETTE (c)     Time Frame (Transfer Goal 1, PT)  -- long term goal (LTG);10 days  -LYNETTE (r) CS (t) LYNETTE (c)     Barriers (Transfers Goal 1, PT)  -- medically complex  -LYNETTE (r) CS (t) LYNETTE (c)     Progress/Outcome (Transfer Goal 1, PT) goal met  -BALJEET goal ongoing  -LYNETTE (r) CS (t) LYNETTE (c)        Gait Training Goal 1 (PT)    Activity/Assistive Device (Gait Training Goal 1, PT)  -- gait (walking locomotion)  -LYNETTE (r) CS (t) LYNETTE (c)     [place] Level (Gait Training Goal 1, PT) supervision required  -BALJEET contact guard assist  -LYNETTE (r) CS (t) LYNETTE (c)     Distance (Gait Goal 1, PT) 325  -BALJEET 50ft  -LYNETTE (r) CS (t) LYNETTE (c)     Time Frame (Gait Training Goal 1, PT)  -- long term goal (LTG);10 days  -LYNETTE (r) CS (t) LYNETTE (c)     Barriers (Gait Training Goal 1, PT)  -- medically complex  -LYNETTE (r) CS (t) LYNETTE (c)     Progress/Outcome (Gait Training Goal 1, PT) goal met  -BALJEET goal ongoing  -LYNETTE (r) CS (t) LYNETTE (c)       User Key  (r) = Recorded By, (t) = Taken By, (c) = Cosigned By    Initials Name Provider Type    LYNETTE Del Real, PT DPT Physical Therapist    BALJEET Shaikh, PTA Physical Therapy Assistant    CS Gary Olivier, PT Student PT Student          Therapy Charges for Today     Code Description Service Date Service Provider Modifiers Qty    82229249052 HC GAIT TRAINING EA 15 MIN 4/5/2018 Alejandra Shaikh PTA GP, KX 1    43256922669 HC PT THER PROC EA 15 MIN 4/5/2018 Alejandra Shaikh PTA GP, KX 1          PT Discharge Summary  Anticipated Discharge Disposition (PT): home  with home health care  Reason for Discharge: All goals achieved  Outcomes Achieved: Able to achieve all goals within established timeline  Discharge Destination: Home with home health      Alejandra Shaikh, PTA   4/6/2018

## 2018-09-28 ENCOUNTER — APPOINTMENT (OUTPATIENT)
Dept: CARDIOLOGY | Facility: HOSPITAL | Age: 58
End: 2018-09-28
Attending: FAMILY MEDICINE

## 2018-09-28 ENCOUNTER — APPOINTMENT (OUTPATIENT)
Dept: GENERAL RADIOLOGY | Facility: HOSPITAL | Age: 58
End: 2018-09-28

## 2018-09-28 ENCOUNTER — HOSPITAL ENCOUNTER (INPATIENT)
Facility: HOSPITAL | Age: 58
LOS: 4 days | Discharge: HOME | End: 2018-10-02
Attending: EMERGENCY MEDICINE | Admitting: INTERNAL MEDICINE

## 2018-09-28 DIAGNOSIS — E87.29 RESPIRATORY ACIDOSIS: ICD-10-CM

## 2018-09-28 DIAGNOSIS — J96.21 ACUTE ON CHRONIC RESPIRATORY FAILURE WITH HYPOXIA AND HYPERCAPNIA (HCC): ICD-10-CM

## 2018-09-28 DIAGNOSIS — J96.22 ACUTE ON CHRONIC RESPIRATORY FAILURE WITH HYPOXIA AND HYPERCAPNIA (HCC): ICD-10-CM

## 2018-09-28 DIAGNOSIS — R06.89 HYPERCARBIA: ICD-10-CM

## 2018-09-28 DIAGNOSIS — J44.1 COPD WITH ACUTE EXACERBATION (HCC): Primary | ICD-10-CM

## 2018-09-28 DIAGNOSIS — Z74.09 IMPAIRED FUNCTIONAL MOBILITY AND ENDURANCE: ICD-10-CM

## 2018-09-28 LAB
ALBUMIN SERPL-MCNC: 4.1 G/DL (ref 3.5–5)
ALBUMIN/GLOB SERPL: 1.3 G/DL (ref 1.1–2.5)
ALP SERPL-CCNC: 123 U/L (ref 24–120)
ALT SERPL W P-5'-P-CCNC: 23 U/L (ref 0–54)
ANION GAP SERPL CALCULATED.3IONS-SCNC: ABNORMAL MMOL/L (ref 4–13)
ANION GAP SERPL CALCULATED.3IONS-SCNC: ABNORMAL MMOL/L (ref 4–13)
APTT PPP: 30.3 SECONDS (ref 24.1–34.8)
ARTERIAL PATENCY WRIST A: POSITIVE
AST SERPL-CCNC: 33 U/L (ref 7–45)
ATMOSPHERIC PRESS: 752 MMHG
ATMOSPHERIC PRESS: 753 MMHG
ATMOSPHERIC PRESS: 753 MMHG
BASE EXCESS BLDA CALC-SCNC: 12.3 MMOL/L (ref 0–2)
BASE EXCESS BLDA CALC-SCNC: 13.8 MMOL/L (ref 0–2)
BASE EXCESS BLDA CALC-SCNC: 14.2 MMOL/L (ref 0–2)
BASOPHILS # BLD AUTO: 0.04 10*3/MM3 (ref 0–0.2)
BASOPHILS # BLD AUTO: 0.07 10*3/MM3 (ref 0–0.2)
BASOPHILS NFR BLD AUTO: 0.2 % (ref 0–2)
BASOPHILS NFR BLD AUTO: 0.7 % (ref 0–2)
BDY SITE: ABNORMAL
BH CV ECHO MEAS - AO MAX PG (FULL): -1.3 MMHG
BH CV ECHO MEAS - AO MAX PG: 9.5 MMHG
BH CV ECHO MEAS - AO MEAN PG (FULL): 0 MMHG
BH CV ECHO MEAS - AO MEAN PG: 5 MMHG
BH CV ECHO MEAS - AO ROOT AREA (BSA CORRECTED): 1.7
BH CV ECHO MEAS - AO ROOT AREA: 4.5 CM^2
BH CV ECHO MEAS - AO ROOT DIAM: 2.4 CM
BH CV ECHO MEAS - AO V2 MAX: 154 CM/SEC
BH CV ECHO MEAS - AO V2 MEAN: 104 CM/SEC
BH CV ECHO MEAS - AO V2 VTI: 25.7 CM
BH CV ECHO MEAS - AVA(I,A): 3.3 CM^2
BH CV ECHO MEAS - AVA(I,D): 3.3 CM^2
BH CV ECHO MEAS - AVA(V,A): 3.3 CM^2
BH CV ECHO MEAS - AVA(V,D): 3.3 CM^2
BH CV ECHO MEAS - BSA(HAYCOCK): 1.4 M^2
BH CV ECHO MEAS - BSA: 1.4 M^2
BH CV ECHO MEAS - BZI_BMI: 18.5 KILOGRAMS/M^2
BH CV ECHO MEAS - BZI_METRIC_HEIGHT: 154.9 CM
BH CV ECHO MEAS - BZI_METRIC_WEIGHT: 44.5 KG
BH CV ECHO MEAS - EDV(CUBED): 53.6 ML
BH CV ECHO MEAS - EDV(MOD-SP4): 31.3 ML
BH CV ECHO MEAS - EDV(TEICH): 60.8 ML
BH CV ECHO MEAS - EF(CUBED): 68.3 %
BH CV ECHO MEAS - EF(MOD-SP4): 54.3 %
BH CV ECHO MEAS - EF(TEICH): 60.7 %
BH CV ECHO MEAS - ESV(CUBED): 17 ML
BH CV ECHO MEAS - ESV(MOD-SP4): 14.3 ML
BH CV ECHO MEAS - ESV(TEICH): 23.9 ML
BH CV ECHO MEAS - FS: 31.8 %
BH CV ECHO MEAS - IVS/LVPW: 1.2
BH CV ECHO MEAS - IVSD: 0.96 CM
BH CV ECHO MEAS - LA DIMENSION: 2.6 CM
BH CV ECHO MEAS - LA/AO: 1.1
BH CV ECHO MEAS - LAT PEAK E' VEL: 10.9 CM/SEC
BH CV ECHO MEAS - LV DIASTOLIC VOL/BSA (35-75): 22.4 ML/M^2
BH CV ECHO MEAS - LV MASS(C)D: 96.1 GRAMS
BH CV ECHO MEAS - LV MASS(C)DI: 68.9 GRAMS/M^2
BH CV ECHO MEAS - LV MAX PG: 10.8 MMHG
BH CV ECHO MEAS - LV MEAN PG: 5 MMHG
BH CV ECHO MEAS - LV SYSTOLIC VOL/BSA (12-30): 10.3 ML/M^2
BH CV ECHO MEAS - LV V1 MAX: 164 CM/SEC
BH CV ECHO MEAS - LV V1 MEAN: 95.2 CM/SEC
BH CV ECHO MEAS - LV V1 VTI: 27.4 CM
BH CV ECHO MEAS - LVIDD: 3.8 CM
BH CV ECHO MEAS - LVIDS: 2.6 CM
BH CV ECHO MEAS - LVLD AP4: 6.3 CM
BH CV ECHO MEAS - LVLS AP4: 4.9 CM
BH CV ECHO MEAS - LVOT AREA (M): 3.1 CM^2
BH CV ECHO MEAS - LVOT AREA: 3.1 CM^2
BH CV ECHO MEAS - LVOT DIAM: 2 CM
BH CV ECHO MEAS - LVPWD: 0.8 CM
BH CV ECHO MEAS - MED PEAK E' VEL: 8.16 CM/SEC
BH CV ECHO MEAS - MV A MAX VEL: 109 CM/SEC
BH CV ECHO MEAS - MV DEC TIME: 0.1 SEC
BH CV ECHO MEAS - MV E MAX VEL: 97.5 CM/SEC
BH CV ECHO MEAS - MV E/A: 0.89
BH CV ECHO MEAS - PA MAX PG: 4 MMHG
BH CV ECHO MEAS - PA V2 MAX: 99.4 CM/SEC
BH CV ECHO MEAS - RAP SYSTOLE: 5 MMHG
BH CV ECHO MEAS - RVDD: 3 CM
BH CV ECHO MEAS - RVSP: 57.7 MMHG
BH CV ECHO MEAS - SI(AO): 83.3 ML/M^2
BH CV ECHO MEAS - SI(CUBED): 26.2 ML/M^2
BH CV ECHO MEAS - SI(LVOT): 61.7 ML/M^2
BH CV ECHO MEAS - SI(MOD-SP4): 12.2 ML/M^2
BH CV ECHO MEAS - SI(TEICH): 26.4 ML/M^2
BH CV ECHO MEAS - SV(AO): 116.3 ML
BH CV ECHO MEAS - SV(CUBED): 36.6 ML
BH CV ECHO MEAS - SV(LVOT): 86.1 ML
BH CV ECHO MEAS - SV(MOD-SP4): 17 ML
BH CV ECHO MEAS - SV(TEICH): 36.9 ML
BH CV ECHO MEAS - TR MAX VEL: 363 CM/SEC
BH CV ECHO MEASUREMENTS AVERAGE E/E' RATIO: 10.23
BILIRUB SERPL-MCNC: 0.4 MG/DL (ref 0.1–1)
BODY TEMPERATURE: 37 C
BUN BLD-MCNC: 9 MG/DL (ref 5–21)
BUN BLD-MCNC: 9 MG/DL (ref 5–21)
BUN/CREAT SERPL: 11.5 (ref 7–25)
BUN/CREAT SERPL: 12 (ref 7–25)
CA-I BLD-MCNC: 4.14 MG/DL (ref 4.6–5.4)
CA-I BLD-MCNC: 4.73 MG/DL (ref 4.6–5.4)
CALCIUM SPEC-SCNC: 8.9 MG/DL (ref 8.4–10.4)
CALCIUM SPEC-SCNC: 9.1 MG/DL (ref 8.4–10.4)
CHLORIDE SERPL-SCNC: 82 MMOL/L (ref 98–110)
CHLORIDE SERPL-SCNC: 84 MMOL/L (ref 98–110)
CK MB SERPL-CCNC: 6.12 NG/ML (ref 0–5)
CK MB SERPL-CCNC: 7.62 NG/ML (ref 0–5)
CK MB SERPL-RTO: 2.9 % (ref 0–5.7)
CK MB SERPL-RTO: 3 % (ref 0–5.7)
CK SERPL-CCNC: 206 U/L (ref 0–203)
CK SERPL-CCNC: 264 U/L (ref 0–203)
CO2 SERPL-SCNC: >40 MMOL/L (ref 24–31)
CO2 SERPL-SCNC: >40 MMOL/L (ref 24–31)
COHGB MFR BLD: 5.3 % (ref 0–5)
CREAT BLD-MCNC: 0.75 MG/DL (ref 0.5–1.4)
CREAT BLD-MCNC: 0.78 MG/DL (ref 0.5–1.4)
D DIMER PPP FEU-MCNC: 0.41 MG/L (FEU) (ref 0–0.5)
D-LACTATE SERPL-SCNC: 0.7 MMOL/L (ref 0.5–2)
D-LACTATE SERPL-SCNC: 1.2 MMOL/L (ref 0.5–2)
DEPRECATED RDW RBC AUTO: 49.3 FL (ref 40–54)
DEPRECATED RDW RBC AUTO: 49.8 FL (ref 40–54)
EOSINOPHIL # BLD AUTO: 0.02 10*3/MM3 (ref 0–0.7)
EOSINOPHIL # BLD AUTO: 0.37 10*3/MM3 (ref 0–0.7)
EOSINOPHIL NFR BLD AUTO: 0.1 % (ref 0–4)
EOSINOPHIL NFR BLD AUTO: 3.7 % (ref 0–4)
EPAP: 10
ERYTHROCYTE [DISTWIDTH] IN BLOOD BY AUTOMATED COUNT: 13.2 % (ref 12–15)
ERYTHROCYTE [DISTWIDTH] IN BLOOD BY AUTOMATED COUNT: 13.3 % (ref 12–15)
GFR SERPL CREATININE-BSD FRML MDRD: 76 ML/MIN/1.73
GFR SERPL CREATININE-BSD FRML MDRD: 80 ML/MIN/1.73
GLOBULIN UR ELPH-MCNC: 3.1 GM/DL
GLUCOSE BLD-MCNC: 117 MG/DL (ref 70–100)
GLUCOSE BLD-MCNC: 129 MG/DL (ref 70–100)
HCO3 BLDA-SCNC: 42.7 MMOL/L (ref 20–26)
HCO3 BLDA-SCNC: 43.2 MMOL/L (ref 20–26)
HCO3 BLDA-SCNC: 43.8 MMOL/L (ref 20–26)
HCT VFR BLD AUTO: 40.3 % (ref 37–47)
HCT VFR BLD AUTO: 45 % (ref 37–47)
HCT VFR BLD CALC: 39.7 % (ref 38–51)
HGB BLD-MCNC: 12.7 G/DL (ref 12–16)
HGB BLD-MCNC: 14 G/DL (ref 12–16)
HGB BLDA-MCNC: 12.9 G/DL (ref 13.5–17.5)
HOROWITZ INDEX BLD+IHG-RTO: 50 %
HOROWITZ INDEX BLD+IHG-RTO: 50 %
HOROWITZ INDEX BLD+IHG-RTO: 60 %
IMM GRANULOCYTES # BLD: 0.03 10*3/MM3 (ref 0–0.03)
IMM GRANULOCYTES # BLD: 0.06 10*3/MM3 (ref 0–0.03)
IMM GRANULOCYTES NFR BLD: 0.3 % (ref 0–5)
IMM GRANULOCYTES NFR BLD: 0.4 % (ref 0–5)
INR PPP: 0.77 (ref 0.91–1.09)
IPAP: 20
LEFT ATRIUM VOLUME INDEX: 16.3 ML/M2
LEFT ATRIUM VOLUME: 22.8 CM3
LYMPHOCYTES # BLD AUTO: 0.76 10*3/MM3 (ref 0.72–4.86)
LYMPHOCYTES # BLD AUTO: 2.51 10*3/MM3 (ref 0.72–4.86)
LYMPHOCYTES NFR BLD AUTO: 24.9 % (ref 15–45)
LYMPHOCYTES NFR BLD AUTO: 4.5 % (ref 15–45)
Lab: ABNORMAL
Lab: NORMAL
MAGNESIUM SERPL-MCNC: 2.1 MG/DL (ref 1.4–2.2)
MAXIMAL PREDICTED HEART RATE: 164 BPM
MCH RBC QN AUTO: 31.2 PG (ref 28–32)
MCH RBC QN AUTO: 31.9 PG (ref 28–32)
MCHC RBC AUTO-ENTMCNC: 31.1 G/DL (ref 33–36)
MCHC RBC AUTO-ENTMCNC: 31.5 G/DL (ref 33–36)
MCV RBC AUTO: 100.2 FL (ref 82–98)
MCV RBC AUTO: 101.3 FL (ref 82–98)
METHGB BLD QL: 0.8 % (ref 0–3)
MODALITY: ABNORMAL
MONOCYTES # BLD AUTO: 0.3 10*3/MM3 (ref 0.19–1.3)
MONOCYTES # BLD AUTO: 0.97 10*3/MM3 (ref 0.19–1.3)
MONOCYTES NFR BLD AUTO: 1.8 % (ref 4–12)
MONOCYTES NFR BLD AUTO: 9.6 % (ref 4–12)
MYOGLOBIN SERPL-MCNC: 47.1 NG/ML (ref 0–110)
MYOGLOBIN SERPL-MCNC: 64.1 NG/ML (ref 0–110)
NEUTROPHILS # BLD AUTO: 15.54 10*3/MM3 (ref 1.87–8.4)
NEUTROPHILS # BLD AUTO: 6.13 10*3/MM3 (ref 1.87–8.4)
NEUTROPHILS NFR BLD AUTO: 60.8 % (ref 39–78)
NEUTROPHILS NFR BLD AUTO: 93 % (ref 39–78)
NOTIFIED BY: ABNORMAL
NOTIFIED WHO: ABNORMAL
NRBC BLD MANUAL-RTO: 0 /100 WBC (ref 0–0)
NRBC BLD MANUAL-RTO: 0 /100 WBC (ref 0–0)
NT-PROBNP SERPL-MCNC: 250 PG/ML (ref 0–900)
OSMOLALITY SERPL: 277 MOSM/KG (ref 289–308)
OSMOLALITY UR: 158 MOSM/KG (ref 601–850)
OXYHGB MFR BLDV: 85.4 % (ref 94–99)
PCO2 BLDA: 74.3 MM HG (ref 35–45)
PCO2 BLDA: 85.4 MM HG (ref 35–45)
PCO2 BLDA: 93.6 MM HG (ref 35–45)
PCO2 TEMP ADJ BLD: ABNORMAL MM HG (ref 35–45)
PH BLDA: 7.27 PH UNITS (ref 7.35–7.45)
PH BLDA: 7.32 PH UNITS (ref 7.35–7.45)
PH BLDA: 7.37 PH UNITS (ref 7.35–7.45)
PH, TEMP CORRECTED: ABNORMAL PH UNITS (ref 7.35–7.45)
PHOSPHATE SERPL-MCNC: 3.3 MG/DL (ref 2.5–4.5)
PLATELET # BLD AUTO: 233 10*3/MM3 (ref 130–400)
PLATELET # BLD AUTO: 251 10*3/MM3 (ref 130–400)
PMV BLD AUTO: 9.3 FL (ref 6–12)
PMV BLD AUTO: 9.6 FL (ref 6–12)
PO2 BLDA: 47.8 MM HG (ref 83–108)
PO2 BLDA: 61.2 MM HG (ref 83–108)
PO2 BLDA: 79 MM HG (ref 83–108)
PO2 TEMP ADJ BLD: ABNORMAL MM HG (ref 83–108)
POTASSIUM BLD-SCNC: 3.6 MMOL/L (ref 3.5–5.3)
POTASSIUM BLD-SCNC: 4 MMOL/L (ref 3.5–5.3)
POTASSIUM BLDA-SCNC: 3.7 MMOL/L (ref 3.5–5.2)
PROCALCITONIN SERPL-MCNC: <0.25 NG/ML
PROT SERPL-MCNC: 7.2 G/DL (ref 6.3–8.7)
PROTHROMBIN TIME: 11 SECONDS (ref 11.9–14.6)
RBC # BLD AUTO: 3.98 10*6/MM3 (ref 4.2–5.4)
RBC # BLD AUTO: 4.49 10*6/MM3 (ref 4.2–5.4)
SAO2 % BLDCOA: 84.3 % (ref 94–99)
SAO2 % BLDCOA: 90.9 % (ref 94–99)
SAO2 % BLDCOA: 97 % (ref 94–99)
SET MECH RESP RATE: 14
SODIUM BLD-SCNC: 133 MMOL/L (ref 135–145)
SODIUM BLD-SCNC: 134 MMOL/L (ref 135–145)
SODIUM BLDA-SCNC: 131 MMOL/L (ref 136–145)
SODIUM UR-SCNC: 6 MMOL/L (ref 30–90)
STRESS TARGET HR: 139 BPM
TROPONIN I SERPL-MCNC: <0.012 NG/ML (ref 0–0.03)
VENTILATOR MODE: ABNORMAL
WBC NRBC COR # BLD: 10.08 10*3/MM3 (ref 4.8–10.8)
WBC NRBC COR # BLD: 16.72 10*3/MM3 (ref 4.8–10.8)

## 2018-09-28 PROCEDURE — 94799 UNLISTED PULMONARY SVC/PX: CPT

## 2018-09-28 PROCEDURE — 85610 PROTHROMBIN TIME: CPT | Performed by: EMERGENCY MEDICINE

## 2018-09-28 PROCEDURE — 83874 ASSAY OF MYOGLOBIN: CPT | Performed by: EMERGENCY MEDICINE

## 2018-09-28 PROCEDURE — 71045 X-RAY EXAM CHEST 1 VIEW: CPT

## 2018-09-28 PROCEDURE — 85730 THROMBOPLASTIN TIME PARTIAL: CPT | Performed by: EMERGENCY MEDICINE

## 2018-09-28 PROCEDURE — 82550 ASSAY OF CK (CPK): CPT | Performed by: EMERGENCY MEDICINE

## 2018-09-28 PROCEDURE — 93005 ELECTROCARDIOGRAM TRACING: CPT | Performed by: FAMILY MEDICINE

## 2018-09-28 PROCEDURE — 82550 ASSAY OF CK (CPK): CPT | Performed by: FAMILY MEDICINE

## 2018-09-28 PROCEDURE — 83930 ASSAY OF BLOOD OSMOLALITY: CPT | Performed by: FAMILY MEDICINE

## 2018-09-28 PROCEDURE — 94644 CONT INHLJ TX 1ST HOUR: CPT

## 2018-09-28 PROCEDURE — 83050 HGB METHEMOGLOBIN QUAN: CPT

## 2018-09-28 PROCEDURE — 36600 WITHDRAWAL OF ARTERIAL BLOOD: CPT

## 2018-09-28 PROCEDURE — 83874 ASSAY OF MYOGLOBIN: CPT | Performed by: FAMILY MEDICINE

## 2018-09-28 PROCEDURE — 25010000002 METHYLPREDNISOLONE PER 125 MG: Performed by: FAMILY MEDICINE

## 2018-09-28 PROCEDURE — G0378 HOSPITAL OBSERVATION PER HR: HCPCS

## 2018-09-28 PROCEDURE — 83735 ASSAY OF MAGNESIUM: CPT | Performed by: FAMILY MEDICINE

## 2018-09-28 PROCEDURE — 82330 ASSAY OF CALCIUM: CPT

## 2018-09-28 PROCEDURE — 93010 ELECTROCARDIOGRAM REPORT: CPT | Performed by: INTERNAL MEDICINE

## 2018-09-28 PROCEDURE — 93306 TTE W/DOPPLER COMPLETE: CPT

## 2018-09-28 PROCEDURE — 94760 N-INVAS EAR/PLS OXIMETRY 1: CPT

## 2018-09-28 PROCEDURE — 83935 ASSAY OF URINE OSMOLALITY: CPT | Performed by: FAMILY MEDICINE

## 2018-09-28 PROCEDURE — 84484 ASSAY OF TROPONIN QUANT: CPT | Performed by: EMERGENCY MEDICINE

## 2018-09-28 PROCEDURE — 94640 AIRWAY INHALATION TREATMENT: CPT

## 2018-09-28 PROCEDURE — 84100 ASSAY OF PHOSPHORUS: CPT | Performed by: FAMILY MEDICINE

## 2018-09-28 PROCEDURE — 83605 ASSAY OF LACTIC ACID: CPT | Performed by: FAMILY MEDICINE

## 2018-09-28 PROCEDURE — 82805 BLOOD GASES W/O2 SATURATION: CPT

## 2018-09-28 PROCEDURE — 80053 COMPREHEN METABOLIC PANEL: CPT | Performed by: EMERGENCY MEDICINE

## 2018-09-28 PROCEDURE — 85379 FIBRIN DEGRADATION QUANT: CPT | Performed by: EMERGENCY MEDICINE

## 2018-09-28 PROCEDURE — 94660 CPAP INITIATION&MGMT: CPT

## 2018-09-28 PROCEDURE — 84300 ASSAY OF URINE SODIUM: CPT | Performed by: FAMILY MEDICINE

## 2018-09-28 PROCEDURE — 82375 ASSAY CARBOXYHB QUANT: CPT

## 2018-09-28 PROCEDURE — 25010000002 METHYLPREDNISOLONE PER 125 MG: Performed by: INTERNAL MEDICINE

## 2018-09-28 PROCEDURE — 85025 COMPLETE CBC W/AUTO DIFF WBC: CPT | Performed by: EMERGENCY MEDICINE

## 2018-09-28 PROCEDURE — 82553 CREATINE MB FRACTION: CPT | Performed by: EMERGENCY MEDICINE

## 2018-09-28 PROCEDURE — 83880 ASSAY OF NATRIURETIC PEPTIDE: CPT | Performed by: EMERGENCY MEDICINE

## 2018-09-28 PROCEDURE — 93005 ELECTROCARDIOGRAM TRACING: CPT | Performed by: EMERGENCY MEDICINE

## 2018-09-28 PROCEDURE — 82803 BLOOD GASES ANY COMBINATION: CPT

## 2018-09-28 PROCEDURE — 84484 ASSAY OF TROPONIN QUANT: CPT | Performed by: FAMILY MEDICINE

## 2018-09-28 PROCEDURE — 93306 TTE W/DOPPLER COMPLETE: CPT | Performed by: INTERNAL MEDICINE

## 2018-09-28 PROCEDURE — 83605 ASSAY OF LACTIC ACID: CPT | Performed by: EMERGENCY MEDICINE

## 2018-09-28 PROCEDURE — 82553 CREATINE MB FRACTION: CPT | Performed by: FAMILY MEDICINE

## 2018-09-28 PROCEDURE — 25010000002 ENOXAPARIN PER 10 MG: Performed by: INTERNAL MEDICINE

## 2018-09-28 PROCEDURE — 85025 COMPLETE CBC W/AUTO DIFF WBC: CPT | Performed by: FAMILY MEDICINE

## 2018-09-28 PROCEDURE — 84145 PROCALCITONIN (PCT): CPT | Performed by: FAMILY MEDICINE

## 2018-09-28 PROCEDURE — 99285 EMERGENCY DEPT VISIT HI MDM: CPT

## 2018-09-28 PROCEDURE — 87040 BLOOD CULTURE FOR BACTERIA: CPT | Performed by: EMERGENCY MEDICINE

## 2018-09-28 PROCEDURE — 5A09457 ASSISTANCE WITH RESPIRATORY VENTILATION, 24-96 CONSECUTIVE HOURS, CONTINUOUS POSITIVE AIRWAY PRESSURE: ICD-10-PCS | Performed by: FAMILY MEDICINE

## 2018-09-28 PROCEDURE — 25010000002 LEVOFLOXACIN PER 250 MG: Performed by: FAMILY MEDICINE

## 2018-09-28 RX ORDER — IPRATROPIUM BROMIDE AND ALBUTEROL SULFATE 2.5; .5 MG/3ML; MG/3ML
3 SOLUTION RESPIRATORY (INHALATION) EVERY 4 HOURS PRN
Status: DISCONTINUED | OUTPATIENT
Start: 2018-09-28 | End: 2018-09-30

## 2018-09-28 RX ORDER — LEVOFLOXACIN 5 MG/ML
750 INJECTION, SOLUTION INTRAVENOUS DAILY
Status: DISCONTINUED | OUTPATIENT
Start: 2018-09-28 | End: 2018-09-28

## 2018-09-28 RX ORDER — FUROSEMIDE 20 MG/1
10 TABLET ORAL DAILY PRN
Status: DISCONTINUED | OUTPATIENT
Start: 2018-09-28 | End: 2018-09-28

## 2018-09-28 RX ORDER — ASPIRIN 81 MG/1
324 TABLET, CHEWABLE ORAL ONCE
Status: COMPLETED | OUTPATIENT
Start: 2018-09-28 | End: 2018-09-28

## 2018-09-28 RX ORDER — DOXYCYCLINE 100 MG/1
100 TABLET ORAL EVERY 12 HOURS SCHEDULED
Status: DISCONTINUED | OUTPATIENT
Start: 2018-09-28 | End: 2018-10-02 | Stop reason: HOSPADM

## 2018-09-28 RX ORDER — SODIUM CHLORIDE 0.9 % (FLUSH) 0.9 %
1-10 SYRINGE (ML) INJECTION AS NEEDED
Status: DISCONTINUED | OUTPATIENT
Start: 2018-09-28 | End: 2018-10-02 | Stop reason: HOSPADM

## 2018-09-28 RX ORDER — ASPIRIN 325 MG
325 TABLET ORAL ONCE
Status: DISCONTINUED | OUTPATIENT
Start: 2018-09-28 | End: 2018-09-28

## 2018-09-28 RX ORDER — METHYLPREDNISOLONE SODIUM SUCCINATE 40 MG/ML
40 INJECTION, POWDER, LYOPHILIZED, FOR SOLUTION INTRAMUSCULAR; INTRAVENOUS EVERY 6 HOURS
Status: DISCONTINUED | OUTPATIENT
Start: 2018-09-28 | End: 2018-09-29

## 2018-09-28 RX ORDER — HYDROCODONE BITARTRATE AND ACETAMINOPHEN 7.5; 325 MG/1; MG/1
1 TABLET ORAL EVERY 6 HOURS PRN
Status: DISCONTINUED | OUTPATIENT
Start: 2018-09-28 | End: 2018-10-02 | Stop reason: HOSPADM

## 2018-09-28 RX ORDER — ATORVASTATIN CALCIUM 40 MG/1
80 TABLET, FILM COATED ORAL NIGHTLY
Status: DISCONTINUED | OUTPATIENT
Start: 2018-09-28 | End: 2018-10-02 | Stop reason: HOSPADM

## 2018-09-28 RX ORDER — SODIUM CHLORIDE 9 MG/ML
100 INJECTION, SOLUTION INTRAVENOUS CONTINUOUS
Status: DISCONTINUED | OUTPATIENT
Start: 2018-09-28 | End: 2018-09-28

## 2018-09-28 RX ORDER — SODIUM CHLORIDE 0.9 % (FLUSH) 0.9 %
10 SYRINGE (ML) INJECTION AS NEEDED
Status: DISCONTINUED | OUTPATIENT
Start: 2018-09-28 | End: 2018-10-02 | Stop reason: HOSPADM

## 2018-09-28 RX ORDER — VENLAFAXINE 37.5 MG/1
75 TABLET ORAL DAILY
Status: DISCONTINUED | OUTPATIENT
Start: 2018-09-28 | End: 2018-09-30

## 2018-09-28 RX ORDER — METHYLPREDNISOLONE SODIUM SUCCINATE 125 MG/2ML
80 INJECTION, POWDER, LYOPHILIZED, FOR SOLUTION INTRAMUSCULAR; INTRAVENOUS EVERY 6 HOURS
Status: DISCONTINUED | OUTPATIENT
Start: 2018-09-28 | End: 2018-09-28

## 2018-09-28 RX ORDER — ASPIRIN 81 MG/1
81 TABLET ORAL DAILY
Status: DISCONTINUED | OUTPATIENT
Start: 2018-09-29 | End: 2018-10-02 | Stop reason: HOSPADM

## 2018-09-28 RX ORDER — PANTOPRAZOLE SODIUM 40 MG/1
40 TABLET, DELAYED RELEASE ORAL DAILY
Status: DISCONTINUED | OUTPATIENT
Start: 2018-09-28 | End: 2018-10-02 | Stop reason: HOSPADM

## 2018-09-28 RX ORDER — ACETAMINOPHEN 325 MG/1
650 TABLET ORAL EVERY 6 HOURS PRN
Status: DISCONTINUED | OUTPATIENT
Start: 2018-09-28 | End: 2018-10-02 | Stop reason: HOSPADM

## 2018-09-28 RX ORDER — LACTULOSE 20 G/30ML
20 SOLUTION ORAL DAILY
Status: DISCONTINUED | OUTPATIENT
Start: 2018-09-28 | End: 2018-09-30

## 2018-09-28 RX ORDER — BUDESONIDE AND FORMOTEROL FUMARATE DIHYDRATE 160; 4.5 UG/1; UG/1
2 AEROSOL RESPIRATORY (INHALATION)
Status: DISCONTINUED | OUTPATIENT
Start: 2018-09-28 | End: 2018-10-02 | Stop reason: HOSPADM

## 2018-09-28 RX ORDER — CYCLOBENZAPRINE HCL 10 MG
10 TABLET ORAL 2 TIMES DAILY PRN
Status: DISCONTINUED | OUTPATIENT
Start: 2018-09-28 | End: 2018-10-02 | Stop reason: HOSPADM

## 2018-09-28 RX ORDER — ASPIRIN 325 MG
325 TABLET, DELAYED RELEASE (ENTERIC COATED) ORAL DAILY
Status: DISCONTINUED | OUTPATIENT
Start: 2018-09-28 | End: 2018-09-28

## 2018-09-28 RX ORDER — ONDANSETRON 2 MG/ML
4 INJECTION INTRAMUSCULAR; INTRAVENOUS EVERY 6 HOURS PRN
Status: DISCONTINUED | OUTPATIENT
Start: 2018-09-28 | End: 2018-10-02 | Stop reason: HOSPADM

## 2018-09-28 RX ORDER — HEPARIN SODIUM 5000 [USP'U]/ML
5000 INJECTION, SOLUTION INTRAVENOUS; SUBCUTANEOUS EVERY 12 HOURS SCHEDULED
Status: DISCONTINUED | OUTPATIENT
Start: 2018-09-28 | End: 2018-09-28

## 2018-09-28 RX ORDER — ALBUTEROL SULFATE 2.5 MG/3ML
10 SOLUTION RESPIRATORY (INHALATION) CONTINUOUS
Status: DISPENSED | OUTPATIENT
Start: 2018-09-28 | End: 2018-09-28

## 2018-09-28 RX ORDER — BENZONATATE 100 MG/1
200 CAPSULE ORAL 3 TIMES DAILY PRN
Status: DISCONTINUED | OUTPATIENT
Start: 2018-09-28 | End: 2018-10-02 | Stop reason: HOSPADM

## 2018-09-28 RX ORDER — NICOTINE 21 MG/24HR
1 PATCH, TRANSDERMAL 24 HOURS TRANSDERMAL
Status: DISCONTINUED | OUTPATIENT
Start: 2018-09-28 | End: 2018-10-02 | Stop reason: HOSPADM

## 2018-09-28 RX ORDER — GUAIFENESIN 600 MG/1
1200 TABLET, EXTENDED RELEASE ORAL 2 TIMES DAILY
Status: DISCONTINUED | OUTPATIENT
Start: 2018-09-28 | End: 2018-10-02 | Stop reason: HOSPADM

## 2018-09-28 RX ADMIN — METHYLPREDNISOLONE SODIUM SUCCINATE 40 MG: 125 INJECTION, POWDER, FOR SOLUTION INTRAMUSCULAR; INTRAVENOUS at 17:02

## 2018-09-28 RX ADMIN — DOXYCYCLINE 100 MG: 100 TABLET ORAL at 20:48

## 2018-09-28 RX ADMIN — ALBUTEROL SULFATE 10 MG: 2.5 SOLUTION RESPIRATORY (INHALATION) at 00:54

## 2018-09-28 RX ADMIN — HYDROCODONE BITARTRATE AND ACETAMINOPHEN 1 TABLET: 7.5; 325 TABLET ORAL at 14:29

## 2018-09-28 RX ADMIN — ENOXAPARIN SODIUM 40 MG: 40 INJECTION SUBCUTANEOUS at 08:35

## 2018-09-28 RX ADMIN — BUDESONIDE AND FORMOTEROL FUMARATE DIHYDRATE 2 PUFF: 160; 4.5 AEROSOL RESPIRATORY (INHALATION) at 19:52

## 2018-09-28 RX ADMIN — ATORVASTATIN CALCIUM 80 MG: 40 TABLET, FILM COATED ORAL at 20:48

## 2018-09-28 RX ADMIN — METHYLPREDNISOLONE SODIUM SUCCINATE 40 MG: 125 INJECTION, POWDER, FOR SOLUTION INTRAMUSCULAR; INTRAVENOUS at 20:48

## 2018-09-28 RX ADMIN — VENLAFAXINE HYDROCHLORIDE 75 MG: 37.5 TABLET ORAL at 08:35

## 2018-09-28 RX ADMIN — LEVOFLOXACIN 750 MG: 5 INJECTION, SOLUTION INTRAVENOUS at 02:54

## 2018-09-28 RX ADMIN — GUAIFENESIN 1200 MG: 600 TABLET, EXTENDED RELEASE ORAL at 08:01

## 2018-09-28 RX ADMIN — ASPIRIN 81 MG CHEWABLE TABLET 324 MG: 81 TABLET CHEWABLE at 01:34

## 2018-09-28 RX ADMIN — PANTOPRAZOLE SODIUM 40 MG: 40 TABLET, DELAYED RELEASE ORAL at 08:02

## 2018-09-28 RX ADMIN — LACTULOSE 20 G: 20 SOLUTION ORAL at 08:35

## 2018-09-28 RX ADMIN — METOPROLOL TARTRATE 12.5 MG: 25 TABLET, FILM COATED ORAL at 08:01

## 2018-09-28 RX ADMIN — METOPROLOL TARTRATE 12.5 MG: 25 TABLET, FILM COATED ORAL at 20:48

## 2018-09-28 RX ADMIN — HYDROCODONE BITARTRATE AND ACETAMINOPHEN 1 TABLET: 7.5; 325 TABLET ORAL at 20:49

## 2018-09-28 RX ADMIN — ACETAMINOPHEN 650 MG: 325 TABLET, FILM COATED ORAL at 19:01

## 2018-09-28 RX ADMIN — NICOTINE 1 PATCH: 14 PATCH, EXTENDED RELEASE TRANSDERMAL at 08:34

## 2018-09-28 RX ADMIN — HYDROCODONE BITARTRATE AND ACETAMINOPHEN 1 TABLET: 7.5; 325 TABLET ORAL at 08:36

## 2018-09-28 RX ADMIN — SODIUM CHLORIDE 100 ML/HR: 9 INJECTION, SOLUTION INTRAVENOUS at 02:54

## 2018-09-28 RX ADMIN — METHYLPREDNISOLONE SODIUM SUCCINATE 40 MG: 125 INJECTION, POWDER, FOR SOLUTION INTRAMUSCULAR; INTRAVENOUS at 08:02

## 2018-09-28 RX ADMIN — BUDESONIDE AND FORMOTEROL FUMARATE DIHYDRATE 2 PUFF: 160; 4.5 AEROSOL RESPIRATORY (INHALATION) at 09:23

## 2018-09-28 RX ADMIN — METHYLPREDNISOLONE SODIUM SUCCINATE 80 MG: 125 INJECTION, POWDER, FOR SOLUTION INTRAMUSCULAR; INTRAVENOUS at 02:54

## 2018-09-28 RX ADMIN — GUAIFENESIN 1200 MG: 600 TABLET, EXTENDED RELEASE ORAL at 20:48

## 2018-09-29 LAB
ANION GAP SERPL CALCULATED.3IONS-SCNC: 6 MMOL/L (ref 4–13)
ANION GAP SERPL CALCULATED.3IONS-SCNC: 7 MMOL/L (ref 4–13)
ANION GAP SERPL CALCULATED.3IONS-SCNC: 7 MMOL/L (ref 4–13)
ANION GAP SERPL CALCULATED.3IONS-SCNC: 9 MMOL/L (ref 4–13)
BUN BLD-MCNC: 12 MG/DL (ref 5–21)
BUN BLD-MCNC: 15 MG/DL (ref 5–21)
BUN BLD-MCNC: 16 MG/DL (ref 5–21)
BUN BLD-MCNC: 18 MG/DL (ref 5–21)
BUN/CREAT SERPL: 20 (ref 7–25)
BUN/CREAT SERPL: 25.4 (ref 7–25)
BUN/CREAT SERPL: 26.2 (ref 7–25)
BUN/CREAT SERPL: 30 (ref 7–25)
CALCIUM SPEC-SCNC: 8.7 MG/DL (ref 8.4–10.4)
CALCIUM SPEC-SCNC: 8.8 MG/DL (ref 8.4–10.4)
CALCIUM SPEC-SCNC: 8.8 MG/DL (ref 8.4–10.4)
CALCIUM SPEC-SCNC: 9.2 MG/DL (ref 8.4–10.4)
CHLORIDE SERPL-SCNC: 78 MMOL/L (ref 98–110)
CHLORIDE SERPL-SCNC: 85 MMOL/L (ref 98–110)
CO2 SERPL-SCNC: 36 MMOL/L (ref 24–31)
CO2 SERPL-SCNC: 36 MMOL/L (ref 24–31)
CO2 SERPL-SCNC: 37 MMOL/L (ref 24–31)
CO2 SERPL-SCNC: 37 MMOL/L (ref 24–31)
CREAT BLD-MCNC: 0.59 MG/DL (ref 0.5–1.4)
CREAT BLD-MCNC: 0.6 MG/DL (ref 0.5–1.4)
CREAT BLD-MCNC: 0.6 MG/DL (ref 0.5–1.4)
CREAT BLD-MCNC: 0.61 MG/DL (ref 0.5–1.4)
CREAT UR-MCNC: 68.5 MG/DL
DEPRECATED RDW RBC AUTO: 43.2 FL (ref 40–54)
ERYTHROCYTE [DISTWIDTH] IN BLOOD BY AUTOMATED COUNT: 12.6 % (ref 12–15)
GFR SERPL CREATININE-BSD FRML MDRD: 101 ML/MIN/1.73
GFR SERPL CREATININE-BSD FRML MDRD: 103 ML/MIN/1.73
GFR SERPL CREATININE-BSD FRML MDRD: 103 ML/MIN/1.73
GFR SERPL CREATININE-BSD FRML MDRD: 105 ML/MIN/1.73
GLUCOSE BLD-MCNC: 115 MG/DL (ref 70–100)
GLUCOSE BLD-MCNC: 122 MG/DL (ref 70–100)
GLUCOSE BLD-MCNC: 141 MG/DL (ref 70–100)
GLUCOSE BLD-MCNC: 147 MG/DL (ref 70–100)
HCT VFR BLD AUTO: 32.9 % (ref 37–47)
HGB BLD-MCNC: 11 G/DL (ref 12–16)
MCH RBC QN AUTO: 31.2 PG (ref 28–32)
MCHC RBC AUTO-ENTMCNC: 33.4 G/DL (ref 33–36)
MCV RBC AUTO: 93.2 FL (ref 82–98)
OSMOLALITY SERPL: 258 MOSM/KG (ref 289–308)
OSMOLALITY UR: 514 MOSM/KG (ref 601–850)
PLATELET # BLD AUTO: 188 10*3/MM3 (ref 130–400)
PMV BLD AUTO: 10.3 FL (ref 6–12)
POTASSIUM BLD-SCNC: 4 MMOL/L (ref 3.5–5.3)
POTASSIUM BLD-SCNC: 4.1 MMOL/L (ref 3.5–5.3)
POTASSIUM BLD-SCNC: 4.7 MMOL/L (ref 3.5–5.3)
POTASSIUM BLD-SCNC: 4.9 MMOL/L (ref 3.5–5.3)
RBC # BLD AUTO: 3.53 10*6/MM3 (ref 4.2–5.4)
SODIUM BLD-SCNC: 121 MMOL/L (ref 135–145)
SODIUM BLD-SCNC: 121 MMOL/L (ref 135–145)
SODIUM BLD-SCNC: 123 MMOL/L (ref 135–145)
SODIUM BLD-SCNC: 129 MMOL/L (ref 135–145)
SODIUM UR-SCNC: 21 MMOL/L (ref 30–90)
T4 FREE SERPL-MCNC: 1.21 NG/DL (ref 0.78–2.19)
TSH SERPL DL<=0.05 MIU/L-ACNC: 0.07 MIU/ML (ref 0.47–4.68)
URATE SERPL-MCNC: 2.5 MG/DL (ref 2.7–7.5)
WBC NRBC COR # BLD: 10.04 10*3/MM3 (ref 4.8–10.8)

## 2018-09-29 PROCEDURE — 25010000002 METHYLPREDNISOLONE PER 125 MG: Performed by: INTERNAL MEDICINE

## 2018-09-29 PROCEDURE — 83935 ASSAY OF URINE OSMOLALITY: CPT | Performed by: INTERNAL MEDICINE

## 2018-09-29 PROCEDURE — 94799 UNLISTED PULMONARY SVC/PX: CPT

## 2018-09-29 PROCEDURE — 84439 ASSAY OF FREE THYROXINE: CPT | Performed by: INTERNAL MEDICINE

## 2018-09-29 PROCEDURE — 82570 ASSAY OF URINE CREATININE: CPT | Performed by: INTERNAL MEDICINE

## 2018-09-29 PROCEDURE — 85027 COMPLETE CBC AUTOMATED: CPT | Performed by: INTERNAL MEDICINE

## 2018-09-29 PROCEDURE — 83930 ASSAY OF BLOOD OSMOLALITY: CPT | Performed by: INTERNAL MEDICINE

## 2018-09-29 PROCEDURE — 84481 FREE ASSAY (FT-3): CPT | Performed by: INTERNAL MEDICINE

## 2018-09-29 PROCEDURE — 84550 ASSAY OF BLOOD/URIC ACID: CPT | Performed by: INTERNAL MEDICINE

## 2018-09-29 PROCEDURE — G0378 HOSPITAL OBSERVATION PER HR: HCPCS

## 2018-09-29 PROCEDURE — 84300 ASSAY OF URINE SODIUM: CPT | Performed by: INTERNAL MEDICINE

## 2018-09-29 PROCEDURE — 84443 ASSAY THYROID STIM HORMONE: CPT | Performed by: INTERNAL MEDICINE

## 2018-09-29 PROCEDURE — 25010000002 ENOXAPARIN PER 10 MG: Performed by: INTERNAL MEDICINE

## 2018-09-29 PROCEDURE — 80048 BASIC METABOLIC PNL TOTAL CA: CPT | Performed by: INTERNAL MEDICINE

## 2018-09-29 PROCEDURE — 25010000002 METHYLPREDNISOLONE PER 40 MG: Performed by: INTERNAL MEDICINE

## 2018-09-29 RX ORDER — METHYLPREDNISOLONE SODIUM SUCCINATE 40 MG/ML
40 INJECTION, POWDER, LYOPHILIZED, FOR SOLUTION INTRAMUSCULAR; INTRAVENOUS EVERY 8 HOURS
Status: DISCONTINUED | OUTPATIENT
Start: 2018-09-29 | End: 2018-10-01

## 2018-09-29 RX ORDER — TOLVAPTAN 15 MG/1
15 TABLET ORAL DAILY
Status: DISCONTINUED | OUTPATIENT
Start: 2018-09-29 | End: 2018-09-30

## 2018-09-29 RX ADMIN — METHYLPREDNISOLONE SODIUM SUCCINATE 40 MG: 125 INJECTION, POWDER, FOR SOLUTION INTRAMUSCULAR; INTRAVENOUS at 03:19

## 2018-09-29 RX ADMIN — ACETAMINOPHEN 650 MG: 325 TABLET, FILM COATED ORAL at 13:51

## 2018-09-29 RX ADMIN — METOPROLOL TARTRATE 12.5 MG: 25 TABLET, FILM COATED ORAL at 09:53

## 2018-09-29 RX ADMIN — ENOXAPARIN SODIUM 40 MG: 40 INJECTION SUBCUTANEOUS at 09:49

## 2018-09-29 RX ADMIN — HYDROCODONE BITARTRATE AND ACETAMINOPHEN 1 TABLET: 7.5; 325 TABLET ORAL at 21:28

## 2018-09-29 RX ADMIN — DOXYCYCLINE 100 MG: 100 TABLET ORAL at 21:25

## 2018-09-29 RX ADMIN — BUDESONIDE AND FORMOTEROL FUMARATE DIHYDRATE 2 PUFF: 160; 4.5 AEROSOL RESPIRATORY (INHALATION) at 19:41

## 2018-09-29 RX ADMIN — HYDROCODONE BITARTRATE AND ACETAMINOPHEN 1 TABLET: 7.5; 325 TABLET ORAL at 09:52

## 2018-09-29 RX ADMIN — ATORVASTATIN CALCIUM 80 MG: 40 TABLET, FILM COATED ORAL at 21:25

## 2018-09-29 RX ADMIN — TOLVAPTAN 15 MG: 15 TABLET ORAL at 12:07

## 2018-09-29 RX ADMIN — GUAIFENESIN 1200 MG: 600 TABLET, EXTENDED RELEASE ORAL at 09:53

## 2018-09-29 RX ADMIN — NICOTINE 1 PATCH: 14 PATCH, EXTENDED RELEASE TRANSDERMAL at 09:50

## 2018-09-29 RX ADMIN — GUAIFENESIN 1200 MG: 600 TABLET, EXTENDED RELEASE ORAL at 21:25

## 2018-09-29 RX ADMIN — HYDROCODONE BITARTRATE AND ACETAMINOPHEN 1 TABLET: 7.5; 325 TABLET ORAL at 15:58

## 2018-09-29 RX ADMIN — IPRATROPIUM BROMIDE AND ALBUTEROL SULFATE 3 ML: 2.5; .5 SOLUTION RESPIRATORY (INHALATION) at 11:34

## 2018-09-29 RX ADMIN — METOPROLOL TARTRATE 12.5 MG: 25 TABLET, FILM COATED ORAL at 21:25

## 2018-09-29 RX ADMIN — METHYLPREDNISOLONE SODIUM SUCCINATE 40 MG: 40 INJECTION, POWDER, FOR SOLUTION INTRAMUSCULAR; INTRAVENOUS at 17:26

## 2018-09-29 RX ADMIN — CYCLOBENZAPRINE HYDROCHLORIDE 10 MG: 10 TABLET, FILM COATED ORAL at 13:51

## 2018-09-29 RX ADMIN — PANTOPRAZOLE SODIUM 40 MG: 40 TABLET, DELAYED RELEASE ORAL at 06:42

## 2018-09-29 RX ADMIN — VENLAFAXINE HYDROCHLORIDE 75 MG: 37.5 TABLET ORAL at 09:54

## 2018-09-29 RX ADMIN — IPRATROPIUM BROMIDE AND ALBUTEROL SULFATE 3 ML: 2.5; .5 SOLUTION RESPIRATORY (INHALATION) at 19:41

## 2018-09-29 RX ADMIN — ASPIRIN 81 MG: 81 TABLET ORAL at 09:54

## 2018-09-29 RX ADMIN — HYDROCODONE BITARTRATE AND ACETAMINOPHEN 1 TABLET: 7.5; 325 TABLET ORAL at 03:24

## 2018-09-29 RX ADMIN — DOXYCYCLINE 100 MG: 100 TABLET ORAL at 09:54

## 2018-09-29 RX ADMIN — METHYLPREDNISOLONE SODIUM SUCCINATE 40 MG: 125 INJECTION, POWDER, FOR SOLUTION INTRAMUSCULAR; INTRAVENOUS at 09:49

## 2018-09-29 RX ADMIN — ACETAMINOPHEN 650 MG: 325 TABLET, FILM COATED ORAL at 06:41

## 2018-09-30 LAB
ANION GAP SERPL CALCULATED.3IONS-SCNC: ABNORMAL MMOL/L (ref 4–13)
BUN BLD-MCNC: 22 MG/DL (ref 5–21)
BUN/CREAT SERPL: 34.9 (ref 7–25)
CALCIUM SPEC-SCNC: 9.3 MG/DL (ref 8.4–10.4)
CHLORIDE SERPL-SCNC: 88 MMOL/L (ref 98–110)
CO2 SERPL-SCNC: >40 MMOL/L (ref 24–31)
CREAT BLD-MCNC: 0.63 MG/DL (ref 0.5–1.4)
GFR SERPL CREATININE-BSD FRML MDRD: 98 ML/MIN/1.73
GLUCOSE BLD-MCNC: 113 MG/DL (ref 70–100)
POTASSIUM BLD-SCNC: 4.6 MMOL/L (ref 3.5–5.3)
SODIUM BLD-SCNC: 132 MMOL/L (ref 135–145)

## 2018-09-30 PROCEDURE — G0378 HOSPITAL OBSERVATION PER HR: HCPCS

## 2018-09-30 PROCEDURE — 94799 UNLISTED PULMONARY SVC/PX: CPT

## 2018-09-30 PROCEDURE — 25010000002 METHYLPREDNISOLONE PER 40 MG: Performed by: INTERNAL MEDICINE

## 2018-09-30 PROCEDURE — 25010000002 ENOXAPARIN PER 10 MG: Performed by: INTERNAL MEDICINE

## 2018-09-30 PROCEDURE — 80048 BASIC METABOLIC PNL TOTAL CA: CPT | Performed by: INTERNAL MEDICINE

## 2018-09-30 RX ORDER — IPRATROPIUM BROMIDE AND ALBUTEROL SULFATE 2.5; .5 MG/3ML; MG/3ML
3 SOLUTION RESPIRATORY (INHALATION)
Status: DISCONTINUED | OUTPATIENT
Start: 2018-09-30 | End: 2018-10-02 | Stop reason: HOSPADM

## 2018-09-30 RX ADMIN — HYDROCODONE BITARTRATE AND ACETAMINOPHEN 1 TABLET: 7.5; 325 TABLET ORAL at 15:26

## 2018-09-30 RX ADMIN — Medication 10 ML: at 17:21

## 2018-09-30 RX ADMIN — METHYLPREDNISOLONE SODIUM SUCCINATE 40 MG: 40 INJECTION, POWDER, FOR SOLUTION INTRAMUSCULAR; INTRAVENOUS at 09:40

## 2018-09-30 RX ADMIN — NICOTINE 1 PATCH: 14 PATCH, EXTENDED RELEASE TRANSDERMAL at 08:22

## 2018-09-30 RX ADMIN — IPRATROPIUM BROMIDE AND ALBUTEROL SULFATE 3 ML: 2.5; .5 SOLUTION RESPIRATORY (INHALATION) at 19:16

## 2018-09-30 RX ADMIN — ENOXAPARIN SODIUM 40 MG: 40 INJECTION SUBCUTANEOUS at 09:40

## 2018-09-30 RX ADMIN — GUAIFENESIN 1200 MG: 600 TABLET, EXTENDED RELEASE ORAL at 21:17

## 2018-09-30 RX ADMIN — IPRATROPIUM BROMIDE AND ALBUTEROL SULFATE 3 ML: 2.5; .5 SOLUTION RESPIRATORY (INHALATION) at 13:15

## 2018-09-30 RX ADMIN — METHYLPREDNISOLONE SODIUM SUCCINATE 40 MG: 40 INJECTION, POWDER, FOR SOLUTION INTRAMUSCULAR; INTRAVENOUS at 17:20

## 2018-09-30 RX ADMIN — IPRATROPIUM BROMIDE AND ALBUTEROL SULFATE 3 ML: 2.5; .5 SOLUTION RESPIRATORY (INHALATION) at 07:03

## 2018-09-30 RX ADMIN — TOLVAPTAN 15 MG: 15 TABLET ORAL at 08:21

## 2018-09-30 RX ADMIN — GUAIFENESIN 1200 MG: 600 TABLET, EXTENDED RELEASE ORAL at 08:21

## 2018-09-30 RX ADMIN — BUDESONIDE AND FORMOTEROL FUMARATE DIHYDRATE 2 PUFF: 160; 4.5 AEROSOL RESPIRATORY (INHALATION) at 19:16

## 2018-09-30 RX ADMIN — DOXYCYCLINE 100 MG: 100 TABLET ORAL at 21:18

## 2018-09-30 RX ADMIN — HYDROCODONE BITARTRATE AND ACETAMINOPHEN 1 TABLET: 7.5; 325 TABLET ORAL at 09:40

## 2018-09-30 RX ADMIN — METOPROLOL TARTRATE 12.5 MG: 25 TABLET, FILM COATED ORAL at 21:16

## 2018-09-30 RX ADMIN — DOXYCYCLINE 100 MG: 100 TABLET ORAL at 08:21

## 2018-09-30 RX ADMIN — HYDROCODONE BITARTRATE AND ACETAMINOPHEN 1 TABLET: 7.5; 325 TABLET ORAL at 03:23

## 2018-09-30 RX ADMIN — PANTOPRAZOLE SODIUM 40 MG: 40 TABLET, DELAYED RELEASE ORAL at 03:25

## 2018-09-30 RX ADMIN — BUDESONIDE AND FORMOTEROL FUMARATE DIHYDRATE 2 PUFF: 160; 4.5 AEROSOL RESPIRATORY (INHALATION) at 07:03

## 2018-09-30 RX ADMIN — ASPIRIN 81 MG: 81 TABLET ORAL at 08:21

## 2018-09-30 RX ADMIN — METOPROLOL TARTRATE 12.5 MG: 25 TABLET, FILM COATED ORAL at 08:21

## 2018-09-30 RX ADMIN — METHYLPREDNISOLONE SODIUM SUCCINATE 40 MG: 40 INJECTION, POWDER, FOR SOLUTION INTRAMUSCULAR; INTRAVENOUS at 03:23

## 2018-09-30 RX ADMIN — VENLAFAXINE HYDROCHLORIDE 75 MG: 37.5 TABLET ORAL at 08:21

## 2018-09-30 RX ADMIN — HYDROCODONE BITARTRATE AND ACETAMINOPHEN 1 TABLET: 7.5; 325 TABLET ORAL at 21:16

## 2018-09-30 RX ADMIN — LACTULOSE 20 G: 20 SOLUTION ORAL at 08:21

## 2018-09-30 RX ADMIN — ATORVASTATIN CALCIUM 80 MG: 40 TABLET, FILM COATED ORAL at 21:18

## 2018-10-01 LAB
ANION GAP SERPL CALCULATED.3IONS-SCNC: 7 MMOL/L (ref 4–13)
BUN BLD-MCNC: 21 MG/DL (ref 5–21)
BUN/CREAT SERPL: 27.6 (ref 7–25)
CALCIUM SPEC-SCNC: 9.3 MG/DL (ref 8.4–10.4)
CHLORIDE SERPL-SCNC: 89 MMOL/L (ref 98–110)
CO2 SERPL-SCNC: 39 MMOL/L (ref 24–31)
CREAT BLD-MCNC: 0.76 MG/DL (ref 0.5–1.4)
GFR SERPL CREATININE-BSD FRML MDRD: 79 ML/MIN/1.73
GLUCOSE BLD-MCNC: 114 MG/DL (ref 70–100)
POTASSIUM BLD-SCNC: 4.1 MMOL/L (ref 3.5–5.3)
SODIUM BLD-SCNC: 135 MMOL/L (ref 135–145)

## 2018-10-01 PROCEDURE — 25010000002 METHYLPREDNISOLONE PER 40 MG: Performed by: INTERNAL MEDICINE

## 2018-10-01 PROCEDURE — G8979 MOBILITY GOAL STATUS: HCPCS | Performed by: PHYSICAL THERAPIST

## 2018-10-01 PROCEDURE — G0378 HOSPITAL OBSERVATION PER HR: HCPCS

## 2018-10-01 PROCEDURE — 25010000002 ENOXAPARIN PER 10 MG: Performed by: INTERNAL MEDICINE

## 2018-10-01 PROCEDURE — G8978 MOBILITY CURRENT STATUS: HCPCS | Performed by: PHYSICAL THERAPIST

## 2018-10-01 PROCEDURE — 94799 UNLISTED PULMONARY SVC/PX: CPT

## 2018-10-01 PROCEDURE — 94760 N-INVAS EAR/PLS OXIMETRY 1: CPT

## 2018-10-01 PROCEDURE — 97110 THERAPEUTIC EXERCISES: CPT

## 2018-10-01 PROCEDURE — 97161 PT EVAL LOW COMPLEX 20 MIN: CPT

## 2018-10-01 PROCEDURE — 97116 GAIT TRAINING THERAPY: CPT

## 2018-10-01 PROCEDURE — 80048 BASIC METABOLIC PNL TOTAL CA: CPT | Performed by: INTERNAL MEDICINE

## 2018-10-01 RX ORDER — METHYLPREDNISOLONE SODIUM SUCCINATE 40 MG/ML
40 INJECTION, POWDER, LYOPHILIZED, FOR SOLUTION INTRAMUSCULAR; INTRAVENOUS EVERY 12 HOURS
Status: DISCONTINUED | OUTPATIENT
Start: 2018-10-01 | End: 2018-10-02 | Stop reason: HOSPADM

## 2018-10-01 RX ADMIN — IPRATROPIUM BROMIDE AND ALBUTEROL SULFATE 3 ML: 2.5; .5 SOLUTION RESPIRATORY (INHALATION) at 19:52

## 2018-10-01 RX ADMIN — DOXYCYCLINE 100 MG: 100 TABLET ORAL at 08:56

## 2018-10-01 RX ADMIN — METHYLPREDNISOLONE SODIUM SUCCINATE 40 MG: 40 INJECTION, POWDER, FOR SOLUTION INTRAMUSCULAR; INTRAVENOUS at 08:56

## 2018-10-01 RX ADMIN — METOPROLOL TARTRATE 12.5 MG: 25 TABLET, FILM COATED ORAL at 09:55

## 2018-10-01 RX ADMIN — BUDESONIDE AND FORMOTEROL FUMARATE DIHYDRATE 2 PUFF: 160; 4.5 AEROSOL RESPIRATORY (INHALATION) at 21:30

## 2018-10-01 RX ADMIN — HYDROCODONE BITARTRATE AND ACETAMINOPHEN 1 TABLET: 7.5; 325 TABLET ORAL at 23:46

## 2018-10-01 RX ADMIN — ATORVASTATIN CALCIUM 80 MG: 40 TABLET, FILM COATED ORAL at 21:01

## 2018-10-01 RX ADMIN — NICOTINE 1 PATCH: 14 PATCH, EXTENDED RELEASE TRANSDERMAL at 09:00

## 2018-10-01 RX ADMIN — GUAIFENESIN 1200 MG: 600 TABLET, EXTENDED RELEASE ORAL at 21:02

## 2018-10-01 RX ADMIN — HYDROCODONE BITARTRATE AND ACETAMINOPHEN 1 TABLET: 7.5; 325 TABLET ORAL at 17:43

## 2018-10-01 RX ADMIN — METHYLPREDNISOLONE SODIUM SUCCINATE 40 MG: 40 INJECTION, POWDER, FOR SOLUTION INTRAMUSCULAR; INTRAVENOUS at 01:42

## 2018-10-01 RX ADMIN — ACETAMINOPHEN 650 MG: 325 TABLET, FILM COATED ORAL at 08:56

## 2018-10-01 RX ADMIN — HYDROCODONE BITARTRATE AND ACETAMINOPHEN 1 TABLET: 7.5; 325 TABLET ORAL at 11:34

## 2018-10-01 RX ADMIN — DOXYCYCLINE 100 MG: 100 TABLET ORAL at 21:03

## 2018-10-01 RX ADMIN — HYDROCODONE BITARTRATE AND ACETAMINOPHEN 1 TABLET: 7.5; 325 TABLET ORAL at 05:23

## 2018-10-01 RX ADMIN — IPRATROPIUM BROMIDE AND ALBUTEROL SULFATE 3 ML: 2.5; .5 SOLUTION RESPIRATORY (INHALATION) at 13:05

## 2018-10-01 RX ADMIN — METOPROLOL TARTRATE 12.5 MG: 25 TABLET, FILM COATED ORAL at 21:02

## 2018-10-01 RX ADMIN — ASPIRIN 81 MG: 81 TABLET ORAL at 08:56

## 2018-10-01 RX ADMIN — GUAIFENESIN 1200 MG: 600 TABLET, EXTENDED RELEASE ORAL at 08:56

## 2018-10-01 RX ADMIN — BUDESONIDE AND FORMOTEROL FUMARATE DIHYDRATE 2 PUFF: 160; 4.5 AEROSOL RESPIRATORY (INHALATION) at 07:04

## 2018-10-01 RX ADMIN — METHYLPREDNISOLONE SODIUM SUCCINATE 40 MG: 40 INJECTION, POWDER, FOR SOLUTION INTRAMUSCULAR; INTRAVENOUS at 21:04

## 2018-10-01 RX ADMIN — IPRATROPIUM BROMIDE AND ALBUTEROL SULFATE 3 ML: 2.5; .5 SOLUTION RESPIRATORY (INHALATION) at 07:04

## 2018-10-01 RX ADMIN — ACETAMINOPHEN 650 MG: 325 TABLET, FILM COATED ORAL at 21:01

## 2018-10-01 RX ADMIN — PANTOPRAZOLE SODIUM 40 MG: 40 TABLET, DELAYED RELEASE ORAL at 05:23

## 2018-10-01 RX ADMIN — IPRATROPIUM BROMIDE AND ALBUTEROL SULFATE 3 ML: 2.5; .5 SOLUTION RESPIRATORY (INHALATION) at 00:15

## 2018-10-01 RX ADMIN — ENOXAPARIN SODIUM 40 MG: 40 INJECTION SUBCUTANEOUS at 08:56

## 2018-10-01 NOTE — PAYOR COMM NOTE
"10/1 CLINICAL UPDATE  UR PHONE    [phone]    Teri Tim (58 y.o. Female)     Date of Birth Social Security Number Address Home Phone MRN    1960  [facility] [address] [place] [address] [phone] [id number]    [employer] Marital Status          Faith        Admission Date Admission Type Admitting Provider Attending Provider Department, Room/Bed    9/28/18 Emergency Gonsalo Loo MD [place], Glenn Riego, MD [address], 475/1    Discharge Date Discharge Disposition Discharge Destination                       Attending Provider:  Gonsalo Loo MD    Allergies:  Codeine    Isolation:  None   Infection:  None   Code Status:  CPR    Ht:  154.9 cm (61\")   Wt:  46.5 kg (102 lb 8 oz)    Admission Cmt:  None   Principal Problem:  None                Active Insurance as of 9/28/2018     Primary Coverage     Payor Plan Insurance Group Employer/Plan Group    [employer] [place] [employer] [place]ICA[place]      Payor Plan Address Payor Plan Phone Number Effective From Effective To    [address] [address] [phone] 11/1/2017     [facility] [id number]       Subscriber Name Subscriber Birth Date Member ID       TERI TIM [id number]                 Emergency Contacts      (Rel.) Home Phone Work Phone Mobile Phone    Rich Tim (Spouse) [phone] -- [phone]               Physician Progress Notes (all)      Gonsalo Loo MD at 10/1/2018  9:11 AM          1           [facility] Medicine Services  INPATIENT PROGRESS NOTE    Patient Name: Teri Tim  Date of Admission: 9/28/2018  Today's Date: 10/01/18  Length of Stay: 0  Primary Care Physician: Lorri Guan APRN    Subjective   Chief Complaint: Follow-up  HPI   58-year-old  woman admitted for further evaluation and management of shortness of breath and intermittent chest pain.  She carries history of chronic " obstructive pulmonary disease, hypertension and pneumonia.  Record indicates past medical history of congestive heart failure although, the echocardiogram did not show any evidence of heart failure.    Initial blood gas showed acute hypercarbic and hypoxic respiratory failure while on BiPAP.  BNP was normal 250.  D dimer was 0.41  She was admitted for chronic obstructive pulmonary disease exacerbation with acute on chronic hypercarbic and hypoxic respiratory failure.    In her hospital course, she had acute hyponatremia which improved with some scar and fluid restriction.  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  The interested reader is referred to Dr. Oneill's last's note for details    Had some confusion according to    concern about her oxygen level of 99 % at her normal setting of 2lpm oxygen via nc.    Review of Systems     All pertinent negatives and positives are as above. All other systems have been reviewed and are negative unless otherwise stated.     Objective    Temp:  [97.6 °F (36.4 °C)-98.5 °F (36.9 °C)] 98.3 °F (36.8 °C)  Heart Rate:  [] 84  Resp:  [16-20] 16  BP: (120-134)/(56-68) 120/60  Physical Exam  Constitutional: She is oriented to person, place, and time.   Up in bed. Chronically ill appearing.  Her  is present with her.  Seen and discussed with her nurse, Dale  Head: Normocephalic and atraumatic.   Eyes: Pupils are equal, round  Conjunctivae and EOM are normal.   Neck: Neck supple. No JVD present.   Cardiovascular: Normal rate, regular rhythm, normal heart sounds and intact distal pulses.  Exam reveals no gallop and no friction rub.  No murmur heard.  Pulmonary/Chest: Effort normal. No respiratory distress. wheezing is gone Diminished breath sounds. Normal excursion of chest wall She has no rales. She exhibits no tenderness.   Abdominal: Soft. Bowel sounds are normal. She exhibits no distension. There is no tenderness. There is no rebound and no guarding.    Musculoskeletal: Normal range of motion. She exhibits no edema, tenderness or deformity.   Neurological: She is alert and oriented to person, place, and time. She displays normal reflexes. No cranial nerve deficit. She exhibits normal muscle tone. Generally weak, nonfocal.   Skin: Skin is warm and dry. No rash noted.   Psychiatric: Flat.           Results Review:  I have reviewed the labs, radiology results, and diagnostic studies.    Laboratory Data:     Results from last 7 days  Lab Units 09/29/18  0512 09/28/18  0239 09/28/18  0026   WBC 10*3/mm3 10.04 16.72* 10.08   HEMOGLOBIN g/dL 11.0* 12.7 14.0   HEMATOCRIT % 32.9* 40.3 45.0   PLATELETS 10*3/mm3 188 233 251          Results from last 7 days  Lab Units 10/01/18  0459 09/30/18  0457 09/29/18  2318  09/28/18  0239   SODIUM mmol/L 135 132* 129*  < > 133*   POTASSIUM mmol/L 4.1 4.6 4.0  < > 3.6   CHLORIDE mmol/L 89* 88* 85*  < > 84*   CO2 mmol/L 39.0* >40.0* 37.0*  < > >40.0*   BUN mg/dL 21 22* 18  < > 9   CREATININE mg/dL 0.76 0.63 0.60  < > 0.75   CALCIUM mg/dL 9.3 9.3 9.2  < > 8.9   BILIRUBIN mg/dL  --   --   --   --  0.4   ALK PHOS U/L  --   --   --   --  123*   ALT (SGPT) U/L  --   --   --   --  23   AST (SGOT) U/L  --   --   --   --  33   GLUCOSE mg/dL 114* 113* 147*  < > 129*   < > = values in this interval not displayed.    Culture Data:   Blood Culture   Date Value Ref Range Status   09/28/2018 No growth at 3 days  Preliminary   09/28/2018 No growth at 3 days  Preliminary       Radiology Data:   Imaging Results (last 24 hours)     ** No results found for the last 24 hours. **      Echocardiogram 09/28/2018  · Left ventricular systolic function is normal. Estimated ejection fraction is 61-65%.  · Right ventricular cavity is mildly dilated. Systolic function is normal.  · Left ventricular diastolic function is normal.  · Mild tricuspid valve regurgitation is present.  · Estimated right ventricular systolic pressure from tricuspid regurgitation is  "moderately elevated (45-55 mmHg).  · There is no evidence of right to left shunt on bubble study.    I have reviewed the patient's current medications.     Assessment/Plan     Hospital Problem List     COPD with acute exacerbation (CMS/[facility])        1.  Acute on chronic hypoxic and hypercarbic respiratory failure.  2.  Acute exacerbation of chronic obstructive pulmonary disease.  3.  Ongoing tobacco abuse.  4.  Hyponatremia, history of SIADH in the past; resolved  5.  Pulmonary hypertension.  6.  Reported history of chronic diastolic heart failure with normal diastolic function on current echocardiogram. - no evidence of heart failure   7.  Systemic arterial hypertension.       Cont on Trilogy per prescribed and as needed  She is maintaining 93% on RA; she may need o2 esither during sleep and or exercise.    Add OPEP.  Cont fluid restriction  Agree with holding off LAsix; daily weight; she may need this when she gains weight due to swelling prob from PHTN  increse activities  DVT proph  Discussed with Dale.  Apprised patient and .  Possible home tomorrow  sw for d/c planning     Other plan per orders        Gonsalo Loo MD   10/01/18   9:11 AM      Electronically signed by Gonsalo Loo MD at 10/1/2018  9:42 AM     Marcin Oneill DO at 9/30/2018  8:21 AM              [facility] Medicine Services  INPATIENT PROGRESS NOTE    Patient Name: Teri Tim  Date of Admission: 9/28/2018  Today's Date: 09/30/18  Length of Stay: 0  Primary Care Physician: Lorri Guan APRN    Subjective   Chief Complaint: weakness  HPI   Sodium much better with Samsca and fluid restriction. Rate of correction should be fine as we know that the 121 was acute (133 the day prior). She is not as weak today.     She is still having wheezing and BABIN. \"I'm not ready.\"    Review of Systems   All pertinent negatives and positives are as above. All other systems have been " reviewed and are negative unless otherwise stated.     Objective    Temp:  [97.6 °F (36.4 °C)-98.5 °F (36.9 °C)] 97.9 °F (36.6 °C)  Heart Rate:  [70-96] 83  Resp:  [16-18] 18  BP: (121-131)/(51-61) 130/52  Physical Exam  Constitutional: She is oriented to person, place, and time.   Up in bed. Chronically ill appearing.  Her  is present with her.  Seen and discussed with her nurse, Sushma.    Head: Normocephalic and atraumatic.   Eyes: Pupils are equal, round, and reactive to light. Conjunctivae and EOM are normal.   Neck: Neck supple. No JVD present.   Cardiovascular: Normal rate, regular rhythm, normal heart sounds and intact distal pulses.  Exam reveals no gallop and no friction rub.  No murmur heard.  Pulmonary/Chest: Effort normal. No respiratory distress. She has wheezes (still present, left > right). She has no rales. She exhibits no tenderness.   Abdominal: Soft. Bowel sounds are normal. She exhibits no distension. There is no tenderness. There is no rebound and no guarding.   Musculoskeletal: Normal range of motion. She exhibits no edema, tenderness or deformity.   Neurological: She is alert and oriented to person, place, and time. She displays normal reflexes. No cranial nerve deficit. She exhibits normal muscle tone. Generally weak, nonfocal.   Skin: Skin is warm and dry. No rash noted.   Psychiatric: Flat.      Results Review:  I have reviewed the labs, radiology results, and diagnostic studies.    Laboratory Data:     Results from last 7 days  Lab Units 09/30/18  0457 09/29/18  2318 09/29/18  1522  09/28/18  0239   SODIUM mmol/L 132* 129* 121*  < > 133*   POTASSIUM mmol/L 4.6 4.0 4.9  < > 3.6   CHLORIDE mmol/L 88* 85* 78*  < > 84*   CO2 mmol/L >40.0* 37.0* 37.0*  < > >40.0*   BUN mg/dL 22* 18 16  < > 9   CREATININE mg/dL 0.63 0.60 0.61  < > 0.75   CALCIUM mg/dL 9.3 9.2 8.8  < > 8.9   BILIRUBIN mg/dL  --   --   --   --  0.4   ALK PHOS U/L  --   --   --   --  123*   ALT (SGPT) U/L  --   --   --    --  23   AST (SGOT) U/L  --   --   --   --  33   GLUCOSE mg/dL 113* 147* 115*  < > 129*   < > = values in this interval not displayed.    Uric acid low at 2.5.     I have reviewed the patient's current medications.     Assessment/Plan   Assessment:   1.  Acute on chronic hypoxic and hypercarbic respiratory failure.  2.  Acute exacerbation of chronic obstructive pulmonary disease.  3.  Ongoing tobacco abuse.  4.  Hyponatremia, history of SIADH in the past; improved.  5.  Pulmonary hypertension.  6.  Reported history of chronic diastolic heart failure with normal diastolic function on current echocardiogram.  7.  Systemic arterial hypertension.     Plan:  Continue oral doxycycline. Hold on further weaning of IV steroids.  Continue Symbicort and DuoNeb. Continue Mucinex and incentive spirometry.    Offered a nicotine patch and counseled for tobacco cessation.      Continue home sleep device.      Sodium much improved. Continue fluid restriction at 1500 ML daily.  Samsca  was given as a single dose on 9/29. BMP daily at this point. She has not had a CT of her chest since May 2016.  No lung cancer or nodules were identified at that time.  Her problem with hyponatremia predates that exam.     Continue to hold Lasix. I don't think she needs it at all based on exam and her current echocardiogram. She states that she is no longer on Aldactone.      Increase activity.     Prophylactic Lovenox.    Discharge Planning:     Marcin Oneill DO   09/30/18   8:21 AM      Electronically signed by aMrcin Oneill DO at 9/30/2018  8:41 AM     Marcin Oneill DO at 9/29/2018 10:21 AM              [facility] Medicine Services  INPATIENT PROGRESS NOTE    Patient Name: Teri Tim  Date of Admission: 9/28/2018  Today's Date: 09/29/18  Length of Stay: 0  Primary Care Physician: Lorri Guan APRN    Subjective   Chief Complaint: shortness of breath  HPI   Or shortness of breath is a bit better  today.  She is still wheezing.  Cough is nonproductive.  She is not complaining of any pain.    She feels weaker today.  I did tell her that her sodium has become more abnormal.  It is 121 today.  I am placing her on Samsca and fluid restriction.  She has problems with intermittent hyponatremia dating back as far as 2014 (probably even further).  This has been proven in the past to be pulmonary SIADH related to her severe lung disease.    Review of Systems   All pertinent negatives and positives are as above. All other systems have been reviewed and are negative unless otherwise stated.     Objective    Temp:  [97.6 °F (36.4 °C)-98.8 °F (37.1 °C)] 97.6 °F (36.4 °C)  Heart Rate:  [62-86] 86  Resp:  [16-18] 18  BP: (108-125)/(58-65) 123/61  Physical Exam   Constitutional: She is oriented to person, place, and time.   Up in bed. Chronically ill appearing.  Her  is present with her.  Discussed with her nurse, Nayla    HENT:   Head: Normocephalic and atraumatic.   Eyes: Pupils are equal, round, and reactive to light. Conjunctivae and EOM are normal.   Neck: Neck supple. No JVD present.   Cardiovascular: Normal rate, regular rhythm, normal heart sounds and intact distal pulses.  Exam reveals no gallop and no friction rub.    No murmur heard.  Pulmonary/Chest: Effort normal. No respiratory distress. She has wheezes (slight improvement from yesterday). She has no rales. She exhibits no tenderness.   Abdominal: Soft. Bowel sounds are normal. She exhibits no distension. There is no tenderness. There is no rebound and no guarding.   Musculoskeletal: Normal range of motion. She exhibits no edema, tenderness or deformity.   Neurological: She is alert and oriented to person, place, and time. She displays normal reflexes. No cranial nerve deficit. She exhibits normal muscle tone.   Generally weak, nonfocal.   Skin: Skin is warm and dry. No rash noted.   Psychiatric:   Flat.      Results Review:  I have reviewed the labs,  radiology results, and diagnostic studies.    Laboratory Data:     Results from last 7 days  Lab Units 09/29/18  0512 09/28/18  0239 09/28/18  0026   WBC 10*3/mm3 10.04 16.72* 10.08   HEMOGLOBIN g/dL 11.0* 12.7 14.0   HEMATOCRIT % 32.9* 40.3 45.0   PLATELETS 10*3/mm3 188 233 251       Results from last 7 days  Lab Units 09/29/18  0512 09/28/18  0239 09/28/18  0050 09/28/18  0026   SODIUM mmol/L 121* 133*  --  134*   SODIUM, ARTERIAL mmol/L  --   --  131*  --    POTASSIUM mmol/L 4.7 3.6  --  4.0   CHLORIDE mmol/L 78* 84*  --  82*   CO2 mmol/L 36.0* >40.0*  --  >40.0*   BUN mg/dL 12 9  --  9   CREATININE mg/dL 0.60 0.75  --  0.78   CALCIUM mg/dL 8.8 8.9  --  9.1   BILIRUBIN mg/dL  --  0.4  --   --    ALK PHOS U/L  --  123*  --   --    ALT (SGPT) U/L  --  23  --   --    AST (SGOT) U/L  --  33  --   --    GLUCOSE mg/dL 122* 129*  --  117*       Results from last 7 days  Lab Units 09/28/18  0426   PH, ARTERIAL pH units 7.368   PO2 ART mm Hg 79.0*   PCO2, ARTERIAL mm Hg 74.3*   HCO3 ART mmol/L 42.7*     Results for orders placed during the hospital encounter of 09/28/18   Adult Transthoracic Echo Complete W/ Cont if Necessary Per Protocol    Narrative · Left ventricular systolic function is normal. Estimated ejection   fraction is 61-65%.  · Right ventricular cavity is mildly dilated. Systolic function is normal.  · Left ventricular diastolic function is normal.  · Mild tricuspid valve regurgitation is present.  · Estimated right ventricular systolic pressure from tricuspid   regurgitation is moderately elevated (45-55 mmHg).  · There is no evidence of right to left shunt on bubble study.        I have reviewed the patient's current medications.     Assessment/Plan   Assessment:   1.  Acute on chronic hypoxic and hypercarbic respiratory failure.  2.  Acute exacerbation of chronic obstructive pulmonary disease.  3.  Ongoing tobacco abuse.  4.  Hyponatremia, history of SIADH in the past.  5.  Pulmonary hypertension.  6.   Reported history of chronic diastolic heart failure with normal diastolic function on current echocardiogram.  6.  Systemic arterial hypertension.    Plan:  Continue oral doxycycline.  Wean IV steroids.  Continue Symbicort and DuoNeb.  Continu Mucinex and incentive spirometry.    Continue home sleep device.      Fluid restriction to 1500 ML daily.  Samsca 15 mg daily for 3 days.  BMP now and every 6 hours. Check thyroid studies, uric acid, serum osm, urine osm, urine Na/Cr.  She has not had a CT of her chest since May 2016.  No lung cancer or nodules were identified at that time.  Her problem with hyponatremia predates that exam.    Continue to hold Lasix and Aldactone.    Increase activity.    Prophylactic Lovenox.    Marcin Oneill DO   09/29/18   10:21 AM      Electronically signed by Marcin Oneill DO at 9/29/2018 10:52 AM     Marcin Oneill DO at 9/28/2018  8:06 AM        Admitted after midnight by Dr. Reyes.     Discussed with her nurse, Desiree.     Her  is present.     Patient has a long-standing history of chronic obstructive pulmonary disease and has been hospitalized in the past for exacerbations.  She is also chronically on oxygen.  She has a chronic respiratory acidosis that had worsened causing her to be placed on BiPAP in the emergency department.  This is currently resolved and we will try to wean her from BiPAP to nasal cannula this morning. She is supposed to wear a CPAP at home provided by [employer].     Change Levaquin to doxycycline.  Continue IV Solu-Medrol.  Continue inhaled bronchodilators.  Add Symbicort.  Mucinex.  Incentive spirometry.    She does not appear to be in heart failure at the moment.  She has a history of chronic diastolic heart failure.  No echocardiogram since November 2017.  She also had a normal dobutamine stress echocardiogram in November 2017.  Her first 2 troponin values are normal. Chest pain free at present.     Marcin Oneill DO  09/28/18  8:06  AM            Electronically signed by Marcin Oneill DO at 9/28/2018  8:13 AM       Consult Notes (all)     No notes of this type exist for this encounter.        Luna Portillo, RN Registered Nurse Signed   Plan of Care Date of Service: 9/29/2018  6:23 PM         Problem: Patient Care Overview  Goal: Plan of Care Review  Outcome: Ongoing (interventions implemented as appropriate)    09/29/18 1821   OTHER   Outcome Summary VSS, c/o of pain 9-10 to lower back and bilat hips, prn pain meds and flexeril given, sodium 121 this am, samsca ordered and pt now on FR of 1500ml/day, has home bipap unit at bedside, lungs wheezy, pt less SOB today,         Problem: Skin Injury Risk (Adult)  Goal: Skin Health and Integrity  Outcome: Ongoing (interventions implemented as appropriate)        Problem: Chronic Obstructive Pulmonary Disease (Adult)  Goal: Signs and Symptoms of Listed Potential Problems Will be Absent, Minimized or Managed (Chronic Obstructive Pulmonary Disease)  Outcome: Ongoing (interventions implemented as appropriate)        Problem: Fall Risk (Adult)  Goal: Absence of Fall  Outcome: Ongoing (interventions implemented as appropriate)        Problem: Nutrition, Imbalanced: Inadequate Oral Intake (Adult)  Goal: Prevent Further Weight Loss  Outcome: Ongoing (interventions implemented as appropriate)

## 2018-10-01 NOTE — PLAN OF CARE
Problem: Patient Care Overview  Goal: Plan of Care Review   10/01/18 1134   Coping/Psychosocial   Plan of Care Reviewed With patient   Plan of Care Review   Progress improving   OTHER   Outcome Summary Prior to hospitalization, pt was independent with all ADLs and mobility. Pt has good balance and strength, and is currently able to ambulate indepdendently, but desaturates with activity. Pt would benefit from skilled PT in order to increase endurance to avoid desaturation and to increase independence and safety with ascending/descending stairs. Recommend pt d/c home with assist.

## 2018-10-01 NOTE — PROGRESS NOTES
Continued Stay Note   [place]     Patient Name: Teri Tim  MRN: [id number]  Today's Date: 10/1/2018    Admit Date: 9/28/2018          Discharge Plan     Row Name 10/01/18 1433       Plan    Plan Home with possible home health and [facility]    Patient/Family in Agreement with Plan yes    Plan Comments Spoke with pt to update d/c planning. She has been set up with the community program through [facility]. She is requesting home health as well and has had [facility] in the past. Informed Dr. Loo.              Discharge Codes    No documentation.           KAREN Sharpe

## 2018-10-01 NOTE — PLAN OF CARE
Problem: Patient Care Overview  Goal: Plan of Care Review   10/01/18 3417   Coping/Psychosocial   Plan of Care Reviewed With patient   Plan of Care Review   Progress improving   OTHER   Outcome Summary Pt was able to tolerate activity well. Pt able to increase distance ambulated with VC for breathing while maintaining a higher SpO2. Pt's SpO2 did drop to 85% with ambulating, but required less time to return to >90%. Pt was able to perform exercises with SpO2 >90%. Pt showed improvement in endurance and deep breathing techniques with VC. Pt is making good progress toward goals, recommend pt d/c home with assist.

## 2018-10-01 NOTE — THERAPY EVALUATION
Acute Care - Physical Therapy Initial Evaluation  [facility]     Patient Name: Teri Tim  : 1960  MRN: [id number]  Today's Date: 10/1/2018   Onset of Illness/Injury or Date of Surgery: 18  Date of Referral to PT: 10/01/18  Referring Physician: Dr. Loo       Admit Date: 2018    Visit Dx:     ICD-10-CM ICD-9-CM   1. COPD with acute exacerbation (CMS/HCC) J44.1 491.21   2. Respiratory acidosis E87.2 276.2   3. Hypercarbia R06.89 786.09   4. Impaired functional mobility and endurance Z74.09 V49.89     Patient Active Problem List   Diagnosis   • NSTEMI (non-ST elevated myocardial infarction) (CMS/HCC)   • Abnormal LFTs   • Abnormal US (ultrasound) of abdomen   • COPD exacerbation (CMS/HCC)   • COPD with acute exacerbation (CMS/HCC)     Past Medical History:   Diagnosis Date   • CHF (congestive heart failure) (CMS/HCC)    • COPD (chronic obstructive pulmonary disease) (CMS/HCC)    • Hypertension    • Pneumonia      Past Surgical History:   Procedure Laterality Date   • CARPAL TUNNEL RELEASE     • HYSTERECTOMY      complete        PT ASSESSMENT (last 12 hours)      Physical Therapy Evaluation     Row Name 10/01/18 1102          PT Evaluation Time/Intention    Subjective Information complains of;pain  -MS (r) KE (t) MS (c)     Document Type evaluation  -MS (r) KE (t) MS (c)     Mode of Treatment physical therapy  -MS (r) KE (t) MS (c)     Row Name 10/01/18 1102          General Information    Patient Profile Reviewed? yes  -MS (r) KE (t) MS (c)     Onset of Illness/Injury or Date of Surgery 18  -MS (r) KE (t) MS (c)     Referring Physician Dr. Loo   -MS (r) KE (t) MS (c)     Patient Observations alert;cooperative;agree to therapy  -MS (r) KE (t) MS (c)     Patient/Family Observations no family present  -MS (r) KE (t) MS (c)     General Observations of Patient sidelying in bed  -MS (r) KE (t) MS (c)     Prior Level of Function independent:;gait;transfer;ADL's  -MS (r) KE (t) MS  (c)     Equipment Currently Used at Home oxygen;shower chair  -MS (r) KE (t) MS (c)     Pertinent History of Current Functional Problem pt presented to ER c/o SOA, cough, intermittent chest pain; dx: COPD exacerbation, hyponatremia, acute respiratory failure. Pt has Hx of carpal tunnel release, CHF, COPD, and HTN  -MS (r) KE (t) MS (c)     Existing Precautions/Restrictions fall  -MS (r) KE (t) MS (c)     Risks Reviewed patient:;LOB;nausea/vomiting;dizziness;increased discomfort;change in vital signs;lines disloged  -MS (r) KE (t) MS (c)     Benefits Reviewed patient:;improve function;increase independence;increase strength;increase balance;decrease pain;decrease risk of DVT;increase knowledge  -MS (r) KE (t) MS (c)     Barriers to Rehab none identified  -MS (r) KE (t) MS (c)     Row Name 10/01/18 1102          Relationship/Environment    Lives With spouse;grandchild(elia)  -MS (r) KE (t) MS (c)     Row Name 10/01/18 1102          Resource/Environmental Concerns    Current Living Arrangements home/apartment/condo  -MS (r) KE (t) MS (c)     Row Name 10/01/18 1102          Home Main Entrance    Number of Stairs, Main Entrance two  -MS (r) KE (t) MS (c)     Stair Railings, Main Entrance none  -MS (r) KE (t) MS (c)     Row Name 10/01/18 1102          Cognitive Assessment/Interventions    Additional Documentation Cognitive Assessment/Intervention (Group)  -MS (r) KE (t) MS (c)     Row Name 10/01/18 1102          Cognitive Assessment/Intervention- PT/OT    Affect/Mental Status (Cognitive) WNL  -MS (r) KE (t) MS (c)     Orientation Status (Cognition) oriented x 4  -MS (r) KE (t) MS (c)     Follows Commands (Cognition) WNL  -MS (r) KE (t) MS (c)     Cognitive Function (Cognitive) WNL  -MS (r) KE (t) MS (c)     Personal Safety Interventions elopement precautions initiated;fall prevention program maintained;muscle strengthening facilitated;gait belt;nonskid shoes/slippers when out of bed;supervised activity  -MS (r) KE (t) MS  (c)     Row Name 10/01/18 1102          Safety Issues, Functional Mobility    Impairments Affecting Function (Mobility) shortness of breath  -MS (r) KE (t) MS (c)     Row Name 10/01/18 1102          Bed Mobility Assessment/Treatment    Bed Mobility Assessment/Treatment sidelying-sit;sit-sidelying  -MS (r) KE (t) MS (c)     Sidelying-Sit [place] (Bed Mobility) conditional independence  -MS (r) KE (t) MS (c)     Sit-Sidelying [place] (Bed Mobility) conditional independence  -MS (r) KE (t) MS (c)     Assistive Device (Bed Mobility) bed rails;head of bed elevated  -MS (r) KE (t) MS (c)     Row Name 10/01/18 1102          Transfer Assessment/Treatment    Transfer Assessment/Treatment sit-stand transfer;stand-sit transfer  -MS (r) KE (t) MS (c)     Sit-Stand [place] (Transfers) supervision  -MS (r) KE (t) MS (c)     Stand-Sit [place] (Transfers) supervision  -MS (r) KE (t) MS (c)     Row Name 10/01/18 1102          Gait/Stairs Assessment/Training    Gait/Stairs Assessment/Training gait/ambulation independence;distance ambulated;gait pattern  -MS (r) KE (t) MS (c)     [place] Level (Gait) contact guard  -MS (r) KE (t) MS (c)     Distance in Feet (Gait) 100 feet  -MS (r) KE (t) MS (c)     Pattern (Gait) step-through  -MS (r) KE (t) MS (c)     Deviations/Abnormal Patterns (Gait) stride length decreased;gait speed decreased  -MS (r) KE (t) MS (c)     Comment (Gait/Stairs) Pt able to ambulate 100 feet, but became dizzy, SpO2 dropped to 84%   -MS (r) KE (t) MS (c)     Row Name 10/01/18 1102          General ROM    GENERAL ROM COMMENTS BUE and BLE AROM WNL   -MS (r) KE (t) MS (c)     Row Name 10/01/18 1102          MMT (Manual Muscle Testing)    General MMT Comments BUE and BLE MMT 4+/5  -MS (r) KE (t) MS (c)     Row Name 10/01/18 1102          Motor Assessment/Intervention    Additional Documentation Balance (Group)  -MS (r) KE (t) MS (c)     Row Name 10/01/18 1102          Balance    Balance static  standing balance;static sitting balance;dynamic sitting balance;dynamic standing balance  -MS (r) KE (t) MS (c)     Row Name 10/01/18 1102          Static Sitting Balance    Level of [place] (Unsupported Sitting, Static Balance) supervision  -MS (r) KE (t) MS (c)     Sitting Position (Unsupported Sitting, Static Balance) sitting on edge of bed  -MS (r) KE (t) MS (c)     Row Name 10/01/18 1102          Dynamic Sitting Balance    Level of [place], Reaches Outside Midline (Sitting, Dynamic Balance) supervision  -MS (r) KE (t) MS (c)     Sitting Position, Reaches Outside Midline (Sitting, Dynamic Balance) sitting on edge of bed  -MS (r) KE (t) MS (c)     Row Name 10/01/18 1102          Static Standing Balance    Level of [place] (Supported Standing, Static Balance) supervision  -MS (r) KE (t) MS (c)     Row Name 10/01/18 1102          Dynamic Standing Balance    Level of [place], Reaches Outside Midline (Standing, Dynamic Balance) contact guard assist  -MS (r) KE (t) MS (c)     Comment, Reaches Outside Midline (Standing, Dynamic Balance) no LOB  -MS (r) KE (t) MS (c)     Level of [place], Resists Mild Perturbations (Standing, Dynamic Balance) contact guard assist  -MS (r) KE (t) MS (c)     Comment, Resists Mild Perturbations (Sitting, Dynamic Balance) no LOB   -MS (r) KE (t) MS (c)     Row Name 10/01/18 1102          Sensory Assessment/Intervention    Sensory General Assessment no sensation deficits identified  -MS (r) KE (t) MS (c)     Row Name 10/01/18 1102          Pain Assessment    Additional Documentation Pain Scale: Numbers Pre/Post-Treatment (Group);Pain Scale 2: Numbers Pre/Post-Treatment (Group)  -MS (r) KE (t) MS (c)     Row Name 10/01/18 1102          Pain Scale: Numbers Pre/Post-Treatment    Pain Scale: Numbers, Pretreatment 10/10  -MS (r) KE (t) MS (c)     Pain Scale: Numbers, Post-Treatment 10/10  -MS (r) KE (t) MS (c)     Pain Location - Side Bilateral  -MS (r) KE (t) MS (c)      Pain Location hip  -MS (r) KE (t) MS (c)     Pain Intervention(s) Repositioned;Ambulation/increased activity  -MS (r) KE (t) MS (c)     Row Name 10/01/18 1102          Pain Scale 2: Numbers Pre/Post-Treatment    Pain Scale 2: Numbers, Pretreatment 10/10  -MS (r) KE (t) MS (c)     Pain Scale 2: Numbers, Post-Treatment 10/10  -MS (r) KE (t) MS (c)     Pain Location 2 back  -MS (r) KE (t) MS (c)     Pain Intervention(s) 2 Repositioned;Ambulation/increased activity  -MS (r) KE (t) MS (c)     Row Name 10/01/18 1102          Plan of Care Review    Plan of Care Reviewed With patient  -MS (r) KE (t) MS (c)     Row Name 10/01/18 1102          Physical Therapy Clinical Impression    Date of Referral to PT 10/01/18  -MS (r) KE (t) MS (c)     Patient/Family Goals Statement (PT Clinical Impression) return home   -MS (r) KE (t) MS (c)     Criteria for Skilled Interventions Met (PT Clinical Impression) yes;treatment indicated  -MS (r) KE (t) MS (c)     Pathology/Pathophysiology Noted (Describe Specifically for Each System) pulmonary;cardiovascular  -MS (r) KE (t) MS (c)     Impairments Found (describe specific impairments) aerobic capacity/endurance  -MS (r) KE (t) MS (c)     Functional Limitations in Following Categories (Describe Specific Limitations) self-care;home management;community/leisure  -MS (r) KE (t) MS (c)     Disability: Inability to Perform Actions/Activities of Required Roles (describe specific disability) community/leisure  -MS (r) KE (t) MS (c)     Rehab Potential (PT Clinical Summary) good, to achieve stated therapy goals  -MS (r) KE (t) MS (c)     Predicted Duration of Therapy (PT) duration of stay  -MS (r) KE (t) MS (c)     Care Plan Review (PT) care plan/treatment goals reviewed;evaluation/treatment results reviewed;risks/benefits reviewed;current/potential barriers reviewed;patient/other agree to care plan  -MS (r) KE (t) MS (c)     Row Name 10/01/18 1102          Vital Signs    Pretreatment Heart Rate  (beats/min) 100  -MS (r) KE (t) MS (c)     Intratreatment Heart Rate (beats/min) 106  -MS (r) KE (t) MS (c)     Posttreatment Heart Rate (beats/min) 97  -MS (r) KE (t) MS (c)     Pre SpO2 (%) 90  -MS (r) KE (t) MS (c)     O2 Delivery Pre Treatment room air  -MS (r) KE (t) MS (c)     Intra SpO2 (%) 84  -MS (r) KE (t) MS (c)     O2 Delivery Intra Treatment room air  -MS (r) KE (t) MS (c)     Post SpO2 (%) 91  -MS (r) KE (t) MS (c)     O2 Delivery Post Treatment room air  -MS (r) KE (t) MS (c)     Pre Patient Position Side Lying  -MS (r) KE (t) MS (c)     Intra Patient Position Sitting  -MS (r) KE (t) MS (c)     Post Patient Position Side Lying  -MS (r) KE (t) MS (c)     Row Name 10/01/18 1102          Physical Therapy Goals    Gait Training Goal Selection (PT) gait training, PT goal 1  -MS (r) KE (t) MS (c)     Stairs Goal Selection (PT) stairs, PT goal 1  -MS (r) KE (t) MS (c)     Additional Documentation Stairs Goal Selection (PT) (Row)  -MS (r) KE (t) MS (c)     Row Name 10/01/18 1102          Gait Training Goal 1 (PT)    Activity/Assistive Device (Gait Training Goal 1, PT) gait (walking locomotion);increase endurance/gait distance   SpO2 above 90%   -MS (r) KE (t) MS (c)     [place] Level (Gait Training Goal 1, PT) supervision required  -MS (r) KE (t) MS (c)     Distance (Gait Goal 1, PT) 200 feet  -MS (r) KE (t) MS (c)     Time Frame (Gait Training Goal 1, PT) long term goal (LTG);by discharge  -MS (r) KE (t) MS (c)     Progress/Outcome (Gait Training Goal 1, PT) goal ongoing  -MS (r) KE (t) MS (c)     Row Name 10/01/18 1102          Stairs Goal 1 (PT)    Activity/Assistive Device (Stairs Goal 1, PT) ascending stairs;descending stairs  -MS (r) KE (t) MS (c)     [place] Level/Cues Needed (Stairs Goal 1, PT) contact guard assist  -MS (r) KE (t) MS (c)     Number of Stairs (Stairs Goal 1, PT) 2  -MS (r) KE (t) MS (c)     Time Frame (Stairs Goal 1, PT) long term goal (LTG);by discharge  -MS (r) KE (t) MS  (c)     Progress/Outcome (Stairs Goal 1, PT) goal ongoing  -MS (r) KE (t) MS (c)     Row Name 10/01/18 1102          Positioning and Restraints    Pre-Treatment Position in bed  -MS (r) KE (t) MS (c)     Post Treatment Position bed  -MS (r) KE (t) MS (c)     In Bed side lying right;call light within reach;encouraged to call for assist;side rails up x3  -MS (r) KE (t) MS (c)     Row Name 10/01/18 1102          Living Environment    Home Accessibility stairs to enter home  -MS (r) KE (t) MS (c)       User Key  (r) = Recorded By, (t) = Taken By, (c) = Cosigned By    Initials Name Provider Type    Modesta Reinoso, PT, DPT, NCS Physical Therapist    Amanda Almazan, PT Student PT Student          Physical Therapy Education     Title: PT OT SLP Therapies (Active)     Topic: Physical Therapy (Active)     Point: Mobility training (Done)    Learning Progress Summary     Learner Status Readiness Method Response Comment Documented by    Patient Done Acceptance E VU Pt educated on POC. Pt educated on deep breathing techniques. Pt educated on benefits of activity and risks of bed rest.  10/01/18 1353     Done Acceptance E VU Pt educated on PT POC. Pt educated on safety awareness and breathing techniques.  10/01/18 1133          Point: Body mechanics (Done)    Learning Progress Summary     Learner Status Readiness Method Response Comment Documented by    Patient Done Acceptance E VU Pt educated on PT POC. Pt educated on safety awareness and breathing techniques.  10/01/18 1133          Point: Precautions (Done)    Learning Progress Summary     Learner Status Readiness Method Response Comment Documented by    Patient Done Acceptance E VU Pt educated on POC. Pt educated on deep breathing techniques. Pt educated on benefits of activity and risks of bed rest.  10/01/18 1353     Done Acceptance E VU Pt educated on PT POC. Pt educated on safety awareness and breathing techniques.  10/01/18 1133                       User Key     Initials Effective Dates Name Provider Type Discipline     07/30/18 -  Amanda Traylor, PT Student PT Student PT                PT Recommendation and Plan  Anticipated Discharge Disposition (PT): home with assist  Planned Therapy Interventions (PT Eval): gait training, patient/family education, strengthening  Therapy Frequency (PT Clinical Impression): 2 times/day  Outcome Summary/Treatment Plan (PT)  Daily Summary of Progress (PT): progress toward functional goals is good  Anticipated Discharge Disposition (PT): home with assist  Plan of Care Reviewed With: patient  Progress: improving  Outcome Summary: Pt was able to tolerate activity well. Pt able to increase distance ambulated with VC for breathing while maintaining a higher SpO2. Pt's SpO2 did drop to 85% with ambulating, but required less time to return to >90%. Pt  was able to perform exercises with SpO2 >90%. Pt showed improvement in endurance and deep breathing techniques with VC. Pt is making good progress toward goals, recommend pt d/c home with assist.           Outcome Measures     Row Name 10/01/18 1300 10/01/18 1100          How much help from another person do you currently need...    Turning from your back to your side while in flat bed without using bedrails? 4  -MS (r) KE (t) MS (c) 4  -MS (r) KE (t) MS (c)     Moving from lying on back to sitting on the side of a flat bed without bedrails? 4  -MS (r) KE (t) MS (c) 4  -MS (r) KE (t) MS (c)     Moving to and from a bed to a chair (including a wheelchair)? 4  -MS (r) KE (t) MS (c) 4  -MS (r) KE (t) MS (c)     Standing up from a chair using your arms (e.g., wheelchair, bedside chair)? 4  -MS (r) KE (t) MS (c) 4  -MS (r) KE (t) MS (c)     Climbing 3-5 steps with a railing? 3  -MS (r) KE (t) MS (c) 3  -MS (r) KE (t) MS (c)     To walk in hospital room? 4  -MS (r) KE (t) MS (c) 4  -MS (r) KE (t) MS (c)     AM-PAC 6 Clicks Score 23  -MS (r) KE (t) 23  -MS (r) KE (t)        Functional  Assessment    Outcome Measure Options AM-PAC 6 Clicks Basic Mobility (PT)  -MS (r) KE (t) MS (c) AM-PAC 6 Clicks Basic Mobility (PT)  -MS (r) KE (t) MS (c)       User Key  (r) = Recorded By, (t) = Taken By, (c) = Cosigned By    Initials Name Provider Type    Modesta Reinoso JERRY, PT, DPT, NCS Physical Therapist    Amanda Almazan, PT Student PT Student           Time Calculation:         PT Charges     Row Name 10/01/18 1400 10/01/18 1310 10/01/18 1137       Time Calculation    Start Time 1310  -MS (r) KE (t) MS (c)  -- 1100   chart review 10:44-10:54  -MS (r) KE (t) MS (c)    Stop Time 1338  -MS (r) KE (t) MS (c)  -- 1115  -MS (r) KE (t) MS (c)    Time Calculation (min) 28 min  -MS (r) KE (t)  -- 15 min  -MS (r) KE (t)    PT Received On 10/01/18  -MS (r) KE (t) MS (c)  -- 10/01/18  -MS (r) KE (t) MS (c)    PT Goal Re-Cert Due Date 10/11/18  -MS (r) KE (t) MS (c)  -- 10/11/18  -MS (r) KE (t) MS (c)       Timed Charges    40024 - PT Therapeutic Exercise Minutes 18  -MS (r) KE (t) MS (c) 18  -MS (r) KE (t) MS (c)  --    45769 - Gait Training Minutes  10  -MS (r) KE (t) MS (c)  --  --      User Key  (r) = Recorded By, (t) = Taken By, (c) = Cosigned By    Initials Name Provider Type    Modesta Reinoso JERRY, PT, DPT, NCS Physical Therapist    Amanda Almazan, PT Student PT Student        Therapy Suggested Charges     Code   Minutes Charges    19588 (CPT®) Hc Pt Neuromusc Re Education Ea 15 Min      03777 (CPT®) Hc Pt Ther Proc Ea 15 Min 36 2    11614 (CPT®) Hc Gait Training Ea 15 Min 10 1    24512 (CPT®) Hc Pt Therapeutic Act Ea 15 Min      38888 (CPT®) Hc Pt Manual Therapy Ea 15 Min      13676 (CPT®) Hc Pt Iontophoresis Ea 15 Min      20819 (CPT®) Hc Pt Elec Stim Ea-Per 15 Min      89745 (CPT®) Hc Pt Ultrasound Ea 15 Min      85483 (CPT®) Hc Pt Self Care/Mgmt/Train Ea 15 Min      11769 (CPT®) Hc Pt Prosthetic (S) Train Initial Encounter, Each 15 Min      63417 (CPT®) Hc Pt Orthotic(S)/Prosthetic(S) Encounter, Each  15 Min      31451 (CPT®) Hc Orthotic(S) Mgmt/Train Initial Encounter, Each 15min      Total  46 3        Therapy Charges for Today     Code Description Service Date Service Provider Modifiers Qty    36832869018 HC PT EVAL LOW COMPLEXITY 2 10/1/2018 Amanda Traylor, PT Student GP 1    58675511673 HC GAIT TRAINING EA 15 MIN 10/1/2018 Amanda Traylor, PT Student GP 1    35066646771 HC PT THER PROC EA 15 MIN 10/1/2018 Amanda Traylor, PT Student GP 1          PT G-Codes  PT Professional Judgement Used?: Yes  Outcome Measure Options: AM-PAC 6 Clicks Basic Mobility (PT)  AM-PAC 6 Clicks Score: 23  Functional Limitation: Mobility: Walking and moving around  Mobility: Walking and Moving Around Current Status (): At least 1 percent but less than 20 percent impaired, limited or restricted  Mobility: Walking and Moving Around Goal Status (): 0 percent impaired, limited or restricted      Amanda Traylor PT Student  10/1/2018

## 2018-10-01 NOTE — PROGRESS NOTES
1           [facility] Medicine Services  INPATIENT PROGRESS NOTE    Patient Name: Teri Tim  Date of Admission: 9/28/2018  Today's Date: 10/01/18  Length of Stay: 0  Primary Care Physician: Lorri Guan APRN    Subjective   Chief Complaint: Follow-up  HPI   58-year-old  woman admitted for further evaluation and management of shortness of breath and intermittent chest pain.  She carries history of chronic obstructive pulmonary disease, hypertension and pneumonia.  Record indicates past medical history of congestive heart failure although, the echocardiogram did not show any evidence of heart failure.    Initial blood gas showed acute hypercarbic and hypoxic respiratory failure while on BiPAP.  BNP was normal 250.  D dimer was 0.41  She was admitted for chronic obstructive pulmonary disease exacerbation with acute on chronic hypercarbic and hypoxic respiratory failure.    In her hospital course, she had acute hyponatremia which improved with some scar and fluid restriction.  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  The interested reader is referred to Dr. Oneill's last's note for details    Had some confusion according to    concern about her oxygen level of 99 % at her normal setting of 2lpm oxygen via nc.    Review of Systems     All pertinent negatives and positives are as above. All other systems have been reviewed and are negative unless otherwise stated.     Objective    Temp:  [97.6 °F (36.4 °C)-98.5 °F (36.9 °C)] 98.3 °F (36.8 °C)  Heart Rate:  [] 84  Resp:  [16-20] 16  BP: (120-134)/(56-68) 120/60  Physical Exam  Constitutional: She is oriented to person, place, and time.   Up in bed. Chronically ill appearing.  Her  is present with her.  Seen and discussed with her nurse, Dale  Head: Normocephalic and atraumatic.   Eyes: Pupils are equal, round  Conjunctivae and EOM are normal.   Neck: Neck supple. No JVD present.    Cardiovascular: Normal rate, regular rhythm, normal heart sounds and intact distal pulses.  Exam reveals no gallop and no friction rub.  No murmur heard.  Pulmonary/Chest: Effort normal. No respiratory distress. wheezing is gone Diminished breath sounds. Normal excursion of chest wall She has no rales. She exhibits no tenderness.   Abdominal: Soft. Bowel sounds are normal. She exhibits no distension. There is no tenderness. There is no rebound and no guarding.   Musculoskeletal: Normal range of motion. She exhibits no edema, tenderness or deformity.   Neurological: She is alert and oriented to person, place, and time. She displays normal reflexes. No cranial nerve deficit. She exhibits normal muscle tone. Generally weak, nonfocal.   Skin: Skin is warm and dry. No rash noted.   Psychiatric: Flat.           Results Review:  I have reviewed the labs, radiology results, and diagnostic studies.    Laboratory Data:     Results from last 7 days  Lab Units 09/29/18  0512 09/28/18  0239 09/28/18  0026   WBC 10*3/mm3 10.04 16.72* 10.08   HEMOGLOBIN g/dL 11.0* 12.7 14.0   HEMATOCRIT % 32.9* 40.3 45.0   PLATELETS 10*3/mm3 188 233 251          Results from last 7 days  Lab Units 10/01/18  0459 09/30/18  0457 09/29/18  2318  09/28/18  0239   SODIUM mmol/L 135 132* 129*  < > 133*   POTASSIUM mmol/L 4.1 4.6 4.0  < > 3.6   CHLORIDE mmol/L 89* 88* 85*  < > 84*   CO2 mmol/L 39.0* >40.0* 37.0*  < > >40.0*   BUN mg/dL 21 22* 18  < > 9   CREATININE mg/dL 0.76 0.63 0.60  < > 0.75   CALCIUM mg/dL 9.3 9.3 9.2  < > 8.9   BILIRUBIN mg/dL  --   --   --   --  0.4   ALK PHOS U/L  --   --   --   --  123*   ALT (SGPT) U/L  --   --   --   --  23   AST (SGOT) U/L  --   --   --   --  33   GLUCOSE mg/dL 114* 113* 147*  < > 129*   < > = values in this interval not displayed.    Culture Data:   Blood Culture   Date Value Ref Range Status   09/28/2018 No growth at 3 days  Preliminary   09/28/2018 No growth at 3 days  Preliminary       Radiology Data:    Imaging Results (last 24 hours)     ** No results found for the last 24 hours. **      Echocardiogram 09/28/2018  · Left ventricular systolic function is normal. Estimated ejection fraction is 61-65%.  · Right ventricular cavity is mildly dilated. Systolic function is normal.  · Left ventricular diastolic function is normal.  · Mild tricuspid valve regurgitation is present.  · Estimated right ventricular systolic pressure from tricuspid regurgitation is moderately elevated (45-55 mmHg).  · There is no evidence of right to left shunt on bubble study.    I have reviewed the patient's current medications.     Assessment/Plan     Hospital Problem List     COPD with acute exacerbation (CMS/[facility])        1.  Acute on chronic hypoxic and hypercarbic respiratory failure.  2.  Acute exacerbation of chronic obstructive pulmonary disease.  3.  Ongoing tobacco abuse.  4.  Hyponatremia, history of SIADH in the past; resolved  5.  Pulmonary hypertension.  6.  Reported history of chronic diastolic heart failure with normal diastolic function on current echocardiogram. - no evidence of heart failure   7.  Systemic arterial hypertension.       Cont on Trilogy per prescribed and as needed  She is maintaining 93% on RA; she may need o2 esither during sleep and or exercise.    Add OPEP.  Cont fluid restriction  Agree with holding off LAsix; daily weight; she may need this when she gains weight due to swelling prob from PHTN  increse activities  DVT proph  Discussed with Dale.  Apprised patient and .  Possible home tomorrow  sw for d/c planning     Other plan per orders        Gonsalo Loo MD   10/01/18   9:11 AM

## 2018-10-01 NOTE — PLAN OF CARE
Problem: Patient Care Overview  Goal: Plan of Care Review  Outcome: Ongoing (interventions implemented as appropriate)   10/01/18 0158   Coping/Psychosocial   Plan of Care Reviewed With patient;spouse   Plan of Care Review   Progress improving   OTHER   Outcome Summary Medicated for chronic pain, Home trilogy at  with stable Sats, Weaning solumedrol       Problem: Skin Injury Risk (Adult)  Goal: Skin Health and Integrity  Outcome: Ongoing (interventions implemented as appropriate)   10/01/18 0158   Skin Injury Risk (Adult)   Skin Health and Integrity making progress toward outcome       Problem: Chronic Obstructive Pulmonary Disease (Adult)  Goal: Signs and Symptoms of Listed Potential Problems Will be Absent, Minimized or Managed (Chronic Obstructive Pulmonary Disease)  Outcome: Ongoing (interventions implemented as appropriate)   10/01/18 0158   Goal/Outcome Evaluation   Problems Assessed (Chronic Obstructive Pulmonary Disease (COPD)) all   Problems Present (COPD, Bronch/Emphy) respiratory compromise;situational response

## 2018-10-02 VITALS
OXYGEN SATURATION: 95 % | BODY MASS INDEX: 20.07 KG/M2 | DIASTOLIC BLOOD PRESSURE: 71 MMHG | HEART RATE: 80 BPM | SYSTOLIC BLOOD PRESSURE: 134 MMHG | TEMPERATURE: 97.1 F | HEIGHT: 61 IN | RESPIRATION RATE: 16 BRPM | WEIGHT: 106.31 LBS

## 2018-10-02 PROBLEM — E87.1 HYPONATREMIA: Status: ACTIVE | Noted: 2018-10-02

## 2018-10-02 PROBLEM — J96.21 ACUTE ON CHRONIC RESPIRATORY FAILURE WITH HYPOXIA AND HYPERCAPNIA (HCC): Status: ACTIVE | Noted: 2018-10-02

## 2018-10-02 PROBLEM — I10 ESSENTIAL HYPERTENSION: Status: ACTIVE | Noted: 2018-10-02

## 2018-10-02 PROBLEM — J96.22 ACUTE ON CHRONIC RESPIRATORY FAILURE WITH HYPOXIA AND HYPERCAPNIA (HCC): Status: ACTIVE | Noted: 2018-10-02

## 2018-10-02 PROBLEM — Z72.0 TOBACCO ABUSE: Status: ACTIVE | Noted: 2018-10-02

## 2018-10-02 PROBLEM — I27.20 PULMONARY HYPERTENSION (HCC): Status: ACTIVE | Noted: 2018-10-02

## 2018-10-02 LAB — T3FREE SERPL-MCNC: 1.6 PG/ML (ref 2–4.4)

## 2018-10-02 PROCEDURE — 25010000002 METHYLPREDNISOLONE PER 40 MG: Performed by: INTERNAL MEDICINE

## 2018-10-02 PROCEDURE — 25010000002 ENOXAPARIN PER 10 MG: Performed by: INTERNAL MEDICINE

## 2018-10-02 PROCEDURE — 94799 UNLISTED PULMONARY SVC/PX: CPT

## 2018-10-02 PROCEDURE — G0378 HOSPITAL OBSERVATION PER HR: HCPCS

## 2018-10-02 RX ORDER — NICOTINE 21 MG/24HR
1 PATCH, TRANSDERMAL 24 HOURS TRANSDERMAL
Qty: 21 EACH | Refills: 0 | Status: ON HOLD | OUTPATIENT
Start: 2018-10-02 | End: 2018-11-26

## 2018-10-02 RX ORDER — PREDNISONE 20 MG/1
TABLET ORAL
Qty: 11 TABLET | Refills: 0 | Status: ON HOLD | OUTPATIENT
Start: 2018-10-02 | End: 2018-11-26

## 2018-10-02 RX ORDER — DOXYCYCLINE 100 MG/1
100 TABLET ORAL EVERY 12 HOURS SCHEDULED
Qty: 3 TABLET | Refills: 0 | Status: SHIPPED | OUTPATIENT
Start: 2018-10-02 | End: 2018-10-04

## 2018-10-02 RX ADMIN — ASPIRIN 81 MG: 81 TABLET ORAL at 08:54

## 2018-10-02 RX ADMIN — METOPROLOL TARTRATE 12.5 MG: 25 TABLET, FILM COATED ORAL at 08:52

## 2018-10-02 RX ADMIN — HYDROCODONE BITARTRATE AND ACETAMINOPHEN 1 TABLET: 7.5; 325 TABLET ORAL at 06:19

## 2018-10-02 RX ADMIN — BUDESONIDE AND FORMOTEROL FUMARATE DIHYDRATE 2 PUFF: 160; 4.5 AEROSOL RESPIRATORY (INHALATION) at 06:25

## 2018-10-02 RX ADMIN — PANTOPRAZOLE SODIUM 40 MG: 40 TABLET, DELAYED RELEASE ORAL at 08:52

## 2018-10-02 RX ADMIN — IPRATROPIUM BROMIDE AND ALBUTEROL SULFATE 3 ML: 2.5; .5 SOLUTION RESPIRATORY (INHALATION) at 06:25

## 2018-10-02 RX ADMIN — ENOXAPARIN SODIUM 40 MG: 40 INJECTION SUBCUTANEOUS at 08:52

## 2018-10-02 RX ADMIN — NICOTINE 1 PATCH: 14 PATCH, EXTENDED RELEASE TRANSDERMAL at 08:57

## 2018-10-02 RX ADMIN — METHYLPREDNISOLONE SODIUM SUCCINATE 40 MG: 40 INJECTION, POWDER, FOR SOLUTION INTRAMUSCULAR; INTRAVENOUS at 08:52

## 2018-10-02 RX ADMIN — DOXYCYCLINE 100 MG: 100 TABLET ORAL at 08:52

## 2018-10-02 RX ADMIN — PANTOPRAZOLE SODIUM 40 MG: 40 TABLET, DELAYED RELEASE ORAL at 06:18

## 2018-10-02 RX ADMIN — GUAIFENESIN 1200 MG: 600 TABLET, EXTENDED RELEASE ORAL at 08:54

## 2018-10-02 RX ADMIN — IPRATROPIUM BROMIDE AND ALBUTEROL SULFATE 3 ML: 2.5; .5 SOLUTION RESPIRATORY (INHALATION) at 00:37

## 2018-10-02 NOTE — THERAPY DISCHARGE NOTE
Acute Care - Physical Therapy Progress Note/Discharge  [facility]     Patient Name: Teri Tim  : 1960  MRN: [id number]  Today's Date: 10/2/2018  Onset of Illness/Injury or Date of Surgery: 18  Date of Referral to PT: 10/01/18  Referring Physician: Dr. Loo     Admit Date: 2018    Visit Dx:    ICD-10-CM ICD-9-CM   1. COPD with acute exacerbation (CMS/HCC) J44.1 491.21   2. Respiratory acidosis E87.2 276.2   3. Hypercarbia R06.89 786.09   4. Impaired functional mobility and endurance Z74.09 V49.89   5. Acute on chronic respiratory failure with hypoxia and hypercapnia (CMS/HCC) J96.21 518.84    J96.22 786.09     799.02     Patient Active Problem List   Diagnosis   • NSTEMI (non-ST elevated myocardial infarction) (CMS/HCC)   • Abnormal LFTs   • Abnormal US (ultrasound) of abdomen   • COPD exacerbation (CMS/HCC)   • COPD with acute exacerbation (CMS/HCC)   • Acute on chronic respiratory failure with hypoxia and hypercapnia (CMS/HCC)   • Tobacco abuse   • Hyponatremia   • Pulmonary hypertension (CMS/HCC)   • Essential hypertension       Physical Therapy Education     Title: PT OT SLP Therapies (Resolved)     Topic: Physical Therapy (Resolved)     Point: Mobility training (Resolved)    Learning Progress Summary     Learner Status Readiness Method Response Comment Documented by    Patient Done Acceptance E VU Pt educated on POC. Pt educated on deep breathing techniques. Pt educated on benefits of activity and risks of bed rest.  10/01/18 1353     Done Acceptance E VU Pt educated on PT POC. Pt educated on safety awareness and breathing techniques.  10/01/18 1133          Point: Body mechanics (Resolved)    Learning Progress Summary     Learner Status Readiness Method Response Comment Documented by    Patient Done Acceptance E VU Pt educated on PT POC. Pt educated on safety awareness and breathing techniques.  10/01/18 1133          Point: Precautions (Resolved)    Learning Progress  Summary     Learner Status Readiness Method Response Comment Documented by    Patient Done Acceptance E VU Pt educated on POC. Pt educated on deep breathing techniques. Pt educated on benefits of activity and risks of bed rest.  10/01/18 1353     Done Acceptance E VU Pt educated on PT POC. Pt educated on safety awareness and breathing techniques.  10/01/18 1133                      User Key     Initials Effective Dates Name Provider Type Discipline     07/30/18 -  Amanda Traylor, PT Student PT Student PT                    PT Rehab Goals     Row Name 10/02/18 1400             Gait Training Goal 1 (PT)    Activity/Assistive Device (Gait Training Goal 1, PT) --   ox at 88%/goal: above 90%  -BALJEET      Cowley Level (Gait Training Goal 1, PT) contact guard assist   goal:supervision  -BALJEET      Distance (Gait Goal 1, PT) 200   goal: 200  -BALJEET      Progress/Outcome (Gait Training Goal 1, PT) goal not met  -BALJEET         Stairs Goal 1 (PT)    Cowley Level/Cues Needed (Stairs Goal 1, PT) --   goal:CGA. stairs not attempted  -BALJEET      Number of Stairs (Stairs Goal 1, PT) 0   goal:2  -BALJEET      Progress/Outcome (Stairs Goal 1, PT) goal not met  -BALJEET        User Key  (r) = Recorded By, (t) = Taken By, (c) = Cosigned By    Initials Name Provider Type Discipline    Alejandra Sethi, PTA Physical Therapy Assistant PT        Therapy Treatment         PT Recommendation and Plan  Anticipated Discharge Disposition (PT): home with home health  Outcome Summary/Treatment Plan (PT)  Anticipated Discharge Disposition (PT): home with home health          Outcome Measures     Row Name 10/01/18 1300 10/01/18 1100          How much help from another person do you currently need...    Turning from your back to your side while in flat bed without using bedrails? 4  -MS (r) KE (t) MS (c) 4  -MS (r) KE (t) MS (c)     Moving from lying on back to sitting on the side of a flat bed without bedrails? 4  -MS (r) KE (t) MS (c) 4  -MS (r) KE  (t) MS (c)     Moving to and from a bed to a chair (including a wheelchair)? 4  -MS (r) KE (t) MS (c) 4  -MS (r) KE (t) MS (c)     Standing up from a chair using your arms (e.g., wheelchair, bedside chair)? 4  -MS (r) KE (t) MS (c) 4  -MS (r) KE (t) MS (c)     Climbing 3-5 steps with a railing? 3  -MS (r) KE (t) MS (c) 3  -MS (r) KE (t) MS (c)     To walk in hospital room? 4  -MS (r) KE (t) MS (c) 4  -MS (r) KE (t) MS (c)     AM-PAC 6 Clicks Score 23  -MS (r) KE (t) 23  -MS (r) KE (t)        Functional Assessment    Outcome Measure Options AM-PAC 6 Clicks Basic Mobility (PT)  -MS (r) KE (t) MS (c) AM-PAC 6 Clicks Basic Mobility (PT)  -MS (r) KE (t) MS (c)       User Key  (r) = Recorded By, (t) = Taken By, (c) = Cosigned By    Initials Name Provider Type    Modesta Reinoso, PT, DPT, NCS Physical Therapist    Amanda Almazan, PT Student PT Student           Time Calculation:     Therapy Suggested Charges     Code   Minutes Charges    12140 (CPT®) Hc Pt Neuromusc Re Education Ea 15 Min      34025 (CPT®) Hc Pt Ther Proc Ea 15 Min 36 2    47873 (CPT®) Hc Gait Training Ea 15 Min 10 1    75832 (CPT®) Hc Pt Therapeutic Act Ea 15 Min      20638 (CPT®) Hc Pt Manual Therapy Ea 15 Min      13163 (CPT®) Hc Pt Iontophoresis Ea 15 Min      13407 (CPT®) Hc Pt Elec Stim Ea-Per 15 Min      83722 (CPT®) Hc Pt Ultrasound Ea 15 Min      65246 (CPT®) Hc Pt Self Care/Mgmt/Train Ea 15 Min      14859 (CPT®) Hc Pt Prosthetic (S) Train Initial Encounter, Each 15 Min      08598 (CPT®) Hc Pt Orthotic(S)/Prosthetic(S) Encounter, Each 15 Min      03207 (CPT®) Hc Orthotic(S) Mgmt/Train Initial Encounter, Each 15min      Total  46 3              PT G-Codes  PT Professional Judgement Used?: Yes  Outcome Measure Options: AM-PAC 6 Clicks Basic Mobility (PT)  AM-PAC 6 Clicks Score: 23  Functional Limitation: Mobility: Walking and moving around  Mobility: Walking and Moving Around Current Status (): At least 1 percent but less than 20  percent impaired, limited or restricted  Mobility: Walking and Moving Around Goal Status (): 0 percent impaired, limited or restricted    PT Discharge Summary  Anticipated Discharge Disposition (PT): home with home health  Reason for Discharge: Discharge from facility  Outcomes Achieved: Patient able to partially acheive established goals  Discharge Destination: Home with home health    Alejandra Shiakh, PTA  10/2/2018

## 2018-10-02 NOTE — PROGRESS NOTES
Continued Stay Note  [facility]     Patient Name: Teri Tim  MRN: [id number]  Today's Date: 10/2/2018    Admit Date: 9/28/2018          Discharge Plan     Row Name 10/02/18 0907       Plan    Plan [facility]      Patient/Family in Agreement with Plan yes    Final Discharge Disposition Code 06 - home with home health care    Final Note Pt is being discharged home today. Pt has  care ordered, spoke with pt to inform of options in her area and she prefers [facility] . Called Salena at [id number] and provided referral and informed of d/c status.  Also, provided referral to Agustina with [facility] dept.  [phone].              Discharge Codes    No documentation.       Expected Discharge Date and Time     Expected Discharge Date Expected Discharge Time    Oct 2, 2018             KAREN Hardin

## 2018-10-02 NOTE — PLAN OF CARE
Problem: Patient Care Overview  Goal: Plan of Care Review  Outcome: Ongoing (interventions implemented as appropriate)  At baseline or new baseline much discussion re: smoking cessation - room smells like smoke from pt home - sounds like socioeconomic challenge re: daughter pt is rearing grandchild

## 2018-10-02 NOTE — DISCHARGE PLACEMENT REQUEST
"[facility]  AMOR ROBERTSON RN CM [phone]    Teri Tim (58 y.o. Female)     Date of Birth Social Security Number Address Home Phone MRN    1960  [facility] [address]    [place] [address] [phone] [id number]    Congregation Marital Status          Shinto        Admission Date Admission Type Admitting Provider Attending Provider Department, Room/Bed    9/28/18 Emergency Gonsalo Loo MD [place], Glenn Riego, MD [address], [address]    Discharge Date Discharge Disposition Discharge Destination         [place] [facility] [place]ending Provider:  Gonsalo Loo MD    Allergies:  Codeine    Isolation:  None   Infection:  None   Code Status:  CPR    Ht:  154.9 cm (61\")   Wt:  48.2 kg (106 lb 5 oz)    Admission Cmt:  None   Principal Problem:  Acute on chronic respiratory failure with hypoxia and hypercapnia (CMS/HCC) [J96.21,J96.22]                 Active Insurance as of 9/28/2018     Primary Coverage     Payor Plan Insurance Group Employer/Plan Group    [employer] [place] [employer] [place]ICA[place]      Payor Plan Address Payor Plan Phone Number Effective From Effective To    [address] [address] [phone] 11/1/2017     [facility] [id number]       Subscriber Name Subscriber Birth Date Member ID       TERI TIM 1960 [id number]                 Emergency Contacts      (Rel.) Home Phone Work Phone Mobile Phone    MeetaRich weller (Spouse) [phone] -- [phone]               Discharge Summary      Gonsalo Loo MD at 10/2/2018  8:06 AM              [facility]  DISCHARGE SUMMARY       Date of Admission: 9/28/2018  Date of Discharge:  10/2/2018  Primary Care Physician: Lorri Guan APRN    Discharge Diagnoses:  Hospital Problem List     * (Principal)Acute on chronic respiratory failure with hypoxia and hypercapnia (CMS/HCC)    COPD with acute " exacerbation (CMS/[facility])    Tobacco abuse    Hyponatremia    Pulmonary hypertension (CMS/[facility])    Essential hypertension    Reported history of chronic diastolic heart failure with normal diastolic function on current echocardiogram. - no evidence of heart failure                 Presenting Problem/History of Present Illness:  COPD with acute exacerbation (CMS/[facility]) [J44.1]         Hospital Course  58-year-old  woman admitted for further evaluation and management of shortness of breath and intermittent chest pain.  She carries history of chronic obstructive pulmonary disease, hypertension and pneumonia.  Record indicates past medical history of congestive heart failure although, the echocardiogram did not show any evidence of heart failure.     Initial blood gas showed acute hypercarbic and hypoxic respiratory failure while on BiPAP.  BNP was normal 250.  D dimer was 0.41  She was admitted for chronic obstructive pulmonary disease exacerbation with acute on chronic hypercarbic and hypoxic respiratory failure.     In her hospital course, she had acute hyponatremia which improved with Samscar and fluid restriction.  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  The interested reader is referred to Dr. Oneill's last's note for details     She had  some confusion according to  on Sept. 30 which is now resolved.  I felt this might be related to steroid.  I cut back on steroid and switched her to PO with intention to quickly  Taper it since she is no longer wheezing.   Furthermore,  expressed concern about her oxygen level of 99 % at her normal setting of 2lpm oxygen via nc.  I directly observed her O2 sat.  She maintained 93% in RA while resting but had gotten to 85% while working with therapist.  The interested reader is referred to PT note for details surrounding circumstance. She told me she only use oxygen during the night.  She will need O2 supplement during activity.    Overall, she is doing  "better and felt medically appropriate for discharge       Procedures Performed: None    Consults:  None    Pertinent Test Results:  Lab Results (last 48 hours)     Procedure Component Value Units Date/Time    T3, Free [[id number]]  (Abnormal) Collected:  09/29/18 1104    Specimen:  Blood Updated:  10/02/18 0615     T3, Free 1.6 (L) pg/mL     Narrative:       Performed at:  [address] [place] [address]  [id number]  : Clark Cheung PhD, Phone:  [phone]    Blood Culture - Blood, [[id number]]  (Normal) Collected:  09/28/18 0024    Specimen:  Blood from Wrist, Left Updated:  10/02/18 0105     Blood Culture No growth at 4 days    Blood Culture - Blood, [[id number]]  (Normal) Collected:  09/28/18 0026    Specimen:  Blood from Arm, Left Updated:  10/02/18 0046     Blood Culture No growth at 4 days    Basic Metabolic Panel [[id number]]  (Abnormal) Collected:  10/01/18 0459    Specimen:  Blood Updated:  10/01/18 0552     Glucose 114 (H) mg/dL      BUN 21 mg/dL      Creatinine 0.76 mg/dL      Sodium 135 mmol/L      Potassium 4.1 mmol/L      Chloride 89 (L) mmol/L      CO2 39.0 (H) mmol/L      Calcium 9.3 mg/dL      eGFR Non African Amer 79 mL/min/1.73      BUN/Creatinine Ratio 27.6 (H)     Anion Gap 7.0 mmol/L     Narrative:       GFR Normal >60  Chronic Kidney Disease <60  Kidney Failure <15          Condition on Discharge:  Stable  Physical Exam on Discharge:  /71 (BP Location: Right arm, Patient Position: Lying)   Pulse 80   Temp 97.1 °F (36.2 °C) (Temporal Artery )   Resp 16   Ht 154.9 cm (61\")   Wt 48.2 kg (106 lb 5 oz)   SpO2 95%   BMI 20.09 kg/m²    Physical Exam  Constitutional: She is oriented to person, place, and time.   Laying in bed; O2 sat is 96% in RA while resting   Head: Normocephalic and atraumatic.   Eyes: Pupils are equal, round  Conjunctivae and EOM are normal.   Neck: Neck supple. No JVD present.   Cardiovascular: Normal rate, regular rhythm, normal heart " sounds and intact distal pulses.  Exam reveals no gallop and no friction rub.  No murmur heard.  Pulmonary/Chest: Effort normal. No respiratory distress. wheezing is gone. Diminished breath sounds. Normal excursion of chest wall She has no rales. She exhibits no tenderness.   Abdominal: Soft. Bowel sounds are normal. She exhibits no distension. There is no tenderness. There is no rebound and no guarding.   Musculoskeletal: Normal range of motion. She exhibits no edema, tenderness or deformity.   Neurological: She is alert and oriented to person, place, and time. She displays normal reflexes. No cranial nerve deficit. She exhibits normal muscle tone. Skin: Skin is warm and dry. No rash noted.   Psychiatric: appropriate affect        Discharge Disposition:  Home-Health Care [facility] [place]    Discharge Medications:     Discharge Medications      New Medications      Instructions Start Date   doxycycline 100 MG tablet  Commonly known as:  ADOXA   100 mg, Oral, Every 12 Hours Scheduled      ipratropium-albuterol  MCG/ACT inhaler  Commonly known as:  COMBIVENT RESPIMAT   1 puff, Inhalation, 4 Times Daily PRN      nicotine 14 MG/24HR patch  Commonly known as:  NICODERM CQ   1 patch, Transdermal, Every 24 Hours Scheduled      predniSONE 20 MG tablet  Commonly known as:  DELTASONE   2 mg daily x 2 days then 1 tab daily x 2 days then 1/2 tab daily x 2 days         Continue These Medications      Instructions Start Date   budesonide-formoterol 160-4.5 MCG/ACT inhaler  Commonly known as:  SYMBICORT   2 puffs, Inhalation, 2 Times Daily - RT      cyclobenzaprine 10 MG tablet  Commonly known as:  FLEXERIL   10 mg, Oral, 2 Times Daily PRN      guaiFENesin 600 MG 12 hr tablet  Commonly known as:  MUCINEX   1,200 mg, Oral, 2 Times Daily      metoprolol tartrate 25 MG tablet  Commonly known as:  LOPRESSOR   12.5 mg, Oral, Every 12 Hours Scheduled      pantoprazole 40 MG EC tablet  Commonly known as:  PROTONIX   40 mg, Oral, Daily          Stop These Medications    furosemide 20 MG tablet  Commonly known as:  LASIX            Discharge Diet:   Diet Instructions     Diet: Regular       Discharge Diet:  Regular    Fluid Restriction per day:  1500 mL Fluid            Activity at Discharge:   Activity Instructions     Activity as Tolerated             Follow-up Appointments: Nasrin Guan  In 1 wk  Test Results Pending at Discharge:    Order Current Status    Blood Culture - Blood, Preliminary result    Blood Culture - Blood, Preliminary result           Gonsalo Loo MD  10/02/18  8:27 AM    Time:  > 30 mins  Please note that portions of this note may have been completed with a voice recognition program. Efforts were made to edit the dictations, but occasionally words are mistranscribed.            Electronically signed by Gonsalo Loo MD at 10/2/2018  8:37 AM

## 2018-10-02 NOTE — DISCHARGE SUMMARY
[facility]  DISCHARGE SUMMARY       Date of Admission: 9/28/2018  Date of Discharge:  10/2/2018  Primary Care Physician: Lorri Guan APRN    Discharge Diagnoses:  Hospital Problem List     * (Principal)Acute on chronic respiratory failure with hypoxia and hypercapnia (CMS/HCC)    COPD with acute exacerbation (CMS/HCC)    Tobacco abuse    Hyponatremia    Pulmonary hypertension (CMS/HCC)    Essential hypertension    Reported history of chronic diastolic heart failure with normal diastolic function on current echocardiogram. - no evidence of heart failure                 Presenting Problem/History of Present Illness:  COPD with acute exacerbation (CMS/HCC) [J44.1]         Hospital Course  58-year-old  woman admitted for further evaluation and management of shortness of breath and intermittent chest pain.  She carries history of chronic obstructive pulmonary disease, hypertension and pneumonia.  Record indicates past medical history of congestive heart failure although, the echocardiogram did not show any evidence of heart failure.     Initial blood gas showed acute hypercarbic and hypoxic respiratory failure while on BiPAP.  BNP was normal 250.  D dimer was 0.41  She was admitted for chronic obstructive pulmonary disease exacerbation with acute on chronic hypercarbic and hypoxic respiratory failure.     In her hospital course, she had acute hyponatremia which improved with Samscar and fluid restriction.  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  The interested reader is referred to Dr. Oneill's last's note for details     She had  some confusion according to  on Sept. 30 which is now resolved.  I felt this might be related to steroid.  I cut back on steroid and switched her to PO with intention to quickly  Taper it since she is no longer wheezing.   Furthermore,  expressed concern about her oxygen level of 99 % at her normal  "setting of 2lpm oxygen via nc.  I directly observed her O2 sat.  She maintained 93% in RA while resting but had gotten to 85% while working with therapist.  The interested reader is referred to PT note for details surrounding circumstance. She told me she only use oxygen during the night.  She will need O2 supplement during activity.    Overall, she is doing better and felt medically appropriate for discharge       Procedures Performed: None    Consults:  None    Pertinent Test Results:  Lab Results (last 48 hours)     Procedure Component Value Units Date/Time    T3, Free [[id number]]  (Abnormal) Collected:  09/29/18 1104    Specimen:  Blood Updated:  10/02/18 0615     T3, Free 1.6 (L) pg/mL     Narrative:       Performed at:  [address] [place] [address]  [id number]  : Clark Cheung PhD, Phone:  [phone]    Blood Culture - Blood, [[id number]]  (Normal) Collected:  09/28/18 0024    Specimen:  Blood from Wrist, Left Updated:  10/02/18 0105     Blood Culture No growth at 4 days    Blood Culture - Blood, [[id number]]  (Normal) Collected:  09/28/18 0026    Specimen:  Blood from Arm, Left Updated:  10/02/18 0046     Blood Culture No growth at 4 days    Basic Metabolic Panel [[id number]]  (Abnormal) Collected:  10/01/18 0459    Specimen:  Blood Updated:  10/01/18 0552     Glucose 114 (H) mg/dL      BUN 21 mg/dL      Creatinine 0.76 mg/dL      Sodium 135 mmol/L      Potassium 4.1 mmol/L      Chloride 89 (L) mmol/L      CO2 39.0 (H) mmol/L      Calcium 9.3 mg/dL      eGFR Non African Amer 79 mL/min/1.73      BUN/Creatinine Ratio 27.6 (H)     Anion Gap 7.0 mmol/L     Narrative:       GFR Normal >60  Chronic Kidney Disease <60  Kidney Failure <15          Condition on Discharge:  Stable  Physical Exam on Discharge:  /71 (BP Location: Right arm, Patient Position: Lying)   Pulse 80   Temp 97.1 °F (36.2 °C) (Temporal Artery )   Resp 16   Ht 154.9 cm (61\")   Wt 48.2 kg (106 lb 5 oz)   " SpO2 95%   BMI 20.09 kg/m²   Physical Exam  Constitutional: She is oriented to person, place, and time.   Laying in bed; O2 sat is 96% in RA while resting   Head: Normocephalic and atraumatic.   Eyes: Pupils are equal, round  Conjunctivae and EOM are normal.   Neck: Neck supple. No JVD present.   Cardiovascular: Normal rate, regular rhythm, normal heart sounds and intact distal pulses.  Exam reveals no gallop and no friction rub.  No murmur heard.  Pulmonary/Chest: Effort normal. No respiratory distress. wheezing is gone. Diminished breath sounds. Normal excursion of chest wall She has no rales. She exhibits no tenderness.   Abdominal: Soft. Bowel sounds are normal. She exhibits no distension. There is no tenderness. There is no rebound and no guarding.   Musculoskeletal: Normal range of motion. She exhibits no edema, tenderness or deformity.   Neurological: She is alert and oriented to person, place, and time. She displays normal reflexes. No cranial nerve deficit. She exhibits normal muscle tone. Skin: Skin is warm and dry. No rash noted.   Psychiatric: appropriate affect        Discharge Disposition:  Home-Health Care [facility]    Discharge Medications:     Discharge Medications      New Medications      Instructions Start Date   doxycycline 100 MG tablet  Commonly known as:  ADOXA   100 mg, Oral, Every 12 Hours Scheduled      ipratropium-albuterol  MCG/ACT inhaler  Commonly known as:  COMBIVENT RESPIMAT   1 puff, Inhalation, 4 Times Daily PRN      nicotine 14 MG/24HR patch  Commonly known as:  NICODERM CQ   1 patch, Transdermal, Every 24 Hours Scheduled      predniSONE 20 MG tablet  Commonly known as:  DELTASONE   2 mg daily x 2 days then 1 tab daily x 2 days then 1/2 tab daily x 2 days         Continue These Medications      Instructions Start Date   budesonide-formoterol 160-4.5 MCG/ACT inhaler  Commonly known as:  SYMBICORT   2 puffs, Inhalation, 2 Times Daily - RT      cyclobenzaprine 10 MG tablet  Commonly  known as:  FLEXERIL   10 mg, Oral, 2 Times Daily PRN      guaiFENesin 600 MG 12 hr tablet  Commonly known as:  MUCINEX   1,200 mg, Oral, 2 Times Daily      metoprolol tartrate 25 MG tablet  Commonly known as:  LOPRESSOR   12.5 mg, Oral, Every 12 Hours Scheduled      pantoprazole 40 MG EC tablet  Commonly known as:  PROTONIX   40 mg, Oral, Daily         Stop These Medications    furosemide 20 MG tablet  Commonly known as:  LASIX            Discharge Diet:   Diet Instructions     Diet: Regular       Discharge Diet:  Regular    Fluid Restriction per day:  1500 mL Fluid            Activity at Discharge:   Activity Instructions     Activity as Tolerated             Follow-up Appointments: Nasrin Guan  In 1 wk  Test Results Pending at Discharge:    Order Current Status    Blood Culture - Blood, Preliminary result    Blood Culture - Blood, Preliminary result           Gonsalo Loo MD  10/02/18  8:27 AM    Time:  > 30 mins  Please note that portions of this note may have been completed with a voice recognition program. Efforts were made to edit the dictations, but occasionally words are mistranscribed.

## 2018-10-03 ENCOUNTER — READMISSION MANAGEMENT (OUTPATIENT)
Dept: CALL CENTER | Facility: HOSPITAL | Age: 58
End: 2018-10-03

## 2018-10-03 LAB
BACTERIA SPEC AEROBE CULT: NORMAL
BACTERIA SPEC AEROBE CULT: NORMAL

## 2018-10-03 NOTE — OUTREACH NOTE
Prep Survey      Responses   Facility patient discharged from?  [facility]   Is patient eligible?  Yes   Discharge diagnosis  Acute on chronic resp. failure with hypoxia and hypercapnia, COPD with acute exac. , tobacco abuse, Hyponatremia, Pulmonary HTN, Essential HTN   Does the patient have one of the following disease processes/diagnoses(primary or secondary)?  COPD/Pneumonia   Does the patient have Home health ordered?  Yes   What is the Home health agency?   [facility]   Is there a DME ordered?  No   Comments regarding appointments  See AVS   Prep survey completed?  Yes          Princess Moore RN

## 2018-10-03 NOTE — PAYOR COMM NOTE
"DC HOME 10-2-18  UR PHONE    [phone]    Teri Tim (58 y.o. Female)     Date of Birth Social Security Number Address Home Phone MRN    1960  [facility] [address]    [place] [address] [phone] [id number]    Yazdanism Marital Status          Voodoo        Admission Date Admission Type Admitting Provider Attending Provider Department, Room/Bed    9/28/18 Emergency Gonsalo Loo MD  [facility] 4C, 475/1    Discharge Date Discharge Disposition Discharge Destination        10/2/2018 Home-Health Care Svc              Attending Provider:  (none)   Allergies:  Codeine    Isolation:  None   Infection:  None   Code Status:  Prior    Ht:  154.9 cm (61\")   Wt:  48.2 kg (106 lb 5 oz)    Admission Cmt:  None   Principal Problem:  Acute on chronic respiratory failure with hypoxia and hypercapnia (CMS/HCC) [J96.21,J96.22]                 Active Insurance as of 9/28/2018     Primary Coverage     Payor Plan Insurance Group Employer/Plan Group    [employer] [place] [employer] [place]ICA[place]      Payor Plan Address Payor Plan Phone Number Effective From Effective To    [address] [address] [phone] 11/1/2017     [facility] [id number]       Subscriber Name Subscriber Birth Date Member ID       TERI TIM [id number]                 Emergency Contacts      (Rel.) Home Phone Work Phone Mobile Phone    Rich Tim (Spouse) [phone] -- [phone]               Physician Progress Notes (all)      Gonsalo Loo MD at 10/1/2018  9:11 AM          1           [facility] Medicine Services  INPATIENT PROGRESS NOTE    Patient Name: Teri Tim  Date of Admission: 9/28/2018  Today's Date: 10/01/18  Length of Stay: 0  Primary Care Physician: Lorri Guan APRN    Subjective   Chief Complaint: Follow-up  HPI   58-year-old  woman admitted for further evaluation and management of shortness of breath " and intermittent chest pain.  She carries history of chronic obstructive pulmonary disease, hypertension and pneumonia.  Record indicates past medical history of congestive heart failure although, the echocardiogram did not show any evidence of heart failure.    Initial blood gas showed acute hypercarbic and hypoxic respiratory failure while on BiPAP.  BNP was normal 250.  D dimer was 0.41  She was admitted for chronic obstructive pulmonary disease exacerbation with acute on chronic hypercarbic and hypoxic respiratory failure.    In her hospital course, she had acute hyponatremia which improved with some scar and fluid restriction.  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  The interested reader is referred to Dr. Oneill's last's note for details    Had some confusion according to    concern about her oxygen level of 99 % at her normal setting of 2lpm oxygen via nc.    Review of Systems     All pertinent negatives and positives are as above. All other systems have been reviewed and are negative unless otherwise stated.     Objective    Temp:  [97.6 °F (36.4 °C)-98.5 °F (36.9 °C)] 98.3 °F (36.8 °C)  Heart Rate:  [] 84  Resp:  [16-20] 16  BP: (120-134)/(56-68) 120/60  Physical Exam  Constitutional: She is oriented to person, place, and time.   Up in bed. Chronically ill appearing.  Her  is present with her.  Seen and discussed with her nurse, Dale  Head: Normocephalic and atraumatic.   Eyes: Pupils are equal, round  Conjunctivae and EOM are normal.   Neck: Neck supple. No JVD present.   Cardiovascular: Normal rate, regular rhythm, normal heart sounds and intact distal pulses.  Exam reveals no gallop and no friction rub.  No murmur heard.  Pulmonary/Chest: Effort normal. No respiratory distress. wheezing is gone Diminished breath sounds. Normal excursion of chest wall She has no rales. She exhibits no tenderness.   Abdominal: Soft. Bowel sounds are normal. She exhibits no distension.  There is no tenderness. There is no rebound and no guarding.   Musculoskeletal: Normal range of motion. She exhibits no edema, tenderness or deformity.   Neurological: She is alert and oriented to person, place, and time. She displays normal reflexes. No cranial nerve deficit. She exhibits normal muscle tone. Generally weak, nonfocal.   Skin: Skin is warm and dry. No rash noted.   Psychiatric: Flat.           Results Review:  I have reviewed the labs, radiology results, and diagnostic studies.    Laboratory Data:     Results from last 7 days  Lab Units 09/29/18  0512 09/28/18  0239 09/28/18  0026   WBC 10*3/mm3 10.04 16.72* 10.08   HEMOGLOBIN g/dL 11.0* 12.7 14.0   HEMATOCRIT % 32.9* 40.3 45.0   PLATELETS 10*3/mm3 188 233 251          Results from last 7 days  Lab Units 10/01/18  0459 09/30/18  0457 09/29/18  2318  09/28/18  0239   SODIUM mmol/L 135 132* 129*  < > 133*   POTASSIUM mmol/L 4.1 4.6 4.0  < > 3.6   CHLORIDE mmol/L 89* 88* 85*  < > 84*   CO2 mmol/L 39.0* >40.0* 37.0*  < > >40.0*   BUN mg/dL 21 22* 18  < > 9   CREATININE mg/dL 0.76 0.63 0.60  < > 0.75   CALCIUM mg/dL 9.3 9.3 9.2  < > 8.9   BILIRUBIN mg/dL  --   --   --   --  0.4   ALK PHOS U/L  --   --   --   --  123*   ALT (SGPT) U/L  --   --   --   --  23   AST (SGOT) U/L  --   --   --   --  33   GLUCOSE mg/dL 114* 113* 147*  < > 129*   < > = values in this interval not displayed.    Culture Data:   Blood Culture   Date Value Ref Range Status   09/28/2018 No growth at 3 days  Preliminary   09/28/2018 No growth at 3 days  Preliminary       Radiology Data:   Imaging Results (last 24 hours)     ** No results found for the last 24 hours. **      Echocardiogram 09/28/2018  · Left ventricular systolic function is normal. Estimated ejection fraction is 61-65%.  · Right ventricular cavity is mildly dilated. Systolic function is normal.  · Left ventricular diastolic function is normal.  · Mild tricuspid valve regurgitation is present.  · Estimated right  "ventricular systolic pressure from tricuspid regurgitation is moderately elevated (45-55 mmHg).  · There is no evidence of right to left shunt on bubble study.    I have reviewed the patient's current medications.     Assessment/Plan     Hospital Problem List     COPD with acute exacerbation (CMS/[facility])        1.  Acute on chronic hypoxic and hypercarbic respiratory failure.  2.  Acute exacerbation of chronic obstructive pulmonary disease.  3.  Ongoing tobacco abuse.  4.  Hyponatremia, history of SIADH in the past; resolved  5.  Pulmonary hypertension.  6.  Reported history of chronic diastolic heart failure with normal diastolic function on current echocardiogram. - no evidence of heart failure   7.  Systemic arterial hypertension.       Cont on Trilogy per prescribed and as needed  She is maintaining 93% on RA; she may need o2 esither during sleep and or exercise.    Add OPEP.  Cont fluid restriction  Agree with holding off LAsix; daily weight; she may need this when she gains weight due to swelling prob from PHTN  increse activities  DVT proph  Discussed with Dale.  Apprised patient and .  Possible home tomorrow  sw for d/c planning     Other plan per orders        Gonsalo Loo MD   10/01/18   9:11 AM      Electronically signed by Gonsalo Loo MD at 10/1/2018  9:42 AM     Marcin Oneill DO at 9/30/2018  8:21 AM              [facility] Medicine Services  INPATIENT PROGRESS NOTE    Patient Name: Teri Tim  Date of Admission: 9/28/2018  Today's Date: 09/30/18  Length of Stay: 0  Primary Care Physician: Lorri Guan APRN    Subjective   Chief Complaint: weakness  HPI   Sodium much better with Samsca and fluid restriction. Rate of correction should be fine as we know that the 121 was acute (133 the day prior). She is not as weak today.     She is still having wheezing and BABIN. \"I'm not ready.\"    Review of Systems   All pertinent negatives " and positives are as above. All other systems have been reviewed and are negative unless otherwise stated.     Objective    Temp:  [97.6 °F (36.4 °C)-98.5 °F (36.9 °C)] 97.9 °F (36.6 °C)  Heart Rate:  [70-96] 83  Resp:  [16-18] 18  BP: (121-131)/(51-61) 130/52  Physical Exam  Constitutional: She is oriented to person, place, and time.   Up in bed. Chronically ill appearing.  Her  is present with her.  Seen and discussed with her nurse, Sushma.    Head: Normocephalic and atraumatic.   Eyes: Pupils are equal, round, and reactive to light. Conjunctivae and EOM are normal.   Neck: Neck supple. No JVD present.   Cardiovascular: Normal rate, regular rhythm, normal heart sounds and intact distal pulses.  Exam reveals no gallop and no friction rub.  No murmur heard.  Pulmonary/Chest: Effort normal. No respiratory distress. She has wheezes (still present, left > right). She has no rales. She exhibits no tenderness.   Abdominal: Soft. Bowel sounds are normal. She exhibits no distension. There is no tenderness. There is no rebound and no guarding.   Musculoskeletal: Normal range of motion. She exhibits no edema, tenderness or deformity.   Neurological: She is alert and oriented to person, place, and time. She displays normal reflexes. No cranial nerve deficit. She exhibits normal muscle tone. Generally weak, nonfocal.   Skin: Skin is warm and dry. No rash noted.   Psychiatric: Flat.      Results Review:  I have reviewed the labs, radiology results, and diagnostic studies.    Laboratory Data:     Results from last 7 days  Lab Units 09/30/18  0457 09/29/18  2318 09/29/18  1522  09/28/18  0239   SODIUM mmol/L 132* 129* 121*  < > 133*   POTASSIUM mmol/L 4.6 4.0 4.9  < > 3.6   CHLORIDE mmol/L 88* 85* 78*  < > 84*   CO2 mmol/L >40.0* 37.0* 37.0*  < > >40.0*   BUN mg/dL 22* 18 16  < > 9   CREATININE mg/dL 0.63 0.60 0.61  < > 0.75   CALCIUM mg/dL 9.3 9.2 8.8  < > 8.9   BILIRUBIN mg/dL  --   --   --   --  0.4   ALK PHOS U/L  --    --   --   --  123*   ALT (SGPT) U/L  --   --   --   --  23   AST (SGOT) U/L  --   --   --   --  33   GLUCOSE mg/dL 113* 147* 115*  < > 129*   < > = values in this interval not displayed.    Uric acid low at 2.5.     I have reviewed the patient's current medications.     Assessment/Plan   Assessment:   1.  Acute on chronic hypoxic and hypercarbic respiratory failure.  2.  Acute exacerbation of chronic obstructive pulmonary disease.  3.  Ongoing tobacco abuse.  4.  Hyponatremia, history of SIADH in the past; improved.  5.  Pulmonary hypertension.  6.  Reported history of chronic diastolic heart failure with normal diastolic function on current echocardiogram.  7.  Systemic arterial hypertension.     Plan:  Continue oral doxycycline. Hold on further weaning of IV steroids.  Continue Symbicort and DuoNeb. Continue Mucinex and incentive spirometry.    Offered a nicotine patch and counseled for tobacco cessation.      Continue home sleep device.      Sodium much improved. Continue fluid restriction at 1500 ML daily.  Samsca  was given as a single dose on 9/29. BMP daily at this point. She has not had a CT of her chest since May 2016.  No lung cancer or nodules were identified at that time.  Her problem with hyponatremia predates that exam.     Continue to hold Lasix. I don't think she needs it at all based on exam and her current echocardiogram. She states that she is no longer on Aldactone.      Increase activity.     Prophylactic Lovenox.    Discharge Planning:     Marcin Oneill DO   09/30/18   8:21 AM      Electronically signed by Marcin Oneill DO at 9/30/2018  8:41 AM     Marcin Oneill DO at 9/29/2018 10:21 AM              [facility] Medicine Services  INPATIENT PROGRESS NOTE    Patient Name: Teri Tim  Date of Admission: 9/28/2018  Today's Date: 09/29/18  Length of Stay: 0  Primary Care Physician: Lorri Guan APRN    Subjective   Chief Complaint: shortness of  breath  HPI   Or shortness of breath is a bit better today.  She is still wheezing.  Cough is nonproductive.  She is not complaining of any pain.    She feels weaker today.  I did tell her that her sodium has become more abnormal.  It is 121 today.  I am placing her on Samsca and fluid restriction.  She has problems with intermittent hyponatremia dating back as far as 2014 (probably even further).  This has been proven in the past to be pulmonary SIADH related to her severe lung disease.    Review of Systems   All pertinent negatives and positives are as above. All other systems have been reviewed and are negative unless otherwise stated.     Objective    Temp:  [97.6 °F (36.4 °C)-98.8 °F (37.1 °C)] 97.6 °F (36.4 °C)  Heart Rate:  [62-86] 86  Resp:  [16-18] 18  BP: (108-125)/(58-65) 123/61  Physical Exam   Constitutional: She is oriented to person, place, and time.   Up in bed. Chronically ill appearing.  Her  is present with her.  Discussed with her nurse, Nayla    HENT:   Head: Normocephalic and atraumatic.   Eyes: Pupils are equal, round, and reactive to light. Conjunctivae and EOM are normal.   Neck: Neck supple. No JVD present.   Cardiovascular: Normal rate, regular rhythm, normal heart sounds and intact distal pulses.  Exam reveals no gallop and no friction rub.    No murmur heard.  Pulmonary/Chest: Effort normal. No respiratory distress. She has wheezes (slight improvement from yesterday). She has no rales. She exhibits no tenderness.   Abdominal: Soft. Bowel sounds are normal. She exhibits no distension. There is no tenderness. There is no rebound and no guarding.   Musculoskeletal: Normal range of motion. She exhibits no edema, tenderness or deformity.   Neurological: She is alert and oriented to person, place, and time. She displays normal reflexes. No cranial nerve deficit. She exhibits normal muscle tone.   Generally weak, nonfocal.   Skin: Skin is warm and dry. No rash noted.   Psychiatric:    Flat.      Results Review:  I have reviewed the labs, radiology results, and diagnostic studies.    Laboratory Data:     Results from last 7 days  Lab Units 09/29/18  0512 09/28/18  0239 09/28/18  0026   WBC 10*3/mm3 10.04 16.72* 10.08   HEMOGLOBIN g/dL 11.0* 12.7 14.0   HEMATOCRIT % 32.9* 40.3 45.0   PLATELETS 10*3/mm3 188 233 251       Results from last 7 days  Lab Units 09/29/18  0512 09/28/18  0239 09/28/18  0050 09/28/18  0026   SODIUM mmol/L 121* 133*  --  134*   SODIUM, ARTERIAL mmol/L  --   --  131*  --    POTASSIUM mmol/L 4.7 3.6  --  4.0   CHLORIDE mmol/L 78* 84*  --  82*   CO2 mmol/L 36.0* >40.0*  --  >40.0*   BUN mg/dL 12 9  --  9   CREATININE mg/dL 0.60 0.75  --  0.78   CALCIUM mg/dL 8.8 8.9  --  9.1   BILIRUBIN mg/dL  --  0.4  --   --    ALK PHOS U/L  --  123*  --   --    ALT (SGPT) U/L  --  23  --   --    AST (SGOT) U/L  --  33  --   --    GLUCOSE mg/dL 122* 129*  --  117*       Results from last 7 days  Lab Units 09/28/18  0426   PH, ARTERIAL pH units 7.368   PO2 ART mm Hg 79.0*   PCO2, ARTERIAL mm Hg 74.3*   HCO3 ART mmol/L 42.7*     Results for orders placed during the hospital encounter of 09/28/18   Adult Transthoracic Echo Complete W/ Cont if Necessary Per Protocol    Narrative · Left ventricular systolic function is normal. Estimated ejection   fraction is 61-65%.  · Right ventricular cavity is mildly dilated. Systolic function is normal.  · Left ventricular diastolic function is normal.  · Mild tricuspid valve regurgitation is present.  · Estimated right ventricular systolic pressure from tricuspid   regurgitation is moderately elevated (45-55 mmHg).  · There is no evidence of right to left shunt on bubble study.        I have reviewed the patient's current medications.     Assessment/Plan   Assessment:   1.  Acute on chronic hypoxic and hypercarbic respiratory failure.  2.  Acute exacerbation of chronic obstructive pulmonary disease.  3.  Ongoing tobacco abuse.  4.  Hyponatremia, history of  SIADH in the past.  5.  Pulmonary hypertension.  6.  Reported history of chronic diastolic heart failure with normal diastolic function on current echocardiogram.  6.  Systemic arterial hypertension.    Plan:  Continue oral doxycycline.  Wean IV steroids.  Continue Symbicort and DuoNeb.  Continu Mucinex and incentive spirometry.    Continue home sleep device.      Fluid restriction to 1500 ML daily.  Samsca 15 mg daily for 3 days.  BMP now and every 6 hours. Check thyroid studies, uric acid, serum osm, urine osm, urine Na/Cr.  She has not had a CT of her chest since May 2016.  No lung cancer or nodules were identified at that time.  Her problem with hyponatremia predates that exam.    Continue to hold Lasix and Aldactone.    Increase activity.    Prophylactic Lovenox.    Marcin Oneill DO   09/29/18   10:21 AM      Electronically signed by Marcin Oneill DO at 9/29/2018 10:52 AM     Marcin Oneill DO at 9/28/2018  8:06 AM        Admitted after midnight by Dr. Reyes.     Discussed with her nurse, Desiree.     Her  is present.     Patient has a long-standing history of chronic obstructive pulmonary disease and has been hospitalized in the past for exacerbations.  She is also chronically on oxygen.  She has a chronic respiratory acidosis that had worsened causing her to be placed on BiPAP in the emergency department.  This is currently resolved and we will try to wean her from BiPAP to nasal cannula this morning. She is supposed to wear a CPAP at home provided by [employer].     Change Levaquin to doxycycline.  Continue IV Solu-Medrol.  Continue inhaled bronchodilators.  Add Symbicort.  Mucinex.  Incentive spirometry.    She does not appear to be in heart failure at the moment.  She has a history of chronic diastolic heart failure.  No echocardiogram since November 2017.  She also had a normal dobutamine stress echocardiogram in November 2017.  Her first 2 troponin values are normal. Chest pain free at present.      Marcin Oneill DO  09/28/18  8:06 AM            Electronically signed by Marcin Oneill DO at 9/28/2018  8:13 AM       Consult Notes (all)     No notes of this type exist for this encounter.           Discharge Summary      Gonsalo Loo MD at 10/2/2018  8:06 AM              [facility] Medicine Services  DISCHARGE SUMMARY       Date of Admission: 9/28/2018  Date of Discharge:  10/2/2018  Primary Care Physician: Lorri Guan APRN    Discharge Diagnoses:  Hospital Problem List     * (Principal)Acute on chronic respiratory failure with hypoxia and hypercapnia (CMS/HCC)    COPD with acute exacerbation (CMS/HCC)    Tobacco abuse    Hyponatremia    Pulmonary hypertension (CMS/HCC)    Essential hypertension    Reported history of chronic diastolic heart failure with normal diastolic function on current echocardiogram. - no evidence of heart failure                 Presenting Problem/History of Present Illness:  COPD with acute exacerbation (CMS/HCC) [J44.1]         Hospital Course  58-year-old  woman admitted for further evaluation and management of shortness of breath and intermittent chest pain.  She carries history of chronic obstructive pulmonary disease, hypertension and pneumonia.  Record indicates past medical history of congestive heart failure although, the echocardiogram did not show any evidence of heart failure.     Initial blood gas showed acute hypercarbic and hypoxic respiratory failure while on BiPAP.  BNP was normal 250.  D dimer was 0.41  She was admitted for chronic obstructive pulmonary disease exacerbation with acute on chronic hypercarbic and hypoxic respiratory failure.     In her hospital course, she had acute hyponatremia which improved with Samscar and fluid restriction.  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  The interested reader is referred to Dr. Oneill's last's note for details     She had  some confusion  according to  on Sept. 30 which is now resolved.  I felt this might be related to steroid.  I cut back on steroid and switched her to PO with intention to quickly  Taper it since she is no longer wheezing.   Furthermore,  expressed concern about her oxygen level of 99 % at her normal setting of 2lpm oxygen via nc.  I directly observed her O2 sat.  She maintained 93% in RA while resting but had gotten to 85% while working with therapist.  The interested reader is referred to PT note for details surrounding circumstance. She told me she only use oxygen during the night.  She will need O2 supplement during activity.    Overall, she is doing better and felt medically appropriate for discharge       Procedures Performed: None    Consults:  None    Pertinent Test Results:  Lab Results (last 48 hours)     Procedure Component Value Units Date/Time    T3, Free [[id number]]  (Abnormal) Collected:  09/29/18 1104    Specimen:  Blood Updated:  10/02/18 0615     T3, Free 1.6 (L) pg/mL     Narrative:       Performed at:  [address] [place] [address]  [id number]  : Clark Cheung PhD, Phone:  [phone]    Blood Culture - Blood, [[id number]]  (Normal) Collected:  09/28/18 0024    Specimen:  Blood from Wrist, Left Updated:  10/02/18 0105     Blood Culture No growth at 4 days    Blood Culture - Blood, [[id number]]  (Normal) Collected:  09/28/18 0026    Specimen:  Blood from Arm, Left Updated:  10/02/18 0046     Blood Culture No growth at 4 days    Basic Metabolic Panel [[id number]]  (Abnormal) Collected:  10/01/18 0459    Specimen:  Blood Updated:  10/01/18 0552     Glucose 114 (H) mg/dL      BUN 21 mg/dL      Creatinine 0.76 mg/dL      Sodium 135 mmol/L      Potassium 4.1 mmol/L      Chloride 89 (L) mmol/L      CO2 39.0 (H) mmol/L      Calcium 9.3 mg/dL      eGFR Non African Amer 79 mL/min/1.73      BUN/Creatinine Ratio 27.6 (H)     Anion Gap 7.0 mmol/L     Narrative:       GFR Normal  ">60  Chronic Kidney Disease <60  Kidney Failure <15          Condition on Discharge:  Stable  Physical Exam on Discharge:  /71 (BP Location: Right arm, Patient Position: Lying)   Pulse 80   Temp 97.1 °F (36.2 °C) (Temporal Artery )   Resp 16   Ht 154.9 cm (61\")   Wt 48.2 kg (106 lb 5 oz)   SpO2 95%   BMI 20.09 kg/m²    Physical Exam  Constitutional: She is oriented to person, place, and time.   Laying in bed; O2 sat is 96% in RA while resting   Head: Normocephalic and atraumatic.   Eyes: Pupils are equal, round  Conjunctivae and EOM are normal.   Neck: Neck supple. No JVD present.   Cardiovascular: Normal rate, regular rhythm, normal heart sounds and intact distal pulses.  Exam reveals no gallop and no friction rub.  No murmur heard.  Pulmonary/Chest: Effort normal. No respiratory distress. wheezing is gone. Diminished breath sounds. Normal excursion of chest wall She has no rales. She exhibits no tenderness.   Abdominal: Soft. Bowel sounds are normal. She exhibits no distension. There is no tenderness. There is no rebound and no guarding.   Musculoskeletal: Normal range of motion. She exhibits no edema, tenderness or deformity.   Neurological: She is alert and oriented to person, place, and time. She displays normal reflexes. No cranial nerve deficit. She exhibits normal muscle tone. Skin: Skin is warm and dry. No rash noted.   Psychiatric: appropriate affect        Discharge Disposition:  Home-Health Care [facility]    Discharge Medications:     Discharge Medications      New Medications      Instructions Start Date   doxycycline 100 MG tablet  Commonly known as:  ADOXA   100 mg, Oral, Every 12 Hours Scheduled      ipratropium-albuterol  MCG/ACT inhaler  Commonly known as:  COMBIVENT RESPIMAT   1 puff, Inhalation, 4 Times Daily PRN      nicotine 14 MG/24HR patch  Commonly known as:  NICODERM CQ   1 patch, Transdermal, Every 24 Hours Scheduled      predniSONE 20 MG tablet  Commonly known as:  " DELTASONE   2 mg daily x 2 days then 1 tab daily x 2 days then 1/2 tab daily x 2 days         Continue These Medications      Instructions Start Date   budesonide-formoterol 160-4.5 MCG/ACT inhaler  Commonly known as:  SYMBICORT   2 puffs, Inhalation, 2 Times Daily - RT      cyclobenzaprine 10 MG tablet  Commonly known as:  FLEXERIL   10 mg, Oral, 2 Times Daily PRN      guaiFENesin 600 MG 12 hr tablet  Commonly known as:  MUCINEX   1,200 mg, Oral, 2 Times Daily      metoprolol tartrate 25 MG tablet  Commonly known as:  LOPRESSOR   12.5 mg, Oral, Every 12 Hours Scheduled      pantoprazole 40 MG EC tablet  Commonly known as:  PROTONIX   40 mg, Oral, Daily         Stop These Medications    furosemide 20 MG tablet  Commonly known as:  LASIX            Discharge Diet:   Diet Instructions     Diet: Regular       Discharge Diet:  Regular    Fluid Restriction per day:  1500 mL Fluid            Activity at Discharge:   Activity Instructions     Activity as Tolerated             Follow-up Appointments: Nasrin Guan  In 1 wk  Test Results Pending at Discharge:    Order Current Status    Blood Culture - Blood, Preliminary result    Blood Culture - Blood, Preliminary result           Gonsalo Loo MD  10/02/18  8:27 AM    Time:  > 30 mins  Please note that portions of this note may have been completed with a voice recognition program. Efforts were made to edit the dictations, but occasionally words are mistranscribed.            Electronically signed by Gonsalo Loo MD at 10/2/2018  8:37 AM

## 2018-10-05 ENCOUNTER — READMISSION MANAGEMENT (OUTPATIENT)
Dept: CALL CENTER | Facility: HOSPITAL | Age: 58
End: 2018-10-05

## 2018-10-05 NOTE — OUTREACH NOTE
COPD/PN Week 1 Survey      Responses   Facility patient discharged from?  [place]   Does the patient have one of the following disease processes/diagnoses(primary or secondary)?  COPD/Pneumonia   Is there a successful TCM telephone encounter documented?  No   Was the primary reason for admission:  COPD exacerbation   Week 1 attempt successful?  Yes   Call start time  1108   Call end time  1114   Discharge diagnosis  Acute on chronic resp. failure with hypoxia and hypercapnia, COPD with acute exac. , tobacco abuse, Hyponatremia, Pulmonary HTN, Essential HTN   Is patient permission given to speak with other caregiver?  Yes   Person spoke with today (if not patient) and relationship  Rich/Spouse   Meds reviewed with patient/caregiver?  Yes   Is the patient having any side effects they believe may be caused by any medication additions or changes?  No   Does the patient have all medications ordered at discharge?  Yes   Is the patient taking all medications as directed (includes completed medication regime)?  Yes   Does the patient have a primary care provider?   Yes   Does the patient have an appointment with their PCP or pulmonologist within 7 days of discharge?  Yes   Has the patient kept scheduled appointments due by today?  N/A   What is the Home health agency?   [facility]   Has home health visited the patient within 72 hours of discharge?  No   Home health comments  She cancelled Home Health   Psychosocial issues?  No   Psychosocial comments  She didn't like her Pain Medince MD.  Recommended she ask for a new referral at her PCP appt on Tuesday.   Did the patient receive a copy of their discharge instructions?  Yes   Nursing interventions  Reviewed instructions with patient   What is the patient's perception of their health status since discharge?  Improving   Nursing Interventions  Nurse provided patient education   Are the patient's immunizations up to date?   Yes   Nursing interventions  Advised patient to discuss with  provider at next visit   Is the patient/caregiver able to teach back the hierarchy of who to call/visit for symptoms/problems? PCP, Specialist, Home health nurse, Urgent Care, ED, 911  Yes   Is the patient able to teach back COPD zones?  Yes   Nursing interventions  Education provided on various zones   Patient reports what zone on this call?  Green Zone   Green Zone  Reports doing well, Breathing without shortness of breath, Usual activity and exercise level   Green Zone interventions:  Take daily medications, Use oxygen as prescribed   Week 1 call completed?  Yes          Brooke Chong RN

## 2018-10-13 ENCOUNTER — READMISSION MANAGEMENT (OUTPATIENT)
Dept: CALL CENTER | Facility: HOSPITAL | Age: 58
End: 2018-10-13

## 2018-10-13 NOTE — OUTREACH NOTE
COPD/PN Week 2 Survey      Responses   Facility patient discharged from?  [place]   Does the patient have one of the following disease processes/diagnoses(primary or secondary)?  COPD/Pneumonia   Was the primary reason for admission:  COPD exacerbation   Week 2 attempt successful?  No   Unsuccessful attempts  Attempt 1          Cash Gibson RN

## 2018-10-17 ENCOUNTER — READMISSION MANAGEMENT (OUTPATIENT)
Dept: CALL CENTER | Facility: HOSPITAL | Age: 58
End: 2018-10-17

## 2018-10-17 NOTE — OUTREACH NOTE
COPD/PN Week 2 Survey      Responses   Facility patient discharged from?  [place]   Does the patient have one of the following disease processes/diagnoses(primary or secondary)?  COPD/Pneumonia   Was the primary reason for admission:  COPD exacerbation   Week 2 attempt successful?  No   Unsuccessful attempts  Attempt 2          Amanda Colunga RN

## 2018-10-18 ENCOUNTER — READMISSION MANAGEMENT (OUTPATIENT)
Dept: CALL CENTER | Facility: HOSPITAL | Age: 58
End: 2018-10-18

## 2018-10-18 NOTE — OUTREACH NOTE
COPD/PN Week 2 Survey      Responses   Facility patient discharged from?  [place]   Does the patient have one of the following disease processes/diagnoses(primary or secondary)?  COPD/Pneumonia   Was the primary reason for admission:  COPD exacerbation   Week 2 attempt successful?  Yes   Call start time  1606   Call end time  1616   Discharge diagnosis  Acute on chronic resp. failure with hypoxia and hypercapnia, COPD with acute exac. , tobacco abuse, Hyponatremia, Pulmonary HTN, Essential HTN   Meds reviewed with patient/caregiver?  Yes   Is the patient having any side effects they believe may be caused by any medication additions or changes?  No   Does the patient have all medications ordered at discharge?  Yes   Is the patient taking all medications as directed (includes completed medication regime)?  Yes   Medication comments  Completed ABX and steroids.   Does the patient have a primary care provider?   Yes   Does the patient have an appointment with their PCP or pulmonologist within 7 days of discharge?  Yes   Has the patient kept scheduled appointments due by today?  Yes   Has home health visited the patient within 72 hours of discharge?  N/A   Psychosocial issues?  No   Did the patient receive a copy of their discharge instructions?  Yes   Nursing interventions  Reviewed instructions with patient   What is the patient's perception of their health status since discharge?  Improving   Nursing Interventions  Nurse provided patient education   Are the patient's immunizations up to date?   Yes   Is the patient/caregiver able to teach back the hierarchy of who to call/visit for symptoms/problems? PCP, Specialist, Home health nurse, Urgent Care, ED, 911  Yes   Is the patient able to teach back COPD zones?  Yes   Nursing interventions  Education provided on various zones   Patient reports what zone on this call?  Green Zone   Green Zone  Sleeping well, Appetite is good, Usual amount of phlegm/mucus without  difficulty coughing up, Reports doing well, Breathing without shortness of breath   Green Zone interventions:  Take daily medications, Use oxygen as prescribed   Week 2 call completed?  Yes          Cash Gibson RN

## 2018-10-25 ENCOUNTER — READMISSION MANAGEMENT (OUTPATIENT)
Dept: CALL CENTER | Facility: HOSPITAL | Age: 58
End: 2018-10-25

## 2018-10-25 NOTE — OUTREACH NOTE
"COPD/PN Week 3 Survey      Responses   Facility patient discharged from?  [place]   Does the patient have one of the following disease processes/diagnoses(primary or secondary)?  COPD/Pneumonia   Was the primary reason for admission:  COPD exacerbation   Week 3 attempt successful?  Yes   Call start time  1422   Call end time  1430   Meds reviewed with patient/caregiver?  Yes   Is the patient taking all medications as directed (includes completed medication regime)?  Yes   Has the patient kept scheduled appointments due by today?  Yes   What is the patient's perception of their health status since discharge?  Improving   Additional teach back comments  Pt thinks she has a cold. SOB is getting worse. Feels \"real bad\" some days. Says she has not swelling except in face. Lasix was stopped and pt says she does not void a lot. coughing when she gets up in morning. Breathing tx helps    Nursing interventions  Education provided on various zones   Patient reports what zone on this call?  Yellow Zone   Yellow Zone  Increased shortness of air, Unable to complete daily activities, Poor Appetite, Using quick relief inhaler/nebulizer more often, Fever or feeling like have a chest cold   Yellow interventions  Continue to use daily medications, Use quick relief inhaler as ordered, Use oxygen as ordered, Use other meds such as steroids or antibiotics as ordered, Do not smoke, Get plenty of rest   Week 3 call completed?  Yes          Simona Hoskins RN  "

## 2018-11-01 ENCOUNTER — READMISSION MANAGEMENT (OUTPATIENT)
Dept: CALL CENTER | Facility: HOSPITAL | Age: 58
End: 2018-11-01

## 2018-11-01 NOTE — OUTREACH NOTE
COPD/PN Week 4 Survey      Responses   Facility patient discharged from?  [place]   Does the patient have one of the following disease processes/diagnoses(primary or secondary)?  COPD/Pneumonia   Was the primary reason for admission:  COPD exacerbation   Week 4 attempt successful?  No          Carmen Villela RN

## 2018-11-26 ENCOUNTER — HOSPITAL ENCOUNTER (INPATIENT)
Facility: HOSPITAL | Age: 58
LOS: 4 days | Discharge: HOME | End: 2018-11-30
Attending: EMERGENCY MEDICINE | Admitting: FAMILY MEDICINE

## 2018-11-26 ENCOUNTER — APPOINTMENT (OUTPATIENT)
Dept: GENERAL RADIOLOGY | Facility: HOSPITAL | Age: 58
End: 2018-11-26

## 2018-11-26 DIAGNOSIS — J96.22 ACUTE ON CHRONIC RESPIRATORY FAILURE WITH HYPOXIA AND HYPERCAPNIA (HCC): ICD-10-CM

## 2018-11-26 DIAGNOSIS — Z72.0 TOBACCO ABUSE: ICD-10-CM

## 2018-11-26 DIAGNOSIS — I21.4 NSTEMI (NON-ST ELEVATED MYOCARDIAL INFARCTION) (HCC): ICD-10-CM

## 2018-11-26 DIAGNOSIS — J44.1 COPD WITH ACUTE EXACERBATION (HCC): ICD-10-CM

## 2018-11-26 DIAGNOSIS — J44.1 COPD EXACERBATION (HCC): ICD-10-CM

## 2018-11-26 DIAGNOSIS — I10 ESSENTIAL HYPERTENSION: ICD-10-CM

## 2018-11-26 DIAGNOSIS — R79.89 ABNORMAL LFTS: ICD-10-CM

## 2018-11-26 DIAGNOSIS — R93.5 ABNORMAL US (ULTRASOUND) OF ABDOMEN: ICD-10-CM

## 2018-11-26 DIAGNOSIS — I27.20 PULMONARY HYPERTENSION (HCC): ICD-10-CM

## 2018-11-26 DIAGNOSIS — R06.89 HYPERCAPNEMIA: Primary | ICD-10-CM

## 2018-11-26 DIAGNOSIS — E87.1 HYPONATREMIA: ICD-10-CM

## 2018-11-26 DIAGNOSIS — J96.21 ACUTE ON CHRONIC RESPIRATORY FAILURE WITH HYPOXIA AND HYPERCAPNIA (HCC): ICD-10-CM

## 2018-11-26 LAB
ALBUMIN SERPL-MCNC: 3.5 G/DL (ref 3.5–5)
ALBUMIN/GLOB SERPL: 1 G/DL (ref 1.1–2.5)
ALP SERPL-CCNC: 161 U/L (ref 24–120)
ALT SERPL W P-5'-P-CCNC: 20 U/L (ref 0–54)
ANION GAP SERPL CALCULATED.3IONS-SCNC: ABNORMAL MMOL/L (ref 4–13)
ARTERIAL PATENCY WRIST A: POSITIVE
ARTERIAL PATENCY WRIST A: POSITIVE
AST SERPL-CCNC: 13 U/L (ref 7–45)
ATMOSPHERIC PRESS: 752 MMHG
ATMOSPHERIC PRESS: 752 MMHG
BASE EXCESS BLDA CALC-SCNC: 14.8 MMOL/L (ref 0–2)
BASE EXCESS BLDA CALC-SCNC: 15 MMOL/L (ref 0–2)
BASOPHILS # BLD AUTO: 0.04 10*3/MM3 (ref 0–0.2)
BASOPHILS NFR BLD AUTO: 0.2 % (ref 0–2)
BDY SITE: ABNORMAL
BDY SITE: ABNORMAL
BILIRUB SERPL-MCNC: 0.4 MG/DL (ref 0.1–1)
BODY TEMPERATURE: 37 C
BODY TEMPERATURE: 37 C
BUN BLD-MCNC: 11 MG/DL (ref 5–21)
BUN/CREAT SERPL: 16.4 (ref 7–25)
CALCIUM SPEC-SCNC: 9.2 MG/DL (ref 8.4–10.4)
CHLORIDE SERPL-SCNC: 87 MMOL/L (ref 98–110)
CO2 SERPL-SCNC: >40 MMOL/L (ref 24–31)
CREAT BLD-MCNC: 0.67 MG/DL (ref 0.5–1.4)
D-LACTATE SERPL-SCNC: 1 MMOL/L (ref 0.5–2)
DEPRECATED RDW RBC AUTO: 55.3 FL (ref 40–54)
EOSINOPHIL # BLD AUTO: 0 10*3/MM3 (ref 0–0.7)
EOSINOPHIL NFR BLD AUTO: 0 % (ref 0–4)
EPAP: 4
ERYTHROCYTE [DISTWIDTH] IN BLOOD BY AUTOMATED COUNT: 14 % (ref 12–15)
GAS FLOW AIRWAY: 3 LPM
GFR SERPL CREATININE-BSD FRML MDRD: 91 ML/MIN/1.73
GLOBULIN UR ELPH-MCNC: 3.6 GM/DL
GLUCOSE BLD-MCNC: 120 MG/DL (ref 70–100)
HCO3 BLDA-SCNC: 45.1 MMOL/L (ref 20–26)
HCO3 BLDA-SCNC: 46.1 MMOL/L (ref 20–26)
HCT VFR BLD AUTO: 43.1 % (ref 37–47)
HGB BLD-MCNC: 12.6 G/DL (ref 12–16)
HOLD SPECIMEN: NORMAL
HOLD SPECIMEN: NORMAL
HOROWITZ INDEX BLD+IHG-RTO: 40 %
IPAP: 18
LYMPHOCYTES # BLD AUTO: 1.13 10*3/MM3 (ref 0.72–4.86)
LYMPHOCYTES NFR BLD AUTO: 6 % (ref 15–45)
Lab: ABNORMAL
MCH RBC QN AUTO: 31.1 PG (ref 28–32)
MCHC RBC AUTO-ENTMCNC: 29.2 G/DL (ref 33–36)
MCV RBC AUTO: 106.4 FL (ref 82–98)
MODALITY: ABNORMAL
MODALITY: ABNORMAL
MONOCYTES # BLD AUTO: 1.99 10*3/MM3 (ref 0.19–1.3)
MONOCYTES NFR BLD AUTO: 10.5 % (ref 4–12)
NEUTROPHILS # BLD AUTO: 15.62 10*3/MM3 (ref 1.87–8.4)
NEUTROPHILS NFR BLD AUTO: 82.5 % (ref 39–78)
NOTIFIED BY: ABNORMAL
NOTIFIED BY: ABNORMAL
NOTIFIED WHO: ABNORMAL
NOTIFIED WHO: ABNORMAL
NT-PROBNP SERPL-MCNC: 2080 PG/ML (ref 0–900)
PCO2 BLDA: 90.1 MM HG (ref 35–45)
PCO2 BLDA: 99.7 MM HG (ref 35–45)
PH BLDA: 7.27 PH UNITS (ref 7.35–7.45)
PH BLDA: 7.31 PH UNITS (ref 7.35–7.45)
PLATELET # BLD AUTO: 291 10*3/MM3 (ref 130–400)
PMV BLD AUTO: 10.3 FL (ref 6–12)
PO2 BLDA: 120 MM HG (ref 83–108)
PO2 BLDA: 52.2 MM HG (ref 83–108)
POTASSIUM BLD-SCNC: 4.3 MMOL/L (ref 3.5–5.3)
PROT SERPL-MCNC: 7.1 G/DL (ref 6.3–8.7)
RBC # BLD AUTO: 4.05 10*6/MM3 (ref 4.2–5.4)
SAO2 % BLDCOA: 84.9 % (ref 94–99)
SAO2 % BLDCOA: 98.8 % (ref 94–99)
SET MECH RESP RATE: 18
SODIUM BLD-SCNC: 140 MMOL/L (ref 135–145)
TROPONIN I SERPL-MCNC: <0.012 NG/ML (ref 0–0.03)
VENTILATOR MODE: ABNORMAL
VENTILATOR MODE: ABNORMAL
WBC NRBC COR # BLD: 18.94 10*3/MM3 (ref 4.8–10.8)
WHOLE BLOOD HOLD SPECIMEN: NORMAL
WHOLE BLOOD HOLD SPECIMEN: NORMAL

## 2018-11-26 PROCEDURE — 94799 UNLISTED PULMONARY SVC/PX: CPT

## 2018-11-26 PROCEDURE — 36600 WITHDRAWAL OF ARTERIAL BLOOD: CPT

## 2018-11-26 PROCEDURE — 87040 BLOOD CULTURE FOR BACTERIA: CPT | Performed by: EMERGENCY MEDICINE

## 2018-11-26 PROCEDURE — 25010000002 ENOXAPARIN PER 10 MG: Performed by: FAMILY MEDICINE

## 2018-11-26 PROCEDURE — 71045 X-RAY EXAM CHEST 1 VIEW: CPT

## 2018-11-26 PROCEDURE — 85025 COMPLETE CBC W/AUTO DIFF WBC: CPT | Performed by: EMERGENCY MEDICINE

## 2018-11-26 PROCEDURE — 93010 ELECTROCARDIOGRAM REPORT: CPT | Performed by: INTERNAL MEDICINE

## 2018-11-26 PROCEDURE — 94640 AIRWAY INHALATION TREATMENT: CPT

## 2018-11-26 PROCEDURE — 25010000002 METHYLPREDNISOLONE PER 125 MG: Performed by: EMERGENCY MEDICINE

## 2018-11-26 PROCEDURE — 83880 ASSAY OF NATRIURETIC PEPTIDE: CPT | Performed by: EMERGENCY MEDICINE

## 2018-11-26 PROCEDURE — 82803 BLOOD GASES ANY COMBINATION: CPT

## 2018-11-26 PROCEDURE — 25010000002 METHYLPREDNISOLONE PER 125 MG: Performed by: FAMILY MEDICINE

## 2018-11-26 PROCEDURE — 94644 CONT INHLJ TX 1ST HOUR: CPT

## 2018-11-26 PROCEDURE — 99285 EMERGENCY DEPT VISIT HI MDM: CPT

## 2018-11-26 PROCEDURE — 80053 COMPREHEN METABOLIC PANEL: CPT | Performed by: EMERGENCY MEDICINE

## 2018-11-26 PROCEDURE — 83605 ASSAY OF LACTIC ACID: CPT | Performed by: EMERGENCY MEDICINE

## 2018-11-26 PROCEDURE — 84484 ASSAY OF TROPONIN QUANT: CPT | Performed by: EMERGENCY MEDICINE

## 2018-11-26 PROCEDURE — 93005 ELECTROCARDIOGRAM TRACING: CPT | Performed by: EMERGENCY MEDICINE

## 2018-11-26 PROCEDURE — 94660 CPAP INITIATION&MGMT: CPT

## 2018-11-26 RX ORDER — ACETYLCYSTEINE 200 MG/ML
3 SOLUTION ORAL; RESPIRATORY (INHALATION)
Status: DISCONTINUED | OUTPATIENT
Start: 2018-11-26 | End: 2018-11-26

## 2018-11-26 RX ORDER — ALBUTEROL SULFATE 2.5 MG/3ML
2.5 SOLUTION RESPIRATORY (INHALATION)
Status: COMPLETED | OUTPATIENT
Start: 2018-11-26 | End: 2018-11-26

## 2018-11-26 RX ORDER — FUROSEMIDE 20 MG/1
10 TABLET ORAL 2 TIMES DAILY
COMMUNITY
End: 2019-06-04

## 2018-11-26 RX ORDER — SODIUM CHLORIDE 0.9 % (FLUSH) 0.9 %
10 SYRINGE (ML) INJECTION AS NEEDED
Status: DISCONTINUED | OUTPATIENT
Start: 2018-11-26 | End: 2018-11-30 | Stop reason: HOSPADM

## 2018-11-26 RX ORDER — PANTOPRAZOLE SODIUM 40 MG/1
40 TABLET, DELAYED RELEASE ORAL DAILY
Status: DISCONTINUED | OUTPATIENT
Start: 2018-11-26 | End: 2018-11-30 | Stop reason: HOSPADM

## 2018-11-26 RX ORDER — IPRATROPIUM BROMIDE AND ALBUTEROL SULFATE 2.5; .5 MG/3ML; MG/3ML
3 SOLUTION RESPIRATORY (INHALATION) ONCE
Status: COMPLETED | OUTPATIENT
Start: 2018-11-26 | End: 2018-11-26

## 2018-11-26 RX ORDER — NICOTINE 21 MG/24HR
1 PATCH, TRANSDERMAL 24 HOURS TRANSDERMAL
Status: DISCONTINUED | OUTPATIENT
Start: 2018-11-26 | End: 2018-11-26 | Stop reason: SDUPTHER

## 2018-11-26 RX ORDER — IPRATROPIUM BROMIDE AND ALBUTEROL SULFATE 2.5; .5 MG/3ML; MG/3ML
3 SOLUTION RESPIRATORY (INHALATION)
Status: DISCONTINUED | OUTPATIENT
Start: 2018-11-26 | End: 2018-11-30 | Stop reason: HOSPADM

## 2018-11-26 RX ORDER — ACETYLCYSTEINE 200 MG/ML
3 SOLUTION ORAL; RESPIRATORY (INHALATION)
Status: DISCONTINUED | OUTPATIENT
Start: 2018-11-26 | End: 2018-11-30 | Stop reason: HOSPADM

## 2018-11-26 RX ORDER — NICOTINE 21 MG/24HR
1 PATCH, TRANSDERMAL 24 HOURS TRANSDERMAL
Status: DISCONTINUED | OUTPATIENT
Start: 2018-11-26 | End: 2018-11-30 | Stop reason: HOSPADM

## 2018-11-26 RX ORDER — VENLAFAXINE 75 MG/1
75 TABLET ORAL DAILY
COMMUNITY
End: 2019-06-04

## 2018-11-26 RX ORDER — HYDROCODONE BITARTRATE AND ACETAMINOPHEN 5; 325 MG/1; MG/1
1 TABLET ORAL EVERY 6 HOURS PRN
Status: DISCONTINUED | OUTPATIENT
Start: 2018-11-26 | End: 2018-11-30 | Stop reason: HOSPADM

## 2018-11-26 RX ORDER — METHYLPREDNISOLONE SODIUM SUCCINATE 125 MG/2ML
125 INJECTION, POWDER, LYOPHILIZED, FOR SOLUTION INTRAMUSCULAR; INTRAVENOUS ONCE
Status: COMPLETED | OUTPATIENT
Start: 2018-11-26 | End: 2018-11-26

## 2018-11-26 RX ORDER — AZITHROMYCIN 250 MG/1
500 TABLET, FILM COATED ORAL ONCE
Status: COMPLETED | OUTPATIENT
Start: 2018-11-26 | End: 2018-11-26

## 2018-11-26 RX ORDER — HYDROCODONE BITARTRATE AND ACETAMINOPHEN 7.5; 325 MG/1; MG/1
1 TABLET ORAL EVERY 6 HOURS PRN
COMMUNITY
End: 2019-06-04

## 2018-11-26 RX ORDER — GUAIFENESIN 600 MG/1
1200 TABLET, EXTENDED RELEASE ORAL 2 TIMES DAILY
Status: DISCONTINUED | OUTPATIENT
Start: 2018-11-26 | End: 2018-11-30 | Stop reason: HOSPADM

## 2018-11-26 RX ORDER — METHYLPREDNISOLONE SODIUM SUCCINATE 125 MG/2ML
60 INJECTION, POWDER, LYOPHILIZED, FOR SOLUTION INTRAMUSCULAR; INTRAVENOUS EVERY 6 HOURS
Status: DISCONTINUED | OUTPATIENT
Start: 2018-11-26 | End: 2018-11-30

## 2018-11-26 RX ORDER — ONDANSETRON 2 MG/ML
4 INJECTION INTRAMUSCULAR; INTRAVENOUS EVERY 6 HOURS PRN
Status: DISCONTINUED | OUTPATIENT
Start: 2018-11-26 | End: 2018-11-30 | Stop reason: HOSPADM

## 2018-11-26 RX ORDER — FAMOTIDINE 10 MG/ML
20 INJECTION, SOLUTION INTRAVENOUS 2 TIMES DAILY
Status: DISCONTINUED | OUTPATIENT
Start: 2018-11-26 | End: 2018-11-26 | Stop reason: SDUPTHER

## 2018-11-26 RX ORDER — IPRATROPIUM BROMIDE AND ALBUTEROL SULFATE 2.5; .5 MG/3ML; MG/3ML
3 SOLUTION RESPIRATORY (INHALATION)
Status: DISCONTINUED | OUTPATIENT
Start: 2018-11-26 | End: 2018-11-26

## 2018-11-26 RX ORDER — SODIUM CHLORIDE 0.9 % (FLUSH) 0.9 %
3-10 SYRINGE (ML) INJECTION AS NEEDED
Status: DISCONTINUED | OUTPATIENT
Start: 2018-11-26 | End: 2018-11-30 | Stop reason: HOSPADM

## 2018-11-26 RX ORDER — SODIUM CHLORIDE 0.9 % (FLUSH) 0.9 %
3 SYRINGE (ML) INJECTION EVERY 12 HOURS SCHEDULED
Status: DISCONTINUED | OUTPATIENT
Start: 2018-11-26 | End: 2018-11-30 | Stop reason: HOSPADM

## 2018-11-26 RX ADMIN — AZITHROMYCIN 500 MG: 250 TABLET, FILM COATED ORAL at 12:26

## 2018-11-26 RX ADMIN — METHYLPREDNISOLONE SODIUM SUCCINATE 60 MG: 125 INJECTION, POWDER, FOR SOLUTION INTRAMUSCULAR; INTRAVENOUS at 17:39

## 2018-11-26 RX ADMIN — HYDROCODONE BITARTRATE AND ACETAMINOPHEN 1 TABLET: 5; 325 TABLET ORAL at 23:24

## 2018-11-26 RX ADMIN — ENOXAPARIN SODIUM 30 MG: 30 INJECTION SUBCUTANEOUS at 16:33

## 2018-11-26 RX ADMIN — IPRATROPIUM BROMIDE AND ALBUTEROL SULFATE 3 ML: 2.5; .5 SOLUTION RESPIRATORY (INHALATION) at 11:41

## 2018-11-26 RX ADMIN — SODIUM CHLORIDE, PRESERVATIVE FREE 3 ML: 5 INJECTION INTRAVENOUS at 14:48

## 2018-11-26 RX ADMIN — NICOTINE 1 PATCH: 21 PATCH, EXTENDED RELEASE TRANSDERMAL at 14:46

## 2018-11-26 RX ADMIN — SODIUM CHLORIDE, PRESERVATIVE FREE 3 ML: 5 INJECTION INTRAVENOUS at 22:17

## 2018-11-26 RX ADMIN — METOPROLOL TARTRATE 12.5 MG: 25 TABLET, FILM COATED ORAL at 21:00

## 2018-11-26 RX ADMIN — GUAIFENESIN 1200 MG: 600 TABLET, EXTENDED RELEASE ORAL at 21:00

## 2018-11-26 RX ADMIN — IPRATROPIUM BROMIDE AND ALBUTEROL SULFATE 3 ML: 2.5; .5 SOLUTION RESPIRATORY (INHALATION) at 19:44

## 2018-11-26 RX ADMIN — METHYLPREDNISOLONE SODIUM SUCCINATE 125 MG: 125 INJECTION, POWDER, FOR SOLUTION INTRAMUSCULAR; INTRAVENOUS at 11:38

## 2018-11-26 RX ADMIN — METHYLPREDNISOLONE SODIUM SUCCINATE 60 MG: 125 INJECTION, POWDER, FOR SOLUTION INTRAMUSCULAR; INTRAVENOUS at 23:24

## 2018-11-26 RX ADMIN — IPRATROPIUM BROMIDE AND ALBUTEROL SULFATE 3 ML: 2.5; .5 SOLUTION RESPIRATORY (INHALATION) at 15:10

## 2018-11-26 RX ADMIN — ALBUTEROL SULFATE 2.5 MG: 2.5 SOLUTION RESPIRATORY (INHALATION) at 11:44

## 2018-11-26 RX ADMIN — ALBUTEROL SULFATE 2.5 MG: 2.5 SOLUTION RESPIRATORY (INHALATION) at 11:40

## 2018-11-26 RX ADMIN — ALBUTEROL SULFATE 2.5 MG: 2.5 SOLUTION RESPIRATORY (INHALATION) at 11:42

## 2018-11-26 RX ADMIN — IPRATROPIUM BROMIDE AND ALBUTEROL SULFATE 3 ML: 2.5; .5 SOLUTION RESPIRATORY (INHALATION) at 23:57

## 2018-11-26 RX ADMIN — SODIUM CHLORIDE 500 ML: 9 INJECTION, SOLUTION INTRAVENOUS at 11:38

## 2018-11-26 RX ADMIN — IPRATROPIUM BROMIDE AND ALBUTEROL SULFATE 3 ML: 2.5; .5 SOLUTION RESPIRATORY (INHALATION) at 11:43

## 2018-11-26 RX ADMIN — ACETYLCYSTEINE 4 ML: 200 INHALANT RESPIRATORY (INHALATION) at 19:44

## 2018-11-26 RX ADMIN — METOPROLOL TARTRATE 12.5 MG: 25 TABLET, FILM COATED ORAL at 14:47

## 2018-11-26 RX ADMIN — HYDROCODONE BITARTRATE AND ACETAMINOPHEN 1 TABLET: 5; 325 TABLET ORAL at 16:34

## 2018-11-26 RX ADMIN — DOXYCYCLINE 100 MG: 100 INJECTION, POWDER, LYOPHILIZED, FOR SOLUTION INTRAVENOUS at 14:46

## 2018-11-26 RX ADMIN — DOXYCYCLINE 100 MG: 100 INJECTION, POWDER, LYOPHILIZED, FOR SOLUTION INTRAVENOUS at 22:17

## 2018-11-26 RX ADMIN — PANTOPRAZOLE SODIUM 40 MG: 40 TABLET, DELAYED RELEASE ORAL at 14:48

## 2018-11-27 LAB
ALBUMIN SERPL-MCNC: 3.2 G/DL (ref 3.5–5)
ALBUMIN/GLOB SERPL: 1.1 G/DL (ref 1.1–2.5)
ALP SERPL-CCNC: 134 U/L (ref 24–120)
ALT SERPL W P-5'-P-CCNC: 24 U/L (ref 0–54)
ANION GAP SERPL CALCULATED.3IONS-SCNC: ABNORMAL MMOL/L (ref 4–13)
ARTICHOKE IGE QN: 109 MG/DL (ref 0–99)
AST SERPL-CCNC: 14 U/L (ref 7–45)
BASOPHILS # BLD AUTO: 0.02 10*3/MM3 (ref 0–0.2)
BASOPHILS NFR BLD AUTO: 0.2 % (ref 0–2)
BILIRUB SERPL-MCNC: 0.4 MG/DL (ref 0.1–1)
BUN BLD-MCNC: 20 MG/DL (ref 5–21)
BUN/CREAT SERPL: 35.1 (ref 7–25)
CALCIUM SPEC-SCNC: 9.6 MG/DL (ref 8.4–10.4)
CHLORIDE SERPL-SCNC: 89 MMOL/L (ref 98–110)
CHOLEST SERPL-MCNC: 165 MG/DL (ref 130–200)
CO2 SERPL-SCNC: >40 MMOL/L (ref 24–31)
CREAT BLD-MCNC: 0.57 MG/DL (ref 0.5–1.4)
DEPRECATED RDW RBC AUTO: 52.8 FL (ref 40–54)
EOSINOPHIL # BLD AUTO: 0 10*3/MM3 (ref 0–0.7)
EOSINOPHIL NFR BLD AUTO: 0 % (ref 0–4)
ERYTHROCYTE [DISTWIDTH] IN BLOOD BY AUTOMATED COUNT: 13.9 % (ref 12–15)
GFR SERPL CREATININE-BSD FRML MDRD: 110 ML/MIN/1.73
GLOBULIN UR ELPH-MCNC: 2.8 GM/DL
GLUCOSE BLD-MCNC: 136 MG/DL (ref 70–100)
HBA1C MFR BLD: 5.4 %
HCT VFR BLD AUTO: 38 % (ref 37–47)
HDLC SERPL-MCNC: 50 MG/DL
HGB BLD-MCNC: 11.4 G/DL (ref 12–16)
IMM GRANULOCYTES # BLD: 0.08 10*3/MM3 (ref 0–0.03)
IMM GRANULOCYTES NFR BLD: 0.8 % (ref 0–5)
LDLC/HDLC SERPL: 1.92 {RATIO}
LYMPHOCYTES # BLD AUTO: 0.63 10*3/MM3 (ref 0.72–4.86)
LYMPHOCYTES NFR BLD AUTO: 6.1 % (ref 15–45)
MCH RBC QN AUTO: 30.6 PG (ref 28–32)
MCHC RBC AUTO-ENTMCNC: 30 G/DL (ref 33–36)
MCV RBC AUTO: 101.9 FL (ref 82–98)
MONOCYTES # BLD AUTO: 0.28 10*3/MM3 (ref 0.19–1.3)
MONOCYTES NFR BLD AUTO: 2.7 % (ref 4–12)
NEUTROPHILS # BLD AUTO: 9.3 10*3/MM3 (ref 1.87–8.4)
NEUTROPHILS NFR BLD AUTO: 90.2 % (ref 39–78)
NRBC BLD MANUAL-RTO: 0 /100 WBC (ref 0–0)
PLATELET # BLD AUTO: 294 10*3/MM3 (ref 130–400)
PMV BLD AUTO: 10.1 FL (ref 6–12)
POTASSIUM BLD-SCNC: 4.8 MMOL/L (ref 3.5–5.3)
PROT SERPL-MCNC: 6 G/DL (ref 6.3–8.7)
RBC # BLD AUTO: 3.73 10*6/MM3 (ref 4.2–5.4)
SODIUM BLD-SCNC: 139 MMOL/L (ref 135–145)
TRIGL SERPL-MCNC: 94 MG/DL (ref 0–149)
TSH SERPL DL<=0.05 MIU/L-ACNC: 0.07 MIU/ML (ref 0.47–4.68)
WBC NRBC COR # BLD: 10.31 10*3/MM3 (ref 4.8–10.8)

## 2018-11-27 PROCEDURE — 80050 GENERAL HEALTH PANEL: CPT | Performed by: FAMILY MEDICINE

## 2018-11-27 PROCEDURE — 94799 UNLISTED PULMONARY SVC/PX: CPT

## 2018-11-27 PROCEDURE — 83036 HEMOGLOBIN GLYCOSYLATED A1C: CPT | Performed by: FAMILY MEDICINE

## 2018-11-27 PROCEDURE — 80061 LIPID PANEL: CPT | Performed by: FAMILY MEDICINE

## 2018-11-27 PROCEDURE — 94660 CPAP INITIATION&MGMT: CPT

## 2018-11-27 PROCEDURE — 25010000002 METHYLPREDNISOLONE PER 125 MG: Performed by: FAMILY MEDICINE

## 2018-11-27 PROCEDURE — 25010000002 ENOXAPARIN PER 10 MG: Performed by: FAMILY MEDICINE

## 2018-11-27 PROCEDURE — 94760 N-INVAS EAR/PLS OXIMETRY 1: CPT

## 2018-11-27 RX ADMIN — PANTOPRAZOLE SODIUM 40 MG: 40 TABLET, DELAYED RELEASE ORAL at 07:21

## 2018-11-27 RX ADMIN — DOXYCYCLINE 100 MG: 100 INJECTION, POWDER, LYOPHILIZED, FOR SOLUTION INTRAVENOUS at 22:49

## 2018-11-27 RX ADMIN — HYDROCODONE BITARTRATE AND ACETAMINOPHEN 1 TABLET: 5; 325 TABLET ORAL at 07:22

## 2018-11-27 RX ADMIN — IPRATROPIUM BROMIDE AND ALBUTEROL SULFATE 3 ML: 2.5; .5 SOLUTION RESPIRATORY (INHALATION) at 15:05

## 2018-11-27 RX ADMIN — METHYLPREDNISOLONE SODIUM SUCCINATE 60 MG: 125 INJECTION, POWDER, FOR SOLUTION INTRAMUSCULAR; INTRAVENOUS at 16:56

## 2018-11-27 RX ADMIN — GUAIFENESIN 1200 MG: 600 TABLET, EXTENDED RELEASE ORAL at 08:53

## 2018-11-27 RX ADMIN — METHYLPREDNISOLONE SODIUM SUCCINATE 60 MG: 125 INJECTION, POWDER, FOR SOLUTION INTRAMUSCULAR; INTRAVENOUS at 22:49

## 2018-11-27 RX ADMIN — METOPROLOL TARTRATE 12.5 MG: 25 TABLET, FILM COATED ORAL at 20:48

## 2018-11-27 RX ADMIN — IPRATROPIUM BROMIDE AND ALBUTEROL SULFATE 3 ML: 2.5; .5 SOLUTION RESPIRATORY (INHALATION) at 11:33

## 2018-11-27 RX ADMIN — METHYLPREDNISOLONE SODIUM SUCCINATE 60 MG: 125 INJECTION, POWDER, FOR SOLUTION INTRAMUSCULAR; INTRAVENOUS at 05:35

## 2018-11-27 RX ADMIN — METHYLPREDNISOLONE SODIUM SUCCINATE 60 MG: 125 INJECTION, POWDER, FOR SOLUTION INTRAMUSCULAR; INTRAVENOUS at 11:45

## 2018-11-27 RX ADMIN — DOXYCYCLINE 100 MG: 100 INJECTION, POWDER, LYOPHILIZED, FOR SOLUTION INTRAVENOUS at 11:45

## 2018-11-27 RX ADMIN — HYDROCODONE BITARTRATE AND ACETAMINOPHEN 1 TABLET: 5; 325 TABLET ORAL at 16:41

## 2018-11-27 RX ADMIN — ACETYLCYSTEINE 3 ML: 200 INHALANT RESPIRATORY (INHALATION) at 19:12

## 2018-11-27 RX ADMIN — IPRATROPIUM BROMIDE AND ALBUTEROL SULFATE 3 ML: 2.5; .5 SOLUTION RESPIRATORY (INHALATION) at 03:08

## 2018-11-27 RX ADMIN — ENOXAPARIN SODIUM 30 MG: 30 INJECTION SUBCUTANEOUS at 16:37

## 2018-11-27 RX ADMIN — HYDROCODONE BITARTRATE AND ACETAMINOPHEN 1 TABLET: 5; 325 TABLET ORAL at 22:49

## 2018-11-27 RX ADMIN — NICOTINE 1 PATCH: 21 PATCH, EXTENDED RELEASE TRANSDERMAL at 08:53

## 2018-11-27 RX ADMIN — SODIUM CHLORIDE, PRESERVATIVE FREE 3 ML: 5 INJECTION INTRAVENOUS at 20:48

## 2018-11-27 RX ADMIN — IPRATROPIUM BROMIDE AND ALBUTEROL SULFATE 3 ML: 2.5; .5 SOLUTION RESPIRATORY (INHALATION) at 22:37

## 2018-11-27 RX ADMIN — IPRATROPIUM BROMIDE AND ALBUTEROL SULFATE 3 ML: 2.5; .5 SOLUTION RESPIRATORY (INHALATION) at 19:13

## 2018-11-27 RX ADMIN — GUAIFENESIN 1200 MG: 600 TABLET, EXTENDED RELEASE ORAL at 20:48

## 2018-11-27 RX ADMIN — METOPROLOL TARTRATE 12.5 MG: 25 TABLET, FILM COATED ORAL at 08:53

## 2018-11-28 LAB
ALBUMIN SERPL-MCNC: 3.3 G/DL (ref 3.5–5)
ALBUMIN/GLOB SERPL: 1 G/DL (ref 1.1–2.5)
ALP SERPL-CCNC: 102 U/L (ref 24–120)
ALT SERPL W P-5'-P-CCNC: 15 U/L (ref 0–54)
ANION GAP SERPL CALCULATED.3IONS-SCNC: ABNORMAL MMOL/L (ref 4–13)
AST SERPL-CCNC: 25 U/L (ref 7–45)
BASOPHILS # BLD AUTO: 0.02 10*3/MM3 (ref 0–0.2)
BASOPHILS NFR BLD AUTO: 0.1 % (ref 0–2)
BILIRUB SERPL-MCNC: 0.4 MG/DL (ref 0.1–1)
BUN BLD-MCNC: 26 MG/DL (ref 5–21)
BUN/CREAT SERPL: 41.9 (ref 7–25)
CALCIUM SPEC-SCNC: 9.3 MG/DL (ref 8.4–10.4)
CHLORIDE SERPL-SCNC: 87 MMOL/L (ref 98–110)
CO2 SERPL-SCNC: >40 MMOL/L (ref 24–31)
CREAT BLD-MCNC: 0.62 MG/DL (ref 0.5–1.4)
DEPRECATED RDW RBC AUTO: 51.2 FL (ref 40–54)
EOSINOPHIL # BLD AUTO: 0 10*3/MM3 (ref 0–0.7)
EOSINOPHIL NFR BLD AUTO: 0 % (ref 0–4)
ERYTHROCYTE [DISTWIDTH] IN BLOOD BY AUTOMATED COUNT: 14 % (ref 12–15)
GFR SERPL CREATININE-BSD FRML MDRD: 100 ML/MIN/1.73
GLOBULIN UR ELPH-MCNC: 3.2 GM/DL
GLUCOSE BLD-MCNC: 131 MG/DL (ref 70–100)
HCT VFR BLD AUTO: 36.2 % (ref 37–47)
HGB BLD-MCNC: 11.1 G/DL (ref 12–16)
IMM GRANULOCYTES # BLD: 0.15 10*3/MM3 (ref 0–0.03)
IMM GRANULOCYTES NFR BLD: 1.1 % (ref 0–5)
LYMPHOCYTES # BLD AUTO: 1.26 10*3/MM3 (ref 0.72–4.86)
LYMPHOCYTES NFR BLD AUTO: 9.3 % (ref 15–45)
MCH RBC QN AUTO: 30.5 PG (ref 28–32)
MCHC RBC AUTO-ENTMCNC: 30.7 G/DL (ref 33–36)
MCV RBC AUTO: 99.5 FL (ref 82–98)
MONOCYTES # BLD AUTO: 0.66 10*3/MM3 (ref 0.19–1.3)
MONOCYTES NFR BLD AUTO: 4.9 % (ref 4–12)
NEUTROPHILS # BLD AUTO: 11.42 10*3/MM3 (ref 1.87–8.4)
NEUTROPHILS NFR BLD AUTO: 84.6 % (ref 39–78)
NRBC BLD MANUAL-RTO: 0 /100 WBC (ref 0–0)
PLATELET # BLD AUTO: 336 10*3/MM3 (ref 130–400)
PMV BLD AUTO: 9.7 FL (ref 6–12)
POTASSIUM BLD-SCNC: 4.6 MMOL/L (ref 3.5–5.3)
PROT SERPL-MCNC: 6.5 G/DL (ref 6.3–8.7)
RBC # BLD AUTO: 3.64 10*6/MM3 (ref 4.2–5.4)
SODIUM BLD-SCNC: 137 MMOL/L (ref 135–145)
T4 FREE SERPL-MCNC: 0.94 NG/DL (ref 0.78–2.19)
WBC NRBC COR # BLD: 13.51 10*3/MM3 (ref 4.8–10.8)

## 2018-11-28 PROCEDURE — 94799 UNLISTED PULMONARY SVC/PX: CPT

## 2018-11-28 PROCEDURE — 85025 COMPLETE CBC W/AUTO DIFF WBC: CPT | Performed by: FAMILY MEDICINE

## 2018-11-28 PROCEDURE — 25010000002 METHYLPREDNISOLONE PER 125 MG: Performed by: FAMILY MEDICINE

## 2018-11-28 PROCEDURE — 25010000002 ENOXAPARIN PER 10 MG: Performed by: FAMILY MEDICINE

## 2018-11-28 PROCEDURE — 87581 M.PNEUMON DNA AMP PROBE: CPT | Performed by: FAMILY MEDICINE

## 2018-11-28 PROCEDURE — 87798 DETECT AGENT NOS DNA AMP: CPT | Performed by: FAMILY MEDICINE

## 2018-11-28 PROCEDURE — 84439 ASSAY OF FREE THYROXINE: CPT | Performed by: FAMILY MEDICINE

## 2018-11-28 PROCEDURE — 87633 RESP VIRUS 12-25 TARGETS: CPT | Performed by: FAMILY MEDICINE

## 2018-11-28 PROCEDURE — 80053 COMPREHEN METABOLIC PANEL: CPT | Performed by: FAMILY MEDICINE

## 2018-11-28 PROCEDURE — 87486 CHLMYD PNEUM DNA AMP PROBE: CPT | Performed by: FAMILY MEDICINE

## 2018-11-28 PROCEDURE — 99232 SBSQ HOSP IP/OBS MODERATE 35: CPT | Performed by: INTERNAL MEDICINE

## 2018-11-28 RX ADMIN — METHYLPREDNISOLONE SODIUM SUCCINATE 60 MG: 125 INJECTION, POWDER, FOR SOLUTION INTRAMUSCULAR; INTRAVENOUS at 17:17

## 2018-11-28 RX ADMIN — IPRATROPIUM BROMIDE AND ALBUTEROL SULFATE 3 ML: 2.5; .5 SOLUTION RESPIRATORY (INHALATION) at 19:35

## 2018-11-28 RX ADMIN — DOXYCYCLINE 100 MG: 100 INJECTION, POWDER, LYOPHILIZED, FOR SOLUTION INTRAVENOUS at 11:46

## 2018-11-28 RX ADMIN — IPRATROPIUM BROMIDE AND ALBUTEROL SULFATE 3 ML: 2.5; .5 SOLUTION RESPIRATORY (INHALATION) at 16:07

## 2018-11-28 RX ADMIN — SODIUM CHLORIDE, PRESERVATIVE FREE 3 ML: 5 INJECTION INTRAVENOUS at 08:10

## 2018-11-28 RX ADMIN — HYDROCODONE BITARTRATE AND ACETAMINOPHEN 1 TABLET: 5; 325 TABLET ORAL at 20:19

## 2018-11-28 RX ADMIN — METOPROLOL TARTRATE 12.5 MG: 25 TABLET, FILM COATED ORAL at 20:19

## 2018-11-28 RX ADMIN — HYDROCODONE BITARTRATE AND ACETAMINOPHEN 1 TABLET: 5; 325 TABLET ORAL at 07:04

## 2018-11-28 RX ADMIN — ACETYLCYSTEINE 1.5 ML: 200 INHALANT RESPIRATORY (INHALATION) at 08:16

## 2018-11-28 RX ADMIN — ENOXAPARIN SODIUM 30 MG: 30 INJECTION SUBCUTANEOUS at 15:58

## 2018-11-28 RX ADMIN — IPRATROPIUM BROMIDE AND ALBUTEROL SULFATE 3 ML: 2.5; .5 SOLUTION RESPIRATORY (INHALATION) at 23:40

## 2018-11-28 RX ADMIN — GUAIFENESIN 1200 MG: 600 TABLET, EXTENDED RELEASE ORAL at 08:09

## 2018-11-28 RX ADMIN — IPRATROPIUM BROMIDE AND ALBUTEROL SULFATE 3 ML: 2.5; .5 SOLUTION RESPIRATORY (INHALATION) at 03:42

## 2018-11-28 RX ADMIN — METOPROLOL TARTRATE 12.5 MG: 25 TABLET, FILM COATED ORAL at 08:08

## 2018-11-28 RX ADMIN — PANTOPRAZOLE SODIUM 40 MG: 40 TABLET, DELAYED RELEASE ORAL at 07:04

## 2018-11-28 RX ADMIN — DOXYCYCLINE 100 MG: 100 INJECTION, POWDER, LYOPHILIZED, FOR SOLUTION INTRAVENOUS at 22:28

## 2018-11-28 RX ADMIN — METHYLPREDNISOLONE SODIUM SUCCINATE 60 MG: 125 INJECTION, POWDER, FOR SOLUTION INTRAMUSCULAR; INTRAVENOUS at 11:45

## 2018-11-28 RX ADMIN — NICOTINE 1 PATCH: 21 PATCH, EXTENDED RELEASE TRANSDERMAL at 08:09

## 2018-11-28 RX ADMIN — METHYLPREDNISOLONE SODIUM SUCCINATE 60 MG: 125 INJECTION, POWDER, FOR SOLUTION INTRAMUSCULAR; INTRAVENOUS at 07:04

## 2018-11-28 RX ADMIN — ACETYLCYSTEINE 3 ML: 200 INHALANT RESPIRATORY (INHALATION) at 19:35

## 2018-11-28 RX ADMIN — GUAIFENESIN 1200 MG: 600 TABLET, EXTENDED RELEASE ORAL at 20:19

## 2018-11-28 RX ADMIN — METHYLPREDNISOLONE SODIUM SUCCINATE 60 MG: 125 INJECTION, POWDER, FOR SOLUTION INTRAMUSCULAR; INTRAVENOUS at 23:42

## 2018-11-28 RX ADMIN — HYDROCODONE BITARTRATE AND ACETAMINOPHEN 1 TABLET: 5; 325 TABLET ORAL at 13:17

## 2018-11-28 RX ADMIN — IPRATROPIUM BROMIDE AND ALBUTEROL SULFATE 3 ML: 2.5; .5 SOLUTION RESPIRATORY (INHALATION) at 08:15

## 2018-11-29 LAB
ALBUMIN SERPL-MCNC: 2.9 G/DL (ref 3.5–5)
ALBUMIN/GLOB SERPL: 1.1 G/DL (ref 1.1–2.5)
ALP SERPL-CCNC: 86 U/L (ref 24–120)
ALT SERPL W P-5'-P-CCNC: 19 U/L (ref 0–54)
ANION GAP SERPL CALCULATED.3IONS-SCNC: 8 MMOL/L (ref 4–13)
AST SERPL-CCNC: 21 U/L (ref 7–45)
BASOPHILS # BLD AUTO: 0.03 10*3/MM3 (ref 0–0.2)
BASOPHILS NFR BLD AUTO: 0.3 % (ref 0–2)
BILIRUB SERPL-MCNC: 0.3 MG/DL (ref 0.1–1)
BUN BLD-MCNC: 26 MG/DL (ref 5–21)
BUN/CREAT SERPL: 44.1 (ref 7–25)
CALCIUM SPEC-SCNC: 9 MG/DL (ref 8.4–10.4)
CHLORIDE SERPL-SCNC: 86 MMOL/L (ref 98–110)
CO2 SERPL-SCNC: 39 MMOL/L (ref 24–31)
CREAT BLD-MCNC: 0.59 MG/DL (ref 0.5–1.4)
DEPRECATED RDW RBC AUTO: 49 FL (ref 40–54)
EOSINOPHIL # BLD AUTO: 0 10*3/MM3 (ref 0–0.7)
EOSINOPHIL NFR BLD AUTO: 0 % (ref 0–4)
ERYTHROCYTE [DISTWIDTH] IN BLOOD BY AUTOMATED COUNT: 13.8 % (ref 12–15)
GFR SERPL CREATININE-BSD FRML MDRD: 105 ML/MIN/1.73
GLOBULIN UR ELPH-MCNC: 2.6 GM/DL
GLUCOSE BLD-MCNC: 131 MG/DL (ref 70–100)
HCT VFR BLD AUTO: 34.5 % (ref 37–47)
HGB BLD-MCNC: 10.9 G/DL (ref 12–16)
IMM GRANULOCYTES # BLD: 0.26 10*3/MM3 (ref 0–0.03)
IMM GRANULOCYTES NFR BLD: 2.5 % (ref 0–5)
LYMPHOCYTES # BLD AUTO: 0.95 10*3/MM3 (ref 0.72–4.86)
LYMPHOCYTES NFR BLD AUTO: 9.3 % (ref 15–45)
MCH RBC QN AUTO: 30.2 PG (ref 28–32)
MCHC RBC AUTO-ENTMCNC: 31.6 G/DL (ref 33–36)
MCV RBC AUTO: 95.6 FL (ref 82–98)
MONOCYTES # BLD AUTO: 0.39 10*3/MM3 (ref 0.19–1.3)
MONOCYTES NFR BLD AUTO: 3.8 % (ref 4–12)
NEUTROPHILS # BLD AUTO: 8.59 10*3/MM3 (ref 1.87–8.4)
NEUTROPHILS NFR BLD AUTO: 84.1 % (ref 39–78)
NRBC BLD MANUAL-RTO: 0 /100 WBC (ref 0–0)
PLATELET # BLD AUTO: 342 10*3/MM3 (ref 130–400)
PMV BLD AUTO: 9.2 FL (ref 6–12)
POTASSIUM BLD-SCNC: 4.4 MMOL/L (ref 3.5–5.3)
PROT SERPL-MCNC: 5.5 G/DL (ref 6.3–8.7)
RBC # BLD AUTO: 3.61 10*6/MM3 (ref 4.2–5.4)
SODIUM BLD-SCNC: 133 MMOL/L (ref 135–145)
T4 FREE SERPL-MCNC: 0.85 NG/DL (ref 0.78–2.19)
WBC NRBC COR # BLD: 10.22 10*3/MM3 (ref 4.8–10.8)

## 2018-11-29 PROCEDURE — 94799 UNLISTED PULMONARY SVC/PX: CPT

## 2018-11-29 PROCEDURE — 25010000002 METHYLPREDNISOLONE PER 125 MG: Performed by: FAMILY MEDICINE

## 2018-11-29 PROCEDURE — 85025 COMPLETE CBC W/AUTO DIFF WBC: CPT | Performed by: FAMILY MEDICINE

## 2018-11-29 PROCEDURE — 94640 AIRWAY INHALATION TREATMENT: CPT

## 2018-11-29 PROCEDURE — 25010000002 ENOXAPARIN PER 10 MG: Performed by: FAMILY MEDICINE

## 2018-11-29 PROCEDURE — 84439 ASSAY OF FREE THYROXINE: CPT | Performed by: FAMILY MEDICINE

## 2018-11-29 PROCEDURE — 80053 COMPREHEN METABOLIC PANEL: CPT | Performed by: FAMILY MEDICINE

## 2018-11-29 RX ORDER — FUROSEMIDE 20 MG/1
10 TABLET ORAL
Status: DISCONTINUED | OUTPATIENT
Start: 2018-11-29 | End: 2018-11-30 | Stop reason: HOSPADM

## 2018-11-29 RX ORDER — BUDESONIDE AND FORMOTEROL FUMARATE DIHYDRATE 160; 4.5 UG/1; UG/1
2 AEROSOL RESPIRATORY (INHALATION)
Status: DISCONTINUED | OUTPATIENT
Start: 2018-11-29 | End: 2018-11-30 | Stop reason: HOSPADM

## 2018-11-29 RX ADMIN — BUDESONIDE AND FORMOTEROL FUMARATE DIHYDRATE 2 PUFF: 160; 4.5 AEROSOL RESPIRATORY (INHALATION) at 21:20

## 2018-11-29 RX ADMIN — IPRATROPIUM BROMIDE AND ALBUTEROL SULFATE 3 ML: 2.5; .5 SOLUTION RESPIRATORY (INHALATION) at 07:46

## 2018-11-29 RX ADMIN — HYDROCODONE BITARTRATE AND ACETAMINOPHEN 1 TABLET: 5; 325 TABLET ORAL at 14:26

## 2018-11-29 RX ADMIN — NICOTINE 1 PATCH: 21 PATCH, EXTENDED RELEASE TRANSDERMAL at 08:03

## 2018-11-29 RX ADMIN — IPRATROPIUM BROMIDE AND ALBUTEROL SULFATE 3 ML: 2.5; .5 SOLUTION RESPIRATORY (INHALATION) at 10:54

## 2018-11-29 RX ADMIN — ACETYLCYSTEINE 1.5 ML: 200 INHALANT RESPIRATORY (INHALATION) at 07:46

## 2018-11-29 RX ADMIN — IPRATROPIUM BROMIDE AND ALBUTEROL SULFATE 3 ML: 2.5; .5 SOLUTION RESPIRATORY (INHALATION) at 15:01

## 2018-11-29 RX ADMIN — PANTOPRAZOLE SODIUM 40 MG: 40 TABLET, DELAYED RELEASE ORAL at 08:02

## 2018-11-29 RX ADMIN — DOXYCYCLINE 100 MG: 100 INJECTION, POWDER, LYOPHILIZED, FOR SOLUTION INTRAVENOUS at 14:27

## 2018-11-29 RX ADMIN — METOPROLOL TARTRATE 12.5 MG: 25 TABLET, FILM COATED ORAL at 08:02

## 2018-11-29 RX ADMIN — METHYLPREDNISOLONE SODIUM SUCCINATE 60 MG: 125 INJECTION, POWDER, FOR SOLUTION INTRAMUSCULAR; INTRAVENOUS at 23:54

## 2018-11-29 RX ADMIN — HYDROCODONE BITARTRATE AND ACETAMINOPHEN 1 TABLET: 5; 325 TABLET ORAL at 02:16

## 2018-11-29 RX ADMIN — FUROSEMIDE 10 MG: 20 TABLET ORAL at 18:12

## 2018-11-29 RX ADMIN — METHYLPREDNISOLONE SODIUM SUCCINATE 60 MG: 125 INJECTION, POWDER, FOR SOLUTION INTRAMUSCULAR; INTRAVENOUS at 18:11

## 2018-11-29 RX ADMIN — SODIUM CHLORIDE, PRESERVATIVE FREE 3 ML: 5 INJECTION INTRAVENOUS at 09:39

## 2018-11-29 RX ADMIN — METHYLPREDNISOLONE SODIUM SUCCINATE 60 MG: 125 INJECTION, POWDER, FOR SOLUTION INTRAMUSCULAR; INTRAVENOUS at 14:27

## 2018-11-29 RX ADMIN — HYDROCODONE BITARTRATE AND ACETAMINOPHEN 1 TABLET: 5; 325 TABLET ORAL at 08:02

## 2018-11-29 RX ADMIN — IPRATROPIUM BROMIDE AND ALBUTEROL SULFATE 3 ML: 2.5; .5 SOLUTION RESPIRATORY (INHALATION) at 20:10

## 2018-11-29 RX ADMIN — ACETYLCYSTEINE 3 ML: 200 INHALANT RESPIRATORY (INHALATION) at 20:10

## 2018-11-29 RX ADMIN — GUAIFENESIN 1200 MG: 600 TABLET, EXTENDED RELEASE ORAL at 20:46

## 2018-11-29 RX ADMIN — IPRATROPIUM BROMIDE AND ALBUTEROL SULFATE 3 ML: 2.5; .5 SOLUTION RESPIRATORY (INHALATION) at 03:20

## 2018-11-29 RX ADMIN — HYDROCODONE BITARTRATE AND ACETAMINOPHEN 1 TABLET: 5; 325 TABLET ORAL at 20:47

## 2018-11-29 RX ADMIN — METHYLPREDNISOLONE SODIUM SUCCINATE 60 MG: 125 INJECTION, POWDER, FOR SOLUTION INTRAMUSCULAR; INTRAVENOUS at 06:29

## 2018-11-29 RX ADMIN — ENOXAPARIN SODIUM 30 MG: 30 INJECTION SUBCUTANEOUS at 18:12

## 2018-11-29 RX ADMIN — METOPROLOL TARTRATE 12.5 MG: 25 TABLET, FILM COATED ORAL at 20:46

## 2018-11-29 RX ADMIN — GUAIFENESIN 1200 MG: 600 TABLET, EXTENDED RELEASE ORAL at 08:02

## 2018-11-29 RX ADMIN — DOXYCYCLINE 100 MG: 100 INJECTION, POWDER, LYOPHILIZED, FOR SOLUTION INTRAVENOUS at 22:37

## 2018-11-29 RX ADMIN — IPRATROPIUM BROMIDE AND ALBUTEROL SULFATE 3 ML: 2.5; .5 SOLUTION RESPIRATORY (INHALATION) at 23:55

## 2018-11-30 VITALS
RESPIRATION RATE: 18 BRPM | BODY MASS INDEX: 20.02 KG/M2 | HEIGHT: 62 IN | DIASTOLIC BLOOD PRESSURE: 68 MMHG | HEART RATE: 95 BPM | OXYGEN SATURATION: 95 % | WEIGHT: 108.8 LBS | SYSTOLIC BLOOD PRESSURE: 134 MMHG | TEMPERATURE: 97.3 F

## 2018-11-30 LAB
ALBUMIN SERPL-MCNC: 2.9 G/DL (ref 3.5–5)
ALBUMIN/GLOB SERPL: 1.1 G/DL (ref 1.1–2.5)
ALP SERPL-CCNC: 75 U/L (ref 24–120)
ALT SERPL W P-5'-P-CCNC: <15 U/L (ref 0–54)
ANION GAP SERPL CALCULATED.3IONS-SCNC: ABNORMAL MMOL/L (ref 4–13)
ANISOCYTOSIS BLD QL: ABNORMAL
AST SERPL-CCNC: 21 U/L (ref 7–45)
B PERT DNA SPEC QL NAA+PROBE: NOT DETECTED
BASO STIPL COARSE BLD QL SMEAR: ABNORMAL
BILIRUB SERPL-MCNC: 0.3 MG/DL (ref 0.1–1)
BUN BLD-MCNC: 19 MG/DL (ref 5–21)
BUN/CREAT SERPL: 28.4 (ref 7–25)
C PNEUM DNA NPH QL NAA+NON-PROBE: NOT DETECTED
CALCIUM SPEC-SCNC: 8.6 MG/DL (ref 8.4–10.4)
CHLORIDE SERPL-SCNC: 84 MMOL/L (ref 98–110)
CO2 SERPL-SCNC: >40 MMOL/L (ref 24–31)
CREAT BLD-MCNC: 0.67 MG/DL (ref 0.5–1.4)
DEPRECATED RDW RBC AUTO: 48.1 FL (ref 40–54)
ERYTHROCYTE [DISTWIDTH] IN BLOOD BY AUTOMATED COUNT: 13.8 % (ref 12–15)
FLUAV H1 2009 PAND RNA NPH QL NAA+PROBE: NOT DETECTED
FLUAV H1 HA GENE NPH QL NAA+PROBE: NOT DETECTED
FLUAV H3 RNA NPH QL NAA+PROBE: NOT DETECTED
FLUAV SUBTYP SPEC NAA+PROBE: NOT DETECTED
FLUBV RNA ISLT QL NAA+PROBE: NOT DETECTED
GFR SERPL CREATININE-BSD FRML MDRD: 91 ML/MIN/1.73
GLOBULIN UR ELPH-MCNC: 2.6 GM/DL
GLUCOSE BLD-MCNC: 119 MG/DL (ref 70–100)
HADV DNA SPEC NAA+PROBE: NOT DETECTED
HCOV 229E RNA SPEC QL NAA+PROBE: NOT DETECTED
HCOV HKU1 RNA SPEC QL NAA+PROBE: NOT DETECTED
HCOV NL63 RNA SPEC QL NAA+PROBE: NOT DETECTED
HCOV OC43 RNA SPEC QL NAA+PROBE: NOT DETECTED
HCT VFR BLD AUTO: 34.4 % (ref 37–47)
HGB BLD-MCNC: 11 G/DL (ref 12–16)
HMPV RNA NPH QL NAA+NON-PROBE: NOT DETECTED
HPIV1 RNA SPEC QL NAA+PROBE: NOT DETECTED
HPIV2 RNA SPEC QL NAA+PROBE: NOT DETECTED
HPIV3 RNA NPH QL NAA+PROBE: NOT DETECTED
HPIV4 P GENE NPH QL NAA+PROBE: NOT DETECTED
LYMPHOCYTES # BLD MANUAL: 0.36 10*3/MM3 (ref 0.72–4.86)
LYMPHOCYTES NFR BLD MANUAL: 1 % (ref 4–12)
LYMPHOCYTES NFR BLD MANUAL: 4 % (ref 15–45)
M PNEUMO IGG SER IA-ACNC: NOT DETECTED
MACROCYTES BLD QL SMEAR: ABNORMAL
MCH RBC QN AUTO: 30.2 PG (ref 28–32)
MCHC RBC AUTO-ENTMCNC: 32 G/DL (ref 33–36)
MCV RBC AUTO: 94.5 FL (ref 82–98)
MONOCYTES # BLD AUTO: 0.09 10*3/MM3 (ref 0.19–1.3)
MYELOCYTES NFR BLD MANUAL: 2 % (ref 0–0)
NEUTROPHILS # BLD AUTO: 7.9 10*3/MM3 (ref 1.87–8.4)
NEUTROPHILS NFR BLD MANUAL: 87 % (ref 39–78)
NEUTS BAND NFR BLD MANUAL: 2 % (ref 0–10)
PLAT MORPH BLD: NORMAL
PLATELET # BLD AUTO: 373 10*3/MM3 (ref 130–400)
PMV BLD AUTO: 9.2 FL (ref 6–12)
POTASSIUM BLD-SCNC: 3.9 MMOL/L (ref 3.5–5.3)
PROT SERPL-MCNC: 5.5 G/DL (ref 6.3–8.7)
RBC # BLD AUTO: 3.64 10*6/MM3 (ref 4.2–5.4)
RHINOVIRUS RNA SPEC NAA+PROBE: NOT DETECTED
RSV RNA NPH QL NAA+NON-PROBE: NOT DETECTED
SODIUM BLD-SCNC: 133 MMOL/L (ref 135–145)
VARIANT LYMPHS NFR BLD MANUAL: 4 % (ref 0–5)
WBC MORPH BLD: NORMAL
WBC NRBC COR # BLD: 8.88 10*3/MM3 (ref 4.8–10.8)

## 2018-11-30 PROCEDURE — 94799 UNLISTED PULMONARY SVC/PX: CPT

## 2018-11-30 PROCEDURE — 94660 CPAP INITIATION&MGMT: CPT

## 2018-11-30 PROCEDURE — 25010000002 METHYLPREDNISOLONE PER 125 MG: Performed by: FAMILY MEDICINE

## 2018-11-30 PROCEDURE — 85007 BL SMEAR W/DIFF WBC COUNT: CPT | Performed by: FAMILY MEDICINE

## 2018-11-30 PROCEDURE — 85025 COMPLETE CBC W/AUTO DIFF WBC: CPT | Performed by: FAMILY MEDICINE

## 2018-11-30 PROCEDURE — 80053 COMPREHEN METABOLIC PANEL: CPT | Performed by: FAMILY MEDICINE

## 2018-11-30 RX ORDER — METHYLPREDNISOLONE 4 MG/1
TABLET ORAL
Qty: 21 TABLET | Refills: 0 | Status: SHIPPED | OUTPATIENT
Start: 2018-11-30 | End: 2019-06-04

## 2018-11-30 RX ORDER — NICOTINE 21 MG/24HR
1 PATCH, TRANSDERMAL 24 HOURS TRANSDERMAL
Qty: 30 PATCH | Refills: 0 | Status: SHIPPED | OUTPATIENT
Start: 2018-12-01 | End: 2019-06-04

## 2018-11-30 RX ORDER — METHYLPREDNISOLONE SODIUM SUCCINATE 125 MG/2ML
60 INJECTION, POWDER, LYOPHILIZED, FOR SOLUTION INTRAMUSCULAR; INTRAVENOUS EVERY 6 HOURS
Status: DISCONTINUED | OUTPATIENT
Start: 2018-11-30 | End: 2018-11-30 | Stop reason: HOSPADM

## 2018-11-30 RX ORDER — DOXYCYCLINE HYCLATE 100 MG/1
100 TABLET, DELAYED RELEASE ORAL 2 TIMES DAILY
Qty: 10 TABLET | Refills: 0 | Status: SHIPPED | OUTPATIENT
Start: 2018-11-30 | End: 2018-12-05

## 2018-11-30 RX ADMIN — HYDROCODONE BITARTRATE AND ACETAMINOPHEN 1 TABLET: 5; 325 TABLET ORAL at 03:07

## 2018-11-30 RX ADMIN — IPRATROPIUM BROMIDE AND ALBUTEROL SULFATE 3 ML: 2.5; .5 SOLUTION RESPIRATORY (INHALATION) at 14:51

## 2018-11-30 RX ADMIN — HYDROCODONE BITARTRATE AND ACETAMINOPHEN 1 TABLET: 5; 325 TABLET ORAL at 15:08

## 2018-11-30 RX ADMIN — SODIUM CHLORIDE, PRESERVATIVE FREE 3 ML: 5 INJECTION INTRAVENOUS at 08:56

## 2018-11-30 RX ADMIN — METHYLPREDNISOLONE SODIUM SUCCINATE 60 MG: 125 INJECTION, POWDER, FOR SOLUTION INTRAMUSCULAR; INTRAVENOUS at 05:11

## 2018-11-30 RX ADMIN — BUDESONIDE AND FORMOTEROL FUMARATE DIHYDRATE 2 PUFF: 160; 4.5 AEROSOL RESPIRATORY (INHALATION) at 07:36

## 2018-11-30 RX ADMIN — PANTOPRAZOLE SODIUM 40 MG: 40 TABLET, DELAYED RELEASE ORAL at 08:53

## 2018-11-30 RX ADMIN — IPRATROPIUM BROMIDE AND ALBUTEROL SULFATE 3 ML: 2.5; .5 SOLUTION RESPIRATORY (INHALATION) at 10:51

## 2018-11-30 RX ADMIN — GUAIFENESIN 1200 MG: 600 TABLET, EXTENDED RELEASE ORAL at 08:54

## 2018-11-30 RX ADMIN — DOXYCYCLINE 100 MG: 100 INJECTION, POWDER, LYOPHILIZED, FOR SOLUTION INTRAVENOUS at 12:38

## 2018-11-30 RX ADMIN — IPRATROPIUM BROMIDE AND ALBUTEROL SULFATE 3 ML: 2.5; .5 SOLUTION RESPIRATORY (INHALATION) at 03:38

## 2018-11-30 RX ADMIN — FUROSEMIDE 10 MG: 20 TABLET ORAL at 08:54

## 2018-11-30 RX ADMIN — HYDROCODONE BITARTRATE AND ACETAMINOPHEN 1 TABLET: 5; 325 TABLET ORAL at 20:34

## 2018-11-30 RX ADMIN — IPRATROPIUM BROMIDE AND ALBUTEROL SULFATE 3 ML: 2.5; .5 SOLUTION RESPIRATORY (INHALATION) at 07:36

## 2018-11-30 RX ADMIN — NICOTINE 1 PATCH: 21 PATCH, EXTENDED RELEASE TRANSDERMAL at 12:39

## 2018-11-30 RX ADMIN — HYDROCODONE BITARTRATE AND ACETAMINOPHEN 1 TABLET: 5; 325 TABLET ORAL at 09:10

## 2018-11-30 RX ADMIN — METOPROLOL TARTRATE 12.5 MG: 25 TABLET, FILM COATED ORAL at 08:54

## 2018-11-30 RX ADMIN — ACETYLCYSTEINE 3 ML: 200 INHALANT RESPIRATORY (INHALATION) at 07:36

## 2018-12-01 ENCOUNTER — READMISSION MANAGEMENT (OUTPATIENT)
Dept: CALL CENTER | Facility: HOSPITAL | Age: 58
End: 2018-12-01

## 2018-12-01 LAB
BACTERIA SPEC AEROBE CULT: NORMAL
BACTERIA SPEC AEROBE CULT: NORMAL

## 2018-12-01 NOTE — OUTREACH NOTE
Prep Survey      Responses   Facility patient discharged from?  [place]   Is patient eligible?  Yes   Discharge diagnosis  Hypercapnia, acute on chronic resp. failure with hypoxia and hypercapnia, COPD exac.   Does the patient have one of the following disease processes/diagnoses(primary or secondary)?  COPD/Pneumonia   Does the patient have Home health ordered?  No   Is there a DME ordered?  No   What DME was ordered?  Has home O2 per [employer] and bipap per [facility]   Comments regarding appointments  See AVS   Prep survey completed?  Yes          Princess Moore RN

## 2018-12-03 ENCOUNTER — READMISSION MANAGEMENT (OUTPATIENT)
Dept: CALL CENTER | Facility: HOSPITAL | Age: 58
End: 2018-12-03

## 2018-12-03 NOTE — OUTREACH NOTE
COPD/PN Week 1 Survey      Responses   Facility patient discharged from?  [place]   Does the patient have one of the following disease processes/diagnoses(primary or secondary)?  COPD/Pneumonia   Is there a successful TCM telephone encounter documented?  No   Was the primary reason for admission:  COPD exacerbation   Week 1 attempt successful?  No   Unsuccessful attempts  Attempt 1          Amanda Colunga RN

## 2018-12-04 ENCOUNTER — READMISSION MANAGEMENT (OUTPATIENT)
Dept: CALL CENTER | Facility: HOSPITAL | Age: 58
End: 2018-12-04

## 2018-12-04 NOTE — OUTREACH NOTE
COPD/PN Week 1 Survey      Responses   Facility patient discharged from?  [place]   Does the patient have one of the following disease processes/diagnoses(primary or secondary)?  COPD/Pneumonia   Is there a successful TCM telephone encounter documented?  No   Was the primary reason for admission:  COPD exacerbation   Week 1 attempt successful?  Yes   Call start time  1100   Call end time  1107   Discharge diagnosis  Hypercapnia, acute on chronic resp. failure with hypoxia and hypercapnia, COPD exac.   Is patient permission given to speak with other caregiver?  Yes   Person spoke with today (if not patient) and relationship  Rich   Meds reviewed with patient/caregiver?  Yes   Is the patient having any side effects they believe may be caused by any medication additions or changes?  No   Does the patient have all medications ordered at discharge?  Yes   Is the patient taking all medications as directed (includes completed medication regime)?  Yes   Medication comments  Pt continues to take her antibiotic and steroid brook at this time.   Does the patient have a primary care provider?   Yes   Does the patient have an appointment with their PCP or pulmonologist within 7 days of discharge?  Yes   Comments regarding PCP  Lorri WATKINS   Psychosocial issues?  No   What is the patient's perception of their health status since discharge?  Same   Additional teach back comments  Pt states she is feeling better and has follow up appointment on Friday December 14.   Patient reports what zone on this call?  Yellow Zone   Yellow Zone  Unable to complete daily activities, Increased shortness of air   Week 1 call completed?  Yes          Chelsea Hernandez RN

## 2018-12-11 ENCOUNTER — READMISSION MANAGEMENT (OUTPATIENT)
Dept: CALL CENTER | Facility: HOSPITAL | Age: 58
End: 2018-12-11

## 2018-12-11 NOTE — OUTREACH NOTE
COPD/PN Week 2 Survey      Responses   Facility patient discharged from?  [place]   Does the patient have one of the following disease processes/diagnoses(primary or secondary)?  COPD/Pneumonia   Was the primary reason for admission:  COPD exacerbation   Week 2 attempt successful?  Yes   Call start time  1747   Rescheduled  Rescheduled-pt requested   Call end time  1747          Joanna Richards RN

## 2018-12-12 ENCOUNTER — READMISSION MANAGEMENT (OUTPATIENT)
Dept: CALL CENTER | Facility: HOSPITAL | Age: 58
End: 2018-12-12

## 2018-12-12 NOTE — OUTREACH NOTE
COPD/PN Week 2 Survey      Responses   Facility patient discharged from?  [place]   Does the patient have one of the following disease processes/diagnoses(primary or secondary)?  COPD/Pneumonia   Was the primary reason for admission:  COPD exacerbation   Week 2 attempt successful?  Yes   Call start time  1645   Call end time  1654   Discharge diagnosis  Hypercapnia, acute on chronic resp. failure with hypoxia and hypercapnia, COPD exac.   Meds reviewed with patient/caregiver?  Yes   Is the patient having any side effects they believe may be caused by any medication additions or changes?  No   Does the patient have all medications ordered at discharge?  Yes   Is the patient taking all medications as directed (includes completed medication regime)?  Yes   Medication comments  completed abx and steroids   Does the patient have a primary care provider?   Yes   Comments regarding PCP  Lorri WATKINS   Has the patient kept scheduled appointments due by today?  Yes   Has home health visited the patient within 72 hours of discharge?  N/A   What DME was ordered?  Has home O2 per [employer] and bipap per [facility]   Has all DME been delivered?  Yes   Psychosocial issues?  No   Comments  States she is on a trilogy at night. States she still gets short of breath when she gets up and moves.    Did the patient receive a copy of their discharge instructions?  Yes   Nursing interventions  Reviewed instructions with patient   What is the patient's perception of their health status since discharge?  Same   Nursing Interventions  Nurse provided patient education   Are the patient's immunizations up to date?   No   Nursing interventions  Advised patient to discuss with provider at next visit, Educated on importance of maintaining up to date immunizations as advised by provider   If the patient is a current smoker, are they able to teach back resources for cessation?  Smoking cessation medications   Is the patient/caregiver able to  teach back the hierarchy of who to call/visit for symptoms/problems? PCP, Specialist, Home health nurse, Urgent Care, ED, 911  Yes   Is the patient able to teach back COPD zones?  Yes   Nursing interventions  Education provided on various zones   Patient reports what zone on this call?  Yellow Zone   Yellow Zone  Unable to complete daily activities, Increased shortness of air, Increased or thicker phlegm/mucus, Medication is not helping relieve symptoms, Fever or feeling like have a chest cold, Poor sleep and symptoms work patient up, Poor Appetite   Yellow interventions  Continue to use daily medications, Use oxygen as ordered, Use quick relief inhaler as ordered, Use other meds such as steroids or antibiotics as ordered, Do not smoke, Call provider immediatly if symptoms do not improve, Get plenty of rest   Week 2 call completed?  Yes   Wrap up additional comments  States she still smokes occasionally but doesn't understand why last time she was able to get off the O2 and today she can't.           Joanna Richards, RN

## 2018-12-19 ENCOUNTER — READMISSION MANAGEMENT (OUTPATIENT)
Dept: CALL CENTER | Facility: HOSPITAL | Age: 58
End: 2018-12-19

## 2018-12-19 NOTE — OUTREACH NOTE
COPD/PN Week 3 Survey      Responses   Facility patient discharged from?  [place]   Does the patient have one of the following disease processes/diagnoses(primary or secondary)?  COPD/Pneumonia   Was the primary reason for admission:  COPD exacerbation   Week 3 attempt successful?  Yes   Call start time  1023   Call end time  1031   Discharge diagnosis  Hypercapnia, acute on chronic resp. failure with hypoxia and hypercapnia, COPD exac.   Person spoke with today (if not patient) and relationship  Rich   Meds reviewed with patient/caregiver?  Yes   Is the patient taking all medications as directed (includes completed medication regime)?  Yes   Has the patient kept scheduled appointments due by today?  Yes   What is the patient's perception of their health status since discharge?  Improving   Additional teach back comments  Rich says pt has stopped smoking. She still gets SOB with exertion. Uses oxygen all the time   Is the patient able to teach back COPD zones?  Yes   Patient reports what zone on this call?  Yellow Zone   Yellow Zone  Increased shortness of air, Unable to complete daily activities, Using quick relief inhaler/nebulizer more often, Poor sleep and symptoms work patient up   Yellow interventions  Continue to use daily medications, Use quick relief inhaler as ordered, Use oxygen as ordered, Get plenty of rest, Do not smoke   Week 3 call completed?  Yes          Simona Hoskins RN

## 2018-12-28 ENCOUNTER — READMISSION MANAGEMENT (OUTPATIENT)
Dept: CALL CENTER | Facility: HOSPITAL | Age: 58
End: 2018-12-28

## 2018-12-28 NOTE — OUTREACH NOTE
COPD/PN Week 4 Survey      Responses   Facility patient discharged from?  [place]   Does the patient have one of the following disease processes/diagnoses(primary or secondary)?  COPD/Pneumonia   Was the primary reason for admission:  COPD exacerbation   Week 4 attempt successful?  Yes   Call start time  1028   Call end time  1032   Discharge diagnosis  Hypercapnia, acute on chronic resp. failure with hypoxia and hypercapnia, COPD exac.   Meds reviewed with patient/caregiver?  Yes   Is the patient taking all medications as directed (includes completed medication regime)?  Yes   Has the patient kept scheduled appointments due by today?  Yes   Is the patient still receiving Home Health Services?  N/A   Psychosocial issues?  No   What is the patient's perception of their health status since discharge?  Improving   Additional teach back comments  Trying to quit smoking has some patches.   Is the patient able to teach back COPD zones?  Yes   Patient reports what zone on this call?  Yellow Zone   Yellow Zone  Increased shortness of air, Unable to complete daily activities   Yellow interventions  Continue to use daily medications, Do not smoke, Get plenty of rest, Call provider immediatly if symptoms do not improve   Week 4 call completed?  Yes   Would the patient like one additional call?  No   Graduated  Yes   Did the patient feel the follow up calls were helpful during their recovery period?  Yes   Was the number of calls appropriate?  Yes   Wrap up additional comments  Patient searching for new PCP.  List mailed to patient at address provided.          Amanda Colunga RN

## 2019-06-04 ENCOUNTER — APPOINTMENT (OUTPATIENT)
Dept: GENERAL RADIOLOGY | Facility: HOSPITAL | Age: 59
End: 2019-06-04

## 2019-06-04 ENCOUNTER — HOSPITAL ENCOUNTER (INPATIENT)
Facility: HOSPITAL | Age: 59
LOS: 6 days | Discharge: HOME | End: 2019-06-10
Attending: EMERGENCY MEDICINE | Admitting: FAMILY MEDICINE

## 2019-06-04 DIAGNOSIS — Z74.09 IMPAIRED MOBILITY: ICD-10-CM

## 2019-06-04 DIAGNOSIS — Z78.9 DECREASED ACTIVITIES OF DAILY LIVING (ADL): ICD-10-CM

## 2019-06-04 DIAGNOSIS — J96.02 ACUTE RESPIRATORY FAILURE WITH HYPOXIA AND HYPERCAPNIA (HCC): Primary | ICD-10-CM

## 2019-06-04 DIAGNOSIS — J96.01 ACUTE RESPIRATORY FAILURE WITH HYPOXIA AND HYPERCAPNIA (HCC): Primary | ICD-10-CM

## 2019-06-04 DIAGNOSIS — J18.9 PNEUMONIA DUE TO INFECTIOUS ORGANISM, UNSPECIFIED LATERALITY, UNSPECIFIED PART OF LUNG: ICD-10-CM

## 2019-06-04 PROBLEM — J96.22 ACUTE ON CHRONIC RESPIRATORY FAILURE WITH HYPOXIA AND HYPERCAPNIA (HCC): Status: ACTIVE | Noted: 2019-06-04

## 2019-06-04 PROBLEM — R91.8 LUNG INFILTRATE: Status: ACTIVE | Noted: 2019-06-04

## 2019-06-04 PROBLEM — J96.21 ACUTE ON CHRONIC RESPIRATORY FAILURE WITH HYPOXIA AND HYPERCAPNIA (HCC): Status: ACTIVE | Noted: 2019-06-04

## 2019-06-04 PROBLEM — D53.9 MACROCYTIC ANEMIA: Status: ACTIVE | Noted: 2019-06-04

## 2019-06-04 PROBLEM — E44.0 MODERATE PROTEIN-CALORIE MALNUTRITION (HCC): Status: ACTIVE | Noted: 2019-06-04

## 2019-06-04 LAB
A-A DO2: ABNORMAL MMHG
ALBUMIN SERPL-MCNC: 3.7 G/DL (ref 3.5–5)
ALBUMIN/GLOB SERPL: 1.1 G/DL (ref 1.1–2.5)
ALP SERPL-CCNC: 94 U/L (ref 24–120)
ALT SERPL W P-5'-P-CCNC: 15 U/L (ref 0–54)
ANION GAP SERPL CALCULATED.3IONS-SCNC: ABNORMAL MMOL/L (ref 4–13)
ARTERIAL PATENCY WRIST A: ABNORMAL
ARTERIAL PATENCY WRIST A: POSITIVE
AST SERPL-CCNC: 26 U/L (ref 7–45)
ATMOSPHERIC PRESS: 748 MMHG
ATMOSPHERIC PRESS: 749 MMHG
BASE EXCESS BLDA CALC-SCNC: 24.2 MMOL/L (ref 0–2)
BASE EXCESS BLDA CALC-SCNC: 24.5 MMOL/L (ref 0–2)
BASOPHILS # BLD AUTO: 0.04 10*3/MM3 (ref 0–0.2)
BASOPHILS NFR BLD AUTO: 0.4 % (ref 0–2)
BDY SITE: ABNORMAL
BDY SITE: ABNORMAL
BILIRUB SERPL-MCNC: 0.4 MG/DL (ref 0.1–1)
BODY TEMPERATURE: 37 C
BODY TEMPERATURE: 37 C
BUN BLD-MCNC: 20 MG/DL (ref 5–21)
BUN/CREAT SERPL: 38.5 (ref 7–25)
CALCIUM SPEC-SCNC: 9 MG/DL (ref 8.4–10.4)
CHLORIDE SERPL-SCNC: 81 MMOL/L (ref 98–110)
CO2 SERPL-SCNC: >40 MMOL/L (ref 24–31)
COHGB MFR BLD: 3.1 % (ref 0–5)
CREAT BLD-MCNC: 0.52 MG/DL (ref 0.5–1.4)
D DIMER PPP FEU-MCNC: 0.66 MG/L (FEU) (ref 0–0.5)
D-LACTATE SERPL-SCNC: 0.9 MMOL/L (ref 0.5–2)
DEPRECATED RDW RBC AUTO: 51.2 FL (ref 40–54)
EOSINOPHIL # BLD AUTO: 0.06 10*3/MM3 (ref 0–0.7)
EOSINOPHIL NFR BLD AUTO: 0.6 % (ref 0–4)
EPAP: 8
ERYTHROCYTE [DISTWIDTH] IN BLOOD BY AUTOMATED COUNT: 13.2 % (ref 12–15)
GAS FLOW AIRWAY: 2.5 LPM
GFR SERPL CREATININE-BSD FRML MDRD: 122 ML/MIN/1.73
GLOBULIN UR ELPH-MCNC: 3.3 GM/DL
GLUCOSE BLD-MCNC: 130 MG/DL (ref 70–100)
HCO3 BLDA-SCNC: 53.9 MMOL/L (ref 20–26)
HCO3 BLDA-SCNC: 55.3 MMOL/L (ref 20–26)
HCT VFR BLD AUTO: 36.4 % (ref 37–47)
HCT VFR BLD CALC: 34.3 % (ref 38–51)
HGB BLD-MCNC: 11 G/DL (ref 12–16)
HGB BLDA-MCNC: 11.2 G/DL (ref 12–16)
HOLD SPECIMEN: NORMAL
HOLD SPECIMEN: NORMAL
HOROWITZ INDEX BLD+IHG-RTO: 40 %
IMM GRANULOCYTES # BLD AUTO: 0.03 10*3/MM3 (ref 0–0.05)
IMM GRANULOCYTES NFR BLD AUTO: 0.3 % (ref 0–5)
IPAP: 14
LYMPHOCYTES # BLD AUTO: 1.03 10*3/MM3 (ref 0.72–4.86)
LYMPHOCYTES NFR BLD AUTO: 10.8 % (ref 15–45)
Lab: ABNORMAL
MCH RBC QN AUTO: 31.8 PG (ref 28–32)
MCHC RBC AUTO-ENTMCNC: 30.2 G/DL (ref 33–36)
MCV RBC AUTO: 105.2 FL (ref 82–98)
METHGB BLD QL: 0.7 % (ref 0–3)
MODALITY: ABNORMAL
MODALITY: ABNORMAL
MONOCYTES # BLD AUTO: 0.73 10*3/MM3 (ref 0.19–1.3)
MONOCYTES NFR BLD AUTO: 7.7 % (ref 4–12)
NEUTROPHILS # BLD AUTO: 7.62 10*3/MM3 (ref 1.87–8.4)
NEUTROPHILS NFR BLD AUTO: 80.2 % (ref 39–78)
NOTE: ABNORMAL
NOTIFIED BY: ABNORMAL
NOTIFIED BY: ABNORMAL
NOTIFIED WHO: ABNORMAL
NOTIFIED WHO: ABNORMAL
NRBC BLD AUTO-RTO: 0 /100 WBC (ref 0–0.2)
NT-PROBNP SERPL-MCNC: 302 PG/ML (ref 0–900)
OXYHGB MFR BLDV: 86.3 % (ref 94–99)
PCO2 BLDA: 89.9 MM HG (ref 35–45)
PCO2 BLDA: >102 MM HG (ref 35–45)
PCO2 TEMP ADJ BLD: ABNORMAL MM HG (ref 35–45)
PH BLDA: 7.33 PH UNITS (ref 7.35–7.45)
PH BLDA: 7.39 PH UNITS (ref 7.35–7.45)
PH, TEMP CORRECTED: ABNORMAL PH UNITS (ref 7.35–7.45)
PLATELET # BLD AUTO: 247 10*3/MM3 (ref 130–400)
PMV BLD AUTO: 9.7 FL (ref 6–12)
PO2 BLDA: 54.3 MM HG (ref 83–108)
PO2 BLDA: 79.9 MM HG (ref 83–108)
PO2 TEMP ADJ BLD: ABNORMAL MM HG (ref 83–108)
POTASSIUM BLD-SCNC: 4.4 MMOL/L (ref 3.5–5.3)
POTASSIUM BLDA-SCNC: 4.1 MMOL/L (ref 3.5–5.2)
PREALB SERPL-MCNC: 13 MG/DL (ref 18–36)
PROCALCITONIN SERPL-MCNC: <0.25 NG/ML
PROT SERPL-MCNC: 7 G/DL (ref 6.3–8.7)
RBC # BLD AUTO: 3.46 10*6/MM3 (ref 4.2–5.4)
SAO2 % BLDCOA: 89.8 % (ref 94–99)
SAO2 % BLDCOA: 96.2 % (ref 94–99)
SET MECH RESP RATE: 20
SODIUM BLD-SCNC: 138 MMOL/L (ref 135–145)
SODIUM BLDA-SCNC: 141 MMOL/L (ref 136–145)
TROPONIN I SERPL-MCNC: <0.012 NG/ML (ref 0–0.03)
VENTILATOR MODE: ABNORMAL
VENTILATOR MODE: ABNORMAL
WBC NRBC COR # BLD: 9.51 10*3/MM3 (ref 4.8–10.8)
WHOLE BLOOD HOLD SPECIMEN: NORMAL
WHOLE BLOOD HOLD SPECIMEN: NORMAL

## 2019-06-04 PROCEDURE — 93010 ELECTROCARDIOGRAM REPORT: CPT | Performed by: INTERNAL MEDICINE

## 2019-06-04 PROCEDURE — 36600 WITHDRAWAL OF ARTERIAL BLOOD: CPT

## 2019-06-04 PROCEDURE — 84484 ASSAY OF TROPONIN QUANT: CPT | Performed by: EMERGENCY MEDICINE

## 2019-06-04 PROCEDURE — 5A09357 ASSISTANCE WITH RESPIRATORY VENTILATION, LESS THAN 24 CONSECUTIVE HOURS, CONTINUOUS POSITIVE AIRWAY PRESSURE: ICD-10-PCS | Performed by: EMERGENCY MEDICINE

## 2019-06-04 PROCEDURE — 93005 ELECTROCARDIOGRAM TRACING: CPT | Performed by: EMERGENCY MEDICINE

## 2019-06-04 PROCEDURE — 94640 AIRWAY INHALATION TREATMENT: CPT

## 2019-06-04 PROCEDURE — 94799 UNLISTED PULMONARY SVC/PX: CPT

## 2019-06-04 PROCEDURE — 25010000002 AZITHROMYCIN PER 500 MG: Performed by: NURSE PRACTITIONER

## 2019-06-04 PROCEDURE — 83050 HGB METHEMOGLOBIN QUAN: CPT

## 2019-06-04 PROCEDURE — 83605 ASSAY OF LACTIC ACID: CPT | Performed by: EMERGENCY MEDICINE

## 2019-06-04 PROCEDURE — 94660 CPAP INITIATION&MGMT: CPT

## 2019-06-04 PROCEDURE — 83880 ASSAY OF NATRIURETIC PEPTIDE: CPT | Performed by: EMERGENCY MEDICINE

## 2019-06-04 PROCEDURE — 82805 BLOOD GASES W/O2 SATURATION: CPT

## 2019-06-04 PROCEDURE — 85379 FIBRIN DEGRADATION QUANT: CPT | Performed by: EMERGENCY MEDICINE

## 2019-06-04 PROCEDURE — 80053 COMPREHEN METABOLIC PANEL: CPT | Performed by: EMERGENCY MEDICINE

## 2019-06-04 PROCEDURE — 84134 ASSAY OF PREALBUMIN: CPT | Performed by: NURSE PRACTITIONER

## 2019-06-04 PROCEDURE — 87040 BLOOD CULTURE FOR BACTERIA: CPT | Performed by: EMERGENCY MEDICINE

## 2019-06-04 PROCEDURE — 99285 EMERGENCY DEPT VISIT HI MDM: CPT

## 2019-06-04 PROCEDURE — 25010000002 LEVOFLOXACIN PER 250 MG: Performed by: EMERGENCY MEDICINE

## 2019-06-04 PROCEDURE — 84145 PROCALCITONIN (PCT): CPT | Performed by: EMERGENCY MEDICINE

## 2019-06-04 PROCEDURE — 25010000002 ENOXAPARIN PER 10 MG: Performed by: NURSE PRACTITIONER

## 2019-06-04 PROCEDURE — 85025 COMPLETE CBC W/AUTO DIFF WBC: CPT | Performed by: EMERGENCY MEDICINE

## 2019-06-04 PROCEDURE — 82375 ASSAY CARBOXYHB QUANT: CPT

## 2019-06-04 PROCEDURE — 25010000002 METHYLPREDNISOLONE PER 125 MG: Performed by: EMERGENCY MEDICINE

## 2019-06-04 PROCEDURE — 71045 X-RAY EXAM CHEST 1 VIEW: CPT

## 2019-06-04 PROCEDURE — 82803 BLOOD GASES ANY COMBINATION: CPT

## 2019-06-04 RX ORDER — BUDESONIDE AND FORMOTEROL FUMARATE DIHYDRATE 160; 4.5 UG/1; UG/1
2 AEROSOL RESPIRATORY (INHALATION)
Status: DISCONTINUED | OUTPATIENT
Start: 2019-06-04 | End: 2019-06-10 | Stop reason: HOSPADM

## 2019-06-04 RX ORDER — IPRATROPIUM BROMIDE AND ALBUTEROL SULFATE 2.5; .5 MG/3ML; MG/3ML
3 SOLUTION RESPIRATORY (INHALATION) ONCE
Status: COMPLETED | OUTPATIENT
Start: 2019-06-04 | End: 2019-06-04

## 2019-06-04 RX ORDER — SODIUM CHLORIDE 0.9 % (FLUSH) 0.9 %
3 SYRINGE (ML) INJECTION EVERY 12 HOURS SCHEDULED
Status: DISCONTINUED | OUTPATIENT
Start: 2019-06-04 | End: 2019-06-10 | Stop reason: HOSPADM

## 2019-06-04 RX ORDER — HYDROXYZINE HYDROCHLORIDE 25 MG/1
50 TABLET, FILM COATED ORAL 3 TIMES DAILY PRN
Status: DISCONTINUED | OUTPATIENT
Start: 2019-06-04 | End: 2019-06-10 | Stop reason: HOSPADM

## 2019-06-04 RX ORDER — ASPIRIN 81 MG/1
324 TABLET, CHEWABLE ORAL ONCE
Status: COMPLETED | OUTPATIENT
Start: 2019-06-04 | End: 2019-06-04

## 2019-06-04 RX ORDER — IPRATROPIUM BROMIDE AND ALBUTEROL SULFATE 2.5; .5 MG/3ML; MG/3ML
3 SOLUTION RESPIRATORY (INHALATION)
Status: DISCONTINUED | OUTPATIENT
Start: 2019-06-04 | End: 2019-06-10 | Stop reason: HOSPADM

## 2019-06-04 RX ORDER — METHYLPREDNISOLONE SODIUM SUCCINATE 125 MG/2ML
125 INJECTION, POWDER, LYOPHILIZED, FOR SOLUTION INTRAMUSCULAR; INTRAVENOUS ONCE
Status: COMPLETED | OUTPATIENT
Start: 2019-06-04 | End: 2019-06-04

## 2019-06-04 RX ORDER — NICOTINE 21 MG/24HR
1 PATCH, TRANSDERMAL 24 HOURS TRANSDERMAL EVERY 24 HOURS
Status: DISCONTINUED | OUTPATIENT
Start: 2019-06-04 | End: 2019-06-10 | Stop reason: HOSPADM

## 2019-06-04 RX ORDER — GUAIFENESIN 600 MG/1
1200 TABLET, EXTENDED RELEASE ORAL 2 TIMES DAILY
Status: DISCONTINUED | OUTPATIENT
Start: 2019-06-04 | End: 2019-06-10 | Stop reason: HOSPADM

## 2019-06-04 RX ORDER — PANTOPRAZOLE SODIUM 40 MG/1
40 TABLET, DELAYED RELEASE ORAL
Status: DISCONTINUED | OUTPATIENT
Start: 2019-06-05 | End: 2019-06-10 | Stop reason: HOSPADM

## 2019-06-04 RX ORDER — SODIUM CHLORIDE 0.9 % (FLUSH) 0.9 %
10 SYRINGE (ML) INJECTION AS NEEDED
Status: DISCONTINUED | OUTPATIENT
Start: 2019-06-04 | End: 2019-06-10 | Stop reason: HOSPADM

## 2019-06-04 RX ORDER — ALBUTEROL SULFATE 2.5 MG/3ML
2.5 SOLUTION RESPIRATORY (INHALATION)
Status: COMPLETED | OUTPATIENT
Start: 2019-06-04 | End: 2019-06-04

## 2019-06-04 RX ORDER — SODIUM CHLORIDE 0.9 % (FLUSH) 0.9 %
3-10 SYRINGE (ML) INJECTION AS NEEDED
Status: DISCONTINUED | OUTPATIENT
Start: 2019-06-04 | End: 2019-06-10 | Stop reason: HOSPADM

## 2019-06-04 RX ORDER — ONDANSETRON 2 MG/ML
4 INJECTION INTRAMUSCULAR; INTRAVENOUS EVERY 6 HOURS PRN
Status: DISCONTINUED | OUTPATIENT
Start: 2019-06-04 | End: 2019-06-10 | Stop reason: HOSPADM

## 2019-06-04 RX ORDER — LEVOFLOXACIN 5 MG/ML
750 INJECTION, SOLUTION INTRAVENOUS ONCE
Status: COMPLETED | OUTPATIENT
Start: 2019-06-04 | End: 2019-06-04

## 2019-06-04 RX ORDER — ONDANSETRON 4 MG/1
4 TABLET, FILM COATED ORAL EVERY 6 HOURS PRN
Status: DISCONTINUED | OUTPATIENT
Start: 2019-06-04 | End: 2019-06-10 | Stop reason: HOSPADM

## 2019-06-04 RX ORDER — METHYLPREDNISOLONE SODIUM SUCCINATE 40 MG/ML
60 INJECTION, POWDER, LYOPHILIZED, FOR SOLUTION INTRAMUSCULAR; INTRAVENOUS EVERY 8 HOURS
Status: DISCONTINUED | OUTPATIENT
Start: 2019-06-05 | End: 2019-06-06

## 2019-06-04 RX ORDER — ACETAMINOPHEN 325 MG/1
650 TABLET ORAL EVERY 4 HOURS PRN
Status: DISCONTINUED | OUTPATIENT
Start: 2019-06-04 | End: 2019-06-10 | Stop reason: HOSPADM

## 2019-06-04 RX ORDER — SODIUM CHLORIDE 9 MG/ML
75 INJECTION, SOLUTION INTRAVENOUS CONTINUOUS
Status: DISCONTINUED | OUTPATIENT
Start: 2019-06-04 | End: 2019-06-05

## 2019-06-04 RX ADMIN — ACETAMINOPHEN 650 MG: 325 TABLET, FILM COATED ORAL at 20:14

## 2019-06-04 RX ADMIN — ENOXAPARIN SODIUM 30 MG: 30 INJECTION SUBCUTANEOUS at 20:15

## 2019-06-04 RX ADMIN — SODIUM CHLORIDE 75 ML/HR: 9 INJECTION, SOLUTION INTRAVENOUS at 19:43

## 2019-06-04 RX ADMIN — LEVOFLOXACIN 750 MG: 750 INJECTION, SOLUTION INTRAVENOUS at 17:09

## 2019-06-04 RX ADMIN — ASPIRIN 81 MG 324 MG: 81 TABLET ORAL at 18:23

## 2019-06-04 RX ADMIN — AZITHROMYCIN MONOHYDRATE 500 MG: 500 INJECTION, POWDER, LYOPHILIZED, FOR SOLUTION INTRAVENOUS at 20:15

## 2019-06-04 RX ADMIN — IPRATROPIUM BROMIDE AND ALBUTEROL SULFATE 3 ML: 2.5; .5 SOLUTION RESPIRATORY (INHALATION) at 19:44

## 2019-06-04 RX ADMIN — IPRATROPIUM BROMIDE AND ALBUTEROL SULFATE 3 ML: 2.5; .5 SOLUTION RESPIRATORY (INHALATION) at 17:03

## 2019-06-04 RX ADMIN — HYDROXYZINE HYDROCHLORIDE 50 MG: 25 TABLET, FILM COATED ORAL at 20:14

## 2019-06-04 RX ADMIN — BUDESONIDE AND FORMOTEROL FUMARATE DIHYDRATE 2 PUFF: 160; 4.5 AEROSOL RESPIRATORY (INHALATION) at 21:17

## 2019-06-04 RX ADMIN — ALBUTEROL SULFATE 2.5 MG: 2.5 SOLUTION RESPIRATORY (INHALATION) at 17:03

## 2019-06-04 RX ADMIN — METOPROLOL TARTRATE 12.5 MG: 25 TABLET, FILM COATED ORAL at 20:15

## 2019-06-04 RX ADMIN — ALBUTEROL SULFATE 2.5 MG: 2.5 SOLUTION RESPIRATORY (INHALATION) at 17:11

## 2019-06-04 RX ADMIN — METHYLPREDNISOLONE SODIUM SUCCINATE 125 MG: 125 INJECTION, POWDER, FOR SOLUTION INTRAMUSCULAR; INTRAVENOUS at 17:06

## 2019-06-04 RX ADMIN — GUAIFENESIN 1200 MG: 600 TABLET, EXTENDED RELEASE ORAL at 20:14

## 2019-06-04 NOTE — ED PROVIDER NOTES
Subjective     Chest Pain   Pain location:  Substernal area  Pain quality: aching and sharp    Pain radiates to:  Does not radiate  Pain severity:  Moderate  Onset quality:  Gradual  Progression:  Worsening  Chronicity:  Chronic  Context: not breathing, not drug use, not eating, not lifting, not at rest and not trauma    Relieved by:  Nothing  Worsened by:  Nothing  Ineffective treatments:  None tried  Associated symptoms: shortness of breath    Associated symptoms: no abdominal pain, no AICD problem, no altered mental status, no anorexia, no back pain, no claudication, no cough, no diaphoresis, no dizziness, no dysphagia, no fatigue, no fever, no headache, no lower extremity edema, no nausea, no near-syncope, no numbness, no orthopnea, no palpitations, no vomiting and no weakness    Risk factors: high cholesterol, hypertension and smoking    Risk factors: no aortic disease, no coronary artery disease, no diabetes mellitus, no Andrew-Danlos syndrome, not male, no Marfan's syndrome and not obese    Shortness of Breath   Associated symptoms: chest pain    Associated symptoms: no abdominal pain, no claudication, no cough, no diaphoresis, no ear pain, no fever, no headaches, no rash, no sore throat and no vomiting        Review of Systems   Constitutional: Negative.  Negative for activity change, appetite change, chills, diaphoresis, fatigue and fever.   HENT: Negative for congestion, drooling, ear pain, facial swelling, hearing loss, sinus pressure, sore throat and trouble swallowing.    Eyes: Negative.  Negative for discharge.   Respiratory: Positive for shortness of breath. Negative for cough.    Cardiovascular: Positive for chest pain. Negative for palpitations, orthopnea, claudication and near-syncope.   Gastrointestinal: Negative for abdominal distention, abdominal pain, anorexia, blood in stool, diarrhea, nausea and vomiting.   Endocrine: Negative.  Negative for cold intolerance, heat intolerance, polydipsia,  polyphagia and polyuria.   Genitourinary: Negative.  Negative for dysuria, flank pain and urgency.   Musculoskeletal: Negative.  Negative for arthralgias, back pain, myalgias and neck stiffness.   Skin: Negative.  Negative for color change, pallor and rash.   Allergic/Immunologic: Negative.    Neurological: Negative.  Negative for dizziness, seizures, speech difficulty, weakness, numbness and headaches.   Hematological: Negative.  Negative for adenopathy.   All other systems reviewed and are negative.      Past Medical History:   Diagnosis Date   • CHF (congestive heart failure) (CMS/[facility])    • COPD (chronic obstructive pulmonary disease) (CMS/[facility])    • Hypertension    • Pneumonia        Allergies   Allergen Reactions   • Codeine Nausea And Vomiting       Past Surgical History:   Procedure Laterality Date   • CARPAL TUNNEL RELEASE     • HYSTERECTOMY      complete       Family History   Problem Relation Age of Onset   • Cancer Mother    • Heart disease Father    • Cancer Sister    • Heart disease Brother        Social History     Socioeconomic History   • Marital status:      Spouse name: Not on file   • Number of children: Not on file   • Years of education: Not on file   • Highest education level: Not on file   Tobacco Use   • Smoking status: Current Every Day Smoker     Packs/day: 0.50     Types: Cigarettes   • Smokeless tobacco: Never Used   Substance and Sexual Activity   • Alcohol use: No   • Drug use: No   • Sexual activity: Defer           Objective   Physical Exam   Constitutional: She is oriented to person, place, and time. She appears well-developed and well-nourished. She appears ill.   HENT:   Head: Normocephalic.   Right Ear: External ear normal.   Eyes: Conjunctivae are normal. Pupils are equal, round, and reactive to light.   Neck: Normal range of motion. Neck supple.   Cardiovascular: Regular rhythm, normal heart sounds and intact distal pulses. Tachycardia present. PMI is not displaced. Exam  reveals no decreased pulses.   No murmur heard.  Pulmonary/Chest: No accessory muscle usage. Tachypnea noted. She is in respiratory distress. She has decreased breath sounds in the right middle field and the left middle field. She has wheezes in the right middle field, the right lower field, the left middle field and the left lower field. She has no rales. She exhibits no tenderness.   Abdominal: Soft. Bowel sounds are normal. There is no tenderness.   Musculoskeletal: Normal range of motion. She exhibits no edema or tenderness.   Lower extremity exam bilaterally is unremarkable.  There is no right or left calf tenderness .  There is no palpable venous cord.  No obvious difference in the size of the legs.  No pitting edema.  The dorsalis pedis and posterior tibial femoral and popliteal pulses are palpable and +2 bilaterally.  Homans sign is negative   Neurological: She is alert and oriented to person, place, and time. She has normal reflexes. No cranial nerve deficit. Coordination normal.   Skin: Skin is warm. No rash noted. No erythema.   Nursing note and vitals reviewed.      Procedures           ED Course  ED Course as of Jun 04 1750   Tue Jun 04, 2019   1747 Case discussed with the patient will admit her to the hospitalist service  [TS]      ED Course User Index  [TS] Wei Altman MD                  [facility]      Final diagnoses:   Acute respiratory failure with hypoxia and hypercapnia (CMS/HCC)   Pneumonia due to infectious organism, unspecified laterality, unspecified part of lung            Wei Altman MD  06/04/19 9111

## 2019-06-04 NOTE — H&P
[facility] Medicine Services  HISTORY AND PHYSICAL    Date of Admission: 6/4/2019  Primary Care Physician: Lorri Guan APRN    Subjective     Chief Complaint: Shortness of breathing    History of Present Illness  Teri Tim is a 58-year-old female with a past medical history of COPD, hypertension, chronic back pain-untreated, diastolic dysfunction now resolved with normal EF.  Patient also has chronic respiratory failure on 2 L [place] cannula continuously.  She uses trilogy however  reports this has not been working and home saturations while using trilogy's in the 70s.  Patient did present to [facility] today with confusion and shortness of breathing.   states she did report chest pain this morning when she awakened however she denies at this time.  ER evaluation revealed acute on chronic respiratory failure with hypercarbia and hypoxia, mildly elevated d-dimer and macrocytic anemia.  Patient declines to report how many cigarettes she is smoking per day.   provides majority of HPI, states she has been seen by [facility] pulmonary clinic in the remote past.  She is also been seen by [facility] pain management but has stopped due to unhappiness with care provided.  She is currently requesting pain medication and nerve medication.  Currently she denies chest pain, she still somewhat short of breath however significantly improved since arrival.  Patient is admitted for further evaluation and treatment.    Review of Systems   A 10 point review of systems was completed, all negative except for those discussed in HPI    Past Medical History:   Past Medical History:   Diagnosis Date   • CHF (congestive heart failure) (CMS/HCC), normalized 9/2018 echocardiogram    • Chronic back pain    • Chronic respiratory failure (CMS/HCC)    • COPD (chronic obstructive pulmonary disease) (CMS/HCC)    • Hypertension    • Pneumonia        Past Surgical History:  "  Past Surgical History:   Procedure Laterality Date   • CARPAL TUNNEL RELEASE     • HYSTERECTOMY      complete       Family History: family history includes Cancer in her mother and sister; Heart disease in her brother and father.    Social History:  reports that she has been smoking cigarettes.  She has been smoking about 0.50 packs per day. She has never used smokeless tobacco. She reports that she does not drink alcohol or use drugs.    Code Status: Full, her  Rich will speak for her      Allergies:  Allergies   Allergen Reactions   • Codeine Nausea And Vomiting       Medications:  Prior to Admission medications    Medication Sig Start Date End Date Taking? Authorizing Provider   budesonide-formoterol (SYMBICORT) 160-4.5 MCG/ACT inhaler Inhale 2 puffs 2 (Two) Times a Day.    ProviderXena MD   guaiFENesin (MUCINEX) 600 MG 12 hr tablet Take 2 tablets by mouth 2 (Two) Times a Day. 4/5/18   Joanna Juan APRN   ipratropium-albuterol (COMBIVENT RESPIMAT)  MCG/ACT inhaler Inhale 1 puff 4 (Four) Times a Day As Needed for Wheezing or Shortness of Air. 10/2/18   Gonsalo Loo MD   metoprolol tartrate (LOPRESSOR) 25 MG tablet Take 0.5 tablets by mouth Every 12 (Twelve) Hours. 10/2/18   Gonsalo Loo MD   nicotine (NICODERM CQ) 21 MG/24HR patch Place 1 patch on the skin as directed by provider Daily. 12/1/18   Jayy De Santiago MD   pantoprazole (PROTONIX) 40 MG EC tablet Take 40 mg by mouth Daily.    Provider, MD Xena       Objective     /78   Pulse 112   Temp 98.2 °F (36.8 °C)   Resp 24   Ht 152.4 cm (60\")   Wt 38.2 kg (84 lb 3 oz)   SpO2 96%   BMI 16.44 kg/m²   Physical Exam   Constitutional: She appears well-developed.   Frail, cachectic   HENT:   Head: Normocephalic and atraumatic.   Eyes: EOM are normal.   Neck: Normal range of motion.   Cardiovascular: Normal rate, regular rhythm and normal heart sounds.   Tachycardic upon arrival however " normalized at time of assessment   Pulmonary/Chest: She is in respiratory distress.   Severely diminished throughout without adventitious breath sounds   Abdominal: Soft. Bowel sounds are normal. She exhibits no distension. There is no tenderness.   Musculoskeletal: Normal range of motion. She exhibits no edema.   Neurological: She is alert.   Somewhat obtunded however does answer questions appropriately   Skin: Skin is dry.   Excessively dry   Psychiatric:   Flat affect       Pertinent Data:   Lab Results (last 72 hours)     Procedure Component Value Units Date/Time    Prealbumin [[id number]]  (Abnormal) Collected:  06/04/19 1635    Specimen:  Blood Updated:  06/04/19 1926     Prealbumin 13.0 mg/dL     Blood Gas, Arterial With Co-Ox [[id number]]  (Abnormal) Collected:  06/04/19 1744    Specimen:  Arterial Blood Updated:  06/04/19 1749     Site Right Radial     Dao's Test Positive     pH, Arterial 7.386 pH units      pCO2, Arterial 89.9 mm Hg      Comment: 86 Value above critical limit        pO2, Arterial 54.3 mm Hg      Comment: 85 Value below critical limit        HCO3, Arterial 53.9 mmol/L      Comment: 83 Value above reference range        Base Excess, Arterial 24.2 mmol/L      Comment: 83 Value above reference range        O2 Saturation, Arterial 89.8 %      Comment: 84 Value below reference range        Hemoglobin, Blood Gas 11.2 g/dL      Comment: 84 Value below reference range        Hematocrit, Blood Gas 34.3 %      Comment: 84 Value below reference range        Oxyhemoglobin 86.3 %      Comment: 84 Value below reference range        Methemoglobin 0.70 %      Carboxyhemoglobin 3.1 %      A-a Gradiant -- mmHg      Comment: UNABLE TO CALCULATE        Temperature 37.0 C      Sodium, Arterial 141 mmol/L      Potassium, Arterial 4.1 mmol/L      Barometric Pressure for Blood Gas 748 mmHg      Modality BiPap     FIO2 40 %      Ventilator Mode BiPAP     Set [facility] Resp Rate 20.0     IPAP 14     Comment: Meter:  [id number]     :  [id number]        EPAP 8     Note --     Notified Who DR LANGE     Notified By [id number]     Notified Time 06/04/2019 17:50     Collected by [id number]     pH, Temp Corrected -- pH Units      pCO2, Temperature Corrected -- mm Hg      pO2, Temperature Corrected -- mm Hg     Procalcitonin [[id number]]  (Normal) Collected:  06/04/19 1635    Specimen:  Blood Updated:  06/04/19 1739     Procalcitonin <0.25 ng/mL     Lactic Acid, Plasma [[id number]]  (Normal) Collected:  06/04/19 1707    Specimen:  Blood Updated:  06/04/19 1731     Lactate 0.9 mmol/L     CBC Auto Differential [[id number]]  (Abnormal) Collected:  06/04/19 1635    Specimen:  Blood Updated:  06/04/19 1728     WBC 9.51 10*3/mm3      RBC 3.46 10*6/mm3      Hemoglobin 11.0 g/dL      Hematocrit 36.4 %      .2 fL      MCH 31.8 pg      MCHC 30.2 g/dL      RDW 13.2 %      RDW-SD 51.2 fl      MPV 9.7 fL      Platelets 247 10*3/mm3      Neutrophil % 80.2 %      Lymphocyte % 10.8 %      Monocyte % 7.7 %      Eosinophil % 0.6 %      Basophil % 0.4 %      Immature Grans % 0.3 %      Neutrophils, Absolute 7.62 10*3/mm3      Lymphocytes, Absolute 1.03 10*3/mm3      Monocytes, Absolute 0.73 10*3/mm3      Eosinophils, Absolute 0.06 10*3/mm3      Basophils, Absolute 0.04 10*3/mm3      Immature Grans, Absolute 0.03 10*3/mm3      nRBC 0.0 /100 WBC     BNP [[id number]]  (Normal) Collected:  06/04/19 1635    Specimen:  Blood Updated:  06/04/19 1723     proBNP 302.0 pg/mL     Blood Culture - Blood, Arm, Left [[id number]] Collected:  06/04/19 1705    Specimen:  Blood from Arm, Left Updated:  06/04/19 1718    Blood Culture - Blood, Arm, Right [[id number]] Collected:  06/04/19 1705    Specimen:  Blood from Arm, Right Updated:  06/04/19 1717    Blood Gas, Arterial [[id number]]  (Abnormal) Collected:  06/04/19 1707    Specimen:  Arterial Blood Updated:  06/04/19 1708     Site Right Brachial     Dao's Test N/A     pH, Arterial 7.333 pH units      Comment: 84  Value below reference range        pCO2, Arterial >102.0 mm Hg      Comment: 93 Value above reportable range > 102        pO2, Arterial 79.9 mm Hg      Comment: 84 Value below reference range        HCO3, Arterial 55.3 mmol/L      Comment: 83 Value above reference range        Base Excess, Arterial 24.5 mmol/L      Comment: 83 Value above reference range        O2 Saturation, Arterial 96.2 %      Temperature 37.0 C      Barometric Pressure for Blood Gas 749 mmHg      Modality Nasal Cannula     Flow Rate 2.5 lpm      Ventilator Mode NA     Notified Who DR LANGE     Notified By [id number]     Notified Time 06/04/2019 17:13     Collected by [id number]     Comment: Meter: [id number]     :  [id number]       Troponin [[id number]]  (Normal) Collected:  06/04/19 1635    Specimen:  Blood Updated:  06/04/19 1704     Troponin I <0.012 ng/mL     D-dimer, Quantitative [[id number]]  (Abnormal) Collected:  06/04/19 1635    Specimen:  Blood Updated:  06/04/19 1702     D-Dimer, Quantitative 0.66 mg/L (FEU)     Narrative:       Reference Range is 0-0.50 mg/L FEU. However, results <0.50 mg/L FEU tends to rule out DVT or PE. Results >0.50 mg/L FEU are not useful in predicting absence or presence of DVT or PE.    Comprehensive Metabolic Panel [[id number]]  (Abnormal) Collected:  06/04/19 1635    Specimen:  Blood Updated:  06/04/19 1656     Glucose 130 mg/dL      BUN 20 mg/dL      Creatinine 0.52 mg/dL      Sodium 138 mmol/L      Potassium 4.4 mmol/L      Chloride 81 mmol/L      CO2 >40.0 mmol/L      Calcium 9.0 mg/dL      Total Protein 7.0 g/dL      Albumin 3.70 g/dL      ALT (SGPT) 15 U/L      AST (SGOT) 26 U/L      Alkaline Phosphatase 94 U/L      Total Bilirubin 0.4 mg/dL      eGFR Non African Amer 122 mL/min/1.73      Globulin 3.3 gm/dL      A/G Ratio 1.1 g/dL      BUN/Creatinine Ratio 38.5     Anion Gap -- mmol/L      Comment: Unable to calculate Anion Gap.       Narrative:       GFR Normal >60  Chronic Kidney Disease <60  Kidney  Failure <15        Imaging Results (last 24 hours)     Procedure Component Value Units Date/Time    XR Chest 1 View [[id number]] Collected:  06/04/19 1655     Updated:  06/04/19 1659    Narrative:       EXAMINATION:  XR CHEST 1 VW-  6/4/2019 4:49 PM CDT     HISTORY: Chest pain.     COMPARISON: 11/26/2018.     FINDINGS:  There is mild patchy left perihilar infiltrate. There is no  dense consolidation. 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 Heart size is upper limits  of normal. The thoracic aorta is atheromatous.       Impression:       1. Mild patchy left perihilar infiltrate may represent minimal  pneumonia.  2. COPD/emphysema.        This report was finalized on 06/04/2019 16:56 by Dr. Hugo Licea MD.            I have personally reviewed and interpreted the radiology studies and ECG obtained at time of admission.     Assessment / Plan     Assessment:   Active Hospital Problems    Diagnosis   • Acute on chronic respiratory failure with hypoxia and hypercapnia (CMS/HCC)   • Macrocytic anemia   • Lung infiltrate, left perihilar   • Moderate protein-calorie malnutrition (CMS/HCC)   • Tobacco abuse   • COPD with acute exacerbation (CMS/HCC)       Plan:   1.  Admit as inpatient  2.  Start azithromycin 500 mg IV daily  3.  Duo nebs, Symbicort, incentive spirometry, BiPAP, Mucinex  4.  Solu-Medrol 60 mg IV every 8 hours  5.  Home medications reviewed and restarted as appropriate  6.  DVT prophylaxis with low-dose Lovenox  7.  Gentle hydration normal saline 75 mL/hour  8.  Labs in a.m. BMP, CBC, A1c, lipid panel, TSH  9.  Consult  in a.m. for trilogy evaluation of patient's home equipment  10.  Nutrition consult for evaluation nutritional status, BMI 16.44    I discussed the patient's findings and my recommendations with: Nathan Antonio DO  Time spent 35 minutes      ROLAND Pillai  06/04/19   7:36 PM     Chart reviewed  Patient examined in conjunction with AMANDA WATKINS  Agree with assessment and plan.    Discussed smoking cessation with patient  Not really receptive.   Discussed with patient and AMANDA Curry DO  06/04/2019  7:40 PM

## 2019-06-05 ENCOUNTER — APPOINTMENT (OUTPATIENT)
Dept: GENERAL RADIOLOGY | Facility: HOSPITAL | Age: 59
End: 2019-06-05

## 2019-06-05 LAB
ANION GAP SERPL CALCULATED.3IONS-SCNC: ABNORMAL MMOL/L (ref 4–13)
ARTICHOKE IGE QN: 148 MG/DL (ref 0–99)
BUN BLD-MCNC: 19 MG/DL (ref 5–21)
BUN/CREAT SERPL: 35.2 (ref 7–25)
CALCIUM SPEC-SCNC: 9 MG/DL (ref 8.4–10.4)
CHLORIDE SERPL-SCNC: 83 MMOL/L (ref 98–110)
CHOLEST SERPL-MCNC: 226 MG/DL (ref 130–200)
CO2 SERPL-SCNC: >40 MMOL/L (ref 24–31)
CREAT BLD-MCNC: 0.54 MG/DL (ref 0.5–1.4)
DEPRECATED RDW RBC AUTO: 49.4 FL (ref 40–54)
ERYTHROCYTE [DISTWIDTH] IN BLOOD BY AUTOMATED COUNT: 13.1 % (ref 12–15)
GFR SERPL CREATININE-BSD FRML MDRD: 117 ML/MIN/1.73
GLUCOSE BLD-MCNC: 121 MG/DL (ref 70–100)
HBA1C MFR BLD: 4.5 %
HCT VFR BLD AUTO: 34.4 % (ref 37–47)
HDLC SERPL-MCNC: 45 MG/DL
HGB BLD-MCNC: 10.6 G/DL (ref 12–16)
LDLC/HDLC SERPL: 3.26 {RATIO}
MCH RBC QN AUTO: 31.7 PG (ref 28–32)
MCHC RBC AUTO-ENTMCNC: 30.8 G/DL (ref 33–36)
MCV RBC AUTO: 103 FL (ref 82–98)
PLATELET # BLD AUTO: 241 10*3/MM3 (ref 130–400)
PMV BLD AUTO: 10.3 FL (ref 6–12)
POTASSIUM BLD-SCNC: 4.5 MMOL/L (ref 3.5–5.3)
RBC # BLD AUTO: 3.34 10*6/MM3 (ref 4.2–5.4)
SODIUM BLD-SCNC: 136 MMOL/L (ref 135–145)
T4 FREE SERPL-MCNC: 1.08 NG/DL (ref 0.78–2.19)
TRIGL SERPL-MCNC: 172 MG/DL (ref 0–149)
TSH SERPL DL<=0.05 MIU/L-ACNC: 0.14 MIU/ML (ref 0.47–4.68)
WBC NRBC COR # BLD: 3.83 10*3/MM3 (ref 4.8–10.8)

## 2019-06-05 PROCEDURE — 97161 PT EVAL LOW COMPLEX 20 MIN: CPT | Performed by: PHYSICAL THERAPIST

## 2019-06-05 PROCEDURE — 94799 UNLISTED PULMONARY SVC/PX: CPT

## 2019-06-05 PROCEDURE — 99406 BEHAV CHNG SMOKING 3-10 MIN: CPT

## 2019-06-05 PROCEDURE — 71046 X-RAY EXAM CHEST 2 VIEWS: CPT

## 2019-06-05 PROCEDURE — 25010000002 METHYLPREDNISOLONE PER 40 MG: Performed by: NURSE PRACTITIONER

## 2019-06-05 PROCEDURE — 83036 HEMOGLOBIN GLYCOSYLATED A1C: CPT | Performed by: NURSE PRACTITIONER

## 2019-06-05 PROCEDURE — 25010000002 AZITHROMYCIN PER 500 MG: Performed by: NURSE PRACTITIONER

## 2019-06-05 PROCEDURE — 25010000002 CEFTRIAXONE PER 250 MG: Performed by: NURSE PRACTITIONER

## 2019-06-05 PROCEDURE — 97165 OT EVAL LOW COMPLEX 30 MIN: CPT | Performed by: OCCUPATIONAL THERAPIST

## 2019-06-05 PROCEDURE — 84443 ASSAY THYROID STIM HORMONE: CPT | Performed by: NURSE PRACTITIONER

## 2019-06-05 PROCEDURE — 25010000002 ENOXAPARIN PER 10 MG: Performed by: NURSE PRACTITIONER

## 2019-06-05 PROCEDURE — 84439 ASSAY OF FREE THYROXINE: CPT | Performed by: NURSE PRACTITIONER

## 2019-06-05 PROCEDURE — 80048 BASIC METABOLIC PNL TOTAL CA: CPT | Performed by: NURSE PRACTITIONER

## 2019-06-05 PROCEDURE — 85027 COMPLETE CBC AUTOMATED: CPT | Performed by: NURSE PRACTITIONER

## 2019-06-05 PROCEDURE — 80061 LIPID PANEL: CPT | Performed by: NURSE PRACTITIONER

## 2019-06-05 RX ORDER — LIDOCAINE 50 MG/G
2 PATCH TOPICAL
Status: DISCONTINUED | OUTPATIENT
Start: 2019-06-05 | End: 2019-06-10 | Stop reason: HOSPADM

## 2019-06-05 RX ORDER — ASPIRIN 81 MG/1
81 TABLET ORAL DAILY
COMMUNITY

## 2019-06-05 RX ADMIN — BUDESONIDE AND FORMOTEROL FUMARATE DIHYDRATE 2 PUFF: 160; 4.5 AEROSOL RESPIRATORY (INHALATION) at 06:52

## 2019-06-05 RX ADMIN — METOPROLOL TARTRATE 12.5 MG: 25 TABLET, FILM COATED ORAL at 20:34

## 2019-06-05 RX ADMIN — METHYLPREDNISOLONE SODIUM SUCCINATE 60 MG: 40 INJECTION, POWDER, FOR SOLUTION INTRAMUSCULAR; INTRAVENOUS at 09:09

## 2019-06-05 RX ADMIN — IPRATROPIUM BROMIDE AND ALBUTEROL SULFATE 3 ML: 2.5; .5 SOLUTION RESPIRATORY (INHALATION) at 06:52

## 2019-06-05 RX ADMIN — IPRATROPIUM BROMIDE AND ALBUTEROL SULFATE 3 ML: 2.5; .5 SOLUTION RESPIRATORY (INHALATION) at 11:58

## 2019-06-05 RX ADMIN — GUAIFENESIN 1200 MG: 600 TABLET, EXTENDED RELEASE ORAL at 09:09

## 2019-06-05 RX ADMIN — SODIUM CHLORIDE, PRESERVATIVE FREE 3 ML: 5 INJECTION INTRAVENOUS at 20:37

## 2019-06-05 RX ADMIN — IPRATROPIUM BROMIDE AND ALBUTEROL SULFATE 3 ML: 2.5; .5 SOLUTION RESPIRATORY (INHALATION) at 00:05

## 2019-06-05 RX ADMIN — ACETAMINOPHEN 650 MG: 325 TABLET, FILM COATED ORAL at 09:20

## 2019-06-05 RX ADMIN — AZITHROMYCIN MONOHYDRATE 500 MG: 500 INJECTION, POWDER, LYOPHILIZED, FOR SOLUTION INTRAVENOUS at 20:34

## 2019-06-05 RX ADMIN — NICOTINE 1 PATCH: 14 PATCH TRANSDERMAL at 20:35

## 2019-06-05 RX ADMIN — IPRATROPIUM BROMIDE AND ALBUTEROL SULFATE 3 ML: 2.5; .5 SOLUTION RESPIRATORY (INHALATION) at 20:00

## 2019-06-05 RX ADMIN — BUDESONIDE AND FORMOTEROL FUMARATE DIHYDRATE 2 PUFF: 160; 4.5 AEROSOL RESPIRATORY (INHALATION) at 20:00

## 2019-06-05 RX ADMIN — SODIUM CHLORIDE, PRESERVATIVE FREE 3 ML: 5 INJECTION INTRAVENOUS at 09:12

## 2019-06-05 RX ADMIN — LIDOCAINE 2 PATCH: 50 PATCH CUTANEOUS at 12:26

## 2019-06-05 RX ADMIN — ENOXAPARIN SODIUM 30 MG: 30 INJECTION SUBCUTANEOUS at 20:34

## 2019-06-05 RX ADMIN — ACETAMINOPHEN 650 MG: 325 TABLET, FILM COATED ORAL at 05:29

## 2019-06-05 RX ADMIN — GUAIFENESIN 1200 MG: 600 TABLET, EXTENDED RELEASE ORAL at 20:33

## 2019-06-05 RX ADMIN — CEFTRIAXONE 1 G: 1 INJECTION, POWDER, FOR SOLUTION INTRAMUSCULAR; INTRAVENOUS at 16:42

## 2019-06-05 RX ADMIN — PANTOPRAZOLE SODIUM 40 MG: 40 TABLET, DELAYED RELEASE ORAL at 05:26

## 2019-06-05 RX ADMIN — METOPROLOL TARTRATE 12.5 MG: 25 TABLET, FILM COATED ORAL at 09:09

## 2019-06-05 RX ADMIN — METHYLPREDNISOLONE SODIUM SUCCINATE 60 MG: 40 INJECTION, POWDER, FOR SOLUTION INTRAMUSCULAR; INTRAVENOUS at 16:43

## 2019-06-05 RX ADMIN — HYDROXYZINE HYDROCHLORIDE 50 MG: 25 TABLET, FILM COATED ORAL at 09:20

## 2019-06-05 RX ADMIN — METHYLPREDNISOLONE SODIUM SUCCINATE 60 MG: 40 INJECTION, POWDER, FOR SOLUTION INTRAMUSCULAR; INTRAVENOUS at 00:04

## 2019-06-05 NOTE — PROGRESS NOTES
Discharge Planning Assessment  [facility]     Patient Name: Teri Tim  MRN: [id number]  Today's Date: 6/5/2019    Admit Date: 6/4/2019    Discharge Needs Assessment     Row Name 06/05/19 1442       Living Environment    Lives With  spouse    Name(s) of Who Lives With Patient  Harry    Current Living Arrangements  home/apartment/condo    Primary Care Provided by  self    Provides Primary Care For  no one    Family Caregiver if Needed  spouse    Quality of Family Relationships  helpful;involved;supportive    Able to Return to Prior Arrangements  yes       Resource/Environmental Concerns    Resource/Environmental Concerns  none    Transportation Concerns  car, none       Transition Planning    Patient/Family Anticipates Transition to  home with family    Patient/Family Anticipated Services at Transition  none    Transportation Anticipated  family or friend will provide       Discharge Needs Assessment    Readmission Within the Last 30 Days  no previous admission in last 30 days    Concerns to be Addressed  no discharge needs identified;denies needs/concerns at this time    Equipment Currently Used at Home  oxygen    Anticipated Changes Related to Illness  none    Equipment Needed After Discharge  none    Discharge Coordination/Progress  Pt has RX coverage and a PCP. Pt plans on returning home with  at discharge. Pt denies any need for HH and states that she has no other concerns. SW received consult in regards to trilogy not working. Pt denies that her trilogy is in need of repair at this time. SW will follow and assist with any discharge needs that may arise.         Discharge Plan    No documentation.       Destination      No service coordination in this encounter.      Durable Medical Equipment      No service coordination in this encounter.      Dialysis/Infusion      No service coordination in this encounter.      Home Medical Care      No service coordination in this encounter.      Therapy      No  service coordination in this encounter.      Community Resources      No service coordination in this encounter.          Demographic Summary    No documentation.       Functional Status    No documentation.       Psychosocial    No documentation.       Abuse/Neglect    No documentation.       Legal    No documentation.       Substance Abuse    No documentation.       Patient Forms    No documentation.           Zoila Mccann

## 2019-06-05 NOTE — PROGRESS NOTES
[facility] Emergency [facility] Medicine Services  INPATIENT PROGRESS NOTE    Length of Stay: 1  Date of Admission: 6/4/2019  Primary Care Physician: Lorri Guan APRN    Subjective   Chief Complaint: Follow-up shortness of breath  HPI   Patient is resting in bed.  There is no family at bedside at present.  She states she feels okay today.  She still feels somewhat short of breath today.  She is currently on 3 L per nasal cannula with oxygen saturation of 92%.  She normally wears 2 L throughout the day.  She tells me that someone came to fix her trilogy last evening.  She is somewhat confused throughout conversation, tells me that they are supposed to be bringing her food although she is looking at her breakfast tray.  He denies any chest pain.  She complains of chronic back pain.  She states that her appetite is poor.  She is coughing up thick, brown sputum.    Review of Systems   All pertinent negatives and positives are as above. All other systems have been reviewed and are negative unless otherwise stated.     Objective    Temp:  [98 °F (36.7 °C)-98.6 °F (37 °C)] 98.3 °F (36.8 °C)  Heart Rate:  [] 98  Resp:  [16-27] 18  BP: ()/(55-78) 111/61  FiO2 (%):  [24 %-40 %] 35 %  Physical Exam   Constitutional: She is oriented to person, place, and time. She appears well-developed. No distress.   Thin, chronically ill-appearing   HENT:   Head: Normocephalic and atraumatic.   Neck: Normal range of motion. Neck supple. No JVD present. No tracheal deviation present.   Cardiovascular: Normal rate, regular rhythm, normal heart sounds and intact distal pulses. Exam reveals no gallop and no friction rub.   -Sinus tach 83-1 11   Pulmonary/Chest: She has wheezes. She has no rales.   Poor air movement throughout.  Grossly diminished.   Abdominal: Soft. Bowel sounds are normal. She exhibits no distension. There is no tenderness.   Musculoskeletal: Normal range of motion. She exhibits no edema or  tenderness.   Neurological: She is oriented to person, place, and time. No cranial nerve deficit.   Skin: Skin is warm and dry. No rash noted. No erythema.   Psychiatric: She has a normal mood and affect. Her behavior is normal. Judgment and thought content normal.   Vitals reviewed.    Results Review:  I have reviewed the labs, radiology results, and diagnostic studies.    Laboratory Data:   Results from last 7 days   Lab Units 06/05/19  0540 06/04/19  1635   WBC 10*3/mm3 3.83* 9.51   HEMOGLOBIN g/dL 10.6* 11.0*   HEMATOCRIT % 34.4* 36.4*   PLATELETS 10*3/mm3 241 247     Results from last 7 days   Lab Units 06/05/19  0540 06/04/19  1744 06/04/19  1635   SODIUM mmol/L 136  --  138   SODIUM, ARTERIAL mmol/L  --  141  --    POTASSIUM mmol/L 4.5  --  4.4   CHLORIDE mmol/L 83*  --  81*   CO2 mmol/L >40.0*  --  >40.0*   BUN mg/dL 19  --  20   CREATININE mg/dL 0.54  --  0.52   CALCIUM mg/dL 9.0  --  9.0   BILIRUBIN mg/dL  --   --  0.4   ALK PHOS U/L  --   --  94   ALT (SGPT) U/L  --   --  15   AST (SGOT) U/L  --   --  26   GLUCOSE mg/dL 121*  --  130*     Radiology Data:   Imaging Results (last 24 hours)     Procedure Component Value Units Date/Time    XR Chest 1 View [[id number]] Collected:  06/04/19 1655     Updated:  06/04/19 1659    Narrative:       EXAMINATION:  XR CHEST 1 VW-  6/4/2019 4:49 PM CDT     HISTORY: Chest pain.     COMPARISON: 11/26/2018.     FINDINGS:  There is mild patchy left perihilar infiltrate. There is no  dense consolidation. 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 Heart size is upper limits  of normal. The thoracic aorta is atheromatous.       Impression:       1. Mild patchy left perihilar infiltrate may represent minimal  pneumonia.  2. COPD/emphysema.        This report was finalized on 06/04/2019 16:56 by Dr. Hugo Licea MD.        I have reviewed the patient current medications.     Assessment/Plan     Active Hospital Problems    Diagnosis   • Acute on chronic respiratory failure with hypoxia and  hypercapnia (CMS/HCC)   • Macrocytic anemia   • Lung infiltrate, left perihilar   • Moderate protein-calorie malnutrition (CMS/HCC)   • Tobacco abuse   • COPD with acute exacerbation (CMS/HCC)     Plan:  1.  Chest x-ray yesterday shows a left perihilar infiltrate which may represent a pneumonia.  Would like to repeat a chest x-ray today, PA and lateral.  2.  Continue duo nebs, Mucinex, Symbicort encouraged use of incentive spirometer.  Add flutter valve.  3.  Continue azithromycin for now, may adjust antibiotic therapy and add Rocephin if left infiltrate is still present on repeat chest x-ray.  4.  Continue Solu-Medrol at current dosage  5.  Withhold any narcotic pain medication for now.  May have Tylenol as needed for pain we will also add Lidoderm patches and aqua K pad if so desired.  6.  Continue BiPAP use for today as she does seem to still be drowsy and confused at times, will see if the patient's trilogy has been adjusted.  Repeat ABG in a.m.  7.  Discontinue IV fluids  8.  TSH is low, check free T4  9.  Physical and Occupational Therapy consults  10.  Labs in a.m.    Discharge Planning: I expect the patient to be discharged to home with home health in ? days.    ROLAND Yanez   06/05/19   10:38 AM        Chart reviewed Patient examined.  Agree with assessment and plan.  Discussed with patient and ROLAND Hill.    Maribell Curry,   06/05/19  1:59 PM

## 2019-06-05 NOTE — PLAN OF CARE
Problem: Patient Care Overview  Goal: Plan of Care Review  Outcome: Ongoing (interventions implemented as appropriate)   06/05/19 4029   Coping/Psychosocial   Plan of Care Reviewed With patient   Plan of Care Review   Progress no change   OTHER   Outcome Summary Patient stayed on BiPap entire shift and has rested well. Tylenol effective for pain control; Atarax for anxiety. IV fluids and abx administered as ordered. Some concern voiced by patient's spouse on whether or not patient's home Trilogy is working correctly. VSS. Safety maintained.        Problem: Pain, Chronic (Adult)  Goal: Acceptable Pain/Comfort Level and Functional Ability  Outcome: Ongoing (interventions implemented as appropriate)      Problem: Fall Risk (Adult)  Goal: Absence of Fall  Outcome: Ongoing (interventions implemented as appropriate)      Problem: Breathing Pattern Ineffective (Adult)  Goal: Effective Oxygenation/Ventilation  Outcome: Ongoing (interventions implemented as appropriate)    Goal: Anxiety/Fear Reduction  Outcome: Ongoing (interventions implemented as appropriate)

## 2019-06-05 NOTE — PLAN OF CARE
Problem: Patient Care Overview  Goal: Plan of Care Review  Outcome: Ongoing (interventions implemented as appropriate)   06/05/19 1510   Coping/Psychosocial   Plan of Care Reviewed With patient;spouse   Plan of Care Review   Progress improving   OTHER   Outcome Summary OT eval completed. Pt has SOA, which increased during tx. Pt was CGA for t/f and functional mobility. pt was supervision with set up to carina socks. Pt required multiple rest breaks. Pt O2 decreased to 80% with minimal activity. Skilled OT recommended to address adls, functional mobility, and activity tolerance. Recommended d/c home with assist and HHOT v. TCU/SNF.

## 2019-06-05 NOTE — PLAN OF CARE
Problem: Patient Care Overview  Goal: Plan of Care Review  Outcome: Ongoing (interventions implemented as appropriate)   06/05/19 7170   Coping/Psychosocial   Plan of Care Reviewed With patient   Plan of Care Review   Progress no change   OTHER   Outcome Summary Patient oriented to place and first name; reorientation done as needed. BiPAP continued. VSS. Safety Maintained. Will continue to monitor.        Problem: Pain, Chronic (Adult)  Goal: Acceptable Pain/Comfort Level and Functional Ability  Outcome: Ongoing (interventions implemented as appropriate)      Problem: Fall Risk (Adult)  Goal: Absence of Fall  Outcome: Ongoing (interventions implemented as appropriate)      Problem: Breathing Pattern Ineffective (Adult)  Goal: Effective Oxygenation/Ventilation  Outcome: Ongoing (interventions implemented as appropriate)    Goal: Anxiety/Fear Reduction  Outcome: Ongoing (interventions implemented as appropriate)

## 2019-06-05 NOTE — PROGRESS NOTES
Malnutrition Severity Assessment    Patient Name:  Teri Tim  YOB: 1960  MRN: [id number]  Admit Date:  6/4/2019    Patient meets criteria for : Severe Malnutrition    Comments:  If in agreement with malnutrition assessment please attest documentation. Thanks.     Malnutrition Severity Assessment  Malnutrition Type: Chronic Disease - Related Malnutrition     Malnutrition Type (last 8 hours)      Malnutrition Severity Assessment     Row Name 06/05/19 1556       Malnutrition Severity Assessment    Malnutrition Type  Chronic Disease - Related Malnutrition    Row Name 06/05/19 1556       Insufficient Energy Intake     Insufficient Energy Intake   <75% of est. energy requirement for > or equal to 3 months    Row Name 06/05/19 1556       Unintentional Weight Loss     Unintentional Weight Loss   Weight loss greater than 10% in six months 22.2%    Row Name 06/05/19 1556       Muscle Loss    Loss of Muscle Mass Findings  Severe    Acromion Bone Region  Severe - squared shoulders, bones, and acromion process protrusion prominent    Patellar Region  Moderate - patella more prominent, less muscle definition around patella    Anterior Thigh Region  Severe - line/depression along thigh, obviously thin    Posterior Calf Region  Severe - thin with very little definition/firmness    Row Name 06/05/19 1556       Fat Loss    Subcutaneous Fat Loss Findings  Moderate    Upper Arm Region  Moderate - some fat tissue, not ample    Thoracic & Lumbar Region  Severe - ribs visible with prominent depressions, iliac crest very prominent    Row Name 06/05/19 1556       Criteria Met (Must meet criteria for severity in at least 2 of these categories: M Wasting, Fat Loss, Fluid, Secondary Signs, Wt. Status, Intake)    Patient meets criteria for   Severe Malnutrition          Electronically signed by:  MARGARET Brady RDN  06/05/19 4:14 PM

## 2019-06-05 NOTE — PAYOR COMM NOTE
"ADMIT INPT 6-4-19  UR  [phone]    82% O2 SAT ON ADMIT  PO2 54.3    Teri Tim (58 y.o. Female)     Date of Birth Social Security Number Address Home Phone MRN    1960  [id number]    [place] [address] [phone] [id number]    Jehovah's witness Marital Status          Yazidi        Admission Date Admission Type Admitting Provider Attending Provider Department, Room/Bed    6/4/19 Emergency Maribell Curry DO Horn, Frances Marie, DO [facility] 4C, 492/1    Discharge Date Discharge Disposition Discharge Destination                       Attending Provider:  Maribell Curry DO    Allergies:  Codeine    Isolation:  None   Infection:  None   Code Status:  CPR    Ht:  152.4 cm (60\")   Wt:  38.2 kg (84 lb 3 oz)    Admission Cmt:  None   Principal Problem:  None                Active Insurance as of 6/4/2019     Primary Coverage     Payor Plan Insurance Group Employer/Plan Group    [employer] [place] [employer] [place]      Payor Plan Address Payor Plan Phone Number Payor Plan Fax Number Effective Dates    [address] [address] [phone]  11/1/2017 - None Entered    [facility] [id number]       Subscriber Name Subscriber Birth Date Member ID       TERI TIM 1960 [id number]                 Emergency Contacts      (Rel.) Home Phone Work Phone Mobile Phone    Rich Tim (Spouse) [phone] -- [phone]               History & Physical      Gema Garcia APRN at 6/4/2019  5:50 PM              [facility] Medicine Services  HISTORY AND PHYSICAL    Date of Admission: 6/4/2019  Primary Care Physician: Lorri Guan APRN    Subjective     Chief Complaint: Shortness of breathing    History of Present Illness  Teri Tim is a 58-year-old female with a past medical history of COPD, hypertension, chronic back pain-untreated, diastolic dysfunction now resolved with normal EF.  Patient also has chronic respiratory " failure on 2 L [place] cannula continuously.  She uses trilogy however  reports this has not been working and home saturations while using trilogy's in the 70s.  [facility] with confusion and shortness of breathing.   states she did report chest pain this morning when she awakened however she denies at this time.  ER evaluation revealed acute on chronic respiratory failure with hypercarbia and hypoxia, mildly elevated d-dimer and macrocytic anemia.  Patient declines to report how many cigarettes she is smoking per day.   provides majority of HPI, states she has been seen by [facility] pulmonary clinic in the remote past.  She is also been seen by [facility] pain [facility] but has stopped due to unhappiness with care provided.  She is currently requesting pain medication and nerve medication.  Currently she denies chest pain, she still somewhat short of breath however significantly improved since arrival.  Patient is admitted for further evaluation and treatment.    Review of Systems   A 10 point review of systems was completed, all negative except for those discussed in HPI    Past Medical History:   Past Medical History:   Diagnosis Date   • CHF (congestive heart failure) (CMS/HCC), normalized 9/2018 echocardiogram    • Chronic back pain    • Chronic respiratory failure (CMS/HCC)    • COPD (chronic obstructive pulmonary disease) (CMS/HCC)    • Hypertension    • Pneumonia        Past Surgical History:   Past Surgical History:   Procedure Laterality Date   • CARPAL TUNNEL RELEASE     • HYSTERECTOMY      complete       Family History: family history includes Cancer in her mother and sister; Heart disease in her brother and father.    Social History:  reports that she has been smoking cigarettes.  She has been smoking about 0.50 packs per day. She has never used smokeless tobacco. She reports that she does not drink alcohol or use drugs.    Code Status: Full, her  " Rich will speak for her      Allergies:  Allergies   Allergen Reactions   • Codeine Nausea And Vomiting       Medications:  Prior to Admission medications    Medication Sig Start Date End Date Taking? Authorizing Provider   budesonide-formoterol (SYMBICORT) 160-4.5 MCG/ACT inhaler Inhale 2 puffs 2 (Two) Times a Day.    ProviderXena MD   guaiFENesin (MUCINEX) 600 MG 12 hr tablet Take 2 tablets by mouth 2 (Two) Times a Day. 4/5/18   Joanna Juan APRN   ipratropium-albuterol (COMBIVENT RESPIMAT)  MCG/ACT inhaler Inhale 1 puff 4 (Four) Times a Day As Needed for Wheezing or Shortness of Air. 10/2/18   Gonsalo Loo MD   metoprolol tartrate (LOPRESSOR) 25 MG tablet Take 0.5 tablets by mouth Every 12 (Twelve) Hours. 10/2/18   Gonsalo Loo MD   nicotine (NICODERM CQ) 21 MG/24HR patch Place 1 patch on the skin as directed by provider Daily. 12/1/18   Jayy De Santiago MD   pantoprazole (PROTONIX) 40 MG EC tablet Take 40 mg by mouth Daily.    Provider, MD Xena       Objective     /78   Pulse 112   Temp 98.2 °F (36.8 °C)   Resp 24   Ht 152.4 cm (60\")   Wt 38.2 kg (84 lb 3 oz)   SpO2 96%   BMI 16.44 kg/m²    Physical Exam   Constitutional: She appears well-developed.   Frail, cachectic   HENT:   Head: Normocephalic and atraumatic.   Eyes: EOM are normal.   Neck: Normal range of motion.   Cardiovascular: Normal rate, regular rhythm and normal heart sounds.   Tachycardic upon arrival however normalized at time of assessment   Pulmonary/Chest: She is in respiratory distress.   Severely diminished throughout without adventitious breath sounds   Abdominal: Soft. Bowel sounds are normal. She exhibits no distension. There is no tenderness.   Musculoskeletal: Normal range of motion. She exhibits no edema.   Neurological: She is alert.   Somewhat obtunded however does answer questions appropriately   Skin: Skin is dry.   Excessively dry   Psychiatric:   Flat " affect       Pertinent Data:   Lab Results (last 72 hours)     Procedure Component Value Units Date/Time    Prealbumin [[id number]]  (Abnormal) Collected:  06/04/19 1635    Specimen:  Blood Updated:  06/04/19 1926     Prealbumin 13.0 mg/dL     Blood Gas, Arterial With Co-Ox [[id number]]  (Abnormal) Collected:  06/04/19 1744    Specimen:  Arterial Blood Updated:  06/04/19 1749     Site Right Radial     Dao's Test Positive     pH, Arterial 7.386 pH units      pCO2, Arterial 89.9 mm Hg      Comment: 86 Value above critical limit        pO2, Arterial 54.3 mm Hg      Comment: 85 Value below critical limit        HCO3, Arterial 53.9 mmol/L      Comment: 83 Value above reference range        Base Excess, Arterial 24.2 mmol/L      Comment: 83 Value above reference range        O2 Saturation, Arterial 89.8 %      Comment: 84 Value below reference range        Hemoglobin, Blood Gas 11.2 g/dL      Comment: 84 Value below reference range        Hematocrit, Blood Gas 34.3 %      Comment: 84 Value below reference range        Oxyhemoglobin 86.3 %      Comment: 84 Value below reference range        Methemoglobin 0.70 %      Carboxyhemoglobin 3.1 %      A-a Gradiant -- mmHg      Comment: UNABLE TO CALCULATE        Temperature 37.0 C      Sodium, Arterial 141 mmol/L      Potassium, Arterial 4.1 mmol/L      Barometric Pressure for Blood Gas 748 mmHg      Modality BiPap     FIO2 40 %      Ventilator Mode BiPAP     Set [facility] Resp Rate 20.0     IPAP 14     Comment: Meter: [id number]     :  [id number]        EPAP 8     Note --     Notified Who DR LANGE     Notified By [id number]     Notified Time 06/04/2019 17:50     Collected by [id number]     pH, Temp Corrected -- pH Units      pCO2, Temperature Corrected -- mm Hg      pO2, Temperature Corrected -- mm Hg     Procalcitonin [[id number]]  (Normal) Collected:  06/04/19 1635    Specimen:  Blood Updated:  06/04/19 1739     Procalcitonin <0.25 ng/mL     Lactic Acid, Plasma [[id number]]   (Normal) Collected:  06/04/19 1707    Specimen:  Blood Updated:  06/04/19 1731     Lactate 0.9 mmol/L     CBC Auto Differential [[id number]]  (Abnormal) Collected:  06/04/19 1635    Specimen:  Blood Updated:  06/04/19 1728     WBC 9.51 10*3/mm3      RBC 3.46 10*6/mm3      Hemoglobin 11.0 g/dL      Hematocrit 36.4 %      .2 fL      MCH 31.8 pg      MCHC 30.2 g/dL      RDW 13.2 %      RDW-SD 51.2 fl      MPV 9.7 fL      Platelets 247 10*3/mm3      Neutrophil % 80.2 %      Lymphocyte % 10.8 %      Monocyte % 7.7 %      Eosinophil % 0.6 %      Basophil % 0.4 %      Immature Grans % 0.3 %      Neutrophils, Absolute 7.62 10*3/mm3      Lymphocytes, Absolute 1.03 10*3/mm3      Monocytes, Absolute 0.73 10*3/mm3      Eosinophils, Absolute 0.06 10*3/mm3      Basophils, Absolute 0.04 10*3/mm3      Immature Grans, Absolute 0.03 10*3/mm3      nRBC 0.0 /100 WBC     BNP [[id number]]  (Normal) Collected:  06/04/19 1635    Specimen:  Blood Updated:  06/04/19 1723     proBNP 302.0 pg/mL     Blood Culture - Blood, Arm, Left [[id number]] Collected:  06/04/19 1705    Specimen:  Blood from Arm, Left Updated:  06/04/19 1718    Blood Culture - Blood, Arm, Right [[id number]] Collected:  06/04/19 1705    Specimen:  Blood from Arm, Right Updated:  06/04/19 1717    Blood Gas, Arterial [[id number]]  (Abnormal) Collected:  06/04/19 1707    Specimen:  Arterial Blood Updated:  06/04/19 1708     Site Right Brachial     Dao's Test N/A     pH, Arterial 7.333 pH units      Comment: 84 Value below reference range        pCO2, Arterial >102.0 mm Hg      Comment: 93 Value above reportable range > 102        pO2, Arterial 79.9 mm Hg      Comment: 84 Value below reference range        HCO3, Arterial 55.3 mmol/L      Comment: 83 Value above reference range        Base Excess, Arterial 24.5 mmol/L      Comment: 83 Value above reference range        O2 Saturation, Arterial 96.2 %      Temperature 37.0 C      Barometric Pressure for Blood Gas 749 mmHg       Modality Nasal Cannula     Flow Rate 2.5 lpm      Ventilator Mode NA     Notified Who DR LANGE     Notified By [id number]     Notified Time 06/04/2019 17:13     Collected by [id number]     Comment: Meter: [id number]     :  [id number]       Troponin [[id number]]  (Normal) Collected:  06/04/19 1635    Specimen:  Blood Updated:  06/04/19 1704     Troponin I <0.012 ng/mL     D-dimer, Quantitative [[id number]]  (Abnormal) Collected:  06/04/19 1635    Specimen:  Blood Updated:  06/04/19 1702     D-Dimer, Quantitative 0.66 mg/L (FEU)     Narrative:       Reference Range is 0-0.50 mg/L FEU. However, results <0.50 mg/L FEU tends to rule out DVT or PE. Results >0.50 mg/L FEU are not useful in predicting absence or presence of DVT or PE.    Comprehensive Metabolic Panel [[id number]]  (Abnormal) Collected:  06/04/19 1635    Specimen:  Blood Updated:  06/04/19 1656     Glucose 130 mg/dL      BUN 20 mg/dL      Creatinine 0.52 mg/dL      Sodium 138 mmol/L      Potassium 4.4 mmol/L      Chloride 81 mmol/L      CO2 >40.0 mmol/L      Calcium 9.0 mg/dL      Total Protein 7.0 g/dL      Albumin 3.70 g/dL      ALT (SGPT) 15 U/L      AST (SGOT) 26 U/L      Alkaline Phosphatase 94 U/L      Total Bilirubin 0.4 mg/dL      eGFR Non African Amer 122 mL/min/1.73      Globulin 3.3 gm/dL      A/G Ratio 1.1 g/dL      BUN/Creatinine Ratio 38.5     Anion Gap -- mmol/L      Comment: Unable to calculate Anion Gap.       Narrative:       GFR Normal >60  Chronic Kidney Disease <60  Kidney Failure <15        Imaging Results (last 24 hours)     Procedure Component Value Units Date/Time    XR Chest 1 View [[id number]] Collected:  06/04/19 1655     Updated:  06/04/19 1659    Narrative:       EXAMINATION:  XR CHEST 1 VW-  6/4/2019 4:49 PM CDT     HISTORY: Chest pain.     COMPARISON: 11/26/2018.     FINDINGS:  There is mild patchy left perihilar infiltrate. There is no  dense consolidation. 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 Heart size is upper limits  of normal. The  thoracic aorta is atheromatous.       Impression:       1. Mild patchy left perihilar infiltrate may represent minimal  pneumonia.  2. COPD/emphysema.        This report was finalized on 06/04/2019 16:56 by Dr. Hugo Licea MD.            I have personally reviewed and interpreted the radiology studies and ECG obtained at time of admission.     Assessment / Plan     Assessment:   Active Hospital Problems    Diagnosis   • Acute on chronic respiratory failure with hypoxia and hypercapnia (CMS/HCC)   • Macrocytic anemia   • Lung infiltrate, left perihilar   • Moderate protein-calorie malnutrition (CMS/HCC)   • Tobacco abuse   • COPD with acute exacerbation (CMS/HCC)       Plan:   1.  Admit as inpatient  2.  Start azithromycin 500 mg IV daily  3.  Duo nebs, Symbicort, incentive spirometry, BiPAP, Mucinex  4.  Solu-Medrol 60 mg IV every 8 hours  5.  Home medications reviewed and restarted as appropriate  6.  DVT prophylaxis with low-dose Lovenox  7.  Gentle hydration normal saline 75 mL/hour  8.  Labs in a.m. BMP, CBC, A1c, lipid panel, TSH  9.  Consult  in a.m. for trilogy evaluation of patient's home equipment  10.  Nutrition consult for evaluation nutritional status, BMI 16.44    I discussed the patient's findings and my recommendations with: Nathan Antonio DO  Time spent 35 minutes      ROLADN Pillai  06/04/19   7:36 PM    Electronically signed by Gema Garcia APRN at 6/4/2019  8:05 PM          Emergency Department Notes      Wei Altman MD at 6/4/2019  5:12 PM          Subjective     Chest Pain   Pain location:  Substernal area  Pain quality: aching and sharp    Pain radiates to:  Does not radiate  Pain severity:  Moderate  Onset quality:  Gradual  Progression:  Worsening  Chronicity:  Chronic  Context: not breathing, not drug use, not eating, not lifting, not at rest and not trauma    Relieved by:  Nothing  Worsened by:  Nothing  Ineffective treatments:  None tried  Associated  symptoms: shortness of breath    Associated symptoms: no abdominal pain, no AICD problem, no altered mental status, no anorexia, no back pain, no claudication, no cough, no diaphoresis, no dizziness, no dysphagia, no fatigue, no fever, no headache, no lower extremity edema, no nausea, no near-syncope, no numbness, no orthopnea, no palpitations, no vomiting and no weakness    Risk factors: high cholesterol, hypertension and smoking    Risk factors: no aortic disease, no coronary artery disease, no diabetes mellitus, no Andrew-Danlos syndrome, not male, no Marfan's syndrome and not obese    Shortness of Breath   Associated symptoms: chest pain    Associated symptoms: no abdominal pain, no claudication, no cough, no diaphoresis, no ear pain, no fever, no headaches, no rash, no sore throat and no vomiting        Review of Systems   Constitutional: Negative.  Negative for activity change, appetite change, chills, diaphoresis, fatigue and fever.   HENT: Negative for congestion, drooling, ear pain, facial swelling, hearing loss, sinus pressure, sore throat and trouble swallowing.    Eyes: Negative.  Negative for discharge.   Respiratory: Positive for shortness of breath. Negative for cough.    Cardiovascular: Positive for chest pain. Negative for palpitations, orthopnea, claudication and near-syncope.   Gastrointestinal: Negative for abdominal distention, abdominal pain, anorexia, blood in stool, diarrhea, nausea and vomiting.   Endocrine: Negative.  Negative for cold intolerance, heat intolerance, polydipsia, polyphagia and polyuria.   Genitourinary: Negative.  Negative for dysuria, flank pain and urgency.   Musculoskeletal: Negative.  Negative for arthralgias, back pain, myalgias and neck stiffness.   Skin: Negative.  Negative for color change, pallor and rash.   Allergic/Immunologic: Negative.    Neurological: Negative.  Negative for dizziness, seizures, speech difficulty, weakness, numbness and headaches.    Hematological: Negative.  Negative for adenopathy.   All other systems reviewed and are negative.      Past Medical History:   Diagnosis Date   • CHF (congestive heart failure) (CMS/HCC)    • COPD (chronic obstructive pulmonary disease) (CMS/HCC)    • Hypertension    • Pneumonia        Allergies   Allergen Reactions   • Codeine Nausea And Vomiting       Past Surgical History:   Procedure Laterality Date   • CARPAL TUNNEL RELEASE     • HYSTERECTOMY      complete       Family History   Problem Relation Age of Onset   • Cancer Mother    • Heart disease Father    • Cancer Sister    • Heart disease Brother        Social History     Socioeconomic History   • Marital status:      Spouse name: Not on file   • Number of children: Not on file   • Years of education: Not on file   • Highest education level: Not on file   Tobacco Use   • Smoking status: Current Every Day Smoker     Packs/day: 0.50     Types: Cigarettes   • Smokeless tobacco: Never Used   Substance and Sexual Activity   • Alcohol use: No   • Drug use: No   • Sexual activity: Defer           Objective   Physical Exam   Constitutional: She is oriented to person, place, and time. She appears well-developed and well-nourished. She appears ill.   HENT:   Head: Normocephalic.   Right Ear: External ear normal.   Eyes: Conjunctivae are normal. Pupils are equal, round, and reactive to light.   Neck: Normal range of motion. Neck supple.   Cardiovascular: Regular rhythm, normal heart sounds and intact distal pulses. Tachycardia present. PMI is not displaced. Exam reveals no decreased pulses.   No murmur heard.  Pulmonary/Chest: No accessory muscle usage. Tachypnea noted. She is in respiratory distress. She has decreased breath sounds in the right middle field and the left middle field. She has wheezes in the right middle field, the right lower field, the left middle field and the left lower field. She has no rales. She exhibits no tenderness.   Abdominal: Soft.  Bowel sounds are normal. There is no tenderness.   Musculoskeletal: Normal range of motion. She exhibits no edema or tenderness.   Lower extremity exam bilaterally is unremarkable.  There is no right or left calf tenderness .  There is no palpable venous cord.  No obvious difference in the size of the legs.  No pitting edema.  The dorsalis pedis and posterior tibial femoral and popliteal pulses are palpable and +2 bilaterally.  Homans sign is negative   Neurological: She is alert and oriented to person, place, and time. She has normal reflexes. No cranial nerve deficit. Coordination normal.   Skin: Skin is warm. No rash noted. No erythema.   Nursing note and vitals reviewed.      Procedures          ED Course  ED Course as of Jun 04 1750   Tue Jun 04, 2019   1747 Case discussed with the patient will admit her to the hospitalist service  [TS]      ED Course User Index  [TS] Wei Altman MD                  [facility]      Final diagnoses:   Acute respiratory failure with hypoxia and hypercapnia (CMS/HCC)   Pneumonia due to infectious organism, unspecified laterality, unspecified part of lung            Wei Altman MD  06/04/19 1750      Electronically signed by Wei Altman MD at 6/4/2019  5:50 PM       ICU Vital Signs     Row Name 06/05/19 0850 06/05/19 0823 06/05/19 0659 06/05/19 0652 06/05/19 0529       Vitals    Temp  --  98.3 °F (36.8 °C)  --  --  --    Temp src  --  Oral  --  --  --    Pulse  --  98  88  87  --    Heart Rate Source  --  Monitor  --  Monitor  --    Resp  --  18  --  18  --    Resp Rate Source  --  Visual  --  Visual  --    BP  --  111/61  --  --  --    BP Location  --  Left arm  --  --  --    BP Method  --  Automatic  --  --  --    Patient Position  --  Lying  --  --  --       Oxygen Therapy    SpO2  --  93 %  --  92 %  93 %    Pulse Oximetry Type  --  --  --  Continuous  Continuous    Device (Oxygen Therapy)  nasal cannula  --  --  nasal cannula  nasal cannula    Flow (L/min)  3  --  --  3  3  "   Row Name 06/05/19 0340 06/05/19 0242 06/05/19 0012 06/05/19 0005 06/05/19 0000       Vitals    Temp  98 °F (36.7 °C)  --  --  --  --    Temp src  Axillary  --  --  --  --    Pulse  83  --  87  86  --    Heart Rate Source  Monitor  --  Monitor  Monitor  --    Resp  22  --  --  22  --    Resp Rate Source  Visual  --  --  Monitor  --    Resp Rate (Observed) Vent  --  24  --  --  22    BP  121/65  --  --  --  --    BP Location  Left arm  --  --  --  --    BP Method  Automatic  --  --  --  --    Patient Position  Lying  --  --  --  --       Oxygen Therapy    SpO2  91 %  --  95 %  97 %  --    Pulse Oximetry Type  Continuous  --  Continuous  Continuous  --    Device (Oxygen Therapy)  NPPV/NIV  --  NPPV/NIV  NPPV/NIV  --    Oxygen Concentration (%)  --  --  40 decreased to 35%.  --  --    Row Name 06/04/19 2353 06/04/19 2014 06/04/19 2000 06/04/19 1951 06/04/19 1944       Vitals    Temp  98.1 °F (36.7 °C)  --  --  --  --    Temp src  Axillary  --  --  --  --    Pulse  87  --  105  108  108    Heart Rate Source  Monitor  --  Monitor  Monitor  Monitor    Resp  22  --  --  20  27    Resp Rate Source  Monitor  --  --  Visual  Monitor    BP  113/65  --  --  --  --    BP Location  Left arm  --  --  --  --    BP Method  Automatic  --  --  --  --    Patient Position  Lying  --  --  --  --       Oxygen Therapy    SpO2  97 %  --  100 %  --  95 %    Pulse Oximetry Type  Continuous  --  Continuous  --  Continuous    Device (Oxygen Therapy)  NPPV/NIV  NPPV/NIV  NPPV/NIV  --  NPPV/NIV    Oxygen Concentration (%)  --  40  40  --  40    Row Name 06/04/19 1901 06/04/19 1845 06/04/19 1829 06/04/19 18:25:39 06/04/19 1800       Height and Weight    Height  152.4 cm (60\")  --  --  --  --    Height Method  Stated  --  --  --  --    Weight  38.2 kg (84 lb 3 oz)  --  --  --  --    Weight Method  Bed scale  --  --  --  --    Ideal Body Weight (IBW) (kg)  45.86  --  --  --  --    BSA (Calculated - sq m)  1.29 sq meters  --  --  --  --    BMI " "(Calculated)  16.4  --  --  --  --    Weight in (lb) to have BMI = 25  127.7  --  --  --  --       Vitals    Temp  --  --  98.2 °F (36.8 °C)  98.6 °F (37 °C)  --    Temp src  --  --  --  Oral  --    Pulse  --  112  114  114  111    Heart Rate Source  --  Monitor  --  Monitor  --    Resp  --  --  24  18  --    Resp Rate Source  --  --  --  Visual  --    Resp Rate (Observed) Vent  21  --  --  --  --    BP  --  --  130/78  115/74  92/72    BP Location  --  --  --  Right arm  --    BP Method  --  --  --  Automatic  --    Patient Position  --  --  --  Lying  --       Oxygen Therapy    SpO2  --  96 %  93 %  94 %  93 %    Pulse Oximetry Type  --  Continuous  Continuous  --  --    Device (Oxygen Therapy)  --  NPPV/NIV  NPPV/NIV  other (see comments) bipap  --    Oxygen Concentration (%)  --  24 24  --  --    Row Name 06/04/19 1746 06/04/19 1731 06/04/19 1716 06/04/19 1711 06/04/19 1701       Vitals    Pulse  109  111  97  96  --    Resp  --  --  --  16  --    BP  111/70  130/67  123/55  --  133/65       Oxygen Therapy    SpO2  94 %  82 %  (Abnormal)   100 %  94 %  --    Device (Oxygen Therapy)  --  --  --  nasal cannula  --    Flow (L/min)  --  --  --  2.5  --    Row Name 06/04/19 1700 06/04/19 1646 06/04/19 1630 06/04/19 1622 06/04/19 1621       Height and Weight    Height  --  --  --  152.4 cm (60\")  --    Height Method  --  --  --  Stated  --    Weight  --  --  --  36.3 kg (80 lb)  --    Weight Method  --  --  --  Bed scale  --    Ideal Body Weight (IBW) (kg)  --  --  --  45.86  --    BSA (Calculated - sq m)  --  --  --  1.26 sq meters  --    BMI (Calculated)  --  --  --  15.6  --    Weight in (lb) to have BMI = 25  --  --  --  127.7  --       Vitals    Temp  --  --  --  98.6 °F (37 °C)  --    Temp src  --  --  --  Oral  --    Pulse  98  99  101  105  104    Heart Rate Source  --  --  --  Monitor  --    Resp  --  --  --  22  --    Resp Rate Source  --  --  --  Visual  --    BP  --  127/60  118/65  128/72  128/72    BP " Location  --  --  --  Left arm  --    BP Method  --  --  --  Automatic  --    Patient Position  --  --  --  Lying  --       Oxygen Therapy    SpO2  96 %  96 %  95 %  94 %  94 %    Device (Oxygen Therapy)  --  --  --  nasal cannula  --    Flow (L/min)  --  --  --  3 wears 2L at home  --        Intake & Output (last day)       06/04 0701 - 06/05 0700 06/05 0701 - 06/06 0700    P.O.  240    IV Piggyback 250     Total Intake(mL/kg) 250 (6.5) 240 (6.3)    Net +250 +240              Lines, Drains & Airways    Active LDAs     Name:   Placement date:   Placement time:   Site:   Days:    Peripheral IV 06/04/19 1635 Right Forearm   06/04/19 1635    Forearm   less than 1                Hospital Medications (all)       Dose Frequency Start End    acetaminophen (TYLENOL) tablet 650 mg 650 mg Every 4 Hours PRN 6/4/2019     Sig - Route: Take 2 tablets by mouth Every 4 (Four) Hours As Needed for Mild Pain . - Oral    albuterol (PROVENTIL) nebulizer solution 0.083% 2.5 mg/3mL 2.5 mg Every 15 Minutes 6/4/2019 6/4/2019    Sig - Route: Take 2.5 mg by nebulization Every 15 (Fifteen) Minutes. - Nebulization    aspirin chewable tablet 324 mg 324 mg Once 6/4/2019 6/4/2019    Sig - Route: Chew 4 tablets 1 (One) Time. - Oral    Cosign for Ordering: Accepted by Wei Altman MD on 6/5/2019  6:36 AM    AZITHROMYCIN 500 MG/250 ML 0.9% NS IVPB (vial-mate) 500 mg Every 24 Hours 6/4/2019 6/11/2019    Sig - Route: Infuse 500 mg into a venous catheter Daily. - Intravenous    budesonide-formoterol (SYMBICORT) 160-4.5 MCG/ACT inhaler 2 puff 2 puff 2 Times Daily - RT 6/4/2019     Sig - Route: Inhale 2 puffs 2 (Two) Times a Day. - Inhalation    enoxaparin (LOVENOX) syringe 30 mg 30 mg Every 24 Hours 6/4/2019     Sig - Route: Inject 0.3 mL under the skin into the appropriate area as directed Daily. - Subcutaneous    guaiFENesin (MUCINEX) 12 hr tablet 1,200 mg 1,200 mg 2 Times Daily 6/4/2019     Sig - Route: Take 2 tablets by mouth 2 (Two) Times a  "Day. - Oral    hydrOXYzine (ATARAX) tablet 50 mg 50 mg 3 Times Daily PRN 6/4/2019     Sig - Route: Take 2 tablets by mouth 3 (Three) Times a Day As Needed for Anxiety. - Oral    ipratropium-albuterol (DUO-NEB) nebulizer solution 3 mL 3 mL Once 6/4/2019 6/4/2019    Sig - Route: Take 3 mL by nebulization 1 (One) Time. - Nebulization    ipratropium-albuterol (DUO-NEB) nebulizer solution 3 mL 3 mL Every 6 Hours - RT 6/4/2019     Sig - Route: Take 3 mL by nebulization Every 6 (Six) Hours. - Nebulization    levoFLOXacin (LEVAQUIN) 750 mg/150 mL D5W (premix) (LEVAQUIN) 750 mg 750 mg Once 6/4/2019 6/4/2019    Sig - Route: Infuse 150 mL into a venous catheter 1 (One) Time. - Intravenous    lidocaine (LIDODERM) 5 % 2 patch 2 patch Every 24 Hours Scheduled 6/5/2019     Sig - Route: Place 2 patches on the skin as directed by provider Daily. - Transdermal    methylPREDNISolone sodium succinate (SOLU-Medrol) injection 125 mg 125 mg Once 6/4/2019 6/4/2019    Sig - Route: Infuse 2 mL into a venous catheter 1 (One) Time. - Intravenous    methylPREDNISolone sodium succinate (SOLU-Medrol) injection 60 mg 60 mg Every 8 Hours 6/5/2019     Sig - Route: Infuse 1.5 mL into a venous catheter Every 8 (Eight) Hours. - Intravenous    metoprolol tartrate (LOPRESSOR) tablet 12.5 mg 12.5 mg Every 12 Hours Scheduled 6/4/2019     Sig - Route: Take 0.5 tablets by mouth Every 12 (Twelve) Hours. - Oral    nicotine (NICODERM CQ) 14 MG/24HR patch 1 patch 1 patch Every 24 Hours 6/4/2019     Sig - Route: Place 1 patch on the skin as directed by provider Daily. - Transdermal    ondansetron (ZOFRAN) injection 4 mg 4 mg Every 6 Hours PRN 6/4/2019     Sig - Route: Infuse 2 mL into a venous catheter Every 6 (Six) Hours As Needed for Nausea or Vomiting. - Intravenous    Linked Group 1:  \"Or\" Linked Group Details        ondansetron (ZOFRAN) tablet 4 mg 4 mg Every 6 Hours PRN 6/4/2019     Sig - Route: Take 1 tablet by mouth Every 6 (Six) Hours As Needed for " "Nausea or Vomiting. - Oral    Linked Group 1:  \"Or\" Linked Group Details        pantoprazole (PROTONIX) EC tablet 40 mg 40 mg Every Early Morning 6/5/2019     Sig - Route: Take 1 tablet by mouth Every Morning. - Oral    sodium chloride 0.9 % flush 10 mL 10 mL As Needed 6/4/2019     Sig - Route: Infuse 10 mL into a venous catheter As Needed for Line Care. - Intravenous    Cosign for Ordering: Accepted by Wei Altman MD on 6/5/2019  6:36 AM    sodium chloride 0.9 % flush 3 mL 3 mL Every 12 Hours Scheduled 6/4/2019     Sig - Route: Infuse 3 mL into a venous catheter Every 12 (Twelve) Hours. - Intravenous    sodium chloride 0.9 % flush 3-10 mL 3-10 mL As Needed 6/4/2019     Sig - Route: Infuse 3-10 mL into a venous catheter As Needed for Line Care. - Intravenous    sodium chloride 0.9 % infusion (Discontinued) 75 mL/hr Continuous 6/4/2019 6/5/2019    Sig - Route: Infuse 75 mL/hr into a venous catheter Continuous. - Intravenous            Lab Results (last 24 hours)     Procedure Component Value Units Date/Time    T4, Free [[id number]] Collected:  06/05/19 0540    Specimen:  Blood Updated:  06/05/19 1103    TSH [[id number]]  (Abnormal) Collected:  06/05/19 0540    Specimen:  Blood Updated:  06/05/19 0659     TSH 0.143 mIU/mL     Hemoglobin A1c [[id number]] Collected:  06/05/19 0540    Specimen:  Blood Updated:  06/05/19 0645     Hemoglobin A1C 4.5 %     Narrative:       Less than 6.0           Non-Diabetic Range  6.0-7.0                 ADA Therapeutic Target  Greater than 7.0        Action Suggested    Lipid Panel [[id number]]  (Abnormal) Collected:  06/05/19 0540    Specimen:  Blood Updated:  06/05/19 0640     Total Cholesterol 226 mg/dL      Triglycerides 172 mg/dL      HDL Cholesterol 45 mg/dL      LDL Cholesterol  148 mg/dL      LDL/HDL Ratio 3.26    Basic Metabolic Panel [[id number]]  (Abnormal) Collected:  06/05/19 0540    Specimen:  Blood Updated:  06/05/19 0633     Glucose 121 mg/dL      BUN 19 mg/dL      " Creatinine 0.54 mg/dL      Sodium 136 mmol/L      Potassium 4.5 mmol/L      Chloride 83 mmol/L      CO2 >40.0 mmol/L      Calcium 9.0 mg/dL      eGFR Non African Amer 117 mL/min/1.73      BUN/Creatinine Ratio 35.2     Anion Gap -- mmol/L      Comment: Unable to calculate Anion Gap.       Narrative:       GFR Normal >60  Chronic Kidney Disease <60  Kidney Failure <15    CBC (No Diff) [[id number]]  (Abnormal) Collected:  06/05/19 0540    Specimen:  Blood Updated:  06/05/19 0620     WBC 3.83 10*3/mm3      RBC 3.34 10*6/mm3      Hemoglobin 10.6 g/dL      Hematocrit 34.4 %      .0 fL      MCH 31.7 pg      MCHC 30.8 g/dL      RDW 13.1 %      RDW-SD 49.4 fl      MPV 10.3 fL      Platelets 241 10*3/mm3     Prealbumin [[id number]]  (Abnormal) Collected:  06/04/19 1635    Specimen:  Blood Updated:  06/04/19 1926     Prealbumin 13.0 mg/dL     Blood Gas, Arterial With Co-Ox [[id number]]  (Abnormal) Collected:  06/04/19 1744    Specimen:  Arterial Blood Updated:  06/04/19 1749     Site Right Radial     Dao's Test Positive     pH, Arterial 7.386 pH units      pCO2, Arterial 89.9 mm Hg      Comment: 86 Value above critical limit        pO2, Arterial 54.3 mm Hg      Comment: 85 Value below critical limit        HCO3, Arterial 53.9 mmol/L      Comment: 83 Value above reference range        Base Excess, Arterial 24.2 mmol/L      Comment: 83 Value above reference range        O2 Saturation, Arterial 89.8 %      Comment: 84 Value below reference range        Hemoglobin, Blood Gas 11.2 g/dL      Comment: 84 Value below reference range        Hematocrit, Blood Gas 34.3 %      Comment: 84 Value below reference range        Oxyhemoglobin 86.3 %      Comment: 84 Value below reference range        Methemoglobin 0.70 %      Carboxyhemoglobin 3.1 %      A-a Gradiant -- mmHg      Comment: UNABLE TO CALCULATE        Temperature 37.0 C      Sodium, Arterial 141 mmol/L      Potassium, Arterial 4.1 mmol/L      Barometric Pressure for Blood  Gas 748 mmHg      Modality BiPap     FIO2 40 %      Ventilator Mode BiPAP     Set Mech Resp Rate 20.0     IPAP 14     Comment: Meter: [id number]     :  [id number]        EPAP 8     Note --     Notified Who DR LANGE     Notified By [id number]     Notified Time 06/04/2019 17:50     Collected by [id number]     pH, Temp Corrected -- pH Units      pCO2, Temperature Corrected -- mm Hg      pO2, Temperature Corrected -- mm Hg     [place] Draw [[id number]] Collected:  06/04/19 1635    Specimen:  Blood Updated:  06/04/19 1745    Narrative:       The following orders were created for panel order [place] Draw.  Procedure                               Abnormality         Status                     ---------                               -----------         ------                     Light Blue Top[[id number]]                                   Final result               Green Top (Gel)[[id number]]                                  Final result               Lavender Top[[id number]]                                     Final result               Red Top[[id number]]                                          Final result                 Please view results for these tests on the individual orders.    Light Blue Top [[id number]] Collected:  06/04/19 1635    Specimen:  Blood Updated:  06/04/19 1745     Extra Tube hold for add-on     Comment: Auto resulted       Green Top (Gel) [[id number]] Collected:  06/04/19 1635    Specimen:  Blood Updated:  06/04/19 1745     Extra Tube Hold for add-ons.     Comment: Auto resulted.       Lavender Top [[id number]] Collected:  06/04/19 1635    Specimen:  Blood Updated:  06/04/19 1745     Extra Tube hold for add-on     Comment: Auto resulted       Red Top [[id number]] Collected:  06/04/19 1635    Specimen:  Blood Updated:  06/04/19 1745     Extra Tube Hold for add-ons.     Comment: Auto resulted.       Procalcitonin [[id number]]  (Normal) Collected:  06/04/19 1635    Specimen:  Blood Updated:  06/04/19 1739      Procalcitonin <0.25 ng/mL     Narrative:       SIRS, sepsis, severe sepsis, and septic shock are categorized according to the criteria of the consensus conference of the American College of Chest Physicians/Society of Critical Care Medicine.    PCT < 0.5 ng/mL     Systemic infection (sepsis) is not likely.    PCT >0.5 and < 2.0 ng/mL Systemic infection (sepsis) is possible, but other conditions are known to elevate PCT as well.    PCT > 2.0 ng/mL     Systemic infection (sepsis) is likely, unless other causes are known.      PCT > 10.0 ng/mL    Important systemic inflammatory response, almost exclusively due to severe bacterial sepsis or septic shock.    PCT values of < 0.5 ng/mL do not exclude an infection, because localized infections (without systemic signs) may be associated with such low concentrations, or a systemic infection in its initial stages (<6 hours).  Increased PCT can occur without infection.  PCT concentrations between 0.5 and 2.0 ng/mL should be interpreted taking into account the patients history.  It is recommended to retest PCT within 6-24 hours if any concentrations < 2.0 ng/mL are obtained.    Lactic Acid, Plasma [[id number]]  (Normal) Collected:  06/04/19 1707    Specimen:  Blood Updated:  06/04/19 1731     Lactate 0.9 mmol/L     CBC & Differential [[id number]] Collected:  06/04/19 1635    Specimen:  Blood Updated:  06/04/19 1728    Narrative:       The following orders were created for panel order CBC & Differential.  Procedure                               Abnormality         Status                     ---------                               -----------         ------                     CBC Auto Differential[[id number]]        Abnormal            Final result                 Please view results for these tests on the individual orders.    CBC Auto Differential [[id number]]  (Abnormal) Collected:  06/04/19 1635    Specimen:  Blood Updated:  06/04/19 1728     WBC 9.51 10*3/mm3      RBC 3.46  10*6/mm3      Hemoglobin 11.0 g/dL      Hematocrit 36.4 %      .2 fL      MCH 31.8 pg      MCHC 30.2 g/dL      RDW 13.2 %      RDW-SD 51.2 fl      MPV 9.7 fL      Platelets 247 10*3/mm3      Neutrophil % 80.2 %      Lymphocyte % 10.8 %      Monocyte % 7.7 %      Eosinophil % 0.6 %      Basophil % 0.4 %      Immature Grans % 0.3 %      Neutrophils, Absolute 7.62 10*3/mm3      Lymphocytes, Absolute 1.03 10*3/mm3      Monocytes, Absolute 0.73 10*3/mm3      Eosinophils, Absolute 0.06 10*3/mm3      Basophils, Absolute 0.04 10*3/mm3      Immature Grans, Absolute 0.03 10*3/mm3      nRBC 0.0 /100 WBC     BNP [[id number]]  (Normal) Collected:  06/04/19 1635    Specimen:  Blood Updated:  06/04/19 1723     proBNP 302.0 pg/mL     Blood Culture - Blood, Arm, Left [[id number]] Collected:  06/04/19 1705    Specimen:  Blood from Arm, Left Updated:  06/04/19 1718    Blood Culture - Blood, Arm, Right [[id number]] Collected:  06/04/19 1705    Specimen:  Blood from Arm, Right Updated:  06/04/19 1717    Blood Gas, Arterial [[id number]]  (Abnormal) Collected:  06/04/19 1707    Specimen:  Arterial Blood Updated:  06/04/19 1708     Site Right Brachial     Dao's Test N/A     pH, Arterial 7.333 pH units      Comment: 84 Value below reference range        pCO2, Arterial >102.0 mm Hg      Comment: 93 Value above reportable range > 102        pO2, Arterial 79.9 mm Hg      Comment: 84 Value below reference range        HCO3, Arterial 55.3 mmol/L      Comment: 83 Value above reference range        Base Excess, Arterial 24.5 mmol/L      Comment: 83 Value above reference range        O2 Saturation, Arterial 96.2 %      Temperature 37.0 C      Barometric Pressure for Blood Gas 749 mmHg      Modality Nasal Cannula     Flow Rate 2.5 lpm      Ventilator Mode NA     Notified Who DR LANGE     Notified By [id number]     Notified Time 06/04/2019 17:13     Collected by [id number]     Comment: Meter: [id number]     :  [id number]       Troponin  [[id number]]  (Normal) Collected:  06/04/19 1635    Specimen:  Blood Updated:  06/04/19 1704     Troponin I <0.012 ng/mL     D-dimer, Quantitative [[id number]]  (Abnormal) Collected:  06/04/19 1635    Specimen:  Blood Updated:  06/04/19 1702     D-Dimer, Quantitative 0.66 mg/L (FEU)     Narrative:       Reference Range is 0-0.50 mg/L FEU. However, results <0.50 mg/L FEU tends to rule out DVT or PE. Results >0.50 mg/L FEU are not useful in predicting absence or presence of DVT or PE.    Comprehensive Metabolic Panel [[id number]]  (Abnormal) Collected:  06/04/19 1635    Specimen:  Blood Updated:  06/04/19 1656     Glucose 130 mg/dL      BUN 20 mg/dL      Creatinine 0.52 mg/dL      Sodium 138 mmol/L      Potassium 4.4 mmol/L      Chloride 81 mmol/L      CO2 >40.0 mmol/L      Calcium 9.0 mg/dL      Total Protein 7.0 g/dL      Albumin 3.70 g/dL      ALT (SGPT) 15 U/L      AST (SGOT) 26 U/L      Alkaline Phosphatase 94 U/L      Total Bilirubin 0.4 mg/dL      eGFR Non African Amer 122 mL/min/1.73      Globulin 3.3 gm/dL      A/G Ratio 1.1 g/dL      BUN/Creatinine Ratio 38.5     Anion Gap -- mmol/L      Comment: Unable to calculate Anion Gap.       Narrative:       GFR Normal >60  Chronic Kidney Disease <60  Kidney Failure <15        Imaging Results (last 24 hours)     Procedure Component Value Units Date/Time    XR Chest 1 View [[id number]] Collected:  06/04/19 1655     Updated:  06/04/19 1659    Narrative:       EXAMINATION:  XR CHEST 1 VW-  6/4/2019 4:49 PM CDT     HISTORY: Chest pain.     COMPARISON: 11/26/2018.     FINDINGS:  There is mild patchy left perihilar infiltrate. There is no  dense consolidation. 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 Heart size is upper limits  of normal. The thoracic aorta is atheromatous.       Impression:       1. Mild patchy left perihilar infiltrate may represent minimal  pneumonia.  2. COPD/emphysema.        This report was finalized on 06/04/2019 16:56 by Dr. Hugo Licea MD.        Orders (last 24  hrs)     Start     Ordered    06/06/19 0600  Blood Gas, Arterial  Morning Draw      06/05/19 1057    06/06/19 0600  Basic Metabolic Panel  Morning Draw      06/05/19 1058    06/06/19 0600  CBC (No Diff)  Morning Draw      06/05/19 1058    06/05/19 1430  Oscillating Positive Expiratory Pressure (OPEP)  3 Times Daily - RT      06/05/19 1055    06/05/19 1145  lidocaine (LIDODERM) 5 % 2 patch  Every 24 Hours Scheduled      06/05/19 1057    06/05/19 1059  T4, Free  Once      06/05/19 1058    06/05/19 1058  Apply Heat To Affected Area  Once     Comments:  May use aqua K pad if desired for back pain    06/05/19 1057    06/05/19 1055  XR Chest PA & Lateral  1 Time Imaging      06/05/19 1055    06/05/19 1055  PT Consult: Eval & Treat Evaluate Patient Based on RN Assessment & Documentation  Once      06/05/19 1055    06/05/19 1055  OT Consult: Eval & Treat Deconditioning, energy conservation  Once      06/05/19 1055    06/05/19 0600  pantoprazole (PROTONIX) EC tablet 40 mg  Every Early Morning      06/04/19 1906    06/05/19 0600  Basic Metabolic Panel  Morning Draw      06/04/19 1906    06/05/19 0600  CBC (No Diff)  Morning Draw      06/04/19 1906    06/05/19 0600  Hemoglobin A1c  Morning Draw      06/04/19 1906    06/05/19 0600  TSH  Morning Draw      06/04/19 1906 06/05/19 0600  Lipid Panel  Morning Draw      06/04/19 1906    06/05/19 0100  methylPREDNISolone sodium succinate (SOLU-Medrol) injection 60 mg  Every 8 Hours      06/04/19 1906    06/04/19 2130  budesonide-formoterol (SYMBICORT) 160-4.5 MCG/ACT inhaler 2 puff  2 Times Daily - RT      06/04/19 1906 06/04/19 2100  guaiFENesin (MUCINEX) 12 hr tablet 1,200 mg  2 Times Daily      06/04/19 1906    06/04/19 2100  metoprolol tartrate (LOPRESSOR) tablet 12.5 mg  Every 12 Hours Scheduled      06/04/19 1906 06/04/19 2100  sodium chloride 0.9 % flush 3 mL  Every 12 Hours Scheduled      06/04/19 1906 06/04/19 2000  Vital Signs  Every 4 Hours      06/04/19 1906     06/04/19 2000  enoxaparin (LOVENOX) syringe 30 mg  Every 24 Hours      06/04/19 1906 06/04/19 2000  sodium chloride 0.9 % infusion  Continuous,   Status:  Discontinued      06/04/19 1906 06/04/19 2000  AZITHROMYCIN 500 MG/250 ML 0.9% NS IVPB (vial-mate)  Every 24 Hours      06/04/19 1906    06/04/19 2000  ipratropium-albuterol (DUO-NEB) nebulizer solution 3 mL  Every 6 Hours - RT      06/04/19 1906 06/04/19 2000  nicotine (NICODERM CQ) 14 MG/24HR patch 1 patch  Every 24 Hours      06/04/19 1906    06/04/19 1907  Intake & Output  Every Shift      06/04/19 1906    06/04/19 1907  Weigh Patient  Once      06/04/19 1906    06/04/19 1907  Insert Peripheral IV  Once      06/04/19 1906    06/04/19 1907  Saline Lock & Maintain IV Access  Continuous      06/04/19 1906    06/04/19 1907  Cardiac Monitoring  Continuous      06/04/19 1906    06/04/19 1907  Turn Patient  Now Then Every 2 Hours      06/04/19 1906    06/04/19 1907  Incentive Spirometry  Every Hour While Awake      06/04/19 1906    06/04/19 1907  Diet Regular  Diet Effective Now      06/04/19 1906    06/04/19 1907  Inpatient Case Management  Consult  Once     Provider:  (Not yet assigned)    06/04/19 1906    06/04/19 1907  Inpatient Nutrition Consult  Once     Provider:  (Not yet assigned)    06/04/19 1906    06/04/19 1907  Prealbumin  Once      06/04/19 1906    06/04/19 1907  Tobacco Cessation Education  Prior to Discharge      06/04/19 1906    06/04/19 1906  sodium chloride 0.9 % flush 3-10 mL  As Needed      06/04/19 1906    06/04/19 1906  acetaminophen (TYLENOL) tablet 650 mg  Every 4 Hours PRN      06/04/19 1906    06/04/19 1906  ondansetron (ZOFRAN) tablet 4 mg  Every 6 Hours PRN      06/04/19 1906    06/04/19 1906  ondansetron (ZOFRAN) injection 4 mg  Every 6 Hours PRN      06/04/19 1906    06/04/19 1906  hydrOXYzine (ATARAX) tablet 50 mg  3 Times Daily PRN      06/04/19 1906    06/04/19 1818  Code Status and Medical Interventions:   Continuous      06/04/19 1824    06/04/19 1750  Blood Gas, Arterial With Co-Ox  Once      06/04/19 1744    06/04/19 1750  Inpatient Admission  Once      06/04/19 1750    06/04/19 1740  Blood Gas, Arterial With Co-Ox  STAT      06/04/19 1739    06/04/19 1711  BIPAP  Until Discontinued      06/04/19 1710    06/04/19 1709  Blood Gas, Arterial  Once      06/04/19 1707    06/04/19 1651  ipratropium-albuterol (DUO-NEB) nebulizer solution 3 mL  Once      06/04/19 1649    06/04/19 1651  albuterol (PROVENTIL) nebulizer solution 0.083% 2.5 mg/3mL  Every 15 Minutes      06/04/19 1649    06/04/19 1651  methylPREDNISolone sodium succinate (SOLU-Medrol) injection 125 mg  Once      06/04/19 1649    06/04/19 1651  levoFLOXacin (LEVAQUIN) 750 mg/150 mL D5W (premix) (LEVAQUIN) 750 mg  Once      06/04/19 1649    06/04/19 1649  Blood Gas, Arterial  Once      06/04/19 1649    06/04/19 1649  BNP  Once      06/04/19 1649    06/04/19 1649  D-dimer, Quantitative  Once      06/04/19 1649    06/04/19 1649  Blood Culture - Blood,  Once      06/04/19 1649    06/04/19 1649  Blood Culture - Blood,  Once      06/04/19 1649    06/04/19 1649  Lactic Acid, Plasma  Once      06/04/19 1649    06/04/19 1649  Procalcitonin  Once      06/04/19 1649    06/04/19 1646  Manual Differential  Once,   Status:  Canceled      06/04/19 1645    06/04/19 1607  aspirin chewable tablet 324 mg  Once      06/04/19 1605    06/04/19 1606  NPO Diet  Diet Effective Now,   Status:  Canceled      06/04/19 1605    06/04/19 1606  Undress and Gown  Once      06/04/19 1605    06/04/19 1606  Cardiac Monitoring  Per Hospital Policy,   Status:  Canceled      06/04/19 1605    06/04/19 1606  Continuous Pulse Oximetry  Continuous      06/04/19 1605    06/04/19 1606  ECG 12 Lead  Now & in 3 Hours      06/04/19 1605    06/04/19 1606  XR Chest 1 View  1 Time Imaging      06/04/19 1605    06/04/19 1606  Insert Peripheral IV  Once      06/04/19 1605    06/04/19 1606  [place] Draw  Once       06/04/19 1605    06/04/19 1606  CBC & Differential  Once      06/04/19 1605    06/04/19 1606  Comprehensive Metabolic Panel  Once      06/04/19 1605    06/04/19 1606  Troponin  Now Then Every 3 Hours,   Status:  Canceled      06/04/19 1605    06/04/19 1606  Light Blue Top  PROCEDURE ONCE      06/04/19 1605    06/04/19 1606  Green Top (Gel)  PROCEDURE ONCE      06/04/19 1605    06/04/19 1606  Lavender Top  PROCEDURE ONCE      06/04/19 1605    06/04/19 1606  Red Top  PROCEDURE ONCE      06/04/19 1605    06/04/19 1606  CBC Auto Differential  PROCEDURE ONCE      06/04/19 1605    06/04/19 1605  sodium chloride 0.9 % flush 10 mL  As Needed      06/04/19 1605    Unscheduled  Oxygen Therapy- Nasal Cannula; 2 LPM; Titrate for SPO2: equal to or greater than, 92%  Continuous PRN      06/04/19 1605    Unscheduled  ECG 12 Lead  As Needed      06/04/19 1605    --  SCANNED - TELEMETRY        06/04/19 0000    --  metoprolol tartrate (LOPRESSOR) 25 MG tablet  2 Times Daily      06/05/19 1031    --  aspirin 81 MG EC tablet  Daily      06/05/19 1031          Ventilator/Non-Invasive Ventilation Settings (From admission, onward)    Start     Ordered    06/04/19 1711  BIPAP  Until Discontinued     Question Answer Comment   Type: BIPAP    NIPPV Mask Interface: Per Patient Preference        06/04/19 1710             Physician Progress Notes (last 24 hours) (Notes from 6/4/2019 11:39 AM through 6/5/2019 11:39 AM)      Vanessa Martinez APRN at 6/5/2019 10:38 AM              [facility] Medicine Services  INPATIENT PROGRESS NOTE    Length of Stay: 1  Date of Admission: 6/4/2019  Primary Care Physician: Lorri Guan APRN    Subjective   Chief Complaint: Follow-up shortness of breath  HPI   Patient is resting in bed.  There is no family at bedside at present.  She states she feels okay today.  She still feels somewhat short of breath today.  She is currently on 3 L per nasal cannula with oxygen  saturation of 92%.  She normally wears 2 L throughout the day.  She tells me that someone came to fix her trilogy last evening.  She is somewhat confused throughout conversation, tells me that they are supposed to be bringing her food although she is looking at her breakfast tray.  He denies any chest pain.  She complains of chronic back pain.  She states that her appetite is poor.  She is coughing up thick, brown sputum.    Review of Systems   All pertinent negatives and positives are as above. All other systems have been reviewed and are negative unless otherwise stated.     Objective    Temp:  [98 °F (36.7 °C)-98.6 °F (37 °C)] 98.3 °F (36.8 °C)  Heart Rate:  [] 98  Resp:  [16-27] 18  BP: ()/(55-78) 111/61  FiO2 (%):  [24 %-40 %] 35 %  Physical Exam   Constitutional: She is oriented to person, place, and time. She appears well-developed. No distress.   Thin, chronically ill-appearing   HENT:   Head: Normocephalic and atraumatic.   Neck: Normal range of motion. Neck supple. No JVD present. No tracheal deviation present.   Cardiovascular: Normal rate, regular rhythm, normal heart sounds and intact distal pulses. Exam reveals no gallop and no friction rub.   -Sinus tach 83-1 11   Pulmonary/Chest: She has wheezes. She has no rales.   Poor air movement throughout.  Grossly diminished.   Abdominal: Soft. Bowel sounds are normal. She exhibits no distension. There is no tenderness.   Musculoskeletal: Normal range of motion. She exhibits no edema or tenderness.   Neurological: She is oriented to person, place, and time. No cranial nerve deficit.   Skin: Skin is warm and dry. No rash noted. No erythema.   Psychiatric: She has a normal mood and affect. Her behavior is normal. Judgment and thought content normal.   Vitals reviewed.    Results Review:  I have reviewed the labs, radiology results, and diagnostic studies.    Laboratory Data:   Results from last 7 days   Lab Units 06/05/19  0540 06/04/19  1635   WBC  10*3/mm3 3.83* 9.51   HEMOGLOBIN g/dL 10.6* 11.0*   HEMATOCRIT % 34.4* 36.4*   PLATELETS 10*3/mm3 241 247     Results from last 7 days   Lab Units 06/05/19  0540 06/04/19  1744 06/04/19  1635   SODIUM mmol/L 136  --  138   SODIUM, ARTERIAL mmol/L  --  141  --    POTASSIUM mmol/L 4.5  --  4.4   CHLORIDE mmol/L 83*  --  81*   CO2 mmol/L >40.0*  --  >40.0*   BUN mg/dL 19  --  20   CREATININE mg/dL 0.54  --  0.52   CALCIUM mg/dL 9.0  --  9.0   BILIRUBIN mg/dL  --   --  0.4   ALK PHOS U/L  --   --  94   ALT (SGPT) U/L  --   --  15   AST (SGOT) U/L  --   --  26   GLUCOSE mg/dL 121*  --  130*     Radiology Data:   Imaging Results (last 24 hours)     Procedure Component Value Units Date/Time    XR Chest 1 View [[id number]] Collected:  06/04/19 1655     Updated:  06/04/19 1659    Narrative:       EXAMINATION:  XR CHEST 1 VW-  6/4/2019 4:49 PM CDT     HISTORY: Chest pain.     COMPARISON: 11/26/2018.     FINDINGS:  There is mild patchy left perihilar infiltrate. There is no  dense consolidation. 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 Heart size is upper limits  of normal. The thoracic aorta is atheromatous.       Impression:       1. Mild patchy left perihilar infiltrate may represent minimal  pneumonia.  2. COPD/emphysema.        This report was finalized on 06/04/2019 16:56 by Dr. Hugo Licea MD.        I have reviewed the patient current medications.     Assessment/Plan     Active Hospital Problems    Diagnosis   • Acute on chronic respiratory failure with hypoxia and hypercapnia (CMS/HCC)   • Macrocytic anemia   • Lung infiltrate, left perihilar   • Moderate protein-calorie malnutrition (CMS/HCC)   • Tobacco abuse   • COPD with acute exacerbation (CMS/HCC)     Plan:  1.  Chest x-ray yesterday shows a left perihilar infiltrate which may represent a pneumonia.  Would like to repeat a chest x-ray today, PA and lateral.  2.  Continue duo nebs, Mucinex, Symbicort encouraged use of incentive spirometer.  Add flutter valve.  3.  Continue  azithromycin for now, may adjust antibiotic therapy and add Rocephin if left infiltrate is still present on repeat chest x-ray.  4.  Continue Solu-Medrol at current dosage  5.  Withhold any narcotic pain medication for now.  May have Tylenol as needed for pain we will also add Lidoderm patches and aqua K pad if so desired.  6.  Continue BiPAP use for today as she does seem to still be drowsy and confused at times, will see if the patient's trilogy has been adjusted.  Repeat ABG in a.m.  7.  Discontinue IV fluids  8.  TSH is low, check free T4  9.  Physical and Occupational Therapy consults  10.  Labs in a.m.    Discharge Planning: I expect the patient to be discharged to home with home health in ? days.    ROLAND Yanez   06/05/19   10:38 AM      Electronically signed by Vanessa Martinez APRN at 6/5/2019 10:59 AM       Consult Notes (last 24 hours) (Notes from 6/4/2019 11:39 AM through 6/5/2019 11:39 AM)     No notes of this type exist for this encounter.

## 2019-06-05 NOTE — THERAPY EVALUATION
Acute Care - Physical Therapy Initial Evaluation  [facility]     Patient Name: Teri Tim  : 1960  MRN: [id number]  Today's Date: 2019   Onset of Illness/Injury or Date of Surgery: 19  Date of Referral to PT: 19  Referring Physician: Dr. Curry      Admit Date: 2019    Visit Dx:     ICD-10-CM ICD-9-CM   1. Acute respiratory failure with hypoxia and hypercapnia (CMS/HCC) J96.01 518.81    J96.02    2. Pneumonia due to infectious organism, unspecified laterality, unspecified part of lung J18.9 136.9     484.8   3. Impaired mobility Z74.09 799.89     Patient Active Problem List   Diagnosis   • NSTEMI (non-ST elevated myocardial infarction) (CMS/HCC)   • Abnormal LFTs   • Abnormal US (ultrasound) of abdomen   • COPD exacerbation (CMS/HCC)   • COPD with acute exacerbation (CMS/[facility])   • Tobacco abuse   • Hyponatremia   • Pulmonary hypertension (CMS/HCC)   • Essential hypertension   • Hypercapnemia   • Acute on chronic respiratory failure with hypoxia and hypercapnia (CMS/HCC)   • Macrocytic anemia   • Lung infiltrate, left perihilar   • Moderate protein-calorie malnutrition (CMS/HCC)     Past Medical History:   Diagnosis Date   • CHF (congestive heart failure) (CMS/HCC), normalized 2018 echocardiogram    • Chronic back pain    • Chronic respiratory failure (CMS/HCC)    • COPD (chronic obstructive pulmonary disease) (CMS/[facility])    • Hypertension    • Pneumonia      Past Surgical History:   Procedure Laterality Date   • CARPAL TUNNEL RELEASE     • HYSTERECTOMY      complete        PT ASSESSMENT (last 12 hours)      Physical Therapy Evaluation     Row Name 19 1511 19 1423       PT Evaluation Time/Intention    Subjective Information  complains of;pain;weakness;dyspnea  -SB  --    Document Type  evaluation  -SB  --  -SB    Mode of Treatment  physical therapy  -SB  --  -SB    Patient Effort  good  -SB  --    Symptoms Noted During/After Treatment  shortness of breath  -SB  --    Row Name  06/05/19 1511 06/05/19 1423       General Information    Patient Profile Reviewed?  yes  -SB  --    Onset of Illness/Injury or Date of Surgery  06/04/19  -SB  --    Referring Physician  Dr. Curry  -SB  --    Patient Observations  cooperative;agree to therapy;lethargic  -SB  --    Patient/Family Observations  spouse present  -SB  --    General Observations of Patient  fowlers, BiPAP, cont pulse ox  -SB  --    Prior Level of Function  independent:;all household mobility  -SB  --    Equipment Currently Used at Home  oxygen;shower chair  -SB  --    Pertinent History of Current Functional Problem  presents with SOB; dx: Acute on chronic respiratory failure with hypoxia and hypercapnia   -SB  --  -SB    Existing Precautions/Restrictions  oxygen therapy device and L/min  -SB  --    Limitations/Impairments  safety/cognitive  -SB  --    Risks Reviewed  patient:;LOB;nausea/vomiting;dizziness;change in vital signs;increased discomfort;lines disloged;increased drainage  -SB  --    Benefits Reviewed  patient:;improve function;increase independence;increase strength;increase balance;decrease pain;decrease risk of DVT;improve skin integrity;increase knowledge  -SB  --    Barriers to Rehab  medically complex  -SB  --    Row Name 06/05/19 1511          Relationship/Environment    Lives With  spouse  -SB     Family Caregiver if Needed  spouse  -SB     Row Name 06/05/19 1511          Resource/Environmental Concerns    Current Living Arrangements  home/apartment/condo  -SB     Resource/Environmental Concerns  none  -SB     Transportation Concerns  car, none  -SB     Row Name 06/05/19 1511          Home Main Entrance    Number of Stairs, Main Entrance  three  -SB     Stair Railings, Main Entrance  none  -SB     Row Name 06/05/19 1511          Cognitive Assessment/Interventions    Additional Documentation  Cognitive Assessment/Intervention (Group)  -SB     Row Name 06/05/19 1511          Cognitive Assessment/Intervention- PT/OT     Affect/Mental Status (Cognitive)  confused  -SB     Orientation Status (Cognition)  oriented to;person  -SB     Follows Commands (Cognition)  follows one step commands;over 90% accuracy;repetition of directions required  -SB     Personal Safety Interventions  fall prevention program maintained;gait belt;muscle strengthening facilitated;nonskid shoes/slippers when out of bed;supervised activity  -SB     Row Name 06/05/19 1511          Safety Issues, Functional Mobility    Impairments Affecting Function (Mobility)  endurance/activity tolerance;shortness of breath;strength;pain;balance;cognition  -SB     Row Name 06/05/19 1511          Bed Mobility Assessment/Treatment    Bed Mobility Assessment/Treatment  supine-sit;sit-supine  -SB     Supine-Sit [place] (Bed Mobility)  supervision  -SB     Sit-Supine [place] (Bed Mobility)  supervision  -SB     Assistive Device (Bed Mobility)  head of bed elevated  -SB     Row Name 06/05/19 1511          Transfer Assessment/Treatment    Transfer Assessment/Treatment  sit-stand transfer;stand-sit transfer  -SB     Sit-Stand [place] (Transfers)  contact guard  -SB     Stand-Sit [place] (Transfers)  contact guard  -SB     Row Name 06/05/19 1511          Gait/Stairs Assessment/Training    Comment (Gait/Stairs)  pt marched in place and took sidesteps to HOB  -SB     Row Name 06/05/19 1511          General ROM    GENERAL ROM COMMENTS  BLE WFl  -SB     Row Name 06/05/19 1511          MMT (Manual Muscle Testing)    General MMT Comments  BLE 4-/5  -SB     Row Name 06/05/19 1511          Motor Assessment/Intervention    Additional Documentation  Balance (Group)  -SB     Row Name 06/05/19 1511          Balance    Balance  static sitting balance;static standing balance;dynamic standing balance  -SB     Row Name 06/05/19 1511          Static Sitting Balance    Level of [place] (Unsupported Sitting, Static Balance)  supervision  -SB     Sitting Position (Unsupported  Sitting, Static Balance)  sitting on edge of bed  -SB     Row Name 06/05/19 1511          Static Standing Balance    Level of [place] (Supported Standing, Static Balance)  contact guard assist  -SB     Row Name 06/05/19 1511          Dynamic Standing Balance    Level of [place], Reaches Outside Midline (Standing, Dynamic Balance)  contact guard assist  -SB     Row Name 06/05/19 1511          Sensory Assessment/Intervention    Sensory General Assessment  no sensation deficits identified  -SB     Row Name 06/05/19 1511          Pain Scale: Numbers Pre/Post-Treatment    Pain Scale: Numbers, Pretreatment  7/10  -SB     Pain Location - Orientation  lower  -SB     Pain Location  back  -SB     Pain Intervention(s)  Repositioned;Ambulation/increased activity;Medication (See MAR)  -SB     Row Name 06/05/19 1511          Plan of Care Review    Plan of Care Reviewed With  patient  -SB     Row Name 06/05/19 1511          Physical Therapy Clinical Impression    Date of Referral to PT  06/05/19  -SB     Patient/Family Goals Statement (PT Clinical Impression)  go home  -SB     Criteria for Skilled Interventions Met (PT Clinical Impression)  yes  -SB     Rehab Potential (PT Clinical Summary)  good, to achieve stated therapy goals  -SB     Predicted Duration of Therapy (PT)  until d/c  -SB     Care Plan Review (PT)  evaluation/treatment results reviewed;care plan/treatment goals reviewed;risks/benefits reviewed;current/potential barriers reviewed;patient/other agree to care plan  -SB     Care Plan Review, Other Participant (PT Clinical Impression)  spouse  -SB     Row Name 06/05/19 1511          Vital Signs    Pre SpO2 (%)  93  -SB     O2 Delivery Pre Treatment  -- bipap  -SB     Intra SpO2 (%)  80  -SB     Post SpO2 (%)  95  -SB     Row Name 06/05/19 1511          Physical Therapy Goals    Bed Mobility Goal Selection (PT)  bed mobility, PT goal 1  -SB     Transfer Goal Selection (PT)  transfer, PT goal 1  -SB     Gait  Training Goal Selection (PT)  gait training, PT goal 1  -SB     Row Name 06/05/19 1511          Bed Mobility Goal 1 (PT)    Activity/Assistive Device (Bed Mobility Goal 1, PT)  bed mobility activities, all  -SB     [place] Level/Cues Needed (Bed Mobility Goal 1, PT)  conditional independence  -SB     Time Frame (Bed Mobility Goal 1, PT)  by discharge  -SB     Progress/Outcomes (Bed Mobility Goal 1, PT)  goal ongoing  -SB     Row Name 06/05/19 1511          Transfer Goal 1 (PT)    Activity/Assistive Device (Transfer Goal 1, PT)  sit-to-stand/stand-to-sit;bed-to-chair/chair-to-bed  -SB     [place] Level/Cues Needed (Transfer Goal 1, PT)  supervision required  -SB     Time Frame (Transfer Goal 1, PT)  by discharge  -SB     Progress/Outcome (Transfer Goal 1, PT)  goal ongoing  -SB     Row Name 06/05/19 1511          Gait Training Goal 1 (PT)    Activity/Assistive Device (Gait Training Goal 1, PT)  gait (walking locomotion);increase endurance/gait distance  -SB     [place] Level (Gait Training Goal 1, PT)  contact guard assist  -SB     Distance (Gait Goal 1, PT)  30  -SB     Time Frame (Gait Training Goal 1, PT)  by discharge  -SB     Progress/Outcome (Gait Training Goal 1, PT)  goal ongoing  -SB     Row Name 06/05/19 1511          Positioning and Restraints    Pre-Treatment Position  in bed  -SB     Post Treatment Position  bed  -SB     In Bed  fowlers;call light within reach;encouraged to call for assist;with family/caregiver;side rails up x2  -SB     Row Name 06/05/19 1511          Living Environment    Home Accessibility  stairs to enter home;tub/shower is not walk in  -SB       User Key  (r) = Recorded By, (t) = Taken By, (c) = Cosigned By    Initials Name Provider Type    Bharti Corrales, PT Physical Therapist        Physical Therapy Education     Title: PT OT SLP Therapies (Done)     Topic: Physical Therapy (Done)     Point: Mobility training (Done)     Learning Progress Summary           Patient  Acceptance, E, VU by SB at 6/5/2019  4:21 PM    Comment:  pt edu on POC, benefits of act and d/c plans   Family Acceptance, E, VU by SB at 6/5/2019  4:21 PM    Comment:  pt edu on POC, benefits of act and d/c plans                   Point: Home exercise program (Done)     Learning Progress Summary           Patient Acceptance, E, VU by SB at 6/5/2019  4:21 PM    Comment:  pt edu on POC, benefits of act and d/c plans   Family Acceptance, E, VU by SB at 6/5/2019  4:21 PM    Comment:  pt edu on POC, benefits of act and d/c plans                   Point: Body mechanics (Done)     Learning Progress Summary           Patient Acceptance, E, VU by SB at 6/5/2019  4:21 PM    Comment:  pt edu on POC, benefits of act and d/c plans   Family Acceptance, E, VU by SB at 6/5/2019  4:21 PM    Comment:  pt edu on POC, benefits of act and d/c plans                   Point: Precautions (Done)     Learning Progress Summary           Patient Acceptance, E, VU by SB at 6/5/2019  4:21 PM    Comment:  pt edu on POC, benefits of act and d/c plans   Family Acceptance, E, VU by SB at 6/5/2019  4:21 PM    Comment:  pt edu on POC, benefits of act and d/c plans                               User Key     Initials Effective Dates Name Provider Type Discipline     08/31/18 -  Bharti Tyler, PT Physical Therapist PT              PT Recommendation and Plan  Anticipated Discharge Disposition (PT): home with home health, home with assist  Planned Therapy Interventions (PT Eval): balance training, bed mobility training, gait training, home exercise program, patient/family education, strengthening, transfer training  Therapy Frequency (PT Clinical Impression): daily  Outcome Summary/Treatment Plan (PT)  Anticipated Discharge Disposition (PT): home with home health, home with assist  Plan of Care Reviewed With: patient, spouse  Progress: no change  Outcome Summary: PT eval completed. Pt oriented to person only. Pt on BiPAP upon arrival. Pt performed bed  mobility with sup and displayed overall decreased strength. Pt performed sit to stand with CGA and performed marching in place and sidesteps with CGA. Pt did have minor LOB during side steps that reqd min A to correct. Pt with significant dec in endurance and act tolerance that will benefit from skilled PT. Recommend d/c home with assist and home health pending progress.   Outcome Measures     Row Name 06/05/19 1600             How much help from another person do you currently need...    Turning from your back to your side while in flat bed without using bedrails?  4  -SB      Moving from lying on back to sitting on the side of a flat bed without bedrails?  4  -SB      Moving to and from a bed to a chair (including a wheelchair)?  3  -SB      Standing up from a chair using your arms (e.g., wheelchair, bedside chair)?  3  -SB      Climbing 3-5 steps with a railing?  1  -SB      To walk in hospital room?  3  -SB      AM-PAC 6 Clicks Score  18  -SB         Functional Assessment    Outcome Measure Options  AM-PAC 6 Clicks Basic Mobility (PT)  -SB        User Key  (r) = Recorded By, (t) = Taken By, (c) = Cosigned By    Initials Name Provider Type    Bharti Corrales PT Physical Therapist         Time Calculation:   PT Charges     Row Name 06/05/19 1623             Time Calculation    Start Time  1508  -SB      Stop Time  1551  -SB      Time Calculation (min)  43 min  -SB      PT Received On  06/05/19  -SB      PT Goal Re-Cert Due Date  06/15/19  -SB        User Key  (r) = Recorded By, (t) = Taken By, (c) = Cosigned By    Initials Name Provider Type    Bharti Corrales PT Physical Therapist        Therapy Charges for Today     Code Description Service Date Service Provider Modifiers Qty    [id number] HC PT EVAL LOW COMPLEXITY 3 6/5/2019 Bharti Tyler PT  1          PT G-Codes  Outcome Measure Options: AM-PAC 6 Clicks Basic Mobility (PT)  AM-PAC 6 Clicks Score: 18      Bharti Tyler PT  6/5/2019

## 2019-06-05 NOTE — PLAN OF CARE
Problem: Patient Care Overview  Goal: Plan of Care Review  Outcome: Ongoing (interventions implemented as appropriate)   06/05/19 5954   Coping/Psychosocial   Plan of Care Reviewed With patient;spouse   Plan of Care Review   Progress no change   OTHER   Outcome Summary Pt reports decreased appetite. States she has not really had an appetite in several months, might eat 2 meals daily. Pt reports her UBW to be 110lbs. Noted wt loss of 24lbs over 6 months. Pt agreeable to Boost BID; ordered. NFPE completed, MSA submitted to MD. Will continue to follow.

## 2019-06-05 NOTE — THERAPY EVALUATION
Acute Care - Occupational Therapy Initial Evaluation  [facility]     Patient Name: Teri Tim  : 1960  MRN: [id number]  Today's Date: 2019  Onset of Illness/Injury or Date of Surgery: 19  Date of Referral to OT: 19  Referring Physician: Dr. Curry    Admit Date: 2019       ICD-10-CM ICD-9-CM   1. Acute respiratory failure with hypoxia and hypercapnia (CMS/HCC) J96.01 518.81    J96.02    2. Pneumonia due to infectious organism, unspecified laterality, unspecified part of lung J18.9 136.9     484.8   3. Impaired mobility Z74.09 799.89   4. Decreased activities of daily living (ADL) R68.89 780.99     Patient Active Problem List   Diagnosis   • NSTEMI (non-ST elevated myocardial infarction) (CMS/HCC)   • Abnormal LFTs   • Abnormal US (ultrasound) of abdomen   • COPD exacerbation (CMS/[facility])   • COPD with acute exacerbation (CMS/[facility])   • Tobacco abuse   • Hyponatremia   • Pulmonary hypertension (CMS/HCC)   • Essential hypertension   • Hypercapnemia   • Acute on chronic respiratory failure with hypoxia and hypercapnia (CMS/HCC)   • Macrocytic anemia   • Lung infiltrate, left perihilar   • Moderate protein-calorie malnutrition (CMS/HCC)     Past Medical History:   Diagnosis Date   • CHF (congestive heart failure) (CMS/HCC), normalized 2018 echocardiogram    • Chronic back pain    • Chronic respiratory failure (CMS/HCC)    • COPD (chronic obstructive pulmonary disease) (CMS/[facility])    • Hypertension    • Pneumonia      Past Surgical History:   Procedure Laterality Date   • CARPAL TUNNEL RELEASE     • HYSTERECTOMY      complete          OT ASSESSMENT FLOWSHEET (last 12 hours)      Occupational Therapy Evaluation     Row Name 19 1510                   OT Evaluation Time/Intention    Subjective Information  complains of;pain;weakness;dyspnea  -MM        Document Type  evaluation  -MM        Mode of Treatment  occupational therapy  -MM        Patient Effort  good  -MM        Symptoms Noted  During/After Treatment  shortness of breath  -MM           General Information    Patient Profile Reviewed?  yes  -MM        Onset of Illness/Injury or Date of Surgery  06/04/19  -MM        Referring Physician  Dr. Curry  -MM        Patient Observations  lethargic;cooperative;agree to therapy  -MM        Patient/Family Observations  spouse present  -MM        General Observations of Patient  fowlers, BiPAP, cont pulse ox  -MM        Prior Level of Function  independent:;all household mobility;community mobility;transfer;bed mobility;ADL's;feeding;grooming;dressing;bathing until past week  -MM        Equipment Currently Used at Home  oxygen;shower chair  -MM        Pertinent History of Current Functional Problem  presents with SOB; dx: Acute on chronic respiratory failure with hypoxia and hypercapnia   -MM        Existing Precautions/Restrictions  oxygen therapy device and L/min  -MM        Limitations/Impairments  safety/cognitive  -MM        Risks Reviewed  patient and family:;nausea/vomiting;LOB;dizziness;increased discomfort;change in vital signs;lines disloged  -MM        Benefits Reviewed  patient and family:;improve function;increase independence;increase strength;increase balance;decrease pain;improve skin integrity;decrease risk of DVT;increase knowledge  -MM        Barriers to Rehab  medically complex  -MM           Relationship/Environment    Lives With  spouse  -MM        Name(s) of Who Lives With Patient  Harry  -MM        Family Caregiver if Needed  spouse  -MM           Resource/Environmental Concerns    Current Living Arrangements  home/apartment/condo  -MM        Resource/Environmental Concerns  none  -MM        Transportation Concerns  car, none  -MM           Home Main Entrance    Number of Stairs, Main Entrance  three  -MM        Stair Railings, Main Entrance  none  -MM           Cognitive Assessment/Interventions    Additional Documentation  Cognitive Assessment/Intervention (Group)  -MM            Cognitive Assessment/Intervention- PT/OT    Affect/Mental Status (Cognitive)  confused  -MM        Orientation Status (Cognition)  oriented to;person  -MM        Follows Commands (Cognition)  follows one step commands;over 90% accuracy;repetition of directions required  -MM        Cognitive Function (Cognitive)  WNL  -MM        Personal Safety Interventions  fall prevention program maintained;gait belt;muscle strengthening facilitated;nonskid shoes/slippers when out of bed;supervised activity  -MM           Safety Issues, Functional Mobility    Impairments Affecting Function (Mobility)  endurance/activity tolerance;shortness of breath;strength;pain;balance;cognition  -MM           Bed Mobility Assessment/Treatment    Bed Mobility Assessment/Treatment  supine-sit;sit-supine  -MM        Supine-Sit [place] (Bed Mobility)  supervision  -MM        Sit-Supine [place] (Bed Mobility)  supervision  -MM        Assistive Device (Bed Mobility)  head of bed elevated  -MM           Functional Mobility    Functional Mobility- Ind. Level  contact guard assist;verbal cues required  -MM        Functional Mobility- Device  -- HHA  -MM        Functional Mobility- Comment  steps to HOB  -MM           Transfer Assessment/Treatment    Transfer Assessment/Treatment  sit-stand transfer;stand-sit transfer  -MM           Sit-Stand Transfer    Sit-Stand [place] (Transfers)  contact guard  -MM           Stand-Sit Transfer    Stand-Sit [place] (Transfers)  contact guard  -MM           ADL Assessment/Intervention    BADL Assessment/Intervention  lower body dressing  -MM           Lower Body Dressing Assessment/Training    Lower Body Dressing [place] Level  don;doff;socks;supervision;set up  -MM        Lower Body Dressing Position  edge of bed sitting  -MM        Comment (Lower Body Dressing)  with rest breaks  -MM           General ROM    GENERAL ROM COMMENTS  BUE WFL  -MM           MMT (Manual Muscle Testing)    General  MMT Comments  BUE 4/5  -MM           Motor Assessment/Interventions    Additional Documentation  Fine Motor Testing & Training (Group);Gross Motor Coordination (Group)  -MM           Gross Motor Coordination    Gross Motor Skill, Impairments Detail  mild decreased coordination LUE  -MM           Fine Motor Testing & Training    Comment, Fine Motor Coordination  mild decreased coordination LUE  -MM           Sensory Assessment/Intervention    Sensory General Assessment  no sensation deficits identified  -MM           Positioning and Restraints    Pre-Treatment Position  in bed  -MM        Post Treatment Position  bed  -MM        In Bed  fowlers;call light within reach;encouraged to call for assist;with family/caregiver;side rails up x2  -MM           Pain Assessment    Additional Documentation  Pain Scale: Numbers Pre/Post-Treatment (Group)  -MM           Pain Scale: Numbers Pre/Post-Treatment    Pain Scale: Numbers, Pretreatment  7/10  -MM        Pain Location - Orientation  lower  -MM        Pain Location  back  -MM        Pain Intervention(s)  Medication (See MAR);Repositioned;Ambulation/increased activity  -MM           Clinical Impression (OT)    Date of Referral to OT  06/05/19  -MM        OT Diagnosis  impaired mobility and adsl  -MM        Patient/Family Goals Statement (OT Eval)  return home  -MM        Criteria for Skilled Therapeutic Interventions Met (OT Eval)  yes;treatment indicated  -MM        Rehab Potential (OT Eval)  good, to achieve stated therapy goals  -MM        Therapy Frequency (OT Eval)  5 times/wk  -MM        Predicted Duration of Therapy Intervention (Therapy Eval)  until d/c  -MM        Care Plan Review (OT)  evaluation/treatment results reviewed;care plan/treatment goals reviewed;risks/benefits reviewed;current/potential barriers reviewed;patient/other agree to care plan  -MM        Anticipated Discharge Disposition (OT)  skilled nursing facility;transitional care;home with home  health;home with assist pending pt progress  -MM           Vital Signs    Pre SpO2 (%)  93  -MM        O2 Delivery Pre Treatment  -- bipap  -MM        Intra SpO2 (%)  80  -MM        Post SpO2 (%)  95  -MM        Pre Patient Position  Supine  -MM        Intra Patient Position  Sitting  -MM        Post Patient Position  Supine  -MM           Planned OT Interventions    Planned Therapy Interventions (OT Eval)  activity tolerance training;adaptive equipment training;BADL retraining;functional balance retraining;neuromuscular control/coordination retraining;occupation/activity based interventions;patient/caregiver education/training;ROM/therapeutic exercise;strengthening exercise;transfer/mobility retraining  -MM           OT Goals    Dressing Goal Selection (OT)  dressing, OT goal 1  -MM        Toileting Goal Selection (OT)  toileting, OT goal 1  -MM        Activity Tolerance Goal Selection (OT)  activity tolerance, OT goal 1  -MM        Additional Documentation  Activity Tolerance Goal Selection (OT) (Row)  -MM           Dressing Goal 1 (OT)    Activity/Assistive Device (Dressing Goal 1, OT)  dressing skills, all  -MM        Loving/Cues Needed (Dressing Goal 1, OT)  independent  -MM        Time Frame (Dressing Goal 1, OT)  10 days  -MM        Progress/Outcome (Dressing Goal 1, OT)  goal ongoing  -MM           Toileting Goal 1 (OT)    Activity/Device (Toileting Goal 1, OT)  toileting skills, all  -MM        Loving Level/Cues Needed (Toileting Goal 1, OT)  independent  -MM        Time Frame (Toileting Goal 1, OT)  10 days  -MM        Progress/Outcome (Toileting Goal 1, OT)  goal ongoing  -MM            Activity Tolerance Goal 1 (OT)    Activity Tolerance Goal 1 (OT)  Pt will participate in 5 minutes of functional ADL activity with O2 @> 88%.  -MM        Activity Level (Endurance Goal 1, OT)  5 min activity;O2 sat >/ equal to 88%  -MM        Time Frame (Activity Tolerance Goal 1, OT)  10 days  -MM         Progress/Outcome (Activity Tolerance Goal 1, OT)  goal ongoing  -MM           Living Environment    Home Accessibility  stairs to enter home;tub/shower is not walk in  -MM          User Key  (r) = Recorded By, (t) = Taken By, (c) = Cosigned By    Initials Name Effective Dates    MM Dariel Randolph OTR/L 04/03/18 -          Occupational Therapy Education     Title: PT OT SLP Therapies (In Progress)     Topic: Occupational Therapy (In Progress)     Point: ADL training (Done)     Description: Instruct learner(s) on proper safety adaptation and remediation techniques during self care or transfers.   Instruct in proper use of assistive devices.    Learning Progress Summary           Patient Acceptance, E, VU by  at 6/5/2019  4:55 PM    Comment:  OT role, benefits, POC, rest breaks   Family Acceptance, E, VU by MM at 6/5/2019  4:55 PM    Comment:  OT role, benefits, POC, rest breaks                   Point: Precautions (Done)     Description: Instruct learner(s) on prescribed precautions during self-care and functional transfers.    Learning Progress Summary           Patient Acceptance, E, VU by  at 6/5/2019  4:55 PM    Comment:  OT role, benefits, POC, rest breaks   Family Acceptance, E, VU by MM at 6/5/2019  4:55 PM    Comment:  OT role, benefits, POC, rest breaks                               User Key     Initials Effective Dates Name Provider Type Discipline     04/03/18 -  Dariel Randolph OTR/L Occupational Therapist OT                  OT Recommendation and Plan  Outcome Summary/Treatment Plan (OT)  Anticipated Discharge Disposition (OT): skilled nursing facility, transitional care, home with home health, home with assist(pending pt progress)  Planned Therapy Interventions (OT Eval): activity tolerance training, adaptive equipment training, BADL retraining, functional balance retraining, neuromuscular control/coordination retraining, occupation/activity based interventions, patient/caregiver  education/training, ROM/therapeutic exercise, strengthening exercise, transfer/mobility retraining  Therapy Frequency (OT Eval): 5 times/wk  Plan of Care Review  Plan of Care Reviewed With: patient, spouse  Plan of Care Reviewed With: patient, spouse  Outcome Summary: OT eval completed. Pt has SOA, which increased during tx. Pt was CGA for t/f and functional mobility. pt was supervision with set up to carina socks. Pt required multiple rest breaks. Pt O2 decreased to 80% with minimal activity. Skilled OT recommended to address adls, functional mobility, and activity tolerance. Recommended d/c home with assist and HHOT v. TCU/SNF.    Outcome Measures     Row Name 06/05/19 1655 06/05/19 1600          How much help from another person do you currently need...    Turning from your back to your side while in flat bed without using bedrails?  --  4  -SB     Moving from lying on back to sitting on the side of a flat bed without bedrails?  --  4  -SB     Moving to and from a bed to a chair (including a wheelchair)?  --  3  -SB     Standing up from a chair using your arms (e.g., wheelchair, bedside chair)?  --  3  -SB     Climbing 3-5 steps with a railing?  --  1  -SB     To walk in hospital room?  --  3  -SB     AM-PAC 6 Clicks Score  --  18  -SB        How much help from another is currently needed...    Putting on and taking off regular lower body clothing?  3  -MM  --     Bathing (including washing, rinsing, and drying)  2  -MM  --     Toileting (which includes using toilet bed pan or urinal)  3  -MM  --     Putting on and taking off regular upper body clothing  3  -MM  --     Taking care of personal grooming (such as brushing teeth)  3  -MM  --     Eating meals  4  -MM  --     Score  18  -MM  --        Functional Assessment    Outcome Measure Options  AM-PAC 6 Clicks Daily Activity (OT)  -MM  AM-PAC 6 Clicks Basic Mobility (PT)  -SB       User Key  (r) = Recorded By, (t) = Taken By, (c) = Cosigned By    Initials Name  Provider Type    MM Dariel Randolph, OTR/L Occupational Therapist    SB Bharti Tyler, PT Physical Therapist          Time Calculation:   Time Calculation- OT     Row Name 06/05/19 1500             Time Calculation- OT    OT Start Time  1500  -MM      OT Stop Time  1542  -MM      OT Time Calculation (min)  42 min  -MM      OT Received On  06/05/19  -MM      OT Goal Re-Cert Due Date  06/15/19  -MM        User Key  (r) = Recorded By, (t) = Taken By, (c) = Cosigned By    Initials Name Provider Type    Dariel Chavis, OTR/L Occupational Therapist        Therapy Charges for Today     Code Description Service Date Service Provider Modifiers Qty    04802233195 HC OT EVAL LOW COMPLEXITY 3 6/5/2019 Dariel Randolph OTR/L GO 1               MARIANA Sesay/KENTON  6/5/2019

## 2019-06-05 NOTE — PROGRESS NOTES
Continued Stay Note  [facility]     Patient Name: Teri Tim  MRN: [id number]  Today's Date: 6/5/2019    Admit Date: 6/4/2019    Discharge Plan     Row Name 06/05/19 1547       Plan    Plan Comments  Spouse in room saying trilogy machine is not working properly.  Spoke with spouse and he is saying machine is from [employer]. Called Kristin at [facility] and informed [phone], they will come to hospital to look at machine and get it operational again. Will follow.         Discharge Codes    No documentation.             KAREN Hardin

## 2019-06-05 NOTE — PLAN OF CARE
"Problem: Patient Care Overview  Goal: Plan of Care Review  Outcome: Ongoing (interventions implemented as appropriate)   06/04/19 2090   Coping/Psychosocial   Plan of Care Reviewed With patient;spouse   Plan of Care Review   Progress no change   OTHER   Outcome Summary New admission on previous shift. Pt came to ER with c/o SOA & chest pain. No reports of chest pain at this time. c/o chronic back pain & anxiety; requesting pain medication & \"nerve pill\". Currently on BIPap & asking to come off of it; informed patient that she needed to stay on it as long as possible. VSS. Will continue to monitor.        Problem: Pain, Chronic (Adult)  Goal: Identify Related Risk Factors and Signs and Symptoms  Outcome: Outcome(s) achieved Date Met: 06/04/19    Goal: Acceptable Pain/Comfort Level and Functional Ability  Outcome: Ongoing (interventions implemented as appropriate)      Problem: Fall Risk (Adult)  Goal: Identify Related Risk Factors and Signs and Symptoms  Outcome: Outcome(s) achieved Date Met: 06/04/19    Goal: Absence of Fall  Outcome: Ongoing (interventions implemented as appropriate)      Problem: Breathing Pattern Ineffective (Adult)  Goal: Identify Related Risk Factors and Signs and Symptoms  Outcome: Outcome(s) achieved Date Met: 06/04/19    Goal: Effective Oxygenation/Ventilation  Outcome: Ongoing (interventions implemented as appropriate)    Goal: Anxiety/Fear Reduction  Outcome: Ongoing (interventions implemented as appropriate)        "

## 2019-06-05 NOTE — PROGRESS NOTES
Spoke with patient regarding smoking cessation.  Patient very sleepy and on her Bipap at this time.  Pamphlet left at BS for patient to read. Total time spent with patient was 4 minutes.

## 2019-06-05 NOTE — PLAN OF CARE
Problem: Patient Care Overview  Goal: Plan of Care Review  Outcome: Ongoing (interventions implemented as appropriate)   06/05/19 6571   Coping/Psychosocial   Plan of Care Reviewed With patient;spouse   Plan of Care Review   Progress no change   OTHER   Outcome Summary PT eval completed. Pt oriented to person only. Pt on BiPAP upon arrival. Pt performed bed mobility with sup and displayed overall decreased strength. Pt performed sit to stand with CGA and performed marching in place and sidesteps with CGA. Pt did have minor LOB during side steps that reqd min A to correct. Pt with significant dec in endurance and act tolerance that will benefit from skilled PT. Recommend d/c home with assist and home health pending progress.

## 2019-06-06 PROBLEM — J18.9 COMMUNITY ACQUIRED PNEUMONIA OF LEFT UPPER LOBE OF LUNG: Status: ACTIVE | Noted: 2019-06-04

## 2019-06-06 PROBLEM — E43 SEVERE PROTEIN-CALORIE MALNUTRITION (HCC): Status: ACTIVE | Noted: 2019-06-04

## 2019-06-06 LAB
ANION GAP SERPL CALCULATED.3IONS-SCNC: ABNORMAL MMOL/L (ref 4–13)
ARTERIAL PATENCY WRIST A: POSITIVE
ATMOSPHERIC PRESS: 745 MMHG
BASE EXCESS BLDA CALC-SCNC: 18.3 MMOL/L (ref 0–2)
BDY SITE: ABNORMAL
BODY TEMPERATURE: 37 C
BUN BLD-MCNC: 22 MG/DL (ref 5–21)
BUN/CREAT SERPL: 37.3 (ref 7–25)
CALCIUM SPEC-SCNC: 8.9 MG/DL (ref 8.4–10.4)
CHLORIDE SERPL-SCNC: 85 MMOL/L (ref 98–110)
CO2 SERPL-SCNC: >40 MMOL/L (ref 24–31)
CREAT BLD-MCNC: 0.59 MG/DL (ref 0.5–1.4)
DEPRECATED RDW RBC AUTO: 46.9 FL (ref 40–54)
EPAP: 8
ERYTHROCYTE [DISTWIDTH] IN BLOOD BY AUTOMATED COUNT: 13.2 % (ref 12–15)
GFR SERPL CREATININE-BSD FRML MDRD: 105 ML/MIN/1.73
GLUCOSE BLD-MCNC: 122 MG/DL (ref 70–100)
HCO3 BLDA-SCNC: 45 MMOL/L (ref 20–26)
HCT VFR BLD AUTO: 29.5 % (ref 37–47)
HGB BLD-MCNC: 9.7 G/DL (ref 12–16)
HOROWITZ INDEX BLD+IHG-RTO: 35 %
IPAP: 14
Lab: ABNORMAL
MCH RBC QN AUTO: 31.8 PG (ref 28–32)
MCHC RBC AUTO-ENTMCNC: 32.9 G/DL (ref 33–36)
MCV RBC AUTO: 96.7 FL (ref 82–98)
MODALITY: ABNORMAL
PCO2 BLDA: 65.4 MM HG (ref 35–45)
PH BLDA: 7.45 PH UNITS (ref 7.35–7.45)
PLATELET # BLD AUTO: 213 10*3/MM3 (ref 130–400)
PMV BLD AUTO: 9.9 FL (ref 6–12)
PO2 BLDA: 65.1 MM HG (ref 83–108)
POTASSIUM BLD-SCNC: 4.4 MMOL/L (ref 3.5–5.3)
RBC # BLD AUTO: 3.05 10*6/MM3 (ref 4.2–5.4)
SAO2 % BLDCOA: 93.5 % (ref 94–99)
SODIUM BLD-SCNC: 131 MMOL/L (ref 135–145)
VENTILATOR MODE: ABNORMAL
WBC NRBC COR # BLD: 8.37 10*3/MM3 (ref 4.8–10.8)

## 2019-06-06 PROCEDURE — 94760 N-INVAS EAR/PLS OXIMETRY 1: CPT

## 2019-06-06 PROCEDURE — 97110 THERAPEUTIC EXERCISES: CPT

## 2019-06-06 PROCEDURE — 25010000002 CEFTRIAXONE PER 250 MG: Performed by: NURSE PRACTITIONER

## 2019-06-06 PROCEDURE — 25010000002 METHYLPREDNISOLONE PER 40 MG: Performed by: NURSE PRACTITIONER

## 2019-06-06 PROCEDURE — 25010000002 AZITHROMYCIN PER 500 MG: Performed by: NURSE PRACTITIONER

## 2019-06-06 PROCEDURE — 94799 UNLISTED PULMONARY SVC/PX: CPT

## 2019-06-06 PROCEDURE — 85027 COMPLETE CBC AUTOMATED: CPT | Performed by: NURSE PRACTITIONER

## 2019-06-06 PROCEDURE — 80048 BASIC METABOLIC PNL TOTAL CA: CPT | Performed by: NURSE PRACTITIONER

## 2019-06-06 PROCEDURE — 82803 BLOOD GASES ANY COMBINATION: CPT

## 2019-06-06 PROCEDURE — 25010000002 ENOXAPARIN PER 10 MG: Performed by: NURSE PRACTITIONER

## 2019-06-06 PROCEDURE — 94660 CPAP INITIATION&MGMT: CPT

## 2019-06-06 PROCEDURE — 36600 WITHDRAWAL OF ARTERIAL BLOOD: CPT

## 2019-06-06 PROCEDURE — 97116 GAIT TRAINING THERAPY: CPT

## 2019-06-06 RX ORDER — METHYLPREDNISOLONE SODIUM SUCCINATE 40 MG/ML
40 INJECTION, POWDER, LYOPHILIZED, FOR SOLUTION INTRAMUSCULAR; INTRAVENOUS EVERY 8 HOURS
Status: DISCONTINUED | OUTPATIENT
Start: 2019-06-06 | End: 2019-06-07

## 2019-06-06 RX ORDER — MEGESTROL ACETATE 40 MG/1
40 TABLET ORAL DAILY
Status: DISCONTINUED | OUTPATIENT
Start: 2019-06-06 | End: 2019-06-06 | Stop reason: SDUPTHER

## 2019-06-06 RX ORDER — ASPIRIN 81 MG/1
81 TABLET ORAL DAILY
Status: DISCONTINUED | OUTPATIENT
Start: 2019-06-06 | End: 2019-06-10 | Stop reason: HOSPADM

## 2019-06-06 RX ORDER — MEGESTROL ACETATE 40 MG/ML
400 SUSPENSION ORAL DAILY
Status: DISCONTINUED | OUTPATIENT
Start: 2019-06-06 | End: 2019-06-10 | Stop reason: HOSPADM

## 2019-06-06 RX ORDER — TRAMADOL HYDROCHLORIDE 50 MG/1
50 TABLET ORAL EVERY 6 HOURS PRN
Status: DISCONTINUED | OUTPATIENT
Start: 2019-06-06 | End: 2019-06-07

## 2019-06-06 RX ADMIN — ASPIRIN 81 MG: 81 TABLET ORAL at 09:39

## 2019-06-06 RX ADMIN — MEGESTROL ACETATE 400 MG: 40 SUSPENSION ORAL at 10:14

## 2019-06-06 RX ADMIN — ACETAMINOPHEN 650 MG: 325 TABLET, FILM COATED ORAL at 16:38

## 2019-06-06 RX ADMIN — METOPROLOL TARTRATE 12.5 MG: 25 TABLET, FILM COATED ORAL at 20:39

## 2019-06-06 RX ADMIN — ENOXAPARIN SODIUM 30 MG: 30 INJECTION SUBCUTANEOUS at 20:40

## 2019-06-06 RX ADMIN — IPRATROPIUM BROMIDE AND ALBUTEROL SULFATE 3 ML: 2.5; .5 SOLUTION RESPIRATORY (INHALATION) at 18:45

## 2019-06-06 RX ADMIN — NICOTINE 1 PATCH: 14 PATCH TRANSDERMAL at 20:54

## 2019-06-06 RX ADMIN — ACETAMINOPHEN 650 MG: 325 TABLET, FILM COATED ORAL at 06:35

## 2019-06-06 RX ADMIN — CEFTRIAXONE 1 G: 1 INJECTION, POWDER, FOR SOLUTION INTRAMUSCULAR; INTRAVENOUS at 17:25

## 2019-06-06 RX ADMIN — HYDROXYZINE HYDROCHLORIDE 50 MG: 25 TABLET, FILM COATED ORAL at 10:14

## 2019-06-06 RX ADMIN — METHYLPREDNISOLONE SODIUM SUCCINATE 40 MG: 40 INJECTION, POWDER, FOR SOLUTION INTRAMUSCULAR; INTRAVENOUS at 17:25

## 2019-06-06 RX ADMIN — METHYLPREDNISOLONE SODIUM SUCCINATE 40 MG: 40 INJECTION, POWDER, FOR SOLUTION INTRAMUSCULAR; INTRAVENOUS at 09:39

## 2019-06-06 RX ADMIN — AZITHROMYCIN MONOHYDRATE 500 MG: 500 INJECTION, POWDER, LYOPHILIZED, FOR SOLUTION INTRAVENOUS at 20:40

## 2019-06-06 RX ADMIN — PANTOPRAZOLE SODIUM 40 MG: 40 TABLET, DELAYED RELEASE ORAL at 06:33

## 2019-06-06 RX ADMIN — TRAMADOL HYDROCHLORIDE 50 MG: 50 TABLET, COATED ORAL at 22:13

## 2019-06-06 RX ADMIN — GUAIFENESIN 1200 MG: 600 TABLET, EXTENDED RELEASE ORAL at 09:39

## 2019-06-06 RX ADMIN — LIDOCAINE 2 PATCH: 50 PATCH CUTANEOUS at 09:40

## 2019-06-06 RX ADMIN — IPRATROPIUM BROMIDE AND ALBUTEROL SULFATE 3 ML: 2.5; .5 SOLUTION RESPIRATORY (INHALATION) at 11:45

## 2019-06-06 RX ADMIN — IPRATROPIUM BROMIDE AND ALBUTEROL SULFATE 3 ML: 2.5; .5 SOLUTION RESPIRATORY (INHALATION) at 01:22

## 2019-06-06 RX ADMIN — BUDESONIDE AND FORMOTEROL FUMARATE DIHYDRATE 2 PUFF: 160; 4.5 AEROSOL RESPIRATORY (INHALATION) at 06:43

## 2019-06-06 RX ADMIN — SODIUM CHLORIDE, PRESERVATIVE FREE 3 ML: 5 INJECTION INTRAVENOUS at 09:39

## 2019-06-06 RX ADMIN — TRAMADOL HYDROCHLORIDE 50 MG: 50 TABLET, COATED ORAL at 16:39

## 2019-06-06 RX ADMIN — IPRATROPIUM BROMIDE AND ALBUTEROL SULFATE 3 ML: 2.5; .5 SOLUTION RESPIRATORY (INHALATION) at 06:43

## 2019-06-06 RX ADMIN — METOPROLOL TARTRATE 12.5 MG: 25 TABLET, FILM COATED ORAL at 09:39

## 2019-06-06 RX ADMIN — GUAIFENESIN 1200 MG: 600 TABLET, EXTENDED RELEASE ORAL at 20:39

## 2019-06-06 RX ADMIN — ACETAMINOPHEN 650 MG: 325 TABLET, FILM COATED ORAL at 22:06

## 2019-06-06 RX ADMIN — HYDROXYZINE HYDROCHLORIDE 50 MG: 25 TABLET, FILM COATED ORAL at 19:25

## 2019-06-06 RX ADMIN — METHYLPREDNISOLONE SODIUM SUCCINATE 60 MG: 40 INJECTION, POWDER, FOR SOLUTION INTRAMUSCULAR; INTRAVENOUS at 00:38

## 2019-06-06 NOTE — PLAN OF CARE
Problem: Patient Care Overview  Goal: Plan of Care Review  Outcome: Ongoing (interventions implemented as appropriate)   06/06/19 6192   Coping/Psychosocial   Plan of Care Reviewed With patient   OTHER   Outcome Summary Pt. progressing good with her act. zeny, rests often when tiring for 1-2 min. ea.!

## 2019-06-06 NOTE — PLAN OF CARE
Problem: Patient Care Overview  Goal: Plan of Care Review  Outcome: Ongoing (interventions implemented as appropriate)   06/06/19 1122   OTHER   Outcome Summary Pt. has good bed mobility. Transfers and ambulates 80' with CGA. SAt on 2lpm dropped to 82-84% during ambulation. Recovered in 40 seconds to 92%. Tolerated well.

## 2019-06-07 LAB
ANION GAP SERPL CALCULATED.3IONS-SCNC: ABNORMAL MMOL/L (ref 4–13)
BUN BLD-MCNC: 21 MG/DL (ref 5–21)
BUN/CREAT SERPL: 38.2 (ref 7–25)
CALCIUM SPEC-SCNC: 8.4 MG/DL (ref 8.4–10.4)
CHLORIDE SERPL-SCNC: 85 MMOL/L (ref 98–110)
CO2 SERPL-SCNC: >40 MMOL/L (ref 24–31)
CREAT BLD-MCNC: 0.55 MG/DL (ref 0.5–1.4)
DEPRECATED RDW RBC AUTO: 45.4 FL (ref 40–54)
ERYTHROCYTE [DISTWIDTH] IN BLOOD BY AUTOMATED COUNT: 13.2 % (ref 12–15)
GFR SERPL CREATININE-BSD FRML MDRD: 114 ML/MIN/1.73
GLUCOSE BLD-MCNC: 105 MG/DL (ref 70–100)
HCT VFR BLD AUTO: 28.2 % (ref 37–47)
HGB BLD-MCNC: 9.5 G/DL (ref 12–16)
MCH RBC QN AUTO: 32 PG (ref 28–32)
MCHC RBC AUTO-ENTMCNC: 33.7 G/DL (ref 33–36)
MCV RBC AUTO: 94.9 FL (ref 82–98)
PLATELET # BLD AUTO: 231 10*3/MM3 (ref 130–400)
PMV BLD AUTO: 10.4 FL (ref 6–12)
POTASSIUM BLD-SCNC: 4 MMOL/L (ref 3.5–5.3)
RBC # BLD AUTO: 2.97 10*6/MM3 (ref 4.2–5.4)
SODIUM BLD-SCNC: 128 MMOL/L (ref 135–145)
WBC NRBC COR # BLD: 6.59 10*3/MM3 (ref 4.8–10.8)

## 2019-06-07 PROCEDURE — 94660 CPAP INITIATION&MGMT: CPT

## 2019-06-07 PROCEDURE — 80048 BASIC METABOLIC PNL TOTAL CA: CPT | Performed by: NURSE PRACTITIONER

## 2019-06-07 PROCEDURE — 85027 COMPLETE CBC AUTOMATED: CPT | Performed by: NURSE PRACTITIONER

## 2019-06-07 PROCEDURE — 97535 SELF CARE MNGMENT TRAINING: CPT

## 2019-06-07 PROCEDURE — 94760 N-INVAS EAR/PLS OXIMETRY 1: CPT

## 2019-06-07 PROCEDURE — 94799 UNLISTED PULMONARY SVC/PX: CPT

## 2019-06-07 PROCEDURE — 94640 AIRWAY INHALATION TREATMENT: CPT

## 2019-06-07 PROCEDURE — 97110 THERAPEUTIC EXERCISES: CPT

## 2019-06-07 PROCEDURE — 97116 GAIT TRAINING THERAPY: CPT

## 2019-06-07 PROCEDURE — 25010000002 METHYLPREDNISOLONE PER 40 MG: Performed by: NURSE PRACTITIONER

## 2019-06-07 PROCEDURE — 25010000002 CEFTRIAXONE PER 250 MG: Performed by: NURSE PRACTITIONER

## 2019-06-07 PROCEDURE — 25010000002 ENOXAPARIN PER 10 MG: Performed by: NURSE PRACTITIONER

## 2019-06-07 RX ORDER — PREDNISONE 20 MG/1
20 TABLET ORAL 2 TIMES DAILY WITH MEALS
Status: DISCONTINUED | OUTPATIENT
Start: 2019-06-08 | End: 2019-06-09

## 2019-06-07 RX ORDER — METHYLPREDNISOLONE SODIUM SUCCINATE 40 MG/ML
40 INJECTION, POWDER, LYOPHILIZED, FOR SOLUTION INTRAMUSCULAR; INTRAVENOUS EVERY 12 HOURS
Status: COMPLETED | OUTPATIENT
Start: 2019-06-07 | End: 2019-06-07

## 2019-06-07 RX ORDER — HYDROCODONE BITARTRATE AND ACETAMINOPHEN 7.5; 325 MG/1; MG/1
1 TABLET ORAL EVERY 8 HOURS PRN
Status: DISCONTINUED | OUTPATIENT
Start: 2019-06-07 | End: 2019-06-09

## 2019-06-07 RX ORDER — SODIUM CHLORIDE 9 MG/ML
75 INJECTION, SOLUTION INTRAVENOUS CONTINUOUS
Status: DISPENSED | OUTPATIENT
Start: 2019-06-07 | End: 2019-06-07

## 2019-06-07 RX ORDER — CEFDINIR 300 MG/1
300 CAPSULE ORAL EVERY 12 HOURS SCHEDULED
Status: COMPLETED | OUTPATIENT
Start: 2019-06-08 | End: 2019-06-09

## 2019-06-07 RX ADMIN — ENOXAPARIN SODIUM 30 MG: 30 INJECTION SUBCUTANEOUS at 20:17

## 2019-06-07 RX ADMIN — METHYLPREDNISOLONE SODIUM SUCCINATE 40 MG: 40 INJECTION, POWDER, FOR SOLUTION INTRAMUSCULAR; INTRAVENOUS at 20:17

## 2019-06-07 RX ADMIN — ASPIRIN 81 MG: 81 TABLET ORAL at 11:01

## 2019-06-07 RX ADMIN — SODIUM CHLORIDE 75 ML/HR: 9 INJECTION, SOLUTION INTRAVENOUS at 11:01

## 2019-06-07 RX ADMIN — SODIUM CHLORIDE, PRESERVATIVE FREE 3 ML: 5 INJECTION INTRAVENOUS at 20:18

## 2019-06-07 RX ADMIN — METOPROLOL TARTRATE 12.5 MG: 25 TABLET, FILM COATED ORAL at 08:33

## 2019-06-07 RX ADMIN — BUDESONIDE AND FORMOTEROL FUMARATE DIHYDRATE 2 PUFF: 160; 4.5 AEROSOL RESPIRATORY (INHALATION) at 07:03

## 2019-06-07 RX ADMIN — SODIUM CHLORIDE, PRESERVATIVE FREE 3 ML: 5 INJECTION INTRAVENOUS at 08:34

## 2019-06-07 RX ADMIN — IPRATROPIUM BROMIDE AND ALBUTEROL SULFATE 3 ML: 2.5; .5 SOLUTION RESPIRATORY (INHALATION) at 19:13

## 2019-06-07 RX ADMIN — GUAIFENESIN 1200 MG: 600 TABLET, EXTENDED RELEASE ORAL at 08:33

## 2019-06-07 RX ADMIN — LIDOCAINE 2 PATCH: 50 PATCH CUTANEOUS at 08:34

## 2019-06-07 RX ADMIN — HYDROCODONE BITARTRATE AND ACETAMINOPHEN 1 TABLET: 7.5; 325 TABLET ORAL at 18:52

## 2019-06-07 RX ADMIN — IPRATROPIUM BROMIDE AND ALBUTEROL SULFATE 3 ML: 2.5; .5 SOLUTION RESPIRATORY (INHALATION) at 00:23

## 2019-06-07 RX ADMIN — HYDROXYZINE HYDROCHLORIDE 50 MG: 25 TABLET, FILM COATED ORAL at 05:24

## 2019-06-07 RX ADMIN — CEFTRIAXONE 1 G: 1 INJECTION, POWDER, FOR SOLUTION INTRAMUSCULAR; INTRAVENOUS at 17:39

## 2019-06-07 RX ADMIN — MEGESTROL ACETATE 400 MG: 40 SUSPENSION ORAL at 08:34

## 2019-06-07 RX ADMIN — ACETAMINOPHEN 650 MG: 325 TABLET, FILM COATED ORAL at 17:40

## 2019-06-07 RX ADMIN — ACETAMINOPHEN 650 MG: 325 TABLET, FILM COATED ORAL at 08:34

## 2019-06-07 RX ADMIN — TRAMADOL HYDROCHLORIDE 50 MG: 50 TABLET, COATED ORAL at 05:24

## 2019-06-07 RX ADMIN — METHYLPREDNISOLONE SODIUM SUCCINATE 40 MG: 40 INJECTION, POWDER, FOR SOLUTION INTRAMUSCULAR; INTRAVENOUS at 01:47

## 2019-06-07 RX ADMIN — METHYLPREDNISOLONE SODIUM SUCCINATE 40 MG: 40 INJECTION, POWDER, FOR SOLUTION INTRAMUSCULAR; INTRAVENOUS at 08:34

## 2019-06-07 RX ADMIN — NICOTINE 1 PATCH: 14 PATCH TRANSDERMAL at 20:19

## 2019-06-07 RX ADMIN — BUDESONIDE AND FORMOTEROL FUMARATE DIHYDRATE 2 PUFF: 160; 4.5 AEROSOL RESPIRATORY (INHALATION) at 19:14

## 2019-06-07 RX ADMIN — METOPROLOL TARTRATE 12.5 MG: 25 TABLET, FILM COATED ORAL at 20:17

## 2019-06-07 RX ADMIN — IPRATROPIUM BROMIDE AND ALBUTEROL SULFATE 3 ML: 2.5; .5 SOLUTION RESPIRATORY (INHALATION) at 07:03

## 2019-06-07 RX ADMIN — HYDROXYZINE HYDROCHLORIDE 50 MG: 25 TABLET, FILM COATED ORAL at 17:40

## 2019-06-07 RX ADMIN — ACETAMINOPHEN 650 MG: 325 TABLET, FILM COATED ORAL at 22:26

## 2019-06-07 RX ADMIN — IPRATROPIUM BROMIDE AND ALBUTEROL SULFATE 3 ML: 2.5; .5 SOLUTION RESPIRATORY (INHALATION) at 12:14

## 2019-06-07 RX ADMIN — HYDROCODONE BITARTRATE AND ACETAMINOPHEN 1 TABLET: 7.5; 325 TABLET ORAL at 11:01

## 2019-06-07 RX ADMIN — PANTOPRAZOLE SODIUM 40 MG: 40 TABLET, DELAYED RELEASE ORAL at 05:24

## 2019-06-07 RX ADMIN — GUAIFENESIN 1200 MG: 600 TABLET, EXTENDED RELEASE ORAL at 20:17

## 2019-06-07 RX ADMIN — IPRATROPIUM BROMIDE AND ALBUTEROL SULFATE 3 ML: 2.5; .5 SOLUTION RESPIRATORY (INHALATION) at 23:57

## 2019-06-07 NOTE — PROGRESS NOTES
[facility] Medicine Services  INPATIENT PROGRESS NOTE    Length of Stay: 3  Date of Admission: 6/4/2019  Primary Care Physician: Lorri Guan APRN    Subjective   Chief Complaint: Follow-up shortness of breath  HPI   Patient is sitting up on the side of the bed.  She states she had a little bit of a rough night because he was having a hard time adjusting the mask on her trilogy machine.  She switched back over to the hospital BiPAP and states she slept much better after this.  She is breathing better this morning.  She is still coughing up some thick sputum at times.  She denies chest pain.  She tells me that she ate more breakfast today than she has in quite some time.  She is not drinking much.  She did sit in the chair yesterday and stated that she walked twice in the [place]una.  She continues to complain of back pain, which is chronic.    Review of Systems   All pertinent negatives and positives are as above. All other systems have been reviewed and are negative unless otherwise stated.     Objective    Temp:  [97.9 °F (36.6 °C)-98.6 °F (37 °C)] 98 °F (36.7 °C)  Heart Rate:  [70-99] 79  Resp:  [16-20] 18  BP: (111-124)/(50-59) 120/50  FiO2 (%):  [35 %] 35 %  Physical Exam  Constitutional: She is oriented to person, place, and time. She appears well-developed. No distress.   Thin, chronically ill-appearing   HENT:   Head: Normocephalic and atraumatic.   Neck: Normal range of motion. Neck supple. No JVD present. No tracheal deviation present.   Cardiovascular: Normal rate, regular rhythm, normal heart sounds and intact distal pulses. Exam reveals no gallop and no friction rub.   Sinus 60-88  Pulmonary/Chest: She has no wheezes. She has no rales.   Poor air movement throughout.  Grossly diminished.   Abdominal: Soft. Bowel sounds are normal. She exhibits no distension. There is no tenderness.   Musculoskeletal: Normal range of motion. She exhibits no edema or tenderness.    Neurological: She is oriented to person, place, and time. No cranial nerve deficit.   Skin: Skin is warm and dry. No rash noted. No erythema.   Psychiatric: She has a normal mood and affect. Her behavior is normal. Judgment and thought content normal.   Vitals reviewed.    Results Review:  I have reviewed the labs, radiology results, and diagnostic studies.    Laboratory Data:   Results from last 7 days   Lab Units 06/07/19  0513 06/06/19  0609 06/05/19  0540   WBC 10*3/mm3 6.59 8.37 3.83*   HEMOGLOBIN g/dL 9.5* 9.7* 10.6*   HEMATOCRIT % 28.2* 29.5* 34.4*   PLATELETS 10*3/mm3 231 213 241     Results from last 7 days   Lab Units 06/07/19  0513 06/06/19  0609 06/05/19  0540  06/04/19  1635   SODIUM mmol/L 128* 131* 136  --  138   SODIUM, ARTERIAL   --   --   --    < >  --    POTASSIUM mmol/L 4.0 4.4 4.5  --  4.4   CHLORIDE mmol/L 85* 85* 83*  --  81*   CO2 mmol/L >40.0* >40.0* >40.0*  --  >40.0*   BUN mg/dL 21 22* 19  --  20   CREATININE mg/dL 0.55 0.59 0.54  --  0.52   CALCIUM mg/dL 8.4 8.9 9.0  --  9.0   BILIRUBIN mg/dL  --   --   --   --  0.4   ALK PHOS U/L  --   --   --   --  94   ALT (SGPT) U/L  --   --   --   --  15   AST (SGOT) U/L  --   --   --   --  26   GLUCOSE mg/dL 105* 122* 121*  --  130*    < > = values in this interval not displayed.     I have reviewed the patient current medications.     Assessment/Plan     Active Hospital Problems    Diagnosis   • Acute on chronic respiratory failure with hypoxia and hypercapnia (CMS/HCC)   • Macrocytic anemia   • Community acquired pneumonia of left upper lobe of lung (CMS/HCC)   • Severe protein-calorie malnutrition (CMS/HCC)   • Tobacco abuse   • COPD with acute exacerbation (CMS/HCC)     Plan:  1.  Continue Rocephin, day 3.  Patient has completed 3 days of azithromycin therapy.  Will convert to oral Omnicef to start tomorrow.  2.  Continue Symbicort, duo nebs, Mucinex, and encouraged use of incentive spirometer and flutter valve.  3.  Wean Solu-Medrol and will  start oral prednisone tomorrow.  4.  Have asked nursing to contact [facility] to see if the patient's trilogy settings have been adjusted or if the machine has been looked at by the technicians as there is concern from her and her  that the machine is not working properly.  5.  Worsening hyponatremia.  Check urine sodium and osmolality.  Feel that she is somewhat dehydrated, will give gentle IV fluids over the next 12 hours and recheck in a.m.  6.  Continue physical and occupational therapy, activity as tolerated.  7.  Change Ultram to Norco as needed for back pain.  Continue Lidoderm patches.  8.  TSH is low but free T4 is normal.  Would recommend rechecking thyroid function tests in 6 weeks  9.  BMP in a.m.    Discharge Planning: I expect the patient to be discharged to home with home health in 1-2 days.    ROLAND Yanez   06/07/19   9:44 AM    Chart reviewed.  Patient examined.  Agree with assessment and plan.  Marked improvement.  Likely home in the next 1 to 2 days.  Discussed with patient and with ROLAND Gutiérrez.    .Maribell Curry DO  06/07/19  6:49 PM

## 2019-06-07 NOTE — PROGRESS NOTES
Continued Stay Note   [facility]     Patient Name: Teri Tim  MRN: [id number]  Today's Date: 6/7/2019    Admit Date: 6/4/2019    Discharge Plan     Row Name 06/07/19 1258       Plan    Plan Comments  Pt states that [facility] came and fixed trilogy. Pt plans on returning home with  at discharge and denies any needs for HH at this time. SW will follow and assist with any discharge needs that may arise.         Discharge Codes    No documentation.             Zoila Mccann

## 2019-06-07 NOTE — PLAN OF CARE
Problem: Patient Care Overview  Goal: Plan of Care Review  Outcome: Ongoing (interventions implemented as appropriate)   06/07/19 1123   Coping/Psychosocial   Plan of Care Reviewed With patient;spouse   OTHER   Outcome Summary Pt. progressing good with her adl tasks of bathing/dressing!

## 2019-06-07 NOTE — PLAN OF CARE
Problem: Patient Care Overview  Goal: Plan of Care Review  Outcome: Ongoing (interventions implemented as appropriate)   06/07/19 7135   Coping/Psychosocial   Plan of Care Reviewed With patient   OTHER   Outcome Summary Pt. is independent in bed mobility and transfers. Ambulates 80' with SBA. oX SAT on 2lpm began 95% dropped to 86%. Recovers in approx 35 seconds to mid 90's.

## 2019-06-07 NOTE — PLAN OF CARE
Problem: Patient Care Overview  Goal: Plan of Care Review  Outcome: Ongoing (interventions implemented as appropriate)   06/07/19 0244   Coping/Psychosocial   Plan of Care Reviewed With patient   Plan of Care Review   Progress no change   OTHER   Outcome Summary Patient c/o pain, prn medication given with relief. Continues to receive IV antibiotics and IV steroids. Patient resting comfortably with BiPAP on. VSS. Safety maintained. Will continue to monitor.        Problem: Pain, Chronic (Adult)  Goal: Acceptable Pain/Comfort Level and Functional Ability  Outcome: Ongoing (interventions implemented as appropriate)      Problem: Fall Risk (Adult)  Goal: Absence of Fall  Outcome: Ongoing (interventions implemented as appropriate)      Problem: Breathing Pattern Ineffective (Adult)  Goal: Effective Oxygenation/Ventilation  Outcome: Ongoing (interventions implemented as appropriate)    Goal: Anxiety/Fear Reduction  Outcome: Ongoing (interventions implemented as appropriate)

## 2019-06-08 LAB
ANION GAP SERPL CALCULATED.3IONS-SCNC: 6 MMOL/L (ref 4–13)
BUN BLD-MCNC: 18 MG/DL (ref 5–21)
BUN/CREAT SERPL: 31 (ref 7–25)
CALCIUM SPEC-SCNC: 8.8 MG/DL (ref 8.4–10.4)
CHLORIDE SERPL-SCNC: 88 MMOL/L (ref 98–110)
CO2 SERPL-SCNC: 37 MMOL/L (ref 24–31)
CREAT BLD-MCNC: 0.58 MG/DL (ref 0.5–1.4)
GFR SERPL CREATININE-BSD FRML MDRD: 108 ML/MIN/1.73
GLUCOSE BLD-MCNC: 94 MG/DL (ref 70–100)
OSMOLALITY UR: 146 MOSM/KG (ref 601–850)
POTASSIUM BLD-SCNC: 3.8 MMOL/L (ref 3.5–5.3)
SODIUM BLD-SCNC: 131 MMOL/L (ref 135–145)
SODIUM UR-SCNC: 22 MMOL/L (ref 30–90)

## 2019-06-08 PROCEDURE — 63710000001 PREDNISONE PER 1 MG: Performed by: NURSE PRACTITIONER

## 2019-06-08 PROCEDURE — 84300 ASSAY OF URINE SODIUM: CPT | Performed by: NURSE PRACTITIONER

## 2019-06-08 PROCEDURE — 83935 ASSAY OF URINE OSMOLALITY: CPT | Performed by: NURSE PRACTITIONER

## 2019-06-08 PROCEDURE — 94799 UNLISTED PULMONARY SVC/PX: CPT

## 2019-06-08 PROCEDURE — 25010000002 ENOXAPARIN PER 10 MG: Performed by: NURSE PRACTITIONER

## 2019-06-08 PROCEDURE — 80048 BASIC METABOLIC PNL TOTAL CA: CPT | Performed by: NURSE PRACTITIONER

## 2019-06-08 PROCEDURE — 97110 THERAPEUTIC EXERCISES: CPT

## 2019-06-08 RX ADMIN — HYDROCODONE BITARTRATE AND ACETAMINOPHEN 1 TABLET: 7.5; 325 TABLET ORAL at 19:35

## 2019-06-08 RX ADMIN — HYDROCODONE BITARTRATE AND ACETAMINOPHEN 1 TABLET: 7.5; 325 TABLET ORAL at 02:57

## 2019-06-08 RX ADMIN — GUAIFENESIN 1200 MG: 600 TABLET, EXTENDED RELEASE ORAL at 20:26

## 2019-06-08 RX ADMIN — PREDNISONE 20 MG: 20 TABLET ORAL at 17:07

## 2019-06-08 RX ADMIN — SODIUM CHLORIDE, PRESERVATIVE FREE 3 ML: 5 INJECTION INTRAVENOUS at 20:27

## 2019-06-08 RX ADMIN — GUAIFENESIN 1200 MG: 600 TABLET, EXTENDED RELEASE ORAL at 07:54

## 2019-06-08 RX ADMIN — ONDANSETRON 4 MG: 4 TABLET, FILM COATED ORAL at 18:24

## 2019-06-08 RX ADMIN — IPRATROPIUM BROMIDE AND ALBUTEROL SULFATE 3 ML: 2.5; .5 SOLUTION RESPIRATORY (INHALATION) at 06:41

## 2019-06-08 RX ADMIN — CEFDINIR 300 MG: 300 CAPSULE ORAL at 20:26

## 2019-06-08 RX ADMIN — ACETAMINOPHEN 650 MG: 325 TABLET, FILM COATED ORAL at 18:24

## 2019-06-08 RX ADMIN — IPRATROPIUM BROMIDE AND ALBUTEROL SULFATE 3 ML: 2.5; .5 SOLUTION RESPIRATORY (INHALATION) at 19:18

## 2019-06-08 RX ADMIN — ENOXAPARIN SODIUM 30 MG: 30 INJECTION SUBCUTANEOUS at 20:26

## 2019-06-08 RX ADMIN — HYDROXYZINE HYDROCHLORIDE 50 MG: 25 TABLET, FILM COATED ORAL at 20:27

## 2019-06-08 RX ADMIN — IPRATROPIUM BROMIDE AND ALBUTEROL SULFATE 3 ML: 2.5; .5 SOLUTION RESPIRATORY (INHALATION) at 13:54

## 2019-06-08 RX ADMIN — BUDESONIDE AND FORMOTEROL FUMARATE DIHYDRATE 2 PUFF: 160; 4.5 AEROSOL RESPIRATORY (INHALATION) at 19:19

## 2019-06-08 RX ADMIN — PANTOPRAZOLE SODIUM 40 MG: 40 TABLET, DELAYED RELEASE ORAL at 06:25

## 2019-06-08 RX ADMIN — ASPIRIN 81 MG: 81 TABLET ORAL at 07:53

## 2019-06-08 RX ADMIN — BUDESONIDE AND FORMOTEROL FUMARATE DIHYDRATE 2 PUFF: 160; 4.5 AEROSOL RESPIRATORY (INHALATION) at 06:47

## 2019-06-08 RX ADMIN — HYDROXYZINE HYDROCHLORIDE 50 MG: 25 TABLET, FILM COATED ORAL at 02:57

## 2019-06-08 RX ADMIN — METOPROLOL TARTRATE 12.5 MG: 25 TABLET, FILM COATED ORAL at 20:26

## 2019-06-08 RX ADMIN — SODIUM CHLORIDE, PRESERVATIVE FREE 3 ML: 5 INJECTION INTRAVENOUS at 08:50

## 2019-06-08 RX ADMIN — PREDNISONE 20 MG: 20 TABLET ORAL at 07:54

## 2019-06-08 RX ADMIN — HYDROXYZINE HYDROCHLORIDE 50 MG: 25 TABLET, FILM COATED ORAL at 10:52

## 2019-06-08 RX ADMIN — CEFDINIR 300 MG: 300 CAPSULE ORAL at 07:53

## 2019-06-08 RX ADMIN — NICOTINE 1 PATCH: 14 PATCH TRANSDERMAL at 20:22

## 2019-06-08 RX ADMIN — ACETAMINOPHEN 650 MG: 325 TABLET, FILM COATED ORAL at 08:29

## 2019-06-08 RX ADMIN — HYDROCODONE BITARTRATE AND ACETAMINOPHEN 1 TABLET: 7.5; 325 TABLET ORAL at 10:52

## 2019-06-08 RX ADMIN — METOPROLOL TARTRATE 12.5 MG: 25 TABLET, FILM COATED ORAL at 07:52

## 2019-06-08 RX ADMIN — MEGESTROL ACETATE 400 MG: 40 SUSPENSION ORAL at 07:53

## 2019-06-08 RX ADMIN — IPRATROPIUM BROMIDE AND ALBUTEROL SULFATE 3 ML: 2.5; .5 SOLUTION RESPIRATORY (INHALATION) at 23:42

## 2019-06-08 RX ADMIN — LIDOCAINE 2 PATCH: 50 PATCH CUTANEOUS at 07:52

## 2019-06-08 RX ADMIN — ACETAMINOPHEN 650 MG: 325 TABLET, FILM COATED ORAL at 14:02

## 2019-06-08 NOTE — PLAN OF CARE
Problem: Patient Care Overview  Goal: Plan of Care Review  Outcome: Ongoing (interventions implemented as appropriate)   06/08/19 0326   Coping/Psychosocial   Plan of Care Reviewed With patient   Plan of Care Review   Progress no change   OTHER   Outcome Summary Patient c/o pain, prn medication given with relief. PRN atarax given with relief. Continues to receive ATB. VSS. Safety maintained. Will continue to monitor.        Problem: Pain, Chronic (Adult)  Goal: Acceptable Pain/Comfort Level and Functional Ability  Outcome: Ongoing (interventions implemented as appropriate)      Problem: Fall Risk (Adult)  Goal: Absence of Fall  Outcome: Ongoing (interventions implemented as appropriate)      Problem: Breathing Pattern Ineffective (Adult)  Goal: Effective Oxygenation/Ventilation  Outcome: Ongoing (interventions implemented as appropriate)    Goal: Anxiety/Fear Reduction  Outcome: Ongoing (interventions implemented as appropriate)

## 2019-06-08 NOTE — PLAN OF CARE
Problem: Patient Care Overview  Goal: Plan of Care Review  Outcome: Ongoing (interventions implemented as appropriate)   06/08/19 1126   Coping/Psychosocial   Plan of Care Reviewed With patient   OTHER   Outcome Summary Pt. progressed good with her ue exs increasing her act. zeny for adl tasks!

## 2019-06-08 NOTE — PROGRESS NOTES
"    [facility] Medicine Services  INPATIENT PROGRESS NOTE    Patient Name: Teri Tim  Date of Admission: 6/4/2019  Today's Date: 06/08/19  Length of Stay: 4  Primary Care Physician: Lorri Guan APRN    Subjective   Chief Complaint: follow up COPD  HPI   Pt is awake and alert. On oxygen at 2L. States she feels much better than prior to admission. States she did not \"sleep\" with her Trilogy machine on last night. States she was lying down but didn't to go sleep. States she feels wired given the steroids. No new complaints. Still with cough.  at bedside.     Review of Systems   All pertinent negatives and positives are as above. All other systems have been reviewed and are negative unless otherwise stated.     Objective    Temp:  [98.3 °F (36.8 °C)-98.6 °F (37 °C)] 98.5 °F (36.9 °C)  Heart Rate:  [73-99] 74  Resp:  [16-18] 18  BP: (111-134)/(59-72) 111/72  Physical Exam   Constitutional: She is oriented to person, place, and time. She appears well-developed.   Chronically ill appearing. Thin.   HENT:   Head: Normocephalic and atraumatic.   Eyes: Conjunctivae and EOM are normal. Pupils are equal, round, and reactive to light.   Neck: Neck supple. No JVD present. No thyromegaly present.   Cardiovascular: Normal rate, regular rhythm, normal heart sounds and intact distal pulses. Exam reveals no gallop and no friction rub.   No murmur heard.  Sinus tachycardia 106   Pulmonary/Chest: Effort normal. No respiratory distress. She has no wheezes. She has no rales. She exhibits no tenderness.   Diminished throughout.    Abdominal: Soft. Bowel sounds are normal. She exhibits no distension. There is no tenderness. There is no rebound and no guarding.   Musculoskeletal: Normal range of motion. She exhibits no edema, tenderness or deformity.   Lymphadenopathy:     She has no cervical adenopathy.   Neurological: She is alert and oriented to person, place, and time. She displays " normal reflexes. No cranial nerve deficit. She exhibits normal muscle tone.   Skin: Skin is warm and dry. No rash noted.   Psychiatric: She has a normal mood and affect. Her behavior is normal. Judgment and thought content normal.     Results Review:  I have reviewed the labs, radiology results, and diagnostic studies.    Laboratory Data:   Results from last 7 days   Lab Units 06/07/19  0513 06/06/19  0609 06/05/19  0540   WBC 10*3/mm3 6.59 8.37 3.83*   HEMOGLOBIN g/dL 9.5* 9.7* 10.6*   HEMATOCRIT % 28.2* 29.5* 34.4*   PLATELETS 10*3/mm3 231 213 241        Results from last 7 days   Lab Units 06/08/19  0451 06/07/19  0513 06/06/19  0609  06/04/19  1635   SODIUM mmol/L 131* 128* 131*   < > 138   SODIUM, ARTERIAL   --   --   --    < >  --    POTASSIUM mmol/L 3.8 4.0 4.4   < > 4.4   CHLORIDE mmol/L 88* 85* 85*   < > 81*   CO2 mmol/L 37.0* >40.0* >40.0*   < > >40.0*   BUN mg/dL 18 21 22*   < > 20   CREATININE mg/dL 0.58 0.55 0.59   < > 0.52   CALCIUM mg/dL 8.8 8.4 8.9   < > 9.0   BILIRUBIN mg/dL  --   --   --   --  0.4   ALK PHOS U/L  --   --   --   --  94   ALT (SGPT) U/L  --   --   --   --  15   AST (SGOT) U/L  --   --   --   --  26   GLUCOSE mg/dL 94 105* 122*   < > 130*    < > = values in this interval not displayed.     Culture Data:   Blood Culture   Date Value Ref Range Status   06/04/2019 No growth at 3 days  Preliminary   06/04/2019 No growth at 3 days  Preliminary     Radiology Data:   Imaging Results (last 24 hours)     ** No results found for the last 24 hours. **          I have reviewed the patient's current medications.     Assessment/Plan     Active Hospital Problems    Diagnosis   • Acute on chronic respiratory failure with hypoxia and hypercapnia (CMS/HCC)   • Macrocytic anemia   • Community acquired pneumonia of left upper lobe of lung (CMS/HCC)   • Severe protein-calorie malnutrition (CMS/HCC)   • Tobacco abuse   • Hyponatremia   • COPD with acute exacerbation (CMS/HCC)     Plan:  1. Encouraged  increased fluid intake today. Will repeat bmp in am.   2. Her steroids have been weaned to oral today. Discussed with nursing to give her atarax tonight and use her Triology. If she tolerates this and is stable in the am. Will likely be discharge.   3. Will discontinue cardiac monitoring.   4. Repeat bmp in am.   5. Encouraged ambulation.     Discharge Planning: I expect the patient to be discharged to home in ? days.    ROLAND Burdick   06/08/19   1:21 PM       Chart reviewed  Patient examined  Agree with assessment and plan  Discussed with patient and KARIE Curry DO  06/08/19  2:00 PM

## 2019-06-09 LAB
ANION GAP SERPL CALCULATED.3IONS-SCNC: 3 MMOL/L (ref 4–13)
ANION GAP SERPL CALCULATED.3IONS-SCNC: 4 MMOL/L (ref 4–13)
ANION GAP SERPL CALCULATED.3IONS-SCNC: 4 MMOL/L (ref 4–13)
BACTERIA SPEC AEROBE CULT: NORMAL
BACTERIA SPEC AEROBE CULT: NORMAL
BUN BLD-MCNC: 12 MG/DL (ref 5–21)
BUN BLD-MCNC: 12 MG/DL (ref 5–21)
BUN BLD-MCNC: 13 MG/DL (ref 5–21)
BUN/CREAT SERPL: 19.7 (ref 7–25)
BUN/CREAT SERPL: 22 (ref 7–25)
BUN/CREAT SERPL: 22.2 (ref 7–25)
CALCIUM SPEC-SCNC: 8.1 MG/DL (ref 8.4–10.4)
CALCIUM SPEC-SCNC: 8.5 MG/DL (ref 8.4–10.4)
CALCIUM SPEC-SCNC: 8.6 MG/DL (ref 8.4–10.4)
CHLORIDE SERPL-SCNC: 85 MMOL/L (ref 98–110)
CHLORIDE SERPL-SCNC: 87 MMOL/L (ref 98–110)
CHLORIDE SERPL-SCNC: 92 MMOL/L (ref 98–110)
CO2 SERPL-SCNC: 36 MMOL/L (ref 24–31)
CO2 SERPL-SCNC: 37 MMOL/L (ref 24–31)
CO2 SERPL-SCNC: 37 MMOL/L (ref 24–31)
CREAT BLD-MCNC: 0.54 MG/DL (ref 0.5–1.4)
CREAT BLD-MCNC: 0.59 MG/DL (ref 0.5–1.4)
CREAT BLD-MCNC: 0.61 MG/DL (ref 0.5–1.4)
GFR SERPL CREATININE-BSD FRML MDRD: 101 ML/MIN/1.73
GFR SERPL CREATININE-BSD FRML MDRD: 105 ML/MIN/1.73
GFR SERPL CREATININE-BSD FRML MDRD: 117 ML/MIN/1.73
GLUCOSE BLD-MCNC: 76 MG/DL (ref 70–100)
GLUCOSE BLD-MCNC: 96 MG/DL (ref 70–100)
GLUCOSE BLD-MCNC: 97 MG/DL (ref 70–100)
POTASSIUM BLD-SCNC: 3.6 MMOL/L (ref 3.5–5.3)
POTASSIUM BLD-SCNC: 3.6 MMOL/L (ref 3.5–5.3)
POTASSIUM BLD-SCNC: 3.8 MMOL/L (ref 3.5–5.3)
SODIUM BLD-SCNC: 126 MMOL/L (ref 135–145)
SODIUM BLD-SCNC: 126 MMOL/L (ref 135–145)
SODIUM BLD-SCNC: 133 MMOL/L (ref 135–145)

## 2019-06-09 PROCEDURE — 94799 UNLISTED PULMONARY SVC/PX: CPT

## 2019-06-09 PROCEDURE — 25010000002 ENOXAPARIN PER 10 MG: Performed by: NURSE PRACTITIONER

## 2019-06-09 PROCEDURE — 80048 BASIC METABOLIC PNL TOTAL CA: CPT | Performed by: NURSE PRACTITIONER

## 2019-06-09 PROCEDURE — 97116 GAIT TRAINING THERAPY: CPT

## 2019-06-09 PROCEDURE — 63710000001 PREDNISONE PER 1 MG: Performed by: NURSE PRACTITIONER

## 2019-06-09 PROCEDURE — 97110 THERAPEUTIC EXERCISES: CPT

## 2019-06-09 PROCEDURE — 94760 N-INVAS EAR/PLS OXIMETRY 1: CPT

## 2019-06-09 RX ORDER — LANOLIN ALCOHOL/MO/W.PET/CERES
3 CREAM (GRAM) TOPICAL NIGHTLY
Status: DISCONTINUED | OUTPATIENT
Start: 2019-06-09 | End: 2019-06-10 | Stop reason: HOSPADM

## 2019-06-09 RX ORDER — TOLVAPTAN 15 MG/1
7.5 TABLET ORAL ONCE
Status: COMPLETED | OUTPATIENT
Start: 2019-06-09 | End: 2019-06-09

## 2019-06-09 RX ORDER — HYDROCODONE BITARTRATE AND ACETAMINOPHEN 7.5; 325 MG/1; MG/1
1 TABLET ORAL EVERY 4 HOURS PRN
Status: DISCONTINUED | OUTPATIENT
Start: 2019-06-09 | End: 2019-06-10 | Stop reason: HOSPADM

## 2019-06-09 RX ORDER — PREDNISONE 20 MG/1
20 TABLET ORAL
Status: DISCONTINUED | OUTPATIENT
Start: 2019-06-09 | End: 2019-06-10 | Stop reason: HOSPADM

## 2019-06-09 RX ADMIN — ENOXAPARIN SODIUM 30 MG: 30 INJECTION SUBCUTANEOUS at 20:10

## 2019-06-09 RX ADMIN — HYDROCODONE BITARTRATE AND ACETAMINOPHEN 1 TABLET: 7.5; 325 TABLET ORAL at 03:19

## 2019-06-09 RX ADMIN — CEFDINIR 300 MG: 300 CAPSULE ORAL at 20:09

## 2019-06-09 RX ADMIN — MEGESTROL ACETATE 400 MG: 40 SUSPENSION ORAL at 07:57

## 2019-06-09 RX ADMIN — IPRATROPIUM BROMIDE AND ALBUTEROL SULFATE 3 ML: 2.5; .5 SOLUTION RESPIRATORY (INHALATION) at 07:03

## 2019-06-09 RX ADMIN — SODIUM CHLORIDE, PRESERVATIVE FREE 3 ML: 5 INJECTION INTRAVENOUS at 20:12

## 2019-06-09 RX ADMIN — METOPROLOL TARTRATE 12.5 MG: 25 TABLET, FILM COATED ORAL at 07:58

## 2019-06-09 RX ADMIN — ACETAMINOPHEN 650 MG: 325 TABLET, FILM COATED ORAL at 00:25

## 2019-06-09 RX ADMIN — NICOTINE 1 PATCH: 14 PATCH TRANSDERMAL at 20:11

## 2019-06-09 RX ADMIN — HYDROCODONE BITARTRATE AND ACETAMINOPHEN 1 TABLET: 7.5; 325 TABLET ORAL at 17:17

## 2019-06-09 RX ADMIN — HYDROCODONE BITARTRATE AND ACETAMINOPHEN 1 TABLET: 7.5; 325 TABLET ORAL at 21:29

## 2019-06-09 RX ADMIN — HYDROXYZINE HYDROCHLORIDE 50 MG: 25 TABLET, FILM COATED ORAL at 20:09

## 2019-06-09 RX ADMIN — PANTOPRAZOLE SODIUM 40 MG: 40 TABLET, DELAYED RELEASE ORAL at 05:26

## 2019-06-09 RX ADMIN — METOPROLOL TARTRATE 12.5 MG: 25 TABLET, FILM COATED ORAL at 20:10

## 2019-06-09 RX ADMIN — HYDROCODONE BITARTRATE AND ACETAMINOPHEN 1 TABLET: 7.5; 325 TABLET ORAL at 10:37

## 2019-06-09 RX ADMIN — GUAIFENESIN 1200 MG: 600 TABLET, EXTENDED RELEASE ORAL at 07:57

## 2019-06-09 RX ADMIN — BUDESONIDE AND FORMOTEROL FUMARATE DIHYDRATE 2 PUFF: 160; 4.5 AEROSOL RESPIRATORY (INHALATION) at 07:09

## 2019-06-09 RX ADMIN — TOLVAPTAN 7.5 MG: 15 TABLET ORAL at 09:58

## 2019-06-09 RX ADMIN — CEFDINIR 300 MG: 300 CAPSULE ORAL at 07:56

## 2019-06-09 RX ADMIN — HYDROXYZINE HYDROCHLORIDE 50 MG: 25 TABLET, FILM COATED ORAL at 07:55

## 2019-06-09 RX ADMIN — LIDOCAINE 2 PATCH: 50 PATCH CUTANEOUS at 07:57

## 2019-06-09 RX ADMIN — PREDNISONE 20 MG: 20 TABLET ORAL at 07:56

## 2019-06-09 RX ADMIN — ASPIRIN 81 MG: 81 TABLET ORAL at 08:00

## 2019-06-09 RX ADMIN — BUDESONIDE AND FORMOTEROL FUMARATE DIHYDRATE 2 PUFF: 160; 4.5 AEROSOL RESPIRATORY (INHALATION) at 19:25

## 2019-06-09 RX ADMIN — GUAIFENESIN 1200 MG: 600 TABLET, EXTENDED RELEASE ORAL at 20:09

## 2019-06-09 RX ADMIN — IPRATROPIUM BROMIDE AND ALBUTEROL SULFATE 3 ML: 2.5; .5 SOLUTION RESPIRATORY (INHALATION) at 19:24

## 2019-06-09 RX ADMIN — IPRATROPIUM BROMIDE AND ALBUTEROL SULFATE 3 ML: 2.5; .5 SOLUTION RESPIRATORY (INHALATION) at 12:02

## 2019-06-09 RX ADMIN — Medication 3 MG: at 20:09

## 2019-06-09 NOTE — PAYOR COMM NOTE
"6/9/19 UPDATE  [id number]    [phone]      Teri Tim (58 y.o. Female)     Date of Birth Social Security Number Address Home Phone MRN    1960  [facility] [address]    [place] [address] [phone] [id number]    Judaism Marital Status          Sikhism        Admission Date Admission Type Admitting Provider Attending Provider Department, Room/Bed    6/4/19 Emergency Maribell Curry DO Horn, Frances Marie, DO [facility] 4C, 492/1    Discharge Date Discharge Disposition Discharge Destination                       Attending Provider:  Maribell Curry DO    Allergies:  Codeine    Isolation:  None   Infection:  None   Code Status:  CPR    Ht:  152.4 cm (60\")   Wt:  47.8 kg (105 lb 6.4 oz)    Admission Cmt:  None   Principal Problem:  None                Active Insurance as of 6/4/2019     Primary Coverage     Payor Plan Insurance Group Employer/Plan Group    [employer] [place] [employer] [place]      Payor Plan Address Payor Plan Phone Number Payor Plan Fax Number Effective Dates    [address] [address] [phone]  11/1/2017 - None Entered    [facility] [id number]       Subscriber Name Subscriber Birth Date Member ID       TERI TIM 1960 [id number]                 Emergency Contacts      (Rel.) Home Phone Work Phone Mobile Phone    Rich Tim (Spouse) [phone] -- [phone]            ICU Vital Signs     Row Name 06/09/19 1601 06/09/19 1207 06/09/19 1202 06/09/19 1159 06/09/19 0800       Vitals    Temp  98.7 °F (37.1 °C)  --  --  98.4 °F (36.9 °C)  --    Temp src  Oral  --  --  Oral  --    Pulse  85  84  77  81  --    Heart Rate Source  --  Monitor  Monitor  Monitor  --    Resp  16  18  18  18  --    Resp Rate Source  --  Visual  Visual  Visual  --    BP  126/63  --  --  110/50  --       Oxygen Therapy    SpO2  95 %  --  98 %  98 %  --    Pulse Oximetry Type  --  --  Continuous  --  --    Device (Oxygen Therapy)  --  nasal cannula  nasal cannula "  --  nasal cannula    Flow (L/min)  --  2  2  2  2    Row Name 06/09/19 0730 06/09/19 0709 06/09/19 0703 06/08/19 2350 06/08/19 2342       Vitals    Temp  98.5 °F (36.9 °C)  --  --  --  --    Temp src  Oral  --  --  --  --    Pulse  94  82  84  74  73    Heart Rate Source  Monitor  Monitor  Monitor  Monitor  Monitor    Resp  20  18  18  18  18    Resp Rate Source  Visual  Visual  Visual  Visual  Visual    BP  150/70  --  --  --  --       Oxygen Therapy    SpO2  94 %  --  92 %  94 %  96 %    Pulse Oximetry Type  --  --  Continuous  Continuous  Continuous    Device (Oxygen Therapy)  --  nasal cannula  nasal cannula  NPPV/NIV trilogy  NPPV/NIV home trilogy    Flow (L/min)  2  2  2  3  3    Row Name 06/08/19 2300 06/08/19 2005 06/08/19 1939 06/08/19 1929 06/08/19 1918       Height and Weight    Weight  --  --  47.8 kg (105 lb 6.4 oz)  --  --    Weight Method  --  --  Standing scale  --  --       Vitals    Temp  --  --  98.7 °F (37.1 °C)  --  --    Temp src  --  --  Oral  --  --    Pulse  --  --  100  86  88    Heart Rate Source  --  --  Monitor  Monitor  Monitor    Resp  --  --  18  18  18    Resp Rate Source  --  --  Visual  Visual  Visual    BP  --  --  126/75  --  --    BP Location  --  --  Right arm  --  --    BP Method  --  --  Automatic  --  --    Patient Position  --  --  Sitting  --  --       Oxygen Therapy    SpO2  --  --  94 %  96 %  92 %    Pulse Oximetry Type  --  --  Continuous  Continuous  Continuous    Device (Oxygen Therapy)  other (see comments) trilogy  nasal cannula  nasal cannula  nasal cannula  nasal cannula    Flow (L/min)  3  2  --  2  2        Intake & Output (last day)       06/08 0701 - 06/09 0700 06/09 0701 - 06/10 0700    P.O.      Total Intake(mL/kg)      Urine (mL/kg/hr) 2700 (2.4)     Total Output 2700     Net -2700               Lines, Drains & Airways    Active LDAs     Name:   Placement date:   Placement time:   Site:   Days:    Peripheral IV 06/04/19 1635 Right Forearm   06/04/19     1635    Forearm   5                Hospital Medications (active)       Dose Frequency Start End    acetaminophen (TYLENOL) tablet 650 mg 650 mg Every 4 Hours PRN 6/4/2019     Sig - Route: Take 2 tablets by mouth Every 4 (Four) Hours As Needed for Mild Pain . - Oral    aspirin EC tablet 81 mg 81 mg Daily 6/6/2019     Sig - Route: Take 1 tablet by mouth Daily. - Oral    budesonide-formoterol (SYMBICORT) 160-4.5 MCG/ACT inhaler 2 puff 2 puff 2 Times Daily - RT 6/4/2019     Sig - Route: Inhale 2 puffs 2 (Two) Times a Day. - Inhalation    cefdinir (OMNICEF) capsule 300 mg 300 mg Every 12 Hours Scheduled 6/8/2019 6/10/2019    Sig - Route: Take 1 capsule by mouth Every 12 (Twelve) Hours. - Oral    enoxaparin (LOVENOX) syringe 30 mg 30 mg Every 24 Hours 6/4/2019     Sig - Route: Inject 0.3 mL under the skin into the appropriate area as directed Daily. - Subcutaneous    guaiFENesin (MUCINEX) 12 hr tablet 1,200 mg 1,200 mg 2 Times Daily 6/4/2019     Sig - Route: Take 2 tablets by mouth 2 (Two) Times a Day. - Oral    HYDROcodone-acetaminophen (NORCO) 7.5-325 MG per tablet 1 tablet 1 tablet Every 4 Hours PRN 6/9/2019     Sig - Route: Take 1 tablet by mouth Every 4 (Four) Hours As Needed for Moderate Pain . - Oral    hydrOXYzine (ATARAX) tablet 50 mg 50 mg 3 Times Daily PRN 6/4/2019     Sig - Route: Take 2 tablets by mouth 3 (Three) Times a Day As Needed for Anxiety. - Oral    ipratropium-albuterol (DUO-NEB) nebulizer solution 3 mL 3 mL Every 6 Hours - RT 6/4/2019     Sig - Route: Take 3 mL by nebulization Every 6 (Six) Hours. - Nebulization    lidocaine (LIDODERM) 5 % 2 patch 2 patch Every 24 Hours Scheduled 6/5/2019     Sig - Route: Place 2 patches on the skin as directed by provider Daily. - Transdermal    megestrol (MEGACE) 40 MG/ML suspension 400 mg 400 mg Daily 6/6/2019     Sig - Route: Take 10 mL by mouth Daily. - Oral    metoprolol tartrate (LOPRESSOR) tablet 12.5 mg 12.5 mg Every 12 Hours Scheduled 6/4/2019     Sig -  "Route: Take 0.5 tablets by mouth Every 12 (Twelve) Hours. - Oral    nicotine (NICODERM CQ) 14 MG/24HR patch 1 patch 1 patch Every 24 Hours 6/4/2019     Sig - Route: Place 1 patch on the skin as directed by provider Daily. - Transdermal    ondansetron (ZOFRAN) injection 4 mg 4 mg Every 6 Hours PRN 6/4/2019     Sig - Route: Infuse 2 mL into a venous catheter Every 6 (Six) Hours As Needed for Nausea or Vomiting. - Intravenous    Linked Group 1:  \"Or\" Linked Group Details        ondansetron (ZOFRAN) tablet 4 mg 4 mg Every 6 Hours PRN 6/4/2019     Sig - Route: Take 1 tablet by mouth Every 6 (Six) Hours As Needed for Nausea or Vomiting. - Oral    Linked Group 1:  \"Or\" Linked Group Details        pantoprazole (PROTONIX) EC tablet 40 mg 40 mg Every Early Morning 6/5/2019     Sig - Route: Take 1 tablet by mouth Every Morning. - Oral    predniSONE (DELTASONE) tablet 20 mg 20 mg Daily With Breakfast 6/9/2019     Sig - Route: Take 1 tablet by mouth Daily With Breakfast. - Oral    sodium chloride 0.9 % flush 10 mL 10 mL As Needed 6/4/2019     Sig - Route: Infuse 10 mL into a venous catheter As Needed for Line Care. - Intravenous    Cosign for Ordering: Accepted by Wei Altman MD on 6/5/2019  6:36 AM    sodium chloride 0.9 % flush 3 mL 3 mL Every 12 Hours Scheduled 6/4/2019     Sig - Route: Infuse 3 mL into a venous catheter Every 12 (Twelve) Hours. - Intravenous    sodium chloride 0.9 % flush 3-10 mL 3-10 mL As Needed 6/4/2019     Sig - Route: Infuse 3-10 mL into a venous catheter As Needed for Line Care. - Intravenous    tolvaptan (SAMSCA) tablet 7.5 mg 7.5 mg Once 6/9/2019 6/9/2019    Sig - Route: Take 0.5 tablets by mouth 1 (One) Time. - Oral    HYDROcodone-acetaminophen (NORCO) 7.5-325 MG per tablet 1 tablet (Discontinued) 1 tablet Every 8 Hours PRN 6/7/2019 6/9/2019    Sig - Route: Take 1 tablet by mouth Every 8 (Eight) Hours As Needed for Moderate Pain . - Oral    predniSONE (DELTASONE) tablet 20 mg (Discontinued) 20 mg " 2 Times Daily With Meals 6/8/2019 6/9/2019    Sig - Route: Take 1 tablet by mouth 2 (Two) Times a Day With Meals. - Oral            Lab Results (last 24 hours)     Procedure Component Value Units Date/Time    Basic Metabolic Panel [[id number]]  (Abnormal) Collected:  06/09/19 1157    Specimen:  Blood Updated:  06/09/19 1216     Glucose 76 mg/dL      BUN 12 mg/dL      Creatinine 0.61 mg/dL      Sodium 126 mmol/L      Potassium 3.8 mmol/L      Chloride 87 mmol/L      CO2 36.0 mmol/L      Calcium 8.1 mg/dL      eGFR Non African Amer 101 mL/min/1.73      BUN/Creatinine Ratio 19.7     Anion Gap 3.0 mmol/L     Narrative:       GFR Normal >60  Chronic Kidney Disease <60  Kidney Failure <15    Basic Metabolic Panel [[id number]]  (Abnormal) Collected:  06/09/19 0648    Specimen:  Blood Updated:  06/09/19 0717     Glucose 96 mg/dL      BUN 13 mg/dL      Creatinine 0.59 mg/dL      Sodium 126 mmol/L      Potassium 3.6 mmol/L      Chloride 85 mmol/L      CO2 37.0 mmol/L      Calcium 8.6 mg/dL      eGFR Non African Amer 105 mL/min/1.73      BUN/Creatinine Ratio 22.0     Anion Gap 4.0 mmol/L     Narrative:       GFR Normal >60  Chronic Kidney Disease <60  Kidney Failure <15    Blood Culture - Blood, Arm, Left [[id number]] Collected:  06/04/19 1705    Specimen:  Blood from Arm, Left Updated:  06/08/19 1730     Blood Culture No growth at 4 days    Blood Culture - Blood, Arm, Right [[id number]] Collected:  06/04/19 1705    Specimen:  Blood from Arm, Right Updated:  06/08/19 1730     Blood Culture No growth at 4 days        Imaging Results (last 24 hours)     ** No results found for the last 24 hours. **        Orders (last 24 hrs)     Start     Ordered    06/09/19 1800  Basic Metabolic Panel  Once      06/09/19 1246    06/09/19 1200  Neuro Checks  Every 4 Hours      06/09/19 0823    06/09/19 1200  Basic Metabolic Panel  Every 6 Hours      06/09/19 0823    06/09/19 0915  tolvaptan (SAMSCA) tablet 7.5 mg  Once      06/09/19 0823     06/09/19 0900  Strict Intake & Output  Every Hour      06/09/19 0823    06/09/19 0830  HYDROcodone-acetaminophen (NORCO) 7.5-325 MG per tablet 1 tablet  Every 4 Hours PRN      06/09/19 0823    06/09/19 0823  Discontinue Previous Fluid Restriction  Once      06/09/19 0823    06/09/19 0823  Verify Discontinuation of Hypertonic Saline  Once      06/09/19 0823    06/09/19 0823  Notify Provider - Sodium Levels  Until Discontinued      06/09/19 0823    06/09/19 0800  predniSONE (DELTASONE) tablet 20 mg  Daily With Breakfast      06/09/19 0644    06/09/19 0644  Basic Metabolic Panel  STAT      06/09/19 0643    06/08/19 0900  cefdinir (OMNICEF) capsule 300 mg  Every 12 Hours Scheduled      06/07/19 1005    06/08/19 0800  predniSONE (DELTASONE) tablet 20 mg  2 Times Daily With Meals,   Status:  Discontinued      06/07/19 1006    06/07/19 1012  HYDROcodone-acetaminophen (NORCO) 7.5-325 MG per tablet 1 tablet  Every 8 Hours PRN,   Status:  Discontinued      06/07/19 1012    06/06/19 1015  megestrol (MEGACE) 40 MG/ML suspension 400 mg  Daily      06/06/19 0924    06/06/19 0930  aspirin EC tablet 81 mg  Daily      06/06/19 0844    06/05/19 1800  Dietary Nutrition Supplements Boost Plus (Ensure Enlive, Ensure Plus); chocolate  Daily With Breakfast & Dinner      06/05/19 1604    06/05/19 1430  Oscillating Positive Expiratory Pressure (OPEP)  3 Times Daily - RT      06/05/19 1055    06/05/19 1145  lidocaine (LIDODERM) 5 % 2 patch  Every 24 Hours Scheduled      06/05/19 1057    06/05/19 0600  pantoprazole (PROTONIX) EC tablet 40 mg  Every Early Morning      06/04/19 1906    06/04/19 2130  budesonide-formoterol (SYMBICORT) 160-4.5 MCG/ACT inhaler 2 puff  2 Times Daily - RT      06/04/19 1906    06/04/19 2100  guaiFENesin (MUCINEX) 12 hr tablet 1,200 mg  2 Times Daily      06/04/19 1906 06/04/19 2100  metoprolol tartrate (LOPRESSOR) tablet 12.5 mg  Every 12 Hours Scheduled      06/04/19 1906 06/04/19 2100  sodium chloride 0.9 %  flush 3 mL  Every 12 Hours Scheduled      06/04/19 1906    06/04/19 2000  enoxaparin (LOVENOX) syringe 30 mg  Every 24 Hours      06/04/19 1906 06/04/19 2000  ipratropium-albuterol (DUO-NEB) nebulizer solution 3 mL  Every 6 Hours - RT      06/04/19 1906 06/04/19 2000  nicotine (NICODERM CQ) 14 MG/24HR patch 1 patch  Every 24 Hours      06/04/19 1906 06/04/19 1906  sodium chloride 0.9 % flush 3-10 mL  As Needed      06/04/19 1906    06/04/19 1906  acetaminophen (TYLENOL) tablet 650 mg  Every 4 Hours PRN      06/04/19 1906 06/04/19 1906  ondansetron (ZOFRAN) tablet 4 mg  Every 6 Hours PRN      06/04/19 1906    06/04/19 1906  ondansetron (ZOFRAN) injection 4 mg  Every 6 Hours PRN      06/04/19 1906 06/04/19 1906  hydrOXYzine (ATARAX) tablet 50 mg  3 Times Daily PRN      06/04/19 1906    06/04/19 1605  sodium chloride 0.9 % flush 10 mL  As Needed      06/04/19 1605    Unscheduled  Oxygen Therapy- Nasal Cannula; 2 LPM; Titrate for SPO2: equal to or greater than, 92%  Continuous PRN      06/04/19 1605    Unscheduled  ECG 12 Lead  As Needed      06/04/19 1605    --  SCANNED - TELEMETRY        06/04/19 0000    --  metoprolol tartrate (LOPRESSOR) 25 MG tablet  2 Times Daily      06/05/19 1031    --  aspirin 81 MG EC tablet  Daily      06/05/19 1031    --  SCANNED - TELEMETRY        06/04/19 0000          Ventilator/Non-Invasive Ventilation Settings (From admission, onward)    Start     Ordered    06/08/19 0645  BIPAP  As Needed-RT     Question Answer Comment   Type: BIPAP    NIPPV Mask Interface: Per Patient Preference        06/08/19 0645    06/04/19 1711  BIPAP  Until Discontinued,   Status:  Canceled     Question Answer Comment   Type: BIPAP    NIPPV Mask Interface: Per Patient Preference        06/04/19 1710             Physician Progress Notes (last 24 hours) (Notes from 6/8/2019  4:36 PM through 6/9/2019  4:36 PM)      Joanna Juan APRN at 6/9/2019 12:46 PM              [facility]  [facility] Medicine Services  INPATIENT PROGRESS NOTE    Patient Name: Teri Tim  Date of Admission: 6/4/2019  Today's Date: 06/09/19  Length of Stay: 5  Primary Care Physician: Lorri Guan APRN    Subjective   Chief Complaint: hyponatremia  HPI   Pt is awake and alert. State she wore her triology but didn't sleep again. Staff report she slept some because they had to increase her oxygen to 3L through trilogy when her SpO2 was 86%.  at bedside. Pt did not drink much yesterday. She has been walking. Her Sodium today is 126.     Review of Systems   All pertinent negatives and positives are as above. All other systems have been reviewed and are negative unless otherwise stated.     Objective    Temp:  [98.4 °F (36.9 °C)-98.7 °F (37.1 °C)] 98.4 °F (36.9 °C)  Heart Rate:  [] 84  Resp:  [16-20] 18  BP: (110-150)/(50-75) 110/50  Physical Exam   Constitutional: She is oriented to person, place, and time. She appears well-developed and well-nourished.   HENT:   Head: Normocephalic and atraumatic.   Eyes: Conjunctivae and EOM are normal. Pupils are equal, round, and reactive to light.   Neck: Neck supple. No JVD present. No thyromegaly present.   Cardiovascular: Normal rate, regular rhythm, normal heart sounds and intact distal pulses. Exam reveals no gallop and no friction rub.   No murmur heard.  Pulmonary/Chest: Effort normal. No respiratory distress. She has no wheezes. She has no rales. She exhibits no tenderness.   Coarse bilaterally. Faint squeak left lung base.    Abdominal: Soft. Bowel sounds are normal. She exhibits no distension. There is no tenderness. There is no rebound and no guarding.   Musculoskeletal: Normal range of motion. She exhibits no edema, tenderness or deformity.   Lymphadenopathy:     She has no cervical adenopathy.   Neurological: She is alert and oriented to person, place, and time. She displays normal reflexes. No cranial nerve deficit. She exhibits normal  muscle tone.   Skin: Skin is warm and dry. No rash noted.   Psychiatric: She has a normal mood and affect. Her behavior is normal. Judgment and thought content normal.     Results Review:  I have reviewed the labs, radiology results, and diagnostic studies.    Laboratory Data:   Results from last 7 days   Lab Units 06/07/19  0513 06/06/19  0609 06/05/19  0540   WBC 10*3/mm3 6.59 8.37 3.83*   HEMOGLOBIN g/dL 9.5* 9.7* 10.6*   HEMATOCRIT % 28.2* 29.5* 34.4*   PLATELETS 10*3/mm3 231 213 241        Results from last 7 days   Lab Units 06/09/19  1157 06/09/19  0648 06/08/19  0451  06/04/19  1635   SODIUM mmol/L 126* 126* 131*   < > 138   SODIUM, ARTERIAL   --   --   --    < >  --    POTASSIUM mmol/L 3.8 3.6 3.8   < > 4.4   CHLORIDE mmol/L 87* 85* 88*   < > 81*   CO2 mmol/L 36.0* 37.0* 37.0*   < > >40.0*   BUN mg/dL 12 13 18   < > 20   CREATININE mg/dL 0.61 0.59 0.58   < > 0.52   CALCIUM mg/dL 8.1* 8.6 8.8   < > 9.0   BILIRUBIN mg/dL  --   --   --   --  0.4   ALK PHOS U/L  --   --   --   --  94   ALT (SGPT) U/L  --   --   --   --  15   AST (SGOT) U/L  --   --   --   --  26   GLUCOSE mg/dL 76 96 94   < > 130*    < > = values in this interval not displayed.       Culture Data:   Blood Culture   Date Value Ref Range Status   06/04/2019 No growth at 4 days  Preliminary   06/04/2019 No growth at 4 days  Preliminary       Radiology Data:   Imaging Results (last 24 hours)     ** No results found for the last 24 hours. **          I have reviewed the patient's current medications.     Assessment/Plan     Active Hospital Problems    Diagnosis   • Acute on chronic respiratory failure with hypoxia and hypercapnia (CMS/HCC)   • Macrocytic anemia   • Community acquired pneumonia of left upper lobe of lung (CMS/HCC)   • Severe protein-calorie malnutrition (CMS/HCC)   • Tobacco abuse   • Hyponatremia   • COPD with acute exacerbation (CMS/HCC)     Plan:  1. Will give Samsca this am. Will monitor sodium level closely.   2. Continue  Trilogy.   3. She needs to increase her oral intake in order to keep her sodium level stable.   4. Repeat labs in am.   5. Repeat sodium level at 1800 tonight.     Discharge Planning: I expect the patient to be discharged to home in 1 days.    ROLAND Burdick   06/09/19   12:46 PM    Electronically signed by Joanna Juan APRN at 6/9/2019  1:26 PM       Consult Notes (last 24 hours) (Notes from 6/8/2019  4:37 PM through 6/9/2019  4:37 PM)     No notes of this type exist for this encounter.

## 2019-06-09 NOTE — PROGRESS NOTES
Continued Stay Note   [place]     Patient Name: Teri Tim  MRN: [id number]  Today's Date: 6/9/2019    Admit Date: 6/4/2019    Discharge Plan     Row Name 06/09/19 1349       Plan    Plan Comments  Chart reviewed. Pt is adapting to the trilogy. Pt still plans on returning home with  at discharge and denies any need for HH. Pt has no concerns at this time. SW will follow and assist with any discharge needs that may arise.         Discharge Codes    No documentation.             Zoila Mccann

## 2019-06-09 NOTE — PLAN OF CARE
Problem: Patient Care Overview  Goal: Plan of Care Review  Outcome: Ongoing (interventions implemented as appropriate)   06/09/19 1110   Coping/Psychosocial   Plan of Care Reviewed With patient   Plan of Care Review   Progress improving   OTHER   Outcome Summary She is Independent with bedmobility and t/f's. SBA to amb 100' sitting rest then 160'. She c/o SOA but when returning to room she was 91% on 2L

## 2019-06-09 NOTE — PROGRESS NOTES
[facility] Medicine Services  INPATIENT PROGRESS NOTE    Patient Name: Teri Tim  Date of Admission: 6/4/2019  Today's Date: 06/09/19  Length of Stay: 5  Primary Care Physician: Lorri Guan APRN    Subjective   Chief Complaint: hyponatremia  HPI   Pt is awake and alert. State she wore her triology but didn't sleep again. Staff report she slept some because they had to increase her oxygen to 3L through trilogy when her SpO2 was 86%.  at bedside. Pt did not drink much yesterday. She has been walking. Her Sodium today is 126.     Review of Systems   All pertinent negatives and positives are as above. All other systems have been reviewed and are negative unless otherwise stated.     Objective    Temp:  [98.4 °F (36.9 °C)-98.7 °F (37.1 °C)] 98.4 °F (36.9 °C)  Heart Rate:  [] 84  Resp:  [16-20] 18  BP: (110-150)/(50-75) 110/50  Physical Exam   Constitutional: She is oriented to person, place, and time. She appears well-developed and well-nourished.   HENT:   Head: Normocephalic and atraumatic.   Eyes: Conjunctivae and EOM are normal. Pupils are equal, round, and reactive to light.   Neck: Neck supple. No JVD present. No thyromegaly present.   Cardiovascular: Normal rate, regular rhythm, normal heart sounds and intact distal pulses. Exam reveals no gallop and no friction rub.   No murmur heard.  Pulmonary/Chest: Effort normal. No respiratory distress. She has no wheezes. She has no rales. She exhibits no tenderness.   Coarse bilaterally. Faint squeak left lung base.    Abdominal: Soft. Bowel sounds are normal. She exhibits no distension. There is no tenderness. There is no rebound and no guarding.   Musculoskeletal: Normal range of motion. She exhibits no edema, tenderness or deformity.   Lymphadenopathy:     She has no cervical adenopathy.   Neurological: She is alert and oriented to person, place, and time. She displays normal reflexes. No cranial nerve  deficit. She exhibits normal muscle tone.   Skin: Skin is warm and dry. No rash noted.   Psychiatric: She has a normal mood and affect. Her behavior is normal. Judgment and thought content normal.     Results Review:  I have reviewed the labs, radiology results, and diagnostic studies.    Laboratory Data:   Results from last 7 days   Lab Units 06/07/19  0513 06/06/19  0609 06/05/19  0540   WBC 10*3/mm3 6.59 8.37 3.83*   HEMOGLOBIN g/dL 9.5* 9.7* 10.6*   HEMATOCRIT % 28.2* 29.5* 34.4*   PLATELETS 10*3/mm3 231 213 241        Results from last 7 days   Lab Units 06/09/19  1157 06/09/19  0648 06/08/19  0451  06/04/19  1635   SODIUM mmol/L 126* 126* 131*   < > 138   SODIUM, ARTERIAL   --   --   --    < >  --    POTASSIUM mmol/L 3.8 3.6 3.8   < > 4.4   CHLORIDE mmol/L 87* 85* 88*   < > 81*   CO2 mmol/L 36.0* 37.0* 37.0*   < > >40.0*   BUN mg/dL 12 13 18   < > 20   CREATININE mg/dL 0.61 0.59 0.58   < > 0.52   CALCIUM mg/dL 8.1* 8.6 8.8   < > 9.0   BILIRUBIN mg/dL  --   --   --   --  0.4   ALK PHOS U/L  --   --   --   --  94   ALT (SGPT) U/L  --   --   --   --  15   AST (SGOT) U/L  --   --   --   --  26   GLUCOSE mg/dL 76 96 94   < > 130*    < > = values in this interval not displayed.       Culture Data:   Blood Culture   Date Value Ref Range Status   06/04/2019 No growth at 4 days  Preliminary   06/04/2019 No growth at 4 days  Preliminary       Radiology Data:   Imaging Results (last 24 hours)     ** No results found for the last 24 hours. **          I have reviewed the patient's current medications.     Assessment/Plan     Active Hospital Problems    Diagnosis   • Acute on chronic respiratory failure with hypoxia and hypercapnia (CMS/HCC)   • Macrocytic anemia   • Community acquired pneumonia of left upper lobe of lung (CMS/HCC)   • Severe protein-calorie malnutrition (CMS/HCC)   • Tobacco abuse   • Hyponatremia   • COPD with acute exacerbation (CMS/HCC)     Plan:  1. Will give Samsca this am. Will monitor sodium level  closely.   2. Continue Trilogy.   3. She needs to increase her oral intake in order to keep her sodium level stable.   4. Repeat labs in am.   5. Repeat sodium level at 1800 tonight.     Discharge Planning: I expect the patient to be discharged to home in 1 days.    ROLAND Burdick   06/09/19   12:46 PM     Chart reviewed  Patient examined  Agree with assessment and plan  Discussed with patient and KARIE Curry DO  06/09/19  5:04 PM

## 2019-06-09 NOTE — THERAPY TREATMENT NOTE
Acute Care - Physical Therapy Treatment Note  [facility]     Patient Name: Teri Tim  : 1960  MRN: [id number]  Today's Date: 2019  Onset of Illness/Injury or Date of Surgery: 19  Date of Referral to PT: 19  Referring Physician: Dr. Curry    Admit Date: 2019    Visit Dx:    ICD-10-CM ICD-9-CM   1. Acute respiratory failure with hypoxia and hypercapnia (CMS/HCC) J96.01 518.81    J96.02    2. Pneumonia due to infectious organism, unspecified laterality, unspecified part of lung J18.9 136.9     484.8   3. Impaired mobility Z74.09 799.89   4. Decreased activities of daily living (ADL) R68.89 780.99     Patient Active Problem List   Diagnosis   • NSTEMI (non-ST elevated myocardial infarction) (CMS/HCC)   • Abnormal LFTs   • Abnormal US (ultrasound) of abdomen   • COPD exacerbation (CMS/HCC)   • COPD with acute exacerbation (CMS/[facility])   • Tobacco abuse   • Hyponatremia   • Pulmonary hypertension (CMS/HCC)   • Essential hypertension   • Hypercapnemia   • Acute on chronic respiratory failure with hypoxia and hypercapnia (CMS/HCC)   • Macrocytic anemia   • Community acquired pneumonia of left upper lobe of lung (CMS/HCC)   • Severe protein-calorie malnutrition (CMS/HCC)       Therapy Treatment    Rehabilitation Treatment Summary     Row Name 19 1110             Treatment Time/Intention    Discipline  physical therapy assistant  -AB      Document Type  therapy note (daily note)  -AB2      Subjective Information  complains of;dyspnea  -AB2      Mode of Treatment  physical therapy  -AB2      Existing Precautions/Restrictions  fall;oxygen therapy device and L/min  -AB2      Recorded by [AB] Fauzia Garcias, PTA 19 1116  [AB2] Fauzia Garcias PTA 19 1142      Row Name 19 1110             Vital Signs    O2 Delivery Intra Treatment  supplemental O2 2L  -AB      Post SpO2 (%)  91  -AB      O2 Delivery Post Treatment  -- 2L  -AB      Recorded by [AB] Fauzia Garcias, PTA  06/09/19 1142      Row Name 06/09/19 1110             Bed Mobility Assessment/Treatment    Supine-Sit [place] (Bed Mobility)  independent  -AB      Sit-Supine [place] (Bed Mobility)  not tested sitting EOB  -AB      Recorded by [AB] Fauzia Garcias, [facility] 06/09/19 1142      Row Name 06/09/19 1110             Sit-Stand Transfer    Sit-Stand [place] (Transfers)  independent  -AB      Recorded by [AB] Fauzia Garcias, [facility] 06/09/19 1142      Row Name 06/09/19 1110             Stand-Sit Transfer    Stand-Sit [place] (Transfers)  independent  -AB      Recorded by [AB] Fauzia Garcias, [facility] 06/09/19 1142      Row Name 06/09/19 1110             Gait/Stairs Assessment/Training    [place] Level (Gait)  stand by assist  -AB      Distance in Feet (Gait)  100' and 160' with a sitting rest between  -AB      Deviations/Abnormal Patterns (Gait)  katja decreased  -AB      Recorded by [AB] Fauzia Garcias, [facility] 06/09/19 1142      Row Name 06/09/19 1110             Therapeutic Exercise    Lower Extremity Range of Motion (Therapeutic Exercise)  hip flexion/extension, bilateral;hip abduction/adduction, bilateral;knee flexion/extension, bilateral;ankle dorsiflexion/plantar flexion, bilateral  -AB      Exercise Type (Therapeutic Exercise)  AROM (active range of motion)  -AB      Position (Therapeutic Exercise)  seated  -AB      Sets/Reps (Therapeutic Exercise)  20  -AB      Recorded by [AB] Fauzia Garcias, [facility] 06/09/19 1142      Row Name 06/09/19 1110             Positioning and Restraints    Pre-Treatment Position  in bed  -AB      Post Treatment Position  bed  -AB      In Bed  sitting EOB;call light within reach  -AB      Recorded by [AB] Fauzia Garcias, [facility] 06/09/19 1142      Row Name 06/09/19 1110             Pain Scale: Numbers Pre/Post-Treatment    Pain Scale: Numbers, Pretreatment  0/10 - no pain  -AB      Recorded by [AB] Fauzia Garcias, [facility] 06/09/19 1142        User Key  (r) = Recorded By, (t) =  Taken By, (c) = Cosigned By    Initials Name Effective Dates Discipline    AB Fauzia Garcias M, PTA 08/02/16 -  PT                   Physical Therapy Education     Title: PT OT SLP Therapies (Done)     Topic: Physical Therapy (Done)     Point: Mobility training (Done)     Learning Progress Summary           Patient Acceptance, E, VU by SB at 6/5/2019  4:21 PM    Comment:  pt edu on POC, benefits of act and d/c plans   Family Acceptance, E, VU by SB at 6/5/2019  4:21 PM    Comment:  pt edu on POC, benefits of act and d/c plans                   Point: Home exercise program (Done)     Learning Progress Summary           Patient Acceptance, E, VU by SB at 6/5/2019  4:21 PM    Comment:  pt edu on POC, benefits of act and d/c plans   Family Acceptance, E, VU by SB at 6/5/2019  4:21 PM    Comment:  pt edu on POC, benefits of act and d/c plans                   Point: Body mechanics (Done)     Learning Progress Summary           Patient Acceptance, E, VU by SB at 6/5/2019  4:21 PM    Comment:  pt edu on POC, benefits of act and d/c plans   Family Acceptance, E, VU by SB at 6/5/2019  4:21 PM    Comment:  pt edu on POC, benefits of act and d/c plans                   Point: Precautions (Done)     Learning Progress Summary           Patient Acceptance, E, VU by SB at 6/5/2019  4:21 PM    Comment:  pt edu on POC, benefits of act and d/c plans   Family Acceptance, E, VU by SB at 6/5/2019  4:21 PM    Comment:  pt edu on POC, benefits of act and d/c plans                               User Key     Initials Effective Dates Name Provider Type Discipline    SB 08/31/18 -  Bharti Tyler, PT Physical Therapist PT                PT Recommendation and Plan     Plan of Care Reviewed With: patient  Progress: improving  Outcome Summary: She is Independent with bedmobility and t/f's. SBA to amb 100' sitting rest then 160'. She c/o SOA but when returning to room she was 91% on 2L  Outcome Measures     Row Name 06/08/19 0700 06/07/19 0800           How much help from another is currently needed...    Putting on and taking off regular lower body clothing?  4  -CJ  4  -CJ     Bathing (including washing, rinsing, and drying)  4  -CJ  4  -CJ     Toileting (which includes using toilet bed pan or urinal)  4  -CJ  4  -CJ     Putting on and taking off regular upper body clothing  3  -CJ  3  -CJ     Taking care of personal grooming (such as brushing teeth)  4  -CJ  3  -CJ     Eating meals  --  4  -CJ     Score  --  22  -CJ        Functional Assessment    Outcome Measure Options  AM-PAC 6 Clicks Daily Activity (OT)  -CJ  AM-PAC 6 Clicks Daily Activity (OT)  -       User Key  (r) = Recorded By, (t) = Taken By, (c) = Cosigned By    Initials Name Provider Type    Gary Caceres COTA/L Occupational Therapy Assistant         Time Calculation:   PT Charges     Row Name 06/09/19 1110             Time Calculation    Start Time  1110  -AB      Stop Time  1148  -AB      Time Calculation (min)  38 min  -AB      PT Received On  06/09/19  -AB      PT Goal Re-Cert Due Date  06/15/19  -AB         Time Calculation- PT    Total Timed Code Minutes- PT  38 minute(s)  -AB        User Key  (r) = Recorded By, (t) = Taken By, (c) = Cosigned By    Initials Name Provider Type    AB Fauzia Garcias PTA Physical Therapy Assistant        Therapy Charges for Today     Code Description Service Date Service Provider Modifiers Qty    55803037232 HC GAIT TRAINING EA 15 MIN 6/9/2019 Fauzia Garcias PTA GP 2    05419250424 HC PT THER PROC EA 15 MIN 6/9/2019 Fauzia Garcias PTA GP 1          PT G-Codes  Outcome Measure Options: AM-PAC 6 Clicks Daily Activity (OT)  AM-PAC 6 Clicks Score: 18  Score: 22    Fauzia Garcias PTA  6/9/2019

## 2019-06-10 VITALS
HEIGHT: 60 IN | TEMPERATURE: 98.9 F | OXYGEN SATURATION: 90 % | BODY MASS INDEX: 19.83 KG/M2 | RESPIRATION RATE: 18 BRPM | WEIGHT: 101 LBS | HEART RATE: 87 BPM | SYSTOLIC BLOOD PRESSURE: 131 MMHG | DIASTOLIC BLOOD PRESSURE: 64 MMHG

## 2019-06-10 LAB
ANION GAP SERPL CALCULATED.3IONS-SCNC: 4 MMOL/L (ref 4–13)
BUN BLD-MCNC: 13 MG/DL (ref 5–21)
BUN/CREAT SERPL: 19.7 (ref 7–25)
CALCIUM SPEC-SCNC: 8.4 MG/DL (ref 8.4–10.4)
CHLORIDE SERPL-SCNC: 96 MMOL/L (ref 98–110)
CO2 SERPL-SCNC: 36 MMOL/L (ref 24–31)
CREAT BLD-MCNC: 0.66 MG/DL (ref 0.5–1.4)
GFR SERPL CREATININE-BSD FRML MDRD: 93 ML/MIN/1.73
GLUCOSE BLD-MCNC: 74 MG/DL (ref 70–100)
POTASSIUM BLD-SCNC: 3.4 MMOL/L (ref 3.5–5.3)
SODIUM BLD-SCNC: 136 MMOL/L (ref 135–145)

## 2019-06-10 PROCEDURE — 97110 THERAPEUTIC EXERCISES: CPT

## 2019-06-10 PROCEDURE — 63710000001 PREDNISONE PER 1 MG: Performed by: NURSE PRACTITIONER

## 2019-06-10 PROCEDURE — 94799 UNLISTED PULMONARY SVC/PX: CPT

## 2019-06-10 PROCEDURE — 97535 SELF CARE MNGMENT TRAINING: CPT

## 2019-06-10 PROCEDURE — 97116 GAIT TRAINING THERAPY: CPT

## 2019-06-10 PROCEDURE — 80048 BASIC METABOLIC PNL TOTAL CA: CPT | Performed by: NURSE PRACTITIONER

## 2019-06-10 RX ORDER — POTASSIUM CHLORIDE 750 MG/1
40 CAPSULE, EXTENDED RELEASE ORAL ONCE
Status: COMPLETED | OUTPATIENT
Start: 2019-06-10 | End: 2019-06-10

## 2019-06-10 RX ORDER — PREDNISONE 10 MG/1
TABLET ORAL
Qty: 7 TABLET | Refills: 0 | Status: SHIPPED | OUTPATIENT
Start: 2019-06-10 | End: 2019-11-20

## 2019-06-10 RX ORDER — MEGESTROL ACETATE 40 MG/ML
400 SUSPENSION ORAL DAILY
Qty: 480 ML | Refills: 0 | Status: SHIPPED | OUTPATIENT
Start: 2019-06-11

## 2019-06-10 RX ORDER — MEGESTROL ACETATE 40 MG/ML
400 SUSPENSION ORAL DAILY
Qty: 480 ML | Refills: 0
Start: 2019-06-11 | End: 2019-06-10

## 2019-06-10 RX ADMIN — HYDROXYZINE HYDROCHLORIDE 50 MG: 25 TABLET, FILM COATED ORAL at 14:23

## 2019-06-10 RX ADMIN — LIDOCAINE 2 PATCH: 50 PATCH CUTANEOUS at 08:14

## 2019-06-10 RX ADMIN — PANTOPRAZOLE SODIUM 40 MG: 40 TABLET, DELAYED RELEASE ORAL at 06:24

## 2019-06-10 RX ADMIN — GUAIFENESIN 1200 MG: 600 TABLET, EXTENDED RELEASE ORAL at 08:15

## 2019-06-10 RX ADMIN — HYDROCODONE BITARTRATE AND ACETAMINOPHEN 1 TABLET: 7.5; 325 TABLET ORAL at 06:26

## 2019-06-10 RX ADMIN — METOPROLOL TARTRATE 12.5 MG: 25 TABLET, FILM COATED ORAL at 08:15

## 2019-06-10 RX ADMIN — HYDROCODONE BITARTRATE AND ACETAMINOPHEN 1 TABLET: 7.5; 325 TABLET ORAL at 10:45

## 2019-06-10 RX ADMIN — POTASSIUM CHLORIDE 40 MEQ: 750 CAPSULE, EXTENDED RELEASE ORAL at 14:17

## 2019-06-10 RX ADMIN — MEGESTROL ACETATE 400 MG: 40 SUSPENSION ORAL at 08:15

## 2019-06-10 RX ADMIN — SODIUM CHLORIDE, PRESERVATIVE FREE 3 ML: 5 INJECTION INTRAVENOUS at 10:53

## 2019-06-10 RX ADMIN — IPRATROPIUM BROMIDE AND ALBUTEROL SULFATE 3 ML: 2.5; .5 SOLUTION RESPIRATORY (INHALATION) at 11:33

## 2019-06-10 RX ADMIN — ASPIRIN 81 MG: 81 TABLET ORAL at 08:15

## 2019-06-10 RX ADMIN — IPRATROPIUM BROMIDE AND ALBUTEROL SULFATE 3 ML: 2.5; .5 SOLUTION RESPIRATORY (INHALATION) at 07:05

## 2019-06-10 RX ADMIN — HYDROCODONE BITARTRATE AND ACETAMINOPHEN 1 TABLET: 7.5; 325 TABLET ORAL at 02:11

## 2019-06-10 RX ADMIN — PREDNISONE 20 MG: 20 TABLET ORAL at 08:15

## 2019-06-10 RX ADMIN — BUDESONIDE AND FORMOTEROL FUMARATE DIHYDRATE 2 PUFF: 160; 4.5 AEROSOL RESPIRATORY (INHALATION) at 07:06

## 2019-06-10 RX ADMIN — IPRATROPIUM BROMIDE AND ALBUTEROL SULFATE 3 ML: 2.5; .5 SOLUTION RESPIRATORY (INHALATION) at 00:11

## 2019-06-10 NOTE — PROGRESS NOTES
Informative leaflet on pneumonia left with patient to read at her leisure.   Pt has home 02 with [facility], Neriy with [employer].   Takes nebs @ home using [facility] to fill her meds.   No issues @ this time .

## 2019-06-10 NOTE — THERAPY TREATMENT NOTE
Acute Care - Occupational Therapy Treatment Note  [facility]     Patient Name: Teri Tim  : 1960  MRN: [id number]  Today's Date: 6/10/2019  Onset of Illness/Injury or Date of Surgery: 19  Date of Referral to OT: 19  Referring Physician: Dr. Curry    Admit Date: 2019       ICD-10-CM ICD-9-CM   1. Acute respiratory failure with hypoxia and hypercapnia (CMS/HCC) J96.01 518.81    J96.02    2. Pneumonia due to infectious organism, unspecified laterality, unspecified part of lung J18.9 136.9     484.8   3. Impaired mobility Z74.09 799.89   4. Decreased activities of daily living (ADL) R68.89 780.99     Patient Active Problem List   Diagnosis   • NSTEMI (non-ST elevated myocardial infarction) (CMS/[facility])   • Abnormal LFTs   • Abnormal US (ultrasound) of abdomen   • COPD exacerbation (CMS/[facility])   • COPD with acute exacerbation (CMS/[facility])   • Tobacco abuse   • Hyponatremia   • Pulmonary hypertension (CMS/[facility])   • Essential hypertension   • Hypercapnemia   • Acute on chronic respiratory failure with hypoxia and hypercapnia (CMS/HCC)   • Macrocytic anemia   • Community acquired pneumonia of left upper lobe of lung (CMS/HCC)   • Severe protein-calorie malnutrition (CMS/HCC)     Past Medical History:   Diagnosis Date   • CHF (congestive heart failure) (CMS/HCC), normalized 2018 echocardiogram    • Chronic back pain    • Chronic respiratory failure (CMS/[facility])    • COPD (chronic obstructive pulmonary disease) (CMS/[facility])    • Hypertension    • Pneumonia      Past Surgical History:   Procedure Laterality Date   • CARPAL TUNNEL RELEASE     • HYSTERECTOMY      complete       Therapy Treatment    Rehabilitation Treatment Summary     Row Name 06/10/19 1104 06/10/19 0915          Treatment Time/Intention    Discipline  physical therapy assistant  -LG  occupational therapy assistant  -MM     Document Type  therapy note (daily note)  -LG  therapy note (daily note)  -MM     Subjective Information  complains  of;weakness;fatigue  -LG2  complains of;fatigue;weakness;pain  -MM     Mode of Treatment  individual therapy;physical therapy  -LG2  individual therapy;occupational therapy  -MM     Patient/Family Observations  spouse  -LG2  spouse   -MM     Patient Effort  good  -LG2  good  -MM     Existing Precautions/Restrictions  fall;oxygen therapy device and L/min  -LG2  fall;oxygen therapy device and L/min  -MM     Recorded by [LG] Praful Fernandez, PTA 06/10/19 1105  [LG2] Praful Fernandez, PTA 06/10/19 1322 [MM] Munira Fierro COTA/L 06/10/19 0950     Row Name 06/10/19 1104 06/10/19 0915          Vital Signs    Pre SpO2 (%)  97  -LG  94  -MM     O2 Delivery Pre Treatment  supplemental O2 2L  -LG  supplemental O2 2L  -MM     Intra SpO2 (%)  79 79-88  -LG  81  -MM     O2 Delivery Intra Treatment  supplemental O2 2L   -LG  supplemental O2  -MM     Post SpO2 (%)  92  -LG  91  -MM     O2 Delivery Post Treatment  supplemental O2  -LG  supplemental O2  -MM     Pre Patient Position  Supine  -LG  Sitting  -MM     Intra Patient Position  Standing  -LG  Standing  -MM     Post Patient Position  Sitting  -LG  Sitting  -MM     Recovery Time  40 seconds  -LG  45 seconds  -MM     Recorded by [LG] Praful Fernandez, PTA 06/10/19 1322 [MM] Munira Fierro COTA/L 06/10/19 0950     Row Name 06/10/19 0915             Cognitive Assessment/Intervention- PT/OT    Affect/Mental Status (Cognitive)  WFL  -MM      Orientation Status (Cognition)  oriented to;person;place;situation  -MM      Personal Safety Interventions  fall prevention program maintained;nonskid shoes/slippers when out of bed;supervised activity  -MM      Recorded by [MM] Munira Fierro COTA/L 06/10/19 0950      Row Name 06/10/19 0915             Safety Issues, Functional Mobility    Safety Issues Affecting Function (Mobility)  insight into deficits/self awareness  -MM      Impairments Affecting Function (Mobility)  endurance/activity tolerance;pain;shortness of breath  -MM       Recorded by [MM] Munira Fierro COTA/L 06/10/19 0950      Row Name 06/10/19 1104 06/10/19 0915          Bed Mobility Assessment/Treatment    [place] Level (Bed Mobility)  independent  -LG  --     Supine-Sit [place] (Bed Mobility)  --  independent  -MM     Sit-Supine [place] (Bed Mobility)  --  not tested pt EOB   -MM     Recorded by [LG] Praful Fernandez, PTA 06/10/19 1322 [MM] Munira Fierro HAWKINS/L 06/10/19 1333     Row Name 06/10/19 0915             Functional Mobility    Functional Mobility- Ind. Level  verbal cues required;standby assist  -MM      Functional Mobility- Device  rolling walker  -MM      Functional Mobility- Safety Issues  supplemental O2  -MM      Functional Mobility- Comment  in room<>bathroom 2x for awareness of O2 tubing for increased safety   -MM      Recorded by [MM] Munira Fierro HAWKINS/L 06/10/19 1333      Row Name 06/10/19 0915             Transfer Assessment/Treatment    Transfer Assessment/Treatment  sit-stand transfer;stand-sit transfer;bathtub transfer  -MM      Recorded by [MM] Munira Fierro HAWKINS/L 06/10/19 1333      Row Name 06/10/19 1104 06/10/19 0915          Sit-Stand Transfer    Sit-Stand [place] (Transfers)  independent  -LG  independent  -MM     Recorded by [LG] Praful Fernandez, PTA 06/10/19 1322 [MM] Munira Fierro HAWKINS/L 06/10/19 1333     Row Name 06/10/19 1104 06/10/19 0915          Stand-Sit Transfer    Stand-Sit [place] (Transfers)  independent  -LG  independent  -MM     Recorded by [LG] Praful Fernandez, PTA 06/10/19 1322 [MM] Munira Fierro HAWKINS/L 06/10/19 1333     Row Name 06/10/19 0915             Bathtub Transfer    Type (Bathtub Transfer)  lateral simulated home t/f   -MM      [place] Level (Bathtub Transfer)  stand by assist;verbal cues  -MM      Recorded by [MM] Munira Fierro HAWKINS/L 06/10/19 1333      Row Name 06/10/19 1104             Gait/Stairs Assessment/Training    [place] Level (Gait)  stand by assist   -LG      Distance in Feet (Gait)  150' sitting rest then 150' back to room  -LG      Recorded by [LG] Praful Fernandez, PTA 06/10/19 1322      Row Name 06/10/19 0915             ADL Assessment/Intervention    BADL Assessment/Intervention  lower body dressing  -MM      Recorded by [MM] Munira Fierro COTA/L 06/10/19 1333      Row Name 06/10/19 0915             Lower Body Dressing Assessment/Training    Lower Body Dressing [place] Level  supervision  -MM      Comment (Lower Body Dressing)  simulated; anticipate will need rest breaks   -MM      Recorded by [MM] Munira Fierro COTA/L 06/10/19 1333      Row Name 06/10/19 0915             BADL Safety/Performance    Impairments, BADL Safety/Performance  endurance/activity tolerance;pain;shortness of breath  -MM      Skilled BADL Treatment/Intervention  energy conservation  -MM      Recorded by [MM] Munira Fierro COTA/L 06/10/19 1333      Row Name 06/10/19 1104 06/10/19 0915          Therapeutic Exercise    Position (Therapeutic Exercise)  --  seated  -MM     Sets/Reps (Therapeutic Exercise)  --  x15  -MM     Expected Outcome (Therapeutic Exercise)  --  improve functional tolerance, self-care activity;improve functional tolerance, household activity;improve performance, BADLs;improve performance, transfer skills  -MM     Comment (Therapeutic Exercise)  B LE AROM 20 reps sitting eob  -LG  AROM multiple planes BUEs for increased lung expansion, endurance and strength; went over with spouse and pt for HEP   -MM     Recorded by [LG] Praful Fernandez, PTA 06/10/19 1322 [MM] Munira Fierro COTA/L 06/10/19 1333     Row Name 06/10/19 0915             Balance    Balance  dynamic standing balance  (Pended)   -MM      Recorded by [MM] Munira Fierro COTA/L 06/10/19 1339      Row Name 06/10/19 1104 06/10/19 0915          Positioning and Restraints    Pre-Treatment Position  in bed  -LG  in bed  -MM     Post Treatment Position  bed  -LG  bed  -MM     In Bed  sitting  EOB;call light within reach;notified nsg  -LG  sitting EOB;call light within reach;encouraged to call for assist;with family/caregiver;side rails up x2  -MM     Recorded by [LG] Praful Fernandez, PTA 06/10/19 1322 [MM] Munira Fierro BECK/L 06/10/19 1333     Row Name 06/10/19 1104 06/10/19 0915          Pain Scale: Numbers Pre/Post-Treatment    Pain Scale: Numbers, Pretreatment  0/10 - no pain  -LG  5/10  -MM     Pain Location - Orientation  --  upper  -MM     Pain Location  --  back  -MM     Pain Intervention(s)  --  Repositioned;Medication (See MAR);Ambulation/increased activity  -MM     Recorded by [LG] Praful Fernandez, PTA 06/10/19 1322 [MM] Munira Fierro BECK/L 06/10/19 1333     Row Name 06/10/19 0915             Plan of Care Review    Plan of Care Reviewed With  patient;spouse  -MM      Recorded by [MM] Munira Fierro BECK/L 06/10/19 1333      Row Name 06/10/19 0915             Outcome Summary/Treatment Plan (OT)    Daily Summary of Progress (OT)  progress toward functional goals is good  -MM      Recorded by [MM] Munira Fierro BECK/L 06/10/19 1333        User Key  (r) = Recorded By, (t) = Taken By, (c) = Cosigned By    Initials Name Effective Dates Discipline     Praful Fernandez, PTA 08/02/16 -  PT    Munira Fontenot BECK/L 10/15/18 -  OT             Occupational Therapy Education     Title: PT OT SLP Therapies (Done)     Topic: Occupational Therapy (Done)     Point: ADL training (Done)     Description: Instruct learner(s) on proper safety adaptation and remediation techniques during self care or transfers.   Instruct in proper use of assistive devices.    Learning Progress Summary           Patient Acceptance, E, VU by MM at 6/10/2019  1:34 PM    Comment:  HEP for BUEs, safety for home tub t/f need for continued OOB activity    Acceptance, E,TB, VU,DU,NR by CJ at 6/7/2019 11:22 AM    Comment:  Pt. performed adl bathing/dressing with cga/supervision assist from this beck/l!    Acceptance,  E, VU by MM1 at 6/5/2019  4:55 PM    Comment:  OT role, benefits, POC, rest breaks   Family Acceptance, E, VU by MM1 at 6/5/2019  4:55 PM    Comment:  OT role, benefits, POC, rest breaks   Significant Other Acceptance, E, VU by MM at 6/10/2019  1:34 PM    Comment:  HEP for BUEs, safety for home tub t/f need for continued OOB activity    Acceptance, E,TB, VU,DU,NR by  at 6/7/2019 11:22 AM    Comment:  Pt. performed adl bathing/dressing with cga/supervision assist from this beck/l!                   Point: Home exercise program (Done)     Description: Instruct learner(s) on appropriate technique for monitoring, assisting and/or progressing therapeutic exercises/activities.    Learning Progress Summary           Patient Acceptance, E, VU by MM at 6/10/2019  1:34 PM    Comment:  HEP for BUEs, safety for home tub t/f need for continued OOB activity    Acceptance, E,TB, VU,DU,NR by  at 6/8/2019 11:25 AM    Comment:  Pt. performed ue exs to increase her act. zeny and transfer ability during adl tasks!    Acceptance, E,TB, VU,DU,NR by  at 6/6/2019  3:23 PM    Comment:  Pt. performed ue exs to increase her act. zeny for adl tasks, no issues or c/o's with tx! Pt. tires easily, and rests 1-2 min. to recover!   Significant Other Acceptance, E, VU by MM at 6/10/2019  1:34 PM    Comment:  HEP for BUEs, safety for home tub t/f need for continued OOB activity                   Point: Precautions (Done)     Description: Instruct learner(s) on prescribed precautions during self-care and functional transfers.    Learning Progress Summary           Patient Acceptance, E,TB, VU,DU,NR by  at 6/8/2019 11:25 AM    Comment:  Pt. performed ue exs to increase her act. zeny and transfer ability during adl tasks!    Acceptance, E,TB, VU,DU,NR by  at 6/7/2019 11:22 AM    Comment:  Pt. performed adl bathing/dressing with cga/supervision assist from this beck/l!    Acceptance, E,TB, VU,DU,NR by  at 6/6/2019  3:23 PM    Comment:  Pt.  performed ue exs to increase her act. zeny for adl tasks, no issues or c/o's with tx! Pt. tires easily, and rests 1-2 min. to recover!    Acceptance, E, VU by MM1 at 6/5/2019  4:55 PM    Comment:  OT role, benefits, POC, rest breaks   Family Acceptance, E, VU by MM1 at 6/5/2019  4:55 PM    Comment:  OT role, benefits, POC, rest breaks   Significant Other Acceptance, E,TB, VU,DU,NR by  at 6/7/2019 11:22 AM    Comment:  Pt. performed adl bathing/dressing with cga/supervision assist from this beck/l!                   Point: Body mechanics (Done)     Description: Instruct learner(s) on proper positioning and spine alignment during self-care, functional mobility activities and/or exercises.    Learning Progress Summary           Patient Acceptance, E,TB, VU,DU,NR by  at 6/8/2019 11:25 AM    Comment:  Pt. performed ue exs to increase her act. zeny and transfer ability during adl tasks!    Acceptance, E,TB, VU,DU,NR by  at 6/7/2019 11:22 AM    Comment:  Pt. performed adl bathing/dressing with cga/supervision assist from this beck/l!    Acceptance, E,TB, VU,DU,NR by  at 6/6/2019  3:23 PM    Comment:  Pt. performed ue exs to increase her act. zeny for adl tasks, no issues or c/o's with tx! Pt. tires easily, and rests 1-2 min. to recover!   Significant Other Acceptance, E,TB, VU,DU,NR by  at 6/7/2019 11:22 AM    Comment:  Pt. performed adl bathing/dressing with cga/supervision assist from this beck/l!                               User Key     Initials Effective Dates Name Provider Type Discipline     08/02/16 -  Gary Feliz COTA/L Occupational Therapy Assistant OT    [place] 04/03/18 -  Dariel Randolph OTR/L Occupational Therapist OT     10/15/18 -  Munira Fierro COTA/L Occupational Therapy Assistant OT                OT Recommendation and Plan  Outcome Summary/Treatment Plan (OT)  Daily Summary of Progress (OT): progress toward functional goals is good  Daily Summary of Progress (OT): progress toward  functional goals is good  Plan of Care Review  Plan of Care Reviewed With: patient, spouse  Plan of Care Reviewed With: patient, spouse  Outcome Summary: Pt O2 dropped with standing tolerance activity to 81 on 2L; 40 seconds seated rest break for increase to 90. Pt performed fxl mobility in room<>bathroom 2x for increased endurance. Pt performed tub t/f simulated as home environment; SBA. Educated on and performed HEP x15 reps while EOB for increased endurance/strength. Recommend d/c to rehab for increased activity prior to d/c home. Continue OT POC.   Outcome Measures     Row Name 06/10/19 0915 06/08/19 0700          How much help from another is currently needed...    Putting on and taking off regular lower body clothing?  3  -MM  4  -CJ     Bathing (including washing, rinsing, and drying)  4  -MM  4  -CJ     Toileting (which includes using toilet bed pan or urinal)  4  -MM  4  -CJ     Putting on and taking off regular upper body clothing  3  -MM  3  -CJ     Taking care of personal grooming (such as brushing teeth)  4  -MM  4  -CJ     Eating meals  4  -MM  --     Score  22  -MM  --        Functional Assessment    Outcome Measure Options  --  AM-PAC 6 Clicks Daily Activity (OT)  -CJ       User Key  (r) = Recorded By, (t) = Taken By, (c) = Cosigned By    Initials Name Provider Type    Gary Caceres COTA/L Occupational Therapy Assistant    Munira Fontenot COTA/L Occupational Therapy Assistant           Time Calculation:   Time Calculation- OT     Row Name 06/10/19 0915             Time Calculation- OT    OT Start Time  0915  -MM      OT Stop Time  0940  -MM      OT Time Calculation (min)  25 min  -MM      Total Timed Code Minutes- OT  25 minute(s)  -MM         Timed Charges    76318 - OT Therapeutic Exercise Minutes  10  -MM      04383 - OT Self Care/Mgmt Minutes  15  -MM        User Key  (r) = Recorded By, (t) = Taken By, (c) = Cosigned By    Initials Name Provider Type    Munira Fontenot COTA/[place]  Occupational Therapy Assistant        Therapy Charges for Today     Code Description Service Date Service Provider Modifiers Qty    08656273530 HC OT THER PROC EA 15 MIN 6/10/2019 Munira Fierro COTA/L GO 1    49909775296 HC OT SELF CARE/MGMT/TRAIN EA 15 MIN 6/10/2019 Munira Fierro COTA/L GO 1               MARY Villarreal  6/10/2019

## 2019-06-10 NOTE — THERAPY TREATMENT NOTE
Acute Care - Physical Therapy Treatment Note  [facility]     Patient Name: Teri Tim  : 1960  MRN: [id number]  Today's Date: 6/10/2019  Onset of Illness/Injury or Date of Surgery: 19  Date of Referral to PT: 19  Referring Physician: Dr. Curry    Admit Date: 2019    Visit Dx:    ICD-10-CM ICD-9-CM   1. Acute respiratory failure with hypoxia and hypercapnia (CMS/HCC) J96.01 518.81    J96.02    2. Pneumonia due to infectious organism, unspecified laterality, unspecified part of lung J18.9 136.9     484.8   3. Impaired mobility Z74.09 799.89   4. Decreased activities of daily living (ADL) R68.89 780.99     Patient Active Problem List   Diagnosis   • NSTEMI (non-ST elevated myocardial infarction) (CMS/HCC)   • Abnormal LFTs   • Abnormal US (ultrasound) of abdomen   • COPD exacerbation (CMS/HCC)   • COPD with acute exacerbation (CMS/[facility])   • Tobacco abuse   • Hyponatremia   • Pulmonary hypertension (CMS/HCC)   • Essential hypertension   • Hypercapnemia   • Acute on chronic respiratory failure with hypoxia and hypercapnia (CMS/HCC)   • Macrocytic anemia   • Community acquired pneumonia of left upper lobe of lung (CMS/HCC)   • Severe protein-calorie malnutrition (CMS/HCC)       Therapy Treatment    Rehabilitation Treatment Summary     Row Name 06/10/19 1104 06/10/19 0915          Treatment Time/Intention    Discipline  physical therapy assistant  -LG  occupational therapy assistant  -MM     Document Type  therapy note (daily note)  -LG  therapy note (daily note)  -MM     Subjective Information  complains of;weakness;fatigue  -LG2  complains of;fatigue;weakness;pain  -MM     Mode of Treatment  individual therapy;physical therapy  -LG2  individual therapy;occupational therapy  -MM     Patient/Family Observations  spouse  -LG2  spouse   -MM     Patient Effort  good  -LG2  good  -MM     Existing Precautions/Restrictions  fall;oxygen therapy device and L/min  -LG2  fall;oxygen therapy device and L/min   -MM     Recorded by [LG] Praful Fernandez, PTA 06/10/19 1105  [LG2] Praful Fernandez, PTA 06/10/19 1322 [MM] Munira Fierro HAWKINS/L 06/10/19 0950     Row Name 06/10/19 1104 06/10/19 0915          Vital Signs    Pre SpO2 (%)  97  -LG  94  -MM     O2 Delivery Pre Treatment  supplemental O2 2L  -LG  supplemental O2 2L  -MM     Intra SpO2 (%)  79 79-88  -LG  81  -MM     O2 Delivery Intra Treatment  supplemental O2 2L   -LG  supplemental O2  -MM     Post SpO2 (%)  92  -LG  91  -MM     O2 Delivery Post Treatment  supplemental O2  -LG  supplemental O2  -MM     Pre Patient Position  Supine  -LG  Sitting  -MM     Intra Patient Position  Standing  -LG  Standing  -MM     Post Patient Position  Sitting  -LG  Sitting  -MM     Recovery Time  40 seconds  -LG  45 seconds  -MM     Recorded by [LG] Praful Fernandez, PTA 06/10/19 1322 [MM] Munira Fierro HAWKINS/L 06/10/19 0950     Row Name 06/10/19 0915             Cognitive Assessment/Intervention- PT/OT    Affect/Mental Status (Cognitive)  WFL  -MM      Orientation Status (Cognition)  oriented to;person;place;situation  -MM      Personal Safety Interventions  fall prevention program maintained;nonskid shoes/slippers when out of bed;supervised activity  -MM      Recorded by [MM] Munira Fierro COTA/L 06/10/19 0950      Row Name 06/10/19 0915             Safety Issues, Functional Mobility    Safety Issues Affecting Function (Mobility)  insight into deficits/self awareness  -MM      Impairments Affecting Function (Mobility)  endurance/activity tolerance;pain;shortness of breath  -MM      Recorded by [MM] Munira Fierro HAWKINS/L 06/10/19 0950      Row Name 06/10/19 1104             Bed Mobility Assessment/Treatment    [place] Level (Bed Mobility)  independent  -LG      Recorded by [LG] Praful Fernandez, PTA 06/10/19 1322      Row Name 06/10/19 1104             Sit-Stand Transfer    Sit-Stand [place] (Transfers)  independent  -LG      Recorded by [LG] Praful Fernandez, PTA  06/10/19 1322      Row Name 06/10/19 1104             Stand-Sit Transfer    Stand-Sit [place] (Transfers)  independent  -LG      Recorded by [LG] Praful Fernandez, [facility] 06/10/19 1322      Row Name 06/10/19 1104             Gait/Stairs Assessment/Training    [place] Level (Gait)  stand by assist  -LG      Distance in Feet (Gait)  150' sitting rest then 150' back to room  -LG      Recorded by [LG] Praful Fernandez, PTA 06/10/19 1322      Row Name 06/10/19 1104             Therapeutic Exercise    Comment (Therapeutic Exercise)  B LE AROM 20 reps sitting eob  -LG      Recorded by [LG] Praful Fernandez, PTA 06/10/19 1322      Row Name 06/10/19 1104             Positioning and Restraints    Pre-Treatment Position  in bed  -LG      Post Treatment Position  bed  -LG      In Bed  sitting EOB;call light within reach;notified nsg  -LG      Recorded by [LG] Praful Fernandez, [facility] 06/10/19 1322      Row Name 06/10/19 1104             Pain Scale: Numbers Pre/Post-Treatment    Pain Scale: Numbers, Pretreatment  0/10 - no pain  -LG      Recorded by [LG] Praful Fernandez, [facility] 06/10/19 1322        User Key  (r) = Recorded By, (t) = Taken By, (c) = Cosigned By    Initials Name Effective Dates Discipline    LG Praful Fernandez, PTA 08/02/16 -  PT    MM Munira Fierro COTA/L 10/15/18 -  OT                   Physical Therapy Education     Title: PT OT SLP Therapies (Done)     Topic: Physical Therapy (Done)     Point: Mobility training (Done)     Learning Progress Summary           Patient Acceptance, E, VU by SB at 6/5/2019  4:21 PM    Comment:  pt edu on POC, benefits of act and d/c plans   Family Acceptance, E, VU by SB at 6/5/2019  4:21 PM    Comment:  pt edu on POC, benefits of act and d/c plans                   Point: Home exercise program (Done)     Learning Progress Summary           Patient Acceptance, E, VU by SB at 6/5/2019  4:21 PM    Comment:  pt edu on POC, benefits of act and d/c plans   Family Acceptance, E, VU by SB at  6/5/2019  4:21 PM    Comment:  pt edu on POC, benefits of act and d/c plans                   Point: Body mechanics (Done)     Learning Progress Summary           Patient Acceptance, E, VU by SB at 6/5/2019  4:21 PM    Comment:  pt edu on POC, benefits of act and d/c plans   Family Acceptance, E, VU by SB at 6/5/2019  4:21 PM    Comment:  pt edu on POC, benefits of act and d/c plans                   Point: Precautions (Done)     Learning Progress Summary           Patient Acceptance, E, VU by SB at 6/5/2019  4:21 PM    Comment:  pt edu on POC, benefits of act and d/c plans   Family Acceptance, E, VU by SB at 6/5/2019  4:21 PM    Comment:  pt edu on POC, benefits of act and d/c plans                               User Key     Initials Effective Dates Name Provider Type Discipline     08/31/18 -  Bharti Tyler, PT Physical Therapist PT                PT Recommendation and Plan        Outcome Measures     Row Name 06/08/19 0700             How much help from another is currently needed...    Putting on and taking off regular lower body clothing?  4  -CJ      Bathing (including washing, rinsing, and drying)  4  -CJ      Toileting (which includes using toilet bed pan or urinal)  4  -CJ      Putting on and taking off regular upper body clothing  3  -CJ      Taking care of personal grooming (such as brushing teeth)  4  -CJ         Functional Assessment    Outcome Measure Options  AM-PAC 6 Clicks Daily Activity (OT)  -CJ        User Key  (r) = Recorded By, (t) = Taken By, (c) = Cosigned By    Initials Name Provider Type    CJ Gary Feliz, SHRUTHI/L Occupational Therapy Assistant         Time Calculation:   PT Charges     Row Name 06/10/19 1104             Time Calculation    Start Time  1104  -LG      Stop Time  1129  -LG      Time Calculation (min)  25 min  -LG      PT Received On  06/10/19  -LG      PT Goal Re-Cert Due Date  06/15/19  -LG         Time Calculation- PT    Total Timed Code Minutes- PT  25 minute(s)   -LG        User Key  (r) = Recorded By, (t) = Taken By, (c) = Cosigned By    Initials Name Provider Type    LG Praful Fernandez PTA Physical Therapy Assistant        Therapy Charges for Today     Code Description Service Date Service Provider Modifiers Qty    75860431500 HC GAIT TRAINING EA 15 MIN 6/10/2019 Praful Fernandez PTA GP 1    91156640037 HC PT THER PROC EA 15 MIN 6/10/2019 Praful Fernandez PTA GP 1          PT G-Codes  Outcome Measure Options: AM-PAC 6 Clicks Daily Activity (OT)  AM-PAC 6 Clicks Score: 18  Score: 22    Praful Fernandez PTA  6/10/2019

## 2019-06-10 NOTE — DISCHARGE SUMMARY
[facility] Medicine Services  DISCHARGE SUMMARY       Date of Admission: 6/4/2019  Date of Discharge:  6/10/2019  Primary Care Physician: Lorri Guan APRN    Presenting Problem/History of Present Illness:  Shortness of breath and confusion    Final Discharge Diagnoses:  • Acute on chronic respiratory failure with hypoxia and hypercapnia (CMS/HCC)   • Macrocytic anemia   • Community acquired pneumonia of left upper lobe of lung (CMS/HCC)   • Severe protein-calorie malnutrition (CMS/HCC)   • Tobacco abuse   • Hyponatremia   • COPD with acute exacerbation (CMS/HCC)      Consults: None    Procedures Performed: None    Pertinent Test Results:   Imaging Results (last 7 days)     Procedure Component Value Units Date/Time    XR Chest PA & Lateral [[id number]] Collected:  06/05/19 1333     Updated:  06/05/19 1338    Narrative:       History:  58-year-old with perihilar infiltrate.     Reference:  Chest radiograph one day prior.     Findings:  Frontal and lateral chest radiographs performed.     Normal heart size. Atherosclerotic aortic arch. Tiny calcified granuloma  left apex. Emphysema. Vague opacity in the central left upper lobe is  again noted, slightly less conspicuous. No pleural effusion or  pneumothorax. No acute osseous abnormalities.          Impression:       Persistent vague infiltrate in the central left upper lobe, favor  pneumonia.  This report was finalized on 06/05/2019 13:35 by Dr Andrew Green, .    XR Chest 1 View [[id number]] Collected:  06/04/19 1655     Updated:  06/04/19 165    Narrative:       EXAMINATION:  XR CHEST 1 VW-  6/4/2019 4:49 PM CDT     HISTORY: Chest pain.     COMPARISON: 11/26/2018.     FINDINGS:  There is mild patchy left perihilar infiltrate. There is no  dense consolidation. 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 Heart size is upper limits  of normal. The thoracic aorta is atheromatous.       Impression:       1. Mild patchy left perihilar infiltrate may  represent minimal  pneumonia.  2. COPD/emphysema.        This report was finalized on 06/04/2019 16:56 by Dr. Hugo Licea MD.        Lab Results (last 7 days)     Procedure Component Value Units Date/Time    Basic Metabolic Panel [[id number]]  (Abnormal) Collected:  06/10/19 0426    Specimen:  Blood Updated:  06/10/19 0503     Glucose 74 mg/dL      BUN 13 mg/dL      Creatinine 0.66 mg/dL      Sodium 136 mmol/L      Potassium 3.4 mmol/L      Chloride 96 mmol/L      CO2 36.0 mmol/L      Calcium 8.4 mg/dL      eGFR Non African Amer 93 mL/min/1.73      BUN/Creatinine Ratio 19.7     Anion Gap 4.0 mmol/L     Narrative:       GFR Normal >60  Chronic Kidney Disease <60  Kidney Failure <15    Basic Metabolic Panel [[id number]]  (Abnormal) Collected:  06/09/19 1732    Specimen:  Blood Updated:  06/09/19 1750     Glucose 97 mg/dL      BUN 12 mg/dL      Creatinine 0.54 mg/dL      Sodium 133 mmol/L      Potassium 3.6 mmol/L      Chloride 92 mmol/L      CO2 37.0 mmol/L      Calcium 8.5 mg/dL      eGFR Non African Amer 117 mL/min/1.73      BUN/Creatinine Ratio 22.2     Anion Gap 4.0 mmol/L     Narrative:       GFR Normal >60  Chronic Kidney Disease <60  Kidney Failure <15    Blood Culture - Blood, Arm, Left [[id number]] Collected:  06/04/19 1705    Specimen:  Blood from Arm, Left Updated:  06/09/19 1731     Blood Culture No growth at 5 days    Blood Culture - Blood, Arm, Right [[id number]] Collected:  06/04/19 1705    Specimen:  Blood from Arm, Right Updated:  06/09/19 1731     Blood Culture No growth at 5 days    Basic Metabolic Panel [[id number]]  (Abnormal) Collected:  06/09/19 1157    Specimen:  Blood Updated:  06/09/19 1216     Glucose 76 mg/dL      BUN 12 mg/dL      Creatinine 0.61 mg/dL      Sodium 126 mmol/L      Potassium 3.8 mmol/L      Chloride 87 mmol/L      CO2 36.0 mmol/L      Calcium 8.1 mg/dL      eGFR Non African Amer 101 mL/min/1.73      BUN/Creatinine Ratio 19.7     Anion Gap 3.0 mmol/L     Narrative:       GFR  Normal >60  Chronic Kidney Disease <60  Kidney Failure <15    Basic Metabolic Panel [[id number]]  (Abnormal) Collected:  06/09/19 0648    Specimen:  Blood Updated:  06/09/19 0717     Glucose 96 mg/dL      BUN 13 mg/dL      Creatinine 0.59 mg/dL      Sodium 126 mmol/L      Potassium 3.6 mmol/L      Chloride 85 mmol/L      CO2 37.0 mmol/L      Calcium 8.6 mg/dL      eGFR Non African Amer 105 mL/min/1.73      BUN/Creatinine Ratio 22.0     Anion Gap 4.0 mmol/L     Narrative:       GFR Normal >60  Chronic Kidney Disease <60  Kidney Failure <15    Osmolality, Urine - Urine, Clean Catch [[id number]]  (Abnormal) Collected:  06/08/19 0538    Specimen:  Urine, Clean Catch Updated:  06/08/19 0644     Osmolality, Urine 146 mOsm/kg     Basic Metabolic Panel [[id number]]  (Abnormal) Collected:  06/08/19 0451    Specimen:  Blood Updated:  06/08/19 0619     Glucose 94 mg/dL      BUN 18 mg/dL      Creatinine 0.58 mg/dL      Sodium 131 mmol/L      Potassium 3.8 mmol/L      Comment: Specimen hemolyzed.  Results may be affected.        Chloride 88 mmol/L      CO2 37.0 mmol/L      Calcium 8.8 mg/dL      eGFR Non African Amer 108 mL/min/1.73      BUN/Creatinine Ratio 31.0     Anion Gap 6.0 mmol/L     Narrative:       GFR Normal >60  Chronic Kidney Disease <60  Kidney Failure <15    Sodium, Urine, Random - Urine, Clean Catch [[id number]]  (Abnormal) Collected:  06/08/19 0538    Specimen:  Urine, Clean Catch Updated:  06/08/19 0556     Sodium, Urine 22 mmol/L     Basic Metabolic Panel [[id number]]  (Abnormal) Collected:  06/07/19 0513    Specimen:  Blood Updated:  06/07/19 0702     Glucose 105 mg/dL      BUN 21 mg/dL      Creatinine 0.55 mg/dL      Sodium 128 mmol/L      Potassium 4.0 mmol/L      Chloride 85 mmol/L      CO2 >40.0 mmol/L      Calcium 8.4 mg/dL      eGFR Non African Amer 114 mL/min/1.73      BUN/Creatinine Ratio 38.2     Anion Gap -- mmol/L      Comment: Unable to calculate Anion Gap.       Narrative:       GFR Normal  >60  Chronic Kidney Disease <60  Kidney Failure <15    CBC (No Diff) [[id number]]  (Abnormal) Collected:  06/07/19 0513    Specimen:  Blood Updated:  06/07/19 0626     WBC 6.59 10*3/mm3      RBC 2.97 10*6/mm3      Hemoglobin 9.5 g/dL      Hematocrit 28.2 %      MCV 94.9 fL      MCH 32.0 pg      MCHC 33.7 g/dL      RDW 13.2 %      RDW-SD 45.4 fl      MPV 10.4 fL      Platelets 231 10*3/mm3     Basic Metabolic Panel [[id number]]  (Abnormal) Collected:  06/06/19 0609    Specimen:  Blood Updated:  06/06/19 0642     Glucose 122 mg/dL      BUN 22 mg/dL      Creatinine 0.59 mg/dL      Sodium 131 mmol/L      Potassium 4.4 mmol/L      Chloride 85 mmol/L      CO2 >40.0 mmol/L      Calcium 8.9 mg/dL      eGFR Non African Amer 105 mL/min/1.73      BUN/Creatinine Ratio 37.3     Anion Gap -- mmol/L      Comment: Unable to calculate Anion Gap.       Narrative:       GFR Normal >60  Chronic Kidney Disease <60  Kidney Failure <15    CBC (No Diff) [[id number]]  (Abnormal) Collected:  06/06/19 0609    Specimen:  Blood Updated:  06/06/19 0624     WBC 8.37 10*3/mm3      RBC 3.05 10*6/mm3      Hemoglobin 9.7 g/dL      Hematocrit 29.5 %      MCV 96.7 fL      MCH 31.8 pg      MCHC 32.9 g/dL      RDW 13.2 %      RDW-SD 46.9 fl      MPV 9.9 fL      Platelets 213 10*3/mm3     Blood Gas, Arterial [[id number]]  (Abnormal) Collected:  06/06/19 0430    Specimen:  Arterial Blood Updated:  06/06/19 0434     Site Left Radial     Dao's Test Positive     pH, Arterial 7.446 pH units      pCO2, Arterial 65.4 mm Hg      Comment: 83 Value above reference range        pO2, Arterial 65.1 mm Hg      Comment: 84 Value below reference range        HCO3, Arterial 45.0 mmol/L      Comment: 83 Value above reference range        Base Excess, Arterial 18.3 mmol/L      Comment: 83 Value above reference range        O2 Saturation, Arterial 93.5 %      Comment: 84 Value below reference range        Temperature 37.0 C      Barometric Pressure for Blood Gas 745 mmHg       Modality BiPap     FIO2 35 %      Ventilator Mode BiPAP     IPAP 14     Comment: Meter: [id number]     :  [id number]        EPAP 8     Collected by [id number]    T4, Free [[id number]]  (Normal) Collected:  06/05/19 0540    Specimen:  Blood Updated:  06/05/19 1144     Free T4 1.08 ng/dL     TSH [[id number]]  (Abnormal) Collected:  06/05/19 0540    Specimen:  Blood Updated:  06/05/19 0659     TSH 0.143 mIU/mL     Hemoglobin A1c [[id number]] Collected:  06/05/19 0540    Specimen:  Blood Updated:  06/05/19 0645     Hemoglobin A1C 4.5 %     Narrative:       Less than 6.0           Non-Diabetic Range  6.0-7.0                 ADA Therapeutic Target  Greater than 7.0        Action Suggested    Lipid Panel [[id number]]  (Abnormal) Collected:  06/05/19 0540    Specimen:  Blood Updated:  06/05/19 0640     Total Cholesterol 226 mg/dL      Triglycerides 172 mg/dL      HDL Cholesterol 45 mg/dL      LDL Cholesterol  148 mg/dL      LDL/HDL Ratio 3.26    Basic Metabolic Panel [[id number]]  (Abnormal) Collected:  06/05/19 0540    Specimen:  Blood Updated:  06/05/19 0633     Glucose 121 mg/dL      BUN 19 mg/dL      Creatinine 0.54 mg/dL      Sodium 136 mmol/L      Potassium 4.5 mmol/L      Chloride 83 mmol/L      CO2 >40.0 mmol/L      Calcium 9.0 mg/dL      eGFR Non African Amer 117 mL/min/1.73      BUN/Creatinine Ratio 35.2     Anion Gap -- mmol/L      Comment: Unable to calculate Anion Gap.       Narrative:       GFR Normal >60  Chronic Kidney Disease <60  Kidney Failure <15    CBC (No Diff) [[id number]]  (Abnormal) Collected:  06/05/19 0540    Specimen:  Blood Updated:  06/05/19 0620     WBC 3.83 10*3/mm3      RBC 3.34 10*6/mm3      Hemoglobin 10.6 g/dL      Hematocrit 34.4 %      .0 fL      MCH 31.7 pg      MCHC 30.8 g/dL      RDW 13.1 %      RDW-SD 49.4 fl      MPV 10.3 fL      Platelets 241 10*3/mm3     Prealbumin [[id number]]  (Abnormal) Collected:  06/04/19 1635    Specimen:  Blood Updated:  06/04/19 1925      Prealbumin 13.0 mg/dL     Blood Gas, Arterial With Co-Ox [[id number]]  (Abnormal) Collected:  06/04/19 1744    Specimen:  Arterial Blood Updated:  06/04/19 1749     Site Right Radial     Dao's Test Positive     pH, Arterial 7.386 pH units      pCO2, Arterial 89.9 mm Hg      Comment: 86 Value above critical limit        pO2, Arterial 54.3 mm Hg      Comment: 85 Value below critical limit        HCO3, Arterial 53.9 mmol/L      Comment: 83 Value above reference range        Base Excess, Arterial 24.2 mmol/L      Comment: 83 Value above reference range        O2 Saturation, Arterial 89.8 %      Comment: 84 Value below reference range        Hemoglobin, Blood Gas 11.2 g/dL      Comment: 84 Value below reference range        Hematocrit, Blood Gas 34.3 %      Comment: 84 Value below reference range        Oxyhemoglobin 86.3 %      Comment: 84 Value below reference range        Methemoglobin 0.70 %      Carboxyhemoglobin 3.1 %      A-a Gradiant -- mmHg      Comment: UNABLE TO CALCULATE        Temperature 37.0 C      Sodium, Arterial 141 mmol/L      Potassium, Arterial 4.1 mmol/L      Barometric Pressure for Blood Gas 748 mmHg      Modality BiPap     FIO2 40 %      Ventilator Mode BiPAP     Set [facility] Resp Rate 20.0     IPAP 14     Comment: Meter: [id number]     :  [id number]        EPAP 8     Note --     Notified Who DR LANGE     Notified By [id number]     Notified Time 06/04/2019 17:50     Collected by [id number]     pH, Temp Corrected -- pH Units      pCO2, Temperature Corrected -- mm Hg      pO2, Temperature Corrected -- mm Hg     [place] Draw [[id number]] Collected:  06/04/19 1635    Specimen:  Blood Updated:  06/04/19 1745    Narrative:       The following orders were created for panel order [place] Draw.  Procedure                               Abnormality         Status                     ---------                               -----------         ------                     Light Blue Top[[id number]]                                    Final result               Green Top (Gel)[[id number]]                                  Final result               Lavender Top[[id number]]                                     Final result               Red Top[[id number]]                                          Final result                 Please view results for these tests on the individual orders.    Light Blue Top [[id number]] Collected:  06/04/19 1635    Specimen:  Blood Updated:  06/04/19 1745     Extra Tube hold for add-on     Comment: Auto resulted       Green Top (Gel) [[id number]] Collected:  06/04/19 1635    Specimen:  Blood Updated:  06/04/19 1745     Extra Tube Hold for add-ons.     Comment: Auto resulted.       Lavender Top [[id number]] Collected:  06/04/19 1635    Specimen:  Blood Updated:  06/04/19 1745     Extra Tube hold for add-on     Comment: Auto resulted       Red Top [[id number]] Collected:  06/04/19 1635    Specimen:  Blood Updated:  06/04/19 1745     Extra Tube Hold for add-ons.     Comment: Auto resulted.       Procalcitonin [[id number]]  (Normal) Collected:  06/04/19 1635    Specimen:  Blood Updated:  06/04/19 1739     Procalcitonin <0.25 ng/mL     Narrative:       SIRS, sepsis, severe sepsis, and septic shock are categorized according to the criteria of the consensus conference of the American College of Chest Physicians/Society of Critical Care Medicine.    PCT < 0.5 ng/mL     Systemic infection (sepsis) is not likely.    PCT >0.5 and < 2.0 ng/mL Systemic infection (sepsis) is possible, but other conditions are known to elevate PCT as well.    PCT > 2.0 ng/mL     Systemic infection (sepsis) is likely, unless other causes are known.      PCT > 10.0 ng/mL    Important systemic inflammatory response, almost exclusively due to severe bacterial sepsis or septic shock.    PCT values of < 0.5 ng/mL do not exclude an infection, because localized infections (without systemic signs) may be associated with such low concentrations, or  a systemic infection in its initial stages (<6 hours).  Increased PCT can occur without infection.  PCT concentrations between 0.5 and 2.0 ng/mL should be interpreted taking into account the patients history.  It is recommended to retest PCT within 6-24 hours if any concentrations < 2.0 ng/mL are obtained.    Lactic Acid, Plasma [[id number]]  (Normal) Collected:  06/04/19 1707    Specimen:  Blood Updated:  06/04/19 1731     Lactate 0.9 mmol/L     CBC & Differential [[id number]] Collected:  06/04/19 1635    Specimen:  Blood Updated:  06/04/19 1728    Narrative:       The following orders were created for panel order CBC & Differential.  Procedure                               Abnormality         Status                     ---------                               -----------         ------                     CBC Auto Differential[[id number]]        Abnormal            Final result                 Please view results for these tests on the individual orders.    CBC Auto Differential [[id number]]  (Abnormal) Collected:  06/04/19 1635    Specimen:  Blood Updated:  06/04/19 1728     WBC 9.51 10*3/mm3      RBC 3.46 10*6/mm3      Hemoglobin 11.0 g/dL      Hematocrit 36.4 %      .2 fL      MCH 31.8 pg      MCHC 30.2 g/dL      RDW 13.2 %      RDW-SD 51.2 fl      MPV 9.7 fL      Platelets 247 10*3/mm3      Neutrophil % 80.2 %      Lymphocyte % 10.8 %      Monocyte % 7.7 %      Eosinophil % 0.6 %      Basophil % 0.4 %      Immature Grans % 0.3 %      Neutrophils, Absolute 7.62 10*3/mm3      Lymphocytes, Absolute 1.03 10*3/mm3      Monocytes, Absolute 0.73 10*3/mm3      Eosinophils, Absolute 0.06 10*3/mm3      Basophils, Absolute 0.04 10*3/mm3      Immature Grans, Absolute 0.03 10*3/mm3      nRBC 0.0 /100 WBC     BNP [[id number]]  (Normal) Collected:  06/04/19 1635    Specimen:  Blood Updated:  06/04/19 1723     proBNP 302.0 pg/mL     Blood Gas, Arterial [[id number]]  (Abnormal) Collected:  06/04/19 1707    Specimen:  Arterial  Blood Updated:  06/04/19 1708     Site Right Brachial     Dao's Test N/A     pH, Arterial 7.333 pH units      Comment: 84 Value below reference range        pCO2, Arterial >102.0 mm Hg      Comment: 93 Value above reportable range > 102        pO2, Arterial 79.9 mm Hg      Comment: 84 Value below reference range        HCO3, Arterial 55.3 mmol/L      Comment: 83 Value above reference range        Base Excess, Arterial 24.5 mmol/L      Comment: 83 Value above reference range        O2 Saturation, Arterial 96.2 %      Temperature 37.0 C      Barometric Pressure for Blood Gas 749 mmHg      Modality Nasal Cannula     Flow Rate 2.5 lpm      Ventilator Mode NA     Notified Who DR LANGE     Notified By [id number]     Notified Time 06/04/2019 17:13     Collected by [id number]     Comment: Meter: [id number]     :  [id number]       Troponin [[id number]]  (Normal) Collected:  06/04/19 1635    Specimen:  Blood Updated:  06/04/19 1704     Troponin I <0.012 ng/mL     D-dimer, Quantitative [[id number]]  (Abnormal) Collected:  06/04/19 1635    Specimen:  Blood Updated:  06/04/19 1702     D-Dimer, Quantitative 0.66 mg/L (FEU)     Narrative:       Reference Range is 0-0.50 mg/L FEU. However, results <0.50 mg/L FEU tends to rule out DVT or PE. Results >0.50 mg/L FEU are not useful in predicting absence or presence of DVT or PE.    Comprehensive Metabolic Panel [[id number]]  (Abnormal) Collected:  06/04/19 1635    Specimen:  Blood Updated:  06/04/19 1656     Glucose 130 mg/dL      BUN 20 mg/dL      Creatinine 0.52 mg/dL      Sodium 138 mmol/L      Potassium 4.4 mmol/L      Chloride 81 mmol/L      CO2 >40.0 mmol/L      Calcium 9.0 mg/dL      Total Protein 7.0 g/dL      Albumin 3.70 g/dL      ALT (SGPT) 15 U/L      AST (SGOT) 26 U/L      Alkaline Phosphatase 94 U/L      Total Bilirubin 0.4 mg/dL      eGFR Non African Amer 122 mL/min/1.73      Globulin 3.3 gm/dL      A/G Ratio 1.1 g/dL      BUN/Creatinine Ratio 38.5     Anion Gap --  mmol/L      Comment: Unable to calculate Anion Gap.       Narrative:       GFR Normal >60  Chronic Kidney Disease <60  Kidney Failure <15        Hospital Course:  The patient is a 58 y.o. female with past medical history significant for chronic hypoxic respiratory failure on home trilogy and home oxygen at 2 L, COPD, ongoing tobacco abuse, cachexia, back pain, and chronic diastolic dysfunction.  Patient presented the emergency department on 6/4 with complaints of shortness of breath.  She also had some confusion she does continue to smoke.  Upon admission, she had a PCO2 of >102 and PaO2 79.9 on 2.5L nasal cannula. She was placed on hospital BiPAP and admitted to the hospitalist service.     She was placed on gentle hydration and was started on IV azithromycin therapy as well as IV corticosteroids.  Repeat PA lateral chest x-ray revealed vague opacity in the left upper lobe with the pneumonia favored.  She was then broadened out to Rocephin and azithromycin.  She is completed azithromycin therapy and now has completed Omnicef therapy as well.  Her trilogy company has come in and adjusted her machine.  Patient fortunately continued to be persistently hyponatremic.  She was actually given some IV fluids and given her decreased oral intake.  Did raise her sodium to 131 but then went right back down to 126 yesterday.  I did give her a dose of Samsca yesterday and her sodium today is 136.  Patient continued to have desaturations last night until upper 60s and low 70s while on her trilogy.  They have come back again today and increase the PEEP.  She is now stable for discharge.  Please note that after her BiPAP therapy, her PCO2 did go down to 65.4.  Started on Megace and will continue a steroid taper post discharge.  She has improved her oral intake.  We discussed this with her that she just needs to continue to have a good oral intake and will need repeat lab work  for posthospitalization assessment appointment with her  "primary care provider.    Physical Exam on Discharge:  /64   Pulse 87   Temp 98.9 °F (37.2 °C) (Oral)   Resp 18   Ht 152.4 cm (60\")   Wt 45.8 kg (101 lb)   SpO2 90%   BMI 19.73 kg/m²   Physical Exam   Constitutional: She is oriented to person, place, and time. She appears well-developed and well-nourished.   HENT:   Head: Normocephalic and atraumatic.   Eyes: Conjunctivae and EOM are normal. Pupils are equal, round, and reactive to light.   Neck: Neck supple. No JVD present. No thyromegaly present.   Cardiovascular: Normal rate, regular rhythm, normal heart sounds and intact distal pulses. Exam reveals no gallop and no friction rub.   No murmur heard.  Pulmonary/Chest: Effort normal. No respiratory distress. She has no wheezes. She has no rales. She exhibits no tenderness.   diminished but much improved aeration.    Abdominal: Soft. Bowel sounds are normal. She exhibits no distension. There is no tenderness. There is no rebound and no guarding.   Musculoskeletal: Normal range of motion. She exhibits edema (trace edema bilaterally. ). She exhibits no tenderness or deformity.   Lymphadenopathy:     She has no cervical adenopathy.   Neurological: She is alert and oriented to person, place, and time. She displays normal reflexes. No cranial nerve deficit. She exhibits normal muscle tone.   Skin: Skin is warm and dry. No rash noted.   Psychiatric: She has a normal mood and affect. Her behavior is normal. Judgment and thought content normal.     Condition on Discharge: stable with elevated risk of readmission.     Discharge Disposition:  Home or Self Care    Discharge Medications:     Discharge Medications      New Medications      Instructions Start Date   megestrol 40 MG/ML suspension  Commonly known as:  MEGACE   400 mg, Oral, Daily   Start Date:  6/11/2019     predniSONE 10 MG tablet  Commonly known as:  DELTASONE   2 tabs daily for 2 more day2 then 1 tablet daily for 3 days then stop.         Changes to " Medications      Instructions Start Date   metoprolol tartrate 25 MG tablet  Commonly known as:  LOPRESSOR  What changed:    · how much to take  · when to take this   12.5 mg, Oral, 2 Times Daily         Continue These Medications      Instructions Start Date   aspirin 81 MG EC tablet   81 mg, Oral, Daily      budesonide-formoterol 160-4.5 MCG/ACT inhaler  Commonly known as:  SYMBICORT   2 puffs, Inhalation, 2 Times Daily - RT      guaiFENesin 600 MG 12 hr tablet  Commonly known as:  MUCINEX   1,200 mg, Oral, 2 Times Daily      ipratropium-albuterol  MCG/ACT inhaler  Commonly known as:  COMBIVENT RESPIMAT   1 puff, Inhalation, 4 Times Daily PRN      pantoprazole 40 MG EC tablet  Commonly known as:  PROTONIX   40 mg, Oral, Daily           Discharge Diet:   Diet Instructions     Diet: Regular; Thin      Discharge Diet:  Regular    Fluid Consistency:  Thin        Activity at Discharge:   Activity Instructions     Activity as Tolerated            Follow-up Appointments:   1. PCP in 1 week    Test Results Pending at Discharge: None    ROLAND Burdick  06/10/19  3:56 PM    Time: 35 minutes.     I personally evaluated and examined the patient in conjunction with ROLAND Rutledge and agree with the assessment, treatment plan, and disposition of the patient as recorded by her. My history, exam, and further recommendations are:     Seen and examined.  Appropriate for discharge.  Patient at baseline.    Marcin Hitchcock MD  06/10/19  10:43 PM

## 2019-06-10 NOTE — PROGRESS NOTES
Continued Stay Note   [place]     Patient Name: Teri Tim  MRN: [id number]  Today's Date: 6/10/2019    Admit Date: 6/4/2019    Discharge Plan     Row Name 06/10/19 1127       Plan    Plan Comments  AGREES TO PURCHASE [facility] DEPARTMENT PROGRAM; H/P SENT CONSENT SIGNED NEGRITO NOTIFIED; STATES SHE HAS [facility]        Discharge Codes    No documentation.             Chelsea Taylor RN

## 2019-06-10 NOTE — PLAN OF CARE
Problem: Patient Care Overview  Goal: Plan of Care Review  Outcome: Ongoing (interventions implemented as appropriate)   06/10/19 0512   Coping/Psychosocial   Plan of Care Reviewed With patient   Plan of Care Review   Progress no change   OTHER   Outcome Summary VSS, pt didn't rest well during night even with added Atarax and Melatonin. Desats when up to bathroom, recovers within 1 min. Pt is SOB with exertion and states that her trilogy isn't working correctly-RT came in to her sats in the 70's and placed her back on cannula at which she is back on 2L home o2. Safety maintained, and will continue to monitor.

## 2019-06-10 NOTE — PROGRESS NOTES
Continued Stay Note  [facility]     Patient Name: Teri iTm  MRN: [id number]  Today's Date: 6/10/2019    Admit Date: 6/4/2019    Discharge Plan     Row Name 06/10/19 1436       Plan    Plan  Home    Patient/Family in Agreement with Plan  yes    Final Discharge Disposition Code  01 - home or self-care    Final Note  Pt is being discharged home today. Pt told CM she was followed by [facility] [id number], they have not seen pt since October.          Discharge Codes    No documentation.       Expected Discharge Date and Time     Expected Discharge Date Expected Discharge Time    Lauri 10, 2019             KAREN Hardin

## 2019-06-10 NOTE — DISCHARGE PLACEMENT REQUEST
"[facility]  AMOR ROBERTSON RN CM [phone]      Teri Tim (58 y.o. Female)     Date of Birth Social Security Number Address Home Phone MRN    1960  [facility] [address]    [place] [address] [phone] [id number]    [employer] Marital Status          Voodoo        Admission Date Admission Type Admitting Provider Attending Provider Department, Room/Bed    6/4/19 Emergency Marcin Hitchcock MD Fleming, John Eric, MD [facility] 4C, 492/1    Discharge Date Discharge Disposition Discharge Destination                       Attending Provider:  Marcin Hitchcock MD    Allergies:  Codeine    Isolation:  None   Infection:  None   Code Status:  CPR    Ht:  152.4 cm (60\")   Wt:  45.8 kg (101 lb)    Admission Cmt:  None   Principal Problem:  None                Active Insurance as of 6/4/2019     Primary Coverage     Payor Plan Insurance Group Employer/Plan Group    [employer] [place] [employer] [place]ICA[place]      Payor Plan Address Payor Plan Phone Number Payor Plan Fax Number Effective Dates    [address] [address] [phone]  11/1/2017 - None Entered    [facility] [id number]       Subscriber Name Subscriber Birth Date Member ID       TERI TIM 1960 [id number]                 Emergency Contacts      (Rel.) Home Phone Work Phone Mobile Phone    Rich Tim (Spouse) [phone] -- [phone]               History & Physical      Maribell Curry DO at 6/4/2019  5:50 PM              [facility]  HISTORY AND PHYSICAL    Date of Admission: 6/4/2019  Primary Care Physician: Lorri Guan APRN    Subjective     Chief Complaint: Shortness of breathing    History of Present Illness  Teri Tim is a 58-year-old female with a past medical history of COPD, hypertension, chronic back pain-untreated, diastolic dysfunction now resolved with normal EF.  Patient also has chronic respiratory failure on 2 " L [place] cannula continuously.  She uses trilogy however  reports this has not been working and home saturations while using trilogy's in the 70s.  Patient did present to [facility] today with confusion and shortness of breathing.   states she did report chest pain this morning when she awakened however she denies at this time.  ER evaluation revealed acute on chronic respiratory failure with hypercarbia and hypoxia, mildly elevated d-dimer and macrocytic anemia.  Patient declines to report how many cigarettes she is smoking per day.   provides majority of HPI, states she has been seen by [facility] pulmonary clinic in the remote past.  She is also been seen by [facility] but has stopped due to unhappiness with care provided.  She is currently requesting pain medication and nerve medication.  Currently she denies chest pain, she still somewhat short of breath however significantly improved since arrival.  Patient is admitted for further evaluation and treatment.    Review of Systems   A 10 point review of systems was completed, all negative except for those discussed in HPI    Past Medical History:   Past Medical History:   Diagnosis Date   • CHF (congestive heart failure) (CMS/HCC), normalized 9/2018 echocardiogram    • Chronic back pain    • Chronic respiratory failure (CMS/HCC)    • COPD (chronic obstructive pulmonary disease) (CMS/[facility])    • Hypertension    • Pneumonia        Past Surgical History:   Past Surgical History:   Procedure Laterality Date   • CARPAL TUNNEL RELEASE     • HYSTERECTOMY      complete       Family History: family history includes Cancer in her mother and sister; Heart disease in her brother and father.    Social History:  reports that she has been smoking cigarettes.  She has been smoking about 0.50 packs per day. She has never used smokeless tobacco. She reports that she does not drink alcohol or use drugs.    Code Status: Full, her  Rich  "will speak for her      Allergies:  Allergies   Allergen Reactions   • Codeine Nausea And Vomiting       Medications:  Prior to Admission medications    Medication Sig Start Date End Date Taking? Authorizing Provider   budesonide-formoterol (SYMBICORT) 160-4.5 MCG/ACT inhaler Inhale 2 puffs 2 (Two) Times a Day.    ProviderXena MD   guaiFENesin (MUCINEX) 600 MG 12 hr tablet Take 2 tablets by mouth 2 (Two) Times a Day. 4/5/18   Joanna Juan APRN   ipratropium-albuterol (COMBIVENT RESPIMAT)  MCG/ACT inhaler Inhale 1 puff 4 (Four) Times a Day As Needed for Wheezing or Shortness of Air. 10/2/18   Gonsalo Loo MD   metoprolol tartrate (LOPRESSOR) 25 MG tablet Take 0.5 tablets by mouth Every 12 (Twelve) Hours. 10/2/18   Gonsalo Loo MD   nicotine (NICODERM CQ) 21 MG/24HR patch Place 1 patch on the skin as directed by provider Daily. 12/1/18   Jayy De Santiago MD   pantoprazole (PROTONIX) 40 MG EC tablet Take 40 mg by mouth Daily.    Provider, MD Xena       Objective     /78   Pulse 112   Temp 98.2 °F (36.8 °C)   Resp 24   Ht 152.4 cm (60\")   Wt 38.2 kg (84 lb 3 oz)   SpO2 96%   BMI 16.44 kg/m²    Physical Exam   Constitutional: She appears well-developed.   Frail, cachectic   HENT:   Head: Normocephalic and atraumatic.   Eyes: EOM are normal.   Neck: Normal range of motion.   Cardiovascular: Normal rate, regular rhythm and normal heart sounds.   Tachycardic upon arrival however normalized at time of assessment   Pulmonary/Chest: She is in respiratory distress.   Severely diminished throughout without adventitious breath sounds   Abdominal: Soft. Bowel sounds are normal. She exhibits no distension. There is no tenderness.   Musculoskeletal: Normal range of motion. She exhibits no edema.   Neurological: She is alert.   Somewhat obtunded however does answer questions appropriately   Skin: Skin is dry.   Excessively dry   Psychiatric:   Flat affect "       Pertinent Data:   Lab Results (last 72 hours)     Procedure Component Value Units Date/Time    Prealbumin [[id number]]  (Abnormal) Collected:  06/04/19 1635    Specimen:  Blood Updated:  06/04/19 1926     Prealbumin 13.0 mg/dL     Blood Gas, Arterial With Co-Ox [[id number]]  (Abnormal) Collected:  06/04/19 1744    Specimen:  Arterial Blood Updated:  06/04/19 1749     Site Right Radial     Dao's Test Positive     pH, Arterial 7.386 pH units      pCO2, Arterial 89.9 mm Hg      Comment: 86 Value above critical limit        pO2, Arterial 54.3 mm Hg      Comment: 85 Value below critical limit        HCO3, Arterial 53.9 mmol/L      Comment: 83 Value above reference range        Base Excess, Arterial 24.2 mmol/L      Comment: 83 Value above reference range        O2 Saturation, Arterial 89.8 %      Comment: 84 Value below reference range        Hemoglobin, Blood Gas 11.2 g/dL      Comment: 84 Value below reference range        Hematocrit, Blood Gas 34.3 %      Comment: 84 Value below reference range        Oxyhemoglobin 86.3 %      Comment: 84 Value below reference range        Methemoglobin 0.70 %      Carboxyhemoglobin 3.1 %      A-a Gradiant -- mmHg      Comment: UNABLE TO CALCULATE        Temperature 37.0 C      Sodium, Arterial 141 mmol/L      Potassium, Arterial 4.1 mmol/L      Barometric Pressure for Blood Gas 748 mmHg      Modality BiPap     FIO2 40 %      Ventilator Mode BiPAP     Set [facility] Resp Rate 20.0     IPAP 14     Comment: Meter: [id number]     :  [id number]        EPAP 8     Note --     Notified Who DR LANGE     Notified By [id number]     Notified Time 06/04/2019 17:50     Collected by [id number]     pH, Temp Corrected -- pH Units      pCO2, Temperature Corrected -- mm Hg      pO2, Temperature Corrected -- mm Hg     Procalcitonin [[id number]]  (Normal) Collected:  06/04/19 1635    Specimen:  Blood Updated:  06/04/19 1739     Procalcitonin <0.25 ng/mL     Lactic Acid, Plasma [[id number]]  (Normal)  Collected:  06/04/19 1707    Specimen:  Blood Updated:  06/04/19 1731     Lactate 0.9 mmol/L     CBC Auto Differential [[id number]]  (Abnormal) Collected:  06/04/19 1635    Specimen:  Blood Updated:  06/04/19 1728     WBC 9.51 10*3/mm3      RBC 3.46 10*6/mm3      Hemoglobin 11.0 g/dL      Hematocrit 36.4 %      .2 fL      MCH 31.8 pg      MCHC 30.2 g/dL      RDW 13.2 %      RDW-SD 51.2 fl      MPV 9.7 fL      Platelets 247 10*3/mm3      Neutrophil % 80.2 %      Lymphocyte % 10.8 %      Monocyte % 7.7 %      Eosinophil % 0.6 %      Basophil % 0.4 %      Immature Grans % 0.3 %      Neutrophils, Absolute 7.62 10*3/mm3      Lymphocytes, Absolute 1.03 10*3/mm3      Monocytes, Absolute 0.73 10*3/mm3      Eosinophils, Absolute 0.06 10*3/mm3      Basophils, Absolute 0.04 10*3/mm3      Immature Grans, Absolute 0.03 10*3/mm3      nRBC 0.0 /100 WBC     BNP [[id number]]  (Normal) Collected:  06/04/19 1635    Specimen:  Blood Updated:  06/04/19 1723     proBNP 302.0 pg/mL     Blood Culture - Blood, Arm, Left [[id number]] Collected:  06/04/19 1705    Specimen:  Blood from Arm, Left Updated:  06/04/19 1718    Blood Culture - Blood, Arm, Right [[id number]] Collected:  06/04/19 1705    Specimen:  Blood from Arm, Right Updated:  06/04/19 1717    Blood Gas, Arterial [[id number]]  (Abnormal) Collected:  06/04/19 1707    Specimen:  Arterial Blood Updated:  06/04/19 1708     Site Right Brachial     Dao's Test N/A     pH, Arterial 7.333 pH units      Comment: 84 Value below reference range        pCO2, Arterial >102.0 mm Hg      Comment: 93 Value above reportable range > 102        pO2, Arterial 79.9 mm Hg      Comment: 84 Value below reference range        HCO3, Arterial 55.3 mmol/L      Comment: 83 Value above reference range        Base Excess, Arterial 24.5 mmol/L      Comment: 83 Value above reference range        O2 Saturation, Arterial 96.2 %      Temperature 37.0 C      Barometric Pressure for Blood Gas 749 mmHg      Modality  Nasal Cannula     Flow Rate 2.5 lpm      Ventilator Mode NA     Notified Who DR LANGE     Notified By [id number]     Notified Time 06/04/2019 17:13     Collected by [id number]     Comment: Meter: [id number]     :  [id number]       Troponin [[id number]]  (Normal) Collected:  06/04/19 1635    Specimen:  Blood Updated:  06/04/19 1704     Troponin I <0.012 ng/mL     D-dimer, Quantitative [[id number]]  (Abnormal) Collected:  06/04/19 1635    Specimen:  Blood Updated:  06/04/19 1702     D-Dimer, Quantitative 0.66 mg/L (FEU)     Narrative:       Reference Range is 0-0.50 mg/L FEU. However, results <0.50 mg/L FEU tends to rule out DVT or PE. Results >0.50 mg/L FEU are not useful in predicting absence or presence of DVT or PE.    Comprehensive Metabolic Panel [[id number]]  (Abnormal) Collected:  06/04/19 1635    Specimen:  Blood Updated:  06/04/19 1656     Glucose 130 mg/dL      BUN 20 mg/dL      Creatinine 0.52 mg/dL      Sodium 138 mmol/L      Potassium 4.4 mmol/L      Chloride 81 mmol/L      CO2 >40.0 mmol/L      Calcium 9.0 mg/dL      Total Protein 7.0 g/dL      Albumin 3.70 g/dL      ALT (SGPT) 15 U/L      AST (SGOT) 26 U/L      Alkaline Phosphatase 94 U/L      Total Bilirubin 0.4 mg/dL      eGFR Non African Amer 122 mL/min/1.73      Globulin 3.3 gm/dL      A/G Ratio 1.1 g/dL      BUN/Creatinine Ratio 38.5     Anion Gap -- mmol/L      Comment: Unable to calculate Anion Gap.       Narrative:       GFR Normal >60  Chronic Kidney Disease <60  Kidney Failure <15        Imaging Results (last 24 hours)     Procedure Component Value Units Date/Time    XR Chest 1 View [[id number]] Collected:  06/04/19 1655     Updated:  06/04/19 1659    Narrative:       EXAMINATION:  XR CHEST 1 VW-  6/4/2019 4:49 PM CDT     HISTORY: Chest pain.     COMPARISON: 11/26/2018.     FINDINGS:  There is mild patchy left perihilar infiltrate. There is no  dense consolidation. 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 Heart size is upper limits  of normal. The thoracic  aorta is atheromatous.       Impression:       1. Mild patchy left perihilar infiltrate may represent minimal  pneumonia.  2. COPD/emphysema.        This report was finalized on 06/04/2019 16:56 by Dr. Hugo Licea MD.            I have personally reviewed and interpreted the radiology studies and ECG obtained at time of admission.     Assessment / Plan     Assessment:   Active Hospital Problems    Diagnosis   • Acute on chronic respiratory failure with hypoxia and hypercapnia (CMS/HCC)   • Macrocytic anemia   • Lung infiltrate, left perihilar   • Moderate protein-calorie malnutrition (CMS/HCC)   • Tobacco abuse   • COPD with acute exacerbation (CMS/HCC)       Plan:   1.  Admit as inpatient  2.  Start azithromycin 500 mg IV daily  3.  Duo nebs, Symbicort, incentive spirometry, BiPAP, Mucinex  4.  Solu-Medrol 60 mg IV every 8 hours  5.  Home medications reviewed and restarted as appropriate  6.  DVT prophylaxis with low-dose Lovenox  7.  Gentle hydration normal saline 75 mL/hour  8.  Labs in a.m. BMP, CBC, A1c, lipid panel, TSH  9.  Consult  in a.m. for trilogy evaluation of patient's home equipment  10.  Nutrition consult for evaluation nutritional status, BMI 16.44    I discussed the patient's findings and my recommendations with: Nathan Antonio DO  Time spent 35 minutes      ROLAND Pillai  06/04/19   7:36 PM     Chart reviewed  Patient examined in conjunction with AMANDA WATKINS  Agree with assessment and plan.   Discussed smoking cessation with patient  Not really receptive.   Discussed with patient and AMANDA Curry DO  06/04/2019  7:40 PM    Electronically signed by Maribell Curry DO at 6/5/2019  1:58 PM

## 2019-06-10 NOTE — PLAN OF CARE
Problem: Patient Care Overview  Goal: Plan of Care Review  Outcome: Ongoing (interventions implemented as appropriate)   06/10/19 8179   Coping/Psychosocial   Plan of Care Reviewed With patient;spouse   Plan of Care Review   Progress improving   OTHER   Outcome Summary Pt O2 dropped with standing tolerance activity to 81 after 3min on 2L; 40 seconds seated rest break for increase to 90. Pt performed fxl mobility in room<>bathroom 2x for increased endurance. Pt performed tub t/f simulated as home environment; SBA. Educated on and performed HEP x15 reps while EOB for increased endurance/strength. Recommend d/c to rehab for increased activity prior to d/c home. Continue OT POC.

## 2019-06-11 ENCOUNTER — READMISSION MANAGEMENT (OUTPATIENT)
Dept: CALL CENTER | Facility: HOSPITAL | Age: 59
End: 2019-06-11

## 2019-06-11 NOTE — OUTREACH NOTE
Prep Survey      Responses   Facility patient discharged from?  [facility]   Is patient eligible?  Yes   Discharge diagnosis  Acute on chronic respiratory failure with hypoxia and hypercapnia    Does the patient have one of the following disease processes/diagnoses(primary or secondary)?  COPD/Pneumonia   Does the patient have Home health ordered?  No   Is there a DME ordered?  No   What DME was ordered?  Trilogy Machine   Prep survey completed?  Yes          Soniya Chew RN

## 2019-06-11 NOTE — PAYOR COMM NOTE
"Teri Tim (58 y.o. Female)     Date of Birth Social Security Number Address Home Phone MRN    1960  [facility] [address]    [place] [address] [id number]    [employer] Status          Congregational        Admission Date Admission Type Admitting Provider Attending Provider Department, Room/Bed    6/4/19 Emergency Marcin Hitchcock MD  [facility] 4C, 492/1    Discharge Date Discharge Disposition Discharge Destination        6/10/2019 Home or Self Care Home             Attending Provider:  (none)   Allergies:  Codeine    Isolation:  None   Infection:  None   Code Status:  Prior    Ht:  152.4 cm (60\")   Wt:  45.8 kg (101 lb)    Admission Cmt:  None   Principal Problem:  None                Active Insurance as of 6/4/2019     Primary Coverage     Payor Plan Insurance Group Employer/Plan Group    [employer] [place] [employer] [place]      Payor Plan Address Payor Plan Phone Number Payor Plan Fax Number Effective Dates    [address] [address] [phone]  11/1/2017 - None Entered    [place] [address]       Subscriber Name Subscriber Birth Date Member ID       TERI TIM [id number]                 Emergency Contacts      (Rel.) Home Phone Work Phone Mobile Phone    Rich Tim (Spouse) [phone] -- [phone]                                                          ED to Hosp-Admission  Discharged Go to Cards    6/4/2019 - 6/10/2019 (6 days)    [facility]                    Marcin Hitchcock MD        Last attending • Treatment team                   Case Management Reports        CM Conversatons CM Discharge Plan Discharge Summaries AVS               Discharge Summary                               untitled image         [facility] Medicine Services    DISCHARGE SUMMARY               Date of Admission: 6/4/2019    Date of Discharge:  6/10/2019    Primary Care Physician: Lorri Guan APRN     "     Presenting Problem/History of Present Illness:    Shortness of breath and confusion         Final Discharge Diagnoses:        •     Acute on chronic respiratory failure with hypoxia and hypercapnia (CMS/HCC)       •     Macrocytic anemia       •     Community acquired pneumonia of left upper lobe of lung (CMS/HCC)       •     Severe protein-calorie malnutrition (CMS/HCC)       •     Tobacco abuse       •     Hyponatremia       •     COPD with acute exacerbation (CMS/HCC)            Consults: None         Procedures Performed: None         Pertinent Test Results:                Imaging Results (last 7 days)                    Procedure       Component       Value       Units       Date/Time               XR Chest PA & Lateral [[id number]]     Collected:  06/05/19 1333                   Updated:  06/05/19 1338             Narrative:                History:    58-year-old with perihilar infiltrate.         Reference:    Chest radiograph one day prior.         Findings:    Frontal and lateral chest radiographs performed.         Normal heart size. Atherosclerotic aortic arch. Tiny calcified granuloma    left apex. Emphysema. Vague opacity in the central left upper lobe is    again noted, slightly less conspicuous. No pleural effusion or    pneumothorax. No acute osseous abnormalities.                       Impression:                Persistent vague infiltrate in the central left upper lobe, favor    pneumonia.    This report was finalized on 06/05/2019 13:35 by Dr Andrew Green, .             XR Chest 1 View [[id number]]     Collected:  06/04/19 1655                   Updated:  06/04/19 1650             Narrative:                EXAMINATION:  XR CHEST 1 VW-  6/4/2019 4:49 PM CDT         HISTORY: Chest pain.         COMPARISON: 11/26/2018.         FINDINGS:  There is mild patchy left perihilar infiltrate. There is no    dense consolidation. 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 Heart size is upper limits    of normal. The thoracic  aorta is atheromatous.                  Impression:                1. Mild patchy left perihilar infiltrate may represent minimal    pneumonia.    2. COPD/emphysema.              This report was finalized on 06/04/2019 16:56 by Dr. Hugo Licea MD.                                   Lab Results (last 7 days)                    Procedure       Component       Value       Units       Date/Time               Basic Metabolic Panel [[id number]]  (Abnormal)     Collected:  06/10/19 0426             Specimen:  Blood     Updated:  06/10/19 0503                   Glucose     74     mg/dL                         BUN     13     mg/dL                         Creatinine     0.66     mg/dL                         Sodium     136     mmol/L                         Potassium     3.4(abnormally low)     mmol/L                         Chloride     96(abnormally low)     mmol/L                         CO2     36.0(abnormally high)     mmol/L                         Calcium     8.4     mg/dL                         eGFR Non African Amer     93     mL/min/1.73                         BUN/Creatinine Ratio     19.7                   Anion Gap     4.0     mmol/L                   Narrative:                GFR Normal >60    Chronic Kidney Disease <60    Kidney Failure <15             Basic Metabolic Panel [[id number]]  (Abnormal)     Collected:  06/09/19 1732             Specimen:  Blood     Updated:  06/09/19 1750                   Glucose     97     mg/dL                         BUN     12     mg/dL                         Creatinine     0.54     mg/dL                         Sodium     133(abnormally low)     mmol/L                         Potassium     3.6     mmol/L                         Chloride     92(abnormally low)     mmol/L                         CO2     37.0(abnormally high)     mmol/L                         Calcium     8.5     mg/dL                         eGFR Non  Amer     117     mL/min/1.73                          BUN/Creatinine Ratio     22.2                   Anion Gap     4.0     mmol/L                   Narrative:                GFR Normal >60    Chronic Kidney Disease <60    Kidney Failure <15             Blood Culture - Blood, Arm, Left [[id number]]     Collected:  06/04/19 1705             Specimen:  Blood from Arm, Left     Updated:  06/09/19 1731                   Blood Culture     No growth at 5 days             Blood Culture - Blood, Arm, Right [[id number]]     Collected:  06/04/19 1705             Specimen:  Blood from Arm, Right     Updated:  06/09/19 1731                   Blood Culture     No growth at 5 days             Basic Metabolic Panel [[id number]]  (Abnormal)     Collected:  06/09/19 1157             Specimen:  Blood     Updated:  06/09/19 1216                   Glucose     76     mg/dL                         BUN     12     mg/dL                         Creatinine     0.61     mg/dL                         Sodium     126(abnormally low)     mmol/L                         Potassium     3.8     mmol/L                         Chloride     87(abnormally low)     mmol/L                         CO2     36.0(abnormally high)     mmol/L                         Calcium     8.1(abnormally low)     mg/dL                         eGFR Non  Amer     101     mL/min/1.73                         BUN/Creatinine Ratio     19.7                   Anion Gap     3.0(abnormally low)     mmol/L                   Narrative:                GFR Normal >60    Chronic Kidney Disease <60    Kidney Failure <15             Basic Metabolic Panel [[id number]]  (Abnormal)     Collected:  06/09/19 0648             Specimen:  Blood     Updated:  06/09/19 0717                   Glucose     96     mg/dL                         BUN     13     mg/dL                         Creatinine     0.59     mg/dL                         Sodium     126(abnormally low)     mmol/L                         Potassium     3.6     mmol/L                          Chloride     85(abnormally low)     mmol/L                         CO2     37.0(abnormally high)     mmol/L                         Calcium     8.6     mg/dL                         eGFR Non  Amer     105     mL/min/1.73                         BUN/Creatinine Ratio     22.0                   Anion Gap     4.0     mmol/L                   Narrative:                GFR Normal >60    Chronic Kidney Disease <60    Kidney Failure <15             Osmolality, Urine - Urine, Clean Catch [[id number]]  (Abnormal)     Collected:  06/08/19 0538             Specimen:  Urine, Clean Catch     Updated:  06/08/19 0644                   Osmolality, Urine     146(abnormally low)     mOsm/kg                   Basic Metabolic Panel [[id number]]  (Abnormal)     Collected:  06/08/19 0451             Specimen:  Blood     Updated:  06/08/19 0619                   Glucose     94     mg/dL                         BUN     18     mg/dL                         Creatinine     0.58     mg/dL                         Sodium     131(abnormally low)     mmol/L                         Potassium     3.8     mmol/L                             Comment:     Specimen hemolyzed.  Results may be affected.                        Chloride     88(abnormally low)     mmol/L                         CO2     37.0(abnormally high)     mmol/L                         Calcium     8.8     mg/dL                         eGFR Non  Amer     108     mL/min/1.73                         BUN/Creatinine Ratio     31.0(abnormally high)                   Anion Gap     6.0     mmol/L                   Narrative:                GFR Normal >60    Chronic Kidney Disease <60    Kidney Failure <15             Sodium, Urine, Random - Urine, Clean Catch [[id number]]  (Abnormal)     Collected:  06/08/19 0538             Specimen:  Urine, Clean Catch     Updated:  06/08/19 0556                   Sodium, Urine     22(abnormally low)     mmol/L                   Basic  Metabolic Panel [[id number]]  (Abnormal)     Collected:  06/07/19 0513             Specimen:  Blood     Updated:  06/07/19 0702                   Glucose     105(abnormally high)     mg/dL                         BUN     21     mg/dL                         Creatinine     0.55     mg/dL                         Sodium     128(abnormally low)     mmol/L                         Potassium     4.0     mmol/L                         Chloride     85(abnormally low)     mmol/L                         CO2     >40.0(critically high)     mmol/L                         Calcium     8.4     mg/dL                         eGFR Non  Amer     114     mL/min/1.73                         BUN/Creatinine Ratio     38.2(abnormally high)                   Anion Gap     --     mmol/L                             Comment:     Unable to calculate Anion Gap.                  Narrative:                GFR Normal >60    Chronic Kidney Disease <60    Kidney Failure <15             CBC (No Diff) [[id number]]  (Abnormal)     Collected:  06/07/19 0513             Specimen:  Blood     Updated:  06/07/19 0626                   WBC     6.59     10*3/mm3                         RBC     2.97(abnormally low)     10*6/mm3                         Hemoglobin     9.5(abnormally low)     g/dL                         Hematocrit     28.2(abnormally low)     %                         MCV     94.9     fL                         MCH     32.0     pg                         MCHC     33.7     g/dL                         RDW     13.2     %                         RDW-SD     45.4     fl                         MPV     10.4     fL                         Platelets     231     10*3/mm3                   Basic Metabolic Panel [[id number]]  (Abnormal)     Collected:  06/06/19 0609             Specimen:  Blood     Updated:  06/06/19 0642                   Glucose     122(abnormally high)     mg/dL                         BUN     22(abnormally high)     mg/dL                          Creatinine     0.59     mg/dL                         Sodium     131(abnormally low)     mmol/L                         Potassium     4.4     mmol/L                         Chloride     85(abnormally low)     mmol/L                         CO2     >40.0(critically high)     mmol/L                         Calcium     8.9     mg/dL                         eGFR Non  Amer     105     mL/min/1.73                         BUN/Creatinine Ratio     37.3(abnormally high)                   Anion Gap     --     mmol/L                             Comment:     Unable to calculate Anion Gap.                  Narrative:                GFR Normal >60    Chronic Kidney Disease <60    Kidney Failure <15             CBC (No Diff) [[id number]]  (Abnormal)     Collected:  06/06/19 0609             Specimen:  Blood     Updated:  06/06/19 0624                   WBC     8.37     10*3/mm3                         RBC     3.05(abnormally low)     10*6/mm3                         Hemoglobin     9.7(abnormally low)     g/dL                         Hematocrit     29.5(abnormally low)     %                         MCV     96.7     fL                         MCH     31.8     pg                         MCHC     32.9(abnormally low)     g/dL                         RDW     13.2     %                         RDW-SD     46.9     fl                         MPV     9.9     fL                         Platelets     213     10*3/mm3                   Blood Gas, Arterial [[id number]]  (Abnormal)     Collected:  06/06/19 0430             Specimen:  Arterial Blood     Updated:  06/06/19 0434                   Site     Left Radial                   Dao's Test     Positive                   pH, Arterial     7.446     pH units                         pCO2, Arterial     65.4(abnormally high)     mm Hg                             Comment:     83 Value above reference range                        pO2, Arterial     65.1(abnormally low)     mm Hg                              Comment:     84 Value below reference range                        HCO3, Arterial     45.0(abnormally high)     mmol/L                             Comment:     83 Value above reference range                        Base Excess, Arterial     18.3(abnormally high)     mmol/L                             Comment:     83 Value above reference range                        O2 Saturation, Arterial     93.5(abnormally low)     %                             Comment:     84 Value below reference range                        Temperature     37.0     C                         Barometric Pressure for Blood Gas     745     mmHg                         Modality     BiPap                   FIO2     35     %                         Ventilator Mode     BiPAP                   IPAP     14                       Comment:     Meter: [id number]     :  [id number]                        EPAP     8                   Collected by     [id number]             T4, Free [[id number]]  (Normal)     Collected:  06/05/19 0540             Specimen:  Blood     Updated:  06/05/19 1144                   Free T4     1.08     ng/dL                   TSH [[id number]]  (Abnormal)     Collected:  06/05/19 0540             Specimen:  Blood     Updated:  06/05/19 0659                   TSH     0.143(abnormally low)     mIU/mL                   Hemoglobin A1c [[id number]]     Collected:  06/05/19 0540             Specimen:  Blood     Updated:  06/05/19 0645                   Hemoglobin A1C     4.5     %                   Narrative:                Less than 6.0           Non-Diabetic Range    6.0-7.0                 ADA Therapeutic Target    Greater than 7.0        Action Suggested             Lipid Panel [[id number]]  (Abnormal)     Collected:  06/05/19 0540             Specimen:  Blood     Updated:  06/05/19 0640                   Total Cholesterol     226(abnormally high)     mg/dL                         Triglycerides      172(abnormally high)     mg/dL                         HDL Cholesterol     45(abnormally low)     mg/dL                         LDL Cholesterol      148(abnormally high)     mg/dL                         LDL/HDL Ratio     3.26             Basic Metabolic Panel [[id number]]  (Abnormal)     Collected:  06/05/19 0540             Specimen:  Blood     Updated:  06/05/19 0633                   Glucose     121(abnormally high)     mg/dL                         BUN     19     mg/dL                         Creatinine     0.54     mg/dL                         Sodium     136     mmol/L                         Potassium     4.5     mmol/L                         Chloride     83(abnormally low)     mmol/L                         CO2     >40.0(critically high)     mmol/L                         Calcium     9.0     mg/dL                         eGFR Non  Amer     117     mL/min/1.73                         BUN/Creatinine Ratio     35.2(abnormally high)                   Anion Gap     --     mmol/L                             Comment:     Unable to calculate Anion Gap.                  Narrative:                GFR Normal >60    Chronic Kidney Disease <60    Kidney Failure <15             CBC (No Diff) [[id number]]  (Abnormal)     Collected:  06/05/19 0540             Specimen:  Blood     Updated:  06/05/19 0620                   WBC     3.83(abnormally low)     10*3/mm3                         RBC     3.34(abnormally low)     10*6/mm3                         Hemoglobin     10.6(abnormally low)     g/dL                         Hematocrit     34.4(abnormally low)     %                         MCV     103.0(abnormally high)     fL                         MCH     31.7     pg                         MCHC     30.8(abnormally low)     g/dL                         RDW     13.1     %                         RDW-SD     49.4     fl                         MPV     10.3     fL                         Platelets     241     10*3/mm3                    Prealbumin [[id number]]  (Abnormal)     Collected:  06/04/19 1635             Specimen:  Blood     Updated:  06/04/19 1926                   Prealbumin     13.0(abnormally low)     mg/dL                   Blood Gas, Arterial With Co-Ox [[id number]]  (Abnormal)     Collected:  06/04/19 1744             Specimen:  Arterial Blood     Updated:  06/04/19 1749                   Site     Right Radial                   Dao's Test     Positive                   pH, Arterial     7.386     pH units                         pCO2, Arterial     89.9(critically high)     mm Hg                             Comment:     86 Value above critical limit                        pO2, Arterial     54.3(critically low)     mm Hg                             Comment:     85 Value below critical limit                        HCO3, Arterial     53.9(abnormally high)     mmol/L                             Comment:     83 Value above reference range                        Base Excess, Arterial     24.2(abnormally high)     mmol/L                             Comment:     83 Value above reference range                        O2 Saturation, Arterial     89.8(abnormally low)     %                             Comment:     84 Value below reference range                        Hemoglobin, Blood Gas     11.2(abnormally low)     g/dL                             Comment:     84 Value below reference range                        Hematocrit, Blood Gas     34.3(abnormally low)     %                             Comment:     84 Value below reference range                        Oxyhemoglobin     86.3(abnormally low)     %                             Comment:     84 Value below reference range                        Methemoglobin     0.70     %                         Carboxyhemoglobin     3.1     %                         A-a Gradiant     --     mmHg                             Comment:     UNABLE TO CALCULATE                        Temperature      37.0     C                         Sodium, Arterial     141     mmol/L                         Potassium, Arterial     4.1     mmol/L                         Barometric Pressure for Blood Gas     748     mmHg                         Modality     BiPap                   FIO2     40     %                         Ventilator Mode     BiPAP                   Set [facility] Resp Rate     20.0                   IPAP     14                       Comment:     Meter: [id number]     :  [id number]                        EPAP     8                   Note     --                   Notified Who     DR LANGE                   Notified By     [id number]                   Notified Time     06/04/2019 17:50                   Collected by     [id number]                   pH, Temp Corrected     --     pH Units                         pCO2, Temperature Corrected     --     mm Hg                         pO2, Temperature Corrected     --     mm Hg                   [place] Draw [[id number]]     Collected:  06/04/19 1635             Specimen:  Blood     Updated:  06/04/19 1745             Narrative:                The following orders were created for panel order [place] Draw.    Procedure                               Abnormality         Status                       ---------                               -----------         ------                       Light Blue Top[[id number]]                                   Final result                 Green Top (Gel)[[id number]]                                  Final result                 Lavender Top[[id number]]                                     Final result                 Red Top[[id number]]                                          Final result                      Please view results for these tests on the individual orders.             Light Blue Top [[id number]]     Collected:  06/04/19 1635             Specimen:  Blood     Updated:  06/04/19 1745                   Extra Tube     hold for  add-on                       Comment:     Auto resulted                  Green Top (Gel) [[id number]]     Collected:  06/04/19 1635             Specimen:  Blood     Updated:  06/04/19 1745                   Extra Tube     Hold for add-ons.                       Comment:     Auto resulted.                  Lavender Top [[id number]]     Collected:  06/04/19 1635             Specimen:  Blood     Updated:  06/04/19 1745                   Extra Tube     hold for add-on                       Comment:     Auto resulted                  Red Top [[id number]]     Collected:  06/04/19 1635             Specimen:  Blood     Updated:  06/04/19 1745                   Extra Tube     Hold for add-ons.                       Comment:     Auto resulted.                  Procalcitonin [[id number]]  (Normal)     Collected:  06/04/19 1635             Specimen:  Blood     Updated:  06/04/19 1739                   Procalcitonin     <0.25     ng/mL                   Narrative:                SIRS, sepsis, severe sepsis, and septic shock are categorized according to the criteria of the consensus conference of the American College of Chest Physicians/Society of Critical Care Medicine.         PCT < 0.5 ng/mL                                           Systemic infection (sepsis) is not likely.         PCT >0.5 and < 2.0 ng/mL        Systemic infection (sepsis) is possible, but other conditions are known to elevate PCT as well.         PCT > 2.0 ng/mL                                           Systemic infection (sepsis) is likely, unless other causes are known.              PCT > 10.0 ng/mL                                 Important systemic inflammatory response, almost exclusively due to severe bacterial sepsis or septic shock.         PCT values of < 0.5 ng/mL do not exclude an infection, because localized infections (without systemic signs) may be associated with such low concentrations, or a systemic infection in its initial stages (<6 hours).   Increased PCT can occur without infection.  PCT concentrations between 0.5 and 2.0 ng/mL should be interpreted taking into account the patients history.  It is recommended to retest PCT within 6-24 hours if any concentrations < 2.0 ng/mL are obtained.             Lactic Acid, Plasma [[id number]]  (Normal)     Collected:  06/04/19 1707             Specimen:  Blood     Updated:  06/04/19 1731                   Lactate     0.9     mmol/L                   CBC & Differential [[id number]]     Collected:  06/04/19 1635             Specimen:  Blood     Updated:  06/04/19 1728             Narrative:                The following orders were created for panel order CBC & Differential.    Procedure                               Abnormality         Status                       ---------                               -----------         ------                       CBC Auto Differential[[id number]]        Abnormal            Final result                      Please view results for these tests on the individual orders.             CBC Auto Differential [[id number]]  (Abnormal)     Collected:  06/04/19 1635             Specimen:  Blood     Updated:  06/04/19 1728                   WBC     9.51     10*3/mm3                         RBC     3.46(abnormally low)     10*6/mm3                         Hemoglobin     11.0(abnormally low)     g/dL                         Hematocrit     36.4(abnormally low)     %                         MCV     105.2(abnormally high)     fL                         MCH     31.8     pg                         MCHC     30.2(abnormally low)     g/dL                         RDW     13.2     %                         RDW-SD     51.2     fl                         MPV     9.7     fL                         Platelets     247     10*3/mm3                         Neutrophil %     80.2(abnormally high)     %                         Lymphocyte %     10.8(abnormally low)     %                         Monocyte %     7.7      %                         Eosinophil %     0.6     %                         Basophil %     0.4     %                         Immature Grans %     0.3     %                         Neutrophils, Absolute     7.62     10*3/mm3                         Lymphocytes, Absolute     1.03     10*3/mm3                         Monocytes, Absolute     0.73     10*3/mm3                         Eosinophils, Absolute     0.06     10*3/mm3                         Basophils, Absolute     0.04     10*3/mm3                         Immature Grans, Absolute     0.03     10*3/mm3                         nRBC     0.0     /100 WBC                   BNP [[id number]]  (Normal)     Collected:  06/04/19 1635             Specimen:  Blood     Updated:  06/04/19 1723                   proBNP     302.0     pg/mL                   Blood Gas, Arterial [[id number]]  (Abnormal)     Collected:  06/04/19 1707             Specimen:  Arterial Blood     Updated:  06/04/19 1708                   Site     Right Brachial                   Dao's Test     N/A                   pH, Arterial     7.333(abnormally low)     pH units                             Comment:     84 Value below reference range                        pCO2, Arterial     >102.0(critically high)     mm Hg                             Comment:     93 Value above reportable range > 102                        pO2, Arterial     79.9(abnormally low)     mm Hg                             Comment:     84 Value below reference range                        HCO3, Arterial     55.3(abnormally high)     mmol/L                             Comment:     83 Value above reference range                        Base Excess, Arterial     24.5(abnormally high)     mmol/L                             Comment:     83 Value above reference range                        O2 Saturation, Arterial     96.2     %                         Temperature     37.0     C                         Barometric Pressure for Blood Gas      749     mmHg                         Modality     Nasal Cannula                   Flow Rate     2.5     lpm                         Ventilator Mode     NA                   Notified Who     DR LANGE                   Notified By     [id number]                   Notified Time     06/04/2019 17:13                   Collected by     [id number]                       Comment:     Meter: [id number]     :  [id number]                  Troponin [[id number]]  (Normal)     Collected:  06/04/19 1635             Specimen:  Blood     Updated:  06/04/19 1704                   Troponin I     <0.012     ng/mL                   D-dimer, Quantitative [[id number]]  (Abnormal)     Collected:  06/04/19 1635             Specimen:  Blood     Updated:  06/04/19 1702                   D-Dimer, Quantitative     0.66(abnormally high)     mg/L (FEU)                   Narrative:                Reference Range is 0-0.50 mg/L FEU. However, results <0.50 mg/L FEU tends to rule out DVT or PE. Results >0.50 mg/L FEU are not useful in predicting absence or presence of DVT or PE.             Comprehensive Metabolic Panel [[id number]]  (Abnormal)     Collected:  06/04/19 1635             Specimen:  Blood     Updated:  06/04/19 1656                   Glucose     130(abnormally high)     mg/dL                         BUN     20     mg/dL                         Creatinine     0.52     mg/dL                         Sodium     138     mmol/L                         Potassium     4.4     mmol/L                         Chloride     81(abnormally low)     mmol/L                         CO2     >40.0(critically high)     mmol/L                         Calcium     9.0     mg/dL                         Total Protein     7.0     g/dL                         Albumin     3.70     g/dL                         ALT (SGPT)     15     U/L                         AST (SGOT)     26     U/L                         Alkaline Phosphatase     94     U/L                          Total Bilirubin     0.4     mg/dL                         eGFR Non  Amer     122     mL/min/1.73                         Globulin     3.3     gm/dL                         A/G Ratio     1.1     g/dL                         BUN/Creatinine Ratio     38.5(abnormally high)                   Anion Gap     --     mmol/L                             Comment:     Unable to calculate Anion Gap.                  Narrative:                GFR Normal >60    Chronic Kidney Disease <60    Kidney Failure <15                        Hospital Course:    The patient is a 58 y.o. female with past medical history significant for chronic hypoxic respiratory failure on home trilogy and home oxygen at 2 L, COPD, ongoing tobacco abuse, cachexia, back pain, and chronic diastolic dysfunction.  Patient presented the emergency department on 6/4 with complaints of shortness of breath.  She also had some confusion she does continue to smoke.  Upon admission, she had a PCO2 of >102 and PaO2 79.9 on 2.5L nasal cannula. She was placed on hospital BiPAP and admitted to the hospitalist service.          She was placed on gentle hydration and was started on IV azithromycin therapy as well as IV corticosteroids.  Repeat PA lateral chest x-ray revealed vague opacity in the left upper lobe with the pneumonia favored.  She was then broadened out to Rocephin and azithromycin.  She is completed azithromycin therapy and now has completed Omnicef therapy as well.  Her trilogy company has come in and adjusted her machine.  Patient fortunately continued to be persistently hyponatremic.  She was actually given some IV fluids and given her decreased oral intake.  Did raise her sodium to 131 but then went right back down to 126 yesterday.  I did give her a dose of Samsca yesterday and her sodium today is 136.  Patient continued to have desaturations last night until upper 60s and low 70s while on her trilogy.  They have come back again today and increase  "the PEEP.  She is now stable for discharge.  Please note that after her BiPAP therapy, her PCO2 did go down to 65.4.  Started on Megace and will continue a steroid taper post discharge.  She has improved her oral intake.  We discussed this with her that she just needs to continue to have a good oral intake and will need repeat lab work  for posthospitalization assessment appointment with her primary care provider.         Physical Exam on Discharge:    /64   Pulse 87   Temp 98.9 °F (37.2 °C) (Oral)   Resp 18   Ht 152.4 cm (60\")   Wt 45.8 kg (101 lb)   SpO2 90%   BMI 19.73 kg/m²     Physical Exam     Constitutional: She is oriented to person, place, and time. She appears well-developed and well-nourished.     HENT:     Head: Normocephalic and atraumatic.     Eyes: Conjunctivae and EOM are normal. Pupils are equal, round, and reactive to light.     Neck: Neck supple. No JVD present. No thyromegaly present.     Cardiovascular: Normal rate, regular rhythm, normal heart sounds and intact distal pulses. Exam reveals no gallop and no friction rub.     No murmur heard.    Pulmonary/Chest: Effort normal. No respiratory distress. She has no wheezes. She has no rales. She exhibits no tenderness.   diminished but much improved aeration.      Abdominal: Soft. Bowel sounds are normal. She exhibits no distension. There is no tenderness. There is no rebound and no guarding.   Musculoskeletal: Normal range of motion. She exhibits edema (trace edema bilaterally. ). She exhibits no tenderness or deformity.   Lymphadenopathy:     She has no cervical adenopathy.   Neurological: She is alert and oriented to person, place, and time. She displays normal reflexes. No cranial nerve deficit. She exhibits normal muscle tone.     Skin: Skin is warm and dry. No rash noted.   Psychiatric: She has a normal mood and affect. Her behavior is normal. Judgment and thought content normal.          Condition on Discharge: stable with " elevated risk of readmission.          Discharge Disposition:    Home or Self Care         Discharge Medications:                   Discharge Medications                      New Medications                    Instructions       Start Date         megestrol 40 MG/ML suspension    Commonly known as:  MEGACE          400 mg, Oral, Daily          Start Date:  6/11/2019            predniSONE 10 MG tablet    Commonly known as:  DELTASONE          2 tabs daily for 2 more day2 then 1 tablet daily for 3 days then stop.                                          Changes to Medications                    Instructions       Start Date         metoprolol tartrate 25 MG tablet    Commonly known as:  LOPRESSOR    What changed:      •how much to take      •when to take this            12.5 mg, Oral, 2 Times Daily                                          Continue These Medications                    Instructions       Start Date         aspirin 81 MG EC tablet          81 mg, Oral, Daily                  budesonide-formoterol 160-4.5 MCG/ACT inhaler    Commonly known as:  SYMBICORT          2 puffs, Inhalation, 2 Times Daily - RT                  guaiFENesin 600 MG 12 hr tablet    Commonly known as:  MUCINEX          1,200 mg, Oral, 2 Times Daily                  ipratropium-albuterol  MCG/ACT inhaler    Commonly known as:  COMBIVENT RESPIMAT          1 puff, Inhalation, 4 Times Daily PRN                  pantoprazole 40 MG EC tablet    Commonly known as:  PROTONIX          40 mg, Oral, Daily                                   Discharge Diet:           Diet Instructions                Diet: Regular; Thin               Discharge Diet:  Regular             Fluid Consistency:  Thin                      Activity at Discharge:           Activity Instructions                Activity as Tolerated                             Follow-up Appointments:     1. PCP in 1 week         Test Results Pending at Discharge: None         Joanna  ROLAND Mcelroy    06/10/19    3:56 PM         Time: 35 minutes.          I personally evaluated and examined the patient in conjunction with ROLAND Rutledge and agree with the assessment, treatment plan, and disposition of the patient as recorded by her. My history, exam, and further recommendations are:          Seen and examined.  Appropriate for discharge.  Patient at baseline.         Marcin Hitchcock MD    06/10/19    10:43 PM                           Other Notes  All notes                   H&P from Maribell Curry DO (Medicine)                      Additional Orders and Documentation                   Results     Imaging         Microbiology                     Meds                         Orders     Procedures                     Flowsheets              SmartForms:              IP DISCHARGE FOLLOW-UP           Encounter Info:            History,              Allergies,              Patient Education,              Care Plan,              Detailed Report,              Coding Summary,              Encounter Facesheet,              Link Facesheet                       Communications                   Payor Comm Note sent to [facility]   Sent 6/5/2019 by Ishan Wyatt, RN                   Payor Comm Note sent to [facility]   Sent 6/9/2019 by Ishan Wyatt, RN                   Summary Of Care sent to ROLAND Wayne   Sent 6/10/2019 by Marcin Hitchcock MD                  Media          Electronic signature on 6/4/2019 4:28 PM - Signed        Scan on 6/4/2019: EMERGENCY ROOM SIGN IN FORM - 06/04/2019        Scan on 6/4/2019: AVS SIGNATURE SHEET - 06/10/2019                  Events              Patient arrival at 6/4/2019 1603        Unit: [facility] Emergency Department           Admission at 6/4/2019 1617        Unit: [facility] Emergency Department Room: 10 Bed: 10   Patient class: Emergency Service: Emergency Medicine           ED  Roomed at 6/4/2019 1617        Unit: [facility] Emergency Department Room: 10 Bed: 10   Patient class: Emergency Service: Emergency Medicine           Patient Update at 6/4/2019 1750        Unit: [facility] Emergency Department Room: 10 Bed: 10   Patient class: Inpatient Service: Emergency Medicine           Transfer In at 6/4/2019 1828        Unit: [facility] Emergency Department Room: ANA Bed: ANA   Patient class: Inpatient Service: Emergency Medicine           ED Transfer at 6/4/2019 1828        Unit: [facility] Emergency Department Room: ANA Bed: ANA   Patient class: Inpatient Service: Emergency Medicine           Transfer In at 6/4/2019 1859        Unit: [address] Room: 492 Bed: 1   Patient class: Inpatient Service: Medicine           Admit from ED at 6/4/2019 1859        Unit: [address] Room: 492 Bed: 1   Patient class: Inpatient Service: Medicine           Discharge at 6/10/2019 1730        Unit: [address] Room: 492 Bed: 1   Patient class: Inpatient Service: Medicine                   Account Information          Hospital Account    Primary Payor    Affiliated Recurring Accounts    Combined from [facility]      [id number] - [facility] HEL[facility] [[id number]] None None                     Hospital Problem List                  COPD with acute exacerbation (CMS/HCC)                 Tobacco abuse                 Hyponatremia                 Acute on chronic respiratory failure with hypoxia and hypercapnia (CMS/HCC)                 Macrocytic anemia                 Community acquired pneumonia of left upper lobe of lung (CMS/HCC)                 Severe protein-calorie malnutrition (CMS/HCC)                 Care Timeline        06/04     Admitted from ED 1750   06/10     Discharged 1730               Discharge                   Home or Self Care Level of Care: Telemetry [5] Diagnosis: Acute respiratory failure with  hypoxia and hypercapnia (CMS/[facility]) [[id number]] Admitting Physician: AL PARSONS [[id number]] Attending Physician: AL PARSONS [[id number]] Certification: I certify that inpatient hospital services are medically necessary for greater than 2 midnights.                                  AVS - Discharge (for patient and transfer facility) (Printed 6/10/2019)                                 Follow-Ups: Follow up with Lorri Guan APRN (Family Medicine) in 1 week (6/17/2019); We will call with an appointment date and time as soon as possible.                 Medication List at Discharge              New     Megestrol Acetate 400 mg Oral Daily             New     predniSONE 10 MG 2 tabs daily for 2 more day2 then 1 tablet daily for 3 days then stop.             Modified     Metoprolol Tartrate 12.5 mg Oral 2 Times Daily             No Change     Aspirin 81 mg Oral Daily             No Change     Budesonide-Formoterol Fumarate 160-4.5 MCG/ACT 2 puffs Inhalation 2 Times Daily - RT             No Change     guaiFENesin 1,200 mg Oral 2 Times Daily             No Change     Ipratropium-Albuterol  MCG/ACT 1 puff Inhalation 4 Times Daily PRN             No Change     Pantoprazole Sodium 40 mg Oral Daily                     Significant Events        Code Documentation Stroke Documentation Sedation Documentation Sedation Review Timeline STEMI Documentation   Blood Transfusion                Discharge Order (From admission, onward)    Start     Ordered    06/10/19 1421  Discharge patient  Once     Expected Discharge Date:  06/10/19    Discharge Disposition:  Home or Self Care    Physician of Record for Attribution - Please select from Treatment Team:  AL PARSONS [[id number]]    Review needed by CMO to determine Physician of Record:  No       Question Answer Comment   Physician of Record for Attribution - Please select from Treatment Team AL PARSONS    Review needed by CMO to determine Physician of Record No         06/10/19 1428

## 2019-06-11 NOTE — DISCHARGE PLACEMENT REQUEST
"[facility] DEPARTMENT  AMOR ROBERTSON RN CM [phone]      Teri Tim (58 y.o. Female)     Date of Birth Social Security Number Address Home Phone MRN    1960  [id number]    [place] [address] [phone] [id number]    Jewish Marital Status          Worship        Admission Date Admission Type Admitting Provider Attending Provider Department, Room/Bed    6/4/19 Emergency Marcin Hitchcock MD  [facility] 4C, 492/1    Discharge Date Discharge Disposition Discharge Destination        6/10/2019 Home or Self Care Home             Attending Provider:  (none)   Allergies:  Codeine    Isolation:  None   Infection:  None   Code Status:  Prior    Ht:  152.4 cm (60\")   Wt:  45.8 kg (101 lb)    Admission Cmt:  None   Principal Problem:  None                Active Insurance as of 6/4/2019     Primary Coverage     Payor Plan Insurance Group Employer/Plan Group    [employer] [place] [employer]ICA[place]      Payor Plan Address Payor Plan Phone Number Payor Plan Fax Number Effective Dates    [address] [address] [phone]  11/1/2017 - None Entered    [facility] [id number]       Subscriber Name Subscriber Birth Date Member ID       TERI TIM 1960 [id number]                 Emergency Contacts      (Rel.) Home Phone Work Phone Mobile Phone    MeetaRich (Spouse) [phone] -- [phone]               History & Physical      Maribell Curry DO at 6/4/2019  5:50 PM              [facility] Medicine Services  HISTORY AND PHYSICAL    Date of Admission: 6/4/2019  Primary Care Physician: Lorri Guan APRN    Subjective     Chief Complaint: Shortness of breathing    History of Present Illness  Teri Tim is a 58-year-old female with a past medical history of COPD, hypertension, chronic back pain-untreated, diastolic dysfunction now resolved with normal EF.  Patient also has chronic respiratory failure on 2 L [place] " cannula continuously.  She uses trilogy however  reports this has not been working and home saturations while using trilogy's in the 70s.  Patient did present to [facility] today with confusion and shortness of breathing.   states she did report chest pain this morning when she awakened however she denies at this time.  ER evaluation revealed acute on chronic respiratory failure with hypercarbia and hypoxia, mildly elevated d-dimer and macrocytic anemia.  Patient declines to report how many cigarettes she is smoking per day.   provides majority of HPI, states she has been seen by [facility] pulmonary clinic in the remote past.  She is also been seen by [facility] but has stopped due to unhappiness with care provided.  She is currently requesting pain medication and nerve medication.  Currently she denies chest pain, she still somewhat short of breath however significantly improved since arrival.  Patient is admitted for further evaluation and treatment.    Review of Systems   A 10 point review of systems was completed, all negative except for those discussed in HPI    Past Medical History:   Past Medical History:   Diagnosis Date   • CHF (congestive heart failure) (CMS/HCC), normalized 9/2018 echocardiogram    • Chronic back pain    • Chronic respiratory failure (CMS/HCC)    • COPD (chronic obstructive pulmonary disease) (CMS/HCC)    • Hypertension    • Pneumonia        Past Surgical History:   Past Surgical History:   Procedure Laterality Date   • CARPAL TUNNEL RELEASE     • HYSTERECTOMY      complete       Family History: family history includes Cancer in her mother and sister; Heart disease in her brother and father.    Social History:  reports that she has been smoking cigarettes.  She has been smoking about 0.50 packs per day. She has never used smokeless tobacco. She reports that she does not drink alcohol or use drugs.    Code Status: Full, her  Rich will  "speak for her      Allergies:  Allergies   Allergen Reactions   • Codeine Nausea And Vomiting       Medications:  Prior to Admission medications    Medication Sig Start Date End Date Taking? Authorizing Provider   budesonide-formoterol (SYMBICORT) 160-4.5 MCG/ACT inhaler Inhale 2 puffs 2 (Two) Times a Day.    ProviderXena MD   guaiFENesin (MUCINEX) 600 MG 12 hr tablet Take 2 tablets by mouth 2 (Two) Times a Day. 4/5/18   Joanna Juan APRN   ipratropium-albuterol (COMBIVENT RESPIMAT)  MCG/ACT inhaler Inhale 1 puff 4 (Four) Times a Day As Needed for Wheezing or Shortness of Air. 10/2/18   Gonsalo Loo MD   metoprolol tartrate (LOPRESSOR) 25 MG tablet Take 0.5 tablets by mouth Every 12 (Twelve) Hours. 10/2/18   Gonsalo Loo MD   nicotine (NICODERM CQ) 21 MG/24HR patch Place 1 patch on the skin as directed by provider Daily. 12/1/18   Jayy De Santiago MD   pantoprazole (PROTONIX) 40 MG EC tablet Take 40 mg by mouth Daily.    Provider, MD Xena       Objective     /78   Pulse 112   Temp 98.2 °F (36.8 °C)   Resp 24   Ht 152.4 cm (60\")   Wt 38.2 kg (84 lb 3 oz)   SpO2 96%   BMI 16.44 kg/m²    Physical Exam   Constitutional: She appears well-developed.   Frail, cachectic   HENT:   Head: Normocephalic and atraumatic.   Eyes: EOM are normal.   Neck: Normal range of motion.   Cardiovascular: Normal rate, regular rhythm and normal heart sounds.   Tachycardic upon arrival however normalized at time of assessment   Pulmonary/Chest: She is in respiratory distress.   Severely diminished throughout without adventitious breath sounds   Abdominal: Soft. Bowel sounds are normal. She exhibits no distension. There is no tenderness.   Musculoskeletal: Normal range of motion. She exhibits no edema.   Neurological: She is alert.   Somewhat obtunded however does answer questions appropriately   Skin: Skin is dry.   Excessively dry   Psychiatric:   Flat affect "       Pertinent Data:   Lab Results (last 72 hours)     Procedure Component Value Units Date/Time    Prealbumin [[id number]]  (Abnormal) Collected:  06/04/19 1635    Specimen:  Blood Updated:  06/04/19 1926     Prealbumin 13.0 mg/dL     Blood Gas, Arterial With Co-Ox [[id number]]  (Abnormal) Collected:  06/04/19 1744    Specimen:  Arterial Blood Updated:  06/04/19 1749     Site Right Radial     Dao's Test Positive     pH, Arterial 7.386 pH units      pCO2, Arterial 89.9 mm Hg      Comment: 86 Value above critical limit        pO2, Arterial 54.3 mm Hg      Comment: 85 Value below critical limit        HCO3, Arterial 53.9 mmol/L      Comment: 83 Value above reference range        Base Excess, Arterial 24.2 mmol/L      Comment: 83 Value above reference range        O2 Saturation, Arterial 89.8 %      Comment: 84 Value below reference range        Hemoglobin, Blood Gas 11.2 g/dL      Comment: 84 Value below reference range        Hematocrit, Blood Gas 34.3 %      Comment: 84 Value below reference range        Oxyhemoglobin 86.3 %      Comment: 84 Value below reference range        Methemoglobin 0.70 %      Carboxyhemoglobin 3.1 %      A-a Gradiant -- mmHg      Comment: UNABLE TO CALCULATE        Temperature 37.0 C      Sodium, Arterial 141 mmol/L      Potassium, Arterial 4.1 mmol/L      Barometric Pressure for Blood Gas 748 mmHg      Modality BiPap     FIO2 40 %      Ventilator Mode BiPAP     Set [facility] Resp Rate 20.0     IPAP 14     Comment: Meter: [id number]     :  [id number]        EPAP 8     Note --     Notified Who DR LANGE     Notified By [id number]     Notified Time 06/04/2019 17:50     Collected by [id number]     pH, Temp Corrected -- pH Units      pCO2, Temperature Corrected -- mm Hg      pO2, Temperature Corrected -- mm Hg     Procalcitonin [[id number]]  (Normal) Collected:  06/04/19 1635    Specimen:  Blood Updated:  06/04/19 1739     Procalcitonin <0.25 ng/mL     Lactic Acid, Plasma [[id number]]  (Normal)  Collected:  06/04/19 1707    Specimen:  Blood Updated:  06/04/19 1731     Lactate 0.9 mmol/L     CBC Auto Differential [[id number]]  (Abnormal) Collected:  06/04/19 1635    Specimen:  Blood Updated:  06/04/19 1728     WBC 9.51 10*3/mm3      RBC 3.46 10*6/mm3      Hemoglobin 11.0 g/dL      Hematocrit 36.4 %      .2 fL      MCH 31.8 pg      MCHC 30.2 g/dL      RDW 13.2 %      RDW-SD 51.2 fl      MPV 9.7 fL      Platelets 247 10*3/mm3      Neutrophil % 80.2 %      Lymphocyte % 10.8 %      Monocyte % 7.7 %      Eosinophil % 0.6 %      Basophil % 0.4 %      Immature Grans % 0.3 %      Neutrophils, Absolute 7.62 10*3/mm3      Lymphocytes, Absolute 1.03 10*3/mm3      Monocytes, Absolute 0.73 10*3/mm3      Eosinophils, Absolute 0.06 10*3/mm3      Basophils, Absolute 0.04 10*3/mm3      Immature Grans, Absolute 0.03 10*3/mm3      nRBC 0.0 /100 WBC     BNP [[id number]]  (Normal) Collected:  06/04/19 1635    Specimen:  Blood Updated:  06/04/19 1723     proBNP 302.0 pg/mL     Blood Culture - Blood, Arm, Left [[id number]] Collected:  06/04/19 1705    Specimen:  Blood from Arm, Left Updated:  06/04/19 1718    Blood Culture - Blood, Arm, Right [[id number]] Collected:  06/04/19 1705    Specimen:  Blood from Arm, Right Updated:  06/04/19 1717    Blood Gas, Arterial [[id number]]  (Abnormal) Collected:  06/04/19 1707    Specimen:  Arterial Blood Updated:  06/04/19 1708     Site Right Brachial     Dao's Test N/A     pH, Arterial 7.333 pH units      Comment: 84 Value below reference range        pCO2, Arterial >102.0 mm Hg      Comment: 93 Value above reportable range > 102        pO2, Arterial 79.9 mm Hg      Comment: 84 Value below reference range        HCO3, Arterial 55.3 mmol/L      Comment: 83 Value above reference range        Base Excess, Arterial 24.5 mmol/L      Comment: 83 Value above reference range        O2 Saturation, Arterial 96.2 %      Temperature 37.0 C      Barometric Pressure for Blood Gas 749 mmHg      Modality  Nasal Cannula     Flow Rate 2.5 lpm      Ventilator Mode NA     Notified Who DR LANGE     Notified By [id number]     Notified Time 06/04/2019 17:13     Collected by [id number]     Comment: Meter: [id number]     :  [id number]       Troponin [[id number]]  (Normal) Collected:  06/04/19 1635    Specimen:  Blood Updated:  06/04/19 1704     Troponin I <0.012 ng/mL     D-dimer, Quantitative [[id number]]  (Abnormal) Collected:  06/04/19 1635    Specimen:  Blood Updated:  06/04/19 1702     D-Dimer, Quantitative 0.66 mg/L (FEU)     Narrative:       Reference Range is 0-0.50 mg/L FEU. However, results <0.50 mg/L FEU tends to rule out DVT or PE. Results >0.50 mg/L FEU are not useful in predicting absence or presence of DVT or PE.    Comprehensive Metabolic Panel [[id number]]  (Abnormal) Collected:  06/04/19 1635    Specimen:  Blood Updated:  06/04/19 1656     Glucose 130 mg/dL      BUN 20 mg/dL      Creatinine 0.52 mg/dL      Sodium 138 mmol/L      Potassium 4.4 mmol/L      Chloride 81 mmol/L      CO2 >40.0 mmol/L      Calcium 9.0 mg/dL      Total Protein 7.0 g/dL      Albumin 3.70 g/dL      ALT (SGPT) 15 U/L      AST (SGOT) 26 U/L      Alkaline Phosphatase 94 U/L      Total Bilirubin 0.4 mg/dL      eGFR Non African Amer 122 mL/min/1.73      Globulin 3.3 gm/dL      A/G Ratio 1.1 g/dL      BUN/Creatinine Ratio 38.5     Anion Gap -- mmol/L      Comment: Unable to calculate Anion Gap.       Narrative:       GFR Normal >60  Chronic Kidney Disease <60  Kidney Failure <15        Imaging Results (last 24 hours)     Procedure Component Value Units Date/Time    XR Chest 1 View [[id number]] Collected:  06/04/19 1655     Updated:  06/04/19 1659    Narrative:       EXAMINATION:  XR CHEST 1 VW-  6/4/2019 4:49 PM CDT     HISTORY: Chest pain.     COMPARISON: 11/26/2018.     FINDINGS:  There is mild patchy left perihilar infiltrate. There is no  dense consolidation. 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 Heart size is upper limits  of normal. The thoracic  aorta is atheromatous.       Impression:       1. Mild patchy left perihilar infiltrate may represent minimal  pneumonia.  2. COPD/emphysema.        This report was finalized on 06/04/2019 16:56 by Dr. Hugo Licea MD.            I have personally reviewed and interpreted the radiology studies and ECG obtained at time of admission.     Assessment / Plan     Assessment:   Active Hospital Problems    Diagnosis   • Acute on chronic respiratory failure with hypoxia and hypercapnia (CMS/HCC)   • Macrocytic anemia   • Lung infiltrate, left perihilar   • Moderate protein-calorie malnutrition (CMS/HCC)   • Tobacco abuse   • COPD with acute exacerbation (CMS/HCC)       Plan:   1.  Admit as inpatient  2.  Start azithromycin 500 mg IV daily  3.  Duo nebs, Symbicort, incentive spirometry, BiPAP, Mucinex  4.  Solu-Medrol 60 mg IV every 8 hours  5.  Home medications reviewed and restarted as appropriate  6.  DVT prophylaxis with low-dose Lovenox  7.  Gentle hydration normal saline 75 mL/hour  8.  Labs in a.m. BMP, CBC, A1c, lipid panel, TSH  9.  Consult  in a.m. for trilogy evaluation of patient's home equipment  10.  Nutrition consult for evaluation nutritional status, BMI 16.44    I discussed the patient's findings and my recommendations with: Nathan Antonio DO  Time spent 35 minutes      ROLAND Pillai  06/04/19   7:36 PM     Chart reviewed  Patient examined in conjunction with AMANDA WATKINS  Agree with assessment and plan.   Discussed smoking cessation with patient  Not really receptive.   Discussed with patient and AMANDA Curry DO  06/04/2019  7:40 PM    Electronically signed by Maribell Curry DO at 6/5/2019  1:58 PM          Discharge Summary      Marcin Hitchcock MD at 6/10/2019  3:56 PM                [facility] Medicine Services  DISCHARGE SUMMARY       Date of Admission: 6/4/2019  Date of Discharge:  6/10/2019  Primary Care  Physician: Lorri Guan APRN    Presenting Problem/History of Present Illness:  Shortness of breath and confusion    Final Discharge Diagnoses:  • Acute on chronic respiratory failure with hypoxia and hypercapnia (CMS/HCC)   • Macrocytic anemia   • Community acquired pneumonia of left upper lobe of lung (CMS/HCC)   • Severe protein-calorie malnutrition (CMS/HCC)   • Tobacco abuse   • Hyponatremia   • COPD with acute exacerbation (CMS/HCC)      Consults: None    Procedures Performed: None    Pertinent Test Results:   Imaging Results (last 7 days)     Procedure Component Value Units Date/Time    XR Chest PA & Lateral [[id number]] Collected:  06/05/19 1333     Updated:  06/05/19 1338    Narrative:       History:  58-year-old with perihilar infiltrate.     Reference:  Chest radiograph one day prior.     Findings:  Frontal and lateral chest radiographs performed.     Normal heart size. Atherosclerotic aortic arch. Tiny calcified granuloma  left apex. Emphysema. Vague opacity in the central left upper lobe is  again noted, slightly less conspicuous. No pleural effusion or  pneumothorax. No acute osseous abnormalities.          Impression:       Persistent vague infiltrate in the central left upper lobe, favor  pneumonia.  This report was finalized on 06/05/2019 13:35 by Dr Andrew Green, .    XR Chest 1 View [[id number]] Collected:  06/04/19 1655     Updated:  06/04/19 1659    Narrative:       EXAMINATION:  XR CHEST 1 VW-  6/4/2019 4:49 PM CDT     HISTORY: Chest pain.     COMPARISON: 11/26/2018.     FINDINGS:  There is mild patchy left perihilar infiltrate. There is no  dense consolidation. 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 Heart size is upper limits  of normal. The thoracic aorta is atheromatous.       Impression:       1. Mild patchy left perihilar infiltrate may represent minimal  pneumonia.  2. COPD/emphysema.        This report was finalized on 06/04/2019 16:56 by Dr. Hugo Licea MD.        Lab Results (last 7 days)      Procedure Component Value Units Date/Time    Basic Metabolic Panel [[id number]]  (Abnormal) Collected:  06/10/19 0426    Specimen:  Blood Updated:  06/10/19 0503     Glucose 74 mg/dL      BUN 13 mg/dL      Creatinine 0.66 mg/dL      Sodium 136 mmol/L      Potassium 3.4 mmol/L      Chloride 96 mmol/L      CO2 36.0 mmol/L      Calcium 8.4 mg/dL      eGFR Non African Amer 93 mL/min/1.73      BUN/Creatinine Ratio 19.7     Anion Gap 4.0 mmol/L     Narrative:       GFR Normal >60  Chronic Kidney Disease <60  Kidney Failure <15    Basic Metabolic Panel [[id number]]  (Abnormal) Collected:  06/09/19 1732    Specimen:  Blood Updated:  06/09/19 1750     Glucose 97 mg/dL      BUN 12 mg/dL      Creatinine 0.54 mg/dL      Sodium 133 mmol/L      Potassium 3.6 mmol/L      Chloride 92 mmol/L      CO2 37.0 mmol/L      Calcium 8.5 mg/dL      eGFR Non African Amer 117 mL/min/1.73      BUN/Creatinine Ratio 22.2     Anion Gap 4.0 mmol/L     Narrative:       GFR Normal >60  Chronic Kidney Disease <60  Kidney Failure <15    Blood Culture - Blood, Arm, Left [[id number]] Collected:  06/04/19 1705    Specimen:  Blood from Arm, Left Updated:  06/09/19 1731     Blood Culture No growth at 5 days    Blood Culture - Blood, Arm, Right [[id number]] Collected:  06/04/19 1705    Specimen:  Blood from Arm, Right Updated:  06/09/19 1731     Blood Culture No growth at 5 days    Basic Metabolic Panel [[id number]]  (Abnormal) Collected:  06/09/19 1157    Specimen:  Blood Updated:  06/09/19 1216     Glucose 76 mg/dL      BUN 12 mg/dL      Creatinine 0.61 mg/dL      Sodium 126 mmol/L      Potassium 3.8 mmol/L      Chloride 87 mmol/L      CO2 36.0 mmol/L      Calcium 8.1 mg/dL      eGFR Non African Amer 101 mL/min/1.73      BUN/Creatinine Ratio 19.7     Anion Gap 3.0 mmol/L     Narrative:       GFR Normal >60  Chronic Kidney Disease <60  Kidney Failure <15    Basic Metabolic Panel [[id number]]  (Abnormal) Collected:  06/09/19 0648    Specimen:  Blood Updated:   06/09/19 0717     Glucose 96 mg/dL      BUN 13 mg/dL      Creatinine 0.59 mg/dL      Sodium 126 mmol/L      Potassium 3.6 mmol/L      Chloride 85 mmol/L      CO2 37.0 mmol/L      Calcium 8.6 mg/dL      eGFR Non African Amer 105 mL/min/1.73      BUN/Creatinine Ratio 22.0     Anion Gap 4.0 mmol/L     Narrative:       GFR Normal >60  Chronic Kidney Disease <60  Kidney Failure <15    Osmolality, Urine - Urine, Clean Catch [[id number]]  (Abnormal) Collected:  06/08/19 0538    Specimen:  Urine, Clean Catch Updated:  06/08/19 0644     Osmolality, Urine 146 mOsm/kg     Basic Metabolic Panel [[id number]]  (Abnormal) Collected:  06/08/19 0451    Specimen:  Blood Updated:  06/08/19 0619     Glucose 94 mg/dL      BUN 18 mg/dL      Creatinine 0.58 mg/dL      Sodium 131 mmol/L      Potassium 3.8 mmol/L      Comment: Specimen hemolyzed.  Results may be affected.        Chloride 88 mmol/L      CO2 37.0 mmol/L      Calcium 8.8 mg/dL      eGFR Non African Amer 108 mL/min/1.73      BUN/Creatinine Ratio 31.0     Anion Gap 6.0 mmol/L     Narrative:       GFR Normal >60  Chronic Kidney Disease <60  Kidney Failure <15    Sodium, Urine, Random - Urine, Clean Catch [[id number]]  (Abnormal) Collected:  06/08/19 0538    Specimen:  Urine, Clean Catch Updated:  06/08/19 0556     Sodium, Urine 22 mmol/L     Basic Metabolic Panel [[id number]]  (Abnormal) Collected:  06/07/19 0513    Specimen:  Blood Updated:  06/07/19 0702     Glucose 105 mg/dL      BUN 21 mg/dL      Creatinine 0.55 mg/dL      Sodium 128 mmol/L      Potassium 4.0 mmol/L      Chloride 85 mmol/L      CO2 >40.0 mmol/L      Calcium 8.4 mg/dL      eGFR Non African Amer 114 mL/min/1.73      BUN/Creatinine Ratio 38.2     Anion Gap -- mmol/L      Comment: Unable to calculate Anion Gap.       Narrative:       GFR Normal >60  Chronic Kidney Disease <60  Kidney Failure <15    CBC (No Diff) [[id number]]  (Abnormal) Collected:  06/07/19 0513    Specimen:  Blood Updated:  06/07/19 0626     WBC 6.59  10*3/mm3      RBC 2.97 10*6/mm3      Hemoglobin 9.5 g/dL      Hematocrit 28.2 %      MCV 94.9 fL      MCH 32.0 pg      MCHC 33.7 g/dL      RDW 13.2 %      RDW-SD 45.4 fl      MPV 10.4 fL      Platelets 231 10*3/mm3     Basic Metabolic Panel [[id number]]  (Abnormal) Collected:  06/06/19 0609    Specimen:  Blood Updated:  06/06/19 0642     Glucose 122 mg/dL      BUN 22 mg/dL      Creatinine 0.59 mg/dL      Sodium 131 mmol/L      Potassium 4.4 mmol/L      Chloride 85 mmol/L      CO2 >40.0 mmol/L      Calcium 8.9 mg/dL      eGFR Non African Amer 105 mL/min/1.73      BUN/Creatinine Ratio 37.3     Anion Gap -- mmol/L      Comment: Unable to calculate Anion Gap.       Narrative:       GFR Normal >60  Chronic Kidney Disease <60  Kidney Failure <15    CBC (No Diff) [[id number]]  (Abnormal) Collected:  06/06/19 0609    Specimen:  Blood Updated:  06/06/19 0624     WBC 8.37 10*3/mm3      RBC 3.05 10*6/mm3      Hemoglobin 9.7 g/dL      Hematocrit 29.5 %      MCV 96.7 fL      MCH 31.8 pg      MCHC 32.9 g/dL      RDW 13.2 %      RDW-SD 46.9 fl      MPV 9.9 fL      Platelets 213 10*3/mm3     Blood Gas, Arterial [[id number]]  (Abnormal) Collected:  06/06/19 0430    Specimen:  Arterial Blood Updated:  06/06/19 0434     Site Left Radial     Dao's Test Positive     pH, Arterial 7.446 pH units      pCO2, Arterial 65.4 mm Hg      Comment: 83 Value above reference range        pO2, Arterial 65.1 mm Hg      Comment: 84 Value below reference range        HCO3, Arterial 45.0 mmol/L      Comment: 83 Value above reference range        Base Excess, Arterial 18.3 mmol/L      Comment: 83 Value above reference range        O2 Saturation, Arterial 93.5 %      Comment: 84 Value below reference range        Temperature 37.0 C      Barometric Pressure for Blood Gas 745 mmHg      Modality BiPap     FIO2 35 %      Ventilator Mode BiPAP     IPAP 14     Comment: Meter: [id number]     :  [id number]        EPA 8     Collected by [id number]    ,  Free [[id number]]  (Normal) Collected:  06/05/19 0540    Specimen:  Blood Updated:  06/05/19 1144     Free T4 1.08 ng/dL     TSH [[id number]]  (Abnormal) Collected:  06/05/19 0540    Specimen:  Blood Updated:  06/05/19 0659     TSH 0.143 mIU/mL     Hemoglobin A1c [[id number]] Collected:  06/05/19 0540    Specimen:  Blood Updated:  06/05/19 0645     Hemoglobin A1C 4.5 %     Narrative:       Less than 6.0           Non-Diabetic Range  6.0-7.0                 ADA Therapeutic Target  Greater than 7.0        Action Suggested    Lipid Panel [[id number]]  (Abnormal) Collected:  06/05/19 0540    Specimen:  Blood Updated:  06/05/19 0640     Total Cholesterol 226 mg/dL      Triglycerides 172 mg/dL      HDL Cholesterol 45 mg/dL      LDL Cholesterol  148 mg/dL      LDL/HDL Ratio 3.26    Basic Metabolic Panel [[id number]]  (Abnormal) Collected:  06/05/19 0540    Specimen:  Blood Updated:  06/05/19 0633     Glucose 121 mg/dL      BUN 19 mg/dL      Creatinine 0.54 mg/dL      Sodium 136 mmol/L      Potassium 4.5 mmol/L      Chloride 83 mmol/L      CO2 >40.0 mmol/L      Calcium 9.0 mg/dL      eGFR Non African Amer 117 mL/min/1.73      BUN/Creatinine Ratio 35.2     Anion Gap -- mmol/L      Comment: Unable to calculate Anion Gap.       Narrative:       GFR Normal >60  Chronic Kidney Disease <60  Kidney Failure <15    CBC (No Diff) [[id number]]  (Abnormal) Collected:  06/05/19 0540    Specimen:  Blood Updated:  06/05/19 0620     WBC 3.83 10*3/mm3      RBC 3.34 10*6/mm3      Hemoglobin 10.6 g/dL      Hematocrit 34.4 %      .0 fL      MCH 31.7 pg      MCHC 30.8 g/dL      RDW 13.1 %      RDW-SD 49.4 fl      MPV 10.3 fL      Platelets 241 10*3/mm3     Prealbumin [[id number]]  (Abnormal) Collected:  06/04/19 1635    Specimen:  Blood Updated:  06/04/19 1926     Prealbumin 13.0 mg/dL     Blood Gas, Arterial With Co-Ox [[id number]]  (Abnormal) Collected:  06/04/19 1744    Specimen:  Arterial Blood Updated:  06/04/19 1749     Site Right Radial      Dao's Test Positive     pH, Arterial 7.386 pH units      pCO2, Arterial 89.9 mm Hg      Comment: 86 Value above critical limit        pO2, Arterial 54.3 mm Hg      Comment: 85 Value below critical limit        HCO3, Arterial 53.9 mmol/L      Comment: 83 Value above reference range        Base Excess, Arterial 24.2 mmol/L      Comment: 83 Value above reference range        O2 Saturation, Arterial 89.8 %      Comment: 84 Value below reference range        Hemoglobin, Blood Gas 11.2 g/dL      Comment: 84 Value below reference range        Hematocrit, Blood Gas 34.3 %      Comment: 84 Value below reference range        Oxyhemoglobin 86.3 %      Comment: 84 Value below reference range        Methemoglobin 0.70 %      Carboxyhemoglobin 3.1 %      A-a Gradiant -- mmHg      Comment: UNABLE TO CALCULATE        Temperature 37.0 C      Sodium, Arterial 141 mmol/L      Potassium, Arterial 4.1 mmol/L      Barometric Pressure for Blood Gas 748 mmHg      Modality BiPap     FIO2 40 %      Ventilator Mode BiPAP     Set Mech Resp Rate 20.0     IPAP 14     Comment: Meter: [id number]     :  [id number]        EPAP 8     Note --     Notified Who DR LANGE     Notified By [id number]     Notified Time 06/04/2019 17:50     Collected by [id number]     pH, Temp Corrected -- pH Units      pCO2, Temperature Corrected -- mm Hg      pO2, Temperature Corrected -- mm Hg     [place] [facility] [[id number]] Collected:  06/04/19 1635    Specimen:  Blood Updated:  06/04/19 1735    Narrative:       The following orders were created for panel order [place] Draw.  Procedure                               Abnormality         Status                     ---------                               -----------         ------                     Light Blue Top[[id number]]                                   Final result               Green Top (Gel)[[id number]]                                  Final result               Lavender Top[[id number]]                                      Final result               Red Top[[id number]]                                          Final result                 Please view results for these tests on the individual orders.    Light Blue Top [[id number]] Collected:  06/04/19 1635    Specimen:  Blood Updated:  06/04/19 1745     Extra Tube hold for add-on     Comment: Auto resulted       Green Top (Gel) [[id number]] Collected:  06/04/19 1635    Specimen:  Blood Updated:  06/04/19 1745     Extra Tube Hold for add-ons.     Comment: Auto resulted.       Lavender Top [[id number]] Collected:  06/04/19 1635    Specimen:  Blood Updated:  06/04/19 1745     Extra Tube hold for add-on     Comment: Auto resulted       Red Top [[id number]] Collected:  06/04/19 1635    Specimen:  Blood Updated:  06/04/19 1745     Extra Tube Hold for add-ons.     Comment: Auto resulted.       Procalcitonin [[id number]]  (Normal) Collected:  06/04/19 1635    Specimen:  Blood Updated:  06/04/19 1739     Procalcitonin <0.25 ng/mL     Narrative:       SIRS, sepsis, severe sepsis, and septic shock are categorized according to the criteria of the consensus conference of the American College of Chest Physicians/Society of Critical Care Medicine.    PCT < 0.5 ng/mL     Systemic infection (sepsis) is not likely.    PCT >0.5 and < 2.0 ng/mL Systemic infection (sepsis) is possible, but other conditions are known to elevate PCT as well.    PCT > 2.0 ng/mL     Systemic infection (sepsis) is likely, unless other causes are known.      PCT > 10.0 ng/mL    Important systemic inflammatory response, almost exclusively due to severe bacterial sepsis or septic shock.    PCT values of < 0.5 ng/mL do not exclude an infection, because localized infections (without systemic signs) may be associated with such low concentrations, or a systemic infection in its initial stages (<6 hours).  Increased PCT can occur without infection.  PCT concentrations between 0.5 and 2.0 ng/mL should be interpreted taking into  account the patients history.  It is recommended to retest PCT within 6-24 hours if any concentrations < 2.0 ng/mL are obtained.    Lactic Acid, Plasma [[id number]]  (Normal) Collected:  06/04/19 1707    Specimen:  Blood Updated:  06/04/19 1731     Lactate 0.9 mmol/L     CBC & Differential [[id number]] Collected:  06/04/19 1635    Specimen:  Blood Updated:  06/04/19 1728    Narrative:       The following orders were created for panel order CBC & Differential.  Procedure                               Abnormality         Status                     ---------                               -----------         ------                     CBC Auto Differential[[id number]]        Abnormal            Final result                 Please view results for these tests on the individual orders.    CBC Auto Differential [[id number]]  (Abnormal) Collected:  06/04/19 1635    Specimen:  Blood Updated:  06/04/19 1728     WBC 9.51 10*3/mm3      RBC 3.46 10*6/mm3      Hemoglobin 11.0 g/dL      Hematocrit 36.4 %      .2 fL      MCH 31.8 pg      MCHC 30.2 g/dL      RDW 13.2 %      RDW-SD 51.2 fl      MPV 9.7 fL      Platelets 247 10*3/mm3      Neutrophil % 80.2 %      Lymphocyte % 10.8 %      Monocyte % 7.7 %      Eosinophil % 0.6 %      Basophil % 0.4 %      Immature Grans % 0.3 %      Neutrophils, Absolute 7.62 10*3/mm3      Lymphocytes, Absolute 1.03 10*3/mm3      Monocytes, Absolute 0.73 10*3/mm3      Eosinophils, Absolute 0.06 10*3/mm3      Basophils, Absolute 0.04 10*3/mm3      Immature Grans, Absolute 0.03 10*3/mm3      nRBC 0.0 /100 WBC     BNP [[id number]]  (Normal) Collected:  06/04/19 1635    Specimen:  Blood Updated:  06/04/19 1723     proBNP 302.0 pg/mL     Blood Gas, Arterial [[id number]]  (Abnormal) Collected:  06/04/19 1707    Specimen:  Arterial Blood Updated:  06/04/19 1708     Site Right Brachial     Dao's Test N/A     pH, Arterial 7.333 pH units      Comment: 84 Value below reference range        pCO2, Arterial  >102.0 mm Hg      Comment: 93 Value above reportable range > 102        pO2, Arterial 79.9 mm Hg      Comment: 84 Value below reference range        HCO3, Arterial 55.3 mmol/L      Comment: 83 Value above reference range        Base Excess, Arterial 24.5 mmol/L      Comment: 83 Value above reference range        O2 Saturation, Arterial 96.2 %      Temperature 37.0 C      Barometric Pressure for Blood Gas 749 mmHg      Modality Nasal Cannula     Flow Rate 2.5 lpm      Ventilator Mode NA     Notified Who DR LANGE     Notified By [id number]     Notified Time 06/04/2019 17:13     Collected by [id number]     Comment: Meter: [id number]     :  [id number]       Troponin [[id number]]  (Normal) Collected:  06/04/19 1635    Specimen:  Blood Updated:  06/04/19 1704     Troponin I <0.012 ng/mL     D-dimer, Quantitative [[id number]]  (Abnormal) Collected:  06/04/19 1635    Specimen:  Blood Updated:  06/04/19 1702     D-Dimer, Quantitative 0.66 mg/L (FEU)     Narrative:       Reference Range is 0-0.50 mg/L FEU. However, results <0.50 mg/L FEU tends to rule out DVT or PE. Results >0.50 mg/L FEU are not useful in predicting absence or presence of DVT or PE.    Comprehensive Metabolic Panel [[id number]]  (Abnormal) Collected:  06/04/19 1635    Specimen:  Blood Updated:  06/04/19 1656     Glucose 130 mg/dL      BUN 20 mg/dL      Creatinine 0.52 mg/dL      Sodium 138 mmol/L      Potassium 4.4 mmol/L      Chloride 81 mmol/L      CO2 >40.0 mmol/L      Calcium 9.0 mg/dL      Total Protein 7.0 g/dL      Albumin 3.70 g/dL      ALT (SGPT) 15 U/L      AST (SGOT) 26 U/L      Alkaline Phosphatase 94 U/L      Total Bilirubin 0.4 mg/dL      eGFR Non African Amer 122 mL/min/1.73      Globulin 3.3 gm/dL      A/G Ratio 1.1 g/dL      BUN/Creatinine Ratio 38.5     Anion Gap -- mmol/L      Comment: Unable to calculate Anion Gap.       Narrative:       GFR Normal >60  Chronic Kidney Disease <60  Kidney Failure <15        Hospital Course:  The patient  "is a 58 y.o. female with past medical history significant for chronic hypoxic respiratory failure on home trilogy and home oxygen at 2 L, COPD, ongoing tobacco abuse, cachexia, back pain, and chronic diastolic dysfunction.  Patient presented the emergency department on 6/4 with complaints of shortness of breath.  She also had some confusion she does continue to smoke.  Upon admission, she had a PCO2 of >102 and PaO2 79.9 on 2.5L nasal cannula. She was placed on hospital BiPAP and admitted to the hospitalist service.     She was placed on gentle hydration and was started on IV azithromycin therapy as well as IV corticosteroids.  Repeat PA lateral chest x-ray revealed vague opacity in the left upper lobe with the pneumonia favored.  She was then broadened out to Rocephin and azithromycin.  She is completed azithromycin therapy and now has completed Omnicef therapy as well.  Her trilogy company has come in and adjusted her machine.  Patient fortunately continued to be persistently hyponatremic.  She was actually given some IV fluids and given her decreased oral intake.  Did raise her sodium to 131 but then went right back down to 126 yesterday.  I did give her a dose of Samsca yesterday and her sodium today is 136.  Patient continued to have desaturations last night until upper 60s and low 70s while on her trilogy.  They have come back again today and increase the PEEP.  She is now stable for discharge.  Please note that after her BiPAP therapy, her PCO2 did go down to 65.4.  Started on Megace and will continue a steroid taper post discharge.  She has improved her oral intake.  We discussed this with her that she just needs to continue to have a good oral intake and will need repeat lab work  for posthospitalization assessment appointment with her primary care provider.    Physical Exam on Discharge:  /64   Pulse 87   Temp 98.9 °F (37.2 °C) (Oral)   Resp 18   Ht 152.4 cm (60\")   Wt 45.8 kg (101 lb)   SpO2 " 90%   BMI 19.73 kg/m²    Physical Exam   Constitutional: She is oriented to person, place, and time. She appears well-developed and well-nourished.   HENT:   Head: Normocephalic and atraumatic.   Eyes: Conjunctivae and EOM are normal. Pupils are equal, round, and reactive to light.   Neck: Neck supple. No JVD present. No thyromegaly present.   Cardiovascular: Normal rate, regular rhythm, normal heart sounds and intact distal pulses. Exam reveals no gallop and no friction rub.   No murmur heard.  Pulmonary/Chest: Effort normal. No respiratory distress. She has no wheezes. She has no rales. She exhibits no tenderness.   diminished but much improved aeration.    Abdominal: Soft. Bowel sounds are normal. She exhibits no distension. There is no tenderness. There is no rebound and no guarding.   Musculoskeletal: Normal range of motion. She exhibits edema (trace edema bilaterally. ). She exhibits no tenderness or deformity.   Lymphadenopathy:     She has no cervical adenopathy.   Neurological: She is alert and oriented to person, place, and time. She displays normal reflexes. No cranial nerve deficit. She exhibits normal muscle tone.   Skin: Skin is warm and dry. No rash noted.   Psychiatric: She has a normal mood and affect. Her behavior is normal. Judgment and thought content normal.     Condition on Discharge: stable with elevated risk of readmission.     Discharge Disposition:  Home or Self Care    Discharge Medications:     Discharge Medications      New Medications      Instructions Start Date   megestrol 40 MG/ML suspension  Commonly known as:  MEGACE   400 mg, Oral, Daily   Start Date:  6/11/2019     predniSONE 10 MG tablet  Commonly known as:  DELTASONE   2 tabs daily for 2 more day2 then 1 tablet daily for 3 days then stop.         Changes to Medications      Instructions Start Date   metoprolol tartrate 25 MG tablet  Commonly known as:  LOPRESSOR  What changed:    · how much to take  · when to take this   12.5  mg, Oral, 2 Times Daily         Continue These Medications      Instructions Start Date   aspirin 81 MG EC tablet   81 mg, Oral, Daily      budesonide-formoterol 160-4.5 MCG/ACT inhaler  Commonly known as:  SYMBICORT   2 puffs, Inhalation, 2 Times Daily - RT      guaiFENesin 600 MG 12 hr tablet  Commonly known as:  MUCINEX   1,200 mg, Oral, 2 Times Daily      ipratropium-albuterol  MCG/ACT inhaler  Commonly known as:  COMBIVENT RESPIMAT   1 puff, Inhalation, 4 Times Daily PRN      pantoprazole 40 MG EC tablet  Commonly known as:  PROTONIX   40 mg, Oral, Daily           Discharge Diet:   Diet Instructions     Diet: Regular; Thin      Discharge Diet:  Regular    Fluid Consistency:  Thin        Activity at Discharge:   Activity Instructions     Activity as Tolerated            Follow-up Appointments:   1. PCP in 1 week    Test Results Pending at Discharge: None    ROLAND Burdick  06/10/19  3:56 PM    Time: 35 minutes.     I personally evaluated and examined the patient in conjunction with ROLAND Rutledge and agree with the assessment, treatment plan, and disposition of the patient as recorded by her. My history, exam, and further recommendations are:     Seen and examined.  Appropriate for discharge.  Patient at baseline.    Marcin Hitchocck MD  06/10/19  10:43 PM        Electronically signed by Marcin Hitchcock MD at 6/10/2019 10:43 PM

## 2019-06-11 NOTE — THERAPY DISCHARGE NOTE
Acute Care - Physical Therapy Discharge Summary  [facility]       Patient Name: Teri Tim  : 1960  MRN: [id number]    Today's Date: 2019  Onset of Illness/Injury or Date of Surgery: 19    Date of Referral to PT: 19  Referring Physician: Dr. Curry      Admit Date: 2019      PT Recommendation and Plan    Visit Dx:    ICD-10-CM ICD-9-CM   1. Acute respiratory failure with hypoxia and hypercapnia (CMS/HCC) J96.01 518.81    J96.02    2. Pneumonia due to infectious organism, unspecified laterality, unspecified part of lung J18.9 136.9     484.8   3. Impaired mobility Z74.09 799.89   4. Decreased activities of daily living (ADL) R68.89 780.99       Outcome Measures     Row Name 06/10/19 0915             How much help from another is currently needed...    Putting on and taking off regular lower body clothing?  3  -MM      Bathing (including washing, rinsing, and drying)  4  -MM      Toileting (which includes using toilet bed pan or urinal)  4  -MM      Putting on and taking off regular upper body clothing  3  -MM      Taking care of personal grooming (such as brushing teeth)  4  -MM      Eating meals  4  -MM      Score  22  -MM        User Key  (r) = Recorded By, (t) = Taken By, (c) = Cosigned By    Initials Name Provider Type    Munira Fontenot COTA/L Occupational Therapy Assistant              Rehab Goal Summary     Row Name 19 0819 19 0700          Physical Therapy Goals    Bed Mobility Goal Selection (PT)  bed mobility, PT goal 1  -CW  --     Transfer Goal Selection (PT)  transfer, PT goal 1  -CW  --     Gait Training Goal Selection (PT)  gait training, PT goal 1  -CW  --        Bed Mobility Goal 1 (PT)    Activity/Assistive Device (Bed Mobility Goal 1, PT)  bed mobility activities, all  -CW  --     [place] Level/Cues Needed (Bed Mobility Goal 1, PT)  conditional independence  -CW  --     Time Frame (Bed Mobility Goal 1, PT)  by discharge  -CW  --      Progress/Outcomes (Bed Mobility Goal 1, PT)  goal met  -CW  --        Transfer Goal 1 (PT)    Activity/Assistive Device (Transfer Goal 1, PT)  sit-to-stand/stand-to-sit;bed-to-chair/chair-to-bed  -CW  --     [place] Level/Cues Needed (Transfer Goal 1, PT)  supervision required  -CW  --     Time Frame (Transfer Goal 1, PT)  by discharge  -CW  --     Progress/Outcome (Transfer Goal 1, PT)  goal met  -CW  --        Gait Training Goal 1 (PT)    Activity/Assistive Device (Gait Training Goal 1, PT)  gait (walking locomotion);increase endurance/gait distance  -CW  --     [place] Level (Gait Training Goal 1, PT)  contact guard assist  -CW  --     Distance (Gait Goal 1, PT)  30  -CW  --     Time Frame (Gait Training Goal 1, PT)  by discharge  -CW  --     Progress/Outcome (Gait Training Goal 1, PT)  goal met  -CW  --        Dressing Goal 1 (OT)    Activity/Assistive Device (Dressing Goal 1, OT)  --  dressing skills, all  -TS     [place]/Cues Needed (Dressing Goal 1, OT)  --  independent  -TS     Time Frame (Dressing Goal 1, OT)  --  10 days  -TS     Progress/Outcome (Dressing Goal 1, OT)  --  goal not met  -TS        Toileting Goal 1 (OT)    Activity/Device (Toileting Goal 1, OT)  --  toileting skills, all  -TS     [place] Level/Cues Needed (Toileting Goal 1, OT)  --  independent  -TS     Time Frame (Toileting Goal 1, OT)  --  10 days  -TS     Progress/Outcome (Toileting Goal 1, OT)  --  goal not met  -TS         Activity Tolerance Goal 1 (OT)    Activity Tolerance Goal 1 (OT)  --  pt will participate in 5 minutes of functional ADL activity with O2 @>88%  -TS     Activity Level (Endurance Goal 1, OT)  --  5 min activity;O2 sat >/ equal to 88%  -TS     Time Frame (Activity Tolerance Goal 1, OT)  --  10 days  -TS     Progress/Outcome (Activity Tolerance Goal 1, OT)  --  goal not met  -TS       User Key  (r) = Recorded By, (t) = Taken By, (c) = Cosigned By    Initials Name Provider Type Discipline    TS  Harper Gamboa, SHRUTHI/L Occupational Therapy Assistant OT    CW Betty Pierre PTA Physical Therapy Assistant PT              PT Discharge Summary  Reason for Discharge: Discharge from facility  Outcomes Achieved: Refer to plan of care for updates on goals achieved  Discharge Destination: Home      Betty Pierre PTA   6/11/2019

## 2019-06-11 NOTE — THERAPY DISCHARGE NOTE
Acute Care - Occupational Therapy Discharge Summary  [facility]     Patient Name: Teri Tim  : 1960  MRN: [id number]    Today's Date: 2019  Onset of Illness/Injury or Date of Surgery: 19    Date of Referral to OT: 19  Referring Physician: Dr. Curry      Admit Date: 2019        OT Recommendation and Plan    Visit Dx:    ICD-10-CM ICD-9-CM   1. Acute respiratory failure with hypoxia and hypercapnia (CMS/HCC) J96.01 518.81    J96.02    2. Pneumonia due to infectious organism, unspecified laterality, unspecified part of lung J18.9 136.9     484.8   3. Impaired mobility Z74.09 799.89   4. Decreased activities of daily living (ADL) R68.89 780.99               Rehab Goal Summary     Row Name 19 0700             Dressing Goal 1 (OT)    Activity/Assistive Device (Dressing Goal 1, OT)  dressing skills, all  -TS      [place]/Cues Needed (Dressing Goal 1, OT)  independent  -TS      Time Frame (Dressing Goal 1, OT)  10 days  -TS      Progress/Outcome (Dressing Goal 1, OT)  goal not met  -TS         Toileting Goal 1 (OT)    Activity/Device (Toileting Goal 1, OT)  toileting skills, all  -TS      [place] Level/Cues Needed (Toileting Goal 1, OT)  independent  -TS      Time Frame (Toileting Goal 1, OT)  10 days  -TS      Progress/Outcome (Toileting Goal 1, OT)  goal not met  -TS          Activity Tolerance Goal 1 (OT)    Activity Tolerance Goal 1 (OT)  pt will participate in 5 minutes of functional ADL activity with O2 @>88%  -TS      Activity Level (Endurance Goal 1, OT)  5 min activity;O2 sat >/ equal to 88%  -TS      Time Frame (Activity Tolerance Goal 1, OT)  10 days  -TS      Progress/Outcome (Activity Tolerance Goal 1, OT)  goal not met  -TS        User Key  (r) = Recorded By, (t) = Taken By, (c) = Cosigned By    Initials Name Provider Type Discipline    Harper Fregoso, HAWKINS/L Occupational Therapy Assistant OT          Outcome Measures     Row Name 06/10/19 0915              How much help from another is currently needed...    Putting on and taking off regular lower body clothing?  3  -MM      Bathing (including washing, rinsing, and drying)  4  -MM      Toileting (which includes using toilet bed pan or urinal)  4  -MM      Putting on and taking off regular upper body clothing  3  -MM      Taking care of personal grooming (such as brushing teeth)  4  -MM      Eating meals  4  -MM      Score  22  -MM        User Key  (r) = Recorded By, (t) = Taken By, (c) = Cosigned By    Initials Name Provider Type    Munira Fontenot COTA/L Occupational Therapy Assistant          Therapy Suggested Charges     Code   Minutes Charges    89805 (CPT®) Hc Ot Neuromusc Re Education Ea 15 Min      25212 (CPT®) Hc Ot Ther Proc Ea 15 Min 10 1    57858 (CPT®) Hc Ot Therapeutic Act Ea 15 Min      70407 (CPT®) Hc Ot Manual Therapy Ea 15 Min      99293 (CPT®) Hc Ot Iontophoresis Ea 15 Min      54251 (CPT®) Hc Ot Elec Stim Ea-Per 15 Min      31128 (CPT®) Hc Ot Ultrasound Ea 15 Min      68911 (CPT®) Hc Ot Self Care/Mgmt/Train Ea 15 Min 15 1    Total  25 2              OT Discharge Summary  Reason for Discharge: Discharge from facility  Outcomes Achieved: Refer to plan of care for updates on goals achieved  Discharge Destination: Home with assist, Home with home health      MARY Figueroa  6/11/2019

## 2019-06-13 ENCOUNTER — READMISSION MANAGEMENT (OUTPATIENT)
Dept: CALL CENTER | Facility: HOSPITAL | Age: 59
End: 2019-06-13

## 2019-06-13 NOTE — OUTREACH NOTE
COPD/PN Week 1 Survey      Responses   Facility patient discharged from?  [facility]   Does the patient have one of the following disease processes/diagnoses(primary or secondary)?  COPD/Pneumonia   Is there a successful TCM telephone encounter documented?  No   Was the primary reason for admission:  Pneumonia   Week 1 attempt successful?  Yes   Call start time  1946   Call end time  1951   Discharge diagnosis  Acute on chronic respiratory failure with hypoxia and hypercapnia    Meds reviewed with patient/caregiver?  Yes   Is the patient having any side effects they believe may be caused by any medication additions or changes?  No   Does the patient have all medications ordered at discharge?  Yes   Is the patient taking all medications as directed (includes completed medication regime)?  Yes   Does the patient have a primary care provider?   Yes   Does the patient have an appointment with their PCP or pulmonologist within 7 days of discharge?  Yes   Comments regarding PCP  ROLAND Priest   Has the patient kept scheduled appointments due by today?  Yes   What DME was ordered?  [employer]    Did the patient receive a copy of their discharge instructions?  Yes   Nursing interventions  Reviewed instructions with patient   What is the patient's perception of their health status since discharge?  Improving   Are the patient's immunizations up to date?   No   Is the patient/caregiver able to teach back the hierarchy of who to call/visit for symptoms/problems? PCP, Specialist, Home health nurse, Urgent Care, ED, 911  Yes   Is the patient able to teach back COPD zones?  Yes   Nursing interventions  Education provided on various zones   Patient reports what zone on this call?  Green Zone   Green Zone  Reports doing well, Breathing without shortness of breath, Usual activity and exercise level, Appetite is good, Usual amount of phlegm/mucus without difficulty coughing up, Sleeping well   Green Zone interventions:  Take  daily medications, Continue regular exercise/diet plan, Do not smoke   Week 1 call completed?  Yes          Nahomy Childress RN

## 2019-06-20 ENCOUNTER — READMISSION MANAGEMENT (OUTPATIENT)
Dept: CALL CENTER | Facility: HOSPITAL | Age: 59
End: 2019-06-20

## 2019-06-20 NOTE — OUTREACH NOTE
COPD/PN Week 2 Survey      Responses   Facility patient discharged from?  [facility]   Does the patient have one of the following disease processes/diagnoses(primary or secondary)?  COPD/Pneumonia   Was the primary reason for admission:  Pneumonia   Week 2 attempt successful?  No   Unsuccessful attempts  Attempt 1          Corina Richards RN

## 2019-06-24 ENCOUNTER — READMISSION MANAGEMENT (OUTPATIENT)
Dept: CALL CENTER | Facility: HOSPITAL | Age: 59
End: 2019-06-24

## 2019-06-24 NOTE — OUTREACH NOTE
COPD/PN Week 2 Survey      Responses   Facility patient discharged from?  [facility]   Does the patient have one of the following disease processes/diagnoses(primary or secondary)?  COPD/Pneumonia   Was the primary reason for admission:  Pneumonia   Week 2 attempt successful?  No   Unsuccessful attempts  Attempt 2          Sade Sandoval RN

## 2019-06-28 ENCOUNTER — READMISSION MANAGEMENT (OUTPATIENT)
Dept: CALL CENTER | Facility: HOSPITAL | Age: 59
End: 2019-06-28

## 2019-06-28 NOTE — OUTREACH NOTE
COPD/PN Week 3 Survey      Responses   Facility patient discharged from?  [facility]   Does the patient have one of the following disease processes/diagnoses(primary or secondary)?  COPD/Pneumonia   Was the primary reason for admission:  Pneumonia   Week 3 attempt successful?  Yes   Call start time  1117   Rescheduled  Rescheduled-pt requested   Call end time  1117          Corina Richards RN

## 2019-06-30 ENCOUNTER — READMISSION MANAGEMENT (OUTPATIENT)
Dept: CALL CENTER | Facility: HOSPITAL | Age: 59
End: 2019-06-30

## 2019-06-30 NOTE — OUTREACH NOTE
COPD/PN Week 3 Survey      Responses   Facility patient discharged from?  [facility]   Does the patient have one of the following disease processes/diagnoses(primary or secondary)?  COPD/Pneumonia   Was the primary reason for admission:  Pneumonia   Week 3 attempt successful?  Yes   Call start time  1156   Call end time  1200   Medication alerts for this patient  no medication changes   Meds reviewed with patient/caregiver?  Yes   Is the patient taking all medications as directed (includes completed medication regime)?  Yes   Comments regarding appointments  not see the doctor  cause had the cancel because of transportation   Has the patient kept scheduled appointments due by today?  Yes   What is the patient's perception of their health status since discharge?  Same   Is the patient able to teach back COPD zones?  No [review the zones  with patient]   Nursing interventions  Education provided on various zones   Patient reports what zone on this call?  Yellow Zone   Yellow Zone  Increased shortness of air, Unable to complete daily activities, Poor Appetite   Yellow interventions  Get plenty of rest, Do not smoke, Continue to use daily medications   Week 3 call completed?  Yes   Wrap up additional comments  still complaining of weakness          Sade Sandoval RN

## 2019-07-10 ENCOUNTER — READMISSION MANAGEMENT (OUTPATIENT)
Dept: CALL CENTER | Facility: HOSPITAL | Age: 59
End: 2019-07-10

## 2019-07-10 NOTE — OUTREACH NOTE
COPD/PN Week 4 Survey      Responses   Facility patient discharged from?  [facility]   Does the patient have one of the following disease processes/diagnoses(primary or secondary)?  COPD/Pneumonia   Was the primary reason for admission:  Pneumonia   Week 4 attempt successful?  No          Sade Sandoval RN

## 2019-11-20 ENCOUNTER — APPOINTMENT (OUTPATIENT)
Dept: GENERAL RADIOLOGY | Facility: HOSPITAL | Age: 59
End: 2019-11-20

## 2019-11-20 ENCOUNTER — HOSPITAL ENCOUNTER (INPATIENT)
Facility: HOSPITAL | Age: 59
LOS: 6 days | Discharge: NURSING, REHAB OR LONG-TERM CARE | End: 2019-11-26
Attending: EMERGENCY MEDICINE | Admitting: FAMILY MEDICINE

## 2019-11-20 DIAGNOSIS — J44.1 CHRONIC OBSTRUCTIVE PULMONARY DISEASE WITH ACUTE EXACERBATION (HCC): Primary | ICD-10-CM

## 2019-11-20 DIAGNOSIS — Z78.9 IMPAIRED MOBILITY AND ADLS: ICD-10-CM

## 2019-11-20 DIAGNOSIS — D53.9 MACROCYTIC ANEMIA: ICD-10-CM

## 2019-11-20 DIAGNOSIS — Z74.09 IMPAIRED MOBILITY AND ADLS: ICD-10-CM

## 2019-11-20 DIAGNOSIS — Z78.9 DECREASED ACTIVITIES OF DAILY LIVING (ADL): ICD-10-CM

## 2019-11-20 DIAGNOSIS — Z74.09 IMPAIRED MOBILITY: ICD-10-CM

## 2019-11-20 DIAGNOSIS — I21.4 NSTEMI (NON-ST ELEVATED MYOCARDIAL INFARCTION) (HCC): ICD-10-CM

## 2019-11-20 LAB
ALBUMIN SERPL-MCNC: 3.9 G/DL (ref 3.5–5.2)
ALBUMIN/GLOB SERPL: 1.4 G/DL
ALP SERPL-CCNC: 103 U/L (ref 39–117)
ALT SERPL W P-5'-P-CCNC: 11 U/L (ref 1–33)
ANION GAP SERPL CALCULATED.3IONS-SCNC: 9 MMOL/L (ref 5–15)
ARTERIAL PATENCY WRIST A: ABNORMAL
AST SERPL-CCNC: 29 U/L (ref 1–32)
ATMOSPHERIC PRESS: 754 MMHG
BASE EXCESS BLDA CALC-SCNC: 12.3 MMOL/L (ref 0–2)
BASOPHILS # BLD AUTO: 0.02 10*3/MM3 (ref 0–0.2)
BASOPHILS NFR BLD AUTO: 0.2 % (ref 0–1.5)
BDY SITE: ABNORMAL
BILIRUB SERPL-MCNC: 0.2 MG/DL (ref 0.2–1.2)
BODY TEMPERATURE: 37 C
BUN BLD-MCNC: 6 MG/DL (ref 6–20)
BUN/CREAT SERPL: 13 (ref 7–25)
CALCIUM SPEC-SCNC: 9.1 MG/DL (ref 8.6–10.5)
CHLORIDE SERPL-SCNC: 83 MMOL/L (ref 98–107)
CO2 SERPL-SCNC: 39 MMOL/L (ref 22–29)
CREAT BLD-MCNC: 0.46 MG/DL (ref 0.57–1)
D DIMER PPP FEU-MCNC: 0.27 MG/L (FEU) (ref 0–0.5)
D-LACTATE SERPL-SCNC: 0.8 MMOL/L (ref 0.5–2)
DEPRECATED RDW RBC AUTO: 52.7 FL (ref 37–54)
EOSINOPHIL # BLD AUTO: 0.12 10*3/MM3 (ref 0–0.4)
EOSINOPHIL NFR BLD AUTO: 1.5 % (ref 0.3–6.2)
ERYTHROCYTE [DISTWIDTH] IN BLOOD BY AUTOMATED COUNT: 14.6 % (ref 12.3–15.4)
GAS FLOW AIRWAY: 2 LPM
GFR SERPL CREATININE-BSD FRML MDRD: 140 ML/MIN/1.73
GLOBULIN UR ELPH-MCNC: 2.7 GM/DL
GLUCOSE BLD-MCNC: 103 MG/DL (ref 65–99)
HCO3 BLDA-SCNC: 40.6 MMOL/L (ref 20–26)
HCT VFR BLD AUTO: 37 % (ref 34–46.6)
HGB BLD-MCNC: 11.6 G/DL (ref 12–15.9)
HOLD SPECIMEN: NORMAL
IMM GRANULOCYTES # BLD AUTO: 0.02 10*3/MM3 (ref 0–0.05)
IMM GRANULOCYTES NFR BLD AUTO: 0.2 % (ref 0–0.5)
LYMPHOCYTES # BLD AUTO: 0.82 10*3/MM3 (ref 0.7–3.1)
LYMPHOCYTES NFR BLD AUTO: 10 % (ref 19.6–45.3)
Lab: ABNORMAL
Lab: ABNORMAL
MCH RBC QN AUTO: 30.9 PG (ref 26.6–33)
MCHC RBC AUTO-ENTMCNC: 31.4 G/DL (ref 31.5–35.7)
MCV RBC AUTO: 98.4 FL (ref 79–97)
MODALITY: ABNORMAL
MONOCYTES # BLD AUTO: 0.77 10*3/MM3 (ref 0.1–0.9)
MONOCYTES NFR BLD AUTO: 9.4 % (ref 5–12)
NEUTROPHILS # BLD AUTO: 6.47 10*3/MM3 (ref 1.7–7)
NEUTROPHILS NFR BLD AUTO: 78.7 % (ref 42.7–76)
NOTIFIED BY: ABNORMAL
NOTIFIED WHO: ABNORMAL
NRBC BLD AUTO-RTO: 0 /100 WBC (ref 0–0.2)
NT-PROBNP SERPL-MCNC: 112.3 PG/ML (ref 5–900)
PCO2 BLDA: 73.4 MM HG (ref 35–45)
PH BLDA: 7.35 PH UNITS (ref 7.35–7.45)
PLATELET # BLD AUTO: 224 10*3/MM3 (ref 140–450)
PMV BLD AUTO: 9.2 FL (ref 6–12)
PO2 BLDA: 80.6 MM HG (ref 83–108)
POTASSIUM BLD-SCNC: 3.8 MMOL/L (ref 3.5–5.2)
PROT SERPL-MCNC: 6.6 G/DL (ref 6–8.5)
RBC # BLD AUTO: 3.76 10*6/MM3 (ref 3.77–5.28)
SAO2 % BLDCOA: 96.1 % (ref 94–99)
SODIUM BLD-SCNC: 131 MMOL/L (ref 136–145)
TROPONIN T SERPL-MCNC: 0.06 NG/ML (ref 0–0.03)
VENTILATOR MODE: ABNORMAL
WBC NRBC COR # BLD: 8.22 10*3/MM3 (ref 3.4–10.8)
WHOLE BLOOD HOLD SPECIMEN: NORMAL
WHOLE BLOOD HOLD SPECIMEN: NORMAL

## 2019-11-20 PROCEDURE — 93005 ELECTROCARDIOGRAM TRACING: CPT | Performed by: NURSE PRACTITIONER

## 2019-11-20 PROCEDURE — 93005 ELECTROCARDIOGRAM TRACING: CPT | Performed by: EMERGENCY MEDICINE

## 2019-11-20 PROCEDURE — 36600 WITHDRAWAL OF ARTERIAL BLOOD: CPT

## 2019-11-20 PROCEDURE — 82803 BLOOD GASES ANY COMBINATION: CPT

## 2019-11-20 PROCEDURE — 93005 ELECTROCARDIOGRAM TRACING: CPT | Performed by: FAMILY MEDICINE

## 2019-11-20 PROCEDURE — 93010 ELECTROCARDIOGRAM REPORT: CPT | Performed by: INTERNAL MEDICINE

## 2019-11-20 PROCEDURE — 85379 FIBRIN DEGRADATION QUANT: CPT | Performed by: EMERGENCY MEDICINE

## 2019-11-20 PROCEDURE — 71045 X-RAY EXAM CHEST 1 VIEW: CPT

## 2019-11-20 PROCEDURE — 94799 UNLISTED PULMONARY SVC/PX: CPT

## 2019-11-20 PROCEDURE — 99285 EMERGENCY DEPT VISIT HI MDM: CPT

## 2019-11-20 PROCEDURE — 93005 ELECTROCARDIOGRAM TRACING: CPT

## 2019-11-20 PROCEDURE — 94640 AIRWAY INHALATION TREATMENT: CPT

## 2019-11-20 PROCEDURE — 84484 ASSAY OF TROPONIN QUANT: CPT | Performed by: NURSE PRACTITIONER

## 2019-11-20 PROCEDURE — 25010000002 METHYLPREDNISOLONE PER 125 MG: Performed by: EMERGENCY MEDICINE

## 2019-11-20 PROCEDURE — 85025 COMPLETE CBC W/AUTO DIFF WBC: CPT | Performed by: EMERGENCY MEDICINE

## 2019-11-20 PROCEDURE — 83880 ASSAY OF NATRIURETIC PEPTIDE: CPT | Performed by: EMERGENCY MEDICINE

## 2019-11-20 PROCEDURE — 84484 ASSAY OF TROPONIN QUANT: CPT | Performed by: EMERGENCY MEDICINE

## 2019-11-20 PROCEDURE — 94760 N-INVAS EAR/PLS OXIMETRY 1: CPT

## 2019-11-20 PROCEDURE — 83605 ASSAY OF LACTIC ACID: CPT | Performed by: EMERGENCY MEDICINE

## 2019-11-20 PROCEDURE — 80053 COMPREHEN METABOLIC PANEL: CPT | Performed by: EMERGENCY MEDICINE

## 2019-11-20 PROCEDURE — 87040 BLOOD CULTURE FOR BACTERIA: CPT | Performed by: EMERGENCY MEDICINE

## 2019-11-20 RX ORDER — BACLOFEN 10 MG/1
10 TABLET ORAL 2 TIMES DAILY
COMMUNITY
End: 2019-11-20

## 2019-11-20 RX ORDER — HYDROCODONE BITARTRATE AND ACETAMINOPHEN 7.5; 325 MG/1; MG/1
1 TABLET ORAL ONCE
Status: COMPLETED | OUTPATIENT
Start: 2019-11-20 | End: 2019-11-20

## 2019-11-20 RX ORDER — ONDANSETRON 2 MG/ML
4 INJECTION INTRAMUSCULAR; INTRAVENOUS EVERY 6 HOURS PRN
Status: DISCONTINUED | OUTPATIENT
Start: 2019-11-20 | End: 2019-11-26 | Stop reason: HOSPADM

## 2019-11-20 RX ORDER — IPRATROPIUM BROMIDE AND ALBUTEROL SULFATE 2.5; .5 MG/3ML; MG/3ML
3 SOLUTION RESPIRATORY (INHALATION) ONCE
Status: COMPLETED | OUTPATIENT
Start: 2019-11-20 | End: 2019-11-20

## 2019-11-20 RX ORDER — TOPIRAMATE 100 MG/1
100 TABLET, FILM COATED ORAL EVERY 12 HOURS SCHEDULED
Status: DISCONTINUED | OUTPATIENT
Start: 2019-11-21 | End: 2019-11-26 | Stop reason: HOSPADM

## 2019-11-20 RX ORDER — ESTRADIOL 2 MG/1
2 TABLET ORAL DAILY
COMMUNITY
End: 2019-11-20

## 2019-11-20 RX ORDER — PREDNISONE 20 MG/1
20 TABLET ORAL 2 TIMES DAILY
Qty: 14 TABLET | Refills: 0 | Status: SHIPPED | OUTPATIENT
Start: 2019-11-20

## 2019-11-20 RX ORDER — BUDESONIDE AND FORMOTEROL FUMARATE DIHYDRATE 160; 4.5 UG/1; UG/1
2 AEROSOL RESPIRATORY (INHALATION)
Status: DISCONTINUED | OUTPATIENT
Start: 2019-11-21 | End: 2019-11-26 | Stop reason: HOSPADM

## 2019-11-20 RX ORDER — NITROGLYCERIN 0.4 MG/1
0.4 TABLET SUBLINGUAL
Status: DISCONTINUED | OUTPATIENT
Start: 2019-11-20 | End: 2019-11-26 | Stop reason: HOSPADM

## 2019-11-20 RX ORDER — ACETAMINOPHEN 325 MG/1
650 TABLET ORAL EVERY 4 HOURS PRN
Status: DISCONTINUED | OUTPATIENT
Start: 2019-11-20 | End: 2019-11-26 | Stop reason: HOSPADM

## 2019-11-20 RX ORDER — GUAIFENESIN 600 MG/1
1200 TABLET, EXTENDED RELEASE ORAL 2 TIMES DAILY
Status: DISCONTINUED | OUTPATIENT
Start: 2019-11-21 | End: 2019-11-26 | Stop reason: HOSPADM

## 2019-11-20 RX ORDER — SODIUM CHLORIDE 0.9 % (FLUSH) 0.9 %
10 SYRINGE (ML) INJECTION AS NEEDED
Status: DISCONTINUED | OUTPATIENT
Start: 2019-11-20 | End: 2019-11-26 | Stop reason: HOSPADM

## 2019-11-20 RX ORDER — SODIUM CHLORIDE 0.9 % (FLUSH) 0.9 %
10 SYRINGE (ML) INJECTION EVERY 12 HOURS SCHEDULED
Status: DISCONTINUED | OUTPATIENT
Start: 2019-11-21 | End: 2019-11-26 | Stop reason: HOSPADM

## 2019-11-20 RX ORDER — IPRATROPIUM BROMIDE AND ALBUTEROL SULFATE 2.5; .5 MG/3ML; MG/3ML
3 SOLUTION RESPIRATORY (INHALATION)
Status: DISCONTINUED | OUTPATIENT
Start: 2019-11-21 | End: 2019-11-21

## 2019-11-20 RX ORDER — TOPIRAMATE 100 MG/1
100 TABLET, FILM COATED ORAL 2 TIMES DAILY
Status: ON HOLD | COMMUNITY
End: 2019-11-21

## 2019-11-20 RX ORDER — OMEPRAZOLE 20 MG/1
40 CAPSULE, DELAYED RELEASE ORAL DAILY
COMMUNITY
End: 2019-11-20

## 2019-11-20 RX ORDER — NAPROXEN 500 MG/1
500 TABLET ORAL 2 TIMES DAILY WITH MEALS
COMMUNITY
End: 2019-11-20

## 2019-11-20 RX ORDER — NAPROXEN 500 MG/1
500 TABLET ORAL 2 TIMES DAILY WITH MEALS
COMMUNITY

## 2019-11-20 RX ORDER — ONDANSETRON 4 MG/1
4 TABLET, FILM COATED ORAL EVERY 6 HOURS PRN
Status: ON HOLD | COMMUNITY
End: 2019-11-21

## 2019-11-20 RX ORDER — ESTRADIOL 2 MG/1
2 TABLET ORAL DAILY
Status: ON HOLD | COMMUNITY
End: 2019-11-21

## 2019-11-20 RX ORDER — OMEPRAZOLE 40 MG/1
40 CAPSULE, DELAYED RELEASE ORAL DAILY
Status: ON HOLD | COMMUNITY
End: 2019-11-21

## 2019-11-20 RX ORDER — ASPIRIN 81 MG/1
81 TABLET ORAL DAILY
Status: DISCONTINUED | OUTPATIENT
Start: 2019-11-21 | End: 2019-11-26 | Stop reason: HOSPADM

## 2019-11-20 RX ORDER — ACETAMINOPHEN 650 MG/1
650 SUPPOSITORY RECTAL EVERY 4 HOURS PRN
Status: DISCONTINUED | OUTPATIENT
Start: 2019-11-20 | End: 2019-11-26 | Stop reason: HOSPADM

## 2019-11-20 RX ORDER — BACLOFEN 10 MG/1
10 TABLET ORAL 2 TIMES DAILY
Status: ON HOLD | COMMUNITY
End: 2019-11-21

## 2019-11-20 RX ORDER — FUROSEMIDE 20 MG/1
20 TABLET ORAL
Status: DISCONTINUED | OUTPATIENT
Start: 2019-11-21 | End: 2019-11-26 | Stop reason: HOSPADM

## 2019-11-20 RX ORDER — FUROSEMIDE 20 MG/1
20 TABLET ORAL 2 TIMES DAILY
COMMUNITY

## 2019-11-20 RX ORDER — METHYLPREDNISOLONE SODIUM SUCCINATE 125 MG/2ML
125 INJECTION, POWDER, LYOPHILIZED, FOR SOLUTION INTRAMUSCULAR; INTRAVENOUS ONCE
Status: COMPLETED | OUTPATIENT
Start: 2019-11-20 | End: 2019-11-20

## 2019-11-20 RX ORDER — MEGESTROL ACETATE 40 MG/ML
400 SUSPENSION ORAL DAILY
Status: DISCONTINUED | OUTPATIENT
Start: 2019-11-21 | End: 2019-11-26 | Stop reason: HOSPADM

## 2019-11-20 RX ORDER — METHYLPREDNISOLONE SODIUM SUCCINATE 125 MG/2ML
60 INJECTION, POWDER, LYOPHILIZED, FOR SOLUTION INTRAMUSCULAR; INTRAVENOUS EVERY 6 HOURS SCHEDULED
Status: DISCONTINUED | OUTPATIENT
Start: 2019-11-21 | End: 2019-11-23

## 2019-11-20 RX ORDER — PANTOPRAZOLE SODIUM 40 MG/1
40 TABLET, DELAYED RELEASE ORAL DAILY
Status: DISCONTINUED | OUTPATIENT
Start: 2019-11-21 | End: 2019-11-22

## 2019-11-20 RX ORDER — ONDANSETRON 4 MG/1
4 TABLET, FILM COATED ORAL EVERY 6 HOURS PRN
Status: DISCONTINUED | OUTPATIENT
Start: 2019-11-20 | End: 2019-11-26 | Stop reason: HOSPADM

## 2019-11-20 RX ORDER — ACETAMINOPHEN 160 MG/5ML
650 SOLUTION ORAL EVERY 4 HOURS PRN
Status: DISCONTINUED | OUTPATIENT
Start: 2019-11-20 | End: 2019-11-26 | Stop reason: HOSPADM

## 2019-11-20 RX ADMIN — IPRATROPIUM BROMIDE AND ALBUTEROL SULFATE 3 ML: 2.5; .5 SOLUTION RESPIRATORY (INHALATION) at 20:22

## 2019-11-20 RX ADMIN — HYDROCODONE BITARTRATE AND ACETAMINOPHEN 1 TABLET: 7.5; 325 TABLET ORAL at 20:00

## 2019-11-20 RX ADMIN — METHYLPREDNISOLONE SODIUM SUCCINATE 125 MG: 125 INJECTION, POWDER, FOR SOLUTION INTRAMUSCULAR; INTRAVENOUS at 20:00

## 2019-11-20 RX ADMIN — IPRATROPIUM BROMIDE AND ALBUTEROL SULFATE 3 ML: 2.5; .5 SOLUTION RESPIRATORY (INHALATION) at 23:47

## 2019-11-21 LAB
ANION GAP SERPL CALCULATED.3IONS-SCNC: 8 MMOL/L (ref 5–15)
BUN BLD-MCNC: 7 MG/DL (ref 6–20)
BUN/CREAT SERPL: 13.5 (ref 7–25)
CALCIUM SPEC-SCNC: 9.2 MG/DL (ref 8.6–10.5)
CHLORIDE SERPL-SCNC: 82 MMOL/L (ref 98–107)
CHOLEST SERPL-MCNC: 189 MG/DL (ref 0–200)
CO2 SERPL-SCNC: 39 MMOL/L (ref 22–29)
CREAT BLD-MCNC: 0.52 MG/DL (ref 0.57–1)
DEPRECATED RDW RBC AUTO: 51.2 FL (ref 37–54)
ERYTHROCYTE [DISTWIDTH] IN BLOOD BY AUTOMATED COUNT: 14.6 % (ref 12.3–15.4)
GFR SERPL CREATININE-BSD FRML MDRD: 122 ML/MIN/1.73
GLUCOSE BLD-MCNC: 128 MG/DL (ref 65–99)
HBA1C MFR BLD: 4.6 % (ref 4.8–5.6)
HCT VFR BLD AUTO: 35.5 % (ref 34–46.6)
HDLC SERPL-MCNC: 84 MG/DL (ref 40–60)
HGB BLD-MCNC: 11.5 G/DL (ref 12–15.9)
LDLC SERPL CALC-MCNC: 92 MG/DL (ref 0–100)
LDLC/HDLC SERPL: 1.1 {RATIO}
MCH RBC QN AUTO: 31.1 PG (ref 26.6–33)
MCHC RBC AUTO-ENTMCNC: 32.4 G/DL (ref 31.5–35.7)
MCV RBC AUTO: 95.9 FL (ref 79–97)
PLATELET # BLD AUTO: 248 10*3/MM3 (ref 140–450)
PMV BLD AUTO: 9.6 FL (ref 6–12)
POTASSIUM BLD-SCNC: 4.8 MMOL/L (ref 3.5–5.2)
RBC # BLD AUTO: 3.7 10*6/MM3 (ref 3.77–5.28)
SODIUM BLD-SCNC: 129 MMOL/L (ref 136–145)
TRIGL SERPL-MCNC: 65 MG/DL (ref 0–150)
TROPONIN T SERPL-MCNC: 0.04 NG/ML (ref 0–0.03)
TROPONIN T SERPL-MCNC: 0.05 NG/ML (ref 0–0.03)
TSH SERPL DL<=0.05 MIU/L-ACNC: 0.35 UIU/ML (ref 0.27–4.2)
VLDLC SERPL-MCNC: 13 MG/DL
WBC NRBC COR # BLD: 3.08 10*3/MM3 (ref 3.4–10.8)

## 2019-11-21 PROCEDURE — 25010000002 ENOXAPARIN PER 10 MG: Performed by: NURSE PRACTITIONER

## 2019-11-21 PROCEDURE — 85027 COMPLETE CBC AUTOMATED: CPT | Performed by: NURSE PRACTITIONER

## 2019-11-21 PROCEDURE — 94660 CPAP INITIATION&MGMT: CPT

## 2019-11-21 PROCEDURE — 94799 UNLISTED PULMONARY SVC/PX: CPT

## 2019-11-21 PROCEDURE — 84443 ASSAY THYROID STIM HORMONE: CPT | Performed by: NURSE PRACTITIONER

## 2019-11-21 PROCEDURE — 97530 THERAPEUTIC ACTIVITIES: CPT

## 2019-11-21 PROCEDURE — 25010000002 CEFTRIAXONE PER 250 MG: Performed by: NURSE PRACTITIONER

## 2019-11-21 PROCEDURE — 80048 BASIC METABOLIC PNL TOTAL CA: CPT | Performed by: NURSE PRACTITIONER

## 2019-11-21 PROCEDURE — 83036 HEMOGLOBIN GLYCOSYLATED A1C: CPT | Performed by: NURSE PRACTITIONER

## 2019-11-21 PROCEDURE — 94640 AIRWAY INHALATION TREATMENT: CPT

## 2019-11-21 PROCEDURE — 93010 ELECTROCARDIOGRAM REPORT: CPT | Performed by: INTERNAL MEDICINE

## 2019-11-21 PROCEDURE — 25010000002 KETOROLAC TROMETHAMINE PER 15 MG: Performed by: FAMILY MEDICINE

## 2019-11-21 PROCEDURE — 93005 ELECTROCARDIOGRAM TRACING: CPT | Performed by: NURSE PRACTITIONER

## 2019-11-21 PROCEDURE — 97165 OT EVAL LOW COMPLEX 30 MIN: CPT

## 2019-11-21 PROCEDURE — 84484 ASSAY OF TROPONIN QUANT: CPT | Performed by: NURSE PRACTITIONER

## 2019-11-21 PROCEDURE — 97162 PT EVAL MOD COMPLEX 30 MIN: CPT

## 2019-11-21 PROCEDURE — 25010000002 METHYLPREDNISOLONE PER 125 MG: Performed by: NURSE PRACTITIONER

## 2019-11-21 PROCEDURE — 80061 LIPID PANEL: CPT | Performed by: NURSE PRACTITIONER

## 2019-11-21 PROCEDURE — 97110 THERAPEUTIC EXERCISES: CPT

## 2019-11-21 RX ORDER — TRAMADOL HYDROCHLORIDE 50 MG/1
25 TABLET ORAL EVERY 6 HOURS PRN
Status: DISCONTINUED | OUTPATIENT
Start: 2019-11-21 | End: 2019-11-21

## 2019-11-21 RX ORDER — TRAMADOL HYDROCHLORIDE 50 MG/1
50 TABLET ORAL EVERY 6 HOURS PRN
Status: DISCONTINUED | OUTPATIENT
Start: 2019-11-21 | End: 2019-11-26 | Stop reason: HOSPADM

## 2019-11-21 RX ORDER — KETOROLAC TROMETHAMINE 30 MG/ML
15 INJECTION, SOLUTION INTRAMUSCULAR; INTRAVENOUS EVERY 6 HOURS PRN
Status: DISPENSED | OUTPATIENT
Start: 2019-11-21 | End: 2019-11-26

## 2019-11-21 RX ORDER — IPRATROPIUM BROMIDE AND ALBUTEROL SULFATE 2.5; .5 MG/3ML; MG/3ML
3 SOLUTION RESPIRATORY (INHALATION)
Status: DISCONTINUED | OUTPATIENT
Start: 2019-11-21 | End: 2019-11-24

## 2019-11-21 RX ORDER — HYDROXYZINE HYDROCHLORIDE 25 MG/1
25 TABLET, FILM COATED ORAL 4 TIMES DAILY PRN
Status: DISCONTINUED | OUTPATIENT
Start: 2019-11-21 | End: 2019-11-26 | Stop reason: HOSPADM

## 2019-11-21 RX ORDER — GUAIFENESIN 600 MG/1
600 TABLET, EXTENDED RELEASE ORAL 2 TIMES DAILY
COMMUNITY

## 2019-11-21 RX ORDER — ALBUTEROL SULFATE 90 UG/1
2 AEROSOL, METERED RESPIRATORY (INHALATION) EVERY 4 HOURS PRN
COMMUNITY

## 2019-11-21 RX ADMIN — METHYLPREDNISOLONE SODIUM SUCCINATE 60 MG: 125 INJECTION, POWDER, FOR SOLUTION INTRAMUSCULAR; INTRAVENOUS at 00:31

## 2019-11-21 RX ADMIN — METHYLPREDNISOLONE SODIUM SUCCINATE 60 MG: 125 INJECTION, POWDER, FOR SOLUTION INTRAMUSCULAR; INTRAVENOUS at 23:21

## 2019-11-21 RX ADMIN — METOPROLOL TARTRATE 12.5 MG: 25 TABLET, FILM COATED ORAL at 00:31

## 2019-11-21 RX ADMIN — METOPROLOL TARTRATE 12.5 MG: 25 TABLET, FILM COATED ORAL at 22:41

## 2019-11-21 RX ADMIN — KETOROLAC TROMETHAMINE 15 MG: 30 INJECTION, SOLUTION INTRAMUSCULAR; INTRAVENOUS at 21:20

## 2019-11-21 RX ADMIN — TOPIRAMATE 100 MG: 100 TABLET, FILM COATED ORAL at 00:31

## 2019-11-21 RX ADMIN — KETOROLAC TROMETHAMINE 15 MG: 30 INJECTION, SOLUTION INTRAMUSCULAR; INTRAVENOUS at 15:21

## 2019-11-21 RX ADMIN — METHYLPREDNISOLONE SODIUM SUCCINATE 60 MG: 125 INJECTION, POWDER, FOR SOLUTION INTRAMUSCULAR; INTRAVENOUS at 06:34

## 2019-11-21 RX ADMIN — IPRATROPIUM BROMIDE AND ALBUTEROL SULFATE 3 ML: 2.5; .5 SOLUTION RESPIRATORY (INHALATION) at 11:21

## 2019-11-21 RX ADMIN — METHYLPREDNISOLONE SODIUM SUCCINATE 60 MG: 125 INJECTION, POWDER, FOR SOLUTION INTRAMUSCULAR; INTRAVENOUS at 18:09

## 2019-11-21 RX ADMIN — SODIUM CHLORIDE, PRESERVATIVE FREE 10 ML: 5 INJECTION INTRAVENOUS at 00:31

## 2019-11-21 RX ADMIN — HYDROXYZINE HYDROCHLORIDE 25 MG: 25 TABLET ORAL at 21:19

## 2019-11-21 RX ADMIN — NICOTINE 1 PATCH: 7 PATCH, EXTENDED RELEASE TRANSDERMAL at 08:54

## 2019-11-21 RX ADMIN — TOPIRAMATE 100 MG: 100 TABLET, FILM COATED ORAL at 08:54

## 2019-11-21 RX ADMIN — CEFTRIAXONE SODIUM 1 G: 1 INJECTION, POWDER, FOR SOLUTION INTRAMUSCULAR; INTRAVENOUS at 00:31

## 2019-11-21 RX ADMIN — METOPROLOL TARTRATE 12.5 MG: 25 TABLET, FILM COATED ORAL at 08:55

## 2019-11-21 RX ADMIN — ACETAMINOPHEN 650 MG: 325 TABLET, FILM COATED ORAL at 08:54

## 2019-11-21 RX ADMIN — GUAIFENESIN 1200 MG: 600 TABLET, EXTENDED RELEASE ORAL at 08:54

## 2019-11-21 RX ADMIN — MEGESTROL ACETATE 400 MG: 40 SUSPENSION ORAL at 08:53

## 2019-11-21 RX ADMIN — HYDROXYZINE HYDROCHLORIDE 25 MG: 25 TABLET ORAL at 10:52

## 2019-11-21 RX ADMIN — FUROSEMIDE 20 MG: 20 TABLET ORAL at 08:54

## 2019-11-21 RX ADMIN — KETOROLAC TROMETHAMINE 15 MG: 30 INJECTION, SOLUTION INTRAMUSCULAR; INTRAVENOUS at 00:38

## 2019-11-21 RX ADMIN — KETOROLAC TROMETHAMINE 15 MG: 30 INJECTION, SOLUTION INTRAMUSCULAR; INTRAVENOUS at 06:34

## 2019-11-21 RX ADMIN — FUROSEMIDE 20 MG: 20 TABLET ORAL at 18:09

## 2019-11-21 RX ADMIN — GUAIFENESIN 1200 MG: 600 TABLET, EXTENDED RELEASE ORAL at 21:19

## 2019-11-21 RX ADMIN — SODIUM CHLORIDE, PRESERVATIVE FREE 10 ML: 5 INJECTION INTRAVENOUS at 22:43

## 2019-11-21 RX ADMIN — GUAIFENESIN 1200 MG: 600 TABLET, EXTENDED RELEASE ORAL at 00:31

## 2019-11-21 RX ADMIN — ENOXAPARIN SODIUM 30 MG: 30 INJECTION SUBCUTANEOUS at 08:53

## 2019-11-21 RX ADMIN — ACETAMINOPHEN 650 MG: 325 TABLET, FILM COATED ORAL at 21:19

## 2019-11-21 RX ADMIN — ASPIRIN 81 MG: 81 TABLET ORAL at 08:54

## 2019-11-21 RX ADMIN — PANTOPRAZOLE SODIUM 40 MG: 40 TABLET, DELAYED RELEASE ORAL at 08:54

## 2019-11-21 RX ADMIN — TRAMADOL HYDROCHLORIDE 50 MG: 50 TABLET ORAL at 10:52

## 2019-11-21 RX ADMIN — IPRATROPIUM BROMIDE AND ALBUTEROL SULFATE 3 ML: 2.5; .5 SOLUTION RESPIRATORY (INHALATION) at 14:53

## 2019-11-21 RX ADMIN — TRAMADOL HYDROCHLORIDE 50 MG: 50 TABLET ORAL at 17:01

## 2019-11-21 RX ADMIN — IPRATROPIUM BROMIDE AND ALBUTEROL SULFATE 3 ML: 2.5; .5 SOLUTION RESPIRATORY (INHALATION) at 21:02

## 2019-11-21 RX ADMIN — CEFTRIAXONE SODIUM 1 G: 1 INJECTION, POWDER, FOR SOLUTION INTRAMUSCULAR; INTRAVENOUS at 23:21

## 2019-11-21 RX ADMIN — BUDESONIDE AND FORMOTEROL FUMARATE DIHYDRATE 2 PUFF: 160; 4.5 AEROSOL RESPIRATORY (INHALATION) at 06:31

## 2019-11-21 RX ADMIN — IPRATROPIUM BROMIDE AND ALBUTEROL SULFATE 3 ML: 2.5; .5 SOLUTION RESPIRATORY (INHALATION) at 06:20

## 2019-11-21 RX ADMIN — METHYLPREDNISOLONE SODIUM SUCCINATE 60 MG: 125 INJECTION, POWDER, FOR SOLUTION INTRAMUSCULAR; INTRAVENOUS at 12:38

## 2019-11-21 RX ADMIN — SODIUM CHLORIDE, PRESERVATIVE FREE 10 ML: 5 INJECTION INTRAVENOUS at 08:53

## 2019-11-21 RX ADMIN — TOPIRAMATE 100 MG: 100 TABLET, FILM COATED ORAL at 21:19

## 2019-11-21 RX ADMIN — BUDESONIDE AND FORMOTEROL FUMARATE DIHYDRATE 2 PUFF: 160; 4.5 AEROSOL RESPIRATORY (INHALATION) at 23:53

## 2019-11-21 RX ADMIN — TRAMADOL HYDROCHLORIDE 50 MG: 50 TABLET ORAL at 22:41

## 2019-11-22 PROBLEM — D64.9 ANEMIA: Status: ACTIVE | Noted: 2019-11-22

## 2019-11-22 PROBLEM — I50.32 DIASTOLIC DYSFUNCTION WITH CHRONIC HEART FAILURE (HCC): Status: ACTIVE | Noted: 2019-11-22

## 2019-11-22 PROCEDURE — 25010000002 AZITHROMYCIN PER 500 MG: Performed by: INTERNAL MEDICINE

## 2019-11-22 PROCEDURE — 25010000002 METHYLPREDNISOLONE PER 125 MG: Performed by: NURSE PRACTITIONER

## 2019-11-22 PROCEDURE — 94799 UNLISTED PULMONARY SVC/PX: CPT

## 2019-11-22 PROCEDURE — 25010000002 CEFTRIAXONE PER 250 MG: Performed by: NURSE PRACTITIONER

## 2019-11-22 PROCEDURE — 25010000002 KETOROLAC TROMETHAMINE PER 15 MG: Performed by: FAMILY MEDICINE

## 2019-11-22 PROCEDURE — 97110 THERAPEUTIC EXERCISES: CPT

## 2019-11-22 PROCEDURE — 97116 GAIT TRAINING THERAPY: CPT

## 2019-11-22 PROCEDURE — 25010000002 ENOXAPARIN PER 10 MG: Performed by: NURSE PRACTITIONER

## 2019-11-22 PROCEDURE — 94660 CPAP INITIATION&MGMT: CPT

## 2019-11-22 RX ORDER — PANTOPRAZOLE SODIUM 40 MG/1
40 TABLET, DELAYED RELEASE ORAL
Status: DISCONTINUED | OUTPATIENT
Start: 2019-11-23 | End: 2019-11-26 | Stop reason: HOSPADM

## 2019-11-22 RX ADMIN — KETOROLAC TROMETHAMINE 15 MG: 30 INJECTION, SOLUTION INTRAMUSCULAR; INTRAVENOUS at 07:34

## 2019-11-22 RX ADMIN — TRAMADOL HYDROCHLORIDE 50 MG: 50 TABLET ORAL at 22:58

## 2019-11-22 RX ADMIN — TOPIRAMATE 100 MG: 100 TABLET, FILM COATED ORAL at 09:00

## 2019-11-22 RX ADMIN — FUROSEMIDE 20 MG: 20 TABLET ORAL at 17:54

## 2019-11-22 RX ADMIN — ACETAMINOPHEN 650 MG: 325 TABLET, FILM COATED ORAL at 07:34

## 2019-11-22 RX ADMIN — AZITHROMYCIN MONOHYDRATE 500 MG: 500 INJECTION, POWDER, LYOPHILIZED, FOR SOLUTION INTRAVENOUS at 11:21

## 2019-11-22 RX ADMIN — HYDROXYZINE HYDROCHLORIDE 25 MG: 25 TABLET ORAL at 11:15

## 2019-11-22 RX ADMIN — IPRATROPIUM BROMIDE AND ALBUTEROL SULFATE 3 ML: 2.5; .5 SOLUTION RESPIRATORY (INHALATION) at 11:27

## 2019-11-22 RX ADMIN — KETOROLAC TROMETHAMINE 15 MG: 30 INJECTION, SOLUTION INTRAMUSCULAR; INTRAVENOUS at 20:36

## 2019-11-22 RX ADMIN — BUDESONIDE AND FORMOTEROL FUMARATE DIHYDRATE 2 PUFF: 160; 4.5 AEROSOL RESPIRATORY (INHALATION) at 07:22

## 2019-11-22 RX ADMIN — GUAIFENESIN 1200 MG: 600 TABLET, EXTENDED RELEASE ORAL at 20:36

## 2019-11-22 RX ADMIN — TRAMADOL HYDROCHLORIDE 50 MG: 50 TABLET ORAL at 05:14

## 2019-11-22 RX ADMIN — METOPROLOL TARTRATE 12.5 MG: 25 TABLET, FILM COATED ORAL at 20:35

## 2019-11-22 RX ADMIN — SODIUM CHLORIDE, PRESERVATIVE FREE 10 ML: 5 INJECTION INTRAVENOUS at 09:00

## 2019-11-22 RX ADMIN — IPRATROPIUM BROMIDE AND ALBUTEROL SULFATE 3 ML: 2.5; .5 SOLUTION RESPIRATORY (INHALATION) at 07:22

## 2019-11-22 RX ADMIN — HYDROXYZINE HYDROCHLORIDE 25 MG: 25 TABLET ORAL at 22:58

## 2019-11-22 RX ADMIN — HYDROXYZINE HYDROCHLORIDE 25 MG: 25 TABLET ORAL at 05:14

## 2019-11-22 RX ADMIN — ACETAMINOPHEN 650 MG: 325 TABLET, FILM COATED ORAL at 20:36

## 2019-11-22 RX ADMIN — KETOROLAC TROMETHAMINE 15 MG: 30 INJECTION, SOLUTION INTRAMUSCULAR; INTRAVENOUS at 15:09

## 2019-11-22 RX ADMIN — METHYLPREDNISOLONE SODIUM SUCCINATE 60 MG: 125 INJECTION, POWDER, FOR SOLUTION INTRAMUSCULAR; INTRAVENOUS at 11:15

## 2019-11-22 RX ADMIN — IPRATROPIUM BROMIDE AND ALBUTEROL SULFATE 3 ML: 2.5; .5 SOLUTION RESPIRATORY (INHALATION) at 19:47

## 2019-11-22 RX ADMIN — METHYLPREDNISOLONE SODIUM SUCCINATE 60 MG: 125 INJECTION, POWDER, FOR SOLUTION INTRAMUSCULAR; INTRAVENOUS at 23:03

## 2019-11-22 RX ADMIN — METHYLPREDNISOLONE SODIUM SUCCINATE 60 MG: 125 INJECTION, POWDER, FOR SOLUTION INTRAMUSCULAR; INTRAVENOUS at 17:54

## 2019-11-22 RX ADMIN — METHYLPREDNISOLONE SODIUM SUCCINATE 60 MG: 125 INJECTION, POWDER, FOR SOLUTION INTRAMUSCULAR; INTRAVENOUS at 05:14

## 2019-11-22 RX ADMIN — TRAMADOL HYDROCHLORIDE 50 MG: 50 TABLET ORAL at 17:54

## 2019-11-22 RX ADMIN — IPRATROPIUM BROMIDE AND ALBUTEROL SULFATE 3 ML: 2.5; .5 SOLUTION RESPIRATORY (INHALATION) at 04:15

## 2019-11-22 RX ADMIN — TOPIRAMATE 100 MG: 100 TABLET, FILM COATED ORAL at 20:35

## 2019-11-22 RX ADMIN — BUDESONIDE AND FORMOTEROL FUMARATE DIHYDRATE 2 PUFF: 160; 4.5 AEROSOL RESPIRATORY (INHALATION) at 19:47

## 2019-11-22 RX ADMIN — IPRATROPIUM BROMIDE AND ALBUTEROL SULFATE 3 ML: 2.5; .5 SOLUTION RESPIRATORY (INHALATION) at 15:06

## 2019-11-22 RX ADMIN — CEFTRIAXONE SODIUM 1 G: 1 INJECTION, POWDER, FOR SOLUTION INTRAMUSCULAR; INTRAVENOUS at 23:04

## 2019-11-22 RX ADMIN — SODIUM CHLORIDE, PRESERVATIVE FREE 10 ML: 5 INJECTION INTRAVENOUS at 22:59

## 2019-11-22 RX ADMIN — IPRATROPIUM BROMIDE AND ALBUTEROL SULFATE 3 ML: 2.5; .5 SOLUTION RESPIRATORY (INHALATION) at 23:43

## 2019-11-22 RX ADMIN — PANTOPRAZOLE SODIUM 40 MG: 40 TABLET, DELAYED RELEASE ORAL at 05:14

## 2019-11-22 RX ADMIN — TRAMADOL HYDROCHLORIDE 50 MG: 50 TABLET ORAL at 11:15

## 2019-11-22 RX ADMIN — GUAIFENESIN 1200 MG: 600 TABLET, EXTENDED RELEASE ORAL at 09:00

## 2019-11-22 RX ADMIN — ASPIRIN 81 MG: 81 TABLET ORAL at 08:59

## 2019-11-22 RX ADMIN — HYDROXYZINE HYDROCHLORIDE 25 MG: 25 TABLET ORAL at 17:54

## 2019-11-22 RX ADMIN — METOPROLOL TARTRATE 12.5 MG: 25 TABLET, FILM COATED ORAL at 08:59

## 2019-11-22 RX ADMIN — MEGESTROL ACETATE 400 MG: 40 SUSPENSION ORAL at 08:59

## 2019-11-22 RX ADMIN — IPRATROPIUM BROMIDE AND ALBUTEROL SULFATE 3 ML: 2.5; .5 SOLUTION RESPIRATORY (INHALATION) at 00:03

## 2019-11-22 RX ADMIN — NICOTINE 1 PATCH: 7 PATCH, EXTENDED RELEASE TRANSDERMAL at 09:01

## 2019-11-22 RX ADMIN — ENOXAPARIN SODIUM 30 MG: 30 INJECTION SUBCUTANEOUS at 08:59

## 2019-11-22 RX ADMIN — FUROSEMIDE 20 MG: 20 TABLET ORAL at 08:59

## 2019-11-23 PROBLEM — R41.0 DELIRIUM: Status: ACTIVE | Noted: 2019-11-23

## 2019-11-23 PROBLEM — J96.22 ACUTE ON CHRONIC RESPIRATORY FAILURE WITH HYPOXIA AND HYPERCAPNIA (HCC): Status: ACTIVE | Noted: 2019-11-23

## 2019-11-23 PROBLEM — J96.21 ACUTE ON CHRONIC RESPIRATORY FAILURE WITH HYPOXIA AND HYPERCAPNIA (HCC): Status: ACTIVE | Noted: 2019-11-23

## 2019-11-23 LAB
ANION GAP SERPL CALCULATED.3IONS-SCNC: 6 MMOL/L (ref 5–15)
ARTERIAL PATENCY WRIST A: POSITIVE
ATMOSPHERIC PRESS: 746 MMHG
BASE EXCESS BLDA CALC-SCNC: 14.4 MMOL/L (ref 0–2)
BDY SITE: ABNORMAL
BODY TEMPERATURE: 37 C
BUN BLD-MCNC: 30 MG/DL (ref 6–20)
BUN/CREAT SERPL: 45.5 (ref 7–25)
CALCIUM SPEC-SCNC: 8.8 MG/DL (ref 8.6–10.5)
CHLORIDE SERPL-SCNC: 79 MMOL/L (ref 98–107)
CO2 SERPL-SCNC: 44 MMOL/L (ref 22–29)
CREAT BLD-MCNC: 0.66 MG/DL (ref 0.57–1)
GAS FLOW AIRWAY: 3 LPM
GFR SERPL CREATININE-BSD FRML MDRD: 92 ML/MIN/1.73
GLUCOSE BLD-MCNC: 108 MG/DL (ref 65–99)
HCO3 BLDA-SCNC: 43.8 MMOL/L (ref 20–26)
Lab: ABNORMAL
Lab: ABNORMAL
MODALITY: ABNORMAL
NOTIFIED BY: ABNORMAL
NOTIFIED WHO: ABNORMAL
PCO2 BLDA: 83.4 MM HG (ref 35–45)
PH BLDA: 7.33 PH UNITS (ref 7.35–7.45)
PO2 BLDA: 91.7 MM HG (ref 83–108)
POTASSIUM BLD-SCNC: 3.6 MMOL/L (ref 3.5–5.2)
SAO2 % BLDCOA: 96.5 % (ref 94–99)
SODIUM BLD-SCNC: 129 MMOL/L (ref 136–145)
VENTILATOR MODE: ABNORMAL

## 2019-11-23 PROCEDURE — 25010000002 ENOXAPARIN PER 10 MG: Performed by: NURSE PRACTITIONER

## 2019-11-23 PROCEDURE — 36600 WITHDRAWAL OF ARTERIAL BLOOD: CPT

## 2019-11-23 PROCEDURE — 25010000002 KETOROLAC TROMETHAMINE PER 15 MG: Performed by: FAMILY MEDICINE

## 2019-11-23 PROCEDURE — 97110 THERAPEUTIC EXERCISES: CPT

## 2019-11-23 PROCEDURE — 25010000002 METHYLPREDNISOLONE PER 125 MG: Performed by: NURSE PRACTITIONER

## 2019-11-23 PROCEDURE — 94799 UNLISTED PULMONARY SVC/PX: CPT

## 2019-11-23 PROCEDURE — 25010000002 HALOPERIDOL LACTATE PER 5 MG: Performed by: INTERNAL MEDICINE

## 2019-11-23 PROCEDURE — 25010000002 METHYLPREDNISOLONE PER 40 MG: Performed by: INTERNAL MEDICINE

## 2019-11-23 PROCEDURE — 25010000002 AZITHROMYCIN PER 500 MG: Performed by: INTERNAL MEDICINE

## 2019-11-23 PROCEDURE — 82803 BLOOD GASES ANY COMBINATION: CPT

## 2019-11-23 PROCEDURE — 97535 SELF CARE MNGMENT TRAINING: CPT

## 2019-11-23 PROCEDURE — 97116 GAIT TRAINING THERAPY: CPT

## 2019-11-23 PROCEDURE — 80048 BASIC METABOLIC PNL TOTAL CA: CPT | Performed by: INTERNAL MEDICINE

## 2019-11-23 RX ORDER — QUETIAPINE FUMARATE 25 MG/1
12.5 TABLET, FILM COATED ORAL NIGHTLY
Status: DISCONTINUED | OUTPATIENT
Start: 2019-11-23 | End: 2019-11-26 | Stop reason: HOSPADM

## 2019-11-23 RX ORDER — METHYLPREDNISOLONE SODIUM SUCCINATE 40 MG/ML
40 INJECTION, POWDER, LYOPHILIZED, FOR SOLUTION INTRAMUSCULAR; INTRAVENOUS EVERY 8 HOURS
Status: DISCONTINUED | OUTPATIENT
Start: 2019-11-23 | End: 2019-11-24

## 2019-11-23 RX ORDER — HALOPERIDOL 5 MG/ML
1 INJECTION INTRAMUSCULAR ONCE
Status: COMPLETED | OUTPATIENT
Start: 2019-11-23 | End: 2019-11-23

## 2019-11-23 RX ADMIN — METHYLPREDNISOLONE SODIUM SUCCINATE 60 MG: 125 INJECTION, POWDER, FOR SOLUTION INTRAMUSCULAR; INTRAVENOUS at 11:18

## 2019-11-23 RX ADMIN — ASPIRIN 81 MG: 81 TABLET ORAL at 10:06

## 2019-11-23 RX ADMIN — KETOROLAC TROMETHAMINE 15 MG: 30 INJECTION, SOLUTION INTRAMUSCULAR; INTRAVENOUS at 04:19

## 2019-11-23 RX ADMIN — HYDROXYZINE HYDROCHLORIDE 25 MG: 25 TABLET ORAL at 14:27

## 2019-11-23 RX ADMIN — BUDESONIDE AND FORMOTEROL FUMARATE DIHYDRATE 2 PUFF: 160; 4.5 AEROSOL RESPIRATORY (INHALATION) at 06:58

## 2019-11-23 RX ADMIN — TOPIRAMATE 100 MG: 100 TABLET, FILM COATED ORAL at 10:06

## 2019-11-23 RX ADMIN — TRAMADOL HYDROCHLORIDE 50 MG: 50 TABLET ORAL at 05:22

## 2019-11-23 RX ADMIN — ENOXAPARIN SODIUM 30 MG: 30 INJECTION SUBCUTANEOUS at 10:06

## 2019-11-23 RX ADMIN — FUROSEMIDE 20 MG: 20 TABLET ORAL at 17:48

## 2019-11-23 RX ADMIN — TOPIRAMATE 100 MG: 100 TABLET, FILM COATED ORAL at 21:27

## 2019-11-23 RX ADMIN — IPRATROPIUM BROMIDE AND ALBUTEROL SULFATE 3 ML: 2.5; .5 SOLUTION RESPIRATORY (INHALATION) at 03:35

## 2019-11-23 RX ADMIN — IPRATROPIUM BROMIDE AND ALBUTEROL SULFATE 3 ML: 2.5; .5 SOLUTION RESPIRATORY (INHALATION) at 10:15

## 2019-11-23 RX ADMIN — TRAMADOL HYDROCHLORIDE 50 MG: 50 TABLET ORAL at 17:01

## 2019-11-23 RX ADMIN — ACETAMINOPHEN 650 MG: 325 TABLET, FILM COATED ORAL at 04:20

## 2019-11-23 RX ADMIN — IPRATROPIUM BROMIDE AND ALBUTEROL SULFATE 3 ML: 2.5; .5 SOLUTION RESPIRATORY (INHALATION) at 14:03

## 2019-11-23 RX ADMIN — METHYLPREDNISOLONE SODIUM SUCCINATE 40 MG: 125 INJECTION, POWDER, FOR SOLUTION INTRAMUSCULAR; INTRAVENOUS at 21:28

## 2019-11-23 RX ADMIN — FUROSEMIDE 20 MG: 20 TABLET ORAL at 10:07

## 2019-11-23 RX ADMIN — SODIUM CHLORIDE, PRESERVATIVE FREE 10 ML: 5 INJECTION INTRAVENOUS at 10:06

## 2019-11-23 RX ADMIN — METOPROLOL TARTRATE 12.5 MG: 25 TABLET, FILM COATED ORAL at 21:27

## 2019-11-23 RX ADMIN — GUAIFENESIN 1200 MG: 600 TABLET, EXTENDED RELEASE ORAL at 10:07

## 2019-11-23 RX ADMIN — METOPROLOL TARTRATE 12.5 MG: 25 TABLET, FILM COATED ORAL at 10:07

## 2019-11-23 RX ADMIN — AZITHROMYCIN MONOHYDRATE 500 MG: 500 INJECTION, POWDER, LYOPHILIZED, FOR SOLUTION INTRAVENOUS at 11:18

## 2019-11-23 RX ADMIN — IPRATROPIUM BROMIDE AND ALBUTEROL SULFATE 3 ML: 2.5; .5 SOLUTION RESPIRATORY (INHALATION) at 18:30

## 2019-11-23 RX ADMIN — QUETIAPINE FUMARATE 12.5 MG: 25 TABLET ORAL at 21:27

## 2019-11-23 RX ADMIN — SODIUM CHLORIDE, PRESERVATIVE FREE 10 ML: 5 INJECTION INTRAVENOUS at 21:39

## 2019-11-23 RX ADMIN — GUAIFENESIN 1200 MG: 600 TABLET, EXTENDED RELEASE ORAL at 21:27

## 2019-11-23 RX ADMIN — IPRATROPIUM BROMIDE AND ALBUTEROL SULFATE 3 ML: 2.5; .5 SOLUTION RESPIRATORY (INHALATION) at 23:00

## 2019-11-23 RX ADMIN — HALOPERIDOL LACTATE 1 MG: 5 INJECTION, SOLUTION INTRAMUSCULAR at 17:48

## 2019-11-23 RX ADMIN — BUDESONIDE AND FORMOTEROL FUMARATE DIHYDRATE 2 PUFF: 160; 4.5 AEROSOL RESPIRATORY (INHALATION) at 18:30

## 2019-11-23 RX ADMIN — MEGESTROL ACETATE 400 MG: 40 SUSPENSION ORAL at 10:06

## 2019-11-23 RX ADMIN — PANTOPRAZOLE SODIUM 40 MG: 40 TABLET, DELAYED RELEASE ORAL at 05:22

## 2019-11-23 RX ADMIN — METHYLPREDNISOLONE SODIUM SUCCINATE 60 MG: 125 INJECTION, POWDER, FOR SOLUTION INTRAMUSCULAR; INTRAVENOUS at 05:22

## 2019-11-23 RX ADMIN — NICOTINE 1 PATCH: 7 PATCH, EXTENDED RELEASE TRANSDERMAL at 10:06

## 2019-11-23 RX ADMIN — IPRATROPIUM BROMIDE AND ALBUTEROL SULFATE 3 ML: 2.5; .5 SOLUTION RESPIRATORY (INHALATION) at 06:58

## 2019-11-24 PROBLEM — E87.6 HYPOKALEMIA: Status: ACTIVE | Noted: 2019-11-24

## 2019-11-24 LAB
ANION GAP SERPL CALCULATED.3IONS-SCNC: 7 MMOL/L (ref 5–15)
BUN BLD-MCNC: 27 MG/DL (ref 6–20)
BUN/CREAT SERPL: 48.2 (ref 7–25)
CALCIUM SPEC-SCNC: 9.1 MG/DL (ref 8.6–10.5)
CHLORIDE SERPL-SCNC: 83 MMOL/L (ref 98–107)
CO2 SERPL-SCNC: 44 MMOL/L (ref 22–29)
CREAT BLD-MCNC: 0.56 MG/DL (ref 0.57–1)
DEPRECATED RDW RBC AUTO: 50.5 FL (ref 37–54)
ERYTHROCYTE [DISTWIDTH] IN BLOOD BY AUTOMATED COUNT: 14.6 % (ref 12.3–15.4)
GFR SERPL CREATININE-BSD FRML MDRD: 112 ML/MIN/1.73
GLUCOSE BLD-MCNC: 135 MG/DL (ref 65–99)
HCT VFR BLD AUTO: 36.9 % (ref 34–46.6)
HGB BLD-MCNC: 11.9 G/DL (ref 12–15.9)
MCH RBC QN AUTO: 30.2 PG (ref 26.6–33)
MCHC RBC AUTO-ENTMCNC: 32.2 G/DL (ref 31.5–35.7)
MCV RBC AUTO: 93.7 FL (ref 79–97)
PLATELET # BLD AUTO: 330 10*3/MM3 (ref 140–450)
PMV BLD AUTO: 9.6 FL (ref 6–12)
POTASSIUM BLD-SCNC: 3.3 MMOL/L (ref 3.5–5.2)
RBC # BLD AUTO: 3.94 10*6/MM3 (ref 3.77–5.28)
SODIUM BLD-SCNC: 134 MMOL/L (ref 136–145)
WBC NRBC COR # BLD: 6.13 10*3/MM3 (ref 3.4–10.8)

## 2019-11-24 PROCEDURE — 85027 COMPLETE CBC AUTOMATED: CPT | Performed by: INTERNAL MEDICINE

## 2019-11-24 PROCEDURE — 25010000002 KETOROLAC TROMETHAMINE PER 15 MG: Performed by: FAMILY MEDICINE

## 2019-11-24 PROCEDURE — 25010000002 METHYLPREDNISOLONE PER 125 MG: Performed by: INTERNAL MEDICINE

## 2019-11-24 PROCEDURE — 94799 UNLISTED PULMONARY SVC/PX: CPT

## 2019-11-24 PROCEDURE — 25010000002 CEFTRIAXONE PER 250 MG: Performed by: NURSE PRACTITIONER

## 2019-11-24 PROCEDURE — 94660 CPAP INITIATION&MGMT: CPT

## 2019-11-24 PROCEDURE — 25010000002 AZITHROMYCIN PER 500 MG: Performed by: INTERNAL MEDICINE

## 2019-11-24 PROCEDURE — 80048 BASIC METABOLIC PNL TOTAL CA: CPT | Performed by: INTERNAL MEDICINE

## 2019-11-24 PROCEDURE — 25010000002 ENOXAPARIN PER 10 MG: Performed by: NURSE PRACTITIONER

## 2019-11-24 RX ORDER — METHYLPREDNISOLONE SODIUM SUCCINATE 40 MG/ML
40 INJECTION, POWDER, LYOPHILIZED, FOR SOLUTION INTRAMUSCULAR; INTRAVENOUS EVERY 12 HOURS
Status: DISCONTINUED | OUTPATIENT
Start: 2019-11-24 | End: 2019-11-26 | Stop reason: HOSPADM

## 2019-11-24 RX ORDER — POTASSIUM CHLORIDE 750 MG/1
40 CAPSULE, EXTENDED RELEASE ORAL ONCE
Status: COMPLETED | OUTPATIENT
Start: 2019-11-24 | End: 2019-11-24

## 2019-11-24 RX ORDER — IPRATROPIUM BROMIDE AND ALBUTEROL SULFATE 2.5; .5 MG/3ML; MG/3ML
3 SOLUTION RESPIRATORY (INHALATION)
Status: DISCONTINUED | OUTPATIENT
Start: 2019-11-25 | End: 2019-11-26 | Stop reason: HOSPADM

## 2019-11-24 RX ADMIN — METHYLPREDNISOLONE SODIUM SUCCINATE 40 MG: 125 INJECTION, POWDER, FOR SOLUTION INTRAMUSCULAR; INTRAVENOUS at 11:15

## 2019-11-24 RX ADMIN — BUDESONIDE AND FORMOTEROL FUMARATE DIHYDRATE 2 PUFF: 160; 4.5 AEROSOL RESPIRATORY (INHALATION) at 06:48

## 2019-11-24 RX ADMIN — TOPIRAMATE 100 MG: 100 TABLET, FILM COATED ORAL at 08:04

## 2019-11-24 RX ADMIN — FUROSEMIDE 20 MG: 20 TABLET ORAL at 17:22

## 2019-11-24 RX ADMIN — ASPIRIN 81 MG: 81 TABLET ORAL at 08:00

## 2019-11-24 RX ADMIN — CEFTRIAXONE SODIUM 1 G: 1 INJECTION, POWDER, FOR SOLUTION INTRAMUSCULAR; INTRAVENOUS at 00:20

## 2019-11-24 RX ADMIN — IPRATROPIUM BROMIDE AND ALBUTEROL SULFATE 3 ML: 2.5; .5 SOLUTION RESPIRATORY (INHALATION) at 10:39

## 2019-11-24 RX ADMIN — METOPROLOL TARTRATE 12.5 MG: 25 TABLET, FILM COATED ORAL at 20:03

## 2019-11-24 RX ADMIN — TOPIRAMATE 100 MG: 100 TABLET, FILM COATED ORAL at 20:03

## 2019-11-24 RX ADMIN — ENOXAPARIN SODIUM 30 MG: 30 INJECTION SUBCUTANEOUS at 08:00

## 2019-11-24 RX ADMIN — GUAIFENESIN 1200 MG: 600 TABLET, EXTENDED RELEASE ORAL at 20:03

## 2019-11-24 RX ADMIN — PANTOPRAZOLE SODIUM 40 MG: 40 TABLET, DELAYED RELEASE ORAL at 07:07

## 2019-11-24 RX ADMIN — IPRATROPIUM BROMIDE AND ALBUTEROL SULFATE 3 ML: 2.5; .5 SOLUTION RESPIRATORY (INHALATION) at 03:03

## 2019-11-24 RX ADMIN — BUDESONIDE AND FORMOTEROL FUMARATE DIHYDRATE 2 PUFF: 160; 4.5 AEROSOL RESPIRATORY (INHALATION) at 18:51

## 2019-11-24 RX ADMIN — IPRATROPIUM BROMIDE AND ALBUTEROL SULFATE 3 ML: 2.5; .5 SOLUTION RESPIRATORY (INHALATION) at 06:48

## 2019-11-24 RX ADMIN — QUETIAPINE FUMARATE 12.5 MG: 25 TABLET ORAL at 20:03

## 2019-11-24 RX ADMIN — SODIUM CHLORIDE, PRESERVATIVE FREE 10 ML: 5 INJECTION INTRAVENOUS at 08:08

## 2019-11-24 RX ADMIN — IPRATROPIUM BROMIDE AND ALBUTEROL SULFATE 3 ML: 2.5; .5 SOLUTION RESPIRATORY (INHALATION) at 18:51

## 2019-11-24 RX ADMIN — MEGESTROL ACETATE 400 MG: 40 SUSPENSION ORAL at 08:00

## 2019-11-24 RX ADMIN — METHYLPREDNISOLONE SODIUM SUCCINATE 40 MG: 125 INJECTION, POWDER, FOR SOLUTION INTRAMUSCULAR; INTRAVENOUS at 02:47

## 2019-11-24 RX ADMIN — IPRATROPIUM BROMIDE AND ALBUTEROL SULFATE 3 ML: 2.5; .5 SOLUTION RESPIRATORY (INHALATION) at 14:11

## 2019-11-24 RX ADMIN — POTASSIUM CHLORIDE 40 MEQ: 750 CAPSULE, EXTENDED RELEASE ORAL at 12:18

## 2019-11-24 RX ADMIN — SODIUM CHLORIDE, PRESERVATIVE FREE 10 ML: 5 INJECTION INTRAVENOUS at 20:03

## 2019-11-24 RX ADMIN — METOPROLOL TARTRATE 12.5 MG: 25 TABLET, FILM COATED ORAL at 08:00

## 2019-11-24 RX ADMIN — METHYLPREDNISOLONE SODIUM SUCCINATE 40 MG: 125 INJECTION, POWDER, FOR SOLUTION INTRAMUSCULAR; INTRAVENOUS at 22:29

## 2019-11-24 RX ADMIN — NICOTINE 1 PATCH: 7 PATCH, EXTENDED RELEASE TRANSDERMAL at 08:04

## 2019-11-24 RX ADMIN — KETOROLAC TROMETHAMINE 15 MG: 30 INJECTION, SOLUTION INTRAMUSCULAR; INTRAVENOUS at 11:14

## 2019-11-24 RX ADMIN — TRAMADOL HYDROCHLORIDE 50 MG: 50 TABLET ORAL at 13:43

## 2019-11-24 RX ADMIN — FUROSEMIDE 20 MG: 20 TABLET ORAL at 08:00

## 2019-11-24 RX ADMIN — TRAMADOL HYDROCHLORIDE 50 MG: 50 TABLET ORAL at 07:56

## 2019-11-24 RX ADMIN — AZITHROMYCIN MONOHYDRATE 500 MG: 500 INJECTION, POWDER, LYOPHILIZED, FOR SOLUTION INTRAVENOUS at 11:15

## 2019-11-24 RX ADMIN — TRAMADOL HYDROCHLORIDE 50 MG: 50 TABLET ORAL at 20:03

## 2019-11-24 RX ADMIN — GUAIFENESIN 1200 MG: 600 TABLET, EXTENDED RELEASE ORAL at 08:00

## 2019-11-25 LAB
ANION GAP SERPL CALCULATED.3IONS-SCNC: 6 MMOL/L (ref 5–15)
BACTERIA SPEC AEROBE CULT: NORMAL
BACTERIA SPEC AEROBE CULT: NORMAL
BUN BLD-MCNC: 27 MG/DL (ref 6–20)
BUN/CREAT SERPL: 40.9 (ref 7–25)
CALCIUM SPEC-SCNC: 9.1 MG/DL (ref 8.6–10.5)
CHLORIDE SERPL-SCNC: 84 MMOL/L (ref 98–107)
CO2 SERPL-SCNC: 42 MMOL/L (ref 22–29)
CREAT BLD-MCNC: 0.66 MG/DL (ref 0.57–1)
GFR SERPL CREATININE-BSD FRML MDRD: 92 ML/MIN/1.73
GLUCOSE BLD-MCNC: 121 MG/DL (ref 65–99)
POTASSIUM BLD-SCNC: 4.1 MMOL/L (ref 3.5–5.2)
SODIUM BLD-SCNC: 132 MMOL/L (ref 136–145)

## 2019-11-25 PROCEDURE — 94799 UNLISTED PULMONARY SVC/PX: CPT

## 2019-11-25 PROCEDURE — 97116 GAIT TRAINING THERAPY: CPT

## 2019-11-25 PROCEDURE — 97535 SELF CARE MNGMENT TRAINING: CPT

## 2019-11-25 PROCEDURE — 25010000002 ENOXAPARIN PER 10 MG: Performed by: NURSE PRACTITIONER

## 2019-11-25 PROCEDURE — 94660 CPAP INITIATION&MGMT: CPT

## 2019-11-25 PROCEDURE — 25010000002 METHYLPREDNISOLONE PER 40 MG: Performed by: INTERNAL MEDICINE

## 2019-11-25 PROCEDURE — 97110 THERAPEUTIC EXERCISES: CPT

## 2019-11-25 PROCEDURE — 80048 BASIC METABOLIC PNL TOTAL CA: CPT | Performed by: INTERNAL MEDICINE

## 2019-11-25 RX ADMIN — FUROSEMIDE 20 MG: 20 TABLET ORAL at 17:34

## 2019-11-25 RX ADMIN — MEGESTROL ACETATE 400 MG: 40 SUSPENSION ORAL at 09:01

## 2019-11-25 RX ADMIN — SODIUM CHLORIDE, PRESERVATIVE FREE 10 ML: 5 INJECTION INTRAVENOUS at 20:19

## 2019-11-25 RX ADMIN — ENOXAPARIN SODIUM 30 MG: 30 INJECTION SUBCUTANEOUS at 09:00

## 2019-11-25 RX ADMIN — QUETIAPINE FUMARATE 12.5 MG: 25 TABLET ORAL at 20:18

## 2019-11-25 RX ADMIN — IPRATROPIUM BROMIDE AND ALBUTEROL SULFATE 3 ML: 2.5; .5 SOLUTION RESPIRATORY (INHALATION) at 10:44

## 2019-11-25 RX ADMIN — TOPIRAMATE 100 MG: 100 TABLET, FILM COATED ORAL at 09:00

## 2019-11-25 RX ADMIN — TOPIRAMATE 100 MG: 100 TABLET, FILM COATED ORAL at 20:19

## 2019-11-25 RX ADMIN — TRAMADOL HYDROCHLORIDE 50 MG: 50 TABLET ORAL at 22:34

## 2019-11-25 RX ADMIN — TRAMADOL HYDROCHLORIDE 50 MG: 50 TABLET ORAL at 10:23

## 2019-11-25 RX ADMIN — IPRATROPIUM BROMIDE AND ALBUTEROL SULFATE 3 ML: 2.5; .5 SOLUTION RESPIRATORY (INHALATION) at 06:32

## 2019-11-25 RX ADMIN — IPRATROPIUM BROMIDE AND ALBUTEROL SULFATE 3 ML: 2.5; .5 SOLUTION RESPIRATORY (INHALATION) at 20:03

## 2019-11-25 RX ADMIN — BUDESONIDE AND FORMOTEROL FUMARATE DIHYDRATE 2 PUFF: 160; 4.5 AEROSOL RESPIRATORY (INHALATION) at 20:03

## 2019-11-25 RX ADMIN — GUAIFENESIN 1200 MG: 600 TABLET, EXTENDED RELEASE ORAL at 20:18

## 2019-11-25 RX ADMIN — GUAIFENESIN 1200 MG: 600 TABLET, EXTENDED RELEASE ORAL at 09:00

## 2019-11-25 RX ADMIN — METHYLPREDNISOLONE SODIUM SUCCINATE 40 MG: 125 INJECTION, POWDER, FOR SOLUTION INTRAMUSCULAR; INTRAVENOUS at 22:34

## 2019-11-25 RX ADMIN — METHYLPREDNISOLONE SODIUM SUCCINATE 40 MG: 125 INJECTION, POWDER, FOR SOLUTION INTRAMUSCULAR; INTRAVENOUS at 10:24

## 2019-11-25 RX ADMIN — ASPIRIN 81 MG: 81 TABLET ORAL at 09:00

## 2019-11-25 RX ADMIN — NICOTINE 1 PATCH: 7 PATCH, EXTENDED RELEASE TRANSDERMAL at 09:00

## 2019-11-25 RX ADMIN — TRAMADOL HYDROCHLORIDE 50 MG: 50 TABLET ORAL at 03:59

## 2019-11-25 RX ADMIN — HYDROXYZINE HYDROCHLORIDE 25 MG: 25 TABLET ORAL at 09:03

## 2019-11-25 RX ADMIN — METOPROLOL TARTRATE 12.5 MG: 25 TABLET, FILM COATED ORAL at 09:00

## 2019-11-25 RX ADMIN — METOPROLOL TARTRATE 12.5 MG: 25 TABLET, FILM COATED ORAL at 20:19

## 2019-11-25 RX ADMIN — PANTOPRAZOLE SODIUM 40 MG: 40 TABLET, DELAYED RELEASE ORAL at 03:59

## 2019-11-25 RX ADMIN — SODIUM CHLORIDE, PRESERVATIVE FREE 10 ML: 5 INJECTION INTRAVENOUS at 09:01

## 2019-11-25 RX ADMIN — TRAMADOL HYDROCHLORIDE 50 MG: 50 TABLET ORAL at 16:22

## 2019-11-25 RX ADMIN — BUDESONIDE AND FORMOTEROL FUMARATE DIHYDRATE 2 PUFF: 160; 4.5 AEROSOL RESPIRATORY (INHALATION) at 06:38

## 2019-11-25 RX ADMIN — IPRATROPIUM BROMIDE AND ALBUTEROL SULFATE 3 ML: 2.5; .5 SOLUTION RESPIRATORY (INHALATION) at 15:01

## 2019-11-25 RX ADMIN — FUROSEMIDE 20 MG: 20 TABLET ORAL at 09:00

## 2019-11-26 VITALS
RESPIRATION RATE: 18 BRPM | BODY MASS INDEX: 17.73 KG/M2 | DIASTOLIC BLOOD PRESSURE: 82 MMHG | HEART RATE: 89 BPM | HEIGHT: 61 IN | TEMPERATURE: 98 F | OXYGEN SATURATION: 97 % | WEIGHT: 93.92 LBS | SYSTOLIC BLOOD PRESSURE: 136 MMHG

## 2019-11-26 PROCEDURE — 94799 UNLISTED PULMONARY SVC/PX: CPT

## 2019-11-26 PROCEDURE — 97110 THERAPEUTIC EXERCISES: CPT

## 2019-11-26 PROCEDURE — 97116 GAIT TRAINING THERAPY: CPT

## 2019-11-26 PROCEDURE — 25010000002 ENOXAPARIN PER 10 MG: Performed by: NURSE PRACTITIONER

## 2019-11-26 PROCEDURE — 25010000002 METHYLPREDNISOLONE PER 40 MG: Performed by: INTERNAL MEDICINE

## 2019-11-26 PROCEDURE — 94660 CPAP INITIATION&MGMT: CPT

## 2019-11-26 RX ORDER — TOPIRAMATE 100 MG/1
100 TABLET, FILM COATED ORAL EVERY 12 HOURS SCHEDULED
Qty: 60 TABLET | Refills: 0 | Status: SHIPPED | OUTPATIENT
Start: 2019-11-26

## 2019-11-26 RX ORDER — HYDROCODONE BITARTRATE AND ACETAMINOPHEN 5; 325 MG/1; MG/1
0.5 TABLET ORAL EVERY 6 HOURS PRN
Status: DISCONTINUED | OUTPATIENT
Start: 2019-11-26 | End: 2019-11-26 | Stop reason: HOSPADM

## 2019-11-26 RX ORDER — TRAMADOL HYDROCHLORIDE 50 MG/1
50 TABLET ORAL EVERY 6 HOURS PRN
Qty: 60 TABLET | Refills: 0 | Status: SHIPPED | OUTPATIENT
Start: 2019-11-26 | End: 2019-12-01

## 2019-11-26 RX ORDER — HYDROCODONE BITARTRATE AND ACETAMINOPHEN 5; 325 MG/1; MG/1
1 TABLET ORAL EVERY 6 HOURS PRN
Status: DISCONTINUED | OUTPATIENT
Start: 2019-11-26 | End: 2019-11-26

## 2019-11-26 RX ADMIN — FUROSEMIDE 20 MG: 20 TABLET ORAL at 10:05

## 2019-11-26 RX ADMIN — SODIUM CHLORIDE, PRESERVATIVE FREE 10 ML: 5 INJECTION INTRAVENOUS at 10:08

## 2019-11-26 RX ADMIN — ASPIRIN 81 MG: 81 TABLET ORAL at 10:06

## 2019-11-26 RX ADMIN — ACETAMINOPHEN 650 MG: 325 TABLET, FILM COATED ORAL at 14:20

## 2019-11-26 RX ADMIN — GUAIFENESIN 1200 MG: 600 TABLET, EXTENDED RELEASE ORAL at 10:06

## 2019-11-26 RX ADMIN — BUDESONIDE AND FORMOTEROL FUMARATE DIHYDRATE 2 PUFF: 160; 4.5 AEROSOL RESPIRATORY (INHALATION) at 09:31

## 2019-11-26 RX ADMIN — TOPIRAMATE 100 MG: 100 TABLET, FILM COATED ORAL at 10:05

## 2019-11-26 RX ADMIN — PANTOPRAZOLE SODIUM 40 MG: 40 TABLET, DELAYED RELEASE ORAL at 06:01

## 2019-11-26 RX ADMIN — TRAMADOL HYDROCHLORIDE 50 MG: 50 TABLET ORAL at 04:21

## 2019-11-26 RX ADMIN — METOPROLOL TARTRATE 12.5 MG: 25 TABLET, FILM COATED ORAL at 10:05

## 2019-11-26 RX ADMIN — HYDROXYZINE HYDROCHLORIDE 25 MG: 25 TABLET ORAL at 02:12

## 2019-11-26 RX ADMIN — TRAMADOL HYDROCHLORIDE 50 MG: 50 TABLET ORAL at 16:30

## 2019-11-26 RX ADMIN — NICOTINE 1 PATCH: 7 PATCH, EXTENDED RELEASE TRANSDERMAL at 10:08

## 2019-11-26 RX ADMIN — METHYLPREDNISOLONE SODIUM SUCCINATE 40 MG: 125 INJECTION, POWDER, FOR SOLUTION INTRAMUSCULAR; INTRAVENOUS at 10:20

## 2019-11-26 RX ADMIN — IPRATROPIUM BROMIDE AND ALBUTEROL SULFATE 3 ML: 2.5; .5 SOLUTION RESPIRATORY (INHALATION) at 07:27

## 2019-11-26 RX ADMIN — IPRATROPIUM BROMIDE AND ALBUTEROL SULFATE 3 ML: 2.5; .5 SOLUTION RESPIRATORY (INHALATION) at 13:36

## 2019-11-26 RX ADMIN — ENOXAPARIN SODIUM 30 MG: 30 INJECTION SUBCUTANEOUS at 10:06

## 2019-11-26 RX ADMIN — IPRATROPIUM BROMIDE AND ALBUTEROL SULFATE 3 ML: 2.5; .5 SOLUTION RESPIRATORY (INHALATION) at 10:30

## 2019-11-26 RX ADMIN — HYDROXYZINE HYDROCHLORIDE 25 MG: 25 TABLET ORAL at 10:05

## 2019-11-26 RX ADMIN — FUROSEMIDE 20 MG: 20 TABLET ORAL at 16:55

## 2019-11-26 RX ADMIN — MEGESTROL ACETATE 400 MG: 40 SUSPENSION ORAL at 10:07

## 2019-11-26 RX ADMIN — TRAMADOL HYDROCHLORIDE 50 MG: 50 TABLET ORAL at 10:20

## 2020-12-03 NOTE — PROGRESS NOTES
[facility] Medicine Services  INPATIENT PROGRESS NOTE    Length of Stay: 2  Date of Admission: 6/4/2019  Primary Care Physician: Lorri Guan APRN    Subjective   Chief Complaint: Follow-up shortness of breath/confusion  HPI   Patient is resting in bed working with occupational therapy.  She tells me that she is feeling much better overall.  She feels that she is breathing easier, but was scared last night when her oxygen level dropped into the 70s.  She is currently on 2 L, which is her home setting.  Her oxygen level fluctuates between 84-88% during our conversation.  She states that her cough has decreased in frequency.  She is still bringing up some thick, tan sputum at times.    Review of Systems   All pertinent negatives and positives are as above. All other systems have been reviewed and are negative unless otherwise stated.     Objective    Temp:  [97.6 °F (36.4 °C)-98.9 °F (37.2 °C)] 98.1 °F (36.7 °C)  Heart Rate:  [72-98] 81  Resp:  [16-29] 20  BP: (105-116)/(44-61) 114/53  FiO2 (%):  [35 %] 35 %  Physical Exam  Constitutional: She is oriented to person, place, and time. She appears well-developed. No distress.   Thin, chronically ill-appearing   HENT:   Head: Normocephalic and atraumatic.   Neck: Normal range of motion. Neck supple. No JVD present. No tracheal deviation present.   Cardiovascular: Normal rate, regular rhythm, normal heart sounds and intact distal pulses. Exam reveals no gallop and no friction rub.   Sinus bradycardia-sinus 57-98  Pulmonary/Chest: She has wheezes. She has no rales.   Poor air movement throughout.  Grossly diminished.   Abdominal: Soft. Bowel sounds are normal. She exhibits no distension. There is no tenderness.   Musculoskeletal: Normal range of motion. She exhibits no edema or tenderness.   Neurological: She is oriented to person, place, and time. No cranial nerve deficit.   Skin: Skin is warm and dry. No rash noted. No  erythema.   Psychiatric: She has a normal mood and affect. Her behavior is normal. Judgment and thought content normal.   Vitals reviewed.    Results Review:  I have reviewed the labs, radiology results, and diagnostic studies.    Laboratory Data:   Results from last 7 days   Lab Units 06/06/19  0609 06/05/19  0540 06/04/19  1635   WBC 10*3/mm3 8.37 3.83* 9.51   HEMOGLOBIN g/dL 9.7* 10.6* 11.0*   HEMATOCRIT % 29.5* 34.4* 36.4*   PLATELETS 10*3/mm3 213 241 247     Results from last 7 days   Lab Units 06/06/19  0609 06/05/19  0540 06/04/19  1744 06/04/19  1635   SODIUM mmol/L 131* 136  --  138   SODIUM, ARTERIAL mmol/L  --   --  141  --    POTASSIUM mmol/L 4.4 4.5  --  4.4   CHLORIDE mmol/L 85* 83*  --  81*   CO2 mmol/L >40.0* >40.0*  --  >40.0*   BUN mg/dL 22* 19  --  20   CREATININE mg/dL 0.59 0.54  --  0.52   CALCIUM mg/dL 8.9 9.0  --  9.0   BILIRUBIN mg/dL  --   --   --  0.4   ALK PHOS U/L  --   --   --  94   ALT (SGPT) U/L  --   --   --  15   AST (SGOT) U/L  --   --   --  26   GLUCOSE mg/dL 122* 121*  --  130*     Radiology Data:   Imaging Results (last 24 hours)     Procedure Component Value Units Date/Time    XR Chest PA & Lateral [[id number]] Collected:  06/05/19 1333     Updated:  06/05/19 1338    Narrative:       History:  58-year-old with perihilar infiltrate.     Reference:  Chest radiograph one day prior.     Findings:  Frontal and lateral chest radiographs performed.     Normal heart size. Atherosclerotic aortic arch. Tiny calcified granuloma  left apex. Emphysema. Vague opacity in the central left upper lobe is  again noted, slightly less conspicuous. No pleural effusion or  pneumothorax. No acute osseous abnormalities.          Impression:       Persistent vague infiltrate in the central left upper lobe, favor  pneumonia.  This report was finalized on 06/05/2019 13:35 by Dr Andrew Green, .        I have reviewed the patient current medications.     Assessment/Plan     Active Hospital Problems    Diagnosis    • Acute on chronic respiratory failure with hypoxia and hypercapnia (CMS/HCC)   • Macrocytic anemia   • Community acquired pneumonia of left upper lobe of lung (CMS/HCC)   • Severe protein-calorie malnutrition (CMS/HCC)   • Tobacco abuse   • COPD with acute exacerbation (CMS/HCC)     Plan:  1.  PA lateral chest x-ray was performed yesterday which shows a persistent vague infiltrate in the central left upper lobe which is favorable for pneumonia.  Rocephin 1 g daily added.  Today will complete 3 days of azithromycin therapy.  2.  Continue Symbicort, duo nebs, Mucinex, and encouraged use of incentive spirometer and flutter valve.  3.  Decrease Solu-Medrol to 40 mg every 8 hours  4.  ABG is much improved, PCO2 is now 65.4.  She remains compensated with normal pH.  Continue BiPAP therapy at night until her trilogy has been fixed.  5.  Monitor hyponatremia, encouraged oral intake  6.  Appreciate dietitian assistance, severe malnutrition noted on malnutrition assessment.  Continue supplementation.  Patient states that she has had a very poor appetite for some time, which she blames partly on stress from raising a grandchild.  She feels that she puts her health on the back burner.  She is agreeable to try Megace.  7.  Continue physical and occupational therapy, patient would benefit from home health.   following.  8.  Up to chair with meals  9.  TSH is low but free T4 is normal.  Would recommend rechecking thyroid function tests in 6 weeks.  10.  Labs in a.m.    Discharge Planning: I expect the patient to be discharged to home with home health in 2-3 days.    ROLAND Yanez   06/06/19   8:26 AM    Chart reviewed   Patient examined  Agree with assessment and plan.   Discussed with patient and SOLOMON Curry DO  06/06/19  12:32 PM     Complex Repair And Xenograft Text: The defect edges were debeveled with a #15 scalpel blade.  The primary defect was closed partially with a complex linear closure.  Given the location of the defect, shape of the defect and the proximity to free margins a xenograft was deemed most appropriate to repair the remaining defect.  The graft was trimmed to fit the size of the remaining defect.  The graft was then placed in the primary defect, oriented appropriately, and sutured into place.